# Patient Record
Sex: FEMALE | Race: WHITE | Employment: OTHER | URBAN - METROPOLITAN AREA
[De-identification: names, ages, dates, MRNs, and addresses within clinical notes are randomized per-mention and may not be internally consistent; named-entity substitution may affect disease eponyms.]

---

## 2017-01-03 ENCOUNTER — OFFICE VISIT (OUTPATIENT)
Dept: FAMILY MEDICINE | Facility: CLINIC | Age: 72
End: 2017-01-03
Payer: MEDICARE

## 2017-01-03 VITALS
HEIGHT: 64 IN | DIASTOLIC BLOOD PRESSURE: 72 MMHG | SYSTOLIC BLOOD PRESSURE: 134 MMHG | BODY MASS INDEX: 23.93 KG/M2 | WEIGHT: 140.19 LBS | HEART RATE: 68 BPM | OXYGEN SATURATION: 97 %
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
## 2017-01-03 DIAGNOSIS — M54.2 NECK PAIN: Primary | ICD-10-CM

## 2017-01-03 DIAGNOSIS — Z23 NEED FOR INFLUENZA VACCINATION: ICD-10-CM

## 2017-01-03 DIAGNOSIS — M54.50 ACUTE BILATERAL LOW BACK PAIN WITHOUT SCIATICA: ICD-10-CM

## 2017-01-03 PROCEDURE — 99999 PR PBB SHADOW E&M-EST. PATIENT-LVL IV: CPT | Mod: PBBFAC,,, | Performed by: FAMILY MEDICINE

## 2017-01-03 PROCEDURE — 1125F AMNT PAIN NOTED PAIN PRSNT: CPT | Mod: S$GLB,,, | Performed by: FAMILY MEDICINE

## 2017-01-03 PROCEDURE — 1157F ADVNC CARE PLAN IN RCRD: CPT | Mod: S$GLB,,, | Performed by: FAMILY MEDICINE

## 2017-01-03 PROCEDURE — 1159F MED LIST DOCD IN RCRD: CPT | Mod: S$GLB,,, | Performed by: FAMILY MEDICINE

## 2017-01-03 PROCEDURE — 99214 OFFICE O/P EST MOD 30 MIN: CPT | Mod: 25,S$GLB,, | Performed by: FAMILY MEDICINE

## 2017-01-03 PROCEDURE — 90688 IIV4 VACCINE SPLT 0.5 ML IM: CPT | Mod: S$GLB,,, | Performed by: FAMILY MEDICINE

## 2017-01-03 PROCEDURE — 1160F RVW MEDS BY RX/DR IN RCRD: CPT | Mod: S$GLB,,, | Performed by: FAMILY MEDICINE

## 2017-01-03 PROCEDURE — 3075F SYST BP GE 130 - 139MM HG: CPT | Mod: S$GLB,,, | Performed by: FAMILY MEDICINE

## 2017-01-03 PROCEDURE — G0008 ADMIN INFLUENZA VIRUS VAC: HCPCS | Mod: S$GLB,,, | Performed by: FAMILY MEDICINE

## 2017-01-03 PROCEDURE — 3078F DIAST BP <80 MM HG: CPT | Mod: S$GLB,,, | Performed by: FAMILY MEDICINE

## 2017-01-03 RX ORDER — HYDROCODONE BITARTRATE AND ACETAMINOPHEN 5; 325 MG/1; MG/1
1 TABLET ORAL EVERY 8 HOURS PRN
Qty: 30 TABLET | Refills: 0 | Status: SHIPPED | OUTPATIENT
Start: 2017-01-03 | End: 2017-06-14

## 2017-01-03 RX ORDER — CYCLOBENZAPRINE HCL 5 MG
5 TABLET ORAL 3 TIMES DAILY PRN
Qty: 30 TABLET | Refills: 0 | Status: SHIPPED | OUTPATIENT
Start: 2017-01-03 | End: 2017-01-13

## 2017-01-03 NOTE — MR AVS SNAPSHOT
[facility]   [place]  [place] [address]  Phone: [phone]  Fax: [phone]                  Grace Navarro   1/3/2017 4:00 PM   Office Visit    Description:  Female : 1945   Provider:  Erasto Maier MD   Department:  [facility]           Reason for Visit     Fall     Back Pain     Neck Pain           Diagnoses this Visit        Comments    Neck pain    -  Primary     Acute bilateral low back pain without sciatica         Need for influenza vaccination                To Do List           Future Appointments        Provider Department Dept Phone    2017 1:30 PM KENH XR1 300 LB LIMIT [facility] [phone]    2017 1:45 PM KENH XR1 300 LB LIMIT [facility] [phone]    2017 2:00 PM KENH XR1 300 LB LIMIT [facility] [phone]    2017 2:15 PM KENH XR1 300 LB LIMIT [facility] [phone]    2017 8:00 AM Funmi An, PT [facility] [phone]      Goals (5 Years of Data)     None       These Medications        Disp Refills Start End    hydrocodone-acetaminophen 5-325mg (NORCO) 5-325 mg per tablet 30 tablet 0 1/3/2017     Take 1 tablet by mouth every 8 (eight) hours as needed for Pain. - Oral    Pharmacy: [facility] [address] Ph #: [phone]       cyclobenzaprine (FLEXERIL) 5 MG tablet 30 tablet 0 1/3/2017 2017    Take 1 tablet (5 mg total) by mouth 3 (three) times daily as needed. - Oral    Pharmacy: [facility]  [place] [address] Ph #: [phone]         [facility]     [facility] Care [facility] -  Assistance  Registered nurses in the [facility] provide clinical advisement, health education, appointment booking, and other advisory services.  Call for this free service at [phone].             Medications            Message regarding Medications     Verify the changes and/or additions to your medication regime listed below are the same as discussed with your clinician today.  If any of these changes or additions are incorrect, please notify your healthcare provider.        START taking these NEW medications        Refills    hydrocodone-acetaminophen 5-325mg (NORCO) 5-325 mg per tablet 0    Sig: Take 1 tablet by mouth every 8 (eight) hours as needed for Pain.    Class: Print    Route: Oral    cyclobenzaprine (FLEXERIL) 5 MG tablet 0    Sig: Take 1 tablet (5 mg total) by mouth 3 (three) times daily as needed.    Class: Normal    Route: Oral      STOP taking these medications     acetaminophen (TYLENOL) 325 MG tablet Take 325 mg by mouth every 6 (six) hours as needed for Pain.    tramadol (ULTRAM) 50 mg tablet Take 50 mg by mouth every 6 (six) hours as needed for Pain.           Verify that the below list of medications is an accurate representation of the medications you are currently taking.  If none reported, the list may be blank. If incorrect, please contact your healthcare provider. Carry this list with you in case of emergency.           Current Medications     albuterol 90 mcg/actuation inhaler Inhale 2 puffs into the lungs every 6 (six) hours as needed for Wheezing.    ascorbic acid (VITAMIN C) 1000 MG tablet Take 1,000 mg by mouth once daily.    azelastine (ASTELIN) 137 mcg (0.1 %) nasal spray 1 spray (137 mcg total) by Nasal route 2 (two) times daily.    calcium-vitamin D3 (CALCIUM 500 + D) 500 mg(1,250mg) -200 unit per tablet Take 1 tablet by mouth 2 (two) times daily with meals.    cetirizine 10 mg chewable tablet Take 10 mg by mouth once daily.    metoprolol succinate (TOPROL-XL) 200 MG 24 hr tablet Take 1 tablet (200 mg total) by mouth once daily.    piroxicam (FELDENE) 10 MG Cap Take 1 capsule (10 mg total) by mouth once daily.    cyclobenzaprine (FLEXERIL) 5 MG tablet Take 1 tablet (5 mg total) by mouth 3  "(three) times daily as needed.    hydrocodone-acetaminophen 5-325mg (NORCO) 5-325 mg per tablet Take 1 tablet by mouth every 8 (eight) hours as needed for Pain.           Clinical Reference Information           Vital Signs - Last Recorded  Most recent update: 1/3/2017  3:59 PM by Mary Centeno MA    BP Pulse Ht Wt SpO2 BMI    134/72 (BP Location: Right arm, Patient Position: Sitting, BP Method: Manual) 68 5' 4" (1.626 m) 63.6 kg (140 lb 3.4 oz) 97% 24.07 kg/m2      Blood Pressure          Most Recent Value    BP  134/72      Allergies as of 1/3/2017     Ace Inhibitors    Morphine      Immunizations Administered on Date of Encounter - 1/3/2017     Name Date Dose VIS Date Route    influenza - Quadrivalent  Incomplete 0.5 mL 8/7/2015 Intramuscular      Orders Placed During Today's Visit      Normal Orders This Visit    Influenza - Quadrivalent (3 years & older)     Future Labs/Procedures Expected by Expires    X-Ray Cervical Spine AP And Lateral  1/3/2017 4/3/2017    X-Ray Lumbar Spine Ap And Lateral  1/3/2017 4/3/2017    X-Ray Sacrum And Coccyx  1/3/2017 1/3/2018    X-Ray Thoracic Spine AP Lateral  1/3/2017 4/3/2017      "

## 2017-01-03 NOTE — PROGRESS NOTES
Subjective:       Patient ID: Grace Navarro is a 71 y.o. female.    Chief Complaint: Fall; Back Pain; and Neck Pain    HPI Comments: 71 years old female who came to the clinic after recent fall when a piece of furniture fell over her.  Patient with neck upper and lower Back pain for the last several days.  The pain is 7 out of 10 of intensity on and off aggravated with activity and better with rest.  Patient requests her influenza vaccination.    Fall     Back Pain     Neck Pain        Review of Systems   Constitutional: Negative.    HENT: Negative.    Eyes: Negative.    Respiratory: Negative.    Cardiovascular: Negative.    Gastrointestinal: Negative.    Genitourinary: Negative.    Musculoskeletal: Positive for back pain and neck pain.   Skin: Negative.    Neurological: Negative.    Psychiatric/Behavioral: Negative.        Objective:      Physical Exam   Constitutional: She is oriented to person, place, and time. She appears well-developed and well-nourished. No distress.   HENT:   Head: Normocephalic and atraumatic.   Right Ear: External ear normal.   Left Ear: External ear normal.   Nose: Nose normal.   Mouth/Throat: Oropharynx is clear and moist. No oropharyngeal exudate.   Eyes: Conjunctivae and EOM are normal. Pupils are equal, round, and reactive to light. Right eye exhibits no discharge. Left eye exhibits no discharge. No scleral icterus.   Neck: Normal range of motion. Neck supple. No JVD present. No tracheal deviation present. No thyromegaly present.   Cardiovascular: Normal rate, regular rhythm, normal heart sounds and intact distal pulses.  Exam reveals no gallop and no friction rub.    No murmur heard.  Pulmonary/Chest: Effort normal and breath sounds normal. No stridor. No respiratory distress. She has no wheezes. She has no rales. She exhibits no tenderness.   Abdominal: Soft. Bowel sounds are normal. She exhibits no distension and no mass. There is no tenderness. There is no rebound and no  guarding.   Musculoskeletal: Normal range of motion. She exhibits no edema.        Cervical back: She exhibits tenderness.        Thoracic back: She exhibits tenderness.        Lumbar back: She exhibits tenderness, pain and spasm.   Lymphadenopathy:     She has no cervical adenopathy.   Neurological: She is alert and oriented to person, place, and time. She has normal reflexes. No cranial nerve deficit. She exhibits normal muscle tone. Coordination normal.   Skin: Skin is warm and dry. No rash noted. She is not diaphoretic. No erythema. No pallor.   Psychiatric: She has a normal mood and affect. Her behavior is normal. Judgment and thought content normal.       Assessment:       1. Neck pain    2. Acute bilateral low back pain without sciatica    3. Need for influenza vaccination    4. Fall, initial encounter        Plan:         Grace was seen today for fall, back pain and neck pain.    Diagnoses and all orders for this visit:    Neck pain  -     X-Ray Cervical Spine AP And Lateral; Future  -     hydrocodone-acetaminophen 5-325mg (NORCO) 5-325 mg per tablet; Take 1 tablet by mouth every 8 (eight) hours as needed for Pain.  -     cyclobenzaprine (FLEXERIL) 5 MG tablet; Take 1 tablet (5 mg total) by mouth 3 (three) times daily as needed.    Acute bilateral low back pain without sciatica  -     X-Ray Lumbar Spine Ap And Lateral; Future  -     X-Ray Thoracic Spine AP Lateral; Future  -     X-Ray Sacrum And Coccyx; Future  -     hydrocodone-acetaminophen 5-325mg (NORCO) 5-325 mg per tablet; Take 1 tablet by mouth every 8 (eight) hours as needed for Pain.  -     cyclobenzaprine (FLEXERIL) 5 MG tablet; Take 1 tablet (5 mg total) by mouth 3 (three) times daily as needed.    Need for influenza vaccination  -     Influenza - Quadrivalent (3 years & older)    Fall, initial encounter

## 2017-01-04 ENCOUNTER — HOSPITAL ENCOUNTER (OUTPATIENT)
Dept: RADIOLOGY | Facility: HOSPITAL | Age: 72
Discharge: HOME | End: 2017-01-04
Attending: FAMILY MEDICINE
Payer: MEDICARE

## 2017-01-04 DIAGNOSIS — M54.50 ACUTE BILATERAL LOW BACK PAIN WITHOUT SCIATICA: ICD-10-CM

## 2017-01-04 DIAGNOSIS — M54.2 NECK PAIN: ICD-10-CM

## 2017-01-04 PROCEDURE — 72040 X-RAY EXAM NECK SPINE 2-3 VW: CPT | Mod: 26,,, | Performed by: RADIOLOGY

## 2017-01-04 PROCEDURE — 72100 X-RAY EXAM L-S SPINE 2/3 VWS: CPT | Mod: 26,,, | Performed by: RADIOLOGY

## 2017-01-04 PROCEDURE — 72040 X-RAY EXAM NECK SPINE 2-3 VW: CPT | Mod: TC,PO

## 2017-01-04 PROCEDURE — 72070 X-RAY EXAM THORAC SPINE 2VWS: CPT | Mod: TC,PO

## 2017-01-04 PROCEDURE — 72100 X-RAY EXAM L-S SPINE 2/3 VWS: CPT | Mod: TC,PO

## 2017-01-04 PROCEDURE — 72070 X-RAY EXAM THORAC SPINE 2VWS: CPT | Mod: 26,,, | Performed by: RADIOLOGY

## 2017-01-04 PROCEDURE — 72220 X-RAY EXAM SACRUM TAILBONE: CPT | Mod: 26,,, | Performed by: RADIOLOGY

## 2017-01-04 PROCEDURE — 72220 X-RAY EXAM SACRUM TAILBONE: CPT | Mod: TC,PO

## 2017-01-10 ENCOUNTER — HOSPITAL ENCOUNTER (EMERGENCY)
Facility: HOSPITAL | Age: 72
Discharge: HOME | End: 2017-01-10
Attending: EMERGENCY MEDICINE
Payer: MEDICARE

## 2017-01-10 VITALS
HEART RATE: 76 BPM | TEMPERATURE: 98 F | HEIGHT: 64 IN | WEIGHT: 142 LBS | OXYGEN SATURATION: 99 % | BODY MASS INDEX: 24.24 KG/M2 | RESPIRATION RATE: 18 BRPM | DIASTOLIC BLOOD PRESSURE: 62 MMHG | SYSTOLIC BLOOD PRESSURE: 127 MMHG

## 2017-01-10 DIAGNOSIS — M25.511 ACUTE PAIN OF RIGHT SHOULDER: Primary | ICD-10-CM

## 2017-01-10 PROCEDURE — 63600175 PHARM REV CODE 636 W HCPCS: Performed by: PHYSICIAN ASSISTANT

## 2017-01-10 PROCEDURE — 99283 EMERGENCY DEPT VISIT LOW MDM: CPT | Mod: 25

## 2017-01-10 PROCEDURE — 96372 THER/PROPH/DIAG INJ SC/IM: CPT

## 2017-01-10 RX ORDER — DIAZEPAM 5 MG/1
5 TABLET ORAL EVERY 6 HOURS PRN
COMMUNITY
End: 2017-02-14

## 2017-01-10 RX ORDER — ORPHENADRINE CITRATE 30 MG/ML
30 INJECTION INTRAMUSCULAR; INTRAVENOUS
Status: COMPLETED | OUTPATIENT
Start: 2017-01-10 | End: 2017-01-10

## 2017-01-10 RX ADMIN — ORPHENADRINE CITRATE 30 MG: 30 INJECTION INTRAMUSCULAR; INTRAVENOUS at 11:01

## 2017-01-10 NOTE — DISCHARGE INSTRUCTIONS
Exercises for Shoulder Flexibility: External Rotation  This stretch can help restore shoulder flexibility and relieve pain over time. When stretching, be sure to breathe deeply. Follow any special instructions from your doctor or physical therapist:  1.  a doorway. Grasp the doorjamb with the hand on the frozen side. Your arm should be bent.  2. With the other hand, hold the elbow on the frozen side firmly against your body.  3. Standing in the same spot, rotate your body away from the doorjamb. Stop when you feel the stretch in the shoulder. At first, try to hold the stretch for 5 seconds.  4. Work up to doing 3 sets of this stretch, 3 times a day. Work up to holding the stretch for 30 to 60 seconds.  Note: Keep your arms as still as you can. Over time, rotate your body a little more to enhance the stretch. But be careful not to twist your back.  Frozen shoulder  Frozen shoulder is another name for adhesive capsulitis, which causes restricted movement in the shoulder. If you have frozen shoulder, this stretch may cause discomfort, especially when you first get started. A few months may pass before you achieve the results you want. But once your shoulder heals, it rarely becomes frozen again. So stick to your stretching program. If you have any questions, be sure to ask your doctor.   © 7697-1581 [employer]. [address] [place] [address], [place] [address]. All rights reserved. This information is not intended as a substitute for professional medical care. Always follow your healthcare professional's instructions.

## 2017-01-10 NOTE — ED PROVIDER NOTES
Encounter Date: 1/10/2017       History     Chief Complaint   Patient presents with    Shoulder Pain     shoulder pain for over one week.  Was seen by PCP and pain is worse.     Review of patient's allergies indicates:   Allergen Reactions    Ace inhibitors Other (See Comments)    Morphine Other (See Comments)     hallucination     HPI Comments: Grace Navarro, a 71 y.o. female that presents to the ED for R sided shoulder pain that started 2 days ago.  Pain is constant, burning and stabbing, is worse with movement.  Treatments tried include Norco, valium and ice with no improvement of symptoms.  Denies any recent trauma to this site or any previous surgeries.  She is currently under the treatment of a pain management doctor.   She is requesting a shot for pain d/t having to travel in 2 days for her daughter's wedding.        The history is provided by the patient.     Past Medical History   Diagnosis Date    Anxiety     Rene's disease     Chronic low back pain     Depression     Goiter, nodular     Hyperlipidemia     Hypertension     Irritable bowel     Neuromuscular disorder     PUD (peptic ulcer disease)     Varicose veins      No past medical history pertinent negatives.  Past Surgical History   Procedure Laterality Date    Appendectomy      Tonsillectomy      Spine surgery      Belpharoptosis repair Bilateral     Cataract extraction w/  intraocular lens implant Left 08/03/2016         Cataract extraction w/  intraocular lens implant Right 09/21/2016          Family History   Problem Relation Age of Onset    Hypertension Mother     Kidney failure Mother     Hypertension Father     Coronary artery disease Father     Prostate cancer Father     Eczema Son     Diabetes Brother     Thyroid cancer Neg Hx      Social History   Substance Use Topics    Smoking status: Former Smoker     Types: Cigarettes    Smokeless tobacco: None      Comment: quit 1975     Alcohol use 3.0 oz/week     5 Glasses of wine per week      Comment: socially     Review of Systems   Constitutional: Negative for fever.   Respiratory: Negative for chest tightness.    Cardiovascular: Negative for chest pain.   Gastrointestinal: Negative for nausea and vomiting.   Musculoskeletal: Positive for arthralgias (R shoulder pain ). Negative for neck pain and neck stiffness.   Skin: Negative for color change and rash.   Allergic/Immunologic: Negative for immunocompromised state.   Neurological: Negative for numbness.   Hematological: Does not bruise/bleed easily.   Psychiatric/Behavioral: Negative for agitation and confusion.   All other systems reviewed and are negative.      Physical Exam   Initial Vitals   BP Pulse Resp Temp SpO2   01/10/17 0945 01/10/17 0945 01/10/17 0945 01/10/17 0945 01/10/17 0945   119/60 81 16 98.1 °F (36.7 °C) 99 %     Physical Exam    Nursing note and vitals reviewed.  Constitutional: She appears well-developed and well-nourished. No distress.   HENT:   Head: Normocephalic and atraumatic.   Right Ear: External ear normal.   Left Ear: External ear normal.   Nose: Nose normal.   Mouth/Throat: Oropharynx is clear and moist.   Eyes: Conjunctivae and EOM are normal.   Neck: Normal range of motion. No tracheal deviation present.   Cardiovascular: Normal rate and regular rhythm.   Pulmonary/Chest: No respiratory distress.   Abdominal: Soft. Bowel sounds are normal. There is no tenderness. There is no rebound and no guarding.   Musculoskeletal: Normal range of motion.        Right shoulder: She exhibits tenderness, pain and spasm. She exhibits normal range of motion, no bony tenderness, no swelling, no effusion and no crepitus.        Arms:  Negative drop arm and empty can test, normal flexion and extension with increased pain to R shoulder.      Neurological: She is alert and oriented to person, place, and time. She has normal strength.   Skin: Skin is warm and dry. No rash noted. No  erythema.   Psychiatric: She has a normal mood and affect. Thought content normal.         ED Course   Procedures  Labs Reviewed - No data to display          Medical Decision Making:   Initial Assessment:   Grace Navarro, a 71 y.o.non-toxic/afebrile  female that presents to the ED for R sided shoulder pain that started 2 days ago.  Pain is constant, burning and stabbing, is worse with movement.  Treatments tried include Norco and valium and ice with no improvement of symptoms.  Denies any recent trauma to this site or any previous surgeries.  She is currently under the treatment of a pain management doctor.    Differential Diagnosis:   RTC, muscle spasm, radicular pain   ED Management:  No recent trauma or bony pain to warrant imaging.  Patient was given an injection of orphenadrine with improvement of pain and instructed to continue with massage therapy and the valium and norco that were previously prescribed.  She was given strict precautions for return to ED and verbalized understanding.                     ED Course     Clinical Impression:   The encounter diagnosis was Acute pain of right shoulder.          Ellie Jewell PA-C  01/10/17 3925

## 2017-01-10 NOTE — ED NOTES
"Pt c/o R trapezius and R upper back pain x 1 week.  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  Pt states pain is worse with movement. Pt denies relief from pain medication.   "

## 2017-01-10 NOTE — ED AVS SNAPSHOT
[facility] [address]  [place] [address]               Grace Navarro   1/10/2017 10:07 AM   ED    Description:  Female : 1945   Department:  [facility]           Your Care was Coordinated By:     Provider Role From To    Oral Javed Jr., MD Attending Provider 01/10/17 1034 --    Ellie Jewell PA-C Physician Assistant 01/10/17 1021 --      Reason for Visit     Shoulder Pain           Diagnoses this Visit        Comments    Acute pain of right shoulder    -  Primary       ED Disposition     None           To Do List           [facility]     [facility] Care [facility] -  Assistance  Registered nurses in the [facility] provide clinical advisement, health education, appointment booking, and other advisory services.  Call for this free service at [phone].             Medications           Message regarding Medications     Verify the changes and/or additions to your medication regime listed below are the same as discussed with your clinician today.  If any of these changes or additions are incorrect, please notify your healthcare provider.        These medications were administered today        Dose Freq    orphenadrine injection 30 mg 30 mg ED 1 Time    Sig: Inject 1 mL (30 mg total) into the muscle ED 1 Time.    Class: Normal    Route: Intramuscular    Cosign for Ordering: Required by Oral Javed Jr., MD           Verify that the below list of medications is an accurate representation of the medications you are currently taking.  If none reported, the list may be blank. If incorrect, please contact your healthcare provider. Carry this list with you in case of emergency.           Current Medications     cyclobenzaprine (FLEXERIL) 5 MG tablet Take 1 tablet (5 mg total) by mouth 3 (three) times daily as needed.    diazePAM (VALIUM) 5 MG tablet Take 5 mg by mouth every 6 (six) hours as needed  "for Anxiety.    hydrocodone-acetaminophen 5-325mg (NORCO) 5-325 mg per tablet Take 1 tablet by mouth every 8 (eight) hours as needed for Pain.    albuterol 90 mcg/actuation inhaler Inhale 2 puffs into the lungs every 6 (six) hours as needed for Wheezing.    ascorbic acid (VITAMIN C) 1000 MG tablet Take 1,000 mg by mouth once daily.    azelastine (ASTELIN) 137 mcg (0.1 %) nasal spray 1 spray (137 mcg total) by Nasal route 2 (two) times daily.    calcium-vitamin D3 (CALCIUM 500 + D) 500 mg(1,250mg) -200 unit per tablet Take 1 tablet by mouth 2 (two) times daily with meals.    cetirizine 10 mg chewable tablet Take 10 mg by mouth once daily.    metoprolol succinate (TOPROL-XL) 200 MG 24 hr tablet Take 1 tablet (200 mg total) by mouth once daily.    piroxicam (FELDENE) 10 MG Cap Take 1 capsule (10 mg total) by mouth once daily.           Clinical Reference Information           Your Vitals Were     BP Pulse Temp Resp Height Weight    119/60 (BP Location: Left arm, Patient Position: Sitting) 81 98.1 °F (36.7 °C) (Oral) 16 5' 4" (1.626 m) 64.4 kg (142 lb)    SpO2 BMI             99% 24.37 kg/m2         Allergies as of 1/10/2017        Reactions    Ace Inhibitors Other (See Comments)    Morphine Other (See Comments)    hallucination      Immunizations Administered on Date of Encounter - 1/10/2017     None      ED Micro, Lab, POCT     None      ED Imaging Orders     None        Discharge Instructions         Exercises for Shoulder Flexibility: External Rotation  This stretch can help restore shoulder flexibility and relieve pain over time. When stretching, be sure to breathe deeply. Follow any special instructions from your doctor or physical therapist:  1.  a doorway. Grasp the doorjamb with the hand on the frozen side. Your arm should be bent.  2. With the other hand, hold the elbow on the frozen side firmly against your body.  3. Standing in the same spot, rotate your body away from the doorjamb. Stop when you " feel the stretch in the shoulder. At first, try to hold the stretch for 5 seconds.  4. Work up to doing 3 sets of this stretch, 3 times a day. Work up to holding the stretch for 30 to 60 seconds.  Note: Keep your arms as still as you can. Over time, rotate your body a little more to enhance the stretch. But be careful not to twist your back.  Frozen shoulder  Frozen shoulder is another name for adhesive capsulitis, which causes restricted movement in the shoulder. If you have frozen shoulder, this stretch may cause discomfort, especially when you first get started. A few months may pass before you achieve the results you want. But once your shoulder heals, it rarely becomes frozen again. So stick to your stretching program. If you have any questions, be sure to ask your doctor.   © 4664-9091 [employer]. [address] [place] [address]. All rights reserved. This information is not intended as a substitute for professional medical care. Always follow your healthcare professional's instructions.          Your Scheduled Appointments     Jan 18, 2017  8:00 AM CST   New Physical Therapy Patient with Funmi An, PT   [facility] ([address] [id number] [phone]            Feb 14, 2017  1:20 PM CST   Physical with MD Shankar Linda kerry - Internal Medicine ([facility])    [address]  [place] [address] [phone]              Smoking Cessation     If you would like to quit smoking:   You may be eligible for free services if you are a [place] resident and started smoking cigarettes before September 1, 1988.  Call the Smoking Cessation Trust (SCT) toll free at (090) 173-3150 or (419) 849-6319.   Call 1-800-QUIT-NOW if you do not meet the above criteria.             [facility] complies with applicable Federal civil rights laws and does not discriminate on the  basis of race, color, national origin, age, disability, or sex.        Language Assistance Services     ATTENTION: Language assistance services are available, free of charge. Please call [phone].      ATENCIÓN: Si habla judi, tiene a saleem disposición servicios gratuitos de asistencia lingüística. Llame al [phone].     CHÚ Ý: N?u b?n nói Ti?ng Vi?t, có các d?ch v? h? tr? ngôn ng? mi?n phí dành cho b?n. G?i s? [phone].

## 2017-01-12 ENCOUNTER — OFFICE VISIT (OUTPATIENT)
Dept: INTERNAL MEDICINE | Facility: CLINIC | Age: 72
End: 2017-01-12
Payer: MEDICARE

## 2017-01-12 VITALS
HEART RATE: 82 BPM | DIASTOLIC BLOOD PRESSURE: 84 MMHG | HEIGHT: 64 IN | OXYGEN SATURATION: 98 % | SYSTOLIC BLOOD PRESSURE: 142 MMHG | BODY MASS INDEX: 23.53 KG/M2 | WEIGHT: 137.81 LBS

## 2017-01-12 DIAGNOSIS — M62.838 NECK MUSCLE SPASM: Primary | ICD-10-CM

## 2017-01-12 PROCEDURE — 1160F RVW MEDS BY RX/DR IN RCRD: CPT | Mod: S$GLB,,, | Performed by: INTERNAL MEDICINE

## 2017-01-12 PROCEDURE — 3077F SYST BP >= 140 MM HG: CPT | Mod: S$GLB,,, | Performed by: INTERNAL MEDICINE

## 2017-01-12 PROCEDURE — 1157F ADVNC CARE PLAN IN RCRD: CPT | Mod: S$GLB,,, | Performed by: INTERNAL MEDICINE

## 2017-01-12 PROCEDURE — 99999 PR PBB SHADOW E&M-EST. PATIENT-LVL IV: CPT | Mod: PBBFAC,,, | Performed by: INTERNAL MEDICINE

## 2017-01-12 PROCEDURE — 1159F MED LIST DOCD IN RCRD: CPT | Mod: S$GLB,,, | Performed by: INTERNAL MEDICINE

## 2017-01-12 PROCEDURE — 1125F AMNT PAIN NOTED PAIN PRSNT: CPT | Mod: S$GLB,,, | Performed by: INTERNAL MEDICINE

## 2017-01-12 PROCEDURE — 99213 OFFICE O/P EST LOW 20 MIN: CPT | Mod: S$GLB,,, | Performed by: INTERNAL MEDICINE

## 2017-01-12 PROCEDURE — 3079F DIAST BP 80-89 MM HG: CPT | Mod: S$GLB,,, | Performed by: INTERNAL MEDICINE

## 2017-01-12 RX ORDER — PREDNISONE 20 MG/1
TABLET ORAL
Qty: 10 TABLET | Refills: 0 | Status: SHIPPED | OUTPATIENT
Start: 2017-01-12 | End: 2017-02-14

## 2017-01-12 NOTE — PROGRESS NOTES
"Portions of this note are generated with voice recognition software. Typographical errors may exist.     SUBJECTIVE:    This is a/an 71 y.o. female here for primary care visit for  Chief Complaint   Patient presents with    Neck Pain    Shoulder Pain     right     Patient states that she had acute development of neck and paraspinal pain in the thoracic region and subscapular region which prompted her to seek medical services at the emergency room because she did not have availability at urgent medical visit.  She received an injection at the emergency room with moderate relief of symptoms but returns for medical evaluation because she has had difficulty managing symptoms despite regular use of narcotic pain medication and muscle relaxer.  Patient voices remission of paraspinal pain and subscapular pain.  States this resolved with the massage received earlier today.  However she still has significant problems with stiffness in the neck.  Patient has resorted to wearing a neck brace today thinking that this would improve her overall symptoms in the neck region.  Denies cervical radiculopathy.  Denies constitutional symptoms.  Denies motor sensory or coordination deficits of the extremities.  Patient admits that stress does worsen her pain.  She is scheduled to travel tomorrow for a wedding.  Patient is anxious to "quickly fix this pain before I travel."    Medications Reviewed and Updated    Past medical, family, and social histories were reviewed and updated.    Review of Systems negative unless noted otherwise in history of present illness-  General ROS: negative  Psychological ROS: negative  Allergy and Immunology ROS: negative  Hematological and Lymphatic ROS: negative  Musculoskeletal ROS: negative  Neurological ROS: negative    Allergic:    Review of patient's allergies indicates:   Allergen Reactions    Ace inhibitors Other (See Comments)    Morphine Other (See Comments)     hallucination "       OBJECTIVE:  BP: (!) 142/84 Pulse: 82    Wt Readings from Last 3 Encounters:   01/12/17 62.5 kg (137 lb 12.6 oz)   01/10/17 64.4 kg (142 lb)   01/03/17 63.6 kg (140 lb 3.4 oz)    Body mass index is 23.65 kg/(m^2).  Previous Blood Pressure Readings :   BP Readings from Last 3 Encounters:   01/12/17 (!) 142/84   01/10/17 127/62   01/03/17 134/72     GEN: No apparent distress  HEENT: sclera non-icteric, conjunctiva clear  CV: no peripheral edema  PULM: breathing non-labored  ABD: Obese, protuberant abdomen.  PSYCH: appropriate affect  MSK: able to rise from chair without assistance  · Paraspinal tenderness cervical region and occipital region.  Palpable muscle spasms limiting range of motion in the cervical region.  · Remainder of shoulder and upper back exam unremarkable.  Neurologic: Normal gait.  5 out of 5 strength all extremities.  Sensory exam is equal all 4 extremities to light touch.  SKIN: normal skin turgor    Pertinent Labs Reviewed       ASSESSMENT/PLAN:    Muscle spasms.  Multiple Locations.  Not optimally controlled.  Etiology multifactorial.  Tension headache influenced by stress.  Neck and back strain due to deconditioning and awkward movement.  Counseling on self-care measures.  Recommend patient continue to pursue [place] measures.  Follow with physical therapy.  Recommend against concomitant use of narcotic and muscle relaxer to avoid adverse effects.  Recommend against steroid injection in this elderly patient.  Patient reluctant to pursue NSAID medication.  Patient can proceed cautiously with prednisone 20 mg daily for the next 5-10 days.  Risks benefits and alternatives explained to the patient.  Patient strongly voices preference for steroids therapy.       Future Appointments  Date Time Provider Department Center   1/18/2017 8:00 AM Funmi An, PT [facility]   2/14/2017 1:20 PM Lisa Dean MD [facility]       Tashi Darden  1/12/2017  5:49 PM

## 2017-01-12 NOTE — PATIENT INSTRUCTIONS
PRECAUTIONS WITH Prednisone  · Please take your prednisone medication as prescribed  · NEVER take the prednisone at night. This is a morning medicine to be taken with food.   · You should also consider taking an antacid (Pepcid, Zantac, Prilosec) while you are on this medicine to avoid stomach upset. Take the antacid daily so long as you are on prednisone.     Please call the clinic if you develop the following side effects and stop the steroid   Significant stomach pain or upset   Inability to sleep   Pain in the hip or joints that doesn't go away  Easy and diffuse bruising all over      Body aches or fever      Contracción De Lola [Neck Spasm, No Trauma]  La contracción de los músculos del lola puede ocurrir después de un movimiento de lola torpe. También puede provocar contracción muscular dormir con saleem lola ladeado. Algunas personas responden a la tensión emocional tensando los músculos del lola, hombros, y espalda superior. Si el espasmo de lola dura mucho tiempo puede causar dolor de keron.    El tratamiento descrito abajo generalmente contribuirá que el dolor desaparezca en 5-7 días. El dolor que continua puede requerir andres evaluación mayor u otro tipo de tratamiento dana la terapia física.  Cuidado En [place]:  1. Descanse y relaje los músculos. Use andres almohada cómoda que sostenga la keron y mantenga la columna en posición neutral. La posición de la keron no debe estar inclinada hacia sergo o detrás. Andres toalla enrollada puede ayudar a adaptar la posición a saleem necesidad.  2. Algunas personas se alivian con calor (ducha caliente, baño caliente o compresa caliente) y masajes, mientras que otras prefieren compresas frías (hielo en cubos o molido en bolsa de plástico, envuelta en andres toalla). Intente los dos y use el método que le resulte mejor dipesh 20 minutos varias veces al día.  3. Puede usar acetaminofén (Tylenol) o ibuprofeno (Motrin, Advil) para controlar el dolor, a menos que le  receten otro medicamento para el dolor. [NOTA: Si tiene insuficiencia hepática o renal, o alguna vez ha tenido andres úlcera estomacal o sangrado gastrointestinal, hable con saleem médico antes de usar estos medicamentos.]  Seguimiento  con saleem médico o en esta institución si severino síntomas no muestran señales de mejora luego de andres semana. Puede necesitar terapia física o andres evaluación mayor.  [NOTA: Si le hicieron andres radiografía, la va a revisar un radiólogo. Le avisarán si encuentran algo que pueda afectar saleem atención.]  Busque Prontamente Atención Médica  si algo de lo siguiente ocurre:  · Dolor que empeora o se extiende a taylor o ambos brazos  · Debilidad o entumecimiento de taylor o ambos brazos  · Dolor de keron creciente con náuseas o vómito  · Fiebre por encima de 100.4°F (38.0°C)  © 4530-7231 The [employer]. [address] [place] [address], [place] [address]. Todos los derechos reservados. Esta información no pretende sustituir la atención médica profesional. Sólo saleem médico puede diagnosticar y tratar un problema de bentley.        Inclinación de la keron / estiramiento del trapecio superior (flexibilidad)    4. Siéntese derecho en andres silla con saleem keron y sofya en andres posición neutral, alineando las orejas con los hombros. Tómese del borde del asiento de saleem silla con saleem mano derecha. [place] levemente saleem barbilla.  5. Incline saleem keron hacia la izquierda mirando hacia adelante.  6. Coloque saleem mano izquierda sobre el lado derecho de saleem keron. Empuje saleem keron suavemente hacia la izquierda. Sostenga por 30 a 60 segundos. Aplique andres presión suave para aumentar el estiramiento. No fuerce saleem keron a la posición.  7. Regrese saleem keron y sofya a la posición neutral.  8. Repita vipul ejercicio dos veces o según las instrucciones que haya recibido.  9. Cambie de lado y repita dos veces o según las instrucciones que haya recibido.  Desafíese  [place] un extremo de andres toalla debajo de saleem brazo vane. Luego lleve el otro  extremo hasta cubrir saleem hombro derecho y también meta la toalla debajo de vipul. Jale de la toalla hacia abajo de saleem lado derecho usando severino dos hussain a medida que gira saleem keron hacia la izquierda. Repita del otro lado.   © 1716-7385 The [employer]. [address] [place] [address], [place] [address]. All rights reserved. This information is not intended as a substitute for professional medical care. Always follow your healthcare professional's instructions.

## 2017-01-12 NOTE — MR AVS SNAPSHOT
[facility]   [place] [address]  Phone: [phone]  Fax: [phone]                  Grace Navarro   2017 2:00 PM   Office Visit    Description:  Female : 1945   Provider:  Tashi Darden MD   Department:  [place] [place] [facility]           Reason for Visit     Neck Pain     Shoulder Pain           Diagnoses this Visit        Comments    Neck muscle spasm    -  Primary            To Do List           Future Appointments        Provider Department Dept Phone    2017 8:00 AM Funmi An PT [facility] [phone]    2017 1:20 PM Lisa Dean MD [facility] [phone]      Goals (5 Years of Data)     None       These Medications        Disp Refills Start End    predniSONE (DELTASONE) 20 MG tablet 10 tablet 0 2017     Take 1 tablet with breakfast for 5 to 10 days    Pharmacy: [facility] [address] [place] [address] Ph #: [phone]         [facility]     [facility] Care [facility] -  Assistance  Registered nurses in the [facility] provide clinical advisement, health education, appointment booking, and other advisory services.  Call for this free service at [phone].             Medications           Message regarding Medications     Verify the changes and/or additions to your medication regime listed below are the same as discussed with your clinician today.  If any of these changes or additions are incorrect, please notify your healthcare provider.        START taking these NEW medications        Refills    predniSONE (DELTASONE) 20 MG tablet 0    Sig: Take 1 tablet with breakfast for 5 to 10 days    Class: Normal           Verify that the below list of medications is an accurate representation of the medications you are currently taking.  If none reported, the list may be blank. If incorrect, please contact your healthcare  "provider. Carry this list with you in case of emergency.           Current Medications     albuterol 90 mcg/actuation inhaler Inhale 2 puffs into the lungs every 6 (six) hours as needed for Wheezing.    ascorbic acid (VITAMIN C) 1000 MG tablet Take 1,000 mg by mouth once daily.    azelastine (ASTELIN) 137 mcg (0.1 %) nasal spray 1 spray (137 mcg total) by Nasal route 2 (two) times daily.    calcium-vitamin D3 (CALCIUM 500 + D) 500 mg(1,250mg) -200 unit per tablet Take 1 tablet by mouth 2 (two) times daily with meals.    cetirizine 10 mg chewable tablet Take 10 mg by mouth once daily.    cyclobenzaprine (FLEXERIL) 5 MG tablet Take 1 tablet (5 mg total) by mouth 3 (three) times daily as needed.    diazePAM (VALIUM) 5 MG tablet Take 5 mg by mouth every 6 (six) hours as needed for Anxiety.    hydrocodone-acetaminophen 5-325mg (NORCO) 5-325 mg per tablet Take 1 tablet by mouth every 8 (eight) hours as needed for Pain.    metoprolol succinate (TOPROL-XL) 200 MG 24 hr tablet Take 1 tablet (200 mg total) by mouth once daily.    piroxicam (FELDENE) 10 MG Cap Take 1 capsule (10 mg total) by mouth once daily.    predniSONE (DELTASONE) 20 MG tablet Take 1 tablet with breakfast for 5 to 10 days           Clinical Reference Information           Vital Signs - Last Recorded  Most recent update: 1/12/2017  2:15 PM by Zara Rush MA    BP Pulse Ht Wt SpO2 BMI    (!) 142/84 (BP Location: Left arm, Patient Position: Sitting, BP Method: Manual) 82 5' 4" (1.626 m) 62.5 kg (137 lb 12.6 oz) 98% 23.65 kg/m2      Blood Pressure          Most Recent Value    BP  (!)  142/84      Allergies as of 1/12/2017     Ace Inhibitors    Morphine      Immunizations Administered on Date of Encounter - 1/12/2017     None      Orders Placed During Today's Visit      Normal Orders This Visit    Ambulatory Referral to Physical/Occupational Therapy       Instructions      PRECAUTIONS WITH Prednisone  · Please take your prednisone medication as " prescribed  · NEVER take the prednisone at night. This is a morning medicine to be taken with food.   · You should also consider taking an antacid (Pepcid, Zantac, Prilosec) while you are on this medicine to avoid stomach upset. Take the antacid daily so long as you are on prednisone.     Please call the clinic if you develop the following side effects and stop the steroid   Significant stomach pain or upset   Inability to sleep   Pain in the hip or joints that doesn't go away  Easy and diffuse bruising all over      Body aches or fever      Contracción De Lola [Neck Spasm, No Trauma]  La contracción de los músculos del lola puede ocurrir después de un movimiento de lola torpe. También puede provocar contracción muscular dormir con saleem lola ladeado. Algunas personas responden a la tensión emocional tensando los músculos del lola, hombros, y espalda superior. Si el espasmo de lola dura mucho tiempo puede causar dolor de keron.    El tratamiento descrito abajo generalmente contribuirá que el dolor desaparezca en 5-7 días. El dolor que continua puede requerir andres evaluación mayor u otro tipo de tratamiento dana la terapia física.  Cuidado En [place]:  1. Descanse y relaje los músculos. Use andres almohada cómoda que sostenga la keron y mantenga la columna en posición neutral. La posición de la keron no debe estar inclinada hacia sergo o detrás. Andres toalla enrollada puede ayudar a adaptar la posición a saleem necesidad.  2. Algunas personas se alivian con calor (ducha caliente, baño caliente o compresa caliente) y masajes, mientras que otras prefieren compresas frías (hielo en cubos o molido en bolsa de plástico, envuelta en andres toalla). Intente los dos y use el método que le resulte mejor dipesh 20 minutos varias veces al día.  3. Puede usar acetaminofén (Tylenol) o ibuprofeno (Motrin, Advil) para controlar el dolor, a menos que le receten otro medicamento para el dolor. [NOTA: Si tiene insuficiencia hepática o  renal, o alguna vez ha tenido andres úlcera estomacal o sangrado gastrointestinal, hable con saleem médico antes de usar estos medicamentos.]  Seguimiento  con saleem médico o en esta institución si severino síntomas no muestran señales de mejora luego de andres semana. Puede necesitar terapia física o andres evaluación mayor.  [NOTA: Si le hicieron andres radiografía, la va a revisar un radiólogo. Le avisarán si encuentran algo que pueda afectar saleem atención.]  Busque Prontamente Atención Médica  si algo de lo siguiente ocurre:  · Dolor que empeora o se extiende a taylor o ambos brazos  · Debilidad o entumecimiento de taylor o ambos brazos  · Dolor de keron creciente con náuseas o vómito  · Fiebre por encima de 100.4°F (38.0°C)  © 1253-8608 [employer]. [address] [place] [address]. Todos los derechos reservados. Esta información no pretende sustituir la atención médica profesional. Sólo saleem médico puede diagnosticar y tratar un problema de [place]ley.        Inclinación de la keron / estiramiento del trapecio superior (flexibilidad)    4. Siéntese derecho en andres silla con saleem keron y sofya en andres posición neutral, alineando las orejas con los hombros. Tómese del borde del asiento de saleem silla con saleem mano derecha. [place] levemente saleem barbilla.  5. Incline saleem keron hacia la izquierda mirando hacia adelante.  6. Coloque saleem mano izquierda sobre el lado derecho de saleem keron. Empuje saleem keron suavemente hacia la izquierda. Sostenga por 30 a 60 segundos. Aplique andres presión suave para aumentar el estiramiento. No fuerce saleem keron a la posición.  7. Regrese saleem keron y sofya a la posición neutral.  8. Repita vipul ejercicio dos veces o según las instrucciones que haya recibido.  9. Cambie de lado y repita dos veces o según las instrucciones que haya recibido.  Desafíese  [place] un extremo de andres toalla debajo de saleem brazo vane. Luego lleve el otro extremo hasta cubrir saleem hombro derecho y también meta la toalla debajo de vipul.  Jale de la toalla hacia abajo de saleem lado derecho usando severino dos hussain a medida que gira saleem keron hacia la izquierda. Repita del otro lado.   © 4338-5052 [employer]. [address] [place] [address]. All rights reserved. This information is not intended as a substitute for professional medical care. Always follow your healthcare professional's instructions.

## 2017-02-03 ENCOUNTER — CLINICAL SUPPORT (OUTPATIENT)
Dept: SPEECH THERAPY | Facility: HOSPITAL | Age: 72
End: 2017-02-03
Attending: INTERNAL MEDICINE
Payer: MEDICARE

## 2017-02-03 DIAGNOSIS — R53.1 DECREASED STRENGTH: ICD-10-CM

## 2017-02-03 DIAGNOSIS — R26.89 DECREASED FUNCTIONAL MOBILITY: ICD-10-CM

## 2017-02-03 DIAGNOSIS — M25.60 DECREASED RANGE OF MOTION: ICD-10-CM

## 2017-02-03 DIAGNOSIS — M54.2 NECK PAIN: ICD-10-CM

## 2017-02-03 PROCEDURE — 97110 THERAPEUTIC EXERCISES: CPT

## 2017-02-03 PROCEDURE — 97162 PT EVAL MOD COMPLEX 30 MIN: CPT

## 2017-02-03 PROCEDURE — G8985 CARRY GOAL STATUS: HCPCS | Mod: CK

## 2017-02-03 PROCEDURE — G8984 CARRY CURRENT STATUS: HCPCS | Mod: CK

## 2017-02-03 NOTE — PLAN OF CARE
"  TIME RECORD    Date: 02/03/2017    Start Time:  0800  Stop Time:  0840    PROCEDURES:    TIMED  Procedure Min.   TE 10                     UNTIMED  Procedure Min.   Initial evaluation 30         Total Timed Minutes:  10  Total Timed Units:  1  Total Untimed Units:  1  Charges Billed/# of units:  2 (MCE-1, TE-1)    OUTPATIENT PHYSICAL THERAPY   PATIENT EVALUATION  Onset Date: Chronic  Primary Diagnosis:   1. Decreased range of motion     2. Decreased strength     3. Neck pain     4. Decreased functional mobility       Treatment Diagnosis: neck pain, decreased cervical ROM and UE strength  Past Medical History   Diagnosis Date    Anxiety     Rene's disease     Chronic low back pain     Depression     Goiter, nodular     Hyperlipidemia     Hypertension     Irritable bowel     Neuromuscular disorder     PUD (peptic ulcer disease)     Varicose veins      Precautions: HTN  Prior Therapy: Yes - for back  Medications: Grace Navarro has a current medication list which includes the following prescription(s): albuterol, ascorbic acid (vitamin c), azelastine, calcium-vitamin d3, cetirizine, diazepam, hydrocodone-acetaminophen 5-325mg, metoprolol succinate, piroxicam, and prednisone.  Nutrition:  Normal  History of Present Illness: Chronic neck pain with R shoulder pain  Prior Level of Function: Independent  Social History:   Functional Deficits Leading to Referral/Nature of Injury: difficulty moving head and performing job duties  Patient Therapy Goals: improve pain and head motion    Subjective     Grace Navarro states "since I was [age] and I [age]." "I am a  and am having to use the mouse and I think it is aggravating my condition." Pain and difficulty driving because it is difficult for her to turn her head. Pain in neck, "right shoulder blade and arm." Went to the ER 3 weeks ago "becuase the pain wouldn't let me move my arm." Denies numbness and tingling " into arm. Sometimes worse at night. Had a fall Decemeber 31 and had xrays that all came back negative.    Pain:  Location: neck  and shoulder   Description: Dull  Activities Which Increase Pain: moving head  Activities Which Decrease Pain: heating pad and tylenol  Pain Scale: 3-4/10 at best 5-6/10 now  10/10 at worst    Objective     Posture: sitting: slumped with forward head and rounded shoulders  Palpation: +TTP B cervical paraspinals and UT R>L; pain and hypomobility with  of upper thoracic and cervical spine, and side-gliding and up-gliding  Sensation: B UE intact to light touch    Range of Motion/Strength:     Cervical AROM Pain/Dysfunction with movement   Flexion 25 Pain in midline and R side neck   Extension 22    R Side Bending 10 Pain on L**   L Side Bending 12 Pain on R   R rotation 41 Pain on R   L rotation 25 Pain on L     Shoulder  Right   Left  Pain/Dysfunction with Movement    AROM PROM MMT AROM PROM MMT    flexion WFL NT 4+/5 WFL NT 4+/5    abduction WFL* NT 4+/5 WFL* NT 4+/5 Pain in B neck   Internal rotation WFL NT 4+/5 WFL NT 4+/5    ER  WFL NT 4+/5 WFL NT 4+/5    *denotes pain    Elbow  Right   Left  Pain/Dysfunction with Movement    AROM PROM MMT AROM PROM MMT    flexion WFL NT 4+/5 WFL NT 4+/5    extension WFL NT 4+/5 WFL NT 4+/5        Treatment: Treatment to consist of manual therapy including STM/MFR to B upper thoracic and cervical paraspinals, UT, SCM, manual cervical stretching; therapeutic exercise including cervical/UE strengthening/stretching, postural education, and modalities prn. Gave pt HEP consisting of chin tucks, scap retraction, and pec sarah stretch. Pt demo understanding by performing exercises x 10'.    Assessment     History  Co-morbidities and personal factors that may impact the plan of care Examination  Body Structures and Functions, activity limitations and participation restrictions that may impact the plan of care Clinical Presentation   Decision Making/ Complexity  "Score   Co-morbidities:   -HTN, arthritis          Personal Factors:     **MOD** Body Regions: head, neck, trunk, UE    Body Systems: musculoskeletal - posture, ROM, strength; neuromuscular - sensation      Activity limitations: moving head, reaching      Participation Restrictions: driving, working    **MOD**     Evolving clinical presentation with changing clinical characteristics:     **MOD**   FOTO Neck Survey: 57% limitation - CK -   -pt reported "Extreme difficulty or unable to perform " with Looking up to see a bird  -pt reported "Moderate difficulty " with Using a vacuum ; Lowering a lightweight object (1-5 lbs.) from the top shelf of a closet  -pt reported "A little bit of difficulty" with Combing or brushing your hair; Placing a can of soup (1 lb.) on a shelf overhead  **MOD**         Initial Assessment (Pertinent finding, problem list and factors affecting outcome): Pt is a 72 yo female presenting to PT with pain, decreased cervical ROM, decreased UE strength, poor posture, and functional deficits with moving head, reaching, and performing job duties. Pt would benefit from skilled PT consisting of manual therapy including STM/MFR to B upper thoracic and cervical paraspinals, UT, SCM, manual cervical stretching; therapeutic exercise including cervical/UE strengthening/stretching, postural education, and modalities prn to address limitations and increase functional mobility.  Rehab Potiential: good    Short Term Goals (4 Weeks):   1. Pt will be independent with initial HEP.  2. Pt will increase B UE strength to grossly 5/5 to improve functional mobility.  3. Pt will display correct posture 80% of the time to improve functional mobility.    Long Term Goals (8 Weeks):   1. Pt will be independent with updated HEP.  2. Pt will perform all neck ROM with </= 2/10 pain  3. Pt will perform job duties without limitation as evidence of improved function.  4. Pt will report 44% on the FOTO Neck Survey " placing the patient in the 40%<60% impaired, limited, or restricted category indicating increased functional mobility.    Plan     Certification Period: 2/3/2017 to 4/3/2017  Recommended Treatment Plan: 2 times per week for 8 weeks: Electrical Stimulation IFC, Group Therapy, Manual Therapy, Moist Heat/ Ice, Neuromuscular Re-ed, Patient Education, Therapeutic Activites and Therapeutic Exercise  Other Recommendations: modalities prn, ASTYM prn, kinesiotape prn, Functional Dry Needling prn       Therapist: Camille Elias PT    I CERTIFY THE NEED FOR THESE SERVICES FURNISHED UNDER THIS PLAN OF TREATMENT AND WHILE UNDER MY CARE    Physician's comments: ________________________________________________________________________________________________________________________________________________      Physician's Name: ___________________________________

## 2017-02-13 ENCOUNTER — CLINICAL SUPPORT (OUTPATIENT)
Dept: REHABILITATION | Facility: HOSPITAL | Age: 72
End: 2017-02-13
Attending: INTERNAL MEDICINE
Payer: MEDICARE

## 2017-02-13 DIAGNOSIS — M25.60 DECREASED RANGE OF MOTION: ICD-10-CM

## 2017-02-13 DIAGNOSIS — R26.89 DECREASED FUNCTIONAL MOBILITY: ICD-10-CM

## 2017-02-13 DIAGNOSIS — R53.1 DECREASED STRENGTH: ICD-10-CM

## 2017-02-13 DIAGNOSIS — M54.2 NECK PAIN: ICD-10-CM

## 2017-02-13 PROCEDURE — 97140 MANUAL THERAPY 1/> REGIONS: CPT | Mod: PN

## 2017-02-13 PROCEDURE — 97110 THERAPEUTIC EXERCISES: CPT | Mod: PN

## 2017-02-13 NOTE — PROGRESS NOTES
"TIME RECORD    Date:  02/13/2017    Start Time:  0825  Stop Time:  0915    PROCEDURES:    TIMED  Procedure Min.   MT 15   TE 25                 UNTIMED  Procedure Min.   MH 10         Total Timed Minutes:  40  Total Timed Units:  3  Total Untimed Units:  0  Charges Billed/# of units:  3 (MT-1, TE-2)      Progress/Current Status    Subjective:     Patient ID: Grace Navarro is a 71 y.o. female.  Diagnosis:   1. Decreased range of motion     2. Decreased strength     3. Neck pain     4. Decreased functional mobility       Pain: Pt reports she is "stiff" this morning.    Objective:     Pt initiated treatment with MT x 15' consisting of pt in supine for STM/MFR to B upper thoracic and cervical paraspinals, B UT with TPR and B UT str f/b TE 1:1 with PT per log x 25'. MH to cervical spine in sitting to conclude.    Date 2/13/2017   Visit 2   POC exp  4/3/17    Esaf1Oavg  3/3/17    Gcode 2 of 10   Visit  Total 93.82  201.40   MHP --   MT 15'   UT Str. 3x30" L   LV Str. 3x30" B   Pec Str.    Chin Tuck 10x5"  Supine   Cervical Rot 10x5" B   DNF -   Cervical Kacey 5x5" sb/rot   Scap ret    Lats --   Rows --   Horizontal Abd --   Scaption --   Wall Slides --   Initials KV         Assessment:     Pt tolerated treatment poorly with reports of "a lot of pain" in B UT and cervical paraspinals. Not specific to pain scale. Discussed pt attending a few more PT visits, but if she continues to be unable to tolerated TE PT would recommend pt returning to [facility].    Patient Education/Response:     Cont HEP.    Plans and Goals:     Cont POC. Progress as able.     Short Term Goals (4 Weeks):   1. Pt will be independent with initial HEP.  2. Pt will increase B UE strength to grossly 5/5 to improve functional mobility.  3. Pt will display correct posture 80% of the time to improve functional mobility.     Long Term Goals (8 Weeks):   1. Pt will be independent with updated HEP.  2. Pt will perform all neck ROM with </= 2/10 pain  3. Pt " will perform job duties without limitation as evidence of improved function.  4. Pt will report 44% on the FOTO Neck Survey placing the patient in the 40%<60% impaired, limited, or restricted category indicating increased functional mobility.

## 2017-02-14 ENCOUNTER — OFFICE VISIT (OUTPATIENT)
Dept: INTERNAL MEDICINE | Facility: CLINIC | Age: 72
End: 2017-02-14
Payer: MEDICARE

## 2017-02-14 VITALS
SYSTOLIC BLOOD PRESSURE: 130 MMHG | HEIGHT: 64 IN | TEMPERATURE: 98 F | BODY MASS INDEX: 24.13 KG/M2 | RESPIRATION RATE: 17 BRPM | HEART RATE: 64 BPM | DIASTOLIC BLOOD PRESSURE: 78 MMHG | WEIGHT: 141.31 LBS

## 2017-02-14 DIAGNOSIS — G89.29 CHRONIC BILATERAL LOW BACK PAIN WITHOUT SCIATICA: ICD-10-CM

## 2017-02-14 DIAGNOSIS — J31.0 CHRONIC RHINITIS: ICD-10-CM

## 2017-02-14 DIAGNOSIS — D69.2 SENILE PURPURA: ICD-10-CM

## 2017-02-14 DIAGNOSIS — I10 BENIGN ESSENTIAL HYPERTENSION: Primary | ICD-10-CM

## 2017-02-14 DIAGNOSIS — E04.2 MULTINODULAR GOITER: ICD-10-CM

## 2017-02-14 DIAGNOSIS — Z12.31 ENCOUNTER FOR SCREENING MAMMOGRAM FOR BREAST CANCER: ICD-10-CM

## 2017-02-14 DIAGNOSIS — M54.50 CHRONIC BILATERAL LOW BACK PAIN WITHOUT SCIATICA: ICD-10-CM

## 2017-02-14 DIAGNOSIS — Z78.0 POSTMENOPAUSAL ESTROGEN DEFICIENCY: ICD-10-CM

## 2017-02-14 PROCEDURE — 1125F AMNT PAIN NOTED PAIN PRSNT: CPT | Mod: S$GLB,,, | Performed by: INTERNAL MEDICINE

## 2017-02-14 PROCEDURE — 3075F SYST BP GE 130 - 139MM HG: CPT | Mod: S$GLB,,, | Performed by: INTERNAL MEDICINE

## 2017-02-14 PROCEDURE — 1159F MED LIST DOCD IN RCRD: CPT | Mod: S$GLB,,, | Performed by: INTERNAL MEDICINE

## 2017-02-14 PROCEDURE — 1157F ADVNC CARE PLAN IN RCRD: CPT | Mod: S$GLB,,, | Performed by: INTERNAL MEDICINE

## 2017-02-14 PROCEDURE — 99499 UNLISTED E&M SERVICE: CPT | Mod: S$GLB,,, | Performed by: INTERNAL MEDICINE

## 2017-02-14 PROCEDURE — 99999 PR PBB SHADOW E&M-EST. PATIENT-LVL IV: CPT | Mod: PBBFAC,,, | Performed by: INTERNAL MEDICINE

## 2017-02-14 PROCEDURE — 1160F RVW MEDS BY RX/DR IN RCRD: CPT | Mod: S$GLB,,, | Performed by: INTERNAL MEDICINE

## 2017-02-14 PROCEDURE — 3078F DIAST BP <80 MM HG: CPT | Mod: S$GLB,,, | Performed by: INTERNAL MEDICINE

## 2017-02-14 PROCEDURE — 99215 OFFICE O/P EST HI 40 MIN: CPT | Mod: S$GLB,,, | Performed by: INTERNAL MEDICINE

## 2017-02-14 RX ORDER — METOPROLOL SUCCINATE 200 MG/1
200 TABLET, EXTENDED RELEASE ORAL DAILY
Qty: 90 TABLET | Refills: 3 | Status: SHIPPED | OUTPATIENT
Start: 2017-02-14 | End: 2018-02-21 | Stop reason: SDUPTHER

## 2017-02-14 NOTE — PROGRESS NOTES
INTERNAL MEDICINE INITIAL VISIT NOTE      CHIEF COMPLAINT     Chief Complaint   Patient presents with    [facility]     HPI     Grace Navarro is a 71 y.o. [place] female who presents to [facility] care. Previous PCP Dr. Thompson.    Reports that she had an infection around the R eye but resolved now.     HTN - toprol XL 200mg daily.     LBP s/p L4-L5 laminectomy 7/2012. Undergoing PT currently. On norco prn and valium prn for the lower back pain. Reports has a very addictive personality so she rarely takes it. For her neck, she takes tylenol 650mg bid prn.     Chronic rhinitis - sneezing. Takes astelin 1 spray each nostril BID and zyrtec daily. 2 yrs ago, she had bronchitis in which she used albuterol. Otherwise didn't have to use it.     Has senile purpura and takes vitamin C OTC that she feels help.     Reports sometimes urine is strong. No dysuria/urinary frequency/urgency.     Reports sometimes chokes when she eats and talks on the phone or when she eats very fast. No acid reflux. No dysphagia. No weight loss. Reports slow weight gain. Used to walk 4 miles per day but now w/ work at [employer], sometimes walks 2-3 miles few times a week. Last fall 1/4/17 - shelves fell on her. No unsteady gait.  Lives by self.     Past Medical History:  Past Medical History   Diagnosis Date    Anxiety     Rene's disease     Chronic low back pain     Depression     Goiter, nodular     Hyperlipidemia     Hypertension     Irritable bowel     Neuromuscular disorder     PUD (peptic ulcer disease)     Varicose veins        Past Surgical History:  Past Surgical History   Procedure Laterality Date    Appendectomy      Tonsillectomy      Spine surgery      Belpharoptosis repair Bilateral     Cataract extraction w/  intraocular lens implant Left 08/03/2016         Cataract extraction w/  intraocular lens implant Right 09/21/2016            Allergies:  Review of patient's allergies indicates:  "  Allergen Reactions    Ace inhibitors Other (See Comments)    Morphine Other (See Comments)     hallucination       meds reviewed    Family History:  Family History   Problem Relation Age of Onset    Hypertension Mother     Kidney failure Mother     Hypertension Father     Coronary artery disease Father     Prostate cancer Father     Eczema Son     Diabetes Brother     Thyroid cancer Neg Hx        Social History:  Social History   Substance Use Topics    Smoking status: Former Smoker     Types: Cigarettes    Smokeless tobacco: None      Comment: quit 1975    Alcohol use 8.4 oz/week     14 Glasses of wine per week      Comment: socially       Review of Systems:  Review of Systems Comprehensive review of systems otherwise negative. See history/subjective section for more details.    Health Maintainence:   Tdap 7/21/16  Flu - 1/3/17  Pneumovax 7/21/16  Zostavax - has had it about 12 yrs.   MMG - 4/15/15  Declines colonoscopy  Hep C screening 4/15/15    PHYSICAL EXAM     Visit Vitals    /78    Pulse 64    Temp 98.2 °F (36.8 °C) (Oral)    Resp 17    Ht 5' 4" (1.626 m)    Wt 64.1 kg (141 lb 5 oz)    BMI 24.26 kg/m2       GEN - A+OX4, NAD   HEENT - PERRL, EOMI, OP clear. MMM.   Neck - No cervical LAD. Right thyroid nodule.   CV - RRR, no m/r.   Chest - CTAB, no wheezing or rhonchi  Abd - S/NT/ND/+BS.   Ext - 2+BDP and radial pulses. No LE edema.   Neuro - 5/5 BUE and BLE strength. 1+ DTRs. Normal gait.   MSK - No pain on palpation of the spine. Tense B sup trapezius.  Skin - No rash.      LABS     Labs reviewed.    ASSESSMENT/PLAN     Grace Navarro is a 71 y.o. female with  Grace was seen today for establish care.    Diagnoses and all orders for this visit:    Benign essential hypertension - Stable and controlled. Continue current medications.    Chronic bilateral low back pain without sciatica - currently undergoing PT.     Chronic rhinitis - cont astelin, zyrtec daily.     Senile " christofer - sees Dr. Lopes.     Multinodular goiter - TSH WNL 7/2016.  -     US Soft Tissue Head Neck Thyroid; Future; Expected date: 2/14/17  -     Ambulatory Referral to Endocrinology    Encounter for screening mammogram for breast cancer  -     Mammo Digital Screening Bilateral With CAD; Future; Expected date: 2/14/17    Postmenopausal estrogen deficiency  -     DXA Bone Density Appendicular Skeleton; Future; Expected date: 2/14/17    Other orders  -     metoprolol succinate (TOPROL-XL) 200 MG 24 hr tablet; Take 1 tablet (200 mg total) by mouth once daily.    45 minutes was spent on patient with over half the time was spent in coordination of care and/or counseling.  RTC in 6 months, sooner if needed and depending on labs.    Lisa Dean MD  Department of Internal Medicine - [facility]  1:12 PM

## 2017-02-14 NOTE — MR AVS SNAPSHOT
[facility]  [address]  [facility] [address]  Phone: [phone]  Fax: [phone]                  Grace Navarro   2017 1:20 PM   Office Visit    Description:  Female : 1945   Provider:  Lisa Dean MD   Department:  [facility]           Reason for Visit     Establish Care           Diagnoses this Visit        Comments    Benign essential hypertension    -  Primary     Chronic bilateral low back pain without sciatica         Chronic rhinitis         Senile purpura         Multinodular goiter         Encounter for screening mammogram for breast cancer         Postmenopausal estrogen deficiency                To Do List           Future Appointments        Provider Department Dept Phone    2/15/2017 8:00 AM Kim Casiano PTA [facility] [phone]    2017 11:00 AM Camille Elias PT [facility] [phone]    2017 8:30 AM Camille Elias PT [facility] [phone]    3/2/2017 8:00 AM Kim Casiano PTA [facility] [phone]    3/7/2017 11:00 AM Camille Elias PT [facility] [phone]      Goals (5 Years of Data)     None      Follow-Up and Disposition     Return in about 6 months (around 2017).    Follow-up and Disposition History       These Medications        Disp Refills Start End    metoprolol succinate (TOPROL-XL) 200 MG 24 hr tablet 90 tablet 3 2017     Take 1 tablet (200 mg total) by mouth once daily. - Oral    Pharmacy: [facility] - [place] [phone] [facility] Ph #: [phone]         [facility]     [facility] -  Assistance  Registered nurses in the [facility] provide clinical advisement, health education, appointment booking, and other advisory services.  Call for this free service at [phone].             Medications          "  Message regarding Medications     Verify the changes and/or additions to your medication regime listed below are the same as discussed with your clinician today.  If any of these changes or additions are incorrect, please notify your healthcare provider.        STOP taking these medications     piroxicam (FELDENE) 10 MG Cap Take 1 capsule (10 mg total) by mouth once daily.    diazePAM (VALIUM) 5 MG tablet Take 5 mg by mouth every 6 (six) hours as needed for Anxiety.    predniSONE (DELTASONE) 20 MG tablet Take 1 tablet with breakfast for 5 to 10 days    albuterol 90 mcg/actuation inhaler Inhale 2 puffs into the lungs every 6 (six) hours as needed for Wheezing.           Verify that the below list of medications is an accurate representation of the medications you are currently taking.  If none reported, the list may be blank. If incorrect, please contact your healthcare provider. Carry this list with you in case of emergency.           Current Medications     ascorbic acid (VITAMIN C) 1000 MG tablet Take 1,000 mg by mouth once daily.    azelastine (ASTELIN) 137 mcg (0.1 %) nasal spray 1 spray (137 mcg total) by Nasal route 2 (two) times daily.    calcium-vitamin D3 (CALCIUM 500 + D) 500 mg(1,250mg) -200 unit per tablet Take 1 tablet by mouth 2 (two) times daily with meals.    cetirizine 10 mg chewable tablet Take 10 mg by mouth once daily.    hydrocodone-acetaminophen 5-325mg (NORCO) 5-325 mg per tablet Take 1 tablet by mouth every 8 (eight) hours as needed for Pain.    metoprolol succinate (TOPROL-XL) 200 MG 24 hr tablet Take 1 tablet (200 mg total) by mouth once daily.           Clinical Reference Information           Your Vitals Were     BP Pulse Temp Resp Height Weight    130/78 64 98.2 °F (36.8 °C) (Oral) 17 5' 4" (1.626 m) 64.1 kg (141 lb 5 oz)    BMI                24.26 kg/m2          Blood Pressure          Most Recent Value    BP  130/78      Allergies as of 2/14/2017     Ace Inhibitors    Morphine    "   Immunizations Administered on Date of Encounter - 2/14/2017     None      Orders Placed During Today's Visit      Normal Orders This Visit    Ambulatory Referral to Endocrinology     Future Labs/Procedures Expected by Expires    DXA Bone Density Appendicular Skeleton  2/14/2017 2/14/2018    Mammo Digital Screening Bilateral With CAD  2/14/2017 4/14/2018    US Soft Tissue Head Neck Thyroid  2/14/2017 (Approximate) 2/14/2018      Language Assistance Services     ATTENTION: Language assistance services are available, free of charge. Please call [phone].      ATENCIÓN: Si habla español, tiene a saleem disposición servicios gratuitos de asistencia lingüística. Llame al [phone].     CHÚ Ý: N?u b?n nói Ti?ng Vi?t, có các d?ch v? h? tr? ngôn ng? mi?n phí dành cho b?n. G?i s? [phone].         [facility] complies with applicable Federal civil rights laws and does not discriminate on the basis of race, color, national origin, age, disability, or sex.

## 2017-02-15 ENCOUNTER — CLINICAL SUPPORT (OUTPATIENT)
Dept: REHABILITATION | Facility: HOSPITAL | Age: 72
End: 2017-02-15
Attending: INTERNAL MEDICINE
Payer: MEDICARE

## 2017-02-15 DIAGNOSIS — R26.89 DECREASED FUNCTIONAL MOBILITY: ICD-10-CM

## 2017-02-15 DIAGNOSIS — M25.60 DECREASED RANGE OF MOTION: ICD-10-CM

## 2017-02-15 DIAGNOSIS — M54.2 NECK PAIN: ICD-10-CM

## 2017-02-15 DIAGNOSIS — R53.1 DECREASED STRENGTH: ICD-10-CM

## 2017-02-15 PROCEDURE — 97110 THERAPEUTIC EXERCISES: CPT | Mod: PN

## 2017-02-15 PROCEDURE — 97140 MANUAL THERAPY 1/> REGIONS: CPT | Mod: PN

## 2017-02-15 NOTE — PROGRESS NOTES
"TIME RECORD    Date:  02/15/2017    Start Time:  805  Stop Time:  855    PROCEDURES:    TIMED  Procedure Min.   Manual therapy 15   Therex 25                 UNTIMED  Procedure Min.   Moist heat 10         Total Timed Minutes:  40  Total Timed Units:  3  Total Untimed Units:  0  Charges Billed/# of units:  3  MTx1, TEx2      Progress/Current Status    Subjective:     Patient ID: Grace Navarro is a 71 y.o. female.  Diagnosis:   1. Decreased range of motion     2. Decreased strength     3. Neck pain     4. Decreased functional mobility       Pain: 2-3 /10  Patient reports pain/sore after last visit initially, "then better the next day."    Objective:     Patient received moist heat to cervical spine in sitting x 10 minutes.  Manual therapy x 15' consisting of pt in supine for STM/MFR to B upper thoracic and cervical paraspinals, B UT with TPR and B UT str f/b TE 1:1 with PTA per log x 25'.  Added trial scap retraction, wall slides and UT stretch to right today.    Date 2/15/17 2/13/2017   Visit 3 2   POC exp  4/3/17     [id number]  3/3/17     Gcode 3 of 10 2 of 10   Visit  Total   93.82  295.22 93.82  201.40   MHP 10' --   MT 15' 15'   UT Str. 30"x3 B 3x30" L   LV Str. 30"x3 B 3x30" B   Pec Str.     Chin Tuck Supine  5"x10 10x5"  Supine   Cervical Rot 5"x5 B 10x5" B   DNF -- -   Cervical Kacey 5"x5 sb/rot   5x5" sb/rot   Scap ret 5"x10    Lats Next? --   Rows Next? --   Horizontal Abd  --   Scaption  --   Wall Slides 1x10 --   Initials DH 1/6 KV       Assessment:     Patient able to increase activity as noted with reports of "perfect" after treatment.  Able to perform self UT to B sides today without pain or difficulty with R UE.    Patient Education/Response:     Patient educated to continue HEP.  Verbalized understanding.    Plans and Goals:     Cont POC. Progress as able.     Short Term Goals (4 Weeks):   1. Pt will be independent with initial HEP.  2. Pt will increase B UE strength to grossly 5/5 to improve " functional mobility.  3. Pt will display correct posture 80% of the time to improve functional mobility.     Long Term Goals (8 Weeks):   1. Pt will be independent with updated HEP.  2. Pt will perform all neck ROM with </= 2/10 pain  3. Pt will perform job duties without limitation as evidence of improved function.  4. Pt will report 44% on the FOTO Neck Survey placing the patient in the 40%<60% impaired, limited, or restricted category indicating increased functional mobility.

## 2017-02-21 ENCOUNTER — CLINICAL SUPPORT (OUTPATIENT)
Dept: REHABILITATION | Facility: HOSPITAL | Age: 72
End: 2017-02-21
Attending: INTERNAL MEDICINE
Payer: MEDICARE

## 2017-02-21 DIAGNOSIS — M54.2 NECK PAIN: ICD-10-CM

## 2017-02-21 DIAGNOSIS — M25.60 DECREASED RANGE OF MOTION: ICD-10-CM

## 2017-02-21 DIAGNOSIS — R26.89 DECREASED FUNCTIONAL MOBILITY: ICD-10-CM

## 2017-02-21 DIAGNOSIS — R53.1 DECREASED STRENGTH: ICD-10-CM

## 2017-02-21 PROCEDURE — 97110 THERAPEUTIC EXERCISES: CPT | Mod: PN

## 2017-02-21 PROCEDURE — 97140 MANUAL THERAPY 1/> REGIONS: CPT | Mod: PN

## 2017-02-21 NOTE — PROGRESS NOTES
"TIME RECORD    Date:  02/21/2017    Start Time:  1101  Stop Time:  1150    PROCEDURES:    TIMED  Procedure Min.   MT 15   TE 19   TE Supervised 5 NC             UNTIMED  Procedure Min.   Moist heat 10         Total Timed Minutes:  34  Total Timed Units:  2  Total Untimed Units:  0  Charges Billed/# of units:  2 (MT-1, TE-1)      Progress/Current Status    Subjective:     Patient ID: Grace Navarro is a 71 y.o. female.  Diagnosis:   1. Decreased range of motion     2. Decreased strength     3. Neck pain     4. Decreased functional mobility       Pain: 2-3 /10  Pt reports she is feeling "much better" today. "I just have a contracture here" (as pt points to medial border of scapula at T5)    Objective:     Patient received moist heat to cervical spine in sitting x 10'.  MT x 15' consisting of pt in supine for STM/MFR to B upper thoracic and cervical paraspinals, B UT f/b TE 1:1 with PT per log x 19' f/b supervised PT x 5'. **FOTO NEXT VISIT**    Date 2/21/17 2/15/17 2/13/2017   Visit 4 3 2   POC exp  4/3/17      [id number]  3/3/17      Gcode 4 of 10 3 of 10 2 of 10   Visit  Total 61.84  357.06   93.82  295.22 93.82  201.40   MHP 10' 10' --   MT 10' 15' 15'   UT Str. 30"x3 B 30"x3 B 3x30" L   LV Str. 30"x3 B 30"x3 B 3x30" B   Pec Str.      Chin Tuck Supine  12x5" Supine  5"x10 10x5"  Supine   Cervical Rot  5"x5 B 10x5" B   DNF  -- -   Cervical Kacey 5"x5 sb/rot 5"x5 sb/rot   5x5" sb/rot   Scap ret 5"x10 5"x10    Lats Held (Pain) Next? --   Rows 2x10 YTC Next? --   Horizontal Abd --  --   Scaption --  --   Wall Slides NT 1x10 --   Initials KV DH 1/6 KV       Assessment:     Pt reported pain in L UT with shoulder lats today. Pt reported "slight pain" at the end of the treatment session. Not specific to pain scale.    Patient Education/Response:     Cont HEP.    Plans and Goals:     Cont POC. Progress as able.     Short Term Goals (4 Weeks):   1. Pt will be independent with initial HEP.  2. Pt will increase B UE " strength to grossly 5/5 to improve functional mobility.  3. Pt will display correct posture 80% of the time to improve functional mobility.     Long Term Goals (8 Weeks):   1. Pt will be independent with updated HEP.  2. Pt will perform all neck ROM with </= 2/10 pain  3. Pt will perform job duties without limitation as evidence of improved function.  4. Pt will report 44% on the FOTO Neck Survey placing the patient in the 40%<60% impaired, limited, or restricted category indicating increased functional mobility.

## 2017-02-23 ENCOUNTER — CLINICAL SUPPORT (OUTPATIENT)
Dept: REHABILITATION | Facility: HOSPITAL | Age: 72
End: 2017-02-23
Attending: INTERNAL MEDICINE
Payer: MEDICARE

## 2017-02-23 DIAGNOSIS — M25.60 DECREASED RANGE OF MOTION: ICD-10-CM

## 2017-02-23 DIAGNOSIS — R26.89 DECREASED FUNCTIONAL MOBILITY: ICD-10-CM

## 2017-02-23 DIAGNOSIS — M54.2 NECK PAIN: ICD-10-CM

## 2017-02-23 DIAGNOSIS — R53.1 DECREASED STRENGTH: ICD-10-CM

## 2017-02-23 PROCEDURE — 97140 MANUAL THERAPY 1/> REGIONS: CPT | Mod: PN

## 2017-02-23 NOTE — PROGRESS NOTES
"TIME RECORD    Date:  02/23/2017    Start Time:  0830  Stop Time:  0923    PROCEDURES:    TIMED  Procedure Min.   MT 23   TE 6   TE Supervised 14 NC             UNTIMED  Procedure Min.   MH 10         Total Timed Minutes:  29  Total Timed Units:  2  Total Untimed Units:  0  Charges Billed/# of units:  MT-2      Progress/Current Status    Subjective:     Patient ID: Grace Navarro is a 71 y.o. female.  Diagnosis:   1. Decreased range of motion     2. Decreased strength     3. Neck pain     4. Decreased functional mobility       Pain: Pt reports she feels better than last treatment, but "I always have some pain."    Objective:     Patient received moist heat to cervical spine in sitting x 10' f/b supervised TE x 14' f/b TE 1:1 with PT per log x 6' f/b MT x 23' consisting of pt in supine for STM/MFR to B upper thoracic and cervical paraspinals, B UT.    Date 2/23/17 2/21/17 2/15/17 2/13/2017   Visit 5 4 3 2   POC exp  4/3/17 FOTO      [id number]  3/3/17       Gcode 5 of 10 4 of 10 3 of 10 2 of 10   Visit  Total 59.72  416.78 61.84  357.06   93.82  295.22 93.82  201.40   [facility] 10' 10' 10' --   MT 23' 10' 15' 15'   UT Str. 30"x3 B 30"x3 B 30"x3 B 3x30" L   LV Str. 30"x3 B 30"x3 B 30"x3 B 3x30" B   Pec Str.       Chin Tuck Supine  12x5" Supine  12x5" Supine  5"x10 10x5"  Supine   Cervical Rot   5"x5 B 10x5" B   DNF   -- -   Cervical Kacey 5"x5 sb/rot 5"x5 sb/rot 5"x5 sb/rot   5x5" sb/rot   Scap ret 5"x10 5"x10 5"x10    Lats held Held (Pain) Next? --   Rows 2x10 YTC 2x10 YTC Next? --   Horizontal Abd -- --  --   Scaption -- --  --   Wall Slides NT NT 1x10 --   Initials KV KV DH 1/6 KV       Assessment:     Pt reported feeling "good" but "still some pain like I always have"    Patient Education/Response:     Cont HEP.    Plans and Goals:     Cont POC. Progress as able.     Short Term Goals (4 Weeks):   1. Pt will be independent with initial HEP.  2. Pt will increase B UE strength to grossly 5/5 to improve functional " mobility.  3. Pt will display correct posture 80% of the time to improve functional mobility.     Long Term Goals (8 Weeks):   1. Pt will be independent with updated HEP.  2. Pt will perform all neck ROM with </= 2/10 pain  3. Pt will perform job duties without limitation as evidence of improved function.  4. Pt will report 44% on the FOTO Neck Survey placing the patient in the 40%<60% impaired, limited, or restricted category indicating increased functional mobility.

## 2017-03-02 ENCOUNTER — CLINICAL SUPPORT (OUTPATIENT)
Dept: REHABILITATION | Facility: HOSPITAL | Age: 72
End: 2017-03-02
Attending: INTERNAL MEDICINE
Payer: MEDICARE

## 2017-03-02 DIAGNOSIS — M54.2 NECK PAIN: ICD-10-CM

## 2017-03-02 DIAGNOSIS — M25.60 DECREASED RANGE OF MOTION: ICD-10-CM

## 2017-03-02 DIAGNOSIS — R26.89 DECREASED FUNCTIONAL MOBILITY: ICD-10-CM

## 2017-03-02 DIAGNOSIS — R53.1 DECREASED STRENGTH: ICD-10-CM

## 2017-03-02 PROCEDURE — 97140 MANUAL THERAPY 1/> REGIONS: CPT | Mod: PN

## 2017-03-02 PROCEDURE — 97110 THERAPEUTIC EXERCISES: CPT | Mod: PN

## 2017-03-02 NOTE — PROGRESS NOTES
"TIME RECORD    Date:  03/02/2017    Start Time:  800  Stop Time:  845    PROCEDURES:    TIMED  Procedure Min.   Manual therapy 15   Therex 15   Therex (supervised) 10             UNTIMED  Procedure Min.   Moist heat 10         Total Timed Minutes:  30  Total Timed Units:  2  Total Untimed Units:  0  Charges Billed/# of units:  2  MTx1, TEx2      Progress/Current Status    Subjective:     Patient ID: Grace Navarro is a 71 y.o. female.  Diagnosis:   1. Decreased range of motion     2. Decreased strength     3. Neck pain     4. Decreased functional mobility       Patient reports her neck is doing better, but continued pain/tightness upper mid back/scap border right side.  Not specific to scale.    Objective:     Patient received moist heat to thoracic spine area x 10 minutes in hooklying.  Manual therapy provided x 15 minutes including STM/MFR to B upper thoracic paraspinals, manual stretching B UT and STM/MFR B UT focus right. Patient then performed all self stretching and therex as per log with 1:1 by PTA x 15 minutes and additional 10 minutes with supervision. Added trial lower cervical - upper thoracic stretch.    Date 3/1/17 2/23/17 2/21/17 2/15/17 2/13/2017   Visit 6 5 4 3 2   POC exp  4/3/17  FOTO      [id number]  3/3/17        Gcode 6 of 10 5 of 10 4 of 10 3 of 10 2 of 10   Visit  Total   61.84  478.62 59.72  416.78 61.84  357.06   93.82  295.22 93.82  201.40   MHP 10 10' 10' 10' --   MT 15' 23' 10' 15' 15'   UT Str. 30"x3 B 30"x3 B 30"x3 B 30"x3 B 3x30" L   LV Str. 30"x3 B 30"x3 B 30"x3 B 30"x3 B 3x30" B   Pec Str.        Chin Tuck Supine  5"x12 Supine  12x5" Supine  12x5" Supine  5"x10 10x5"  Supine   Cervical Rot 5"x5   5"x5 B 10x5" B   DNF --   -- -   Cervical Kacey 5"x5 sb/rot 5"x5 sb/rot 5"x5 sb/rot 5"x5 sb/rot   5x5" sb/rot   Scap ret 5"x15 5"x10 5"x10 5"x10    Lats held held Held (Pain) Next? --   Rows 3x10 YTC 2x10 YTC 2x10 YTC Next? --   Horizontal Abd -- -- --  --   Scaption -- -- --  -- " "  Wall Slides -- NT NT 1x10 --   Initials DH 1/6 KV KV DH 1/6 KV       Assessment:     Patient with multiple trigger points noted right upper trap and along right scap border with manual therapy.  Limited range with UT and Levator scap stretching.  Able to increase reps slightly as noted without complaint of pain or difficulty.  Reports "tightness in my shoulder" after treatment.  Not specific to scale.    Patient Education/Response:     Given written handout for lower cervical/upper thoracic self stretch.  Reviewed same as performed, demonstrated understanding.    Plans and Goals:       Cont POC. Progress as able.     Short Term Goals (4 Weeks):   1. Pt will be independent with initial HEP.  2. Pt will increase B UE strength to grossly 5/5 to improve functional mobility.  3. Pt will display correct posture 80% of the time to improve functional mobility.     Long Term Goals (8 Weeks):   1. Pt will be independent with updated HEP.  2. Pt will perform all neck ROM with </= 2/10 pain  3. Pt will perform job duties without limitation as evidence of improved function.  4. Pt will report 44% on the FOTO Neck Survey placing the patient in the 40%<60% impaired, limited, or restricted category indicating increased functional mobility.      "

## 2017-03-07 ENCOUNTER — CLINICAL SUPPORT (OUTPATIENT)
Dept: REHABILITATION | Facility: HOSPITAL | Age: 72
End: 2017-03-07
Attending: INTERNAL MEDICINE
Payer: MEDICARE

## 2017-03-07 DIAGNOSIS — R26.89 DECREASED FUNCTIONAL MOBILITY: ICD-10-CM

## 2017-03-07 DIAGNOSIS — M25.60 DECREASED RANGE OF MOTION: ICD-10-CM

## 2017-03-07 DIAGNOSIS — M54.2 NECK PAIN: ICD-10-CM

## 2017-03-07 DIAGNOSIS — R53.1 DECREASED STRENGTH: ICD-10-CM

## 2017-03-07 PROCEDURE — 97110 THERAPEUTIC EXERCISES: CPT | Mod: PN

## 2017-03-07 PROCEDURE — 97140 MANUAL THERAPY 1/> REGIONS: CPT | Mod: PN

## 2017-03-07 NOTE — PROGRESS NOTES
"TIME RECORD    Date:  03/07/2017    Start Time:  1100  Stop Time:  1152    PROCEDURES:    TIMED  Procedure Min.   Manual therapy 20   Therex 20                 UNTIMED  Procedure Min.   Moist heat 10         Total Timed Minutes:  40  Total Timed Units:  3  Total Untimed Units:  0  Charges Billed/# of units:  3  MTx1, TEx2      Progress/Current Status    Subjective:     Patient ID: Grace Navarro is a 71 y.o. female.  Diagnosis:   1. Decreased range of motion     2. Decreased strength     3. Neck pain     4. Decreased functional mobility       Patient today reports her "neck is about the same, and I have a constant contraction at the base of my head and by my shoulder blade."  Not specific as to scale.    Objective:     Patient received moist heat to thoracic spine area x 10 minutes in hooklying.  Manual therapy provided x 20 minutes including STM/MFR to B upper thoracic paraspinals, manual stretching B UT and STM/MFR B UT focus right., STM with elongation to B cervical paraspinals, CFM B occiput.  To left sidelying for additional STM/MFR right UT  Patient then performed all self stretching and therex as per log with 1:1 by PTA x 15 minutes and additional 10 minutes with supervision. Reviewed technique for self stretching UT, levator scap and  lower cervical - upper thoracic stretch.    Date 3/7/17 3/1/17 2/23/17 2/21/17 2/15/17 2/13/2017   Visit 7 6 5 4 3 2   POC exp  4/3/17   FOTO      [id number]  3/3/17         Gcode 7 of 10 6 of 10 5 of 10 4 of 10 3 of 10 2 of 10   Visit  Total   93.82  572.44   61.84  478.62 59.72  416.78 61.84  357.06   93.82  295.22 93.82  201.40   MHP 10' 10 10' 10' 10' --   MT 20' 15' 23' 10' 15' 15'   UT Str. 30"x3 30"x3 B 30"x3 B 30"x3 B 30"x3 B 3x30" L   LV Str. 30"x3  30"x3 B 30"x3 B 30"x3 B 30"x3 B 3x30" B   Pec Str.         Chin Tuck Supine 5"x15 Supine  5"x12 Supine  12x5" Supine  12x5" Supine  5"x10 10x5"  Supine   Cervical Rot 5"x5 5"x5   5"x5 B 10x5" B   DNF -- --   -- - " "  Cervical Kacey 5"x5 self  Sit Sb/rot  5"x5 sb/rot 5"x5 sb/rot 5"x5 sb/rot 5"x5 sb/rot   5x5" sb/rot   Scap ret 5"x15 5"x15 5"x10 5"x10 5"x10    Lats held held held Held (Pain) Next? --   Rows 2x15 YTC 3x10 YTC 2x10 YTC 2x10 YTC Next? --   Horizontal Abd -- -- -- --  --   Scaption -- -- -- --  --   Wall Slides 2x10 -- NT NT 1x10 --   Initials DH 2/6 DH 1/6 KV KV DH 1/6 KV       Assessment:     Significant myofascial restrictions noted right UT and scap border with manual therapy.  Able to tolerate same and increased activity with therex as noted with reports of "better - worked out" after treatment, not specific to scale.    Patient Education/Response:     Reviewed self stretching for best technique.  Demonstrated understanding.    Plans and Goals:       Cont POC. Progress as able.     Short Term Goals (4 Weeks):   1. Pt will be independent with initial HEP.  2. Pt will increase B UE strength to grossly 5/5 to improve functional mobility.  3. Pt will display correct posture 80% of the time to improve functional mobility.     Long Term Goals (8 Weeks):   1. Pt will be independent with updated HEP.  2. Pt will perform all neck ROM with </= 2/10 pain  3. Pt will perform job duties without limitation as evidence of improved function.  4. Pt will report 44% on the FOTO Neck Survey placing the patient in the 40%<60% impaired, limited, or restricted category indicating increased functional mobility.      "

## 2017-03-09 ENCOUNTER — CLINICAL SUPPORT (OUTPATIENT)
Dept: REHABILITATION | Facility: HOSPITAL | Age: 72
End: 2017-03-09
Attending: INTERNAL MEDICINE
Payer: MEDICARE

## 2017-03-09 DIAGNOSIS — R26.89 DECREASED FUNCTIONAL MOBILITY: ICD-10-CM

## 2017-03-09 DIAGNOSIS — M54.2 NECK PAIN: ICD-10-CM

## 2017-03-09 DIAGNOSIS — M25.60 DECREASED RANGE OF MOTION: ICD-10-CM

## 2017-03-09 DIAGNOSIS — R53.1 DECREASED STRENGTH: ICD-10-CM

## 2017-03-09 PROCEDURE — 97110 THERAPEUTIC EXERCISES: CPT | Mod: PN

## 2017-03-09 PROCEDURE — 97140 MANUAL THERAPY 1/> REGIONS: CPT | Mod: PN

## 2017-03-14 ENCOUNTER — CLINICAL SUPPORT (OUTPATIENT)
Dept: REHABILITATION | Facility: HOSPITAL | Age: 72
End: 2017-03-14
Attending: INTERNAL MEDICINE
Payer: MEDICARE

## 2017-03-14 DIAGNOSIS — M25.60 DECREASED RANGE OF MOTION: ICD-10-CM

## 2017-03-14 DIAGNOSIS — M54.2 NECK PAIN: ICD-10-CM

## 2017-03-14 DIAGNOSIS — R53.1 DECREASED STRENGTH: ICD-10-CM

## 2017-03-14 DIAGNOSIS — R26.89 DECREASED FUNCTIONAL MOBILITY: ICD-10-CM

## 2017-03-14 PROCEDURE — 97140 MANUAL THERAPY 1/> REGIONS: CPT | Mod: PN

## 2017-03-14 PROCEDURE — 97110 THERAPEUTIC EXERCISES: CPT | Mod: PN

## 2017-03-14 NOTE — PROGRESS NOTES
"TIME RECORD    Date:  03/14/2017    Start Time:  1308  Stop Time:  1355    PROCEDURES:    TIMED  Procedure Min.   MT 8   TE 29                 UNTIMED  Procedure Min.   MH 10         Total Timed Minutes:  37  Total Timed Units: 2  Total Untimed Units:  0  Charges Billed/# of units:  2 (MT-1, TE-1)      Progress/Current Status    Subjective:     Patient ID: Grace Navarro is a 71 y.o. female.  Diagnosis:   1. Decreased range of motion     2. Decreased strength     3. Neck pain     4. Decreased functional mobility       Pain: Pt reports she had a "lot of pain" last night, but wore her neck brace to sleep and is feeling "much better" today. "I feel good." Not specific to pain scale.    Objective:     Pt initiated treatment with MT x 8' including pt in L SL for STM/MFR to R upper thoracic paraspinals, with scapular framing f/b TE 1:1 with PT per log x 29' f/b MH x 10' to thoracic spine in sitting to conclude.**FOTO NEXT VISIT**    Date 3/14/17 3/9/17 3/7/17 3/1/17 2/23/17 2/21/17 2/15/17 2/13/2017   Visit 9 8 7 6 5 4 3 2   POC exp  4/3/17     FOTO      [id number]  3/3/17           Gcode 9 of 10 8 of 10 7 of 10 6 of 10 5 of 10 4 of 10 3 of 10 2 of 10   Visit  Total 61.84  728.10   93.82  666.26   93.82  572.44   61.84  478.62 59.72  416.78 61.84  357.06   93.82  295.22 93.82  201.40   MHP 10' 10' 10' 10 10' 10' 10' --   MT 8' 15' 20' 15' 23' 10' 15' 15'   UT Str. 30"x3 B 30"x3 30"x3 30"x3 B 30"x3 B 30"x3 B 30"x3 B 3x30" L   LV Str. 30"x3 B 30"x3 30"x3  30"x3 B 30"x3 B 30"x3 B 30"x3 B 3x30" B   Pec Str.           Chin Tuck Sit   5"x10 Sit   5"x10 Supine 5"x15 Supine  5"x12 Supine  12x5" Supine  12x5" Supine  5"x10 10x5"  Supine   Cervical Rot 10x5" 5"x5 5"x5 5"x5   5"x5 B 10x5" B   DNF   -- --   -- -   Cervical Kacey 5"x7 self sb/rot/flex 5"x5 self sb/rot (sit) 5"x5 self  Sit Sb/rot  5"x5 sb/rot 5"x5 sb/rot 5"x5 sb/rot 5"x5 sb/rot   5x5" sb/rot   Scap ret 20x5" 5"x15 5"x15 5"x15 5"x10 5"x10 5"x10    Lats held held " "held held held Held (Pain) Next? --   Rows RTC 2x10 RTC 2x10 2x15 YTC 3x10 YTC 2x10 YTC 2x10 YTC Next? --   Serratus punch stand RTC   2x10 B RTC   2x10 B         Horizontal Abd -- -- -- -- -- --  --   Scaption -- -- -- -- -- --  --   Wall Slides 2x12 2x10 2x10 -- NT NT 1x10 --   Initials KV DH 3/6 DH 2/6 DH 1/6 KV KV DH 1/6 KV       Assessment:     Pt tolerated treatment well with reports of "feeling good" at the end of the treatment session. Required verbal and tactile cuing for correct performance of exercises.    Patient Education/Response:     Cont HEP.    Plans and Goals:     Cont POC. Progress as able.     Short Term Goals (4 Weeks):   1. Pt will be independent with initial HEP.  2. Pt will increase B UE strength to grossly 5/5 to improve functional mobility.  3. Pt will display correct posture 80% of the time to improve functional mobility.     Long Term Goals (8 Weeks):   1. Pt will be independent with updated HEP.  2. Pt will perform all neck ROM with </= 2/10 pain  3. Pt will perform job duties without limitation as evidence of improved function.  4. Pt will report 44% on the FOTO Neck Survey placing the patient in the 40%<60% impaired, limited, or restricted category indicating increased functional mobility.    "

## 2017-03-16 ENCOUNTER — CLINICAL SUPPORT (OUTPATIENT)
Dept: REHABILITATION | Facility: HOSPITAL | Age: 72
End: 2017-03-16
Attending: INTERNAL MEDICINE
Payer: MEDICARE

## 2017-03-16 DIAGNOSIS — M54.2 NECK PAIN: ICD-10-CM

## 2017-03-16 DIAGNOSIS — M25.60 DECREASED RANGE OF MOTION: ICD-10-CM

## 2017-03-16 DIAGNOSIS — R26.89 DECREASED FUNCTIONAL MOBILITY: ICD-10-CM

## 2017-03-16 DIAGNOSIS — R53.1 DECREASED STRENGTH: ICD-10-CM

## 2017-03-16 PROCEDURE — G8984 CARRY CURRENT STATUS: HCPCS | Mod: CK,PN

## 2017-03-16 PROCEDURE — 97110 THERAPEUTIC EXERCISES: CPT | Mod: PN

## 2017-03-16 PROCEDURE — G8985 CARRY GOAL STATUS: HCPCS | Mod: CK,PN

## 2017-03-16 PROCEDURE — 97140 MANUAL THERAPY 1/> REGIONS: CPT | Mod: PN

## 2017-03-16 NOTE — PROGRESS NOTES
"TIME RECORD    Date:  03/16/2017    Start Time:  800  Stop Time:  910    PROCEDURES:    TIMED  Procedure Min.   Manual therapy 12   Therex 43                 UNTIMED  Procedure Min.   Moist heat 10         Total Timed Minutes:  55  Total Timed Units:  4  Total Untimed Units:  0  Charges Billed/# of units:  4  MTx1, TEx3      Progress/Current Status    Subjective:     Patient ID: Grace Navarro is a 71 y.o. female.  Diagnosis:   1. Decreased range of motion     2. Decreased strength     3. Neck pain     4. Decreased functional mobility       "I am improving but the pain is always there just the same.  I cannot turn my neck when I am driving - it is very dangerous."    Objective:     Manual therapy provided x 12 minutes including STM/MFR to B upper thoracic paraspinals, manual stretching B UT and STM/MFR B UT focus right., STM with elongation to B cervical paraspinals, CFM B occiput.  To left sidelying for additional STM/MFR right UT. Patient then performed all self stretching and therex as per log with 1:1 by PTA x 43 minutes.  MH x 10' to thoracic spine in sitting to conclude. FOTO completed.    Date 3/16/17 3/14/17 3/9/17 3/7/17 3/1/17 2/23/17 2/21/17 2/15/17 2/13/2017   Visit 10 9 8 7 6 5 4 3 2   POC exp  4/3/17      FOTO      [id number]  3/3/17            Gcode 1 of 10 FOTO 9 of 10 8 of 10 7 of 10 6 of 10 5 of 10 4 of 10 3 of 10 2 of 10   Visit  Total 125.80  853.90 61.84  728.10   93.82  666.26   93.82  572.44   61.84  478.62 59.72  416.78 61.84  357.06   93.82  295.22 93.82  201.40   MHP 10' 10' 10' 10' 10 10' 10' 10' --   MT 12' 8' 15' 20' 15' 23' 10' 15' 15'   UT Str. 30"x3 B 30"x3 B 30"x3 30"x3 30"x3 B 30"x3 B 30"x3 B 30"x3 B 3x30" L   LV Str. 30"x3 B 30"x3 B 30"x3 30"x3  30"x3 B 30"x3 B 30"x3 B 30"x3 B 3x30" B   Pec Str. 30"x3           Chin Tuck Sit  5"x15 Sit   5"x10 Sit   5"x10 Supine 5"x15 Supine  5"x12 Supine  12x5" Supine  12x5" Supine  5"x10 10x5"  Supine   Cervical Rot 5"x10 10x5" 5"x5 5"x5 " "5"x5   5"x5 B 10x5" B   DNF    -- --   -- -   Cervical Kacey 5"x5 self  Sb/rot/flex 5"x7 self sb/rot/flex 5"x5 self sb/rot (sit) 5"x5 self  Sit Sb/rot  5"x5 sb/rot 5"x5 sb/rot 5"x5 sb/rot 5"x5 sb/rot   5x5" sb/rot   Scap ret 5"x20 20x5" 5"x15 5"x15 5"x15 5"x10 5"x10 5"x10    Lats held held held held held held Held (Pain) Next? --   Rows RTC 2x15 RTC 2x10 RTC 2x10 2x15 YTC 3x10 YTC 2x10 YTC 2x10 YTC Next? --   Serratus punch stand RTC   2x15 RTC   2x10 B RTC   2x10 B         Horizontal Abd -- -- -- -- -- -- --  --   Scaption -- -- -- -- -- -- --  --   Wall Slides 2x12 2x12 2x10 2x10 -- NT NT 1x10 --   Initials DH 1/6 KV DH 3/6 DH 2/6 DH 1/6 KV KV DH 1/6 KV         Assessment:     Patient required re instruction with verbal instruction, tactile cues and written handout for correct technique with self stretching    Patient Education/Response:     Complete review of technique for all therex and HEP as performed.  Given written handout for self stretching B UT, Lev Scap, lower cervical/upper thoracic, Isometric rotation and side bending    Plans and Goals:     Cont POC. Progress as able.     Short Term Goals (4 Weeks):   1. Pt will be independent with initial HEP.  2. Pt will increase B UE strength to grossly 5/5 to improve functional mobility.  3. Pt will display correct posture 80% of the time to improve functional mobility.     Long Term Goals (8 Weeks):   1. Pt will be independent with updated HEP.  2. Pt will perform all neck ROM with </= 2/10 pain  3. Pt will perform job duties without limitation as evidence of improved function.  4. Pt will report 44% on the FOTO Neck Survey placing the patient in the 40%<60% impaired, limited, or restricted category indicating increased functional mobility.    "

## 2017-03-17 NOTE — PROGRESS NOTES
Functional Limitations Reporting using FOTO Neck Survey. Current score: 57% limitation; previous score: 59% limitation. Pt showed a 2% improvement in function.

## 2017-03-21 ENCOUNTER — CLINICAL SUPPORT (OUTPATIENT)
Dept: REHABILITATION | Facility: HOSPITAL | Age: 72
End: 2017-03-21
Attending: INTERNAL MEDICINE
Payer: MEDICARE

## 2017-03-21 DIAGNOSIS — M54.2 NECK PAIN: ICD-10-CM

## 2017-03-21 DIAGNOSIS — M25.60 DECREASED RANGE OF MOTION: ICD-10-CM

## 2017-03-21 DIAGNOSIS — R26.89 DECREASED FUNCTIONAL MOBILITY: ICD-10-CM

## 2017-03-21 DIAGNOSIS — R53.1 DECREASED STRENGTH: ICD-10-CM

## 2017-03-21 PROCEDURE — 97110 THERAPEUTIC EXERCISES: CPT | Mod: PN

## 2017-03-21 PROCEDURE — 97140 MANUAL THERAPY 1/> REGIONS: CPT | Mod: PN

## 2017-03-21 NOTE — PROGRESS NOTES
"TIME RECORD    Date:  03/21/2017    Start Time:  1100  Stop Time:  1203    PROCEDURES:    TIMED  Procedure Min.   MT 10   TE 43                 UNTIMED  Procedure Min.   MH 10         Total Timed Minutes:  53  Total Timed Units:  4  Total Untimed Units:  0  Charges Billed/# of units:  4  (MT-1, TE-3)      Progress/Current Status    Subjective:     Patient ID: Grace Navarro is a 71 y.o. female.  Diagnosis:   1. Decreased range of motion     2. Decreased strength     3. Neck pain     4. Decreased functional mobility       Pt reports she feels like she is improving with PT.    Objective:     Pt initiated treatment with MT x 10' including STM/MFR to B upper thoracic paraspinals, manual stretching B UT and STM/MFR B UT focus right., STM with elongation to B cervical paraspinals, CFM B occiput then To left sidelying for additional STM/MFR right UT f/b TE 1:1 with PT per log x 43'.  MH x 10' to thoracic spine in sitting to conclude.     Date 3/21/17 3/16/17 3/14/17 3/9/17 3/7/17 3/1/17 2/23/17 2/21/17 2/15/17 2/13/2017   Visit 11 10 9 8 7 6 5 4 3 2   POC exp  4/3/17       FOTO      [id number]  3/3/17             Gcode 2 of 10 1 of 10 FOTO 9 of 10 8 of 10 7 of 10 6 of 10 5 of 10 4 of 10 3 of 10 2 of 10   Visit  Total 125.80  979.70 125.80  853.90 61.84  728.10   93.82  666.26   93.82  572.44   61.84  478.62 59.72  416.78 61.84  357.06   93.82  295.22 93.82  201.40   MHP 10' 10' 10' 10' 10' 10 10' 10' 10' --   MT 10' 12' 8' 15' 20' 15' 23' 10' 15' 15'   UT Str. 30"x3 B 30"x3 B 30"x3 B 30"x3 30"x3 30"x3 B 30"x3 B 30"x3 B 30"x3 B 3x30" L   LV Str. 30"x3 B 30"x3 B 30"x3 B 30"x3 30"x3  30"x3 B 30"x3 B 30"x3 B 30"x3 B 3x30" B   Pec Str.  30"x3           Chin Tuck Sit  5"x15 Sit  5"x15 Sit   5"x10 Sit   5"x10 Supine 5"x15 Supine  5"x12 Supine  12x5" Supine  12x5" Supine  5"x10 10x5"  Supine   Cervical Rot  5"x10 10x5" 5"x5 5"x5 5"x5   5"x5 B 10x5" B   DNF     -- --   -- -   Cervical Kacey 5"x7 self sb/rot/flex 5"x5 " "self  Sb/rot/flex 5"x7 self sb/rot/flex 5"x5 self sb/rot (sit) 5"x5 self  Sit Sb/rot  5"x5 sb/rot 5"x5 sb/rot 5"x5 sb/rot 5"x5 sb/rot   5x5" sb/rot   Scap ret 5"x20 5"x20 20x5" 5"x15 5"x15 5"x15 5"x10 5"x10 5"x10    Lats  held held held held held held Held (Pain) Next? --   Rows RTC 2x15 RTC 2x15 RTC 2x10 RTC 2x10 2x15 YTC 3x10 YTC 2x10 YTC 2x10 YTC Next? --   Serratus punch stand RTC   2x15 RTC   2x15 RTC   2x10 B RTC   2x10 B         Horizontal Abd 2x10 YTB -- -- -- -- -- -- --  --   Scaption -- -- -- -- -- -- -- --  --   Wall Slides 2x12 2x12 2x12 2x10 2x10 -- NT NT 1x10 --   Initials KV DH 1/6 KV DH 3/6 DH 2/6 DH 1/6 KV KV DH 1/6 KV         Assessment:     Trial mechanical traction that was stopped due to pt reports of pain near B mastoids when traction applied. Able to tolerated progression of exercises with no reports of increased pain or discomfort.     Patient Education/Response:     Cont HEP.    Plans and Goals:     Cont POC. Progress as able.     Short Term Goals (4 Weeks):   1. Pt will be independent with initial HEP.  2. Pt will increase B UE strength to grossly 5/5 to improve functional mobility.  3. Pt will display correct posture 80% of the time to improve functional mobility.     Long Term Goals (8 Weeks):   1. Pt will be independent with updated HEP.  2. Pt will perform all neck ROM with </= 2/10 pain  3. Pt will perform job duties without limitation as evidence of improved function.  4. Pt will report 44% on the FOTO Neck Survey placing the patient in the 40%<60% impaired, limited, or restricted category indicating increased functional mobility.    "

## 2017-03-24 ENCOUNTER — CLINICAL SUPPORT (OUTPATIENT)
Dept: REHABILITATION | Facility: HOSPITAL | Age: 72
End: 2017-03-24
Attending: INTERNAL MEDICINE
Payer: MEDICARE

## 2017-03-24 ENCOUNTER — OFFICE VISIT (OUTPATIENT)
Dept: OPHTHALMOLOGY | Facility: CLINIC | Age: 72
End: 2017-03-24
Payer: MEDICARE

## 2017-03-24 DIAGNOSIS — H40.003 GLAUCOMA SUSPECT OF BOTH EYES: ICD-10-CM

## 2017-03-24 DIAGNOSIS — Z98.49 POSTOPERATIVE CARE FOR CATARACT: Primary | ICD-10-CM

## 2017-03-24 DIAGNOSIS — H47.391 MYELINATED NERVE FIBERS OF OPTIC DISC OF RIGHT EYE: ICD-10-CM

## 2017-03-24 DIAGNOSIS — M54.2 NECK PAIN: ICD-10-CM

## 2017-03-24 DIAGNOSIS — M25.60 DECREASED RANGE OF MOTION: Primary | ICD-10-CM

## 2017-03-24 DIAGNOSIS — Z48.810 POSTOPERATIVE CARE FOR CATARACT: Primary | ICD-10-CM

## 2017-03-24 DIAGNOSIS — R26.89 DECREASED FUNCTIONAL MOBILITY: ICD-10-CM

## 2017-03-24 DIAGNOSIS — Z96.1 PSEUDOPHAKIA OF BOTH EYES: ICD-10-CM

## 2017-03-24 DIAGNOSIS — R53.1 DECREASED STRENGTH: ICD-10-CM

## 2017-03-24 DIAGNOSIS — H25.11 NUCLEAR SCLEROTIC CATARACT OF RIGHT EYE: ICD-10-CM

## 2017-03-24 PROCEDURE — 97140 MANUAL THERAPY 1/> REGIONS: CPT | Mod: PN

## 2017-03-24 PROCEDURE — 97110 THERAPEUTIC EXERCISES: CPT | Mod: PN

## 2017-03-24 PROCEDURE — 99499 UNLISTED E&M SERVICE: CPT | Mod: S$GLB,,, | Performed by: OPHTHALMOLOGY

## 2017-03-24 NOTE — PROGRESS NOTES
"TIME RECORD    Date:  03/24/2017    Start Time:  900  Stop Time:  1000    PROCEDURES:    TIMED  Procedure Min.   MT 10   TE 40                 UNTIMED  Procedure Min.   MH 10         Total Timed Minutes:  50  Total Timed Units:  3  Total Untimed Units:  0  Charges Billed/# of units:  3  (MT-1, TE-2)      Progress/Current Status    Subjective:     Patient ID: Grace Navarro is a 71 y.o. female.  Diagnosis:   1. Decreased range of motion     2. Decreased strength     3. Neck pain     4. Decreased functional mobility       Pt reports that her neck is stiff today.  No c/o pain.    Objective:     Pt initiated treatment with MT x 10' including STM/MFR to B upper thoracic paraspinals, manual stretching B UT and STM/MFR B UT focus right., STM with elongation to B cervical paraspinals, CFM B occiput then To left sidelying for additional STM/MFR right UT f/b TE 1:1 with PT per log x 40'.  MH x 10' to thoracic spine in sitting to conclude.     Date 3/24/17 3/21/17 3/16/17 3/14/17 3/9/17 3/7/17 3/1/17 2/23/17 2/21/17 2/15/17 2/13/2017   Visit 12 11 10 9 8 7 6 5 4 3 2   POC exp  4/3/17        FOTO      [id number]  3/3/17              Gcode 3 of 10 2 of 10 1 of 10 FOTO 9 of 10 8 of 10 7 of 10 6 of 10 5 of 10 4 of 10 3 of 10 2 of 10   Visit  Total 93.82  1073.52 125.80  979.70 125.80  853.90 61.84  728.10   93.82  666.26   93.82  572.44   61.84  478.62 59.72  416.78 61.84  357.06   93.82  295.22 93.82  201.40   MHP 10' 10' 10' 10' 10' 10' 10 10' 10' 10' --   MT 10' 10' 12' 8' 15' 20' 15' 23' 10' 15' 15'   UT Str. 30"x3 B 30"x3 B 30"x3 B 30"x3 B 30"x3 30"x3 30"x3 B 30"x3 B 30"x3 B 30"x3 B 3x30" L   LV Str. 30"x3 B 30"x3 B 30"x3 B 30"x3 B 30"x3 30"x3  30"x3 B 30"x3 B 30"x3 B 30"x3 B 3x30" B   Pec Str. x2' supine on 1/2 FR  30"x3           Chin Tuck Sit   5"x15 Sit  5"x15 Sit  5"x15 Sit   5"x10 Sit   5"x10 Supine 5"x15 Supine  5"x12 Supine  12x5" Supine  12x5" Supine  5"x10 10x5"  Supine   Cervical Rot 5"x10  5"x10 10x5" 5"x5 " "5"x5 5"x5   5"x5 B 10x5" B   DNF      -- --   -- -   Cervical Kacey 5"x7 self  Sb/rot/flex 5"x7 self sb/rot/flex 5"x5 self  Sb/rot/flex 5"x7 self sb/rot/flex 5"x5 self sb/rot (sit) 5"x5 self  Sit Sb/rot  5"x5 sb/rot 5"x5 sb/rot 5"x5 sb/rot 5"x5 sb/rot   5x5" sb/rot   Scap ret 5"x20 5"x20 5"x20 20x5" 5"x15 5"x15 5"x15 5"x10 5"x10 5"x10    Lats   held held held held held held Held (Pain) Next? --   Rows RTC 2x15 RTC 2x15 RTC 2x15 RTC 2x10 RTC 2x10 2x15 YTC 3x10 YTC 2x10 YTC 2x10 YTC Next? --   Serratus punch stand RTC   2x15 RTC   2x15 RTC   2x15 RTC   2x10 B RTC   2x10 B         Horizontal Abd 2x10 YTB 2x10 YTB -- -- -- -- -- -- --  --   Scaption  -- -- -- -- -- -- -- --  --   Wall Slides 2x12 2x12 2x12 2x12 2x10 2x10 -- NT NT 1x10 --   Initials RS KV DH 1/6 KV DH 3/6 DH 2/6 DH 1/6 KV KV DH 1/6 KV         Assessment:     Pt tolerated treatment fair, requiring VCs for proper technique during exercises.  Initiated pec stretch on 1/2 foam roll today.  However, pt expressed that it was uncomfortable.  Pt to be reassessed next visit.    Patient Education/Response:     Cont HEP.    Plans and Goals:     Cont POC. Progress as able.     Short Term Goals (4 Weeks):   1. Pt will be independent with initial HEP.  2. Pt will increase B UE strength to grossly 5/5 to improve functional mobility.  3. Pt will display correct posture 80% of the time to improve functional mobility.     Long Term Goals (8 Weeks):   1. Pt will be independent with updated HEP.  2. Pt will perform all neck ROM with </= 2/10 pain  3. Pt will perform job duties without limitation as evidence of improved function.  4. Pt will report 44% on the FOTO Neck Survey placing the patient in the 40%<60% impaired, limited, or restricted category indicating increased functional mobility.    "

## 2017-03-24 NOTE — PROGRESS NOTES
"HPI     DLS: 11/03/16    Pt here for glasses check;  Pt states with her new glasses she feels like she has to move her head   around to see clearly. Pt states she doesn't wear her glasses for distance   but just for reading and for the computer.    Meds: No GTTS    1. Glaucoma suspect of both eyes  2. 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 Pseudophakia, both eyes        Last edited by Bessy Meza on 3/24/2017  2:11 PM.         Assessment /Plan     For exam results, see Encounter Report.    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 (3/22/2016)   Old pt of Dr. Miguel - wants to be seen at [facility] - all her other doctors are here and she lives on [address]  Originally from [facility] - bi lingual         1. Myelinated NFL OD    2. Glaucoma suspect OU - enlarged CDR  Patient states she has been followed for years by Dr. Khan and was told that she does not have glaucoma.    3. 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 PC IOL OU    OD - 9/21/2016 - PCB00 21.5   OS - 8/3/2016 - PCB00 22.0    Plan  Get records from Dr. Khan - signed release  - was monitored with VF's photos ect. / Eventually was told she did NOT have glaucoma and no further testing was done - old VF's ect - may have a defect from the medullated NFL    Try +1.50 readers for [employer] work - her +2.50 are now too strong    Once done with psot op - will need to monitor as a glaucoma suspect - lrg CDR's    F/U 6 months with DFE / ?? OCT or photos - ?? If really needs to be followed as a glaucoma suspect or not   Old pt of [facility] - and told no futher testing needed     Can be seen at [facility] or " main campus -       ADDENDUM - 3/24/2016    Having trouble with new glasses   On re-check - found a minimal change in right eye - half a diopter af cylinder at 90   Suspect the PD and progressive are not lined up with her eyes properly.  New Rx printed and hopefully they can be remade at no charge. NOT done at [facility]     If this does not work - consider referral for dr Tang - for refraction

## 2017-03-27 ENCOUNTER — PATIENT MESSAGE (OUTPATIENT)
Dept: DERMATOLOGY | Facility: CLINIC | Age: 72
End: 2017-03-27

## 2017-03-29 RX ORDER — TRIAMCINOLONE ACETONIDE 1 MG/G
CREAM TOPICAL
Qty: 60 G | Refills: 3 | Status: SHIPPED | OUTPATIENT
Start: 2017-03-29 | End: 2017-07-17

## 2017-03-30 ENCOUNTER — CLINICAL SUPPORT (OUTPATIENT)
Dept: REHABILITATION | Facility: HOSPITAL | Age: 72
End: 2017-03-30
Attending: INTERNAL MEDICINE
Payer: MEDICARE

## 2017-03-30 DIAGNOSIS — M25.60 DECREASED RANGE OF MOTION: ICD-10-CM

## 2017-03-30 DIAGNOSIS — M54.2 NECK PAIN: ICD-10-CM

## 2017-03-30 DIAGNOSIS — R53.1 DECREASED STRENGTH: ICD-10-CM

## 2017-03-30 DIAGNOSIS — R26.89 DECREASED FUNCTIONAL MOBILITY: ICD-10-CM

## 2017-03-30 PROCEDURE — 97110 THERAPEUTIC EXERCISES: CPT | Mod: PN

## 2017-03-30 PROCEDURE — G8986 CARRY D/C STATUS: HCPCS | Mod: CK,PN

## 2017-03-30 PROCEDURE — G8985 CARRY GOAL STATUS: HCPCS | Mod: CK,PN

## 2017-03-30 NOTE — PROGRESS NOTES
"TIME RECORD    Date:  03/30/2017    Start Time:  0901  Stop Time:  0955    PROCEDURES:    TIMED  Procedure Min.   TE 40   TE Supervised 14 NC             UNTIMED  Procedure Min.             Total Timed Minutes:  40  Total Timed Units:  3  Total Untimed Units:  0  Charges Billed/# of units:  TE-3      Progress/Current Status    Subjective:     Patient ID: Grace Navarro is a 71 y.o. female.  Diagnosis:   1. Decreased range of motion     2. Decreased strength     3. Neck pain     4. Decreased functional mobility       Pt reports she is doing "good" today.    Objective:     Pt initiated treatment with objective measures taken x 20' f/b TE 1:1 with PT per log  20' f/b supervised TE x 14'    Date 3/30/17 3/24/17 3/21/17 3/16/17 3/14/17 3/9/17 3/7/17 3/1/17 2/23/17 2/21/17 2/15/17 2/13/2017   Visit 13 12 11 10 9 8 7 6 5 4 3 2   POC exp  4/3/17         FOTO      Knck5Ouge  3/3/17               Gcode  3 of 10 2 of 10 1 of 10 FOTO 9 of 10 8 of 10 7 of 10 6 of 10 5 of 10 4 of 10 3 of 10 2 of 10   Visit  Total 95.94  1169.46 93.82  1073.52 125.80  979.70 125.80  853.90 61.84  728.10   93.82  666.26   93.82  572.44   61.84  478.62 59.72  416.78 61.84  357.06   93.82  295.22 93.82  201.40   MHP  10' 10' 10' 10' 10' 10' 10 10' 10' 10' --   MT  10' 10' 12' 8' 15' 20' 15' 23' 10' 15' 15'   UT Str. 30"x3 B 30"x3 B 30"x3 B 30"x3 B 30"x3 B 30"x3 30"x3 30"x3 B 30"x3 B 30"x3 B 30"x3 B 3x30" L   LV Str. 30"x3 B 30"x3 B 30"x3 B 30"x3 B 30"x3 B 30"x3 30"x3  30"x3 B 30"x3 B 30"x3 B 30"x3 B 3x30" B   Pec Str.  x2' supine on 1/2 FR  30"x3           Chin Tuck 5"x15 Sit   5"x15 Sit  5"x15 Sit  5"x15 Sit   5"x10 Sit   5"x10 Supine 5"x15 Supine  5"x12 Supine  12x5" Supine  12x5" Supine  5"x10 10x5"  Supine   Cervical Rot 5"x10 5"x10  5"x10 10x5" 5"x5 5"x5 5"x5   5"x5 B 10x5" B   DNF       -- --   -- -   Cervical Kacey 5"x7 self  Sb/rot/flex 5"x7 self  Sb/rot/flex 5"x7 self sb/rot/flex 5"x5 self  Sb/rot/flex 5"x7 self sb/rot/flex 5"x5 self " "sb/rot (sit) 5"x5 self  Sit Sb/rot  5"x5 sb/rot 5"x5 sb/rot 5"x5 sb/rot 5"x5 sb/rot   5x5" sb/rot   Scap ret  5"x20 5"x20 5"x20 20x5" 5"x15 5"x15 5"x15 5"x10 5"x10 5"x10    Lats    held held held held held held Held (Pain) Next? --   Rows RTC 2x15 RTC 2x15 RTC 2x15 RTC 2x15 RTC 2x10 RTC 2x10 2x15 YTC 3x10 YTC 2x10 YTC 2x10 YTC Next? --   Serratus punch stand RTC   2x15 RTC   2x15 RTC   2x15 RTC   2x15 RTC   2x10 B RTC   2x10 B         Horizontal Abd 2x10 YTB 2x10 YTB 2x10 YTB -- -- -- -- -- -- --  --   Scaption   -- -- -- -- -- -- -- --  --   Wall Slides  2x12 2x12 2x12 2x12 2x10 2x10 -- NT NT 1x10 --   Initials KV RS KV DH 1/6 KV DH 3/6 DH 2/6 DH 1/6 KV KV DH 1/6 KV         Assessment:     See d/c summary below.    Patient Education/Response:     See d/c summary below.    Plans and Goals:     REHAB SERVICES OUTPATIENT DISCHARGE SUMMARY  Physical Therapy      Name:  Grace Navarro  Date:  3/30/17  Date of Evaluation:    Physician:  2/3/17  Total # Of Visits:  13    Diagnosis:    1. Decreased range of motion     2. Decreased strength     3. Neck pain     4. Decreased functional mobility         Physical/Functional Status:  Since beginning PT, pt has been seen 13 times since initial evaluation on 2/3/17. Overall, she has made good, steady progress with her PT treatments and has worked hard towards all of her PT goals as evidenced by subjective and objective improvements. Pt was educated on maintaining performance of HEP to aid with reduction of chronic pain. Pt verbalized understanding. She will be discharged with an updated HEP and was instructed to contact us with any other questions or concerns. Pt agreeable to d/c.      Range of Motion/Strength:      Cervical AROM Pain/Dysfunction with movement   Flexion 40 Pain in midline and R side neck   Extension 20     R Side Bending 11    L Side Bending 11    R rotation 23    L rotation 25       Shoulder   Right     Left   Pain/Dysfunction with Movement     AROM PROM " MMT AROM PROM MMT     flexion WFL NT 5/5 WFL NT 5/5     abduction WFL NT 5/5 WFL NT 4+/5    Internal rotation WFL NT 5/5 WFL NT 5/5     ER  WFL NT 5/5 WFL NT 5/5     *denotes pain     Elbow   Right     Left   Pain/Dysfunction with Movement     AROM PROM MMT AROM PROM MMT     flexion WFL NT 5/5 WFL NT 5/5     extension WFL NT 5/5 WFL NT 5/5       FOTO Neck Survey: current: 51% limitation    The patient is to be discharged from our Therapy service for the following reason(s):  Patient has reached the maximum rehab potential for the present time    Degree of Goal Achievement:  Patient has partially met goals    Cont POC. Progress as able.     Short Term Goals (4 Weeks):   1. Pt will be independent with initial HEP. - MET  2. Pt will increase B UE strength to grossly 5/5 to improve functional mobility. - Partially MET  3. Pt will display correct posture 80% of the time to improve functional mobility. - Partially MET     Long Term Goals (8 Weeks):   1. Pt will be independent with updated HEP. - Partially MET  2. Pt will perform all neck ROM with </= 2/10 pain  - MET  3. Pt will perform job duties without limitation as evidence of improved function. - Partially MET  4. Pt will report 44% on the FOTO Neck Survey placing the patient in the 40%<60% impaired, limited, or restricted category indicating increased functional mobility. - Partially MET (51% limitation)    Patient Education:  Gave pt HEP consisting of UT, levator scap, and pec stretching, chin tucks, cervical rotation and isometrics, shoulder rows and horizontal abduction, scap retraction. Pt demo understanding by performing exercises.    Discharge Plan:  Home Program:  See above and Recommendations made for follow up therapy care:  Per MD orders or if pain worsens.

## 2017-04-19 ENCOUNTER — PATIENT MESSAGE (OUTPATIENT)
Dept: INTERNAL MEDICINE | Facility: CLINIC | Age: 72
End: 2017-04-19

## 2017-05-22 ENCOUNTER — HOSPITAL ENCOUNTER (OUTPATIENT)
Dept: RADIOLOGY | Facility: HOSPITAL | Age: 72
Discharge: HOME | End: 2017-05-22
Attending: INTERNAL MEDICINE
Payer: MEDICARE

## 2017-05-22 DIAGNOSIS — Z12.31 ENCOUNTER FOR SCREENING MAMMOGRAM FOR BREAST CANCER: ICD-10-CM

## 2017-05-22 PROCEDURE — 77067 SCR MAMMO BI INCL CAD: CPT | Mod: 26,,, | Performed by: RADIOLOGY

## 2017-05-22 PROCEDURE — 77067 SCR MAMMO BI INCL CAD: CPT | Mod: TC

## 2017-05-22 PROCEDURE — 77063 BREAST TOMOSYNTHESIS BI: CPT | Mod: 26,,, | Performed by: RADIOLOGY

## 2017-05-23 RX ORDER — AZELASTINE 1 MG/ML
1 SPRAY, METERED NASAL 2 TIMES DAILY
Qty: 30 ML | Refills: 3 | Status: SHIPPED | OUTPATIENT
Start: 2017-05-23 | End: 2017-08-21

## 2017-05-24 ENCOUNTER — HOSPITAL ENCOUNTER (OUTPATIENT)
Dept: RADIOLOGY | Facility: CLINIC | Age: 72
Discharge: HOME | End: 2017-05-24
Attending: INTERNAL MEDICINE
Payer: MEDICARE

## 2017-05-24 DIAGNOSIS — Z78.0 POSTMENOPAUSAL ESTROGEN DEFICIENCY: ICD-10-CM

## 2017-05-24 PROCEDURE — 77080 DXA BONE DENSITY AXIAL: CPT | Mod: TC

## 2017-05-24 PROCEDURE — 77080 DXA BONE DENSITY AXIAL: CPT | Mod: 26,,, | Performed by: INTERNAL MEDICINE

## 2017-05-29 PROBLEM — M85.89 OSTEOPENIA OF MULTIPLE SITES: Status: ACTIVE | Noted: 2017-05-29

## 2017-06-14 ENCOUNTER — OFFICE VISIT (OUTPATIENT)
Dept: OBSTETRICS AND GYNECOLOGY | Facility: CLINIC | Age: 72
End: 2017-06-14
Payer: MEDICARE

## 2017-06-14 VITALS
WEIGHT: 145.06 LBS | HEIGHT: 64 IN | BODY MASS INDEX: 24.77 KG/M2 | SYSTOLIC BLOOD PRESSURE: 126 MMHG | DIASTOLIC BLOOD PRESSURE: 88 MMHG

## 2017-06-14 DIAGNOSIS — Z01.419 ENCOUNTER FOR ROUTINE GYNECOLOGIC EXAMINATION IN MEDICARE PATIENT: ICD-10-CM

## 2017-06-14 DIAGNOSIS — N95.2 POSTMENOPAUSAL ATROPHIC VAGINITIS: ICD-10-CM

## 2017-06-14 DIAGNOSIS — Z12.31 SCREENING MAMMOGRAM FOR HIGH-RISK PATIENT: ICD-10-CM

## 2017-06-14 DIAGNOSIS — Z01.419 ENCOUNTER FOR GYNECOLOGICAL EXAMINATION WITHOUT ABNORMAL FINDING: Primary | ICD-10-CM

## 2017-06-14 PROCEDURE — 99999 PR PBB SHADOW E&M-EST. PATIENT-LVL III: CPT | Mod: PBBFAC,,, | Performed by: OBSTETRICS & GYNECOLOGY

## 2017-06-14 PROCEDURE — G0101 CA SCREEN;PELVIC/BREAST EXAM: HCPCS | Mod: S$GLB,,, | Performed by: OBSTETRICS & GYNECOLOGY

## 2017-06-14 RX ORDER — ESTRADIOL 0.1 MG/G
0.5 CREAM VAGINAL
Qty: 42 G | Refills: 4 | Status: SHIPPED | OUTPATIENT
Start: 2017-06-15 | End: 2017-07-10

## 2017-06-14 NOTE — PROGRESS NOTES
Subjective:       Patient ID: Grace Navarro is a 71 y.o. female.    Chief Complaint:  Well Woman      History of Present Illness  HPI  Grace Navarro is a 71 y.o. female  NEW TO ME here for her  GYN exam.  She does report vaginal dryness, states she used creams in the past but it was stopped many years ago due to spotting.  She describes her periods as stopped age 50 with menopause.  Denies break through bleeding.   Denies vaginal itching or irritation.  Denies vaginal discharge.  She is not currently sexually active.   History of abnormal pap: No  Last Pap: was normal  Last MMG: normal--routine follow-up in 12 months  Last Colonoscopy:  colonoscopy several years ago without abnormalities.  Denies domestic violence. She does feel safe at home.     Past Medical History:   Diagnosis Date    Anxiety     Rene's disease     Chronic low back pain     Depression     Goiter, nodular     Hyperlipidemia     Hypertension     Irritable bowel     Neuromuscular disorder     Osteopenia of multiple sites 2017    PUD (peptic ulcer disease)     Varicose veins      Past Surgical History:   Procedure Laterality Date    APPENDECTOMY      BELPHAROPTOSIS REPAIR Bilateral     CATARACT EXTRACTION W/  INTRAOCULAR LENS IMPLANT Left 2016        CATARACT EXTRACTION W/  INTRAOCULAR LENS IMPLANT Right 2016        SPINE SURGERY  2012    Fusion @ L4L5    TONSILLECTOMY       Social History     Social History    Marital status:      Spouse name: N/A    Number of children: N/A    Years of education: N/A     Occupational History     Retired     Social History Main Topics    Smoking status: Former Smoker     Packs/day: 1.00     Years: 10.00     Types: Cigarettes    Smokeless tobacco: Never Used      Comment: quit     Alcohol use 8.4 oz/week     14 Glasses of wine per week      Comment: socially    Drug use: No    Sexual activity: Not Currently  "    Partners: Male      Comment:      Other Topics Concern    Not on file     Social History Narrative    . 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     Family History   Problem Relation Age of Onset    Hypertension Mother     Kidney failure Mother     Hypertension Father     Coronary artery disease Father     Prostate cancer Father     Eczema Son     Diabetes Brother     Thyroid cancer Neg Hx     Breast cancer Neg Hx     Colon cancer Neg Hx     Ovarian cancer Neg Hx      OB History      Para Term  AB Living    2 2 2   2    SAB TAB Ectopic Multiple Live Births        2          /88   Ht 5' 4" (1.626 m)   Wt 65.8 kg (145 lb 1 oz)   LMP 1996   BMI 24.90 kg/m²         GYN & OB History  Patient's last menstrual period was 1996.   Date of Last Pap: No result found    OB History    Para Term  AB Living   2 2 2   2   SAB TAB Ectopic Multiple Live Births       2      # Outcome Date GA Lbr Kiran/2nd Weight Sex Delivery Anes PTL Lv   2 Term     M Vag-Spont   BREEZY   1 Term     F Vag-Spont   BREEZY          Review of Systems  Review of Systems   Constitutional: Negative for activity change, appetite change, fatigue and unexpected weight change.   HENT: Negative.    Eyes: Negative for visual disturbance.   Respiratory: Negative for shortness of breath and wheezing.    Cardiovascular: Negative for chest pain, palpitations and leg swelling.   Gastrointestinal: Negative for abdominal pain, bloating and blood in stool.   Endocrine: Negative for diabetes and hair loss.   Genitourinary: Negative for decreased libido, dyspareunia and postmenopausal bleeding.   Musculoskeletal: Negative for back pain and joint swelling.   Skin:  Negative for no acne and hair changes.   Neurological: Negative for headaches.   Hematological: Does not bruise/bleed easily.   Psychiatric/Behavioral: Negative for depression and sleep disturbance. The " patient is not nervous/anxious.    Breast: Negative for breast pain and nipple discharge          Objective:    Physical Exam:   Constitutional: She is oriented to person, place, and time. She appears well-developed and well-nourished.    HENT:   Head: Normocephalic and atraumatic.    Eyes: EOM are normal. Pupils are equal, round, and reactive to light.    Neck: Normal range of motion. Neck supple.    Cardiovascular: Normal rate and regular rhythm.     Pulmonary/Chest: Effort normal and breath sounds normal.   BREASTS: Symmetrical, no skin changes or visible lesions.  No palpable masses, nipple discharge bilaterally.          Abdominal: Soft. Bowel sounds are normal.     Genitourinary: Pelvic exam was performed with patient supine.   Genitourinary Comments: PELVIC: Normal external genitalia without lesions.  Normal hair distribution.  Adequate perineal body, normal urethral meatus.  Vagina  Dry and poorly rugated, Severely atrophic, without lesions or discharge.  Cervix pink, without lesions, discharge or tenderness, almost flat with vault, extremely stenotic upper vagina. No significant cystocele or rectocele.  Bimanual exam shows uterus to be normal size, regular, mobile and nontender.  Adnexa without masses or tenderness.    RECTAL:Deferred             Musculoskeletal: Normal range of motion and moves all extremeties.       Neurological: She is alert and oriented to person, place, and time.    Skin: Skin is warm and dry.    Psychiatric: She has a normal mood and affect.          Assessment:        1. Encounter for gynecological examination without abnormal finding    2. Encounter for routine gynecologic examination in Medicare patient    3. Screening mammogram for high-risk patient    4. Postmenopausal atrophic vaginitis                Plan:           1. Encounter for gynecological examination without abnormal finding  COUNSELING:  The patient was counseled today on osteoporosis prevention, calcium  supplementation, and regular weight bearing exercise. The patient was also counseled today on ACS PAP guidelines, with recommendations for yearly pelvic exams unless their uterus, cervix, and ovaries were removed for benign reasons; in that case, examinations every 3-5 years are recommended. The patient was also counseled regarding monthly breast self-examination, routine STD screening for at-risk populations, prophylactic immunizations for transmitted infections such as HPV, Pertussis, or Influenza as appropriate, and yearly mammograms when indicated by ACS guidelines. She was advised to see her primary care physician for all other health maintenance.   FOLLOW-UP with me for next routine visit.         2. Encounter for routine gynecologic examination in Medicare patient      3. Screening mammogram for high-risk patient    - Mammo Digital Screening Bilat with Tomosynthesis CAD; Future    4. Postmenopausal atrophic vaginitis  - estradiol (ESTRACE) 0.01 % (0.1 mg/gram) vaginal cream; Place 0.5 g vaginally twice a week. Insert 0.5grams intravaginally twice weekly  Dispense: 42 g; Refill: 4  - estradiol (ESTRACE) 0.01 % (0.1 mg/gram) vaginal cream; Place 0.5 g vaginally twice a week. Insert 0.5grams intravaginally twice weekly  Dispense: 42 g; Refill: 4       Return in about 2 years (around 6/14/2019).

## 2017-07-02 ENCOUNTER — PATIENT MESSAGE (OUTPATIENT)
Dept: INTERNAL MEDICINE | Facility: CLINIC | Age: 72
End: 2017-07-02

## 2017-07-10 ENCOUNTER — OFFICE VISIT (OUTPATIENT)
Dept: INTERNAL MEDICINE | Facility: CLINIC | Age: 72
End: 2017-07-10
Payer: MEDICARE

## 2017-07-10 ENCOUNTER — PATIENT MESSAGE (OUTPATIENT)
Dept: ADMINISTRATIVE | Facility: OTHER | Age: 72
End: 2017-07-10

## 2017-07-10 VITALS
RESPIRATION RATE: 17 BRPM | HEART RATE: 58 BPM | BODY MASS INDEX: 24.74 KG/M2 | HEIGHT: 64 IN | WEIGHT: 144.88 LBS | DIASTOLIC BLOOD PRESSURE: 88 MMHG | SYSTOLIC BLOOD PRESSURE: 158 MMHG | TEMPERATURE: 98 F

## 2017-07-10 DIAGNOSIS — Z13.220 ENCOUNTER FOR LIPID SCREENING FOR CARDIOVASCULAR DISEASE: ICD-10-CM

## 2017-07-10 DIAGNOSIS — F41.9 ANXIETY: ICD-10-CM

## 2017-07-10 DIAGNOSIS — R53.83 FATIGUE, UNSPECIFIED TYPE: ICD-10-CM

## 2017-07-10 DIAGNOSIS — Z13.6 ENCOUNTER FOR LIPID SCREENING FOR CARDIOVASCULAR DISEASE: ICD-10-CM

## 2017-07-10 DIAGNOSIS — L29.9 PRURITUS: ICD-10-CM

## 2017-07-10 DIAGNOSIS — R22.31 AXILLARY MASS, RIGHT: ICD-10-CM

## 2017-07-10 DIAGNOSIS — F32.A DEPRESSION, UNSPECIFIED DEPRESSION TYPE: ICD-10-CM

## 2017-07-10 DIAGNOSIS — I10 BENIGN ESSENTIAL HYPERTENSION: Primary | ICD-10-CM

## 2017-07-10 PROCEDURE — 99999 PR PBB SHADOW E&M-EST. PATIENT-LVL IV: CPT | Mod: PBBFAC,,, | Performed by: INTERNAL MEDICINE

## 2017-07-10 PROCEDURE — 1125F AMNT PAIN NOTED PAIN PRSNT: CPT | Mod: S$GLB,,, | Performed by: INTERNAL MEDICINE

## 2017-07-10 PROCEDURE — 99214 OFFICE O/P EST MOD 30 MIN: CPT | Mod: S$GLB,,, | Performed by: INTERNAL MEDICINE

## 2017-07-10 PROCEDURE — 99499 UNLISTED E&M SERVICE: CPT | Mod: S$GLB,,, | Performed by: INTERNAL MEDICINE

## 2017-07-10 PROCEDURE — 1159F MED LIST DOCD IN RCRD: CPT | Mod: S$GLB,,, | Performed by: INTERNAL MEDICINE

## 2017-07-10 RX ORDER — AMLODIPINE BESYLATE 5 MG/1
5 TABLET ORAL DAILY
Qty: 90 TABLET | Refills: 0 | Status: SHIPPED | OUTPATIENT
Start: 2017-07-10 | End: 2017-07-11

## 2017-07-10 RX ORDER — HYDROXYZINE HYDROCHLORIDE 10 MG/1
25 TABLET, FILM COATED ORAL 3 TIMES DAILY PRN
Qty: 30 TABLET | Refills: 0
Start: 2017-07-10 | End: 2017-07-10 | Stop reason: SDUPTHER

## 2017-07-10 RX ORDER — HYDROXYZINE HYDROCHLORIDE 25 MG/1
25 TABLET, FILM COATED ORAL 3 TIMES DAILY PRN
Qty: 30 TABLET | Refills: 1 | Status: SHIPPED | OUTPATIENT
Start: 2017-07-10 | End: 2017-08-09

## 2017-07-10 NOTE — PROGRESS NOTES
"Subjective:       Patient ID: Grace Navarro is a 71 y.o. female.    Chief Complaint: Fatigue and Blood Pressure Check    HPI urgent    Reports that pt is feeling fatigued all the time. When she wakes up, she wants to go back to sleep. Wants to quit alcohol.    Reports always feels itchy all over her skin in the summer. On hydroxyzine 10mg prn. Wonders if this could help calm her nerves and also allows her to sleep at night.     HTN - on toprol XL 200mg daily. Wanted to know if salt affects her BP. Reports when she eats salty foods, it can elevate her DBP to over 100.   Reports her BP cuff at home is not working. Bought another machine that she reports is not working. Didn't bring her BP cuff w/ her. SBP avg 150 per pt. Worried.     Review of Systems   Constitutional: Negative for chills, fever and unexpected weight change.   HENT: Negative.    Respiratory: Negative for shortness of breath and wheezing.    Cardiovascular: Positive for palpitations (occasional). Negative for chest pain and leg swelling.   Gastrointestinal: Negative for abdominal pain, blood in stool, constipation, diarrhea, nausea and vomiting.   Genitourinary: Negative.    Musculoskeletal: Positive for back pain.   Skin: Negative for rash.   Psychiatric/Behavioral: Positive for dysphoric mood and sleep disturbance. The patient is nervous/anxious.      as above in HPI.     Objective:      Physical Exam    BP (!) 158/88   Pulse (!) 58   Temp 98.3 °F (36.8 °C) (Oral)   Resp 17   Ht 5' 4" (1.626 m)   Wt 65.7 kg (144 lb 14.4 oz)   LMP 06/14/1996   BMI 24.87 kg/m²     GEN - A+OX4, NAD   HEENT - PERRL, EOMI, OP clear. MMM.   Neck - No thyromegaly or cervical LAD. Thyroid nodules.  CV - RRR, no m/r   Chest - CTAB, no wheezing or rhonchi  Abd - S/NT/ND/+BS.   Ext - 2+BDP and radial pulses. No LE edema.   Neuro - 5/5 BUE and BLE strength. Decreased DTRs.   LN - R axillary mass - soft and mildly tender.   Skin - No rash.    Previous labs " reviewed.     Assessment/Plan     Grace was seen today for fatigue and blood pressure check.    Diagnoses and all orders for this visit:    Benign essential hypertension - add norvasc. Ordered Digital Hypertension monitoring.  -     Hypertension Digital Medicine (HDMP) Enrollment Order  -     Hypertension Digital Medicine ([facility]): Assign Onboarding Questionnaires  -     amlodipine (NORVASC) 5 MG tablet; Take 1 tablet (5 mg total) by mouth once daily.  -     TSH; Future    Pruritus  -     hydrOXYzine HCl (ATARAX) 25 MG tablet; Take 1 tablet (25 mg total) by mouth 3 (three) times daily as needed (itching or anxiety).    Anxiety  -     Ambulatory consult to Psychology  -     TSH; Future  -     hydrOXYzine HCl (ATARAX) 25 MG tablet; Take 1 tablet (25 mg total) by mouth 3 (three) times daily as needed (itching or anxiety).    Depression, unspecified depression type - wean off of alcohol. No SI/HI.  -     Ambulatory consult to Psychology  -     TSH; Future    Fatigue, unspecified type  -     CBC auto differential; Future  -     Comprehensive metabolic panel; Future  -     Lipid panel; Future    BMI 24.87, adult  -     CBC auto differential; Future    Encounter for lipid screening for cardiovascular disease  -     Lipid panel; Future    Axillary mass, right  -     US Breast Right Complete; Future    Try melatonin 5mg nightly 30 min prior to sleep.   Schedule thyroid ultrasound at check out.     Return in about 3 months (around 10/10/2017).      Lisa Dean MD  Department of Internal Medicine - [facility]  10:30 AM

## 2017-07-11 ENCOUNTER — LAB VISIT (OUTPATIENT)
Dept: LAB | Facility: HOSPITAL | Age: 72
End: 2017-07-11
Attending: INTERNAL MEDICINE
Payer: MEDICARE

## 2017-07-11 ENCOUNTER — PATIENT MESSAGE (OUTPATIENT)
Dept: INTERNAL MEDICINE | Facility: CLINIC | Age: 72
End: 2017-07-11

## 2017-07-11 DIAGNOSIS — F41.9 ANXIETY: ICD-10-CM

## 2017-07-11 DIAGNOSIS — Z13.220 ENCOUNTER FOR LIPID SCREENING FOR CARDIOVASCULAR DISEASE: ICD-10-CM

## 2017-07-11 DIAGNOSIS — R53.83 FATIGUE, UNSPECIFIED TYPE: ICD-10-CM

## 2017-07-11 DIAGNOSIS — I10 BENIGN ESSENTIAL HYPERTENSION: ICD-10-CM

## 2017-07-11 DIAGNOSIS — I10 BENIGN ESSENTIAL HYPERTENSION: Primary | ICD-10-CM

## 2017-07-11 DIAGNOSIS — Z13.6 ENCOUNTER FOR LIPID SCREENING FOR CARDIOVASCULAR DISEASE: ICD-10-CM

## 2017-07-11 DIAGNOSIS — F32.A DEPRESSION, UNSPECIFIED DEPRESSION TYPE: ICD-10-CM

## 2017-07-11 LAB
ALBUMIN SERPL BCP-MCNC: 3.9 G/DL
ALP SERPL-CCNC: 82 U/L
ALT SERPL W/O P-5'-P-CCNC: 23 U/L
ANION GAP SERPL CALC-SCNC: 9 MMOL/L
AST SERPL-CCNC: 28 U/L
BASOPHILS # BLD AUTO: 0.03 K/UL
BASOPHILS NFR BLD: 0.7 %
BILIRUB SERPL-MCNC: 0.5 MG/DL
BUN SERPL-MCNC: 14 MG/DL
CALCIUM SERPL-MCNC: 10 MG/DL
CHLORIDE SERPL-SCNC: 104 MMOL/L
CHOLEST/HDLC SERPL: 3.5 {RATIO}
CO2 SERPL-SCNC: 30 MMOL/L
CREAT SERPL-MCNC: 0.7 MG/DL
DIFFERENTIAL METHOD: NORMAL
EOSINOPHIL # BLD AUTO: 0.2 K/UL
EOSINOPHIL NFR BLD: 4.7 %
ERYTHROCYTE [DISTWIDTH] IN BLOOD BY AUTOMATED COUNT: 13.4 %
EST. GFR  (AFRICAN AMERICAN): >60 ML/MIN/1.73 M^2
EST. GFR  (NON AFRICAN AMERICAN): >60 ML/MIN/1.73 M^2
GLUCOSE SERPL-MCNC: 100 MG/DL
HCT VFR BLD AUTO: 38.8 %
HDL/CHOLESTEROL RATIO: 28.8 %
HDLC SERPL-MCNC: 219 MG/DL
HDLC SERPL-MCNC: 63 MG/DL
HGB BLD-MCNC: 12.5 G/DL
LDLC SERPL CALC-MCNC: 141.4 MG/DL
LYMPHOCYTES # BLD AUTO: 1.4 K/UL
LYMPHOCYTES NFR BLD: 34.5 %
MCH RBC QN AUTO: 30.4 PG
MCHC RBC AUTO-ENTMCNC: 32.2 %
MCV RBC AUTO: 94 FL
MONOCYTES # BLD AUTO: 0.5 K/UL
MONOCYTES NFR BLD: 11.3 %
NEUTROPHILS # BLD AUTO: 2 K/UL
NEUTROPHILS NFR BLD: 48.6 %
NONHDLC SERPL-MCNC: 156 MG/DL
PLATELET # BLD AUTO: 207 K/UL
PMV BLD AUTO: 9.8 FL
POTASSIUM SERPL-SCNC: 4.2 MMOL/L
PROT SERPL-MCNC: 7.5 G/DL
RBC # BLD AUTO: 4.11 M/UL
SODIUM SERPL-SCNC: 143 MMOL/L
T4 FREE SERPL-MCNC: 0.91 NG/DL
TRIGL SERPL-MCNC: 73 MG/DL
TSH SERPL DL<=0.005 MIU/L-ACNC: 5.12 UIU/ML
WBC # BLD AUTO: 4.06 K/UL

## 2017-07-11 PROCEDURE — 84439 ASSAY OF FREE THYROXINE: CPT

## 2017-07-11 PROCEDURE — 80061 LIPID PANEL: CPT

## 2017-07-11 PROCEDURE — 36415 COLL VENOUS BLD VENIPUNCTURE: CPT

## 2017-07-11 PROCEDURE — 80053 COMPREHEN METABOLIC PANEL: CPT

## 2017-07-11 PROCEDURE — 84443 ASSAY THYROID STIM HORMONE: CPT

## 2017-07-11 PROCEDURE — 85025 COMPLETE CBC W/AUTO DIFF WBC: CPT

## 2017-07-11 RX ORDER — HYDROCHLOROTHIAZIDE 12.5 MG/1
12.5 TABLET ORAL DAILY
Qty: 30 TABLET | Refills: 2 | Status: SHIPPED | OUTPATIENT
Start: 2017-07-11 | End: 2017-08-14

## 2017-07-13 ENCOUNTER — PATIENT MESSAGE (OUTPATIENT)
Dept: ADMINISTRATIVE | Facility: OTHER | Age: 72
End: 2017-07-13

## 2017-07-13 ENCOUNTER — HOSPITAL ENCOUNTER (OUTPATIENT)
Dept: RADIOLOGY | Facility: HOSPITAL | Age: 72
Discharge: HOME | End: 2017-07-13
Attending: INTERNAL MEDICINE
Payer: MEDICARE

## 2017-07-13 VITALS — HEIGHT: 64 IN | BODY MASS INDEX: 24.59 KG/M2 | WEIGHT: 144 LBS

## 2017-07-13 DIAGNOSIS — R22.31 AXILLARY MASS, RIGHT: ICD-10-CM

## 2017-07-13 DIAGNOSIS — N63.0 BREAST MASS: ICD-10-CM

## 2017-07-13 PROCEDURE — 76642 ULTRASOUND BREAST LIMITED: CPT | Mod: 26,RT,, | Performed by: RADIOLOGY

## 2017-07-13 PROCEDURE — 77061 BREAST TOMOSYNTHESIS UNI: CPT | Mod: 26,,, | Performed by: RADIOLOGY

## 2017-07-13 PROCEDURE — 76642 ULTRASOUND BREAST LIMITED: CPT | Mod: TC,RT

## 2017-07-13 PROCEDURE — 77061 BREAST TOMOSYNTHESIS UNI: CPT | Mod: TC

## 2017-07-13 PROCEDURE — 77065 DX MAMMO INCL CAD UNI: CPT | Mod: 26,,, | Performed by: RADIOLOGY

## 2017-07-14 ENCOUNTER — TELEPHONE (OUTPATIENT)
Dept: INTERNAL MEDICINE | Facility: CLINIC | Age: 72
End: 2017-07-14

## 2017-07-14 ENCOUNTER — PATIENT MESSAGE (OUTPATIENT)
Dept: INTERNAL MEDICINE | Facility: CLINIC | Age: 72
End: 2017-07-14

## 2017-07-14 DIAGNOSIS — E03.8 SUBCLINICAL HYPOTHYROIDISM: ICD-10-CM

## 2017-07-14 DIAGNOSIS — E78.5 HYPERLIPIDEMIA, UNSPECIFIED HYPERLIPIDEMIA TYPE: Primary | ICD-10-CM

## 2017-07-14 NOTE — TELEPHONE ENCOUNTER
Called and spoke w/ pt. Repeat TFT and lipid in 2 mo. Pt will decrease egg consumption. Decreasing her alcohol intake also.

## 2017-07-14 NOTE — TELEPHONE ENCOUNTER
----- Message from Ana Rodrigues sent at 7/14/2017  9:09 AM CDT -----  Contact: Self/ [phone]   Type: Test Results    What test was performed? Blood test     Who ordered the test? Dr. Dean     When and where were the test performed?  07/11/2017     Comments: pt wanted to go over the test results. Please call and advise     Thank you

## 2017-07-17 ENCOUNTER — PATIENT MESSAGE (OUTPATIENT)
Dept: INTERNAL MEDICINE | Facility: CLINIC | Age: 72
End: 2017-07-17

## 2017-07-17 ENCOUNTER — HOSPITAL ENCOUNTER (OUTPATIENT)
Dept: RADIOLOGY | Facility: HOSPITAL | Age: 72
Discharge: HOME | End: 2017-07-17
Attending: INTERNAL MEDICINE
Payer: MEDICARE

## 2017-07-17 ENCOUNTER — PATIENT OUTREACH (OUTPATIENT)
Dept: OTHER | Facility: OTHER | Age: 72
End: 2017-07-17

## 2017-07-17 DIAGNOSIS — E04.2 MULTINODULAR GOITER: ICD-10-CM

## 2017-07-17 PROCEDURE — 76536 US EXAM OF HEAD AND NECK: CPT | Mod: TC

## 2017-07-17 PROCEDURE — 76536 US EXAM OF HEAD AND NECK: CPT | Mod: 26,,, | Performed by: RADIOLOGY

## 2017-07-17 RX ORDER — UBIDECARENONE 75 MG
500 CAPSULE ORAL DAILY
COMMUNITY

## 2017-07-17 RX ORDER — VIT C/E/ZN/COPPR/LUTEIN/ZEAXAN 250MG-90MG
1000 CAPSULE ORAL 2 TIMES DAILY
COMMUNITY

## 2017-07-17 NOTE — LETTER
Anita Rodney, PharmD  [address] [place] [address]     Dear Grace Navarro,    Welcome to the [facility]!         My name is Anita Rodney PharmD and I am your dedicated Digital Medicine clinician.  As an expert in medication management, I will help ensure that the medications you are taking continue to provide you with the intended benefits.      I am Mami Burr and I will be your health  for the duration of the program.  My  job is to help you identify lifestyle changes to improve your blood pressure control.  We will talk about nutrition, exercise, and other ways that you may be able to adjust your current habits to better your health. Together, we will work to improve your overall health and encourage you to meet your goals for a healthier lifestyle.    What we expect from YOU:    You will need to take blood pressure readings multiple times a week and no less than one reading per week.   It is important that you take your measurements at different times during the day, when possible.     What you should expect from your Digital Medicine Care Team:   We will provide you with education about high blood pressure, including lifestyle changes that could help you to control your blood pressure.   We will review your weekly readings and provide you with monthly blood pressure progress reports after you have been in the program for more than 30 days.   We will send monthly progress reports on your blood pressure control to your physician so they can follow along with your progress as well.    You will be able to reach me by phone at [phone] or through your [facility] account by clicking my name under Care Team on the right side of the home screen.    I look forward to working with you to achieve your blood pressure goals!    Sincerely,    Anita Rodney PharmD  Your personal clinician    Please visit  [email or web address] to learn more about high blood pressure and what you can do lower your blood pressure.                                                                                           Grace Navarro  [address] [address]

## 2017-07-17 NOTE — TELEPHONE ENCOUNTER
HTN Digital Medicine Program Medication Reconciliation Outreach    Called patient to introduce him/her into the HDMP. Reviewed program details including blood pressure goals, technique for taking readings (timing, cuff placement, body positioning, iHealth suzanne), and what to do in case of emergency. Introduced patient to members of care team (health , clinician, and responsible provider). Verified patient's understanding of [facility] MyChart suzanne use for contacting clinical team and to ensure that iHealth cuff readings continue to transmit by logging in once every 2 weeks. Confirmation text sent and patient received.  Pt reports taking BP meds in the morning. Is concerned about HCTZ which she just started because she states she has been feeling very weak. States that she went to the gym and had to leave because of this. Pt reports she will be going out of town for a couple of weeks starting the beginning of September, so will ask to be put on hiatus during this time.  Pt asked about taking multiple BP readings daily - advised that this is fine as long as she waits about 15 mins between readings.    Screening Questionnaire Review:  1. Depression - not indicated  2. Sleep apnea - not indicated         Verified the following information with the patient:  1. Medication list  Current Outpatient Prescriptions on File Prior to Visit   Medication Sig Dispense Refill    ascorbic acid (VITAMIN C) 1000 MG tablet Take 1,000 mg by mouth once daily.      azelastine (ASTELIN) 137 mcg (0.1 %) nasal spray 1 spray (137 mcg total) by Nasal route 2 (two) times daily. 30 mL 3    hydrochlorothiazide (HYDRODIURIL) 12.5 MG Tab Take 1 tablet (12.5 mg total) by mouth once daily. 30 tablet 2    metoprolol succinate (TOPROL-XL) 200 MG 24 hr tablet Take 1 tablet (200 mg total) by mouth once daily. 90 tablet 3    hydrOXYzine HCl (ATARAX) 25 MG tablet Take 1 tablet (25 mg total) by mouth 3 (three) times daily as needed (itching or anxiety).  30 tablet 1    [DISCONTINUED] calcium-vitamin D3 (CALCIUM 500 + D) 500 mg(1,250mg) -200 unit per tablet Take 1 tablet by mouth 2 (two) times daily with meals.      [DISCONTINUED] triamcinolone acetonide 0.1% (KENALOG) 0.1 % cream AAA bid 60 g 3     No current facility-administered medications on file prior to visit.      Does not take D3 or B12 regularly, but has been compliant with all other medications.      Previous ADRs  CCB: Amlodipine - flushing    ACE: pt not sure which medication she was on but was told by her MD that she is allergic to this class of drug    Diuretics: HCTZ - just started on Saturday; causing weakness    2. Medication compliance: has been compliant with the medicaiton regimen     3. Medication Allergies:   Review of patient's allergies indicates:   Allergen Reactions    Ace inhibitors Other (See Comments)    Amlodipine      flushing    Morphine Other (See Comments)     hallucination       Explained that our goal is to get her BP consistently below 140/90mmHg.  Patient denies having further questions, concerns. BP is not at goal.         Last 5 Patient Entered Redings Current 30 Day Average: 146/80     Recent Readings 7/16/2017 7/15/2017 7/15/2017 7/13/2017 7/13/2017    Systolic BP (mmHg) 156 143 160 138 174    Diastolic BP (mmHg) 85 80 88 76 96    Pulse 59 57 62 57 57

## 2017-07-18 ENCOUNTER — PATIENT MESSAGE (OUTPATIENT)
Dept: INTERNAL MEDICINE | Facility: CLINIC | Age: 72
End: 2017-07-18

## 2017-07-26 ENCOUNTER — PATIENT MESSAGE (OUTPATIENT)
Dept: INTERNAL MEDICINE | Facility: CLINIC | Age: 72
End: 2017-07-26

## 2017-07-27 ENCOUNTER — PATIENT MESSAGE (OUTPATIENT)
Dept: INTERNAL MEDICINE | Facility: CLINIC | Age: 72
End: 2017-07-27

## 2017-07-31 ENCOUNTER — LAB VISIT (OUTPATIENT)
Dept: LAB | Facility: HOSPITAL | Age: 72
End: 2017-07-31
Attending: INTERNAL MEDICINE
Payer: MEDICARE

## 2017-07-31 DIAGNOSIS — I10 BENIGN ESSENTIAL HYPERTENSION: ICD-10-CM

## 2017-07-31 LAB
ANION GAP SERPL CALC-SCNC: 6 MMOL/L
BUN SERPL-MCNC: 16 MG/DL
CALCIUM SERPL-MCNC: 9.6 MG/DL
CHLORIDE SERPL-SCNC: 101 MMOL/L
CO2 SERPL-SCNC: 32 MMOL/L
CREAT SERPL-MCNC: 0.6 MG/DL
EST. GFR  (AFRICAN AMERICAN): >60 ML/MIN/1.73 M^2
EST. GFR  (NON AFRICAN AMERICAN): >60 ML/MIN/1.73 M^2
GLUCOSE SERPL-MCNC: 92 MG/DL
POTASSIUM SERPL-SCNC: 4.2 MMOL/L
SODIUM SERPL-SCNC: 139 MMOL/L

## 2017-07-31 PROCEDURE — 80048 BASIC METABOLIC PNL TOTAL CA: CPT

## 2017-07-31 PROCEDURE — 36415 COLL VENOUS BLD VENIPUNCTURE: CPT

## 2017-08-01 ENCOUNTER — PATIENT OUTREACH (OUTPATIENT)
Dept: OTHER | Facility: OTHER | Age: 72
End: 2017-08-01

## 2017-08-01 ENCOUNTER — PATIENT MESSAGE (OUTPATIENT)
Dept: INTERNAL MEDICINE | Facility: CLINIC | Age: 72
End: 2017-08-01

## 2017-08-01 NOTE — PROGRESS NOTES
"Last 5 Patient Entered Redings Current 30 Day Average: 138/79     Recent Readings 8/1/2017 7/30/2017 7/29/2017 7/28/2017 7/27/2017    Systolic BP (mmHg) 146 121 148 129 137    Diastolic BP (mmHg) 83 74 87 72 84    Pulse 57 59 62 62 61        Initial introduction completed with the pt and the role of the health  was explained.   We discussed the following information:  Exercise - Patient reports that she walk 4 miles each day either at the gym or outside.  Diet - Patient reports that she doesn't dine out and usually cooks for herself. She is working to reduce sodium intake. She has also reduced alcohol intake.    Patient reports that she is not liking her new BP medication. Ms. Navarro feels "terrible" when her BP is "good". She messaged Dr. Dean about this.    Resources on diet and exercise were sent.     Pt aware that I am not available for emergencies and to call 911 or [facility] on call if one arises.  Pt aware of the importance of medication adherence.  Pt aware of the importance of diet and exercise.  Pt aware that his sodium intake should be no more than 2000mg per day.  Pt aware that the recommended physical activity each week should be about 30 minutes per day at least 5 times per week.   Pt aware of the importance of taking BP readings at least weekly if not more and during different times each day.  Pt aware that the health  can be used as a resource for lifestyle modifications to help reduce or maintain a healthy BP        "

## 2017-08-03 ENCOUNTER — PATIENT OUTREACH (OUTPATIENT)
Dept: OTHER | Facility: OTHER | Age: 72
End: 2017-08-03

## 2017-08-03 NOTE — PROGRESS NOTES
Ms. Navarro is newly enrolled in [facility]. She has been taking HCTZ 12.5mg for about 2 weeks. She feels a bit fatigued. Denies increased urination. Drinking about 2L of H2O per day. Concerned that BP is not better after HCTZ. Was having very high readings (180 systolic) prior to starting program. Lives alone.     Patient message:   If you look at my readings I had 138/76 on July 13, 2017.   I began the water pill on July 15, 2017.   Today,  my readings are 138/75.   It seems to me that the water pill is not making a difference.   Best regards,   Grace Navarro   [phone]     Last 5 Patient Entered [facility] Current 30 Day Average: 138/78     Recent Readings 8/3/2017 8/2/2017 8/1/2017 8/1/2017 7/30/2017    Systolic BP (mmHg) 135 138 149 146 121    Diastolic BP (mmHg) 73 75 83 83 74    Pulse 65 57 52 57 59      BP at goal, trending down- average BP upon enrollment 146/80mmHg  Continue monitoring fatigue    Hypertension Medications             hydrochlorothiazide (HYDRODIURIL) 12.5 MG Tab Take 1 tablet (12.5 mg total) by mouth once daily.    metoprolol succinate (TOPROL-XL) 200 MG 24 hr tablet Take 1 tablet (200 mg total) by mouth once daily.

## 2017-08-07 ENCOUNTER — PATIENT OUTREACH (OUTPATIENT)
Dept: OTHER | Facility: OTHER | Age: 72
End: 2017-08-07

## 2017-08-07 NOTE — PROGRESS NOTES
Ms. Navarro continues to experience fatigue/weakness, which she believes is associated with HCTZ. She switched administration time to bedtime to see if that helps.     Last 5 Patient Entered [facility] Current 30 Day Average: 137/79     Recent Readings 8/6/2017 8/5/2017 8/4/2017 8/4/2017 8/3/2017    Systolic BP (mmHg) 146 134 125 143 138    Diastolic BP (mmHg) 82 77 76 84 79    Pulse 57 60 59 60 52          BP at goal  Weakness associated with HCTZ?  Continue monitoring

## 2017-08-09 ENCOUNTER — PATIENT MESSAGE (OUTPATIENT)
Dept: INTERNAL MEDICINE | Facility: CLINIC | Age: 72
End: 2017-08-09

## 2017-08-09 RX ORDER — HYDROXYZINE HYDROCHLORIDE 10 MG/1
10 TABLET, FILM COATED ORAL 3 TIMES DAILY PRN
Qty: 45 TABLET | Refills: 0 | Status: SHIPPED | OUTPATIENT
Start: 2017-08-09 | End: 2018-01-02

## 2017-08-13 ENCOUNTER — PATIENT MESSAGE (OUTPATIENT)
Dept: INTERNAL MEDICINE | Facility: CLINIC | Age: 72
End: 2017-08-13

## 2017-08-14 NOTE — PROGRESS NOTES
Patient called to report she will be out of town for a few weeks. She would like to stop HCTZ while she is on vacation and she will not monitoring. She also messaged Dr. Dean to notify her.     Last 5 Patient Entered [facility] Current 30 Day Average: 137/78     Recent Readings 8/13/2017 8/12/2017 8/10/2017 8/9/2017 8/8/2017    Systolic BP (mmHg) 143 129 136 137 138    Diastolic BP (mmHg) 76 72 75 82 80    Pulse 62 60 60 73 56        BP at goal  [facility] for 4 weeks      Hypertension Medications             hydrochlorothiazide (HYDRODIURIL) 12.5 MG Tab Take 1 tablet (12.5 mg total) by mouth once daily.    metoprolol succinate (TOPROL-XL) 200 MG 24 hr tablet Take 1 tablet (200 mg total) by mouth once daily.

## 2017-08-22 ENCOUNTER — PATIENT OUTREACH (OUTPATIENT)
Dept: OTHER | Facility: OTHER | Age: 72
End: 2017-08-22

## 2017-08-22 DIAGNOSIS — I10 ESSENTIAL HYPERTENSION: Primary | ICD-10-CM

## 2017-08-22 RX ORDER — HYDROCHLOROTHIAZIDE 12.5 MG/1
12.5 TABLET ORAL EVERY OTHER DAY
Qty: 15 TABLET | Refills: 2 | Status: SHIPPED | OUTPATIENT
Start: 2017-08-22 | End: 2017-08-23 | Stop reason: DRUGHIGH

## 2017-08-22 NOTE — PROGRESS NOTES
Ms. Navarro is concerned because she took a reading with another monitor today and it was 176/95 mmHg. He reports increased sodium intake and lots of walking. She has noticed KENNEDY. Has gained 2 lbs. Feels weak when taking HCTZ. Wants to try lower dosage. Going out of the country in 10 days.     Last 5 Patient Entered [facility] Current 30 Day Average: 137/78     Recent Readings 8/13/2017 8/12/2017 8/10/2017 8/9/2017 8/8/2017    Systolic BP (mmHg) 143 129 136 137 138    Diastolic BP (mmHg) 76 72 75 82 80    Pulse 62 60 60 73 56          BP spike 2/2 high salt intake  Start HCTZ 12.5mg QOD    Hypertension Medications             hydrochlorothiazide (HYDRODIURIL) 12.5 MG Tab Take 1 tablet (12.5 mg total) by mouth every other day.    metoprolol succinate (TOPROL-XL) 200 MG 24 hr tablet Take 1 tablet (200 mg total) by mouth once daily.

## 2017-08-23 ENCOUNTER — PATIENT OUTREACH (OUTPATIENT)
Dept: OTHER | Facility: OTHER | Age: 72
End: 2017-08-23

## 2017-08-23 RX ORDER — HYDROCHLOROTHIAZIDE 12.5 MG/1
12.5 TABLET ORAL DAILY
COMMUNITY
End: 2017-10-02 | Stop reason: SDUPTHER

## 2017-08-23 NOTE — PROGRESS NOTES
"Ms. Navarro called once again to report elevated BP readings. She reports a mild headache on the "top" of her head. She took HCTZ 12.5 yesterday evening and will  new RX today. She reports monitoring sodium, but upon questioning she ate tuna salad with malissa and red beans that were "a little bit salty". Still planning on going out of the country next week.     [facility]: 140/80     Recent Readings 8/23/2017 8/23/2017 8/22/2017 8/22/2017 8/22/2017    Systolic BP (mmHg) 175 183 176 190 175    Diastolic BP (mmHg) 102 100 90 93 91    Pulse 64 64 57 57 55          BP above goal likely 2/2 excessive salt intake  Increase HCTZ 12.5mg to QD  Continue close monitoring      Hypertension Medications             hydrochlorothiazide (HYDRODIURIL) 12.5 MG Tab Take 12.5 mg by mouth once daily.    metoprolol succinate (TOPROL-XL) 200 MG 24 hr tablet Take 1 tablet (200 mg total) by mouth once daily.            "

## 2017-08-24 ENCOUNTER — PATIENT MESSAGE (OUTPATIENT)
Dept: INTERNAL MEDICINE | Facility: CLINIC | Age: 72
End: 2017-08-24

## 2017-08-24 NOTE — PROGRESS NOTES
Ms. Navarro remains anxious about BP elevation. She continues to monitor with digital cuff and home cuff. She is very anxious because she will have to go out of the country in 8 days. She has been trying to limit sodium, but did eat etouffee yesterday.    Last 5 Patient Entered [facility] Current 30 Day Average: 141/80     Recent Readings 8/24/2017 8/24/2017 8/23/2017 8/23/2017 8/23/2017    Systolic BP (mmHg) 166 173 168 175 183    Diastolic BP (mmHg) 93 105 86 102 100    Pulse 53 65 58 64 64          BP spikes likely 2/2 anxiety  Asked patient to stop monitoring for a few days to alleviate anxiety  Continue monitoring    Hypertension Medications             hydrochlorothiazide (HYDRODIURIL) 12.5 MG Tab Take 12.5 mg by mouth once daily.    metoprolol succinate (TOPROL-XL) 200 MG 24 hr tablet Take 1 tablet (200 mg total) by mouth once daily.

## 2017-08-28 ENCOUNTER — PATIENT MESSAGE (OUTPATIENT)
Dept: ADMINISTRATIVE | Facility: OTHER | Age: 72
End: 2017-08-28

## 2017-08-28 ENCOUNTER — PATIENT OUTREACH (OUTPATIENT)
Dept: OTHER | Facility: OTHER | Age: 72
End: 2017-08-28
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
## 2017-09-13 ENCOUNTER — PATIENT OUTREACH (OUTPATIENT)
Dept: OTHER | Facility: OTHER | Age: 72
End: 2017-09-13

## 2017-09-13 NOTE — PROGRESS NOTES
Last 5 Patient Entered [facility] Current 30 Day Average: 154/86     Recent Readings 9/2/2017 8/30/2017 8/29/2017 8/28/2017 8/27/2017    Systolic BP (mmHg) 137 131 145 161 150    Diastolic BP (mmHg) 85 75 88 87 84    Pulse 61 61 63 58 64        Hypertension Digital Medicine Program (HDMP): Health  Follow Up    Lifestyle Modifications:    1. Low sodium diet: Deferred    2.Physical activity: Deferred    3.Hypotension/Hypertension symptoms: no   Frequency/Alleviating factors/Precipitating factors, etc.     4. Patient  has been been compliant with the medication regimen     Follow up with Ms. Grace Jeanie Navarro completed. Ms. Navarro just returned home from [place]. She will resume taking BP readings this week. No further questions or concerns. I will follow up in a few weeks to assess progress.

## 2017-09-26 DIAGNOSIS — J30.9 ALLERGIC RHINITIS: ICD-10-CM

## 2017-09-26 RX ORDER — AZELASTINE 1 MG/ML
SPRAY, METERED NASAL
Qty: 30 ML | Refills: 11 | Status: SHIPPED | OUTPATIENT
Start: 2017-09-26 | End: 2020-02-27

## 2017-09-28 ENCOUNTER — PATIENT OUTREACH (OUTPATIENT)
Dept: OTHER | Facility: OTHER | Age: 72
End: 2017-09-28

## 2017-09-28 NOTE — PROGRESS NOTES
"Last 5 Patient Entered [facility] Current 30 Day Average: 135/81     Recent Readings 9/23/2017 9/2/2017 8/30/2017 8/29/2017 8/28/2017    Systolic BP (mmHg) 127 137 131 145 161    Diastolic BP (mmHg) 76 85 75 88 87    Pulse 63 61 61 63 58        Hypertension Digital Medicine Program (HDMP): Health  Follow Up    Lifestyle Modifications:    1.Low sodium diet: yes Patient reports that she has made significant changes to her low sodium diet.    2.Physical activity: yes Ms. Navarro is continuing to walk about 4 miles a days for exercise.     3.Hypotension/Hypertension symptoms: no   Frequency/Alleviating factors/Precipitating factors, etc.     4.Patient has been compliant with the medication regimen.     Follow up with Ms. Grace Navarro completed. Ms. Navarro is unsatisfied with the HDMP thus far. Ms. Navarro joined the program July 17, 2017. Since then, patient states that she has been working hard to lower BP by working with [facility], to adjust medications. She has also made significant changes to her lifestyle. Ms. Navarro is most upset about the monthy report she received in the mail this month. She states that the letter is "not personal" and makes her "feel guilty" about not having BP at goal. Ms. Navarro reports that she "doesn't not need to be told to exercise, watch her salt, etc" because she is already doing those things. Patient also states that her elevated BP readings when she first joined should not count towards her BP average. Patient states that she "knows why" her BP may be elevated in certain situations (i.e. Pain, stress, dietary indiscretions, etc). I explained to Ms. Navarro that the letter is automatically sent with an update from the month (past 30 day BP average), and that the information on the bottom half of the letter is just general information for all patients. I apologized to Ms. Navarro for the letter and reassured her that we are here to assist her and to help her improve BP " "and lifestyle. Patient seemed more understanding towards the end of our conversation, but would still like the link to file a complaint "just in case". No further questions or concerns. I will follow up in a few weeks to assess progress.         "

## 2017-10-02 ENCOUNTER — PATIENT OUTREACH (OUTPATIENT)
Dept: OTHER | Facility: OTHER | Age: 72
End: 2017-10-02

## 2017-10-02 DIAGNOSIS — I10 ESSENTIAL HYPERTENSION: Primary | ICD-10-CM

## 2017-10-02 RX ORDER — HYDROCHLOROTHIAZIDE 12.5 MG/1
12.5 TABLET ORAL DAILY
Qty: 90 TABLET | Refills: 2 | Status: SHIPPED | OUTPATIENT
Start: 2017-10-02 | End: 2017-11-20 | Stop reason: DRUGHIGH

## 2017-10-02 NOTE — PROGRESS NOTES
"Called patient to review BP readings. She has been compliant with HCTZ and feels that it is "working". She requests 90 day RX be sent to [facility]. Has been monitoring sodium closely.    Last 5 Patient Entered [facility] Current 30 Day Average: 134/79     Recent Readings 9/29/2017 9/23/2017 9/2/2017 8/30/2017 8/29/2017    Systolic BP (mmHg) 138 127 137 131 145    Diastolic BP (mmHg) 75 76 85 75 88    Pulse 60 63 61 61 63          BP at goal  Continue monitoring    Hypertension Medications             hydrochlorothiazide (HYDRODIURIL) 12.5 MG Tab Take 1 tablet (12.5 mg total) by mouth once daily.    metoprolol succinate (TOPROL-XL) 200 MG 24 hr tablet Take 1 tablet (200 mg total) by mouth once daily.          "

## 2017-10-27 ENCOUNTER — PATIENT OUTREACH (OUTPATIENT)
Dept: OTHER | Facility: OTHER | Age: 72
End: 2017-10-27

## 2017-10-27 NOTE — PROGRESS NOTES
Last 5 Patient Entered [facility] Current 30 Day Average: 130/76     Recent Readings 10/25/2017 10/18/2017 10/15/2017 10/9/2017 10/6/2017    Systolic BP (mmHg) 141 122 131 121 127    Diastolic BP (mmHg) 86 76 75 70 73    Pulse 69 61 64 59 61        Hypertension Digital Medicine Program (HDMP): Health  Follow Up    Lifestyle Modifications:    1.Low sodium diet: yes Patient reports that she is continuing to monitor her salt intake.    2.Physical activity: yes Ms. Navarro is exercising at the gym during the week.    3.Hypotension/Hypertension symptoms: no   Frequency/Alleviating factors/Precipitating factors, etc.     4.Patient has been compliant with the medication regimen.     Follow up with Ms. Grace Navarro completed. Ms. Navarro is doing well. She attributes elevated BP on 10/25 to returning home from vacation. No further questions or concerns. I will follow up in a few weeks to assess progress.

## 2017-11-13 ENCOUNTER — TELEPHONE (OUTPATIENT)
Dept: OBSTETRICS AND GYNECOLOGY | Facility: CLINIC | Age: 72
End: 2017-11-13

## 2017-11-13 NOTE — TELEPHONE ENCOUNTER
----- Message from Angela Lema sent at 11/13/2017  9:44 AM CST -----  Contact: [facility]  _x  1st Request  _  2nd Request  _  3rd Request    Who: Pharmacy    Why: pharmacy calling in regards to medication.. Please advise    What Number to Call Back: [phone]    When to Expect a call back: (Before the end of the day)   -- if call after 3:00 call back will be tomorrow.

## 2017-11-20 ENCOUNTER — PATIENT OUTREACH (OUTPATIENT)
Dept: OTHER | Facility: OTHER | Age: 72
End: 2017-11-20

## 2017-11-20 RX ORDER — HYDROCHLOROTHIAZIDE 12.5 MG/1
25 TABLET ORAL DAILY
COMMUNITY
End: 2017-11-20 | Stop reason: DRUGHIGH

## 2017-11-20 RX ORDER — HYDROCHLOROTHIAZIDE 12.5 MG/1
12.5 TABLET ORAL DAILY
COMMUNITY
End: 2018-03-06 | Stop reason: DRUGHIGH

## 2017-11-20 NOTE — PROGRESS NOTES
Ms. Navarro is feeling well. She feels that she is stable and has made good progress with sodium intake. Reviewed new BP goal, <130/90 mmHg given 20.6% ASCVD risk. She reports that there's not much else she can do, other than increase her walking time (currently walking 1 hour and 8 minutes ~4 miles). She will try to do this.     Last 5 Patient Entered Readings Current 30 Day Average: 132/79     Recent Readings 11/19/2017 11/16/2017 11/12/2017 11/8/2017 11/5/2017    Systolic BP (mmHg) 124 139 136 119 124    Diastolic BP (mmHg) 72 79 84 75 74    Pulse 68 66 61 63 69        BP above goal, <130/80 mmHg  Increase HCTZ to 25mg daily- monitor closely    Hypertension Medications             hydroCHLOROthiazide (HYDRODIURIL) 12.5 MG Tab Take 25 mg by mouth once daily.    metoprolol succinate (TOPROL-XL) 200 MG 24 hr tablet Take 1 tablet (200 mg total) by mouth once daily.

## 2017-11-20 NOTE — PROGRESS NOTES
"Ms. Navarro called back to report she does not want to increase HCTZ dose. She feels that she is "risking too much" to get BP to goal. Would like to continue current treatment and lifestyle modifications.     Last 5 Patient Entered Readings Current 30 Day Average: 132/79     Recent Readings 11/19/2017 11/16/2017 11/12/2017 11/8/2017 11/5/2017    Systolic BP (mmHg) 124 139 136 119 124    Diastolic BP (mmHg) 72 79 84 75 74    Pulse 68 66 61 63 69          BP above goal, <130/80 mmHg  Continue previous treatment    Hypertension Medications             hydroCHLOROthiazide (HYDRODIURIL) 12.5 MG Tab Take 12.5 mg by mouth once daily.    metoprolol succinate (TOPROL-XL) 200 MG 24 hr tablet Take 1 tablet (200 mg total) by mouth once daily.          "

## 2017-11-28 ENCOUNTER — PATIENT OUTREACH (OUTPATIENT)
Dept: OTHER | Facility: OTHER | Age: 72
End: 2017-11-28

## 2017-11-28 NOTE — PROGRESS NOTES
Ms. Navarro called in today due to concerns about elevated readings yesterday and today. They have not transmitted, but she reports a systolic of 143 today. She wants to know if it's ok to take additional dose of HCTZ 12.5mg if BP is elevated.    Last 5 Patient Entered Readings Current 30 Day Average: 130/77     Recent Readings 11/25/2017 11/20/2017 11/19/2017 11/16/2017 11/12/2017    Systolic BP (mmHg) 127 124 124 139 136    Diastolic BP (mmHg) 75 71 72 79 84    Pulse 61 59 68 66 61          BP at goal  Ok to take additional HCTZ 12.5mg dose if BP >140/90 mmHg  Continue monitoring    Hypertension Medications             hydroCHLOROthiazide (HYDRODIURIL) 12.5 MG Tab Take 12.5 mg by mouth once daily.    metoprolol succinate (TOPROL-XL) 200 MG 24 hr tablet Take 1 tablet (200 mg total) by mouth once daily.

## 2017-12-08 ENCOUNTER — OFFICE VISIT (OUTPATIENT)
Dept: FAMILY MEDICINE | Facility: CLINIC | Age: 72
End: 2017-12-08
Payer: MEDICARE

## 2017-12-08 ENCOUNTER — HOSPITAL ENCOUNTER (OUTPATIENT)
Dept: RADIOLOGY | Facility: HOSPITAL | Age: 72
Discharge: HOME | End: 2017-12-08
Attending: FAMILY MEDICINE
Payer: MEDICARE

## 2017-12-08 VITALS
SYSTOLIC BLOOD PRESSURE: 150 MMHG | HEART RATE: 64 BPM | TEMPERATURE: 98 F | BODY MASS INDEX: 24.42 KG/M2 | OXYGEN SATURATION: 98 % | HEIGHT: 64 IN | DIASTOLIC BLOOD PRESSURE: 80 MMHG | WEIGHT: 143.06 LBS

## 2017-12-08 DIAGNOSIS — M25.511 ACUTE PAIN OF RIGHT SHOULDER: ICD-10-CM

## 2017-12-08 DIAGNOSIS — M75.91 SUPRASPINATUS TENDINITIS, RIGHT: ICD-10-CM

## 2017-12-08 DIAGNOSIS — M25.511 ACUTE PAIN OF RIGHT SHOULDER: Primary | ICD-10-CM

## 2017-12-08 DIAGNOSIS — R03.0 ELEVATED BLOOD PRESSURE READING: ICD-10-CM

## 2017-12-08 PROCEDURE — 73030 X-RAY EXAM OF SHOULDER: CPT | Mod: 26,RT,, | Performed by: RADIOLOGY

## 2017-12-08 PROCEDURE — 99214 OFFICE O/P EST MOD 30 MIN: CPT | Mod: S$GLB,,, | Performed by: FAMILY MEDICINE

## 2017-12-08 PROCEDURE — 99999 PR PBB SHADOW E&M-EST. PATIENT-LVL IV: CPT | Mod: PBBFAC,,, | Performed by: FAMILY MEDICINE

## 2017-12-08 PROCEDURE — 73030 X-RAY EXAM OF SHOULDER: CPT | Mod: TC,RT

## 2017-12-08 NOTE — PROGRESS NOTES
Subjective:       Patient ID: Grace Navarro is a 71 y.o. female.    Chief Complaint: Hypertension (Right shoulder pain)    Grace is a 71 y.o. female who presents today for an urgent care appointment with right shoulder pain. This has been going on for 7-10 days. No trauma. The pain radiates a little to her arm, but only if she moves it. She has a lot of pain with movement. She has limited range of motion.       Shoulder Pain    The pain is present in the right shoulder. This is a new problem. The current episode started 1 to 4 weeks ago. There has been no history of extremity trauma. The problem occurs constantly. The problem has been gradually worsening. The quality of the pain is described as sharp. Associated symptoms include a limited range of motion. Pertinent negatives include no fever, headaches, numbness, tingling or visual symptoms. Treatments tried: muscle creams. The treatment provided no relief.     Review of Systems   Constitutional: Negative for chills and fever.   Respiratory: Negative for cough.    Gastrointestinal: Negative for constipation, diarrhea, nausea and vomiting.   Musculoskeletal:        Joint pain   Skin: Negative for rash.   Neurological: Negative for tingling, numbness and headaches.        No tingling       Objective:     Vitals:    12/08/17 1049   BP: (!) 150/80   Pulse: 64   Temp: 98.1 °F (36.7 °C)        Physical Exam   Constitutional: She is oriented to person, place, and time. She appears well-developed and well-nourished.   HENT:   Head: Normocephalic and atraumatic.   Cardiovascular: Normal rate and regular rhythm.    Musculoskeletal:   Tight paraspinals noted  TTP at insertion of supraspinatus (r)  Positive pain with empty can test (r)  Negative spurling BL  Negative aguilar's test BL  Negative Infraspinatus/teres minor examination BL  Negative drop arm test BL  Possible positive apley scratch test   Neurological: She is alert and oriented to person, place, and time.    Skin: Skin is warm.   Psychiatric: She has a normal mood and affect. Her behavior is normal. Judgment and thought content normal.   Nursing note and vitals reviewed.      Assessment:       1. Acute pain of right shoulder    2. Supraspinatus tendinitis, right    3. Elevated blood pressure reading        Plan:         Likely supraspinatus teninosis based on exam, patient states that she can't take NSAID due to a side effect of hypertension. She states that multiple different doctors have told her this in the past. She declines oral steroids also.   Will recommend tylenol 650 TID scheduled for two weeks  R.I.C.E  PT  Follow up with PCP in two weeks to ensure resolution and to recheck Blood pressure, it is possible that it is uncontrolled vs high due to pain.     Acute pain of right shoulder  -     X-ray Shoulder 2 or More Views Right; Future; Expected date: 12/08/2017  -     Ambulatory Referral to Physical/Occupational Therapy    Supraspinatus tendinitis, right  -     Ambulatory Referral to Physical/Occupational Therapy    Elevated blood pressure reading        Warning signs discussed, patient to call with any further issues or worsening of symptoms.

## 2017-12-08 NOTE — PATIENT INSTRUCTIONS
tylenol 650 mg three times per day  Follow up with primary for BP check and shoulder pain    R.I.C.E.    R.I.C.E. stands for Rest, Ice, Compression, and Elevation. Doing these things helps limit pain and swelling after an injury. R.I.C.E. also helps injuries heal faster. Use R.I.C.E. for sprains, strains, and severe bruises or bumps. Follow the tips on this handout and begin R.I.C.E. as soon as possible after an injury.  ? Rest  Pain is your bodys way of telling you to rest an injured area. Whether you have hurt an elbow, hand, foot, or knee, limiting its use will prevent further injury and help you heal.  ? Ice  Applying ice right after an injury helps prevent swelling and reduce pain. Dont place ice directly on your skin.  · Wrap a cold pack or bag of ice in a thin cloth. Place it over the injured area.  · Ice for 10 minutes every 3 hours. Dont ice for more than 20 minutes at a time.  ? Compression  Putting pressure (compression) on an injury helps prevent swelling and provides support.  · Wrap the injured area firmly with an elastic bandage. If your hand or foot tingles, becomes discolored, or feels cold to the touch, the bandage may be too tight. Rewrap it more loosely.  · If your bandage becomes too loose, rewrap it.  · Do not wear an elastic bandage overnight.  ? Elevation  Keeping an injury elevated helps reduce swelling, pain, and throbbing. Elevation is most effective when the injury is kept elevated higher than the heart.     Call your healthcare provider if you notice any of the following:  · Fingers or toes feel numb, are cold to the touch, or change color  · Skin looks shiny or tight  · Pain, swelling, or bruising worsens and is not improved with elevation   Date Last Reviewed: 9/3/2015  © 8407-5353 [employer]. [address] [place] [address], [place] [address]. All rights reserved. This information is not intended as a substitute for professional medical care. Always follow your healthcare  professional's instructions.

## 2017-12-12 ENCOUNTER — PATIENT OUTREACH (OUTPATIENT)
Dept: OTHER | Facility: OTHER | Age: 72
End: 2017-12-12

## 2017-12-12 NOTE — PROGRESS NOTES
"Last 5 Patient Entered Readings Current 30 Day Average: 131/75     Recent Readings 12/11/2017 12/9/2017 12/8/2017 12/6/2017 12/5/2017    Systolic BP (mmHg) 112 121 143 117 143    Diastolic BP (mmHg) 69 71 82 74 83    Pulse 64 62 63 61 56        Hypertension Digital Medicine Program (HDMP): Health  Follow Up    Lifestyle Modifications:    1.Low sodium diet: yes Patient reports that she is limiting her salt intake.    2.Physical activity: yes Ms. Navarro continues to exercise regularly.     3.Hypotension/Hypertension symptoms: no Patient reports feeling "weak" when her BP is low, and that her "body can't get used to it" (used to lower BP). Patient is staying hydrated.  Frequency/Alleviating factors/Precipitating factors, etc.     4.Patient has been compliant with the medication regimen.     Follow up with Ms. Grace Navarro completed. Ms. Navarro is doing okay. Patient was upset that her elevated BP reading in the clinic on Friday (12/08/17) was documented. Patient states that she knew that her BP was elevated because of "extreme pain" in her shoulder. Patient states that she is doing "everything that I can to keep it lower" now that the new HTN guidelines were released. Encouragement provided. No further questions or concerns. I will follow up in a few weeks to assess progress.         "

## 2017-12-27 PROBLEM — R53.1 DECREASED STRENGTH: Status: RESOLVED | Noted: 2017-02-03 | Resolved: 2017-12-27

## 2017-12-27 PROBLEM — M54.2 NECK PAIN: Status: RESOLVED | Noted: 2017-02-03 | Resolved: 2017-12-27

## 2017-12-27 PROBLEM — R26.89 DECREASED FUNCTIONAL MOBILITY: Status: RESOLVED | Noted: 2017-02-03 | Resolved: 2017-12-27

## 2017-12-27 PROBLEM — M25.60 DECREASED RANGE OF MOTION: Status: RESOLVED | Noted: 2017-02-03 | Resolved: 2017-12-27

## 2018-01-02 ENCOUNTER — OFFICE VISIT (OUTPATIENT)
Dept: FAMILY MEDICINE | Facility: CLINIC | Age: 73
End: 2018-01-02
Payer: MEDICARE

## 2018-01-02 ENCOUNTER — CLINICAL SUPPORT (OUTPATIENT)
Dept: REHABILITATION | Facility: HOSPITAL | Age: 73
End: 2018-01-02
Payer: MEDICARE

## 2018-01-02 VITALS
SYSTOLIC BLOOD PRESSURE: 138 MMHG | HEIGHT: 64 IN | TEMPERATURE: 98 F | BODY MASS INDEX: 25.21 KG/M2 | DIASTOLIC BLOOD PRESSURE: 72 MMHG | WEIGHT: 147.69 LBS | OXYGEN SATURATION: 98 % | HEART RATE: 62 BPM

## 2018-01-02 DIAGNOSIS — R53.1 WEAKNESS: ICD-10-CM

## 2018-01-02 DIAGNOSIS — M25.562 CHRONIC PAIN OF BOTH KNEES: ICD-10-CM

## 2018-01-02 DIAGNOSIS — M25.511 ACUTE PAIN OF RIGHT SHOULDER: ICD-10-CM

## 2018-01-02 DIAGNOSIS — G89.29 CHRONIC PAIN OF BOTH KNEES: ICD-10-CM

## 2018-01-02 DIAGNOSIS — M79.601 PAIN OF RIGHT UPPER EXTREMITY: ICD-10-CM

## 2018-01-02 DIAGNOSIS — R03.0 ELEVATED BLOOD PRESSURE READING: ICD-10-CM

## 2018-01-02 DIAGNOSIS — J06.9 VIRAL UPPER RESPIRATORY TRACT INFECTION: ICD-10-CM

## 2018-01-02 DIAGNOSIS — R68.89 FLU-LIKE SYMPTOMS: Primary | ICD-10-CM

## 2018-01-02 DIAGNOSIS — M25.561 CHRONIC PAIN OF BOTH KNEES: ICD-10-CM

## 2018-01-02 LAB
FLUAV AG SPEC QL IA: NEGATIVE
FLUBV AG SPEC QL IA: NEGATIVE
SPECIMEN SOURCE: NORMAL

## 2018-01-02 PROCEDURE — G8988 SELF CARE GOAL STATUS: HCPCS | Mod: CJ,PN

## 2018-01-02 PROCEDURE — G8987 SELF CARE CURRENT STATUS: HCPCS | Mod: CK,PN

## 2018-01-02 PROCEDURE — 99214 OFFICE O/P EST MOD 30 MIN: CPT | Mod: S$GLB,,, | Performed by: FAMILY MEDICINE

## 2018-01-02 PROCEDURE — 97110 THERAPEUTIC EXERCISES: CPT | Mod: PN

## 2018-01-02 PROCEDURE — 97165 OT EVAL LOW COMPLEX 30 MIN: CPT | Mod: PN

## 2018-01-02 PROCEDURE — 99999 PR PBB SHADOW E&M-EST. PATIENT-LVL IV: CPT | Mod: PBBFAC,,, | Performed by: FAMILY MEDICINE

## 2018-01-02 PROCEDURE — 87400 INFLUENZA A/B EACH AG IA: CPT | Mod: PO

## 2018-01-02 RX ORDER — BENZONATATE 100 MG/1
100 CAPSULE ORAL 3 TIMES DAILY PRN
Qty: 30 CAPSULE | Refills: 0 | Status: SHIPPED | OUTPATIENT
Start: 2018-01-02 | End: 2018-02-01

## 2018-01-02 RX ORDER — FLUTICASONE PROPIONATE 50 MCG
1 SPRAY, SUSPENSION (ML) NASAL 2 TIMES DAILY
Qty: 1 BOTTLE | Refills: 11 | Status: SHIPPED | OUTPATIENT
Start: 2018-01-02 | End: 2019-01-20 | Stop reason: SDUPTHER

## 2018-01-02 NOTE — PATIENT INSTRUCTIONS
Discussed diagnosis and treatment of URI.  Discussed the importance of avoiding unnecessary antibiotic therapy.  Suggested symptomatic OTC remedies.  Suggested brief course of Afrin for 3 days total  Flonase BID  Humidifier/hot showers  Buckwheat honey (OTC at grocery store)  Tessalon Perles  Nasal saline spray for congestion.  Follow up as needed with PCP    R.I.C.E.    R.I.C.E. stands for Rest, Ice, Compression, and Elevation. Doing these things helps limit pain and swelling after an injury. R.I.C.E. also helps injuries heal faster. Use R.I.C.E. for sprains, strains, and severe bruises or bumps. Follow the tips on this handout and begin R.I.C.E. as soon as possible after an injury.  ? Rest  Pain is your bodys way of telling you to rest an injured area. Whether you have hurt an elbow, hand, foot, or knee, limiting its use will prevent further injury and help you heal.  ? Ice  Applying ice right after an injury helps prevent swelling and reduce pain. Dont place ice directly on your skin.  · Wrap a cold pack or bag of ice in a thin cloth. Place it over the injured area.  · Ice for 10 minutes every 3 hours. Dont ice for more than 20 minutes at a time.  ? Compression  Putting pressure (compression) on an injury helps prevent swelling and provides support.  · Wrap the injured area firmly with an elastic bandage. If your hand or foot tingles, becomes discolored, or feels cold to the touch, the bandage may be too tight. Rewrap it more loosely.  · If your bandage becomes too loose, rewrap it.  · Do not wear an elastic bandage overnight.  ? Elevation  Keeping an injury elevated helps reduce swelling, pain, and throbbing. Elevation is most effective when the injury is kept elevated higher than the heart.     Call your healthcare provider if you notice any of the following:  · Fingers or toes feel numb, are cold to the touch, or change color  · Skin looks shiny or tight  · Pain, swelling, or bruising worsens and is not  improved with elevation   Date Last Reviewed: 9/3/2015  © 8761-6399 [employer]. [address] [place] [address], [place] [address]. All rights reserved. This information is not intended as a substitute for professional medical care. Always follow your healthcare professional's instructions.

## 2018-01-02 NOTE — PATIENT INSTRUCTIONS
ROM: Flexion - Wand (Supine)        Lie on back holding wand. Raise arms over head.   Repeat 15 times per set. Do 2 sets per session. Do 3-5 sessions per day.     [email or web address]     Abduction (Side-Lying)        Lie on left side. Raise arm above head. Keep palm forward.  Repeat 15 times per set. Do 2 sets per session. Do 3-5 sessions per day.     https://[email or web address]     Copyright © [employer]. All rights reserved.    External Rotation: Side-Lying (Dumbbell)        Lie with neck supported, left elbow bent to 90°, forearm across stomach. Raise forearm, keeping elbow at side.  Repeat ____ times per set. Do ____ sets per session. Do ____ sessions per week. Use ____ lb weight.      Copyright © [employer]. All rights reserved.       Pull Down: Standing        Face anchor in stride stance. Grasp bar, palms down, at shoulder height. Pull bar in to chest.  Repeat 15 times per set. Do 2 sets per session. Do 3-5 sessions per week.  [place] Height: Over Head     [email or web address]/305     Copyright © [employer]. All rights reserved.  Low Row: Thumbs Up        Face anchor, medium to wide stance. Thumbs up, pull arms back, squeezing shoulder blades together.   Repeat 15 times per set. Do 2 sets per session. Do 3-5 sessions per week.  [place] Height: Waist     [email or web address]     Copyright © [employer]. All rights reserved.      Rotation: External (Single Arm)        Side toward anchor in shoulder width stance with elbow bent to 90°, arm across mid-section. Thumb up, pull arm away from body, keeping elbow bent.  Repeat 15 times per set. Repeat with other arm. Do 2 sets per session. Do 3-5 sessions per week.  [place] Height: Waist     https://[email or web address]     Copyright © [employer]. All rights reserved.   Rotation: Internal (Single Arm)        Side toward anchor in shoulder width stance with elbow bent to 90°, forearm away from body. Thumb up, pull arm across body keeping elbow bent.  Repeat 15 times per set. Repeat with other arm. Do 2 sets  per session. Do 3-5 sessions per week.  [place] Height: Waist     [email or web address].[email or web address]     Copyright © [employer]. All rights reserved.     Scapula Adduction With Pectoralis Stretch: Mid-Range - Standing        Shoulders at 90°, keeping weight on feet, lean forward and squeeze shoulder blades together. Hold ___ seconds.  Do ___ times, ___ times per day.     [email or web address]

## 2018-01-02 NOTE — PROGRESS NOTES
Subjective:       Patient ID: Grace Navarro is a 72 y.o. female.    Chief Complaint: Follow-up (chills,cough)    Grace is a 72 y.o. female who presents today with feeling sick and follow up from [facility] for right shoulder pain follow up. The shoulder pain has improved, but is still worse with cooking. She had her first appointment with PT today. Patient is also complaining of chronic BL knee pain along with some leg weakness. The pain is intermittent.     She was recently in [place] and has felt sick since her return. She has no fever, but has congestion, cough, and sore throat. Denies any nausea, vomiting.       URI    This is a new problem. The current episode started yesterday. The problem has been gradually worsening. There has been no fever. Associated symptoms include congestion, coughing and a sore throat. Pertinent negatives include no diarrhea, dysuria, ear pain, headaches, joint pain, joint swelling, nausea, rash, vomiting or wheezing. She has tried acetaminophen (humidifier) for the symptoms. The treatment provided mild relief.   Cough   This is a new problem. The current episode started yesterday. The problem has been gradually worsening. Associated symptoms include nasal congestion and a sore throat. Pertinent negatives include no chills, ear pain, fever, headaches, myalgias, rash or wheezing. She has tried nothing for the symptoms. The treatment provided no relief.   Shoulder Pain    The pain is present in the right shoulder. Chronicity: follow up. The current episode started more than 1 month ago. The problem has been gradually improving. Associated symptoms include a limited range of motion. Pertinent negatives include no fever, headaches, inability to bear weight, joint swelling, numbness or tingling. She has tried acetaminophen and cold for the symptoms. The treatment provided moderate relief.   Knee Pain    The incident occurred more than 1 week ago. There was no injury  mechanism. The pain is present in the right knee and left knee. The quality of the pain is described as aching. The pain is moderate. The pain has been fluctuating since onset. Associated symptoms include muscle weakness. Pertinent negatives include no inability to bear weight, loss of motion, loss of sensation, numbness or tingling. It is unknown if a foreign body is present. She has tried acetaminophen for the symptoms. The treatment provided mild relief.     Review of Systems   Constitutional: Negative for chills and fever.   HENT: Positive for congestion and sore throat. Negative for ear pain.    Respiratory: Positive for cough. Negative for wheezing.    Gastrointestinal: Negative for diarrhea, nausea and vomiting.   Genitourinary: Negative for dysuria.   Musculoskeletal: Positive for back pain and joint swelling. Negative for joint pain and myalgias.   Skin: Negative for rash.   Neurological: Negative for tingling, numbness and headaches.       Objective:     Vitals:    01/02/18 1328   BP: 138/72   Pulse: 62   Temp: 98 °F (36.7 °C)        Physical Exam   Constitutional: She is oriented to person, place, and time. She appears well-developed and well-nourished.   HENT:   Head: Normocephalic and atraumatic.   Moderate nasal congestion  pharyngeal erythema without exudate  No adenopathy   Eyes: Conjunctivae are normal.   Cardiovascular: Normal rate and regular rhythm.    Pulmonary/Chest: Effort normal and breath sounds normal.   Musculoskeletal:   Knees appear grossly intact BL  Hamstring and quad appears to have normal range of motion     Neurological: She is alert and oriented to person, place, and time.   Skin: Skin is warm.   Psychiatric: She has a normal mood and affect. Her behavior is normal. Judgment and thought content normal.   Nursing note and vitals reviewed.      Assessment:       1. Flu-like symptoms    2. Acute pain of right shoulder    3. Elevated blood pressure reading    4. Chronic pain of both  knees    5. Viral upper respiratory tract infection        Plan:       Flu-like symptoms/Viral upper respiratory tract infection  Flu negative  Discussed diagnosis and treatment of URI.  Discussed the importance of avoiding unnecessary antibiotic therapy.  Suggested symptomatic OTC remedies.  Suggested brief course of Afrin for 3 days total  Flonase BID  Humidifier/hot showers  Buckwheat honey (OTC at [employer])  Tessalon Perles  Nasal saline spray for congestion.  Follow up as needed with PCP  -     Influenza antigen Nasopharyngeal Swab  -     fluticasone (FLONASE) 50 mcg/actuation nasal spray; 1 spray by Each Nare route 2 (two) times daily.  Dispense: 1 Bottle; Refill: 11  -     benzonatate (TESSALON) 100 MG capsule; Take 1 capsule (100 mg total) by mouth 3 (three) times daily as needed for Cough.  Dispense: 30 capsule; Refill: 0    Acute pain of right shoulder  Continue PT exercises, first apt was today    Elevated blood pressure reading  Appears to be normal today, compared to last visit    Chronic pain of both knees  R.I.C.E  Tylenol  PT referral  -     Ambulatory Referral to Physical/Occupational Therapy    Warning signs discussed, patient to call with any further issues or worsening of symptoms.

## 2018-01-02 NOTE — PLAN OF CARE
TIME RECORD    Date: 01/02/2018    Start Time:  1010  Stop Time:  1050    PROCEDURES:    TIMED  Procedure Min.   TE 10             UNTIMED  Procedure Min.   IE 40         Total Timed Minutes:  10  Total Timed Units:  1  Total Untimed Units:  1  Charges Billed/# of units:  LCE1 TE1    Medicare  This visit:111.67  Total:111.67    Visit:1 FOTO @ 5    OCCUPATIONAL THERAPY INITIAL EVALUATION & PLAN OF TREATMENT    Patient Name: Grace Navarro  Physician Name:  Clayton  Primary Diagnosis:  R shoulder pain  Treatment Diagnosis:  Pain in limb, weakness  Onset Date:  months  Eval Date:  01/02/2018  Certification Period:  01/02/2018 to 3/2/18  Past Medical History:   Past Medical History:   Diagnosis Date    Anxiety     Rene's disease     Chronic low back pain     Depression     Goiter, nodular     Hyperlipidemia     Hypertension     Irritable bowel     Neuromuscular disorder     Osteopenia of multiple sites 5/29/2017    PUD (peptic ulcer disease)     Varicose veins    Precautions:  universal  Prior Therapy:  rec'd therapy for neck  Signs of Abuse: no  Medications: Grace Navarro has a current medication list which includes the following prescription(s): ascorbic acid (vitamin c), azelastine, calcium/magnesium, cholecalciferol (vitamin d3), cyanocobalamin, hydrochlorothiazide, hydroxyzine hcl, and metoprolol succinate.  Nutrition:  WDWNF  Social Cultural Assessment:  Lives alone, drives  Prior Level of Function: Independent  Social History:  Works at [employer]  Place of Residence (steps/adaptations):  13 steps in house  Functional Deficits Leading to Referral/Nature of Injury:  Insidious onset with progressively worsening symptoms RUE pain, weakness and stiffness all of which inhibit pt's habits, roles and routines.   Patient Therapy Goals:  To decrease pain and increase functional use  Hand dominance: Right  X-Rays/Tests: xrays reveal no abnormalities    Subjective:  Pain:  During no work:  2/10  While workin/10  Sleepin/10  Location of pain: lateral arm     Objective:  Sensation Test: Patient denies any numbness/tingling    Observation/Inspection:rounded shoulders    Range of Motion:   Shoulder  Right   Left  Pain/Dysfunction with Movement    AROM PROM MMT AROM PROM MMT    flexion 145 WNL 4/5 WNL WNL WNL    extension 50 WNL 4/5 WNL WNL WNL    abduction 140 WNL 4/5 WNL WNL WNL    adduction 45 WNL 4/5 WNL WNL WNL    Internal rotation L4 WNL 4/5 WNL WNL WNL    ER at 90° abd 85 WNL 4/5 WNL WNL WNL    ER at 0° abd 85 WNL 4/5 WNL WNL WNL      ROM Comments: Pain at end range    Painful Arc: Patient demonstrates no painful arc in shoulder flexion or abduction    Special Tests:  Positive: Int. Rot. and ADD. Impingement, Neer Impingement and Bicep/yergason's Test  Negative: Empty can test    Palpation: (for pain)     Positive: AC joint and Bicipital Groove    Limitations of Functional Status:   Self Care: requires increase time to don clothes and reaching overhead  Work: Independent  Leisure: pt with difficulty lifting, carrying, reaching    Test: Patient scored 55% on FOTO shoulder survey demonstrating Pt's functional ability with upper extremity.     Treatment included: OT evaluation, the following exercises (HEP) were instructed and Grace was able to demonstrate them prior to the end of the session. HEP are as follows: see attached in media     Assessment  This 72 y.o. female referred to Outpatient Occupational Therapy with diagnosis of R shoulder pain presents with limitations as described in problem list. Patient can benefit from Occupational Therapy services for Ultrasound, moist heat, PROM, AAROM, AROM, Theraputic exercises, joint mobs, home exercise program provied with written instructions, ice and strengthening. The following goals were discussed with the patient and she is in agreement with them as to be addressed in the treatment plan.     History Examination Decision Making Complexity  Score   Occupational Profile: Did an expanded chart review        Medical and Therapy History:     Comorbidities that may affect POC include: chronic low back pain, neck pain, elbow pain          Low   Performance Deficits   Dominant UE affected    Physical  Decreased function of Right UE, Decreased ROM, Increased pain, Decreased strength, Hypomobility, Muscular atrophy, Inability to perform work/tasks, Difficulty sleeping, Inability to perform leisure activiites and Inability to perform self care tasks    Cognitive  N/A      Psychosocial:    Pt unable to perform the following requires increase time to don clothes and reaching overhead, pt with difficulty lifting, carrying, reaching all of which were a part of pt's habits, roles, routines, interpersonal interactions prior to injury    Moderate Foto score:55%    Pt has several treatment options including ASTYM, IASTM, cupping, soft tissue mobs, joint mobs, therex, therapeutic activity, education, endurance training, modalities etc.      Discussed goals and Pt in agreement with goals.    Issued HEP at eval and pt able to perform all with minimal verbal and tactile cues provided by OT. Pt able to complete all without c/o and demonstrating good technique    Low Low     Rehab Potential: good    Goals to be met by discharge:(4 weeks)  1) Independent with HEP  2) Pt will demonstrate (R) shoulder AROM WNL grossly for [place] with ADL's  3) Pt will demonstrate (R) shoulder MMT WNL grossly for [place] with functional activities  4) Independent and pain free with ADL's and IADL's  5) Patient will be able to achieve less than or equal to 25% on FOTO shoulder survey demonstrating overall improved functional ability with upper extremity.     Plan  Recommended Treatment Plan Pt requesting to try to do HEP on her own at this time. Will trial that for 2 weeks and discharge if no c/o. However if pt has trouble performing on her own plan for 2 times per week for 8 weeks:  Therapeutic Exercise, Functional Activities, Patient Education, Home Exercise Program, Ultrasound/Phonophoresis, Electrical Stimulation/TENS/Interferential, Moist Heat/Ice, Sensory/Neuromuscular Reeducation and Manual Therapy  Other Recommendations:  KT/ASTYM PRN    Therapist's Name: Jessie BARBA   Date: 01/02/2018    I CERTIFY THE NEED FOR THESE SERVICES FURNISHED UNDER THIS PLAN OF TREATMENT AND WHILE UNDER MY CARE    Physician's comments: ________________________________________________________________________________________________________________________________________________      Physician's Name: ___________________________________

## 2018-01-19 ENCOUNTER — CLINICAL SUPPORT (OUTPATIENT)
Dept: REHABILITATION | Facility: HOSPITAL | Age: 73
End: 2018-01-19
Payer: MEDICARE

## 2018-01-19 DIAGNOSIS — M25.561 CHRONIC PAIN OF BOTH KNEES: ICD-10-CM

## 2018-01-19 DIAGNOSIS — G89.29 CHRONIC PAIN OF BOTH KNEES: ICD-10-CM

## 2018-01-19 DIAGNOSIS — R29.898 WEAKNESS OF BOTH LOWER EXTREMITIES: ICD-10-CM

## 2018-01-19 DIAGNOSIS — M25.562 CHRONIC PAIN OF BOTH KNEES: ICD-10-CM

## 2018-01-19 PROCEDURE — 97161 PT EVAL LOW COMPLEX 20 MIN: CPT | Mod: PN

## 2018-01-19 PROCEDURE — G8978 MOBILITY CURRENT STATUS: HCPCS | Mod: CK,PN

## 2018-01-19 PROCEDURE — 97110 THERAPEUTIC EXERCISES: CPT | Mod: PN

## 2018-01-19 PROCEDURE — G8979 MOBILITY GOAL STATUS: HCPCS | Mod: CJ,PN

## 2018-01-19 NOTE — PLAN OF CARE
TIME RECORD    Date: 1/19/2018    Start Time:  9:24 am   Stop Time:  9:55 am     PROCEDURES:    TIMED  Procedure Min.   TE 8'                     UNTIMED  Procedure Min.   IE  23'         Total Timed Minutes:  8'  Total Timed Units:  1  Total Untimed Units:  1  Charges Billed/# of units:  2 ( 1 IE, 1 TE)     OUTPATIENT PHYSICAL THERAPY   PATIENT EVALUATION  Onset Date: approximately 2 months ago  Primary Diagnosis: intermittent (B) knee pain, decreased LE strength/flexibility  Treatment Diagnosis:   1. Chronic pain of both knees     2. Weakness of both lower extremities       Past Medical History:   Diagnosis Date    Anxiety     Rene's disease     Chronic low back pain     Depression     Goiter, nodular     Hyperlipidemia     Hypertension     Irritable bowel     Neuromuscular disorder     Osteopenia of multiple sites 5/29/2017    PUD (peptic ulcer disease)     Varicose veins      Precautions: anxiety, depression, HTN, neuromuscular disorder, osteopenia, standard  Prior Therapy: yes  Medications: Grace Navarro has a current medication list which includes the following prescription(s): ascorbic acid (vitamin c), azelastine, benzonatate, calcium/magnesium, cholecalciferol (vitamin d3), cyanocobalamin, fluticasone, hydrochlorothiazide, and metoprolol succinate.  Nutrition:  Normal  History of Present Illness: chronic  Prior Level of Function: Independent  Social History: Pt stated that she lives alone.   Place of Residence (Steps/Adaptations): Pt stated that she has one step to enter her two story house. Pt stated that she has to ascend/descend stairs slowly.   Functional Deficits Leading to Referral/Nature of Injury: difficulty bending/squatting  down  Patient Therapy Goals: improve LE strength    Subjective     Grace Navarro states that approximately 2 months ago she bent down and had difficulty getting up because her legs felt weak. Pt stated that she had a lumbar fusion in 2012,  therefore uses her legs to bend to pick things up. Pt reported that she has been diagnosed with arthritis in her (B) knees. Pt stated that she walks 3-4 miles a day. Pt stated that she feels her (B) knees are getting more stiff.    Pain:  Location: (B) knees   Description: Aching  Activities Which Increase Pain: bending down  Activities Which Decrease Pain: pt stated that she tries to avoid activities that cause pain, creams  Pain Scale: 0/10 at best 0/10 now  8/10 at worst    Objective       Palpation: pt did not c/o tenderness to palpation along (B) knees  Range of Motion/Strength:     Hip Right  Left  Pain/Dysfunction with Movement    AROM MMT AROM MMT    Flexion WFL 4+/5 WFL 4+/5    Extension NT NT NT NT Able to perform good bridge against gravity    Abduction WFL 4+/5 WFL 4+/5      Knee  Right   Left  Pain/Dysfunction with Movement    AROM PROM MMT AROM PROM MMT    Flexion 135 NT 5/5 137  NT 5/5    Extension 2 NT 4+/5 2 NT 4+/5        Ankle Right  Left  Pain/Dysfunction with Movement    AROM MMT AROM MMT    Plantarflexion WFL 4+/5 WFL 4+/5    Dorsiflexion WFL 5/5 WFL 5/5      Flexibility: decreased (B) hamstring and gastroc flexibility   Gait: Without AD  Analysis: (B) genu valgus   Bed Mobility:Independent  Transfers: Independent    Functional Limitation Reports: G codes  Tool: FOTO knee SURVEY  Category: mobility ()  Limitation: 43% limited   Current: CK = at least 40% but < 60% impaired, limited or restricted  Goal: CJ = at least 20% but < 40% impaired, limited or restricted    TREATMENT     Time In: 9:47 am  Time Out: 9:55 am    PT Evaluation Completed? Yes  Discussed Plan of Care with patient: Yes    Grace received 8 minutes of therapeutic exercise & instruction including: (B) SLR and LAQ with RTB    Grace received 0 minutes of manual therapy including:n/a      Written Home Exercises Provided: yes see above  Grace demo good understanding of the education provided. Patient demo good return  demo of skill of exercises.      Assessment       Initial Assessment (Pertinent finding, problem list and factors affecting outcome): Ms. Navarro is a 72 year old female with c/o intermittent (B) knee pain and (B) LE weakness. Pt presents with decreased (B) LE strength/flexibility and decreased functional mobility. Pt will benefit from skilled PT with focus on (B) quad strengthening in order to return pt to her highest level of function with decreased pain and limitation.     History  Co-morbidities and personal factors that may impact the plan of care Examination  Body Structures and Functions, activity limitations and participation restrictions that may impact the plan of care    Clinical Presentation   Co-morbidities:   HTN, neuromuscular disorder, osteopenia        Personal Factors:   anxiety, depression Body Regions:   lower extremities    Body Systems:    ROM  strength            Participation Restrictions:   n/a     Activity limitations:   Learning and applying knowledge  no deficits    General Tasks and Commands  no deficits    Communication  no deficits    Mobility  no deficits    Self care  no deficits    Domestic Life  no deficits    Interactions/Relationships  no deficits    Life Areas  no deficits    Community and Social Life  no deficits         stable and uncomplicated                      low   high  moderate Decision Making/ Complexity Score:  low       Rehab Potiential: good  Barriers to Rehab: none  Short Term Goals = Long Term Goals ( 8 weeks) : 3/19/18  1. Pt will be independent with HEP  2. Pt will improve (B) LE strength to 5/5 on MMT where deficits noted in order to improve functional mobility  3. Pt will improve FOTO knee survey score to </= 25% limited in order to demo improved functional mobility  4. Pt will be able to perform at least 10 squats with proper technique without any VC/TC from therapist in order for pt to be able to safely lower herself to  low objects  5. Pt will report  <4/10 pain in (B) knees when performing ADLs in order to be able to perform ADLs with less difficulty     Plan     Certification Period: 1/19/18 to 3/19/18  Recommended Treatment Plan: 2 times per week for 8 weeks: Gait Training, Group Therapy, Locomotor Training, Manual Therapy, Moist Heat/ Ice, Neuromuscular Re-ed, Patient Education, Self Care, Therapeutic Activites and Therapeutic Exercise  Other Recommendations: modalities prn, ASTYM prn, kinesiotape prn, Functional Dry Needling prn       Therapist: Antonina Wolfe PT    I CERTIFY THE NEED FOR THESE SERVICES FURNISHED UNDER THIS PLAN OF TREATMENT AND WHILE UNDER MY CARE    Physician's comments: ________________________________________________________________________________________________________________________________________________      Physician's Name: ___________________________________

## 2018-01-23 ENCOUNTER — PATIENT OUTREACH (OUTPATIENT)
Dept: OTHER | Facility: OTHER | Age: 73
End: 2018-01-23

## 2018-01-23 ENCOUNTER — CLINICAL SUPPORT (OUTPATIENT)
Dept: REHABILITATION | Facility: HOSPITAL | Age: 73
End: 2018-01-23
Payer: MEDICARE

## 2018-01-23 DIAGNOSIS — M25.561 CHRONIC PAIN OF BOTH KNEES: ICD-10-CM

## 2018-01-23 DIAGNOSIS — G89.29 CHRONIC PAIN OF BOTH KNEES: ICD-10-CM

## 2018-01-23 DIAGNOSIS — R29.898 WEAKNESS OF BOTH LOWER EXTREMITIES: ICD-10-CM

## 2018-01-23 DIAGNOSIS — M25.562 CHRONIC PAIN OF BOTH KNEES: ICD-10-CM

## 2018-01-23 PROCEDURE — 97110 THERAPEUTIC EXERCISES: CPT | Mod: PN

## 2018-01-23 NOTE — PROGRESS NOTES
"TIME RECORD    Date: 1/23/2018      Start Time:  3:00 pm   Stop Time:  3:58 pm     PROCEDURES:    TIMED  Procedure Min.   TE supervised 5' NC   TE 53'                 UNTIMED  Procedure Min.             Total Timed Minutes:  53'  Total Timed Units:  4  Total Untimed Units:  0  Charges Billed/# of units:  4 TE      Progress/Current Status    Subjective:     Patient ID: Grace Navarro is a 72 y.o. female.  Diagnosis:   1. Chronic pain of both knees     2. Weakness of both lower extremities       Pain: 0 /10  Pt stated that she did experience pain in (B) knees a couple of days ago when trying to kneel.     Objective:     Pt initiated therapy session on stationary bike x 5' supervised by PT. Pt participated in therex per log below 1:1 with PT x 53'.   Date  1/23/18   VISIT 2   Gcode 2/10   FOTO 2/5   Cap Visit   Cap Total 127.92  235.50   MT prn   Long Sit HS 3x30" supine B   Gastroc Str.    Bike 5'     QS -   SAQ -   SLR 1.5# 2x10 B   SL Abd    Heel Slides    Kacey Hip Add    LAQs 1.5# 2x10 B   Seated Hip Flex    Cybex   Leg Press 3.5 2x10 DL    1.5 2x10 SL   TKE PTC 2x10 , 3" hold B   Hip Abd RTC 2x10 B   Hip Flex RTC 2x10 B   Hip Ext RTC 2x10 B   Wall squats  1x10    HS Curls    Knee Ext    Step Ups 2x10 blue B   Step Downs 2x10 L1 B   Gait    CP    Initials CK         Assessment:     Pt required frequent VC from therapist for proper technique when performing therex. Pt was able to tolerate all therex without any adverse reactions. Pt reported no increased pain in (B) knees at end of session.     Patient Education/Response:     Continue with HEP. Give updated HEP next visit    Plans and Goals:     Continue per POC, progress as tolerated, focus on quad strengthening therex  Short Term Goals = Long Term Goals ( 8 weeks) : 3/19/18  1. Pt will be independent with HEP  2. Pt will improve (B) LE strength to 5/5 on MMT where deficits noted in order to improve functional mobility  3. Pt will improve FOTO knee survey " score to </= 25% limited in order to demo improved functional mobility  4. Pt will be able to perform at least 10 squats with proper technique without any VC/TC from therapist in order for pt to be able to safely lower herself to  low objects  5. Pt will report <4/10 pain in (B) knees when performing ADLs in order to be able to perform ADLs with less difficulty

## 2018-01-23 NOTE — PROGRESS NOTES
"Last 5 Patient Entered Readings                                      Current 30 Day Average: 121/69     Recent Readings 1/19/2018 1/17/2018 1/17/2018 1/15/2018 1/15/2018    SBP (mmHg) 108 130 142 121 132    DBP (mmHg) 61 78 85 67 76    Pulse 64 56 57 59 60          Hypertension Digital Medicine Program (HDMP): Health  Follow Up    Lifestyle Modifications:    1.Low sodium diet: yes Patient reports that she is continuing to limit her salt intake.     2.Physical activity: yes Ms. Navarro is exercising regularly.    3.Hypotension/Hypertension symptoms: no   Frequency/Alleviating factors/Precipitating factors, etc.     4.Patient has been compliant with the medication regimen.     Follow up with Ms. Grace Navarro completed. Ms. Navarro is doing well. Patient is "upset" with the new guidelines for HTN. She believes that she is doing everything that she can in terms of lifestyle, but will still have an occasional spike in BP. She will continue to monitor. No further questions or concerns. I will follow up in a few weeks to assess progress.         "

## 2018-01-26 ENCOUNTER — CLINICAL SUPPORT (OUTPATIENT)
Dept: REHABILITATION | Facility: HOSPITAL | Age: 73
End: 2018-01-26
Payer: MEDICARE

## 2018-01-26 DIAGNOSIS — M25.562 CHRONIC PAIN OF BOTH KNEES: ICD-10-CM

## 2018-01-26 DIAGNOSIS — M25.561 CHRONIC PAIN OF BOTH KNEES: ICD-10-CM

## 2018-01-26 DIAGNOSIS — G89.29 CHRONIC PAIN OF BOTH KNEES: ICD-10-CM

## 2018-01-26 DIAGNOSIS — R29.898 WEAKNESS OF BOTH LOWER EXTREMITIES: ICD-10-CM

## 2018-01-26 PROCEDURE — 97110 THERAPEUTIC EXERCISES: CPT | Mod: PN

## 2018-01-26 NOTE — PROGRESS NOTES
"TIME RECORD    Date: 1/26/2018      Start Time:  805  Stop Time:  900    PROCEDURES:    TIMED  Procedure Min.   Therex 50     UNTIMED  Procedure Min.   Bike 5         Total Timed Minutes:  50  Total Timed Units:  3  Total Untimed Units:  0  Charges Billed/# of units:  3 TE      Progress/Current Status    Subjective:     Patient ID: Grace Navarro is a 72 y.o. female.  Diagnosis:   1. Chronic pain of both knees     2. Weakness of both lower extremities       Pain: 0 /10  Patient reports no pain upon entering clinic.    Objective:     Pt initiated therapy session on stationary bike x 5' supervised. Pt participated in therex per log below 1:1 with PTA x 50'. Increased reps and expanded LE strengthening ex as noted.    Date  1/26/18 1/23/18   VISIT 3 2   Gcode 3/10 2/10   FOTO 3/5 2/5   Cap Visit   Cap Total   95.94  331.44 127.92  235.50   MT  prn   Long Sit HS 30"x3  w/strap 3x30" supine B   Gastroc Str.     Bike 5' 5'     QS  -   SAQ  -   SLR 1.5# 3x10 B 1.5# 2x10 B   SL Abd 1.5#   2x10 B  Pillow**    Heel Slides     Kacey Hip Add 5"x10    LAQs 1.5# 2x15 1.5# 2x10 B   Seated Hip Flex     Cybex   Leg Press 3.5  2x15 DL    1.5 2x12  Unilateral 3.5 2x10 DL    1.5 2x10 SL   TKE PTC 2x10  3" hold B PTC 2x10 , 3" hold B   Hip Abd RTC 2x10 B RTC 2x10 B   Hip Flex RTC 2x10 B RTC 2x10 B   Hip Ext RTC 2x10 B RTC 2x10 B   Wall squats  10 + 5 1x10    HS Curls     Knee Ext     Step Ups Blue  2x10  B 2x10 blue B   Step Downs L1 2x10 B 2x10 L1 B   Gait --    CP --    Initials DH 1/6 CK     ** Pillow under hip in sidelying for comfort.    Assessment:     Patient able to increase activity significantly with complaint of knee pain only with initial minute on bike.  Resolved immediately and able to complete remaining time and all other therex with reports of "good" after treatment.    Patient Education/Response:     Patient educated to continue HEP.  Verbalized understanding.    Plans and Goals:     Continue per POC, progress as " tolerated, focus on quad strengthening therex.    Short Term Goals = Long Term Goals ( 8 weeks) : 3/19/18  1. Pt will be independent with HEP  2. Pt will improve (B) LE strength to 5/5 on MMT where deficits noted in order to improve functional mobility  3. Pt will improve FOTO knee survey score to </= 25% limited in order to demo improved functional mobility  4. Pt will be able to perform at least 10 squats with proper technique without any VC/TC from therapist in order for pt to be able to safely lower herself to  low objects  5. Pt will report <4/10 pain in (B) knees when performing ADLs in order to be able to perform ADLs with less difficulty

## 2018-01-30 ENCOUNTER — CLINICAL SUPPORT (OUTPATIENT)
Dept: REHABILITATION | Facility: HOSPITAL | Age: 73
End: 2018-01-30
Payer: MEDICARE

## 2018-01-30 DIAGNOSIS — M25.561 CHRONIC PAIN OF BOTH KNEES: ICD-10-CM

## 2018-01-30 DIAGNOSIS — R29.898 WEAKNESS OF BOTH LOWER EXTREMITIES: ICD-10-CM

## 2018-01-30 DIAGNOSIS — G89.29 CHRONIC PAIN OF BOTH KNEES: ICD-10-CM

## 2018-01-30 DIAGNOSIS — M25.562 CHRONIC PAIN OF BOTH KNEES: ICD-10-CM

## 2018-01-30 PROCEDURE — 97110 THERAPEUTIC EXERCISES: CPT | Mod: PN

## 2018-01-30 NOTE — PROGRESS NOTES
"TIME RECORD    Date: 1/30/2018      Start Time:  1100  Stop Time:  1155    PROCEDURES:    TIMED  Procedure Min.   Therex 50     UNTIMED  Procedure Min.   Bike 5         Total Timed Minutes:  50  Total Timed Units:  3  Total Untimed Units:  0  Charges Billed/# of units:  3 TE      Progress/Current Status    Subjective:     Patient ID: Grace Navarro is a 72 y.o. female.  Diagnosis:   1. Chronic pain of both knees     2. Weakness of both lower extremities       Pain: 0 /10  Patient reports no pain upon entering clinic.    Objective:     Pt initiated therapy session on stationary bike x 5' supervised. Pt participated in therex per log below 1:1 with PTA x 50'.     Date  1/30/18 1/26/18 1/23/18   VISIT 4 3 2   Gcode 4/10 3/10 2/10   FOTO 4/6 3/5 2/5   Cap Visit   Cap Total 95.94  427.38   95.94  331.44 127.92  235.50   MT   prn   Long Sit HS 30"x3  w/strap  3x30" supine B   Gastroc Str.      Bike 5' 5' 5'     QS   -   SAQ   -   SLR 1.5# 3x10 B 1.5# 3x10 B 1.5# 2x10 B   SL Abd 1.5#   2x12 B  Pillow** 1.5#   2x10 B  Pillow**    Heel Slides      Kacey Hip Add 5"x15 5"x10    LAQs 1.5# 2x15 1.5# 2x15 1.5# 2x10    Seated Hip Flex      Cybex   Leg Press 3.5   2x15 DL    1.5 2x12  Unilateral 3.5  2x15 DL    1.5 2x12  Unilateral 3.5 2x10 DL    1.5 2x10 SL   TKE PTC 2x10  3" hold B PTC 2x10  3" hold B PTC 2x10 , 3" hold B   Hip Abd RTC 2x12 B RTC 2x10 B RTC  2x10 B   Hip Flex RTC 2x12 B RTC 2x10 B RTC   2x10 B   Hip Ext RTC 2x12 B RTC 2x10 B RTC   2x10 B   Wall squats  10+5  10 + 5 1x10    HS Curls      Knee Ext      Step Ups Blue  2x10  B Blue  2x10  B 2x10 blue B   Step Downs L1 2x10 B L1 2x10 B 2x10 L1 B   Gait  --    CP  --    Initials CS 1/6 DH 1/6 CK     ** Pillow under hip in sidelying for comfort.    Assessment:     Patient reported some lateral quad discomfort after performing bike but dissipated.  Patient Education/Response:     Patient educated to continue HEP.  Verbalized understanding.    Plans and Goals: "     Continue per POC, progress as tolerated, focus on quad strengthening therex.    Short Term Goals = Long Term Goals ( 8 weeks) : 3/19/18  1. Pt will be independent with HEP  2. Pt will improve (B) LE strength to 5/5 on MMT where deficits noted in order to improve functional mobility  3. Pt will improve FOTO knee survey score to </= 25% limited in order to demo improved functional mobility  4. Pt will be able to perform at least 10 squats with proper technique without any VC/TC from therapist in order for pt to be able to safely lower herself to  low objects  5. Pt will report <4/10 pain in (B) knees when performing ADLs in order to be able to perform ADLs with less difficulty

## 2018-02-02 ENCOUNTER — CLINICAL SUPPORT (OUTPATIENT)
Dept: REHABILITATION | Facility: HOSPITAL | Age: 73
End: 2018-02-02
Payer: MEDICARE

## 2018-02-02 DIAGNOSIS — G89.29 CHRONIC PAIN OF BOTH KNEES: ICD-10-CM

## 2018-02-02 DIAGNOSIS — M25.561 CHRONIC PAIN OF BOTH KNEES: ICD-10-CM

## 2018-02-02 DIAGNOSIS — R29.898 WEAKNESS OF BOTH LOWER EXTREMITIES: ICD-10-CM

## 2018-02-02 DIAGNOSIS — M25.562 CHRONIC PAIN OF BOTH KNEES: ICD-10-CM

## 2018-02-02 PROCEDURE — 97110 THERAPEUTIC EXERCISES: CPT | Mod: PN

## 2018-02-02 NOTE — PROGRESS NOTES
"TIME RECORD    Date: 2/2/2018      Start Time:  8:05 am   Stop Time:  8:58 am     PROCEDURES:    TIMED  Procedure Min.   TE supervised 10' NC   TE 43'                  UNTIMED  Procedure Min.             Total Timed Minutes:  43'   Total Timed Units:  3  Total Untimed Units:  0  Charges Billed/# of units:  3 TE      Progress/Current Status    Subjective:     Patient ID: Grace Navarro is a 72 y.o. female.  Diagnosis:   1. Chronic pain of both knees     2. Weakness of both lower extremities       Pain: 7-8 /10 (R) knee, 4-5 (L) knee prior to therapy session  Pt stated that she put biofreeze on to help with pain.     Objective:     Pt participated in therex per log below 1:1 with PTA x 25', supervised therex x 10' and therex 1:1 with PT x 18'.     Date  2/2/18 1/30/18 1/26/18 1/23/18   VISIT 5 4 3 2   Gcode 5/10 4/10 3/10 2/10   FOTO 5/5 4/6 3/5 2/5   Cap Visit   Cap Total 95.94  523.32 95.94  427.38   95.94  331.44 127.92  235.50   MT    prn   Long Sit HS 30"x3 w/ strap 30"x3  w/strap  3x30" supine B   Gastroc Str. 3x30" fitter      Bike NT 5' 5' 5'     QS    -   SAQ    -   SLR 1.5# 2x15 B 1.5# 3x10 B 1.5# 3x10 B 1.5# 2x10 B   SL Abd 1.5# 2x15 B pillow 1.5#   2x12 B  Pillow** 1.5#   2x10 B  Pillow**    Heel Slides       Kcaey Hip Add 5"x20 5"x15 5"x10    LAQs 1.5# 2x15 B 1.5# 2x15 1.5# 2x15 1.5# 2x10    Seated Hip Flex       Cybex   Leg Press 3.5   2x15 DL    1.5 2x12  Unilateral 3.5   2x15 DL    1.5 2x12  Unilateral 3.5  2x15 DL    1.5 2x12  Unilateral 3.5 2x10 DL    1.5 2x10 SL   TKE PTC 2x15, 3" hold B PTC 2x10  3" hold B PTC 2x10  3" hold B PTC 2x10 , 3" hold B   Hip Abd RTC 2x12 B RTC 2x12 B RTC 2x10 B RTC  2x10 B   Hip Flex RTC 2x12 B RTC 2x12 B RTC 2x10 B RTC   2x10 B   Hip Ext RTC 2x12 B RTC 2x12 B RTC 2x10 B RTC   2x10 B   Wall squats  - 10+5  10 + 5 1x10    HS Curls       Knee Ext       Step Ups Blue 2x10 B Blue  2x10  B Blue  2x10  B 2x10 blue B   Step Downs L1 2x10 B L1 2x10 B L1 2x10 B 2x10 L1 B "   Gait   --    CP   --    Initials CK CS 1/6 DH 1/6 CK       Assessment:     Pt was able to tolerate all therex without any c/o increased pain. Pt received VC from therapist for proper technique when performing therex. Pt reported experiencing decreased pain in (B) knees after therapy session.     Patient Education/Response:     Continue with HEP    Plans and Goals:     Continue per POC, progress as tolerated, focus on quad strengthening therex.    Short Term Goals = Long Term Goals ( 8 weeks) : 3/19/18  1. Pt will be independent with HEP  2. Pt will improve (B) LE strength to 5/5 on MMT where deficits noted in order to improve functional mobility  3. Pt will improve FOTO knee survey score to </= 25% limited in order to demo improved functional mobility  4. Pt will be able to perform at least 10 squats with proper technique without any VC/TC from therapist in order for pt to be able to safely lower herself to  low objects  5. Pt will report <4/10 pain in (B) knees when performing ADLs in order to be able to perform ADLs with less difficulty

## 2018-02-06 ENCOUNTER — CLINICAL SUPPORT (OUTPATIENT)
Dept: REHABILITATION | Facility: HOSPITAL | Age: 73
End: 2018-02-06
Payer: MEDICARE

## 2018-02-06 DIAGNOSIS — M25.562 CHRONIC PAIN OF BOTH KNEES: ICD-10-CM

## 2018-02-06 DIAGNOSIS — G89.29 CHRONIC PAIN OF BOTH KNEES: ICD-10-CM

## 2018-02-06 DIAGNOSIS — R29.898 WEAKNESS OF BOTH LOWER EXTREMITIES: ICD-10-CM

## 2018-02-06 DIAGNOSIS — M25.561 CHRONIC PAIN OF BOTH KNEES: ICD-10-CM

## 2018-02-06 PROCEDURE — 97110 THERAPEUTIC EXERCISES: CPT | Mod: PN

## 2018-02-06 NOTE — PROGRESS NOTES
"TIME RECORD    Date: 2/6/2018      Start Time:  11:00 am   Stop Time:  11:50 am     PROCEDURES:    TIMED  Procedure Min.   TE 50                     UNTIMED  Procedure Min.             Total Timed Minutes:  50   Total Timed Units:  3  Total Untimed Units:  0  Charges Billed/# of units:  3TE      Progress/Current Status    Subjective:     Patient ID: Grace Navarro is a 72 y.o. female.  Diagnosis:   1. Chronic pain of both knees     2. Weakness of both lower extremities       Pain:Pt reports her last session she was in B knee pain. She states she feels her B knees are better overall and she is ready to "work out". Pt agreeable to PT session.     Objective:     Pt participated in therex per log below 1:1 with PTA x 45 min    Date  2/6/18 2/2/18 1/30/18 1/26/18 1/23/18   VISIT 6 5 4 3 2   Gcode 6/10 5/10 4/10 3/10 2/10   FOTO 6/10 5/5 4/6 3/5 2/5   Cap Visit   Cap Total 95.94  619.26 95.94  523.32 95.94  427.38   95.94  331.44 127.92  235.50   MT     prn   Long Sit HS 30"x3 w/ strap 30"x3 w/ strap 30"x3  w/strap  3x30" supine B   Gastroc Str.  3x30" fitter      Bike  NT 5' 5' 5'     QS     -   SAQ     -   SLR 1.5# 2x15 B 1.5# 2x15 B 1.5# 3x10 B 1.5# 3x10 B 1.5# 2x10 B   SL Abd NT 1.5# 2x15 B pillow 1.5#   2x12 B  Pillow** 1.5#   2x10 B  Pillow**    Heel Slides        Kacey Hip Add 5'x20 5"x20 5"x15 5"x10    LAQs 1.5# 2x15 B 1.5# 2x15 B 1.5# 2x15 1.5# 2x15 1.5# 2x10    Seated Hip Flex        Cybex   Leg Press 4.0   2x15 DL    2.0 2x15  Unilateral 3.5   2x15 DL    1.5 2x12  Unilateral 3.5   2x15 DL    1.5 2x12  Unilateral 3.5  2x15 DL    1.5 2x12  Unilateral 3.5 2x10 DL    1.5 2x10 SL   TKE Cybex 3.0  2x10 2" hold B PTC 2x15, 3" hold B PTC 2x10  3" hold B PTC 2x10  3" hold B PTC 2x10 , 3" hold B   Hip Abd cybex 1.0 2x10 B RTC 2x12 B RTC 2x12 B RTC 2x10 B RTC  2x10 B   Hip Flex cybex 1.0 2x10 B RTC 2x12 B RTC 2x12 B RTC 2x10 B RTC   2x10 B   Hip Ext cybex 1.0 2x10 B RTC 2x12 B RTC 2x12 B RTC 2x10 B RTC   2x10 B "   Wall squats   - 10+5  10 + 5 1x10    HS Curls        Knee Ext        Step Ups Blue 2x10 B Blue 2x10 B Blue  2x10  B Blue  2x10  B 2x10 blue B   Step Downs  L1 2x10 B L1 2x10 B L1 2x10 B 2x10 L1 B   Gait    --    CP    --    Initials JA 1/6 CK CS 1/6 DH 1/6 CK       Assessment:     Pt completed session with no reports of increased pain in B knees. Tolerated use of cybex machinery today with no adverse reactions.    Patient Education/Response:     Continue with HEP    Plans and Goals:     Continue per POC, progress as tolerated, focus on quad strengthening therex.    Short Term Goals = Long Term Goals ( 8 weeks) : 3/19/18  1. Pt will be independent with HEP  2. Pt will improve (B) LE strength to 5/5 on MMT where deficits noted in order to improve functional mobility  3. Pt will improve FOTO knee survey score to </= 25% limited in order to demo improved functional mobility  4. Pt will be able to perform at least 10 squats with proper technique without any VC/TC from therapist in order for pt to be able to safely lower herself to  low objects  5. Pt will report <4/10 pain in (B) knees when performing ADLs in order to be able to perform ADLs with less difficulty

## 2018-02-08 ENCOUNTER — CLINICAL SUPPORT (OUTPATIENT)
Dept: REHABILITATION | Facility: HOSPITAL | Age: 73
End: 2018-02-08
Payer: MEDICARE

## 2018-02-08 DIAGNOSIS — R29.898 WEAKNESS OF BOTH LOWER EXTREMITIES: ICD-10-CM

## 2018-02-08 DIAGNOSIS — M25.562 CHRONIC PAIN OF BOTH KNEES: ICD-10-CM

## 2018-02-08 DIAGNOSIS — M25.561 CHRONIC PAIN OF BOTH KNEES: ICD-10-CM

## 2018-02-08 DIAGNOSIS — G89.29 CHRONIC PAIN OF BOTH KNEES: ICD-10-CM

## 2018-02-08 PROCEDURE — 97110 THERAPEUTIC EXERCISES: CPT | Mod: PN

## 2018-02-08 NOTE — PROGRESS NOTES
"TIME RECORD    Date: 2/8/2018      Start Time:  9:35am   Stop Time:  10:20 am     PROCEDURES:    TIMED  Procedure Min.   TE 50                     UNTIMED  Procedure Min.             Total Timed Minutes:  50   Total Timed Units:  3  Total Untimed Units:  0  Charges Billed/# of units:  3TE      Progress/Current Status    Subjective:     Patient ID: Grace Navarro is a 72 y.o. female.  Diagnosis:   1. Chronic pain of both knees     2. Weakness of both lower extremities       Pain:Pt agreeable to PT session. She states she has no new complaints of pain. She reports her biggest difficulty is squatting.     Objective:     Pt participated in therex per log below 1:1 with PTA x 45 min    Date  2/8/18 2/6/18 2/2/18 1/30/18 1/26/18 1/23/18   VISIT 7 6 5 4 3 2   Gcode 7/10 6/10 5/10 4/10 3/10 2/10   FOTO 7/10 DONE 6/10 5/5 4/6 3/5 2/5   Cap Visit   Cap Total 95.94  715.20 95.94  619.26 95.94  523.32 95.94  427.38   95.94  331.44 127.92  235.50   MT      prn   Long Sit HS 30"x3 w/ strap 30"x3 w/ strap 30"x3 w/ strap 30"x3  w/strap  3x30" supine B   Gastroc Str.   3x30" fitter      Bike   NT 5' 5' 5'     QS      -   SAQ      -   SLR 1.5# 2x15 B 1.5# 2x15 B 1.5# 2x15 B 1.5# 3x10 B 1.5# 3x10 B 1.5# 2x10 B   SL Abd  NT 1.5# 2x15 B pillow 1.5#   2x12 B  Pillow** 1.5#   2x10 B  Pillow**    Heel Slides         Kacey Hip Add 5'x20 5'x20 5"x20 5"x15 5"x10    LAQs  1.5# 2x15 B 1.5# 2x15 B 1.5# 2x15 1.5# 2x15 1.5# 2x10    Seated Hip Flex         Cybex   Leg Press Defer due to lumbar pain 4.0   2x15 DL    2.0 2x15  Unilateral 3.5   2x15 DL    1.5 2x12  Unilateral 3.5   2x15 DL    1.5 2x12  Unilateral 3.5  2x15 DL    1.5 2x12  Unilateral 3.5 2x10 DL    1.5 2x10 SL   TKE Cybex 3.0  2x10 2" hold B Cybex 3.0  2x10 2" hold B PTC 2x15, 3" hold B PTC 2x10  3" hold B PTC 2x10  3" hold B PTC 2x10 , 3" hold B   Hip Abd cybex 1.0 2x10 B cybex 1.0 2x10 B RTC 2x12 B RTC 2x12 B RTC 2x10 B RTC  2x10 B   Hip Flex cybex 1.0 2x10 B cybex 1.0 2x10 B " RTC 2x12 B RTC 2x12 B RTC 2x10 B RTC   2x10 B   Hip Ext cybex 1.0 2x10 B cybex 1.0 2x10 B RTC 2x12 B RTC 2x12 B RTC 2x10 B RTC   2x10 B   Wall squats  w/ Green Physioball  2x10   - 10+5  10 + 5 1x10    Lunges FWD Blue step  2x10 // B        Knee Ext         Step Ups Blue 2x10 B Blue 2x10 B Blue 2x10 B Blue  2x10  B Blue  2x10  B 2x10 blue B   Step Downs L1 2x10 B  L1 2x10 B L1 2x10 B L1 2x10 B 2x10 L1 B   Gait     --    CP     --    Initials JA 2/6 JA 1/6 CK CS 1/6 DH 1/6 CK       Assessment:   Pt completed session with no reports of pain post session. She responded well to additional therex and wall squats on wall with Physioball.     Patient Education/Response:     Continue with HEP    Plans and Goals:     Continue per POC, progress as tolerated, focus on quad strengthening therex.    Short Term Goals = Long Term Goals ( 8 weeks) : 3/19/18  1. Pt will be independent with HEP  2. Pt will improve (B) LE strength to 5/5 on MMT where deficits noted in order to improve functional mobility  3. Pt will improve FOTO knee survey score to </= 25% limited in order to demo improved functional mobility  4. Pt will be able to perform at least 10 squats with proper technique without any VC/TC from therapist in order for pt to be able to safely lower herself to  low objects  5. Pt will report <4/10 pain in (B) knees when performing ADLs in order to be able to perform ADLs with less difficulty

## 2018-02-16 ENCOUNTER — CLINICAL SUPPORT (OUTPATIENT)
Dept: REHABILITATION | Facility: HOSPITAL | Age: 73
End: 2018-02-16
Payer: MEDICARE

## 2018-02-16 DIAGNOSIS — M25.562 CHRONIC PAIN OF BOTH KNEES: ICD-10-CM

## 2018-02-16 DIAGNOSIS — G89.29 CHRONIC PAIN OF BOTH KNEES: ICD-10-CM

## 2018-02-16 DIAGNOSIS — M25.561 CHRONIC PAIN OF BOTH KNEES: ICD-10-CM

## 2018-02-16 DIAGNOSIS — R29.898 WEAKNESS OF BOTH LOWER EXTREMITIES: ICD-10-CM

## 2018-02-16 PROCEDURE — 97110 THERAPEUTIC EXERCISES: CPT | Mod: PN

## 2018-02-16 NOTE — PROGRESS NOTES
"TIME RECORD    Date: 2/16/2018      Start Time:  900  Stop Time:  952  PROCEDURES:    TIMED  Procedure Min.   TE 52                     UNTIMED  Procedure Min.             Total Timed Minutes:  52   Total Timed Units:  3  Total Untimed Units:  0  Charges Billed/# of units:  3TE      Progress/Current Status    Subjective:     Patient ID: Grace Navarro is a 72 y.o. female.  Diagnosis:   1. Chronic pain of both knees     2. Weakness of both lower extremities       Pain:  Patient reports 5/10 pain today secondary to arthritis.    Objective:     Pt participated in therex per log below 1:1 with PTA x 52 min    Date  2/16/18 2/8/18 2/6/18 2/2/18 1/30/18 1/26/18 1/23/18   VISIT 8 7 6 5 4 3 2   Gcode 8/10 7/10 6/10 5/10 4/10 3/10 2/10   FOTO 8/10 7/10 DONE 6/10 5/5 4/6 3/5 2/5   Cap Visit   Cap Total 95.94  811.14 95.94  715.20 95.94  619.26 95.94  523.32 95.94  427.38   95.94  331.44 127.92  235.50   MT       prn   Long Sit HS 30"x3 w/ strap 30"x3 w/ strap 30"x3 w/ strap 30"x3 w/ strap 30"x3  w/strap  3x30" supine B   Gastroc Str.    3x30" fitter      Bike Stepper  L1  5'   NT 5' 5' 5'     QS       -   SAQ       -   SLR 1.5#   ^ next ? 2x15 B 1.5# 2x15 B 1.5# 2x15 B 1.5# 2x15 B 1.5# 3x10 B 1.5# 3x10 B 1.5# 2x10 B   SL Abd   NT 1.5# 2x15 B pillow 1.5#   2x12 B  Pillow** 1.5#   2x10 B  Pillow**    Heel Slides          Kacey Hip Add 5"x25 5'x20 5'x20 5"x20 5"x15 5"x10    LAQs 1.5# 2x15 B  1.5# 2x15 B 1.5# 2x15 B 1.5# 2x15 1.5# 2x15 1.5# 2x10    Seated Hip Flex          Cybex   Leg Press Pt declined Defer due to lumbar pain 4.0   2x15 DL    2.0 2x15  Unilateral 3.5   2x15 DL    1.5 2x12  Unilateral 3.5   2x15 DL    1.5 2x12  Unilateral 3.5  2x15 DL    1.5 2x12  Unilateral 3.5 2x10 DL    1.5 2x10 SL   TKE Cybex 3.0  2x12  2" hold B Cybex 3.0  2x10 2" hold B Cybex 3.0  2x10 2" hold B PTC 2x15, 3" hold B PTC 2x10  3" hold B PTC 2x10  3" hold B PTC 2x10 , 3" hold B   Hip Abd cybex 1.0 2x12 B cybex 1.0 2x10 B cybex 1.0 2x10 " B RTC 2x12 B RTC 2x12 B RTC 2x10 B RTC  2x10 B   Hip Flex cybex 1.0 2x15 B cybex 1.0 2x10 B cybex 1.0 2x10 B RTC 2x12 B RTC 2x12 B RTC 2x10 B RTC   2x10 B   Hip Ext cybex 1.0 2x12 B cybex 1.0 2x10 B cybex 1.0 2x10 B RTC 2x12 B RTC 2x12 B RTC 2x10 B RTC   2x10 B   Wall squats  w/ Green Physioball  2x10 w/ Green Physioball  2x10   - 10+5  10 + 5 1x10    Lunges FWD Blue step  2x10 // B Blue step  2x10 // B        Knee Ext          Step Ups Blue 2x12 B Blue 2x10 B Blue 2x10 B Blue 2x10 B Blue  2x10  B Blue  2x10  B 2x10 blue B   Step Downs L1 2x10 B L1 2x10 B  L1 2x10 B L1 2x10 B L1 2x10 B 2x10 L1 B   Gait      --    CP      --    Initials CS 3/6 JA 2/6 JA 1/6 CK CS 1/6 DH 1/6 CK       Assessment:   Pt completed with increased reps without difficulty or report of increased pain.     Patient Education/Response:     Continue with HEP    Plans and Goals:     Continue per POC, progress as tolerated, focus on quad strengthening therex.    Short Term Goals = Long Term Goals ( 8 weeks) : 3/19/18  1. Pt will be independent with HEP  2. Pt will improve (B) LE strength to 5/5 on MMT where deficits noted in order to improve functional mobility  3. Pt will improve FOTO knee survey score to </= 25% limited in order to demo improved functional mobility  4. Pt will be able to perform at least 10 squats with proper technique without any VC/TC from therapist in order for pt to be able to safely lower herself to  low objects  5. Pt will report <4/10 pain in (B) knees when performing ADLs in order to be able to perform ADLs with less difficulty

## 2018-02-20 ENCOUNTER — CLINICAL SUPPORT (OUTPATIENT)
Dept: REHABILITATION | Facility: HOSPITAL | Age: 73
End: 2018-02-20
Payer: MEDICARE

## 2018-02-20 DIAGNOSIS — M25.562 CHRONIC PAIN OF BOTH KNEES: ICD-10-CM

## 2018-02-20 DIAGNOSIS — R29.898 WEAKNESS OF BOTH LOWER EXTREMITIES: ICD-10-CM

## 2018-02-20 DIAGNOSIS — G89.29 CHRONIC PAIN OF BOTH KNEES: ICD-10-CM

## 2018-02-20 DIAGNOSIS — M25.561 CHRONIC PAIN OF BOTH KNEES: ICD-10-CM

## 2018-02-20 PROCEDURE — 97110 THERAPEUTIC EXERCISES: CPT | Mod: PN

## 2018-02-20 NOTE — PROGRESS NOTES
"TIME RECORD    Date: 2/20/2018      Start Time:  11:00 am  Stop Time:  11:59 am    PROCEDURES:    TIMED  Procedure Min.   TE supervised 5' NC   TE 54'                 UNTIMED  Procedure Min.             Total Timed Minutes:  54'   Total Timed Units:  4  Total Untimed Units:  0  Charges Billed/# of units:  4 TE      Progress/Current Status    Subjective:     Patient ID: Grace Navarro is a 72 y.o. female.  Diagnosis:   1. Chronic pain of both knees     2. Weakness of both lower extremities       Pain: 0 /10  Pt stated that her (B) knees were feeling great prior to therapy session today. Pt stated that she has noticed that it is a little easier to bend down to pick things up/get on/off floor.     Objective:     Pt initiated therapy session on stationary bike x 5' supervised. Pt participated in therex per log below 1:1 with PT x 54 min    Date  2/20/18 2/16/18 2/8/18 2/6/18 2/2/18 1/30/18 1/26/18 1/23/18   VISIT 9 8 7 6 5 4 3 2   Gcode 9/10 8/10 7/10 6/10 5/10 4/10 3/10 2/10   FOTO 9/10 8/10 7/10 DONE 6/10 5/5 4/6 3/5 2/5   Cap Visit   Cap Total 127.92  939.06 95.94  811.14 95.94  715.20 95.94  619.26 95.94  523.32 95.94  427.38   95.94  331.44 127.92  235.50   MT        prn   Long Sit HS 30"x3 w/strap 30"x3 w/ strap 30"x3 w/ strap 30"x3 w/ strap 30"x3 w/ strap 30"x3  w/strap  3x30" supine B   Gastroc Str. 3x30" fitter    3x30" fitter      Bike 5' Stepper  L1  5'   NT 5' 5' 5'     QS        -   SAQ        -   SLR 2# 2x10  1.5#   ^ next ? 2x15 B 1.5# 2x15 B 1.5# 2x15 B 1.5# 2x15 B 1.5# 3x10 B 1.5# 3x10 B 1.5# 2x10 B   SL Abd -   NT 1.5# 2x15 B pillow 1.5#   2x12 B  Pillow** 1.5#   2x10 B  Pillow**    Heel Slides           Kacey Hip Add 5"x25 5"x25 5'x20 5'x20 5"x20 5"x15 5"x10    LAQs 2# 2x10 B 1.5# 2x15 B  1.5# 2x15 B 1.5# 2x15 B 1.5# 2x15 1.5# 2x15 1.5# 2x10    Seated Hip Flex           Cybex   Leg Press - Pt declined Defer due to lumbar pain 4.0   2x15 DL    2.0 2x15  Unilateral 3.5   2x15 DL    1.5 " "2x12  Unilateral 3.5   2x15 DL    1.5 2x12  Unilateral 3.5  2x15 DL    1.5 2x12  Unilateral 3.5 2x10 DL    1.5 2x10 SL   TKE cybex 3.0 2x10 3" hold B Cybex 3.0  2x12  2" hold B Cybex 3.0  2x10 2" hold B Cybex 3.0  2x10 2" hold B PTC 2x15, 3" hold B PTC 2x10  3" hold B PTC 2x10  3" hold B PTC 2x10 , 3" hold B   Hip Abd cybex 1.5 2x10 B cybex 1.0 2x12 B cybex 1.0 2x10 B cybex 1.0 2x10 B RTC 2x12 B RTC 2x12 B RTC 2x10 B RTC  2x10 B   Hip Flex cybex 1.5 2x10 B cybex 1.0 2x15 B cybex 1.0 2x10 B cybex 1.0 2x10 B RTC 2x12 B RTC 2x12 B RTC 2x10 B RTC   2x10 B   Hip Ext cybex 1.5 2x10 B cybex 1.0 2x12 B cybex 1.0 2x10 B cybex 1.0 2x10 B RTC 2x12 B RTC 2x12 B RTC 2x10 B RTC   2x10 B   Wall squats  W/green PB 2x15 w/ Green Physioball  2x10 w/ Green Physioball  2x10   - 10+5  10 + 5 1x10    Lunges FWD  Blue step  2x10 // B Blue step  2x10 // B        Knee Ext 15# 2x10           Step Ups Blue 2x15 B Blue 2x12 B Blue 2x10 B Blue 2x10 B Blue 2x10 B Blue  2x10  B Blue  2x10  B 2x10 blue B   Step Downs L1 2x15 B L1 2x10 B L1 2x10 B  L1 2x10 B L1 2x10 B L1 2x10 B 2x10 L1 B   Gait       --    CP       --    Initials CK CS 3/6 JA 2/6 JA 1/6 CK CS 1/6 DH 1/6 CK       Range of Motion/Strength:      Hip Right   Left   Pain/Dysfunction with Movement     AROM MMT AROM MMT     Flexion WFL 5/5 WFL 5/5     Extension NT NT NT NT Able to perform good bridge against gravity    Abduction WFL 5/5 WFL 5/5        Knee   Right     Left   Pain/Dysfunction with Movement     AROM PROM MMT AROM PROM MMT     Flexion 140 NT 5/5 140 NT 5/5     Extension 1 NT 4+/5 1 NT 4+/5           Ankle Right   Left   Pain/Dysfunction with Movement     AROM MMT AROM MMT     Plantarflexion WFL 4+/5 WFL 4+/5     Dorsiflexion WFL 5/5 WFL 5/5           Assessment:     Pt was able to tolerate progression of therex noted per log above. Pt demo improved (B) knee ROM and improved (B) hip strength compared to measurements taken on initial evaluation. Pt tolerated all therex without " any c/o increased pain in (B) knees.     Patient Education/Response:     Continue with HEP    Plans and Goals:     Continue per POC, progress as tolerated, focus on quad strengthening therex.    Short Term Goals = Long Term Goals ( 8 weeks) : 3/19/18  1. Pt will be independent with HEP  2. Pt will improve (B) LE strength to 5/5 on MMT where deficits noted in order to improve functional mobility  3. Pt will improve FOTO knee survey score to </= 25% limited in order to demo improved functional mobility  4. Pt will be able to perform at least 10 squats with proper technique without any VC/TC from therapist in order for pt to be able to safely lower herself to  low objects  5. Pt will report <4/10 pain in (B) knees when performing ADLs in order to be able to perform ADLs with less difficulty

## 2018-02-21 RX ORDER — METOPROLOL SUCCINATE 200 MG/1
200 TABLET, EXTENDED RELEASE ORAL DAILY
Qty: 90 TABLET | Refills: 3 | Status: SHIPPED | OUTPATIENT
Start: 2018-02-21 | End: 2018-10-23 | Stop reason: SDUPTHER

## 2018-02-23 ENCOUNTER — CLINICAL SUPPORT (OUTPATIENT)
Dept: REHABILITATION | Facility: HOSPITAL | Age: 73
End: 2018-02-23
Payer: MEDICARE

## 2018-02-23 DIAGNOSIS — M25.562 CHRONIC PAIN OF BOTH KNEES: ICD-10-CM

## 2018-02-23 DIAGNOSIS — R29.898 WEAKNESS OF BOTH LOWER EXTREMITIES: ICD-10-CM

## 2018-02-23 DIAGNOSIS — M25.561 CHRONIC PAIN OF BOTH KNEES: ICD-10-CM

## 2018-02-23 DIAGNOSIS — G89.29 CHRONIC PAIN OF BOTH KNEES: ICD-10-CM

## 2018-02-23 PROCEDURE — G8979 MOBILITY GOAL STATUS: HCPCS | Mod: CJ,PN

## 2018-02-23 PROCEDURE — 97110 THERAPEUTIC EXERCISES: CPT | Mod: PN

## 2018-02-23 PROCEDURE — G8978 MOBILITY CURRENT STATUS: HCPCS | Mod: CK,PN

## 2018-02-23 NOTE — PROGRESS NOTES
"TIME RECORD    Date: 2/23/2018      Start Time:  9:00 am  Stop Time:  10:00  PROCEDURES:    TIMED  Procedure Min.       TE 55'                 UNTIMED  Procedure Min.   Bike 5  NC         Total Timed Minutes:  55'   Total Timed Units:  4  Total Untimed Units:  0  Charges Billed/# of units:  4 TE      Progress/Current Status    Subjective:     Patient ID: Grace Navarro is a 72 y.o. female.  Diagnosis:   1. Chronic pain of both knees     2. Weakness of both lower extremities       Pain:  Patient reports knees are a little achy this morning.      Objective:     Pt initiated therapy session on stationary bike x 5' supervised. Pt participated in therex per log below 1:1 with PTA x 55 min    Date  2/23/18 2/20/18 2/16/18 2/8/18 2/6/18 2/2/18 1/30/18 1/26/18 1/23/18   VISIT 10 9 8 7 6 5 4 3 2   Gcode done 9/10 8/10 7/10 6/10 5/10 4/10 3/10 2/10   FOTO done 9/10 8/10 7/10 DONE 6/10 5/5 4/6 3/5 2/5   Cap Visit   Cap Total 127.92  1066.98 127.92  939.06 95.94  811.14 95.94  715.20 95.94  619.26 95.94  523.32 95.94  427.38   95.94  331.44 127.92  235.50   MT         prn   Long Sit HS 30"x3 w/strap 30"x3 w/strap 30"x3 w/ strap 30"x3 w/ strap 30"x3 w/ strap 30"x3 w/ strap 30"x3  w/strap  3x30" supine B   Gastroc Str. 30"x3 fitter 3x30" fitter    3x30" fitter      Bike 5' 5' Stepper  L1  5'   NT 5' 5' 5'     QS         -   SAQ         -   SLR 2# 2x12 2# 2x10  1.5#   ^ next ? 2x15 B 1.5# 2x15 B 1.5# 2x15 B 1.5# 2x15 B 1.5# 3x10 B 1.5# 3x10 B 1.5# 2x10 B   SL Abd  -   NT 1.5# 2x15 B pillow 1.5#   2x12 B  Pillow** 1.5#   2x10 B  Pillow**    Heel Slides            Kacey Hip Add 5"x25 5"x25 5"x25 5'x20 5'x20 5"x20 5"x15 5"x10    LAQs 2# 2x12 B 2# 2x10 B 1.5# 2x15 B  1.5# 2x15 B 1.5# 2x15 B 1.5# 2x15 1.5# 2x15 1.5# 2x10    Seated Hip Flex -           Cybex   Leg Press - - Pt declined Defer due to lumbar pain 4.0   2x15 DL    2.0 2x15  Unilateral 3.5   2x15 DL    1.5 2x12  Unilateral 3.5   2x15 DL    1.5 2x12  Unilateral " "3.5  2x15 DL    1.5 2x12  Unilateral 3.5 2x10 DL    1.5 2x10 SL   TKE cybex 3.0 2x12   3" hold B cybex 3.0 2x10 3" hold B Cybex 3.0  2x12  2" hold B Cybex 3.0  2x10 2" hold B Cybex 3.0  2x10 2" hold B PTC 2x15, 3" hold B PTC 2x10  3" hold B PTC 2x10  3" hold B PTC 2x10 , 3" hold B   Hip Abd cybex 1.5 2x10 B cybex 1.5 2x10 B cybex 1.0 2x12 B cybex 1.0 2x10 B cybex 1.0 2x10 B RTC 2x12 B RTC 2x12 B RTC 2x10 B RTC  2x10 B   Hip Flex cybex 1.5 2x10 B cybex 1.5 2x10 B cybex 1.0 2x15 B cybex 1.0 2x10 B cybex 1.0 2x10 B RTC 2x12 B RTC 2x12 B RTC 2x10 B RTC   2x10 B   Hip Ext cybex 1.5 2x10 B cybex 1.5 2x10 B cybex 1.0 2x12 B cybex 1.0 2x10 B cybex 1.0 2x10 B RTC 2x12 B RTC 2x12 B RTC 2x10 B RTC   2x10 B   Wall squats  W/green PB 2x15 W/green PB 2x15 w/ Green Physioball  2x10 w/ Green Physioball  2x10   - 10+5  10 + 5 1x10    Lunges FWD   Blue step  2x10 // B Blue step  2x10 // B        Knee Ext 15# 2x10 15# 2x10           Step Ups Blue 2x15 B Blue 2x15 B Blue 2x12 B Blue 2x10 B Blue 2x10 B Blue 2x10 B Blue  2x10  B Blue  2x10  B 2x10 blue B   Step Downs L1 2x15 B L1 2x15 B L1 2x10 B L1 2x10 B  L1 2x10 B L1 2x10 B L1 2x10 B 2x10 L1 B   Gait        --    CP        --    Initials CS 1/6 CK CS 3/6 JA 2/6 JA 1/6 CK CS 1/6 DH 1/6 CK         Assessment:     Patient reported her knees felt better after bike.  fIncreased reps where noted without difficulty.    Patient Education/Response:     Continue with HEP    Plans and Goals:     Continue per POC, progress as tolerated, focus on quad strengthening therex.    Short Term Goals = Long Term Goals ( 8 weeks) : 3/19/18  1. Pt will be independent with HEP  2. Pt will improve (B) LE strength to 5/5 on MMT where deficits noted in order to improve functional mobility  3. Pt will improve FOTO knee survey score to </= 25% limited in order to demo improved functional mobility  4. Pt will be able to perform at least 10 squats with proper technique without any VC/TC from therapist in order for " pt to be able to safely lower herself to  low objects  5. Pt will report <4/10 pain in (B) knees when performing ADLs in order to be able to perform ADLs with less difficulty

## 2018-02-23 NOTE — PROGRESS NOTES
Functional Limitation reporting via FOTO knee survey    FOTO knee survey initial evaluation: 43% limited  FOTO knee survey 2/23/18: 50% limited - not consistent with reports of subjective improvement  FOTO knee survey goal: <25% limited - not met

## 2018-02-27 ENCOUNTER — CLINICAL SUPPORT (OUTPATIENT)
Dept: REHABILITATION | Facility: HOSPITAL | Age: 73
End: 2018-02-27
Payer: MEDICARE

## 2018-02-27 ENCOUNTER — PATIENT OUTREACH (OUTPATIENT)
Dept: OTHER | Facility: OTHER | Age: 73
End: 2018-02-27

## 2018-02-27 ENCOUNTER — DOCUMENTATION ONLY (OUTPATIENT)
Dept: REHABILITATION | Facility: HOSPITAL | Age: 73
End: 2018-02-27

## 2018-02-27 DIAGNOSIS — M25.561 CHRONIC PAIN OF BOTH KNEES: ICD-10-CM

## 2018-02-27 DIAGNOSIS — M25.562 CHRONIC PAIN OF BOTH KNEES: ICD-10-CM

## 2018-02-27 DIAGNOSIS — G89.29 CHRONIC PAIN OF BOTH KNEES: ICD-10-CM

## 2018-02-27 DIAGNOSIS — R29.898 WEAKNESS OF BOTH LOWER EXTREMITIES: ICD-10-CM

## 2018-02-27 PROCEDURE — 97110 THERAPEUTIC EXERCISES: CPT | Mod: PN

## 2018-02-27 NOTE — PROGRESS NOTES
Face to Face PTA Conference performed with Steph Marsh PTA, Kim Casiano PTA, Marlo Shepard PTA, Shan Todd PTA regarding patient's current status, overall progress, and plan of care.    Antonina Wolfe, PT    Face to face meeting completed with Antonina Wolfe PT regarding current status and progress of   Grace Acourt Lurati .  Kim Casiano PTA     Face to face meeting completed with Antonina Wolfe PT regarding current status and progress of   Grace Acourt Lurati .  Shan Todd, PTA    Face to face meeting completed with Antonina Wolfe PT regarding current status and progress of   Grace Acourt Lurati .  Marlo Shepard PTA

## 2018-02-27 NOTE — PROGRESS NOTES
"TIME RECORD    Date: 2/27/2018      Start Time:  1100  Stop Time:  1200  PROCEDURES:    TIMED  Procedure Min.       TE 55'                 UNTIMED  Procedure Min.   Bike 5  NC         Total Timed Minutes:  55'   Total Timed Units:  4  Total Untimed Units:  0  Charges Billed/# of units:  4 TE      Progress/Current Status    Subjective:     Patient ID: Grace Navarro is a 72 y.o. female.  Diagnosis:   1. Chronic pain of both knees     2. Weakness of both lower extremities       Pain:  Patient reports 4-5/10 pain B knees today secondary to arthritis.      Objective:     Pt initiated therapy session on stepper at L 2 x 5' supervised. Pt participated in therex per log below 1:1 with PTA x 55 min   ** Patient's knees shift medially with step ups and downs, so placed GTB around thighs just above knees with cues to apply pressure laterally against theraband to keep knees in alignment requiring verbal cues.**     Date  2/27/18 2/23/18 2/20/18 2/16/18 2/8/18 2/6/18 2/2/18 1/30/18 1/26/18 1/23/18   VISIT 11 10 9 8 7 6 5 4 3 2   Gcode 2/10 done 9/10 8/10 7/10 6/10 5/10 4/10 3/10 2/10   FOTO  done 9/10 8/10 7/10 DONE 6/10 5/5 4/6 3/5 2/5   Cap Visit   Cap Total 121.28  1188.26 127.92  1066.98 127.92  939.06 95.94  811.14 95.94  715.20 95.94  619.26 95.94  523.32 95.94  427.38   95.94  331.44 127.92  235.50   MT          prn   Long Sit HS 30"x3 w/strap 30"x3 w/strap 30"x3 w/strap 30"x3 w/ strap 30"x3 w/ strap 30"x3 w/ strap 30"x3 w/ strap 30"x3  w/strap  3x30" supine B   Gastroc Str. 30"x3 fitter 30"x3 fitter 3x30" fitter    3x30" fitter      Bike Stepper  L2  5' 5' 5' Stepper  L1  5'   NT 5' 5' 5'     QS          -   SAQ          -   SLR 2# 2x12 2# 2x12 2# 2x10  1.5#   ^ next ? 2x15 B 1.5# 2x15 B 1.5# 2x15 B 1.5# 2x15 B 1.5# 3x10 B 1.5# 3x10 B 1.5# 2x10 B   SL Abd   -   NT 1.5# 2x15 B pillow 1.5#   2x12 B  Pillow** 1.5#   2x10 B  Pillow**    Heel Slides             Kacey Hip Add 5"x25 5"x25 5"x25 5"x25 5'x20 5'x20 5"x20 " "5"x15 5"x10    LAQs 2# 2x12 B 2# 2x12 B 2# 2x10 B 1.5# 2x15 B  1.5# 2x15 B 1.5# 2x15 B 1.5# 2x15 1.5# 2x15 1.5# 2x10    Seated Hip Flex  -           Cybex   Leg Press  - - Pt declined Defer due to lumbar pain 4.0   2x15 DL    2.0 2x15  Unilateral 3.5   2x15 DL    1.5 2x12  Unilateral 3.5   2x15 DL    1.5 2x12  Unilateral 3.5  2x15 DL    1.5 2x12  Unilateral 3.5 2x10 DL    1.5 2x10 SL   TKE NT cybex 3.0 2x12   3" hold B cybex 3.0 2x10 3" hold B Cybex 3.0  2x12  2" hold B Cybex 3.0  2x10 2" hold B Cybex 3.0  2x10 2" hold B PTC 2x15, 3" hold B PTC 2x10  3" hold B PTC 2x10  3" hold B PTC 2x10 , 3" hold B   Hip Abd GTB  2x12 cybex 1.5 2x10 B cybex 1.5 2x10 B cybex 1.0 2x12 B cybex 1.0 2x10 B cybex 1.0 2x10 B RTC 2x12 B RTC 2x12 B RTC 2x10 B RTC  2x10 B   Hip Flex GTB  2x12 cybex 1.5 2x10 B cybex 1.5 2x10 B cybex 1.0 2x15 B cybex 1.0 2x10 B cybex 1.0 2x10 B RTC 2x12 B RTC 2x12 B RTC 2x10 B RTC   2x10 B   Hip Ext GTB  2x12 cybex 1.5 2x10 B cybex 1.5 2x10 B cybex 1.0 2x12 B cybex 1.0 2x10 B cybex 1.0 2x10 B RTC 2x12 B RTC 2x12 B RTC 2x10 B RTC   2x10 B   Wall squats  W/green PB 2x15 W/green PB 2x15 W/green PB 2x15 w/ Green Physioball  2x10 w/ Green Physioball  2x10   - 10+5  10 + 5 1x10    Lunges FWD 1x10  note   Blue step  2x10 // B Blue step  2x10 // B        Knee Ext 20# 2x10 15# 2x10 15# 2x10           Step Ups Blue 2x15 B  GTB** Blue 2x15 B Blue 2x15 B Blue 2x12 B Blue 2x10 B Blue 2x10 B Blue 2x10 B Blue  2x10  B Blue  2x10  B 2x10 blue B   Step Downs L1 2x15  GTB ** L1 2x15 B L1 2x15 B L1 2x10 B L1 2x10 B  L1 2x10 B L1 2x10 B L1 2x10 B 2x10 L1 B   Gait         --    CP         --    Initials CS  1/6 CS 1/6 CK CS 3/6 JA 2/6 JA 1/6 CK CS 1/6 DH 1/6 CK         Assessment:     Patient able to maintain better alignment with knees while performing step ups and downs using theraband around thighs and with verbal cues.  Patient Education/Response:     Continue with HEP    Plans and Goals:     Continue per POC, progress as " tolerated, focus on quad strengthening therex.    Short Term Goals = Long Term Goals ( 8 weeks) : 3/19/18  1. Pt will be independent with HEP  2. Pt will improve (B) LE strength to 5/5 on MMT where deficits noted in order to improve functional mobility  3. Pt will improve FOTO knee survey score to </= 25% limited in order to demo improved functional mobility  4. Pt will be able to perform at least 10 squats with proper technique without any VC/TC from therapist in order for pt to be able to safely lower herself to  low objects  5. Pt will report <4/10 pain in (B) knees when performing ADLs in order to be able to perform ADLs with less difficulty

## 2018-02-27 NOTE — PROGRESS NOTES
"Ms. Navarro continues to feel well. She went to a [employer] [place] this past [place] and noticed systolic BP was 162mmHg. She did not submit to EMR. She will no longer go [place] [employer]. She was amazed of the impact of the sodium. Once again, reviewed BP guidelines and patient states BP is "ok" for her. Refuses medication changes. Will try to increase exercise intensity. I recommended she walk on slight incline to improve aerobic capacity.     Last 5 Patient Entered Readings                                      Current 30 Day Average: 134/74     Recent Readings 2/21/2018 2/19/2018 2/14/2018 2/10/2018 2/10/2018    SBP (mmHg) 133 139 123 134 132    DBP (mmHg) 66 79 67 76 76    Pulse 62 62 65 57 60          BP above goal, <130/80 mmHg  Continue monitoring      Hypertension Medications             hydroCHLOROthiazide (HYDRODIURIL) 12.5 MG Tab Take 12.5 mg by mouth once daily.    metoprolol succinate (TOPROL-XL) 200 MG 24 hr tablet Take 1 tablet (200 mg total) by mouth once daily.          "

## 2018-03-02 ENCOUNTER — CLINICAL SUPPORT (OUTPATIENT)
Dept: REHABILITATION | Facility: HOSPITAL | Age: 73
End: 2018-03-02
Payer: MEDICARE

## 2018-03-02 DIAGNOSIS — M25.561 CHRONIC PAIN OF BOTH KNEES: ICD-10-CM

## 2018-03-02 DIAGNOSIS — R29.898 WEAKNESS OF BOTH LOWER EXTREMITIES: ICD-10-CM

## 2018-03-02 DIAGNOSIS — G89.29 CHRONIC PAIN OF BOTH KNEES: ICD-10-CM

## 2018-03-02 DIAGNOSIS — M25.562 CHRONIC PAIN OF BOTH KNEES: ICD-10-CM

## 2018-03-02 PROCEDURE — 97110 THERAPEUTIC EXERCISES: CPT | Mod: PN

## 2018-03-02 NOTE — PROGRESS NOTES
"TIME RECORD    Date: 3/2/2018      Start Time:  9:00  Stop Time:  10:00  PROCEDURES:    TIMED  Procedure Min.       TE 25                 UNTIMED  Procedure Min.   Bike 5  NC   Supervised TE 30     Total Timed Minutes:  25'  Total Timed Units:  2  Total Untimed Units:  0  Charges Billed/# of units:  2 TE      Progress/Current Status    Subjective:     Patient ID: Grace Navarro is a 72 y.o. female.  Diagnosis:   1. Chronic pain of both knees     2. Weakness of both lower extremities       Pain:0/10  Pt reports no knee pain today and that she thinks she needs to stretch the front of her leg as well.    Objective:     Pt initiated therapy session on stepper at L 2 x 5' supervised. Pt participated in therex per log below x 25 mins 1:1 with PT and then supervised x 30 mins per log below. PT added on Yves stretch for quad since pt unable to lay prone or perform SL stork stretch.   ** Patient's knees shift medially with step ups and downs, so placed GTB around thighs just above knees with cues to apply pressure laterally against theraband to keep knees in alignment requiring verbal cues.**     Date  3/2/18 2/27/18 2/23/18 2/20/18 2/16/18 2/8/18 2/6/18 2/2/18 1/30/18 1/26/18 1/23/18   VISIT 12 11 10 9 8 7 6 5 4 3 2   Gcode 3/10 2/10 done 9/10 8/10 7/10 6/10 5/10 4/10 3/10 2/10   FOTO   done 9/10 8/10 7/10 DONE 6/10 5/5 4/6 3/5 2/5   Cap Visit   Cap Total 60.64  1248.90 121.28  1188.26 127.92  1066.98 127.92  939.06 95.94  811.14 95.94  715.20 95.94  619.26 95.94  523.32 95.94  427.38   95.94  331.44 127.92  235.50   MT           prn   Long Sit HS 3x30'' B w/ strap 30"x3 w/strap 30"x3 w/strap 30"x3 w/strap 30"x3 w/ strap 30"x3 w/ strap 30"x3 w/ strap 30"x3 w/ strap 30"x3  w/strap  3x30" supine B   Gastroc Str.  30"x3 fitter 30"x3 fitter 3x30" fitter    3x30" fitter      Quad stretch via yves stretch 2x30'' B             Bike Stepper  L2 5' Stepper  L2  5' 5' 5' Stepper  L1  5'   NT 5' 5' 5'     QS           - " "  SAQ           -   SLR 2# 2x15 B 2# 2x12 2# 2x12 2# 2x10  1.5#   ^ next ? 2x15 B 1.5# 2x15 B 1.5# 2x15 B 1.5# 2x15 B 1.5# 3x10 B 1.5# 3x10 B 1.5# 2x10 B   SL Abd --   -   NT 1.5# 2x15 B pillow 1.5#   2x12 B  Pillow** 1.5#   2x10 B  Pillow**    Heel Slides              Kacey Hip Add 5''x20 5"x25 5"x25 5"x25 5"x25 5'x20 5'x20 5"x20 5"x15 5"x10    LAQs 2# 2x15 B 2# 2x12 B 2# 2x12 B 2# 2x10 B 1.5# 2x15 B  1.5# 2x15 B 1.5# 2x15 B 1.5# 2x15 1.5# 2x15 1.5# 2x10    Seated Hip Flex --  -           Cybex   Leg Press --  - - Pt declined Defer due to lumbar pain 4.0   2x15 DL    2.0 2x15  Unilateral 3.5   2x15 DL    1.5 2x12  Unilateral 3.5   2x15 DL    1.5 2x12  Unilateral 3.5  2x15 DL    1.5 2x12  Unilateral 3.5 2x10 DL    1.5 2x10 SL   TKE cybex 3.0 2x10   3" hold B NT cybex 3.0 2x12   3" hold B cybex 3.0 2x10 3" hold B Cybex 3.0  2x12  2" hold B Cybex 3.0  2x10 2" hold B Cybex 3.0  2x10 2" hold B PTC 2x15, 3" hold B PTC 2x10  3" hold B PTC 2x10  3" hold B PTC 2x10 , 3" hold B   Hip Abd GTB  2x12  ^ next GTB  2x12 cybex 1.5 2x10 B cybex 1.5 2x10 B cybex 1.0 2x12 B cybex 1.0 2x10 B cybex 1.0 2x10 B RTC 2x12 B RTC 2x12 B RTC 2x10 B RTC  2x10 B   Hip Flex GTB  2x12  ^ next GTB  2x12 cybex 1.5 2x10 B cybex 1.5 2x10 B cybex 1.0 2x15 B cybex 1.0 2x10 B cybex 1.0 2x10 B RTC 2x12 B RTC 2x12 B RTC 2x10 B RTC   2x10 B   Hip Ext BTB  2x15 GTB  2x12 cybex 1.5 2x10 B cybex 1.5 2x10 B cybex 1.0 2x12 B cybex 1.0 2x10 B cybex 1.0 2x10 B RTC 2x12 B RTC 2x12 B RTC 2x10 B RTC   2x10 B   Wall squats  W/green PB 2x15 W/green PB 2x15 W/green PB 2x15 W/green PB 2x15 w/ Green Physioball  2x10 w/ Green Physioball  2x10   - 10+5  10 + 5 1x10    Lunges FWD NT 1x10  note   Blue step  2x10 // B Blue step  2x10 // B        Knee Ext  20# 2x10 15# 2x10 15# 2x10           Step Ups Blue 2x15 B  GTB** Blue 2x15 B  GTB** Blue 2x15 B Blue 2x15 B Blue 2x12 B Blue 2x10 B Blue 2x10 B Blue 2x10 B Blue  2x10  B Blue  2x10  B 2x10 blue B   Step Downs L1 2x15  GTB ** " L1 2x15  GTB ** L1 2x15 B L1 2x15 B L1 2x10 B L1 2x10 B  L1 2x10 B L1 2x10 B L1 2x10 B 2x10 L1 B   Gait --         --    CP --         --    Initials GENIE CS  1/6 CS 1/6 CK CS 3/6 JA 2/6 JA 1/6 CK CS 1/6 DH 1/6 CK         Assessment:     Pt did not tolerate quad stretch via lori stretch well, and reported pain at anterior thighs; exercise discontinued and discharged. Pt compliant and completed all other TE as noted above. Pt reported increased L quad pain after treatment.    Patient Education/Response:     Continue with HEP    Plans and Goals:     Continue per POC, progress as tolerated, focus on quad strengthening therex.    Short Term Goals = Long Term Goals ( 8 weeks) : 3/19/18  1. Pt will be independent with HEP  2. Pt will improve (B) LE strength to 5/5 on MMT where deficits noted in order to improve functional mobility  3. Pt will improve FOTO knee survey score to </= 25% limited in order to demo improved functional mobility  4. Pt will be able to perform at least 10 squats with proper technique without any VC/TC from therapist in order for pt to be able to safely lower herself to  low objects  5. Pt will report <4/10 pain in (B) knees when performing ADLs in order to be able to perform ADLs with less difficulty

## 2018-03-06 ENCOUNTER — CLINICAL SUPPORT (OUTPATIENT)
Dept: REHABILITATION | Facility: HOSPITAL | Age: 73
End: 2018-03-06
Payer: MEDICARE

## 2018-03-06 ENCOUNTER — PATIENT OUTREACH (OUTPATIENT)
Dept: OTHER | Facility: OTHER | Age: 73
End: 2018-03-06

## 2018-03-06 DIAGNOSIS — G89.29 CHRONIC PAIN OF BOTH KNEES: ICD-10-CM

## 2018-03-06 DIAGNOSIS — R29.898 WEAKNESS OF BOTH LOWER EXTREMITIES: ICD-10-CM

## 2018-03-06 DIAGNOSIS — M25.562 CHRONIC PAIN OF BOTH KNEES: ICD-10-CM

## 2018-03-06 DIAGNOSIS — M25.561 CHRONIC PAIN OF BOTH KNEES: ICD-10-CM

## 2018-03-06 PROCEDURE — 97110 THERAPEUTIC EXERCISES: CPT | Mod: PN

## 2018-03-06 RX ORDER — HYDROCHLOROTHIAZIDE 25 MG/1
25 TABLET ORAL DAILY
COMMUNITY
End: 2018-03-20 | Stop reason: SDUPTHER

## 2018-03-06 NOTE — PROGRESS NOTES
"TIME RECORD    Date: 3/6/2018      Start Time:  1105   Stop Time:  1200  PROCEDURES:    TIMED  Procedure Min.   TE 45                     UNTIMED  Procedure Min.             Total Timed Minutes: 45  Total Timed Units: 3  Total Untimed Units:  0  Charges Billed/# of units:  3 TE      Progress/Current Status    Subjective:     Patient ID: Grace Navarro is a 72 y.o. female.  Diagnosis:   1. Chronic pain of both knees     2. Weakness of both lower extremities       Pain:  Patient reports L quad pain.  Objective:   Patient performed therex as outlined per log x 35 minutes 1:1 with PTA, with supervision x 10 minutes and completed session x 10 minute 1:1 with PTA.      ** Patient's knees shift medially with step ups and downs, so placed GTB around thighs just above knees with cues to apply pressure laterally against theraband to keep knees in alignment requiring verbal cues.**     Date  3/6/18 3/2/18 2/27/18 2/23/18 2/20/18 2/16/18 2/8/18 2/6/18 2/2/18 1/30/18 1/26/18 1/23/18   VISIT 13 12 11 10 9 8 7 6 5 4 3 2   Gcode 4/10 3/10 2/10 done 9/10 8/10 7/10 6/10 5/10 4/10 3/10 2/10   FOTO    done 9/10 8/10 7/10 DONE 6/10 5/5 4/6 3/5 2/5   Cap Visit   Cap Total 90.96  1339.86 60.64  1248.90 121.28  1188.26 127.92  1066.98 127.92  939.06 95.94  811.14 95.94  715.20 95.94  619.26 95.94  523.32 95.94  427.38   95.94  331.44 127.92  235.50   MT            prn   Long Sit HS 3x30'' B w/ strap 3x30'' B w/ strap 30"x3 w/strap 30"x3 w/strap 30"x3 w/strap 30"x3 w/ strap 30"x3 w/ strap 30"x3 w/ strap 30"x3 w/ strap 30"x3  w/strap  3x30" supine B   Gastroc Str. 30"x3 fitter  30"x3 fitter 30"x3 fitter 3x30" fitter    3x30" fitter      Quad stretch via lori stretch 2x30'' B  R w/strap 2x30'' B             Bike Stepper  L2  7' Stepper  L2 5' Stepper  L2  5' 5' 5' Stepper  L1  5'   NT 5' 5' 5'     QS            -   SAQ            -   SLR 2# 2x15 B 2# 2x15 B 2# 2x12 2# 2x12 2# 2x10  1.5#   ^ next ? 2x15 B 1.5# 2x15 B 1.5# 2x15 B 1.5# " "2x15 B 1.5# 3x10 B 1.5# 3x10 B 1.5# 2x10 B   SL Abd -- --   -   NT 1.5# 2x15 B pillow 1.5#   2x12 B  Pillow** 1.5#   2x10 B  Pillow**    Heel Slides               Kacey Hip Add 5''x20 5''x20 5"x25 5"x25 5"x25 5"x25 5'x20 5'x20 5"x20 5"x15 5"x10    LAQs 2# 2x15 B 2# 2x15 B 2# 2x12 B 2# 2x12 B 2# 2x10 B 1.5# 2x15 B  1.5# 2x15 B 1.5# 2x15 B 1.5# 2x15 1.5# 2x15 1.5# 2x10    Seated Hip Flex -- --  -           Cybex   Leg Press -- --  - - Pt declined Defer due to lumbar pain 4.0   2x15 DL    2.0 2x15  Unilateral 3.5   2x15 DL    1.5 2x12  Unilateral 3.5   2x15 DL    1.5 2x12  Unilateral 3.5  2x15 DL    1.5 2x12  Unilateral 3.5 2x10 DL    1.5 2x10 SL   TKE cybex 3.0 2x12   3" hold B cybex 3.0 2x10   3" hold B NT cybex 3.0 2x12   3" hold B cybex 3.0 2x10 3" hold B Cybex 3.0  2x12  2" hold B Cybex 3.0  2x10 2" hold B Cybex 3.0  2x10 2" hold B PTC 2x15, 3" hold B PTC 2x10  3" hold B PTC 2x10  3" hold B PTC 2x10 , 3" hold B   Hip Abd GTB  2x12 GTB  2x12  ^ next GTB  2x12 cybex 1.5 2x10 B cybex 1.5 2x10 B cybex 1.0 2x12 B cybex 1.0 2x10 B cybex 1.0 2x10 B RTC 2x12 B RTC 2x12 B RTC 2x10 B RTC  2x10 B   Hip Flex GTB  2x15 GTB  2x12  ^ next GTB  2x12 cybex 1.5 2x10 B cybex 1.5 2x10 B cybex 1.0 2x15 B cybex 1.0 2x10 B cybex 1.0 2x10 B RTC 2x12 B RTC 2x12 B RTC 2x10 B RTC   2x10 B   Hip Ext GTB  2x12 BTB  2x15 GTB  2x12 cybex 1.5 2x10 B cybex 1.5 2x10 B cybex 1.0 2x12 B cybex 1.0 2x10 B cybex 1.0 2x10 B RTC 2x12 B RTC 2x12 B RTC 2x10 B RTC   2x10 B   Wall squats  OOT W/green PB 2x15 W/green PB 2x15 W/green PB 2x15 W/green PB 2x15 w/ Green Physioball  2x10 w/ Green Physioball  2x10   - 10+5  10 + 5 1x10    Lunges FWD  NT 1x10  note   Blue step  2x10 // B Blue step  2x10 // B        Knee Ext 20# 2x10  20# 2x10 15# 2x10 15# 2x10           Step Ups Blue 2x15 B  GTB Blue 2x15 B  GTB** Blue 2x15 B  GTB** Blue 2x15 B Blue 2x15 B Blue 2x12 B Blue 2x10 B Blue 2x10 B Blue 2x10 B Blue  2x10  B Blue  2x10  B 2x10 blue B   Step Downs L1 2x15   " GTB L1 2x15  GTB ** L1 2x15  GTB ** L1 2x15 B L1 2x15 B L1 2x10 B L1 2x10 B  L1 2x10 B L1 2x10 B L1 2x10 B 2x10 L1 B   Gait  --         --    CP  --         --    Initials CS  1/6 GENIE CS  1/6 CS 1/6 CK CS 3/6 JA 2/6 JA 1/6 CK CS 1/6 DH 1/6 CK         Assessment:     Cues to keep knees in alignment with step ups and downs.    Patient Education/Response:     Continue with HEP    Plans and Goals:     Continue per POC, progress as tolerated, focus on quad strengthening therex.    Short Term Goals = Long Term Goals ( 8 weeks) : 3/19/18  1. Pt will be independent with HEP  2. Pt will improve (B) LE strength to 5/5 on MMT where deficits noted in order to improve functional mobility  3. Pt will improve FOTO knee survey score to </= 25% limited in order to demo improved functional mobility  4. Pt will be able to perform at least 10 squats with proper technique without any VC/TC from therapist in order for pt to be able to safely lower herself to  low objects  5. Pt will report <4/10 pain in (B) knees when performing ADLs in order to be able to perform ADLs with less difficulty

## 2018-03-06 NOTE — PROGRESS NOTES
Ms. Navarro called because she is concerned about elevated readings over the last few days. She has been dining out frequently, which is not normal for her. She is also concerned because she has a wedding coming up in a couple of weeks and she know she will eat and drink there. Patient has been weary of additional medication, but now wants to increase HCTZ dose.     Last 5 Patient Entered Readings                                      Current 30 Day Average: 137/76     Recent Readings 3/3/2018 3/3/2018 3/3/2018 3/3/2018 2/26/2018    SBP (mmHg) 134 153 157 168 162    DBP (mmHg) 75 80 83 85 90    Pulse 72 66 64 66 57          BP above goal, <130/80 mmHg  Increase HCTZ to 25mg daily  Continue monitoring  Schedule BMP at next encounter    Hypertension Medications             hydroCHLOROthiazide (HYDRODIURIL) 25 MG tablet Take 25 mg by mouth once daily.    metoprolol succinate (TOPROL-XL) 200 MG 24 hr tablet Take 1 tablet (200 mg total) by mouth once daily.

## 2018-03-13 ENCOUNTER — OFFICE VISIT (OUTPATIENT)
Dept: FAMILY MEDICINE | Facility: CLINIC | Age: 73
End: 2018-03-13
Payer: MEDICARE

## 2018-03-13 ENCOUNTER — HOSPITAL ENCOUNTER (OUTPATIENT)
Dept: RADIOLOGY | Facility: HOSPITAL | Age: 73
Discharge: HOME | End: 2018-03-13
Attending: FAMILY MEDICINE
Payer: MEDICARE

## 2018-03-13 VITALS
WEIGHT: 144.38 LBS | OXYGEN SATURATION: 98 % | TEMPERATURE: 98 F | HEIGHT: 64 IN | HEART RATE: 67 BPM | DIASTOLIC BLOOD PRESSURE: 80 MMHG | BODY MASS INDEX: 24.65 KG/M2 | SYSTOLIC BLOOD PRESSURE: 112 MMHG

## 2018-03-13 DIAGNOSIS — M25.561 ACUTE PAIN OF RIGHT KNEE: Primary | ICD-10-CM

## 2018-03-13 DIAGNOSIS — M25.561 ACUTE PAIN OF RIGHT KNEE: ICD-10-CM

## 2018-03-13 DIAGNOSIS — G57.01 PIRIFORMIS SYNDROME, RIGHT: ICD-10-CM

## 2018-03-13 PROCEDURE — 99999 PR PBB SHADOW E&M-EST. PATIENT-LVL IV: CPT | Mod: PBBFAC,,, | Performed by: FAMILY MEDICINE

## 2018-03-13 PROCEDURE — 99499 UNLISTED E&M SERVICE: CPT | Mod: S$GLB,,, | Performed by: FAMILY MEDICINE

## 2018-03-13 PROCEDURE — 3079F DIAST BP 80-89 MM HG: CPT | Mod: CPTII,S$GLB,, | Performed by: FAMILY MEDICINE

## 2018-03-13 PROCEDURE — 73564 X-RAY EXAM KNEE 4 OR MORE: CPT | Mod: TC,FY,RT

## 2018-03-13 PROCEDURE — 73564 X-RAY EXAM KNEE 4 OR MORE: CPT | Mod: 26,RT,, | Performed by: RADIOLOGY

## 2018-03-13 PROCEDURE — 99214 OFFICE O/P EST MOD 30 MIN: CPT | Mod: S$GLB,,, | Performed by: FAMILY MEDICINE

## 2018-03-13 PROCEDURE — 3074F SYST BP LT 130 MM HG: CPT | Mod: CPTII,S$GLB,, | Performed by: FAMILY MEDICINE

## 2018-03-13 RX ORDER — TIZANIDINE 2 MG/1
2 TABLET ORAL NIGHTLY PRN
Qty: 10 TABLET | Refills: 0 | Status: SHIPPED | OUTPATIENT
Start: 2018-03-13 | End: 2018-03-23

## 2018-03-13 NOTE — PROGRESS NOTES
Subjective:       Patient ID: Grace Navarro is a 72 y.o. female.    Chief Complaint: Knee Pain (Right )    Grace is a 72 y.o. female who presents today for an urgent care visit with acute on chronic knee pain.  She was previously seen for knee pain and sent to physical therapy.  Her current symptoms seem to start over the weekend when she was at a wedding in the [facility].  She was walking and heels on the street on Saturday night. While she was walking about one block, she loses all sensation in the right leg. This lasted for a few seconds. After that she was able to step on it, but since this she has had severe right knee pain. There is no swelling, fevers. She was still able to dance at the wedding. She is still moving normally. She tried ice packs and warm compresses and that did not help. She does have a brace but she thinks it makes the pain worse. She does take tylenol 650 mg up to 4 times a day and that doesn't help. She has had what she describes as constant pain.     The PT seems to be helping her chronic pain.     She is able to go up stairs, but with pain.       Knee Pain    The incident occurred 3 to 5 days ago. The incident occurred in the street. There was no injury mechanism. The pain is present in the right knee. The pain is at a severity of 7/10. The pain is moderate. The pain has been constant since onset. Associated symptoms include a loss of sensation and numbness. Pertinent negatives include no inability to bear weight, loss of motion, muscle weakness or tingling.     Review of Systems   Constitutional: Negative for chills and fever.   Gastrointestinal: Negative for diarrhea, nausea and vomiting.   Genitourinary: Negative for difficulty urinating and dyspareunia.   Musculoskeletal: Positive for joint swelling.   Skin: Negative for rash.   Neurological: Positive for numbness. Negative for tingling.       Objective:     Vitals:    03/13/18 1047   BP: 112/80   Pulse: 67   Temp:  97.8 °F (36.6 °C)        Physical Exam   Constitutional: She is oriented to person, place, and time. She appears well-developed and well-nourished.   HENT:   Head: Normocephalic and atraumatic.   Eyes: Conjunctivae and EOM are normal. Pupils are equal, round, and reactive to light.   Neck: Normal range of motion. Neck supple.   Cardiovascular: Normal rate and regular rhythm.    Pulmonary/Chest: Effort normal and breath sounds normal.   Musculoskeletal: She exhibits tenderness. She exhibits no edema.   Knee exam grossly normal BL  No edema noted  Negative thessaly sign BL  There is some crepitus noted with the left knee worse then the right    There is TTP at the piriformis muscle (right)  There is also mild TTP below the greater trochanter and along the IT band (right)   Neurological: She is alert and oriented to person, place, and time. No cranial nerve deficit or sensory deficit. She exhibits normal muscle tone.   Finger to nose intact  Heel to shin intact  Strength and sensation grossly intract BL UE/LE  CN 2-12 grossly intact  PERRLA  Rapid alternating movement intact  No midline spinal tenderness   Skin: Skin is warm. No rash noted.   Psychiatric: She has a normal mood and affect. Her behavior is normal. Judgment and thought content normal.   Nursing note and vitals reviewed.      Assessment:       1. Acute pain of right knee    2. Piriformis syndrome, right        Plan:       72-year-old female presents today with acute on chronic pain of her right knee.  She also states that she has some pain that seems to go down her leg.  Her knee exam was grossly normal.  There was no edema that was appreciated.  She did have a very tight and tender piriformis muscle.  She also had tenderness to palpation of the greater trochanter on the right. Patient reports being allergic to all NSAIDS; her PCP apparently told her to not take them because they cause her to have HTN.     Referral to ortho for knee pain. Continue tylenol.  Continue PT as tolerated. XR ordered  Tizanidine for possible component of piriformis syndrome.     Follow up with her PCP.     Acute pain of right knee  -     X-Ray Knee Complete 4 Or More Views Right; Future; Expected date: 03/13/2018  -     Ambulatory referral to Orthopedics    Piriformis syndrome, right  -     tiZANidine (ZANAFLEX) 2 MG tablet; Take 1 tablet (2 mg total) by mouth nightly as needed.  Dispense: 10 tablet; Refill: 0      Warning signs discussed, patient to call with any further issues or worsening of symptoms.     Parts of the above note were dictated using a voice dictation software. Please excuse any grammatical or typographical errors.

## 2018-03-13 NOTE — PATIENT INSTRUCTIONS
Knee Pain of Uncertain Cause    There are several common causes for knee pain. These can include:  · A sprain of the ligaments that support the joint  · An injury to the cartilage lining of the joint  · Arthritis from wear-and-tear or inflammation  There are other causes as well. There may also be swelling, reduced movement of the knee joint, and pain with walking. A definite diagnosis will still need to be made. If your symptoms do not improve, further follow-up and testing may be needed.  Home care  · Stay off the injured leg as much as possible until pain improves.  · Apply an ice pack over the injured area for 15 to 20 minutes every 3 to 6 hours. You should do this for the first 24 to 48 hours. You can make an ice pack by filling a plastic bag that seals at the top with ice cubes and then wrapping it with a thin towel. Continue to use ice packs for relief of pain and swelling as needed. As the ice melts, be careful to avoid getting your wrap, splint, or cast wet. After 48 hours, apply heat (warm shower or warm bath) for 15 to 20 minutes several times a day, or alternate ice and heat. If you have to wear a hook-and-loop knee brace, you can open it to apply the ice pack, or heat, directly to the knee. Never put ice directly on the skin. Always wrap the ice in a towel or other type of cloth.  · You may use over-the-counter pain medicine to control pain, unless another pain medicine was prescribed. If you have chronic liver or kidney disease or ever had a stomach ulcer or GI bleeding, talk with your healthcare provider using these medicines.  · If crutches or a walker have been recommended, do not put weight on the injured leg until you can do so without pain. Check with your healthcare provider before returning to sports or full work duties.  · If you have a hook-and-loop knee brace, you can remove it to bathe and sleep, unless told otherwise.  Follow-up care  Follow up with your healthcare provider as advised.  This is usually within 1-2 weeks.  If X-rays were taken, you will be told of any new findings that may affect your care.  Call 911  Call 911 if you have:  · Shortness of breath  · Chest pain  When to seek medical advice  Call your healthcare provider right away if any of these occur:  · Toes or foot becomes swollen, cold, blue, numb, or tingly  · Pain or swelling spreads over the knee or calf  · Warmth or redness appears over the knee or calf  · Other joints become painful  · Rash appears  · Fever of 100.4°F (38°C) or above lasting for 24 to 48 hours  Date Last Reviewed: 11/23/2015  © 4502-5188 [employer]. [address] [place] [address], [place] [address]. All rights reserved. This information is not intended as a substitute for professional medical care. Always follow your healthcare professional's instructions.

## 2018-03-16 ENCOUNTER — CLINICAL SUPPORT (OUTPATIENT)
Dept: REHABILITATION | Facility: HOSPITAL | Age: 73
End: 2018-03-16
Payer: MEDICARE

## 2018-03-16 DIAGNOSIS — G89.29 CHRONIC PAIN OF BOTH KNEES: ICD-10-CM

## 2018-03-16 DIAGNOSIS — R29.898 WEAKNESS OF BOTH LOWER EXTREMITIES: ICD-10-CM

## 2018-03-16 DIAGNOSIS — M25.562 CHRONIC PAIN OF BOTH KNEES: ICD-10-CM

## 2018-03-16 DIAGNOSIS — M25.561 CHRONIC PAIN OF BOTH KNEES: ICD-10-CM

## 2018-03-16 PROCEDURE — 97110 THERAPEUTIC EXERCISES: CPT | Mod: PN

## 2018-03-16 NOTE — PROGRESS NOTES
DAILY TREATMENT NOTE      Start Time:  9:02 am  Stop Time:  9:56 am    PROCEDURES:    TIMED  Procedure Min.   TE 54'                     UNTIMED  Procedure Min.             Total Timed Minutes:  54'  Total Timed Units:  4  Total Untimed Units:  0  Charges Billed/# of units:  4 TE      Progress/Current Status    Subjective:     Patient ID: Grace Navarro is a 72 y.o. female.  Diagnosis:   1. Chronic pain of both knees     2. Weakness of both lower extremities       Pain:   Pt stated that she was experiencing pain in (R) knee earlier in the week, so went and saw Dr. Arnold, and was told pain was possibly coming from her back. Pt stated that she underwent an xray on (R) knee on Tuesday and was told has arthritis in (R) knee and referred to orthopedic MD. Pt stated that her (R) knee is feeling better now and walked 4 miles yesterday. Pt reports improvement in her symptoms since SOC and stated that she has less difficulty squatting down/picking thinks up off the floor. Pt stated that she wore heels on Saturday and had to walk two blocks to wedding and MD told her may be what flared up sciatic nerve and knee pain.     Objective:     Objective measurements were taken and patient performed therex as outlined per log x 54 minutes 1:1 with PT     ** Patient's knees shift medially with step ups and downs, so placed GTB around thighs just above knees with cues to apply pressure laterally against theraband to keep knees in alignment requiring verbal cues.**     Date  3/16/18 3/6/18 3/2/18 2/27/18 2/23/18 2/20/18 2/16/18 2/8/18 2/6/18 2/2/18 1/30/18 1/26/18 1/23/18   VISIT 14 13 12 11 10 9 8 7 6 5 4 3 2   Gcode 5/10 4/10 3/10 2/10 done 9/10 8/10 7/10 6/10 5/10 4/10 3/10 2/10   FOTO     done 9/10 8/10 7/10 DONE 6/10 5/5 4/6 3/5 2/5   Cap Visit   Cap Total  90.96  1339.86 60.64  1248.90 121.28  1188.26 127.92  1066.98 127.92  939.06 95.94  811.14 95.94  715.20 95.94  619.26 95.94  523.32 95.94  427.38   95.94  331.44  "127.92  235.50   MT             prn   Long Sit HS 3x30" B supine w/strap 3x30'' B w/ strap 3x30'' B w/ strap 30"x3 w/strap 30"x3 w/strap 30"x3 w/strap 30"x3 w/ strap 30"x3 w/ strap 30"x3 w/ strap 30"x3 w/ strap 30"x3  w/strap  3x30" supine B   Gastroc Str.  30"x3 fitter  30"x3 fitter 30"x3 fitter 3x30" fitter    3x30" fitter      Quad stretch via lori stretch - 2x30'' B  R w/strap 2x30'' B             Bike - Stepper  L2  7' Stepper  L2 5' Stepper  L2  5' 5' 5' Stepper  L1  5'   NT 5' 5' 5'     QS             -   SAQ             -   SLR GTB 2x10 B 2# 2x15 B 2# 2x15 B 2# 2x12 2# 2x12 2# 2x10  1.5#   ^ next ? 2x15 B 1.5# 2x15 B 1.5# 2x15 B 1.5# 2x15 B 1.5# 3x10 B 1.5# 3x10 B 1.5# 2x10 B   SL Abd - -- --   -   NT 1.5# 2x15 B pillow 1.5#   2x12 B  Pillow** 1.5#   2x10 B  Pillow**    Heel Slides                Kacey Hip Add  5''x20 5''x20 5"x25 5"x25 5"x25 5"x25 5'x20 5'x20 5"x20 5"x15 5"x10    LAQs GTB 2x10 2# 2x15 B 2# 2x15 B 2# 2x12 B 2# 2x12 B 2# 2x10 B 1.5# 2x15 B  1.5# 2x15 B 1.5# 2x15 B 1.5# 2x15 1.5# 2x15 1.5# 2x10    Seated Hip Flex  -- --  -           Cybex   Leg Press  -- --  - - Pt declined Defer due to lumbar pain 4.0   2x15 DL    2.0 2x15  Unilateral 3.5   2x15 DL    1.5 2x12  Unilateral 3.5   2x15 DL    1.5 2x12  Unilateral 3.5  2x15 DL    1.5 2x12  Unilateral 3.5 2x10 DL    1.5 2x10 SL   TKE GTB 2x10 cybex 3.0 2x12   3" hold B cybex 3.0 2x10   3" hold B NT cybex 3.0 2x12   3" hold B cybex 3.0 2x10 3" hold B Cybex 3.0  2x12  2" hold B Cybex 3.0  2x10 2" hold B Cybex 3.0  2x10 2" hold B PTC 2x15, 3" hold B PTC 2x10  3" hold B PTC 2x10  3" hold B PTC 2x10 , 3" hold B   Hip Abd GTB 2x10 B GTB  2x12 GTB  2x12  ^ next GTB  2x12 cybex 1.5 2x10 B cybex 1.5 2x10 B cybex 1.0 2x12 B cybex 1.0 2x10 B cybex 1.0 2x10 B RTC 2x12 B RTC 2x12 B RTC 2x10 B RTC  2x10 B   Hip Flex GTB 2x10  GTB  2x15 GTB  2x12  ^ next GTB  2x12 cybex 1.5 2x10 B cybex 1.5 2x10 B cybex 1.0 2x15 B cybex 1.0 2x10 B cybex 1.0 2x10 B RTC 2x12 B RTC " "2x12 B RTC 2x10 B RTC   2x10 B   Hip Ext GTB 2x10 GTB  2x12 BTB  2x15 GTB  2x12 cybex 1.5 2x10 B cybex 1.5 2x10 B cybex 1.0 2x12 B cybex 1.0 2x10 B cybex 1.0 2x10 B RTC 2x12 B RTC 2x12 B RTC 2x10 B RTC   2x10 B   Wall squats  2x10 OOT W/green PB 2x15 W/green PB 2x15 W/green PB 2x15 W/green PB 2x15 w/ Green Physioball  2x10 w/ Green Physioball  2x10   - 10+5  10 + 5 1x10    Lunges FWD -  NT 1x10  note   Blue step  2x10 // B Blue step  2x10 // B        Knee Ext  20# 2x10  20# 2x10 15# 2x10 15# 2x10           Step Ups Stairs 2x10 wGTB B Blue 2x15 B  GTB Blue 2x15 B  GTB** Blue 2x15 B  GTB** Blue 2x15 B Blue 2x15 B Blue 2x12 B Blue 2x10 B Blue 2x10 B Blue 2x10 B Blue  2x10  B Blue  2x10  B 2x10 blue B   Step Downs - L1 2x15   GTB L1 2x15  GTB ** L1 2x15  GTB ** L1 2x15 B L1 2x15 B L1 2x10 B L1 2x10 B  L1 2x10 B L1 2x10 B L1 2x10 B 2x10 L1 B   Gait   --         --    CP   --         --    Initials CK CS  1/6 GENIE CS  1/6 CS 1/6 CK CS 3/6 JA 2/6 JA 1/6 CK CS 1/6 DH 1/6 CK       Assessment:     When pt was performing standing hip therex in parallel bars, pt bent down to fix theraband around her ankles and when she stood up she bumped the back of her head on parallel bar. Pt stated " I'm ok it was my fault". Pt denied experiencing a headache, nausea, blurred vision or any other adverse symptoms. Pt was able to complete session without complaints. At end of therapy session pt again stated that she was not experiencing a headache or any other adverse symptoms from bumping her head on parallel bars.     Patient Education/Response:     See DC summary below    Plans and Goals:     DC from PT       REHAB SERVICES OUTPATIENT DISCHARGE SUMMARY  Physical Therapy      Name:  Grace Navarro  Date:  3/16/18  Date of Evaluation:  1/19/18  Physician:  Silvano Arnold DO  Total # Of Visits:  14  Diagnosis:    1. Chronic pain of both knees     2. Weakness of both lower extremities         Physical/Functional Status:  At time of " discharge, pt had attended 13 PT follow sessions since initial evaluation on 1/19/18. Pt has made steady progress with her PT treatments as evident by subjective and objective improvements. Pt demo improved LE strength and improved (B) knee ROM compared to measurements taken on initial evaluation. Pt was agreeable to being discharged with HEP at this time. See objective measurements below.       Range of Motion/Strength: 3/16/18     Hip Right   Left   Pain/Dysfunction with Movement     AROM MMT AROM MMT     Flexion WFL 5/5 WFL 5/5     Extension NT NT NT NT Able to perform good bridge against gravity    Abduction WFL 5/5 WFL 5/5        Knee   Right     Left   Pain/Dysfunction with Movement     AROM PROM MMT AROM PROM MMT     Flexion 140 NT 5/5 140 NT 5/5     Extension 1 NT 5/5 1 NT 5/5           Ankle Right   Left   Pain/Dysfunction with Movement     AROM MMT AROM MMT     Plantarflexion WFL 4+/5 WFL 4+/5     Dorsiflexion WFL 5/5 WFL 5/5       FOTO: 45% limited - not consistent with subjective and objective improvements    Range of Motion/Strength: Initial Evaluation      Hip Right   Left   Pain/Dysfunction with Movement     AROM MMT AROM MMT     Flexion WFL 4+/5 WFL 4+/5     Extension NT NT NT NT Able to perform good bridge against gravity    Abduction WFL 4+/5 WFL 4+/5        Knee   Right     Left   Pain/Dysfunction with Movement     AROM PROM MMT AROM PROM MMT     Flexion 135 NT 5/5 137  NT 5/5     Extension 2 NT 4+/5 2 NT 4+/5           Ankle Right   Left   Pain/Dysfunction with Movement     AROM MMT AROM MMT     Plantarflexion WFL 4+/5 WFL 4+/5     Dorsiflexion WFL 5/5 WFL 5/5       FOTO; 43% limited    The patient is to be discharged from our Therapy service for the following reason(s):  Patient has reached the maximum rehab potential for the present time    Degree of Goal Achievement:  Patient has partially met goals    Short Term Goals = Long Term Goals ( 8 weeks) : 3/19/18  1. Pt will be independent with HEP  - MET  2. Pt will improve (B) LE strength to 5/5 on MMT where deficits noted in order to improve functional mobility - PARTIALLY MET  3. Pt will improve FOTO knee survey score to </= 25% limited in order to demo improved functional mobility - NOT MET  4. Pt will be able to perform at least 10 squats with proper technique without any VC/TC from therapist in order for pt to be able to safely lower herself to  low objects - NT  5. Pt will report <4/10 pain in (B) knees when performing ADLs in order to be able to perform ADLs with less difficulty - PARTIALLY MET      Patient Education:  Pt was educated on and given handout on updated HEP. Pt demo/verbalized understanding. (See media section)     Discharge Plan:  Home Program:  See above. Follow up with MD prn.

## 2018-03-16 NOTE — Clinical Note
Please see PT DC summary for Grace Navarro,  45. Thank you for this referral.   Anotnina Wolfe, PT 3/16/2018

## 2018-03-20 ENCOUNTER — PATIENT OUTREACH (OUTPATIENT)
Dept: OTHER | Facility: OTHER | Age: 73
End: 2018-03-20

## 2018-03-20 DIAGNOSIS — I10 ESSENTIAL HYPERTENSION: Primary | ICD-10-CM

## 2018-03-20 RX ORDER — HYDROCHLOROTHIAZIDE 25 MG/1
25 TABLET ORAL DAILY
Qty: 90 TABLET | Refills: 2 | Status: SHIPPED | OUTPATIENT
Start: 2018-03-20 | End: 2018-04-06 | Stop reason: DRUGHIGH

## 2018-03-20 NOTE — PROGRESS NOTES
Ms. Navarro is tolerating HCTZ 25mg without problems. She was under some pain, but her knee is better now.     Last 5 Patient Entered Readings                                      Current 30 Day Average: 140/77     Recent Readings 3/15/2018 3/15/2018 3/12/2018 3/12/2018 3/11/2018    SBP (mmHg) 129 139 160 173 133    DBP (mmHg) 80 81 84 86 67    Pulse 60 62 62 63 61          BP above goal, <130/80 mmHg  Continue monitoring  BMP 4/3/18    Hypertension Medications             hydroCHLOROthiazide (HYDRODIURIL) 25 MG tablet Take 1 tablet (25 mg total) by mouth once daily.    metoprolol succinate (TOPROL-XL) 200 MG 24 hr tablet Take 1 tablet (200 mg total) by mouth once daily.

## 2018-03-27 ENCOUNTER — PATIENT OUTREACH (OUTPATIENT)
Dept: OTHER | Facility: OTHER | Age: 73
End: 2018-03-27

## 2018-03-27 NOTE — PROGRESS NOTES
Patient called in today to report fatigue with lower BP reading. We had a poor connection and the patient hung up. Sent [facility] message to follow up.   BP above goal, <130/80 mmHg  Continue monitoring    Last 5 Patient Entered Readings                                      Current 30 Day Average: 137/77     Recent Readings 3/27/2018 3/27/2018 3/25/2018 3/25/2018 3/25/2018    SBP (mmHg) 121 133 131 138 148    DBP (mmHg) 70 74 73 74 76    Pulse 60 59 58 58 61

## 2018-04-03 ENCOUNTER — LAB VISIT (OUTPATIENT)
Dept: LAB | Facility: HOSPITAL | Age: 73
End: 2018-04-03
Attending: INTERNAL MEDICINE
Payer: MEDICARE

## 2018-04-03 DIAGNOSIS — I10 ESSENTIAL HYPERTENSION: ICD-10-CM

## 2018-04-03 LAB
ANION GAP SERPL CALC-SCNC: 8 MMOL/L
BUN SERPL-MCNC: 17 MG/DL
CALCIUM SERPL-MCNC: 9.4 MG/DL
CHLORIDE SERPL-SCNC: 101 MMOL/L
CO2 SERPL-SCNC: 30 MMOL/L
CREAT SERPL-MCNC: 0.6 MG/DL
EST. GFR  (AFRICAN AMERICAN): >60 ML/MIN/1.73 M^2
EST. GFR  (NON AFRICAN AMERICAN): >60 ML/MIN/1.73 M^2
GLUCOSE SERPL-MCNC: 90 MG/DL
POTASSIUM SERPL-SCNC: 4.1 MMOL/L
SODIUM SERPL-SCNC: 139 MMOL/L

## 2018-04-03 PROCEDURE — 36415 COLL VENOUS BLD VENIPUNCTURE: CPT | Mod: PO

## 2018-04-03 PROCEDURE — 80048 BASIC METABOLIC PNL TOTAL CA: CPT

## 2018-04-03 NOTE — PROGRESS NOTES
Ms. Navarro has been feeling a bit fatigued. Understands that it is likely due to BP coming down. Checked at [facility] today and it was 106/59 mmHg. Reports she is drinking plenty of fluids. Wants to continue current treatment. Has been monitoring sodium intake closely and eating at home.    Last 5 Patient Entered Readings                                      Current 30 Day Average: 132/74     Recent Readings 4/1/2018 3/31/2018 3/31/2018 3/28/2018 3/28/2018    SBP (mmHg) 121 127 141 135 140    DBP (mmHg) 70 77 80 69 78    Pulse 60 63 62 69 67          BP at goal, <130/80 mmHg  LLN readings and fatigue likely 2/2 close sodium monitoring in setting of HCTZ use  Continue monitoring  Await lab results    Hypertension Medications             hydroCHLOROthiazide (HYDRODIURIL) 25 MG tablet Take 1 tablet (25 mg total) by mouth once daily.    metoprolol succinate (TOPROL-XL) 200 MG 24 hr tablet Take 1 tablet (200 mg total) by mouth once daily.

## 2018-04-04 ENCOUNTER — PATIENT MESSAGE (OUTPATIENT)
Dept: INTERNAL MEDICINE | Facility: CLINIC | Age: 73
End: 2018-04-04

## 2018-04-06 RX ORDER — HYDROCHLOROTHIAZIDE 12.5 MG/1
12.5 CAPSULE ORAL DAILY
COMMUNITY
End: 2018-05-28 | Stop reason: SDUPTHER

## 2018-04-06 NOTE — PROGRESS NOTES
Called patient to review labs. She decreased HCTZ to 12.5mg daily. She reports some improvement in fatigue, but cannot be sure.     Last 5 Patient Entered Readings                                      Current 30 Day Average: 130/74     Recent Readings 4/5/2018 4/5/2018 4/4/2018 4/1/2018 3/31/2018    SBP (mmHg) 124 130 114 121 127    DBP (mmHg) 71 73 69 70 77    Pulse 55 57 62 60 63          BP at goal, <130/80 mmHg  Continue monitoring    Hypertension Medications             hydroCHLOROthiazide (MICROZIDE) 12.5 mg capsule Take 12.5 mg by mouth once daily.    metoprolol succinate (TOPROL-XL) 200 MG 24 hr tablet Take 1 tablet (200 mg total) by mouth once daily.

## 2018-04-11 ENCOUNTER — PATIENT OUTREACH (OUTPATIENT)
Dept: OTHER | Facility: OTHER | Age: 73
End: 2018-04-11

## 2018-04-11 NOTE — PROGRESS NOTES
"Last 5 Patient Entered Readings                                      Current 30 Day Average: 129/73     Recent Readings 4/10/2018 4/10/2018 4/6/2018 4/6/2018 4/6/2018    SBP (mmHg) 123 127 121 128 128    DBP (mmHg) 69 72 69 77 76    Pulse 60 60 62 60 61        Digital Medicine: Health  Follow Up    Lifestyle Modifications:    1.Dietary Modifications (Sodium intake <2,000mg/day, food labels, dining out): Ms. Navarro reports that she is continuing to limit her salt intake. She states she prepares most of her meals at home to help. She states that there is "nothing else that I can change".    2.Physical Activity: Ms. Navarro is exercising most days of the week. [place]     3.Medication Therapy: Patient has been compliant with the medication regimen.    4.Patient has the following medication side effects/concerns: None  (Frequency/Alleviating factors/Precipitating factors, etc.)     Follow up with Ms. Grace Navarro completed. Ms. Navarro is doing well. No further questions or concerns. I will follow up in a few weeks to assess progress.      "

## 2018-05-01 ENCOUNTER — PATIENT OUTREACH (OUTPATIENT)
Dept: OTHER | Facility: OTHER | Age: 73
End: 2018-05-01

## 2018-05-01 NOTE — PROGRESS NOTES
"Ms. Navarro is feeling well. She attributes higher readings on 4/20 to going to a party. She knows that sodium has a large effect on her blood pressure. She reports let cramps at night "sometimes". She is taking a calcium and magnesium supplement because she heard it can help.      Last 5 Patient Entered Readings                                      Current 30 Day Average: 126/72     Recent Readings 4/24/2018 4/24/2018 4/24/2018 4/21/2018 4/20/2018    SBP (mmHg) 127 131 134 120 153    DBP (mmHg) 64 67 74 69 84    Pulse 59 59 60 62 59        BMP  Lab Results   Component Value Date     04/03/2018    K 4.1 04/03/2018     04/03/2018    CO2 30 (H) 04/03/2018    BUN 17 04/03/2018    CREATININE 0.6 04/03/2018    CALCIUM 9.4 04/03/2018    ANIONGAP 8 04/03/2018    ESTGFRAFRICA >60.0 04/03/2018    EGFRNONAA >60.0 04/03/2018         BP at goal, <130/80 mmHg  Continue monitoring  K WNL- monitor cramps    Hypertension Medications             hydroCHLOROthiazide (MICROZIDE) 12.5 mg capsule Take 12.5 mg by mouth once daily.    metoprolol succinate (TOPROL-XL) 200 MG 24 hr tablet Take 1 tablet (200 mg total) by mouth once daily.            "

## 2018-05-25 ENCOUNTER — PATIENT OUTREACH (OUTPATIENT)
Dept: OTHER | Facility: OTHER | Age: 73
End: 2018-05-25

## 2018-05-25 NOTE — PROGRESS NOTES
Last 5 Patient Entered Readings                                      Current 30 Day Average: 124/70     Recent Readings 5/24/2018 5/22/2018 5/22/2018 5/18/2018 5/15/2018    SBP (mmHg) 127 124 130 115 120    DBP (mmHg) 73 67 73 71 65    Pulse 61 56 56 60 62        Digital Medicine: Health  Follow Up    Left voicemail to follow up with Mrs. Edwards Jeanie Navarro.  Current BP average 124/70 mmHg is at goal, <130/80 mmHg.  Will follow up in 2 weeks.

## 2018-05-26 ENCOUNTER — PATIENT MESSAGE (OUTPATIENT)
Dept: INTERNAL MEDICINE | Facility: CLINIC | Age: 73
End: 2018-05-26

## 2018-05-28 RX ORDER — HYDROCHLOROTHIAZIDE 12.5 MG/1
12.5 CAPSULE ORAL DAILY
Qty: 90 CAPSULE | Refills: 3 | Status: SHIPPED | OUTPATIENT
Start: 2018-05-28 | End: 2018-10-15 | Stop reason: DRUGHIGH

## 2018-06-01 ENCOUNTER — OFFICE VISIT (OUTPATIENT)
Dept: OPHTHALMOLOGY | Facility: CLINIC | Age: 73
End: 2018-06-01
Payer: MEDICARE

## 2018-06-01 DIAGNOSIS — H04.123 DRY EYES: ICD-10-CM

## 2018-06-01 DIAGNOSIS — Z96.1 PSEUDOPHAKIA OF BOTH EYES: ICD-10-CM

## 2018-06-01 DIAGNOSIS — H40.003 GLAUCOMA SUSPECT OF BOTH EYES: Primary | ICD-10-CM

## 2018-06-01 PROCEDURE — 99999 PR PBB SHADOW E&M-EST. PATIENT-LVL I: CPT | Mod: PBBFAC,,, | Performed by: OPHTHALMOLOGY

## 2018-06-01 PROCEDURE — 92012 INTRM OPH EXAM EST PATIENT: CPT | Mod: S$GLB,,, | Performed by: OPHTHALMOLOGY

## 2018-06-01 NOTE — PROGRESS NOTES
HPI     DLS: 3/24/17    Pt states her eyesight is bothering her even with her glasses on x few   months now. Pt states she feels like her glasses are dirty, so she will   take them off and clean them but it doesn't make a difference. Pt states   reading small, fine print is getting really difficult.  Pt denies any eye   pain.     Meds: Systane prn ou             Allergy drop prn ou    1. Glaucoma suspect of both eyes   2. 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 Pseudophakia, both eyes       Last edited by Bessy Meza on 6/1/2018  9:54 AM. (History)            Assessment /Plan     For exam results, see Encounter Report.    Glaucoma suspect of both eyes    Pseudophakia of both eyes        New patient here for cataract eval. (3/22/2016)   Old pt of Dr. Miguel - wants to be seen at [facility] - all her other doctors are here and she lives on [place]  Originally from [facility] - bi lingual         1. Myelinated NFL OD    2. Glaucoma suspect OU - enlarged CDR  Patient states she has been followed for years by Dr. Khan and was told that she does not have glaucoma.    3. 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 PC IOL OU    OD - 9/21/2016 - PCB00 21.5   OS - 8/3/2016 - PCB00 22.0    Plan  Get records from Dr. Khan - signed release  - was monitored with VF's photos ect. / Eventually was told she did NOT have glaucoma and no further testing was done - old VF's ect - may have a defect from the medullated NFL    Try +1.50 readers for [employer] work - her +2.50 are now too strong    Can be seen at [facility] or [facility] -   Excellent VA at 20/20 OD and OS, J1 and J2 OD and OS, minimal PCO OU. No complaints of glare. No other pathology appreciated on anterior exam other than PEEs. Pt reports some dry eye symptoms but not using ATs consistently.  Start ATs 4-6 times daily, scheduled       F/U 6 months with HVF / DFE / OCT - monitor as a glaucoma suspect     No sign change in MR on 6/1/2018 - rec increase AT's   NO PCO   If still C.O trouble with near vision can refer to optom for glasses - ? [place]

## 2018-06-05 ENCOUNTER — PATIENT MESSAGE (OUTPATIENT)
Dept: INTERNAL MEDICINE | Facility: CLINIC | Age: 73
End: 2018-06-05

## 2018-06-05 NOTE — TELEPHONE ENCOUNTER
Called [facility] to get down to the bottom of the clarification they needed since we never received any faxes or phone calls and she said she needed to know if the prescription for hydrochlorothiazide is for 12.5 mg or 25mg

## 2018-06-08 ENCOUNTER — PATIENT MESSAGE (OUTPATIENT)
Dept: INTERNAL MEDICINE | Facility: CLINIC | Age: 73
End: 2018-06-08

## 2018-06-08 NOTE — PROGRESS NOTES
Last 5 Patient Entered Readings                                      Current 30 Day Average: 124/70     Recent Readings 6/2/2018 5/30/2018 5/24/2018 5/22/2018 5/22/2018    SBP (mmHg) 120 122 127 124 130    DBP (mmHg) 75 65 73 67 73    Pulse 67 63 61 56 56        Digital Medicine: Health  Follow Up    Lifestyle Modifications:    1.Dietary Modifications (Sodium intake <2,000mg/day, food labels, dining out): Ms. Navarro continues to limit her salt intake most days. She does dine out on occasion and finds that most foods are over salted. She is more careful during the day about salt intake when she knows that she will be dining out.     2.Physical Activity: Ms. Navarro is exercising most days of the week.    3.Medication Therapy: Patient has been compliant with the medication regimen.    4.Patient has the following medication side effects/concerns: None  (Frequency/Alleviating factors/Precipitating factors, etc.)     Follow up with Ms. Grace Navarro completed. Ms. Navarro is doing well. No further questions or concerns. Will continue follow up to achieve health goals.

## 2018-06-13 ENCOUNTER — PATIENT OUTREACH (OUTPATIENT)
Dept: OTHER | Facility: OTHER | Age: 73
End: 2018-06-13

## 2018-06-13 NOTE — PROGRESS NOTES
Ms. Navarro called to clarify because she received a HCTZ capsule instead of tablet. She verbalizes understanding that this is the appropriate therapy. Pleased with readings. Has been avoiding salt as much as possible.      Last 5 Patient Entered Readings                                      Current 30 Day Average: 122/70     Recent Readings 6/11/2018 6/2/2018 5/30/2018 5/24/2018 5/22/2018    SBP (mmHg) 120 120 122 127 124    DBP (mmHg) 72 75 65 73 67    Pulse 65 67 63 61 56          BP at goal, <130/80 mmHg  Continue monitoring    Hypertension Medications             hydroCHLOROthiazide (MICROZIDE) 12.5 mg capsule Take 1 capsule (12.5 mg total) by mouth once daily.    metoprolol succinate (TOPROL-XL) 200 MG 24 hr tablet Take 1 tablet (200 mg total) by mouth once daily.

## 2018-06-27 ENCOUNTER — PES CALL (OUTPATIENT)
Dept: ADMINISTRATIVE | Facility: CLINIC | Age: 73
End: 2018-06-27

## 2018-06-28 ENCOUNTER — PATIENT MESSAGE (OUTPATIENT)
Dept: INTERNAL MEDICINE | Facility: CLINIC | Age: 73
End: 2018-06-28

## 2018-07-02 ENCOUNTER — PATIENT MESSAGE (OUTPATIENT)
Dept: INTERNAL MEDICINE | Facility: CLINIC | Age: 73
End: 2018-07-02

## 2018-07-17 ENCOUNTER — PATIENT OUTREACH (OUTPATIENT)
Dept: OTHER | Facility: OTHER | Age: 73
End: 2018-07-17

## 2018-07-17 NOTE — PROGRESS NOTES
"Last 5 Patient Entered Readings                                      Current 30 Day Average: 117/67     Recent Readings 7/11/2018 7/4/2018 7/4/2018 6/29/2018 6/23/2018    SBP (mmHg) 123 111 116 114 122    DBP (mmHg) 68 65 71 67 66    Pulse 60 57 58 64 63      Digital Medicine: Health  Follow Up    Lifestyle Modifications:    1.Dietary Modifications (Sodium intake <2,000mg/day, food labels, dining out): Patient is continuing to watch her salt intake closely.     2.Physical Activity: Ms. Navarro is exercising most days of the week. She goes to the gym, and walks outside.     3.Medication Therapy: Patient has been compliant with the medication regimen.    4.Patient has the following medication side effects/concerns: Ms. Navarro has been feeling bad with lower BP readings. She states that she often feels dizzy, and sometimes has to abandon her walks to go lie down. She is drinking 6-8 glasses of water each day. She attributes spikes in BP to pain, so she is afraid to make any changes to her medication regimen. She states that she feels that her BP is too low, but when readings start to approach the 130s "you say its high". Again, she expresses dissatisfaction with the program because of her BP goal (<130/80 mmHg). She is also afraid to leave the program because she lives alone, and likes having someone "keeping an eye" on her.  (Frequency/Alleviating factors/Precipitating factors, etc.)     Follow up with Ms. Grace Navarro completed. No further questions or concerns. Will continue follow up to achieve health goals.        "

## 2018-07-17 NOTE — PROGRESS NOTES
"Patient continues to feel that BP is "too low" sometimes. She understands that pain and her instances of high sodium intake can make BP trend up and she wants to keep HCTZ on for those occassions. She says she has figured out that it is best for her to take HCTZ after her walk. She complains that she "often" feels bad "when BP gets too low". On the same breath, she says she does not want to change her medications. She will discuss with Dr. Dean.     Last 5 Patient Entered Readings                                      Current 30 Day Average: 117/67     Recent Readings 7/11/2018 7/4/2018 7/4/2018 6/29/2018 6/23/2018    SBP (mmHg) 123 111 116 114 122    DBP (mmHg) 68 65 71 67 66    Pulse 60 57 58 64 63          BP at goal with some hypotension symptoms  Counseled patient at length on symptoms of low BP and my recommendation to STOP HCTZ if she is feeling badly. Patient prefers to keep current treatment.   Continue monitoring      Hypertension Medications             hydroCHLOROthiazide (MICROZIDE) 12.5 mg capsule Take 1 capsule (12.5 mg total) by mouth once daily.    metoprolol succinate (TOPROL-XL) 200 MG 24 hr tablet Take 1 tablet (200 mg total) by mouth once daily.          "

## 2018-08-03 ENCOUNTER — PATIENT OUTREACH (OUTPATIENT)
Dept: OTHER | Facility: OTHER | Age: 73
End: 2018-08-03

## 2018-08-03 NOTE — PROGRESS NOTES
Ms. Navarro called in because she is concerned about elevated BP readings. She reports she has not entered them because they have been high. Endorses systolic BP in 140s and 150s. Denies SOB, CP, etc. Does report HA, but attributes them to neck problems. Patient is tearful on the phone. She has been working hard cooking and cleaning for family in town. They are gone for the weekend and she is afraid to be alone. She has been eating high sodium foods prepared by her family.       Last 5 Patient Entered Readings                                      Current 30 Day Average: 122/70     Recent Readings 7/25/2018 7/22/2018 7/18/2018 7/18/2018 7/18/2018    SBP (mmHg) 121 129 128 128 138    DBP (mmHg) 69 78 70 69 73    Pulse 66 58 60 59 61      BP spike 2/2 high dietary sodium  Ok to take additional HCTZ dose if patient remains concerned- she has previously refused additional medication  Decrease sodium in take, <2000mg per day      Hypertension Medications             hydroCHLOROthiazide (MICROZIDE) 12.5 mg capsule Take 1 capsule (12.5 mg total) by mouth once daily.    metoprolol succinate (TOPROL-XL) 200 MG 24 hr tablet Take 1 tablet (200 mg total) by mouth once daily.

## 2018-08-05 ENCOUNTER — PATIENT MESSAGE (OUTPATIENT)
Dept: ADMINISTRATIVE | Facility: OTHER | Age: 73
End: 2018-08-05

## 2018-08-20 ENCOUNTER — PATIENT MESSAGE (OUTPATIENT)
Dept: INTERNAL MEDICINE | Facility: CLINIC | Age: 73
End: 2018-08-20

## 2018-08-28 ENCOUNTER — PATIENT OUTREACH (OUTPATIENT)
Dept: OTHER | Facility: OTHER | Age: 73
End: 2018-08-28

## 2018-08-28 DIAGNOSIS — I10 ESSENTIAL HYPERTENSION: Primary | ICD-10-CM

## 2018-08-28 NOTE — PROGRESS NOTES
Last 5 Patient Entered Readings                                      Current 30 Day Average: 136/75     Recent Readings 8/24/2018 8/20/2018 8/19/2018 8/19/2018 8/16/2018    SBP (mmHg) 125 127 131 138 131    DBP (mmHg) 76 78 72 81 72    Pulse 64 62 63 63 65

## 2018-09-04 ENCOUNTER — PATIENT MESSAGE (OUTPATIENT)
Dept: ADMINISTRATIVE | Facility: OTHER | Age: 73
End: 2018-09-04

## 2018-10-03 ENCOUNTER — PATIENT MESSAGE (OUTPATIENT)
Dept: INTERNAL MEDICINE | Facility: CLINIC | Age: 73
End: 2018-10-03

## 2018-10-03 ENCOUNTER — TELEPHONE (OUTPATIENT)
Dept: INTERNAL MEDICINE | Facility: CLINIC | Age: 73
End: 2018-10-03

## 2018-10-03 NOTE — TELEPHONE ENCOUNTER
----- Message from Cecy Partida sent at 10/3/2018 10:50 AM CDT -----  Contact: PT Portal Request  Appointment Request From: Grace Navarro    With Provider: Lisa Dean MD [[facility] - Internal Medicine]    Preferred Date Range: 10/25/2018 - 11/30/2018    Preferred Times: Any time    Reason for visit: Existing Patient    Comments:  Annual check up

## 2018-10-11 ENCOUNTER — PATIENT OUTREACH (OUTPATIENT)
Dept: OTHER | Facility: OTHER | Age: 73
End: 2018-10-11

## 2018-10-11 NOTE — PROGRESS NOTES
"Last 5 Patient Entered Readings                                      Current 30 Day Average: 141/79     Recent Readings 10/11/2018 10/10/2018 10/10/2018 10/10/2018 10/10/2018    SBP (mmHg) 126 127 146 159 183    DBP (mmHg) 78 80 79 84 101    Pulse 63 63 63 57 60        Patient called requesting to speak with a pharmacist regarding her recent high blood pressure and symptoms she was experiencing. Patient had chest pain yesterday that began on her whole chest then moved to the left side only. She took a shower for relief and had a "cold spell" where her body went cold for an hour after the shower then stopped. Prior to this episode she had a family conflict over the phone that caused tremendous stress triggering this episode or likely anxiety attack. She took her BP and was elevated then returned to normal with time and relaxation. She increased HCTZ from 12.5mg daily to 25mg daily 10/8. I recommended continuing to avoid triggers causing a spike in blood pressure.     Her second complaint was left leg pain. She returned home Monday from an [place] trip with a long plan ride. She denies tenderness to the touch, fever to the touch, pain, SOB or swelling of the left leg today. Yesterday she stated her left leg was in pain that improved with a pedicure. She resumed her normal four mile daily walk yesterday and she was able to complete her walk without any issues. I advised her on the signs and symptoms of a DVT and to seek medical care if these occur.     She will follow-up as planned with her pharmacist MARY Rodney.  "

## 2018-10-11 NOTE — PROGRESS NOTES
"Last 5 Patient Entered Readings                                      Current 30 Day Average: 167/86     Recent Readings 10/10/2018 10/9/2018 10/9/2018 10/8/2018 10/8/2018    SBP (mmHg) 192 146 151 164 168    DBP (mmHg) 100 76 84 82 91    Pulse 64 60 63 62 64        Called patient to follow up about elevated BP. Patient reports feeling "so scared" last night because her BP was so elevated. Ms. Navarro had been out of the country, and returned home on Monday. She "figured" her BP was "out of whack" because of the 26 hour travel time. She has noticed that her feet have been swollen, and she has been waking in the middle of the night to urinate. Ms. Navarro reports taking an additional dose of hctz because of dietary indiscretions while traveling.     Ms. Navarro will follow up with Dr. Dean 11/6. She would also like to see cardiology, but is needing a recommendation. She reports being afraid of what elevated BP is doing to her heart and her body. She also fears she will have a stroke, as this "runs in my family".     PharmD to follow up. Will continue to monitor.     "

## 2018-10-15 ENCOUNTER — PATIENT OUTREACH (OUTPATIENT)
Dept: OTHER | Facility: OTHER | Age: 73
End: 2018-10-15

## 2018-10-15 RX ORDER — HYDROCHLOROTHIAZIDE 12.5 MG/1
25 TABLET ORAL DAILY
COMMUNITY
End: 2018-10-23 | Stop reason: DRUGHIGH

## 2018-10-15 NOTE — PROGRESS NOTES
Ms. Edwards is feeling much better today after BP spikes last week. She is currently at the gym. Reports taking HCTZ 25mg daily.    Last 5 Patient Entered Readings                                      Current 30 Day Average: 139/77     Recent Readings 10/13/2018 10/11/2018 10/11/2018 10/10/2018 10/10/2018    SBP (mmHg) 129 127 126 127 146    DBP (mmHg) 70 79 78 80 79    Pulse 66 63 63 63 63          BP above goal, <130/80 mmHg- trending down  Continue monitoring    Hypertension Medications             hydroCHLOROthiazide (HYDRODIURIL) 12.5 MG Tab Take 25 mg by mouth once daily.    metoprolol succinate (TOPROL-XL) 200 MG 24 hr tablet Take 1 tablet (200 mg total) by mouth once daily.

## 2018-10-23 ENCOUNTER — PATIENT OUTREACH (OUTPATIENT)
Dept: OTHER | Facility: OTHER | Age: 73
End: 2018-10-23

## 2018-10-23 RX ORDER — HYDROCHLOROTHIAZIDE 25 MG/1
25 TABLET ORAL DAILY
Qty: 90 TABLET | Refills: 1 | Status: SHIPPED | OUTPATIENT
Start: 2018-10-23 | End: 2019-02-11 | Stop reason: DRUGHIGH

## 2018-10-23 NOTE — PROGRESS NOTES
Ms. Navarro called in to request a refill on HCTZ. She confirms she is still taking 25mg daily.       Last 5 Patient Entered Readings                                      Current 30 Day Average: 135/76     Recent Readings 10/22/2018 10/20/2018 10/17/2018 10/13/2018 10/11/2018    SBP (mmHg) 126 131 129 129 127    DBP (mmHg) 69 75 73 70 79    Pulse 65 66 69 66 63          BP trending down, goal <130/80 mmHg  RX refilled    Hypertension Medications             hydroCHLOROthiazide (HYDRODIURIL) 25 MG tablet Take 1 tablet (25 mg total) by mouth once daily.    metoprolol succinate (TOPROL-XL) 200 MG 24 hr tablet Take 1 tablet (200 mg total) by mouth once daily.

## 2018-10-24 RX ORDER — METOPROLOL SUCCINATE 200 MG/1
200 TABLET, EXTENDED RELEASE ORAL DAILY
Qty: 90 TABLET | Refills: 3 | Status: SHIPPED | OUTPATIENT
Start: 2018-10-24 | End: 2019-05-18

## 2018-11-02 ENCOUNTER — PATIENT OUTREACH (OUTPATIENT)
Dept: ADMINISTRATIVE | Facility: HOSPITAL | Age: 73
End: 2018-11-02

## 2018-11-02 DIAGNOSIS — I10 HYPERTENSION, UNSPECIFIED TYPE: Primary | ICD-10-CM

## 2018-11-02 NOTE — PROGRESS NOTES
Pre-visit audit complete, pt due for labs ordered by PCP.  Attempted to contact pt to schedule lab visit.

## 2018-11-06 ENCOUNTER — IMMUNIZATION (OUTPATIENT)
Dept: INTERNAL MEDICINE | Facility: CLINIC | Age: 73
End: 2018-11-06
Payer: MEDICARE

## 2018-11-06 ENCOUNTER — OFFICE VISIT (OUTPATIENT)
Dept: INTERNAL MEDICINE | Facility: CLINIC | Age: 73
End: 2018-11-06
Payer: MEDICARE

## 2018-11-06 ENCOUNTER — PATIENT MESSAGE (OUTPATIENT)
Dept: INTERNAL MEDICINE | Facility: CLINIC | Age: 73
End: 2018-11-06

## 2018-11-06 VITALS
DIASTOLIC BLOOD PRESSURE: 66 MMHG | HEART RATE: 62 BPM | HEIGHT: 64 IN | SYSTOLIC BLOOD PRESSURE: 124 MMHG | BODY MASS INDEX: 24.69 KG/M2 | TEMPERATURE: 98 F | WEIGHT: 144.63 LBS

## 2018-11-06 DIAGNOSIS — Z13.220 ENCOUNTER FOR LIPID SCREENING FOR CARDIOVASCULAR DISEASE: ICD-10-CM

## 2018-11-06 DIAGNOSIS — Z12.83 SKIN CANCER SCREENING: ICD-10-CM

## 2018-11-06 DIAGNOSIS — F41.9 ANXIETY: ICD-10-CM

## 2018-11-06 DIAGNOSIS — D69.2 SENILE PURPURA: ICD-10-CM

## 2018-11-06 DIAGNOSIS — E04.2 MULTINODULAR GOITER: ICD-10-CM

## 2018-11-06 DIAGNOSIS — Z13.6 ENCOUNTER FOR LIPID SCREENING FOR CARDIOVASCULAR DISEASE: ICD-10-CM

## 2018-11-06 DIAGNOSIS — Z23 NEED FOR STREPTOCOCCUS PNEUMONIAE VACCINATION: ICD-10-CM

## 2018-11-06 DIAGNOSIS — M47.816 LUMBAR FACET ARTHROPATHY: ICD-10-CM

## 2018-11-06 DIAGNOSIS — M85.80 OSTEOPENIA, UNSPECIFIED LOCATION: ICD-10-CM

## 2018-11-06 DIAGNOSIS — I10 BENIGN ESSENTIAL HYPERTENSION: Primary | ICD-10-CM

## 2018-11-06 DIAGNOSIS — Z12.11 ENCOUNTER FOR FIT (FECAL IMMUNOCHEMICAL TEST) SCREENING: ICD-10-CM

## 2018-11-06 PROCEDURE — 99499 UNLISTED E&M SERVICE: CPT | Mod: S$GLB,,, | Performed by: INTERNAL MEDICINE

## 2018-11-06 PROCEDURE — 3078F DIAST BP <80 MM HG: CPT | Mod: CPTII,HCNC,S$GLB, | Performed by: INTERNAL MEDICINE

## 2018-11-06 PROCEDURE — G0008 ADMIN INFLUENZA VIRUS VAC: HCPCS | Mod: HCNC,S$GLB,, | Performed by: INTERNAL MEDICINE

## 2018-11-06 PROCEDURE — 90662 IIV NO PRSV INCREASED AG IM: CPT | Mod: HCNC,S$GLB,, | Performed by: INTERNAL MEDICINE

## 2018-11-06 PROCEDURE — 3074F SYST BP LT 130 MM HG: CPT | Mod: CPTII,HCNC,S$GLB, | Performed by: INTERNAL MEDICINE

## 2018-11-06 PROCEDURE — 99215 OFFICE O/P EST HI 40 MIN: CPT | Mod: 25,HCNC,S$GLB, | Performed by: INTERNAL MEDICINE

## 2018-11-06 PROCEDURE — 99999 PR PBB SHADOW E&M-EST. PATIENT-LVL V: CPT | Mod: PBBFAC,HCNC,, | Performed by: INTERNAL MEDICINE

## 2018-11-06 PROCEDURE — 1101F PT FALLS ASSESS-DOCD LE1/YR: CPT | Mod: CPTII,HCNC,S$GLB, | Performed by: INTERNAL MEDICINE

## 2018-11-06 RX ORDER — HYDROXYZINE HYDROCHLORIDE 10 MG/1
10 TABLET, FILM COATED ORAL 3 TIMES DAILY PRN
Qty: 30 TABLET | Refills: 1 | Status: SHIPPED | OUTPATIENT
Start: 2018-11-06 | End: 2018-11-06 | Stop reason: SDUPTHER

## 2018-11-06 RX ORDER — HYDROXYZINE HYDROCHLORIDE 10 MG/1
10 TABLET, FILM COATED ORAL 3 TIMES DAILY PRN
Qty: 30 TABLET | Refills: 1 | Status: SHIPPED | OUTPATIENT
Start: 2018-11-06 | End: 2019-10-30

## 2018-11-06 NOTE — PROGRESS NOTES
INTERNAL MEDICINE YEARLY VISIT NOTE      CHIEF COMPLAINT     Yearly exam    HPI     Grace Navarro is a 72 y.o. C female who presents for yearly exam.    LBP s/p L4-L5 laminectomy 7/2012.  Concerned about her mobility. Difficulty tying her shoes - has to sit down to tie shoes. If she bends forward to  something off the floor, she has pain down her L leg. This is ongoing for a while.   Reports also sometimes w/ pins and needles in the L leg.   MRI L spine 11/2015 reviewed. Facet changes.   S/p PT but didn't feel like it's helped her legs get stronger.     HTN - hct z25mg daily, toprol xl 200mg daily. Part of digital HTN. Reviewed flowsheet.  Reports issues w/ son and one of the stressful encounters, BP went up to 180s. Then she went to take a bath and eventually it went back down.   Previously on hydroxyzine 10mg prn and worked well.     Senile purpura and takes vitamin C OTC that she feels help.     Multinodular goiter - US thyroid 7/2017 stable multinodular thyroid gland.    Osteopenia - Ca and Vit d.    Past Medical History:  Past Medical History:   Diagnosis Date    Anxiety     Rene's disease     Chronic low back pain     Depression     Goiter, nodular     Hyperlipidemia     Hypertension     Irritable bowel     Neuromuscular disorder     Osteopenia of multiple sites 5/29/2017    PUD (peptic ulcer disease)     Varicose veins        Past Surgical History:  Past Surgical History:   Procedure Laterality Date    APPENDECTOMY      BELPHAROPTOSIS REPAIR Bilateral     BREAST BIOPSY      RIGHT    CATARACT EXTRACTION W/  INTRAOCULAR LENS IMPLANT Left 08/03/2016        CATARACT EXTRACTION W/  INTRAOCULAR LENS IMPLANT Right 09/21/2016        INSERTION-INTRAOCULAR LENS (IOL) Right 9/21/2016    Performed [facility] at [facility] OR [facility]    INSERTION-INTRAOCULAR LENS (IOL) Left 8/3/2016    Performed [facility] at [facility]     PHACOEMULSIFICATION-ASPIRATION-CATARACT Right 9/21/2016    Performed by Mayra [facility] at [facility] OR [facility]    PHACOEMULSIFICATION-ASPIRATION-CATARACT Left 8/3/2016    Per[facility] at [facility] OR [facility]    SPINE SURGERY  07/2012    Fusion @ L4L5    TONSILLECTOMY         Allergies:  Review of patient's allergies indicates:   Allergen Reactions    Ace inhibitors Other (See Comments)     Pt not sure which medication it was - was told by doctor that she was allergic    Amlodipine      flushing    Ibuprofen     Morphine Other (See Comments)     hallucination       Home Medications:  Prior to Admission medications    Medication Sig Start Date End Date Taking? Authorizing Provider   ascorbic acid (VITAMIN C) 1000 MG tablet Take 1,000 mg by mouth once daily.   Yes Historical Provider, MD   azelastine (ASTELIN) 137 mcg (0.1 %) nasal spray USE ONE SPRAY(S) IN EACH NOSTRIL TWICE DAILY 9/26/17  Yes Erasto Maier MD   CALCIUM-MAGNESIUM ORAL Take 1 capsule by mouth Daily.   Yes Historical Provider, MD   cholecalciferol, vitamin D3, 1,000 unit capsule Take 1,000 Units by mouth once daily.   Yes Historical Provider, MD   cyanocobalamin 500 MCG tablet Take 500 mcg by mouth once daily.   Yes Historical Provider, MD   hydroCHLOROthiazide (HYDRODIURIL) 25 MG tablet Take 1 tablet (25 mg total) by mouth once daily. 10/23/18 10/23/19 Yes Lisa Dean MD   metoprolol succinate (TOPROL-XL) 200 MG 24 hr tablet TAKE 1 TABLET (200 MG TOTAL) BY MOUTH ONCE DAILY. 10/24/18  Yes Lisa Dean MD       Family History:  Family History   Problem Relation Age of Onset    Hypertension Mother     Kidney failure Mother     Hypertension Father     Coronary artery disease Father     Prostate cancer Father     Eczema Son     Diabetes Brother     Thyroid cancer Neg Hx     Breast cancer Neg Hx     Colon cancer Neg Hx     Ovarian cancer Neg Hx        Social History:  Social History     Tobacco Use    Smoking  "status: Former Smoker     Packs/day: 1.00     Years: 10.00     Pack years: 10.00     Types: Cigarettes    Smokeless tobacco: Never Used    Tobacco comment: quit 1975   Substance Use Topics    Alcohol use: Yes     Alcohol/week: 8.4 oz     Types: 14 Glasses of wine per week     Comment: socially    Drug use: No       Review of Systems:  Review of Systems Comprehensive review of systems otherwise negative. See history/subjective section for more details.    Health Maintainence:   Reviewed.    PHYSICAL EXAM     /66 (BP Location: Left arm, Patient Position: Sitting, BP Method: Large (Manual))   Pulse 62   Temp 98.2 °F (36.8 °C)   Ht 5' 4" (1.626 m)   Wt 65.6 kg (144 lb 9.6 oz)   LMP 06/14/1996   BMI 24.82 kg/m²     GEN - A+OX4, NAD   HEENT - PERRL, EOMI, OP clear. MMM.   Neck - No cervical LAD. R and midline thyroid nodules.   CV - RRR, no m/r   Chest - CTAB, no wheezing or rhonchi  Abd - S/NT/ND/+BS.   Ext - 2+BDP and radial pulses. No LE edema.   Neuro - PERRL, EOMI, no nystagmus, eyebrow raise, facial sensation, hearing, m of mastication, smile, palatal raise, shoulder shrug, tongue protrusion symmetric and intact. 5/5 BUE and BLE strength. Sensation to light touch intact throughout. 2+ DTRs. Normal gait. Neg straight leg raise test.  MSK - No spinal tenderness to palpation. Normal gait.   Skin - No rash. Multiple bruises.    LABS     Previous labs reviewed.    ASSESSMENT/PLAN     Grace Navarro is a 72 y.o. female with  Grace was seen today for annual exam.    Diagnoses and all orders for this visit:    Benign essential hypertension - Stable and controlled. Continue current medications.  -     Comprehensive metabolic panel; Future; Expected date: 11/06/2018  -     TSH; Future; Expected date: 11/06/2018  -     T4, free; Future; Expected date: 11/06/2018    Senile purpura  -     CBC auto differential; Future; Expected date: 11/06/2018    Multinodular goiter  -     TSH; Future; Expected date: " 11/06/2018  -     T4, free; Future; Expected date: 11/06/2018  -     US Soft Tissue Head Neck Thyroid; Future; Expected date: 11/06/2018    Osteopenia, unspecified location - cont Ca and Vit d.     BMI 24.82, adult  -     CBC auto differential; Future; Expected date: 11/06/2018    Encounter for lipid screening for cardiovascular disease  -     Lipid panel; Future; Expected date: 11/06/2018    Anxiety   -     hydrOXYzine HCl (ATARAX) 10 MG Tab; Take 1 tablet (10 mg total) by mouth 3 (three) times daily as needed (anxiety).    Lumbar facet arthropathy  -     [facility]    Need for Streptococcus pneumoniae vaccination  -     pneumoc 13-eleanor conj-dip cr(PF) (PREVNAR 13 (PF)) 0.5 mL  -     (In Office Administered) Pneumococcal Conjugate Vaccine (13 Valent) (IM)    Encounter for FIT (fecal immunochemical test) screening  -     Fecal Immunochemical Test (iFOBT); Future; Expected date: 11/06/2018    Skin cancer screening  -     Ambulatory Referral to Dermatology    Flu vaccine today.   45 minutes was spent on patient with over half the time was spent in coordination of care and/or counseling.    RTC in 6 months, sooner if needed and depending on labs.    Lisa Dean MD  Department of Internal Medicine - [facility]  10:00 AM

## 2018-11-07 ENCOUNTER — PATIENT MESSAGE (OUTPATIENT)
Dept: INTERNAL MEDICINE | Facility: CLINIC | Age: 73
End: 2018-11-07

## 2018-11-08 ENCOUNTER — LAB VISIT (OUTPATIENT)
Dept: LAB | Facility: HOSPITAL | Age: 73
End: 2018-11-08
Attending: INTERNAL MEDICINE
Payer: MEDICARE

## 2018-11-08 DIAGNOSIS — Z13.220 ENCOUNTER FOR LIPID SCREENING FOR CARDIOVASCULAR DISEASE: ICD-10-CM

## 2018-11-08 DIAGNOSIS — Z13.6 ENCOUNTER FOR LIPID SCREENING FOR CARDIOVASCULAR DISEASE: ICD-10-CM

## 2018-11-08 DIAGNOSIS — E04.2 MULTINODULAR GOITER: ICD-10-CM

## 2018-11-08 DIAGNOSIS — I10 BENIGN ESSENTIAL HYPERTENSION: ICD-10-CM

## 2018-11-08 DIAGNOSIS — D69.2 SENILE PURPURA: ICD-10-CM

## 2018-11-08 LAB
ALBUMIN SERPL BCP-MCNC: 3.8 G/DL
ALP SERPL-CCNC: 63 U/L
ALT SERPL W/O P-5'-P-CCNC: 15 U/L
ANION GAP SERPL CALC-SCNC: 7 MMOL/L
AST SERPL-CCNC: 18 U/L
BASOPHILS # BLD AUTO: 0.01 K/UL
BASOPHILS NFR BLD: 0.2 %
BILIRUB SERPL-MCNC: 0.3 MG/DL
BUN SERPL-MCNC: 19 MG/DL
CALCIUM SERPL-MCNC: 9.6 MG/DL
CHLORIDE SERPL-SCNC: 101 MMOL/L
CHOLEST SERPL-MCNC: 221 MG/DL
CHOLEST/HDLC SERPL: 3.8 {RATIO}
CO2 SERPL-SCNC: 31 MMOL/L
CREAT SERPL-MCNC: 0.7 MG/DL
DIFFERENTIAL METHOD: ABNORMAL
EOSINOPHIL # BLD AUTO: 0.1 K/UL
EOSINOPHIL NFR BLD: 2.6 %
ERYTHROCYTE [DISTWIDTH] IN BLOOD BY AUTOMATED COUNT: 12.9 %
EST. GFR  (AFRICAN AMERICAN): >60 ML/MIN/1.73 M^2
EST. GFR  (NON AFRICAN AMERICAN): >60 ML/MIN/1.73 M^2
GLUCOSE SERPL-MCNC: 113 MG/DL
HCT VFR BLD AUTO: 37.6 %
HDLC SERPL-MCNC: 58 MG/DL
HDLC SERPL: 26.2 %
HGB BLD-MCNC: 12.2 G/DL
LDLC SERPL CALC-MCNC: 149.6 MG/DL
LYMPHOCYTES # BLD AUTO: 1.4 K/UL
LYMPHOCYTES NFR BLD: 31.7 %
MCH RBC QN AUTO: 31 PG
MCHC RBC AUTO-ENTMCNC: 32.4 G/DL
MCV RBC AUTO: 96 FL
MONOCYTES # BLD AUTO: 0.4 K/UL
MONOCYTES NFR BLD: 10.1 %
NEUTROPHILS # BLD AUTO: 2.4 K/UL
NEUTROPHILS NFR BLD: 55.2 %
NONHDLC SERPL-MCNC: 163 MG/DL
PLATELET # BLD AUTO: 178 K/UL
PMV BLD AUTO: 9.4 FL
POTASSIUM SERPL-SCNC: 4 MMOL/L
PROT SERPL-MCNC: 7.2 G/DL
RBC # BLD AUTO: 3.93 M/UL
SODIUM SERPL-SCNC: 139 MMOL/L
T4 FREE SERPL-MCNC: 1.04 NG/DL
TRIGL SERPL-MCNC: 67 MG/DL
TSH SERPL DL<=0.005 MIU/L-ACNC: 3.29 UIU/ML
WBC # BLD AUTO: 4.26 K/UL

## 2018-11-08 PROCEDURE — 80053 COMPREHEN METABOLIC PANEL: CPT | Mod: HCNC

## 2018-11-08 PROCEDURE — 80061 LIPID PANEL: CPT | Mod: HCNC

## 2018-11-08 PROCEDURE — 84443 ASSAY THYROID STIM HORMONE: CPT | Mod: HCNC

## 2018-11-08 PROCEDURE — 84439 ASSAY OF FREE THYROXINE: CPT | Mod: HCNC

## 2018-11-08 PROCEDURE — 36415 COLL VENOUS BLD VENIPUNCTURE: CPT | Mod: HCNC

## 2018-11-08 PROCEDURE — 85025 COMPLETE CBC W/AUTO DIFF WBC: CPT | Mod: HCNC

## 2018-11-09 ENCOUNTER — TELEPHONE (OUTPATIENT)
Dept: INTERNAL MEDICINE | Facility: CLINIC | Age: 73
End: 2018-11-09

## 2018-11-09 NOTE — TELEPHONE ENCOUNTER
Please call and notify pt:    Blood counts, liver, kidney and thyroid functions are normal. Fasting sugars are mildly high but not in diabetic range. I recommend lower carb diet.     Your cholesterol is very high. All your risk factors added together indicates that you should be started on a cholesterol medication to decrease the risk of a future cardiovascular event (such as stroke, heart attack, etc). I would like to start you on a medication called pravastatin. Most common side effects include some nausea and upset stomach and muscle cramps. It can potentially worsen your glucose a little. Rare but serious side effects include liver issues and muscle breakdown.     If you are ok with it, let me know and I'll send the script to the pharmacy. And please follow up with me in 3 months to recheck the cholesterol and liver function.

## 2018-11-12 NOTE — TELEPHONE ENCOUNTER
Spoke with pt, advised of MD notes. Pt states she is not interested in starting medication. States she is against medication because of side effects. Pt says she is reading books and will diet to work on cholesterol. Scheduled appt in 3 months. KENNETH

## 2018-11-26 ENCOUNTER — PATIENT MESSAGE (OUTPATIENT)
Dept: INTERNAL MEDICINE | Facility: CLINIC | Age: 73
End: 2018-11-26

## 2018-11-26 DIAGNOSIS — Z12.31 ENCOUNTER FOR SCREENING MAMMOGRAM FOR BREAST CANCER: Primary | ICD-10-CM

## 2018-11-28 ENCOUNTER — PATIENT OUTREACH (OUTPATIENT)
Dept: OTHER | Facility: OTHER | Age: 73
End: 2018-11-28

## 2018-11-28 ENCOUNTER — PATIENT MESSAGE (OUTPATIENT)
Dept: INTERNAL MEDICINE | Facility: CLINIC | Age: 73
End: 2018-11-28

## 2018-11-28 NOTE — PROGRESS NOTES
"Last 5 Patient Entered Readings                                      Current 30 Day Average: 128/77     Recent Readings 11/20/2018 11/20/2018 11/20/2018 11/17/2018 11/11/2018    SBP (mmHg) 123 134 131 114 124    DBP (mmHg) 76 70 77 71 71    Pulse 67 65 67 66 62        Digital Medicine: Health  Follow Up    Lifestyle Modifications:    1.Dietary Modifications (Sodium intake <2,000mg/day, food labels, dining out): Encouraged sodium restriction.    2.Physical Activity: Ms. Navarro continues to exercise daily.     3.Medication Therapy: Patient has been compliant with the medication regimen.    4.Patient has the following medication side effects/concerns: None  (Frequency/Alleviating factors/Precipitating factors, etc.)     Follow up with Ms. Grace Navarro completed. Ms. Navarro is doing okay. She reports that she is continuing to take "double of the fluid pill". She had labs drawn before seeing Dr. Dean, and she was pleased to see normal kidney function. Kidney disease also runs in her family, and this has always been a concern for her. No further questions or concerns. Will continue to follow up to achieve health goals.        "

## 2018-11-29 ENCOUNTER — HOSPITAL ENCOUNTER (OUTPATIENT)
Dept: RADIOLOGY | Facility: HOSPITAL | Age: 73
Discharge: HOME | End: 2018-11-29
Attending: INTERNAL MEDICINE
Payer: MEDICARE

## 2018-11-29 DIAGNOSIS — Z12.31 ENCOUNTER FOR SCREENING MAMMOGRAM FOR BREAST CANCER: ICD-10-CM

## 2018-11-29 PROCEDURE — 77063 BREAST TOMOSYNTHESIS BI: CPT | Mod: TC,HCNC

## 2018-11-29 PROCEDURE — 77063 BREAST TOMOSYNTHESIS BI: CPT | Mod: 26,HCNC,, | Performed by: RADIOLOGY

## 2018-11-29 PROCEDURE — 77067 SCR MAMMO BI INCL CAD: CPT | Mod: TC,HCNC

## 2018-11-29 PROCEDURE — 77067 SCR MAMMO BI INCL CAD: CPT | Mod: 26,HCNC,, | Performed by: RADIOLOGY

## 2018-12-03 ENCOUNTER — TELEPHONE (OUTPATIENT)
Dept: RADIOLOGY | Facility: HOSPITAL | Age: 73
End: 2018-12-03

## 2018-12-03 ENCOUNTER — PATIENT MESSAGE (OUTPATIENT)
Dept: INTERNAL MEDICINE | Facility: CLINIC | Age: 73
End: 2018-12-03

## 2018-12-03 NOTE — TELEPHONE ENCOUNTER
Spoke with patient and explained mammogram findings.Patient expressed understanding of results. Patient is scheduled for a abnormal mammogram follow up appointment at [facility] on 12/4/18

## 2018-12-04 ENCOUNTER — HOSPITAL ENCOUNTER (OUTPATIENT)
Dept: RADIOLOGY | Facility: HOSPITAL | Age: 73
Discharge: HOME | End: 2018-12-04
Attending: INTERNAL MEDICINE
Payer: MEDICARE

## 2018-12-04 DIAGNOSIS — R92.8 ABNORMAL MAMMOGRAM: ICD-10-CM

## 2018-12-04 PROCEDURE — 77065 DX MAMMO INCL CAD UNI: CPT | Mod: TC,HCNC,PO

## 2018-12-04 PROCEDURE — 77061 BREAST TOMOSYNTHESIS UNI: CPT | Mod: TC,HCNC,PO

## 2018-12-04 PROCEDURE — 77061 BREAST TOMOSYNTHESIS UNI: CPT | Mod: 26,,, | Performed by: RADIOLOGY

## 2018-12-04 PROCEDURE — 77065 DX MAMMO INCL CAD UNI: CPT | Mod: 26,HCNC,, | Performed by: RADIOLOGY

## 2018-12-05 ENCOUNTER — PATIENT MESSAGE (OUTPATIENT)
Dept: INTERNAL MEDICINE | Facility: CLINIC | Age: 73
End: 2018-12-05

## 2018-12-11 ENCOUNTER — PATIENT OUTREACH (OUTPATIENT)
Dept: OTHER | Facility: OTHER | Age: 73
End: 2018-12-11

## 2018-12-11 NOTE — PROGRESS NOTES
"Patient called to inquire about a program she found on Facebook to treat high blood pressure without medication.   She is tolerating medications without problems. Also reports she is feelign very "peaceful". Monitoring sodium intake.  No questions or concerns.     Last 5 Patient Entered Readings                                      Current 30 Day Average: 122/72     Recent Readings 12/5/2018 11/29/2018 11/29/2018 11/20/2018 11/20/2018    SBP (mmHg) 128 121 131 123 134    DBP (mmHg) 74 66 71 76 70    Pulse 62 65 66 67 65        BP at goal, <130/80 mmHg  Continue monitoring      Hypertension Medications             hydroCHLOROthiazide (HYDRODIURIL) 25 MG tablet Take 1 tablet (25 mg total) by mouth once daily.    metoprolol succinate (TOPROL-XL) 200 MG 24 hr tablet TAKE 1 TABLET (200 MG TOTAL) BY MOUTH ONCE DAILY.          "

## 2019-01-20 DIAGNOSIS — J06.9 VIRAL UPPER RESPIRATORY TRACT INFECTION: ICD-10-CM

## 2019-01-20 DIAGNOSIS — R68.89 FLU-LIKE SYMPTOMS: ICD-10-CM

## 2019-01-21 RX ORDER — FLUTICASONE PROPIONATE 50 MCG
SPRAY, SUSPENSION (ML) NASAL
Qty: 16 G | Refills: 1 | Status: SHIPPED | OUTPATIENT
Start: 2019-01-21 | End: 2019-09-11 | Stop reason: SDUPTHER

## 2019-01-30 ENCOUNTER — OFFICE VISIT (OUTPATIENT)
Dept: OBSTETRICS AND GYNECOLOGY | Facility: CLINIC | Age: 74
End: 2019-01-30
Payer: MEDICARE

## 2019-01-30 ENCOUNTER — PATIENT OUTREACH (OUTPATIENT)
Dept: OTHER | Facility: OTHER | Age: 74
End: 2019-01-30

## 2019-01-30 VITALS
SYSTOLIC BLOOD PRESSURE: 124 MMHG | BODY MASS INDEX: 25.29 KG/M2 | WEIGHT: 148.13 LBS | DIASTOLIC BLOOD PRESSURE: 70 MMHG | HEIGHT: 64 IN

## 2019-01-30 DIAGNOSIS — N95.2 VAGINAL ATROPHY: ICD-10-CM

## 2019-01-30 DIAGNOSIS — Z01.419 WELL WOMAN EXAM WITH ROUTINE GYNECOLOGICAL EXAM: Primary | ICD-10-CM

## 2019-01-30 DIAGNOSIS — Z01.419 PAP TEST, AS PART OF ROUTINE GYNECOLOGICAL EXAMINATION: ICD-10-CM

## 2019-01-30 PROCEDURE — 88175 CYTOPATH C/V AUTO FLUID REDO: CPT | Mod: HCNC

## 2019-01-30 PROCEDURE — G0101 CA SCREEN;PELVIC/BREAST EXAM: HCPCS | Mod: HCNC,S$GLB,, | Performed by: OBSTETRICS & GYNECOLOGY

## 2019-01-30 PROCEDURE — 99999 PR PBB SHADOW E&M-EST. PATIENT-LVL III: CPT | Mod: PBBFAC,HCNC,, | Performed by: OBSTETRICS & GYNECOLOGY

## 2019-01-30 PROCEDURE — 87624 HPV HI-RISK TYP POOLED RSLT: CPT | Mod: HCNC

## 2019-01-30 PROCEDURE — 99999 PR PBB SHADOW E&M-EST. PATIENT-LVL III: ICD-10-PCS | Mod: PBBFAC,HCNC,, | Performed by: OBSTETRICS & GYNECOLOGY

## 2019-01-30 PROCEDURE — G0101 PR CA SCREEN;PELVIC/BREAST EXAM: ICD-10-PCS | Mod: HCNC,S$GLB,, | Performed by: OBSTETRICS & GYNECOLOGY

## 2019-01-30 RX ORDER — ESTRADIOL 0.1 MG/G
1 CREAM VAGINAL
Qty: 42.5 G | Refills: 1 | Status: SHIPPED | OUTPATIENT
Start: 2019-01-31 | End: 2020-07-30

## 2019-01-30 NOTE — PROGRESS NOTES
Subjective:       Patient ID: Grace Navarro is a 73 y.o. female.    Chief Complaint:  Well Woman      History of Present Illness  HPI  This 73 yr old P2 female is here for routine exam.  She is new to me.  Her MD retired and just estab care.  She has not had hyst. She has never had abnormal pap and had normal mammogram one month ago.  She has no family or personal history of breast or ovarian or uterine ca.  She is not sexually active.  She is not on HRT  Took for one yr. She exercises regularly    Walks daily.  No complaints  GYN & OB History  Patient's last menstrual period was 1996.   Date of Last Pap: No result found    OB History    Para Term  AB Living   2 2 2     2   SAB TAB Ectopic Multiple Live Births           2      # Outcome Date GA Lbr Kiran/2nd Weight Sex Delivery Anes PTL Lv   2 Term     M Vag-Spont   BREEZY   1 Term     F Vag-Spont   BREEZY          Review of Systems  Review of Systems   Constitutional: Negative for chills and fever.   Respiratory: Negative for shortness of breath.    Cardiovascular: Negative for chest pain.   Gastrointestinal: Negative for abdominal pain, nausea and vomiting.   Genitourinary: Negative for difficulty urinating, dyspareunia, genital sores, menstrual problem, pelvic pain, vaginal bleeding, vaginal discharge and vaginal pain.   Skin: Negative for wound.   Hematological: Negative for adenopathy.           Objective:   Physical Exam:   Constitutional: She is oriented to person, place, and time. She appears well-developed and well-nourished.    HENT:   Head: Normocephalic.    Eyes: EOM are normal.    Neck: Normal range of motion.    Cardiovascular: Normal rate.     Pulmonary/Chest: Effort normal. She exhibits no mass and no tenderness. Right breast exhibits no inverted nipple, no mass, no skin change and no tenderness. Left breast exhibits no inverted nipple, no mass, no skin change and no tenderness.        Abdominal: Soft. She exhibits no distension.  There is no tenderness.     Genitourinary: Vagina normal and uterus normal. There is no rash, tenderness or lesion on the right labia. There is no rash, tenderness or lesion on the left labia. Uterus is not tender. Cervix is normal. Right adnexum displays no mass, no tenderness and no fullness. Left adnexum displays no mass, no tenderness and no fullness. Cervix exhibits no discharge.   Genitourinary Comments: Some mild atrophy           Musculoskeletal: Normal range of motion.       Neurological: She is alert and oriented to person, place, and time.    Skin: Skin is warm and dry.    Psychiatric: She has a normal mood and affect.          Assessment:        1. Well woman exam with routine gynecological exam    2. Vaginal atrophy    3. Pap test, as part of routine gynecological examination               Plan:      If pap normal today with neg HPV she does not need another and her internist can order her mammogram yearly  Estrace cream for vaginal dryness

## 2019-01-30 NOTE — LETTER
January 30, 2019      Lisa Dean MD  [address]  [facility] [address]           [facility] 5th Floor  [address]  [facility] [address]  Phone: [phone]          Patient: Grace Navarro   MR Number: [id number]   YOB: 1945   Date of Visit: 1/30/2019       Dear Dr. Lisa Dean:    Thank you for referring Grace Navarro to me for evaluation. Attached you will find relevant portions of my assessment and plan of care.    If you have questions, please do not hesitate to call me. I look forward to following Grace Navarro along with you.    Sincerely,    Zara Torres MD    Enclosure  CC:  No Recipients    If you would like to receive this communication electronically, please contact [email or web address] or [phone] to request more information on [employer] access.    For providers and/or their staff who would like to refer a patient to [facility], please contact us through our one-stop-shop provider referral line, [facility], at [phone].    If you feel you have received this communication in error or would no longer like to receive these types of communications, please e-mail [email or web address]

## 2019-01-30 NOTE — PROGRESS NOTES
Last 5 Patient Entered Readings                                      Current 30 Day Average: 138/83     Recent Readings 1/21/2019 1/21/2019 1/19/2019 1/19/2019 1/18/2019    SBP (mmHg) 123 141 141 166 142    DBP (mmHg) 73 77 81 81 80    Pulse 65 66 64 66 60        Digital Medicine: Health  Follow Up    Lifestyle Modifications:    1.Dietary Modifications (Sodium intake <2,000mg/day, food labels, dining out): Deferred    2.Physical Activity: Deferred    3.Medication Therapy: Patient has been compliant with the medication regimen.    4.Patient has the following medication side effects/concerns: None  (Frequency/Alleviating factors/Precipitating factors, etc.)     Follow up with Ms. Grace Childsluchokylee Melanie completed. MsDanial Navarro says she is doing okay. She was in the clinic today, and unable to speak. Encouraged patient to continue to submit frequent BP readings, and advised her to call back at any time with questions or concerns. She thanks me for calling. Will continue to follow up to achieve health goals.

## 2019-01-31 ENCOUNTER — PATIENT MESSAGE (OUTPATIENT)
Dept: OBSTETRICS AND GYNECOLOGY | Facility: CLINIC | Age: 74
End: 2019-01-31

## 2019-02-04 ENCOUNTER — PATIENT MESSAGE (OUTPATIENT)
Dept: ADMINISTRATIVE | Facility: OTHER | Age: 74
End: 2019-02-04

## 2019-02-05 LAB
HPV HR 12 DNA CVX QL NAA+PROBE: NEGATIVE
HPV16 AG SPEC QL: NEGATIVE
HPV18 DNA SPEC QL NAA+PROBE: NEGATIVE

## 2019-02-06 ENCOUNTER — PATIENT MESSAGE (OUTPATIENT)
Dept: OBSTETRICS AND GYNECOLOGY | Facility: CLINIC | Age: 74
End: 2019-02-06

## 2019-02-08 NOTE — PROGRESS NOTES
"Last 5 Patient Entered Readings                                      Current 30 Day Average: 131/80     Recent Readings 2/6/2019 2/4/2019 2/1/2019 1/21/2019 1/21/2019    SBP (mmHg) 128 109 119 123 141    DBP (mmHg) 70 70 66 73 77    Pulse 61 61 64 65 66        Called patient to follow up about low BP recorded on 2/4. Patient states that she was feeling "poorly" with low BP on 2/4. Since then, she has cute her hctz in half. She is feeling better. Will have PharmD, RAMON Rodney, follow up next week.       "

## 2019-02-11 ENCOUNTER — PATIENT OUTREACH (OUTPATIENT)
Dept: OTHER | Facility: OTHER | Age: 74
End: 2019-02-11

## 2019-02-11 RX ORDER — HYDROCHLOROTHIAZIDE 12.5 MG/1
12.5 TABLET ORAL DAILY
COMMUNITY
End: 2019-03-07 | Stop reason: SDUPTHER

## 2019-02-11 NOTE — PROGRESS NOTES
"Ms. Navarro continues to have episodes of feelign "faint". She thought it was due to HCTZ 25mg so she decreased on her own to 12.5mg, but noticed no change. She is now convinced that it may not be there water pill. She will see Dr. Dean on 2/20.    Last 5 Patient Entered Readings                                      Current 30 Day Average: 133/81     Recent Readings 2/6/2019 2/4/2019 2/1/2019 1/21/2019 1/21/2019    SBP (mmHg) 128 109 119 123 141    DBP (mmHg) 70 70 66 73 77    Pulse 61 61 64 65 66          Recommended patient keep a detailed log of how she is feeling everyday  Decrease dose to 12.5mg daily  Continue monitoring    Hypertension Medications             hydroCHLOROthiazide (HYDRODIURIL) 12.5 MG Tab Take 12.5 mg by mouth once daily.    metoprolol succinate (TOPROL-XL) 200 MG 24 hr tablet TAKE 1 TABLET (200 MG TOTAL) BY MOUTH ONCE DAILY.          "

## 2019-02-20 ENCOUNTER — OFFICE VISIT (OUTPATIENT)
Dept: INTERNAL MEDICINE | Facility: CLINIC | Age: 74
End: 2019-02-20
Payer: MEDICARE

## 2019-02-20 VITALS
BODY MASS INDEX: 24.99 KG/M2 | DIASTOLIC BLOOD PRESSURE: 70 MMHG | TEMPERATURE: 98 F | WEIGHT: 146.38 LBS | HEART RATE: 88 BPM | HEIGHT: 64 IN | SYSTOLIC BLOOD PRESSURE: 124 MMHG

## 2019-02-20 DIAGNOSIS — Z12.11 COLON CANCER SCREENING: ICD-10-CM

## 2019-02-20 DIAGNOSIS — Z12.83 SKIN CANCER SCREENING: ICD-10-CM

## 2019-02-20 DIAGNOSIS — E78.5 HYPERLIPIDEMIA, UNSPECIFIED HYPERLIPIDEMIA TYPE: Primary | ICD-10-CM

## 2019-02-20 DIAGNOSIS — Z82.3 FAMILY HISTORY OF STROKE: ICD-10-CM

## 2019-02-20 DIAGNOSIS — I10 BENIGN ESSENTIAL HYPERTENSION: ICD-10-CM

## 2019-02-20 DIAGNOSIS — K58.9 IRRITABLE BOWEL SYNDROME, UNSPECIFIED TYPE: ICD-10-CM

## 2019-02-20 DIAGNOSIS — R73.02 IGT (IMPAIRED GLUCOSE TOLERANCE): ICD-10-CM

## 2019-02-20 DIAGNOSIS — R14.0 BLOATING: ICD-10-CM

## 2019-02-20 PROCEDURE — 1101F PR PT FALLS ASSESS DOC 0-1 FALLS W/OUT INJ PAST YR: ICD-10-PCS | Mod: HCNC,CPTII,S$GLB, | Performed by: INTERNAL MEDICINE

## 2019-02-20 PROCEDURE — 3074F SYST BP LT 130 MM HG: CPT | Mod: HCNC,CPTII,S$GLB, | Performed by: INTERNAL MEDICINE

## 2019-02-20 PROCEDURE — 3078F PR MOST RECENT DIASTOLIC BLOOD PRESSURE < 80 MM HG: ICD-10-PCS | Mod: HCNC,CPTII,S$GLB, | Performed by: INTERNAL MEDICINE

## 2019-02-20 PROCEDURE — 1101F PT FALLS ASSESS-DOCD LE1/YR: CPT | Mod: HCNC,CPTII,S$GLB, | Performed by: INTERNAL MEDICINE

## 2019-02-20 PROCEDURE — 99214 PR OFFICE/OUTPT VISIT, EST, LEVL IV, 30-39 MIN: ICD-10-PCS | Mod: HCNC,S$GLB,, | Performed by: INTERNAL MEDICINE

## 2019-02-20 PROCEDURE — 99214 OFFICE O/P EST MOD 30 MIN: CPT | Mod: HCNC,S$GLB,, | Performed by: INTERNAL MEDICINE

## 2019-02-20 PROCEDURE — 99999 PR PBB SHADOW E&M-EST. PATIENT-LVL IV: ICD-10-PCS | Mod: PBBFAC,HCNC,, | Performed by: INTERNAL MEDICINE

## 2019-02-20 PROCEDURE — 3078F DIAST BP <80 MM HG: CPT | Mod: HCNC,CPTII,S$GLB, | Performed by: INTERNAL MEDICINE

## 2019-02-20 PROCEDURE — 99999 PR PBB SHADOW E&M-EST. PATIENT-LVL IV: CPT | Mod: PBBFAC,HCNC,, | Performed by: INTERNAL MEDICINE

## 2019-02-20 PROCEDURE — 3074F PR MOST RECENT SYSTOLIC BLOOD PRESSURE < 130 MM HG: ICD-10-PCS | Mod: HCNC,CPTII,S$GLB, | Performed by: INTERNAL MEDICINE

## 2019-02-20 RX ORDER — DICYCLOMINE HYDROCHLORIDE 10 MG/1
10 CAPSULE ORAL
Qty: 120 CAPSULE | Refills: 0 | Status: SHIPPED | OUTPATIENT
Start: 2019-02-20 | End: 2019-03-22

## 2019-02-20 NOTE — PROGRESS NOTES
"Subjective:       Patient ID: Grace Navarro is a 73 y.o. female.    Chief Complaint: Follow-up    HPI     HTN - hctz 25mg daily, toprol xl 200mg daily.   Part of digital HTN.   10 days ago reports decreased her hctz to 12.5mg daily as she was feeling fatigued and dizziness in the afternoon. Uncertain if it helped w/ fatigue; reports no more dizziness after decreasing the hctz.     Chronic LBP w/ h/o laminectomy. Reports neuropathic symptoms of BLE. S/P PT but doesn't feel like it helped w/ strength.   Last had MRI L spine 11/2015.  At our last visit 11/2018, referred to back and spine.   Pt reports the L leg pain is resolved now though.     Cholesterol was high on labs. Didn't want to start statin at the time. Was going to try dietary changes and here for f/u today.  She stopped eating shrimp. Decreased cheese.  Drinks about a little over a glass of wine a night.   Dad w h/o stroke. Wonders if she needed to see cards. Last seen Dr Villarreal in 2015 for syncope work up.    C/o issues w/ IBS - flatulence all the time. Reports the flatulence is painful. Feel bloated. No diarrhea/constipation. Eats a lot fiber. No nausea/vomiting. Beans, veggies and legumes all cause flatulence.   Taking gas relief prevention pills OTC.   Previously on bentyl and it helped.   No unintentional weight loss/decreased appetite/fevers/chills.    Multinodular goiter. Ordered US at last visit and pt didn't realize need to make appt.    Review of Systems  as above in HPI.     Objective:      Physical Exam    /70 (BP Location: Left arm, Patient Position: Sitting, BP Method: Medium (Manual))   Pulse 88   Temp 98 °F (36.7 °C)   Ht 5' 4" (1.626 m)   Wt 66.4 kg (146 lb 6.2 oz)   LMP 06/14/1996   BMI 25.13 kg/m²     GEN - A+OX4, NAD   HEENT - PERRL, EOMI, OP clear. MMM  Neck no carotid bruit  CV - RRR, no m/r   Chest - CTAB, no wheezing or rhonchi  Abd - S/ND/+BS/NT. No masses palpated.  Ext - 2+BDP and radial pulses. No C/C/E.  Neuro " - 5/5 BUE and BLE strength. Decreased DTRs.  MSK - no spinal tenderness to palp  Skin - No rash.    Previous labs reviewed w pt    Assessment/Plan     Grace was seen today for follow-up.    Diagnoses and all orders for this visit:    Hyperlipidemia, unspecified hyperlipidemia type - Pt hesitant to start cholesterol medicine. Made some dietary changes. Repeat FLP when she's fasting. Would like to see cardiologist for second opinion. Will refer.   -     Lipid panel; Future  -     Ambulatory Referral to Cardiology    IGT (impaired glucose tolerance) - fasting glucose on IGT range. Will check a1c.   -     Hemoglobin A1c; Future    Colon cancer screening  -     Cancel: Case request GI: COLONOSCOPY    Irritable bowel syndrome, unspecified  Type - reports has this for many years and unchanged. Wanted to go back to trying bentyl (on this in 2013 and 2014). Received letter to repeat Cscope from [facility] - last one done 2/2012. Reports normal and doesn't want to do Cscope at this time. If continued symptoms, recommend proceeding w/ cscope and consider US.   -     dicyclomine (BENTYL) 10 MG capsule; Take 1 capsule (10 mg total) by mouth 4 (four) times daily before meals and nightly.  -     H. pylori antigen, stool; Future    Benign essential hypertension - Stable and controlled. Continue current medications. Ok w/ hctz 12.5mg daily. Part of digital HTN.     Bloating  -     Lipase; Future  -     Tissue transglutaminase, IgA; Future  -     IgA; Future    Family history of stroke  -     Ambulatory Referral to Cardiology    Skin cancer screening  -     Ambulatory Referral to Dermatology      Follow-up in about 1 year (around 2/20/2020).      Lisa Dean MD  Department of Internal Medicine - [facility]  11:09 AM

## 2019-02-20 NOTE — PATIENT INSTRUCTIONS
Fasting labs another day. Return stool test to check for H pylori.   Schedule thyroid ultrasound to recheck thyroid nodule.   Make an appointment with cardiology for screening.   Make appointment with dermatology for skin cancer screening.   If you change your mind about colonoscopy let me know and I can order it.     I sent in the refill for Bentyl (IBS medicine) to pharmacy.

## 2019-02-21 ENCOUNTER — LAB VISIT (OUTPATIENT)
Dept: LAB | Facility: HOSPITAL | Age: 74
End: 2019-02-21
Attending: INTERNAL MEDICINE
Payer: MEDICARE

## 2019-02-21 DIAGNOSIS — R73.02 IGT (IMPAIRED GLUCOSE TOLERANCE): ICD-10-CM

## 2019-02-21 DIAGNOSIS — K58.9 IRRITABLE BOWEL SYNDROME, UNSPECIFIED TYPE: ICD-10-CM

## 2019-02-21 DIAGNOSIS — R14.0 BLOATING: ICD-10-CM

## 2019-02-21 DIAGNOSIS — E78.5 HYPERLIPIDEMIA, UNSPECIFIED HYPERLIPIDEMIA TYPE: ICD-10-CM

## 2019-02-21 LAB
CHOLEST SERPL-MCNC: 249 MG/DL
CHOLEST/HDLC SERPL: 3.7 {RATIO}
ESTIMATED AVG GLUCOSE: 111 MG/DL
HBA1C MFR BLD HPLC: 5.5 %
HDLC SERPL-MCNC: 67 MG/DL
HDLC SERPL: 26.9 %
IGA SERPL-MCNC: 130 MG/DL
LDLC SERPL CALC-MCNC: 158.2 MG/DL
LIPASE SERPL-CCNC: 19 U/L
NONHDLC SERPL-MCNC: 182 MG/DL
TRIGL SERPL-MCNC: 119 MG/DL

## 2019-02-21 PROCEDURE — 83036 HEMOGLOBIN GLYCOSYLATED A1C: CPT | Mod: HCNC

## 2019-02-21 PROCEDURE — 83690 ASSAY OF LIPASE: CPT | Mod: HCNC

## 2019-02-21 PROCEDURE — 83516 IMMUNOASSAY NONANTIBODY: CPT | Mod: HCNC

## 2019-02-21 PROCEDURE — 82784 ASSAY IGA/IGD/IGG/IGM EACH: CPT | Mod: HCNC

## 2019-02-21 PROCEDURE — 87338 HPYLORI STOOL AG IA: CPT | Mod: HCNC

## 2019-02-21 PROCEDURE — 36415 COLL VENOUS BLD VENIPUNCTURE: CPT | Mod: HCNC

## 2019-02-21 PROCEDURE — 80061 LIPID PANEL: CPT | Mod: HCNC

## 2019-02-22 ENCOUNTER — PATIENT MESSAGE (OUTPATIENT)
Dept: INTERNAL MEDICINE | Facility: CLINIC | Age: 74
End: 2019-02-22

## 2019-02-22 DIAGNOSIS — E78.5 HYPERLIPIDEMIA, UNSPECIFIED HYPERLIPIDEMIA TYPE: ICD-10-CM

## 2019-02-22 DIAGNOSIS — R73.02 IGT (IMPAIRED GLUCOSE TOLERANCE): Primary | ICD-10-CM

## 2019-02-22 LAB — TTG IGA SER-ACNC: 4 UNITS

## 2019-02-22 NOTE — TELEPHONE ENCOUNTER
Spoke with pt, advised of message below. Pt is very worried about cholesterol, she says she does not eat any of the above items except her glasses of alcohol. she says she is wiling to start medication at this time. She would like It sent through [employer]. Thank you    Pt also was very upset saying she has prediabetes. I explained to pt that she is in the normal range and not a prediabetic. Pt says she is close to the range so I advised her to stay away from sweets, pastas, snack. Pt says she does not eat any of those.  Pt says waiting a year to see PCP is too long because she wont know how her health is. I advised pt she can be seen sooner all she has to do is schedule appt when available

## 2019-02-22 NOTE — TELEPHONE ENCOUNTER
Please call and notify pt:    Things that can elevate cholesterol includes: alcohol, dairy, creams, butter, shellfish, etc. If she snacks/goes out to eat, there's likely cholesterol in those foods. I recommend paying attention to the food labels.   We've discussed previously trial of diet to lower cholesterol. Now that it's a little higher, I would recommend a low dose cholesterol medicine henny if she's worried about stroke due to family history. I'll send in pravastatin 20mg daily to [facility].   Sugars are normal. Pancreatic enzyme is also normal. H pylori in the stool and celiac disease tests are pending.

## 2019-02-25 RX ORDER — PRAVASTATIN SODIUM 40 MG/1
40 TABLET ORAL DAILY
Qty: 90 TABLET | Refills: 3 | Status: SHIPPED | OUTPATIENT
Start: 2019-02-25 | End: 2019-05-13

## 2019-02-26 ENCOUNTER — PATIENT MESSAGE (OUTPATIENT)
Dept: INTERNAL MEDICINE | Facility: CLINIC | Age: 74
End: 2019-02-26

## 2019-02-26 NOTE — PROGRESS NOTES
Patient found to be stable by Dr. Dean.  Continue monitoring  BP at goal, <130/80 mmHg    Last 5 Patient Entered Readings                                      Current 30 Day Average: 120/68     Recent Readings 2/23/2019 2/11/2019 2/11/2019 2/11/2019 2/6/2019    SBP (mmHg) 121 124 134 131 128    DBP (mmHg) 69 66 67 71 70    Pulse 63 60 60 59 61

## 2019-02-27 LAB — H PYLORI AG STL QL IA: NOT DETECTED

## 2019-03-02 ENCOUNTER — PATIENT MESSAGE (OUTPATIENT)
Dept: INTERNAL MEDICINE | Facility: CLINIC | Age: 74
End: 2019-03-02

## 2019-03-07 ENCOUNTER — HOSPITAL ENCOUNTER (OUTPATIENT)
Dept: CARDIOLOGY | Facility: CLINIC | Age: 74
Discharge: HOME | End: 2019-03-07
Payer: MEDICARE

## 2019-03-07 ENCOUNTER — OFFICE VISIT (OUTPATIENT)
Dept: CARDIOLOGY | Facility: CLINIC | Age: 74
End: 2019-03-07
Payer: MEDICARE

## 2019-03-07 VITALS
HEIGHT: 64 IN | WEIGHT: 148.56 LBS | BODY MASS INDEX: 25.36 KG/M2 | DIASTOLIC BLOOD PRESSURE: 72 MMHG | HEART RATE: 61 BPM | SYSTOLIC BLOOD PRESSURE: 156 MMHG

## 2019-03-07 DIAGNOSIS — I10 ESSENTIAL HYPERTENSION: ICD-10-CM

## 2019-03-07 DIAGNOSIS — E78.00 PURE HYPERCHOLESTEROLEMIA: Primary | ICD-10-CM

## 2019-03-07 PROCEDURE — 93000 EKG 12-LEAD: ICD-10-PCS | Mod: HCNC,S$GLB,, | Performed by: INTERNAL MEDICINE

## 2019-03-07 PROCEDURE — 93000 ELECTROCARDIOGRAM COMPLETE: CPT | Mod: HCNC,S$GLB,, | Performed by: INTERNAL MEDICINE

## 2019-03-07 PROCEDURE — 3077F PR MOST RECENT SYSTOLIC BLOOD PRESSURE >= 140 MM HG: ICD-10-PCS | Mod: HCNC,CPTII,S$GLB, | Performed by: INTERNAL MEDICINE

## 2019-03-07 PROCEDURE — 3078F DIAST BP <80 MM HG: CPT | Mod: HCNC,CPTII,S$GLB, | Performed by: INTERNAL MEDICINE

## 2019-03-07 PROCEDURE — 99203 PR OFFICE/OUTPT VISIT, NEW, LEVL III, 30-44 MIN: ICD-10-PCS | Mod: HCNC,S$GLB,, | Performed by: INTERNAL MEDICINE

## 2019-03-07 PROCEDURE — 99203 OFFICE O/P NEW LOW 30 MIN: CPT | Mod: HCNC,S$GLB,, | Performed by: INTERNAL MEDICINE

## 2019-03-07 PROCEDURE — 99999 PR PBB SHADOW E&M-EST. PATIENT-LVL III: ICD-10-PCS | Mod: PBBFAC,HCNC,, | Performed by: INTERNAL MEDICINE

## 2019-03-07 PROCEDURE — 1101F PR PT FALLS ASSESS DOC 0-1 FALLS W/OUT INJ PAST YR: ICD-10-PCS | Mod: HCNC,CPTII,S$GLB, | Performed by: INTERNAL MEDICINE

## 2019-03-07 PROCEDURE — 3078F PR MOST RECENT DIASTOLIC BLOOD PRESSURE < 80 MM HG: ICD-10-PCS | Mod: HCNC,CPTII,S$GLB, | Performed by: INTERNAL MEDICINE

## 2019-03-07 PROCEDURE — 99999 PR PBB SHADOW E&M-EST. PATIENT-LVL III: CPT | Mod: PBBFAC,HCNC,, | Performed by: INTERNAL MEDICINE

## 2019-03-07 PROCEDURE — 1101F PT FALLS ASSESS-DOCD LE1/YR: CPT | Mod: HCNC,CPTII,S$GLB, | Performed by: INTERNAL MEDICINE

## 2019-03-07 PROCEDURE — 3077F SYST BP >= 140 MM HG: CPT | Mod: HCNC,CPTII,S$GLB, | Performed by: INTERNAL MEDICINE

## 2019-03-07 RX ORDER — HYDROCHLOROTHIAZIDE 12.5 MG/1
12.5 TABLET ORAL DAILY
Qty: 90 TABLET | Refills: 3 | Status: SHIPPED | OUTPATIENT
Start: 2019-03-07 | End: 2019-06-03 | Stop reason: SDUPTHER

## 2019-03-07 NOTE — LETTER
March 7, 2019      Lisa Dean MD  [address]  [facility] [address]           [facility]  [address]  [facility] [address]  Phone: [phone]          Patient: Grace Navarro   MR Number: [id number]   YOB: 1945   Date of Visit: 3/7/2019       Dear Dr. Lisa Dean:    Thank you for referring Grace Navarro to me for evaluation. Attached you will find relevant portions of my assessment and plan of care.    If you have questions, please do not hesitate to call me. I look forward to following Grace Navarro along with you.    Sincerely,    Lori Gibson MD    Enclosure  CC:  No Recipients    If you would like to receive this communication electronically, please contact [email or web address] or [phone] to request more information on [employer] Link access.    For providers and/or their staff who would like to refer a patient to [facility], please contact us through our one-stop-shop provider referral line, [facility] , at [phone].    If you feel you have received this communication in error or would no longer like to receive these types of communications, please e-mail [email or web address]          Private Residence

## 2019-03-07 NOTE — PROGRESS NOTES
Subjective:   Patient ID:  Grace Navarro is a 73 y.o. female who presents for evaluation of Hyperlipidemia; Medication Management (pravastatin); and Family history of stroke      HPI: She presents for evaluation of HTN and hyperlipidemia. She has been on metoprolol since Dominique and was subsequently started on HCTZ which she has tolerated well. She is followed in [facility]. She was recently prescribed pravastatin for an elevated 10 year ASCVD score of 17%. She has no other cardiac history. Grace Navarro denies any chest pain, shortness of breath, PND, orthopnea, palpitations, leg edema, or syncope. She eats a clean diet and walks 4 miles daily. She had an echo in 2014 which showed an LVEF of 60-65%.    ECG : Sinus bradycardia 59 bpm    Past Medical History:   Diagnosis Date    Anxiety     Rene's disease     Chronic low back pain     Depression     Goiter, nodular     Hyperlipidemia     Hypertension     Irritable bowel     Neuromuscular disorder     Osteopenia of multiple sites 5/29/2017    PUD (peptic ulcer disease)     Subarachnoid hemorrhage 2014    Varicose veins        Past Surgical History:   Procedure Laterality Date    APPENDECTOMY      BELPHAROPTOSIS REPAIR Bilateral     BREAST BIOPSY      RIGHT    CATARACT EXTRACTION W/  INTRAOCULAR LENS IMPLANT Left 08/03/2016        CATARACT EXTRACTION W/  INTRAOCULAR LENS IMPLANT Right 09/21/2016        INSERTION-INTRAOCULAR LENS (IOL) Right 9/21/2016    Performed by [facility] at [facility] OR 1ST FLR    INSERTION-INTRAOCULAR LENS (IOL) Left 8/3/2016    Performed by [facility] MD at [facility] OR 1ST FLR    PHACOEMULSIFICATION-ASPIRATION-CATARACT Right 9/21/2016    Performed by [facility] MD at [facility] OR 1ST FLR    PHACOEMULSIFICATION-ASPIRATION-CATARACT Left 8/3/2016    Performed by [facility] at [facility] OR 1ST FLR    SPINE SURGERY  07/2012    Fusion @ L4L5     TONSILLECTOMY         Social History     Socioeconomic History    Marital status:      Spouse name: None    Number of children: None    Years of education: None    Highest education level: None   Social Needs    Financial resource strain: None    Food insecurity - worry: None    Food insecurity - inability: None    Transportation needs - medical: None    Transportation needs - non-medical: None   Occupational History     Employer: retired   Tobacco Use    Smoking status: Former Smoker     Packs/day: 1.00     Years: 10.00     Pack years: 10.00     Types: Cigarettes    Smokeless tobacco: Never Used    Tobacco comment: quit 1975   Substance and Sexual Activity    Alcohol use: Yes     Alcohol/week: 8.4 oz     Types: 14 Glasses of wine per week     Comment: socially    Drug use: No    Sexual activity: Not Currently     Partners: Male     Comment:    Other Topics Concern    Are you pregnant or think you may be? Not Asked    Breast-feeding Not Asked   Social History Narrative    . 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       Family History   Problem Relation Age of Onset    Hypertension Mother     Kidney failure Mother     Hypertension Father     Coronary artery disease Father     Prostate cancer Father     Stroke Father     Eczema Son     Diabetes Brother     Thyroid cancer Neg Hx     Breast cancer Neg Hx     Colon cancer Neg Hx     Ovarian cancer Neg Hx        Patient's Medications   New Prescriptions    No medications on file   Previous Medications    ASCORBIC ACID (VITAMIN C) 1000 MG TABLET    Take 1,000 mg by mouth once daily.    AZELASTINE (ASTELIN) 137 MCG (0.1 %) NASAL SPRAY    USE ONE SPRAY(S) IN EACH NOSTRIL TWICE DAILY    CALCIUM-MAGNESIUM ORAL    Take 1 capsule by mouth Daily.    CHOLECALCIFEROL, VITAMIN D3, 1,000 UNIT CAPSULE    Take 1,000 Units by mouth once daily.    CYANOCOBALAMIN 500 MCG TABLET    Take 500 mcg by  mouth once daily.    DICYCLOMINE (BENTYL) 10 MG CAPSULE    Take 1 capsule (10 mg total) by mouth 4 (four) times daily before meals and nightly.    ESTRADIOL (ESTRACE) 0.01 % (0.1 MG/GRAM) VAGINAL CREAM    Place 1 g vaginally twice a week.    FLUTICASONE (FLONASE) 50 MCG/ACTUATION NASAL SPRAY    USE ONE SPRAY(S) IN EACH NOSTRIL TWICE DAILY    HYDROXYZINE HCL (ATARAX) 10 MG TAB    Take 1 tablet (10 mg total) by mouth 3 (three) times daily as needed (anxiety).    METOPROLOL SUCCINATE (TOPROL-XL) 200 MG 24 HR TABLET    TAKE 1 TABLET (200 MG TOTAL) BY MOUTH ONCE DAILY.    PRAVASTATIN (PRAVACHOL) 40 MG TABLET    Take 1 tablet (40 mg total) by mouth once daily.   Modified Medications    Modified Medication Previous Medication    HYDROCHLOROTHIAZIDE (HYDRODIURIL) 12.5 MG TAB hydroCHLOROthiazide (HYDRODIURIL) 12.5 MG Tab       Take 1 tablet (12.5 mg total) by mouth once daily.    Take 12.5 mg by mouth once daily.   Discontinued Medications    No medications on file       Review of Systems   Constitution: Negative for weakness, malaise/fatigue and weight gain.   HENT: Negative for hearing loss.    Eyes: Negative for visual disturbance.   Cardiovascular: Negative for chest pain, claudication, dyspnea on exertion, leg swelling, near-syncope, orthopnea, palpitations, paroxysmal nocturnal dyspnea and syncope.   Respiratory: Negative for cough, shortness of breath, sleep disturbances due to breathing, snoring and wheezing.    Endocrine: Negative for cold intolerance, heat intolerance, polydipsia, polyphagia and polyuria.   Hematologic/Lymphatic: Negative for bleeding problem. Does not bruise/bleed easily.   Skin: Negative for rash and suspicious lesions.   Musculoskeletal: Negative for arthritis, falls, joint pain, muscle weakness and myalgias.   Gastrointestinal: Negative for abdominal pain, change in bowel habit, constipation, diarrhea, heartburn, hematochezia, melena and nausea.   Genitourinary: Negative for hematuria and  "nocturia.   Neurological: Negative for excessive daytime sleepiness, dizziness, headaches, light-headedness and loss of balance.   Psychiatric/Behavioral: Negative for depression. The patient is not nervous/anxious.    Allergic/Immunologic: Negative for environmental allergies.       BP (!) 156/72 (BP Location: Left arm, Patient Position: Sitting, BP Method: Medium (Automatic))   Pulse 61   Ht 5' 4" (1.626 m)   Wt 67.4 kg (148 lb 9.4 oz)   LMP 06/14/1996   BMI 25.51 kg/m²     Objective:   Physical Exam   Constitutional: She is oriented to person, place, and time. She appears well-developed and well-nourished.        HENT:   Head: Normocephalic and atraumatic.   Mouth/Throat: Oropharynx is clear and moist.   Eyes: Conjunctivae and EOM are normal. Pupils are equal, round, and reactive to light. No scleral icterus.   Neck: Normal range of motion. Neck supple. No hepatojugular reflux and no JVD present. No tracheal deviation present. No thyromegaly present.   Cardiovascular: Normal rate, regular rhythm, normal heart sounds and intact distal pulses. PMI is not displaced.   Pulses:       Carotid pulses are 2+ on the right side, and 2+ on the left side.       Radial pulses are 2+ on the right side, and 2+ on the left side.        Dorsalis pedis pulses are 2+ on the right side, and 2+ on the left side.        Posterior tibial pulses are 2+ on the right side, and 2+ on the left side.   Pulmonary/Chest: Effort normal and breath sounds normal.   Abdominal: Soft. Bowel sounds are normal. She exhibits no distension and no mass. There is no hepatosplenomegaly. There is no tenderness.   Musculoskeletal: She exhibits no edema or tenderness.   Lymphadenopathy:     She has no cervical adenopathy.   Neurological: She is alert and oriented to person, place, and time.   Skin: Skin is warm and dry. No rash noted. No cyanosis or erythema. Nails show no clubbing.   Psychiatric: She has a normal mood and affect. Her speech is normal " and behavior is normal.       Lab Results   Component Value Date     11/08/2018    K 4.0 11/08/2018     11/08/2018    CO2 31 (H) 11/08/2018    BUN 19 11/08/2018    CREATININE 0.7 11/08/2018     (H) 11/08/2018    HGBA1C 5.5 02/21/2019    MG 2.2 12/14/2016    AST 18 11/08/2018    ALT 15 11/08/2018    ALBUMIN 3.8 11/08/2018    PROT 7.2 11/08/2018    BILITOT 0.3 11/08/2018    WBC 4.26 11/08/2018    HGB 12.2 11/08/2018    HCT 37.6 11/08/2018    MCV 96 11/08/2018     11/08/2018    INR 1.0 02/08/2014    TSH 3.288 11/08/2018    CHOL 249 (H) 02/21/2019    HDL 67 02/21/2019    LDLCALC 158.2 02/21/2019    TRIG 119 02/21/2019       Assessment:     1. Pure hypercholesterolemia : Recently started pravastatin.   2. Essential hypertension : Blood pressure is at goal. I have made no changes. Continue current regimen. She is enrolled in [facility]. Continue healthy lifestyle.       Plan:     Grace was seen today for hyperlipidemia, medication management and family history of stroke.    Diagnoses and all orders for this visit:    Pure hypercholesterolemia  -     EKG 12-lead    Essential hypertension  -     EKG 12-lead    Other orders  -     hydroCHLOROthiazide (HYDRODIURIL) 12.5 MG Tab; Take 1 tablet (12.5 mg total) by mouth once daily.        Thank you for allowing me to participate in this patient's care. Please do not hesitate to contact me with any questions or concerns.

## 2019-03-08 ENCOUNTER — OFFICE VISIT (OUTPATIENT)
Dept: FAMILY MEDICINE | Facility: CLINIC | Age: 74
End: 2019-03-08
Payer: MEDICARE

## 2019-03-08 ENCOUNTER — HOSPITAL ENCOUNTER (OUTPATIENT)
Dept: RADIOLOGY | Facility: HOSPITAL | Age: 74
Discharge: HOME | End: 2019-03-08
Attending: FAMILY MEDICINE
Payer: MEDICARE

## 2019-03-08 VITALS
WEIGHT: 147.94 LBS | BODY MASS INDEX: 25.25 KG/M2 | HEART RATE: 69 BPM | DIASTOLIC BLOOD PRESSURE: 68 MMHG | HEIGHT: 64 IN | SYSTOLIC BLOOD PRESSURE: 128 MMHG | OXYGEN SATURATION: 95 %

## 2019-03-08 DIAGNOSIS — M25.531 RIGHT WRIST PAIN: ICD-10-CM

## 2019-03-08 DIAGNOSIS — M25.531 RIGHT WRIST PAIN: Primary | ICD-10-CM

## 2019-03-08 PROCEDURE — 99214 OFFICE O/P EST MOD 30 MIN: CPT | Mod: HCNC,S$GLB,, | Performed by: FAMILY MEDICINE

## 2019-03-08 PROCEDURE — 1101F PT FALLS ASSESS-DOCD LE1/YR: CPT | Mod: HCNC,CPTII,S$GLB, | Performed by: FAMILY MEDICINE

## 2019-03-08 PROCEDURE — 3078F DIAST BP <80 MM HG: CPT | Mod: HCNC,CPTII,S$GLB, | Performed by: FAMILY MEDICINE

## 2019-03-08 PROCEDURE — 99214 PR OFFICE/OUTPT VISIT, EST, LEVL IV, 30-39 MIN: ICD-10-PCS | Mod: HCNC,S$GLB,, | Performed by: FAMILY MEDICINE

## 2019-03-08 PROCEDURE — 1101F PR PT FALLS ASSESS DOC 0-1 FALLS W/OUT INJ PAST YR: ICD-10-PCS | Mod: HCNC,CPTII,S$GLB, | Performed by: FAMILY MEDICINE

## 2019-03-08 PROCEDURE — 3074F SYST BP LT 130 MM HG: CPT | Mod: HCNC,CPTII,S$GLB, | Performed by: FAMILY MEDICINE

## 2019-03-08 PROCEDURE — 73100 X-RAY EXAM OF WRIST: CPT | Mod: 26,HCNC,RT, | Performed by: RADIOLOGY

## 2019-03-08 PROCEDURE — 99999 PR PBB SHADOW E&M-EST. PATIENT-LVL IV: ICD-10-PCS | Mod: PBBFAC,HCNC,, | Performed by: FAMILY MEDICINE

## 2019-03-08 PROCEDURE — 73100 X-RAY EXAM OF WRIST: CPT | Mod: TC,HCNC,FY,RT

## 2019-03-08 PROCEDURE — 3078F PR MOST RECENT DIASTOLIC BLOOD PRESSURE < 80 MM HG: ICD-10-PCS | Mod: HCNC,CPTII,S$GLB, | Performed by: FAMILY MEDICINE

## 2019-03-08 PROCEDURE — 99999 PR PBB SHADOW E&M-EST. PATIENT-LVL IV: CPT | Mod: PBBFAC,HCNC,, | Performed by: FAMILY MEDICINE

## 2019-03-08 PROCEDURE — 3074F PR MOST RECENT SYSTOLIC BLOOD PRESSURE < 130 MM HG: ICD-10-PCS | Mod: HCNC,CPTII,S$GLB, | Performed by: FAMILY MEDICINE

## 2019-03-08 PROCEDURE — 73100 XR WRIST 2 VIEW RIGHT: ICD-10-PCS | Mod: 26,HCNC,RT, | Performed by: RADIOLOGY

## 2019-03-08 RX ORDER — DICLOFENAC SODIUM 10 MG/G
2 GEL TOPICAL DAILY
Qty: 100 G | Refills: 0 | Status: SHIPPED | OUTPATIENT
Start: 2019-03-08 | End: 2019-07-22 | Stop reason: SDUPTHER

## 2019-03-08 NOTE — LETTER
March 8, 2019      [facility]  [address]  [place] [address]  Phone: [phone]  Fax: [phone]       Patient: Grace Navarro   YOB: 1945  Date of Visit: 03/08/2019    To Whom It May Concern:    Marge Navarro  was at [facility] on 03/08/2019. Please allow Grace to check her bag for free, she can not grab anything with her right hand. If you have any questions or concerns, or if I can be of further assistance, please do not hesitate to contact me.    Sincerely,    Silvano Arnold DO

## 2019-03-08 NOTE — PATIENT INSTRUCTIONS
XR  salonpas  voltaren gel  Tylenol 500 mg up to 4 times per day  Compression  Icing  Rest    Follow up with PCP and hand surgery if this persists.

## 2019-03-08 NOTE — PROGRESS NOTES
Subjective:       Patient ID: Grace Navarro is a 73 y.o. female.    Chief Complaint: Wrist Pain (x 1 day) and Arm Pain (x 1 day)    Grace is a 73 y.o. female who presents today for an urgent care visit for wrist pain x 1 day. She is not sure of any eliciting factors. The wrist pain is on the right side. She didn't lift anything heavy. No trauma or falls. The pain starts in the wrist and seems to go to the elbow. The pain worsens with rotation and movement. She has tried a brace and tylenol. This didn't really help. No history of gout.       Wrist Pain    The pain is present in the right wrist. This is a new problem. The current episode started yesterday. The problem occurs daily. Associated symptoms include joint swelling and a limited range of motion. Pertinent negatives include no fever, inability to bear weight, itching, joint locking, numbness, stiffness or tingling. Treatments tried: tylenol. Family history does not include gout or rheumatoid arthritis. There is no history of gout.     Review of Systems   Constitutional: Negative for chills and fever.   Musculoskeletal: Negative for gait problem, gout, joint swelling and stiffness.   Skin: Negative for itching, rash and wound.   Neurological: Negative for tingling and numbness.         Objective:     Vitals:    03/08/19 0948   BP: 128/68   Pulse: 69        Physical Exam   Constitutional: She appears well-developed and well-nourished.   HENT:   Head: Normocephalic and atraumatic.   Eyes: Conjunctivae are normal.   Cardiovascular: Normal rate and regular rhythm.   Pulmonary/Chest: Effort normal and breath sounds normal.   Musculoskeletal: She exhibits tenderness.        Hands:  BL:  - No erythema/deformity  - Normal  strength  - No sensory deficit  - Normal thumb/finger opposition  - Alma/Tinel both negative  - No contracture     R hand:  TTP of above areas  Limited rotation    Pulses normal BL UE   Psychiatric: She has a normal mood and affect.  Her behavior is normal. Judgment and thought content normal.   Nursing note and vitals reviewed.      Assessment:       1. Right wrist pain        Plan:       XR  salonpas  voltaren gel  Tylenol 500 mg up to 4 times per day  Compression  Icing  Rest    Follow up with PCP and hand surgery if this persists.     Right wrist pain  -     X-Ray Wrist 2 View Right; Future; Expected date: 03/08/2019  -     diclofenac sodium (VOLTAREN) 1 % Gel; Apply 2 g topically once daily.  Dispense: 100 g; Refill: 0        Warning signs discussed, patient to call with any further issues or worsening of symptoms.

## 2019-03-10 ENCOUNTER — PATIENT MESSAGE (OUTPATIENT)
Dept: CARDIOLOGY | Facility: CLINIC | Age: 74
End: 2019-03-10

## 2019-03-21 ENCOUNTER — PATIENT OUTREACH (OUTPATIENT)
Dept: OTHER | Facility: OTHER | Age: 74
End: 2019-03-21

## 2019-03-21 NOTE — PROGRESS NOTES
Last 5 Patient Entered Readings                                      Current 30 Day Average: 130/74     Recent Readings 3/5/2019 3/5/2019 3/3/2019 2/28/2019 2/23/2019    SBP (mmHg) 132 140 132 134 121    DBP (mmHg) 73 74 72 81 69    Pulse 65 65 61 59 63        [facility]: Health  Follow Up    Left voicemail to follow up with Ms. Grace Navarro.  Current BP average 130/74 mmHg is at goal, <130/80 mmHg.  Patient recently placed on hiatus for travel. Will follow up in 2 weeks.

## 2019-04-08 NOTE — PROGRESS NOTES
Last 5 Patient Entered Readings                                      Current 30 Day Average: 131/75     Recent Readings 4/4/2019 3/5/2019 3/5/2019 3/3/2019 2/28/2019    SBP (mmHg) 131 132 140 132 134    DBP (mmHg) 75 73 74 72 81    Pulse 64 65 65 61 59        Digital [facility]: Health  Follow Up    Left voicemail to follow up with Ms. Grace Navarro.  Current BP average 131/75 mmHg is at goal, <130/80 mmHg.  Patient recently placed on hiatus for travel. 2nd attempt. Will follow up in 2 weeks.    Patient called back.    Lifestyle Modifications:    1.Dietary Modifications (Sodium intake <2,000mg/day, food labels, dining out): Patient continues to limit her salt intake. She has noticed her BP to be a little elevated when checking immediately after eating. Encouraged patient to wait at least 30 minutes after eating/exercising before checking her BP, and resting 5-10 minutes prior to monitoring.     2.Physical Activity: Patient continues to exercise regularly.    3.Medication Therapy: Patient has been compliant with the medication regimen.    4.Patient has the following medication side effects/concerns: None  (Frequency/Alleviating factors/Precipitating factors, etc.)     Follow up with Ms. Grace Navarro completed. Ms. Navarro is doing okay. No further questions or concerns. Will continue to follow up to achieve health goals.

## 2019-04-22 ENCOUNTER — OFFICE VISIT (OUTPATIENT)
Dept: DERMATOLOGY | Facility: CLINIC | Age: 74
End: 2019-04-22
Payer: MEDICARE

## 2019-04-22 DIAGNOSIS — D48.5 NEOPLASM OF UNCERTAIN BEHAVIOR OF SKIN: Primary | ICD-10-CM

## 2019-04-22 DIAGNOSIS — Z12.83 SCREENING EXAM FOR SKIN CANCER: ICD-10-CM

## 2019-04-22 DIAGNOSIS — D22.9 MULTIPLE BENIGN NEVI: ICD-10-CM

## 2019-04-22 DIAGNOSIS — D18.00 ANGIOMA: ICD-10-CM

## 2019-04-22 DIAGNOSIS — L82.1 SK (SEBORRHEIC KERATOSIS): ICD-10-CM

## 2019-04-22 PROCEDURE — 1101F PR PT FALLS ASSESS DOC 0-1 FALLS W/OUT INJ PAST YR: ICD-10-PCS | Mod: HCNC,CPTII,S$GLB, | Performed by: DERMATOLOGY

## 2019-04-22 PROCEDURE — 99999 PR PBB SHADOW E&M-EST. PATIENT-LVL II: ICD-10-PCS | Mod: PBBFAC,HCNC,, | Performed by: DERMATOLOGY

## 2019-04-22 PROCEDURE — 99214 PR OFFICE/OUTPT VISIT, EST, LEVL IV, 30-39 MIN: ICD-10-PCS | Mod: 25,HCNC,S$GLB, | Performed by: DERMATOLOGY

## 2019-04-22 PROCEDURE — 88305 TISSUE EXAM BY PATHOLOGIST: CPT | Mod: HCNC | Performed by: PATHOLOGY

## 2019-04-22 PROCEDURE — 11102 TANGNTL BX SKIN SINGLE LES: CPT | Mod: HCNC,S$GLB,, | Performed by: DERMATOLOGY

## 2019-04-22 PROCEDURE — 99999 PR PBB SHADOW E&M-EST. PATIENT-LVL II: CPT | Mod: PBBFAC,HCNC,, | Performed by: DERMATOLOGY

## 2019-04-22 PROCEDURE — 99214 OFFICE O/P EST MOD 30 MIN: CPT | Mod: 25,HCNC,S$GLB, | Performed by: DERMATOLOGY

## 2019-04-22 PROCEDURE — 11102 PR TANGENTIAL BIOPSY, SKIN, SINGLE LESION: ICD-10-PCS | Mod: HCNC,S$GLB,, | Performed by: DERMATOLOGY

## 2019-04-22 PROCEDURE — 1101F PT FALLS ASSESS-DOCD LE1/YR: CPT | Mod: HCNC,CPTII,S$GLB, | Performed by: DERMATOLOGY

## 2019-04-22 PROCEDURE — 88305 TISSUE SPECIMEN TO PATHOLOGY, DERMATOLOGY: ICD-10-PCS | Mod: 26,HCNC,, | Performed by: PATHOLOGY

## 2019-04-22 NOTE — LETTER
April 22, 2019      Lisa Dean MD  [address]  [facility] [address]           [facility]  [address]  [facility] [address]  Phone: [phone]  Fax: [phone]          Patient: Grace Navarro   MR Number: [id number]   YOB: 1945   Date of Visit: 4/22/2019       Dear Dr. Lisa Dean:    Thank you for referring Grace Navarro to me for evaluation. Attached you will find relevant portions of my assessment and plan of care.    If you have questions, please do not hesitate to call me. I look forward to following Grace Navarro along with you.    Sincerely,    Dyana Lopes MD    Enclosure  CC:  No Recipients    If you would like to receive this communication electronically, please contact [email or web address] or [phone] to request more information on [employer] Link access.    For providers and/or their staff who would like to refer a patient to [facility], please contact us through our one-stop-shop provider referral line, [facility], at [phone].    If you feel you have received this communication in error or would no longer like to receive these types of communications, please e-mail [email or web address]

## 2019-04-22 NOTE — PROGRESS NOTES
Subjective:       Patient ID:  Grace Navarro is a 73 y.o. female who presents for   Chief Complaint   Patient presents with    Skin Check     TBSE     74 yo WF presenting for annual skin exam (last seen August 2018). Remote h/o actinic keratoses. Otherwise h/o lentigines, EIC, and SK.     No other skin complaints today.     No h/o nmsc or mm      Review of Systems   Skin: Positive for itching, activity-related sunscreen use and wears hat. Negative for daily sunscreen use and recent sunburn.   Hematologic/Lymphatic: Bruises/bleeds easily.        Objective:    Physical Exam   Constitutional: She appears well-developed and well-nourished. No distress.   Neurological: She is alert and oriented to person, place, and time. She is not disoriented.   Psychiatric: She has a normal mood and affect.   Skin:   Areas Examined (abnormalities noted in diagram):   Scalp / Hair Palpated and Inspected  Head / Face Inspection Performed  Neck Inspection Performed  Chest / Axilla Inspection Performed  Abdomen Inspection Performed  Genitals / Buttocks / Groin Inspection Performed  Back Inspection Performed  RUE Inspected  LUE Inspection Performed  RLE Inspected  LLE Inspection Performed  Nails and Digits Inspection Performed                   Diagram Legend     Erythematous scaling macule/papule c/w actinic keratosis       Vascular papule c/w angioma      Pigmented verrucoid papule/plaque c/w seborrheic keratosis      Yellow umbilicated papule c/w sebaceous hyperplasia      Irregularly shaped tan macule c/w lentigo     1-2 mm smooth white papules consistent with Milia      Movable subcutaneous cyst with punctum c/w epidermal inclusion cyst      Subcutaneous movable cyst c/w pilar cyst      Firm pink to brown papule c/w dermatofibroma      Pedunculated fleshy papule(s) c/w skin tag(s)      Evenly pigmented macule c/w junctional nevus     Mildly variegated pigmented, slightly irregular-bordered macule c/w mildly atypical nevus       Flesh colored to evenly pigmented papule c/w intradermal nevus       Pink pearly papule/plaque c/w basal cell carcinoma      Erythematous hyperkeratotic cursted plaque c/w SCC      Surgical scar with no sign of skin cancer recurrence      Open and closed comedones      Inflammatory papules and pustules      Verrucoid papule consistent consistent with wart     Erythematous eczematous patches and plaques     Dystrophic onycholytic nail with subungual debris c/w onychomycosis     Umbilicated papule    Erythematous-base heme-crusted tan verrucoid plaque consistent with inflamed seborrheic keratosis     Erythematous Silvery Scaling Plaque c/w Psoriasis     See annotation          Assessment / Plan:      Pathology Orders:     Normal Orders This Visit    Tissue Specimen To Pathology, Dermatology     Questions:    Directional Terms:  Other(comment)    Clinical Information:  r/o superficial bcc    Specific Site:  left shoulder        Neoplasm of uncertain behavior of skin  -     Tissue Specimen To Pathology, Dermatology    Shave biopsy procedure note:    Shave biopsy performed after verbal consent including risk of infection, scar, recurrence, need for additional treatment of site. Area prepped with alcohol, anesthetized with approximately 1.0cc of 1% lidocaine with epinephrine. Lesional tissue shaved with razor blade. Hemostasis achieved with application of aluminum chloride followed by hyfrecation. No complications. Dressing applied. Wound care explained.    If biopsy positive for malignancy, will treat with Aldara 5% cream 5 nights/week x 4 - 6 weeks.      SK (seborrheic keratosis)  These are benign inherited growths without a malignant potential. Reassurance given to patient. No treatment is necessary.     Angioma  This is a benign vascular lesion. Reassurance given. No treatment required.       Multiple benign nevi  total body skin examination performed today including at least 12 points as noted in physical  examination. No lesions suspicious for malignancy noted.  Reassurance provided.  Instructed patient to observe lesion(s) for changes and follow up in clinic if changes are noted. Discussed ABCDE's of moles and brochure provided.      Screening exam for skin cancer    Total body skin examination performed today including at least 12 points as noted in physical examination. Suspicious lesions noted.               Follow up in about 1 year (around 4/22/2020), or if symptoms worsen or fail to improve.

## 2019-04-22 NOTE — PATIENT INSTRUCTIONS

## 2019-04-24 ENCOUNTER — PATIENT MESSAGE (OUTPATIENT)
Dept: DERMATOLOGY | Facility: CLINIC | Age: 74
End: 2019-04-24

## 2019-04-30 ENCOUNTER — HOSPITAL ENCOUNTER (OUTPATIENT)
Dept: RADIOLOGY | Facility: HOSPITAL | Age: 74
Discharge: HOME | End: 2019-04-30
Attending: INTERNAL MEDICINE
Payer: MEDICARE

## 2019-04-30 DIAGNOSIS — E04.2 MULTINODULAR GOITER: ICD-10-CM

## 2019-04-30 PROCEDURE — 76536 US EXAM OF HEAD AND NECK: CPT | Mod: TC,HCNC

## 2019-04-30 PROCEDURE — 76536 US EXAM OF HEAD AND NECK: CPT | Mod: 26,HCNC,, | Performed by: RADIOLOGY

## 2019-04-30 PROCEDURE — 76536 US SOFT TISSUE HEAD NECK THYROID: ICD-10-PCS | Mod: 26,HCNC,, | Performed by: RADIOLOGY

## 2019-05-02 ENCOUNTER — TELEPHONE (OUTPATIENT)
Dept: INTERNAL MEDICINE | Facility: CLINIC | Age: 74
End: 2019-05-02

## 2019-05-02 NOTE — TELEPHONE ENCOUNTER
----- Message from Galina Pelletier sent at 5/2/2019  3:56 PM CDT -----  Contact: [phone]  Patient is returning a phone call.  Who left a message for the patient: Jacque  Does patient know what this is regarding:    Comments: please advise, thanks

## 2019-05-11 ENCOUNTER — PATIENT MESSAGE (OUTPATIENT)
Dept: INTERNAL MEDICINE | Facility: CLINIC | Age: 74
End: 2019-05-11

## 2019-05-11 DIAGNOSIS — H93.12 LEFT-SIDED TINNITUS: Primary | ICD-10-CM

## 2019-05-13 ENCOUNTER — PATIENT MESSAGE (OUTPATIENT)
Dept: INTERNAL MEDICINE | Facility: CLINIC | Age: 74
End: 2019-05-13

## 2019-05-13 DIAGNOSIS — E78.5 HYPERLIPIDEMIA, UNSPECIFIED HYPERLIPIDEMIA TYPE: Primary | ICD-10-CM

## 2019-05-13 RX ORDER — ROSUVASTATIN CALCIUM 10 MG/1
10 TABLET, COATED ORAL DAILY
Qty: 90 TABLET | Refills: 3 | Status: SHIPPED | OUTPATIENT
Start: 2019-05-13 | End: 2019-05-18

## 2019-05-13 NOTE — TELEPHONE ENCOUNTER
"Please call pt:  Change pravastatin 40mg to rosuvastatin 10mg nightly to see if it helps w/ symptoms (trouble sleeping/"unusual tiredness/weakness"). Recommend giving it several weeks to before making changes unless symptoms are intolerable. Thus keep appt as scheduled.  "

## 2019-05-14 NOTE — TELEPHONE ENCOUNTER
"Spoke with pt, she says she is not sure if its the cholesterol medication that has her feeling like she is. She isn't sure if she wants to change medication right now. Pt would like labs done as soon as possible because she says "something is not right". Pt want to know if she can be seen by PCP sooner then appt date. Pt says she lives alone and sometimes she feels so weak she feels like she can not move. (pt did not want message sent to covering physician, she wants Dr. Dean to receive message).  "

## 2019-05-15 ENCOUNTER — PATIENT MESSAGE (OUTPATIENT)
Dept: INTERNAL MEDICINE | Facility: CLINIC | Age: 74
End: 2019-05-15

## 2019-05-15 NOTE — TELEPHONE ENCOUNTER
Please let her know I'm not in office. Can schedule UC appt as nothing available this week. If there is a space available on Sat, can also be scheduled for then.

## 2019-05-18 ENCOUNTER — OFFICE VISIT (OUTPATIENT)
Dept: INTERNAL MEDICINE | Facility: CLINIC | Age: 74
End: 2019-05-18
Payer: MEDICARE

## 2019-05-18 VITALS
BODY MASS INDEX: 25.11 KG/M2 | DIASTOLIC BLOOD PRESSURE: 64 MMHG | SYSTOLIC BLOOD PRESSURE: 124 MMHG | HEART RATE: 57 BPM | HEIGHT: 64 IN | WEIGHT: 147.06 LBS | TEMPERATURE: 98 F

## 2019-05-18 DIAGNOSIS — E04.9 GOITER, NODULAR: ICD-10-CM

## 2019-05-18 DIAGNOSIS — G89.29 CHRONIC MIDLINE LOW BACK PAIN WITHOUT SCIATICA: ICD-10-CM

## 2019-05-18 DIAGNOSIS — E78.5 HYPERLIPIDEMIA, UNSPECIFIED HYPERLIPIDEMIA TYPE: ICD-10-CM

## 2019-05-18 DIAGNOSIS — E55.9 VITAMIN D DEFICIENCY: ICD-10-CM

## 2019-05-18 DIAGNOSIS — I10 BENIGN ESSENTIAL HYPERTENSION: ICD-10-CM

## 2019-05-18 DIAGNOSIS — M54.50 CHRONIC MIDLINE LOW BACK PAIN WITHOUT SCIATICA: ICD-10-CM

## 2019-05-18 DIAGNOSIS — E53.8 VITAMIN B12 DEFICIENCY: ICD-10-CM

## 2019-05-18 DIAGNOSIS — R53.83 FATIGUE, UNSPECIFIED TYPE: Primary | ICD-10-CM

## 2019-05-18 PROCEDURE — 3074F PR MOST RECENT SYSTOLIC BLOOD PRESSURE < 130 MM HG: ICD-10-PCS | Mod: HCNC,CPTII,S$GLB, | Performed by: INTERNAL MEDICINE

## 2019-05-18 PROCEDURE — 3078F DIAST BP <80 MM HG: CPT | Mod: HCNC,CPTII,S$GLB, | Performed by: INTERNAL MEDICINE

## 2019-05-18 PROCEDURE — 99999 PR PBB SHADOW E&M-EST. PATIENT-LVL III: ICD-10-PCS | Mod: PBBFAC,HCNC,, | Performed by: INTERNAL MEDICINE

## 2019-05-18 PROCEDURE — 99214 OFFICE O/P EST MOD 30 MIN: CPT | Mod: HCNC,S$GLB,, | Performed by: INTERNAL MEDICINE

## 2019-05-18 PROCEDURE — 99999 PR PBB SHADOW E&M-EST. PATIENT-LVL III: CPT | Mod: PBBFAC,HCNC,, | Performed by: INTERNAL MEDICINE

## 2019-05-18 PROCEDURE — 99214 PR OFFICE/OUTPT VISIT, EST, LEVL IV, 30-39 MIN: ICD-10-PCS | Mod: HCNC,S$GLB,, | Performed by: INTERNAL MEDICINE

## 2019-05-18 PROCEDURE — 3074F SYST BP LT 130 MM HG: CPT | Mod: HCNC,CPTII,S$GLB, | Performed by: INTERNAL MEDICINE

## 2019-05-18 PROCEDURE — 1101F PR PT FALLS ASSESS DOC 0-1 FALLS W/OUT INJ PAST YR: ICD-10-PCS | Mod: HCNC,CPTII,S$GLB, | Performed by: INTERNAL MEDICINE

## 2019-05-18 PROCEDURE — 3078F PR MOST RECENT DIASTOLIC BLOOD PRESSURE < 80 MM HG: ICD-10-PCS | Mod: HCNC,CPTII,S$GLB, | Performed by: INTERNAL MEDICINE

## 2019-05-18 PROCEDURE — 1101F PT FALLS ASSESS-DOCD LE1/YR: CPT | Mod: HCNC,CPTII,S$GLB, | Performed by: INTERNAL MEDICINE

## 2019-05-18 RX ORDER — METOPROLOL SUCCINATE 100 MG/1
100 TABLET, EXTENDED RELEASE ORAL DAILY
Qty: 30 TABLET | Refills: 11 | Status: SHIPPED | OUTPATIENT
Start: 2019-05-18 | End: 2020-04-01

## 2019-05-18 NOTE — PROGRESS NOTES
"Subjective:       Patient ID: Grace Navarro is a 73 y.o. female.    Chief Complaint: Fatigue    HPI   Pt previously w/ elevated cholesterol but wanted to try diet modifications. On repeat cholesterol, still high. Started on pravastatin 2/2019.    Pt sent message 5/11/19 reporting "chirping" in L ear that's bothersome and requested ENT referral, which was placed in the system.   Pt sent another message 5/13/19 regarding feeling "unusual tiredness/weakness" and trouble sleeping. She thinks it may be due to pravastatin.     Multinodular goiter - 4/30/19 US w/ multiple nodules. Repeat in 1 yr.     Reports if she does some work, she would feel very tired and has to nap.   Normally walks about 4 miles every day. But in the last 3 days, so tired that she didn't go on walks at all. In the last 3-4 mo, intermittently would not go to sleep all night. She's concerned it may be due to cholesterol medicine. Reports that even when she wakes up after a full night's sleep, she may still feel tired and has to nap again. No SOB/CP/palpitations. No weakness. Since 4/20/19, she cut the cholesterol pill in half but it hasn't helped.     Daughter told her she snores at night.     No night sweats/unexpected weight changes/lymphadenopathy/fevers/chills.    Review of Systems    Chronic B knee pains occasionally.   Comprehensive review of systems otherwise negative. See history/subjective section for more details.    Objective:      Physical Exam    /64 (BP Location: Left arm, Patient Position: Sitting, BP Method: Medium (Manual))   Pulse (!) 57   Temp 98 °F (36.7 °C)   Ht 5' 4" (1.626 m)   Wt 66.7 kg (147 lb 0.8 oz)   LMP 06/14/1996   BMI 25.24 kg/m²     Gen - A+OX4, NAD  HEENT - PERRL, OP clear. MMM. TM normal.   Neck - no LAD  CV - RRR, no m/r  Chest - CTAB, no wheezing/rhonchi  Abd - S/NT/ND/+BS  Ext - 2+ B radial and DP pulses. No LE edema.   MSK - no swelling in hands/knee joints. No spinal tenderness to palpation. " Normal gait.   LN - no axillary LAD.     PREVIOUS LABS AND IMAGING REVIEWED.    Assessment/Plan     Grace was seen today for fatigue.    Diagnoses and all orders for this visit:    Fatigue, unspecified type - Stop cholesterol medicine. Decrease metoprolol from 200mg daily to 100mg daily.   F/U in June as scheduled. If unchanged, consider sleep study.  -     metoprolol succinate (TOPROL-XL) 100 MG 24 hr tablet; Take 1 tablet (100 mg total) by mouth once daily.  -     CBC auto differential; Future  -     Comprehensive metabolic panel; Future  -     TSH; Future  -     Vitamin D; Future  -     Vitamin B12; Future  -     Protein electrophoresis, serum; Future  -     Urinalysis; Future  -     Urinalysis Microscopic; Future    Benign essential hypertension - dec toprol xl to 100mg daily. Cont hctz 12.5mg daily.  -     Comprehensive metabolic panel; Future    Vitamin B12 deficiency - taking OTC b12.  -     Vitamin B12; Future    Hyperlipidemia, unspecified hyperlipidemia type - stop cholesterol medicine for now to see if it helps.  -     Comprehensive metabolic panel; Future  -     Lipid panel; Future    Vitamin D deficiency - takes 1000 units daily.   -     Vitamin D; Future    Chronic midline low back pain without sciatica - f/u w/ back and spine.   -     Protein electrophoresis, serum; Future    Goiter, nodular - repeat US 4/2020.    Keep appt in June.      Lisa Dean MD  Department of Internal Medicine - [facility]  7:55 AM

## 2019-05-20 ENCOUNTER — LAB VISIT (OUTPATIENT)
Dept: LAB | Facility: HOSPITAL | Age: 74
End: 2019-05-20
Attending: INTERNAL MEDICINE
Payer: MEDICARE

## 2019-05-20 ENCOUNTER — PATIENT OUTREACH (OUTPATIENT)
Dept: OTHER | Facility: OTHER | Age: 74
End: 2019-05-20

## 2019-05-20 DIAGNOSIS — R53.83 FATIGUE, UNSPECIFIED TYPE: ICD-10-CM

## 2019-05-20 LAB
BILIRUB UR QL STRIP: NEGATIVE
CLARITY UR: CLEAR
COLOR UR: YELLOW
GLUCOSE UR QL STRIP: NEGATIVE
HGB UR QL STRIP: ABNORMAL
KETONES UR QL STRIP: NEGATIVE
LEUKOCYTE ESTERASE UR QL STRIP: NEGATIVE
MICROSCOPIC COMMENT: NORMAL
NITRITE UR QL STRIP: NEGATIVE
PH UR STRIP: 6 [PH] (ref 5–8)
PROT UR QL STRIP: NEGATIVE
RBC #/AREA URNS HPF: 4 /HPF (ref 0–4)
SP GR UR STRIP: 1.01 (ref 1–1.03)
URN SPEC COLLECT METH UR: ABNORMAL
UROBILINOGEN UR STRIP-ACNC: NEGATIVE EU/DL
WBC #/AREA URNS HPF: 1 /HPF (ref 0–5)

## 2019-05-20 PROCEDURE — 81000 URINALYSIS NONAUTO W/SCOPE: CPT | Mod: HCNC

## 2019-05-20 NOTE — PROGRESS NOTES
Last 5 Patient Entered Readings                                      Current 30 Day Average: 121/67     Recent Readings 5/9/2019 5/1/2019 5/1/2019 4/25/2019 4/21/2019    SBP (mmHg) 117 118 134 126 121    DBP (mmHg) 64 63 72 69 68    Pulse 63 55 56 60 61        Digital Medicine: Health  Follow Up    Lifestyle Modifications:    1.Dietary Modifications (Sodium intake <2,000mg/day, food labels, dining out): No changes to diet.    2.Physical Activity: Patient is taking exercise one day at a time since she has not had as much energy. She reports being more active on some days than others.     3.Medication Therapy: Patient has been compliant with the medication regimen. Patient reports feeling more tired and fatigued over the past few weeks. She was able to be seen in the clinic last week, and reports that her cholesterol medication was stopped and metoprolol was cut back. She has noticed lower pulse too.     4.Patient has the following medication side effects/concerns: None  (Frequency/Alleviating factors/Precipitating factors, etc.)     Follow up with Ms. Grace Ware Melanie completed. Ms. Navarro feels that she is not doing okay, but hopes that she will be better since her last medication change. No further questions or concerns. Will continue to follow up to achieve health goals.

## 2019-05-22 ENCOUNTER — PATIENT MESSAGE (OUTPATIENT)
Dept: INTERNAL MEDICINE | Facility: CLINIC | Age: 74
End: 2019-05-22

## 2019-05-22 ENCOUNTER — TELEPHONE (OUTPATIENT)
Dept: INTERNAL MEDICINE | Facility: CLINIC | Age: 74
End: 2019-05-22

## 2019-05-22 DIAGNOSIS — G47.19 EXCESSIVE DAYTIME SLEEPINESS: Primary | ICD-10-CM

## 2019-05-22 NOTE — TELEPHONE ENCOUNTER
Please call and notify pt:    Urine, b12, D, thyroid, hemoglobin to check for anemia, liver and kidney functions, protein in the blood all normal. Recommend proceeding w/ sleep study. Make sure she stopped her cholesterol medicine and did dec her metoprolol to 50mg daily also.

## 2019-05-22 NOTE — TELEPHONE ENCOUNTER
Spoke with pt, notified of results. Advised to proceed with sleep study. Pt says she has stopped cholesterol meds. According to clinical note she was to decrease metoprolol from 200mg to 100mg (which she says she did). Is it suppose to be decreased to 50mg instead? Please advise

## 2019-05-28 ENCOUNTER — PATIENT OUTREACH (OUTPATIENT)
Dept: OTHER | Facility: OTHER | Age: 74
End: 2019-05-28

## 2019-05-28 NOTE — PROGRESS NOTES
Patient LVM to report she started metoprolol per Dr. Dean. BP at goal, <130/80 mmHg  Continue monitoring    Last 5 Patient Entered Readings                                      Current 30 Day Average: 122/69     Recent Readings 5/28/2019 5/20/2019 5/20/2019 5/9/2019 5/1/2019    SBP (mmHg) 128 125 125 117 118    DBP (mmHg) 72 73 72 64 63    Pulse 67 60 61 63 55

## 2019-05-30 ENCOUNTER — TELEPHONE (OUTPATIENT)
Dept: SLEEP MEDICINE | Facility: OTHER | Age: 74
End: 2019-05-30

## 2019-06-01 ENCOUNTER — PATIENT MESSAGE (OUTPATIENT)
Dept: CARDIOLOGY | Facility: CLINIC | Age: 74
End: 2019-06-01

## 2019-06-03 RX ORDER — HYDROCHLOROTHIAZIDE 12.5 MG/1
12.5 TABLET ORAL DAILY
Qty: 90 TABLET | Refills: 3 | Status: SHIPPED | OUTPATIENT
Start: 2019-06-03 | End: 2020-04-07 | Stop reason: SDUPTHER

## 2019-06-19 ENCOUNTER — LAB VISIT (OUTPATIENT)
Dept: LAB | Facility: HOSPITAL | Age: 74
End: 2019-06-19
Attending: INTERNAL MEDICINE
Payer: MEDICARE

## 2019-06-19 DIAGNOSIS — R73.02 IGT (IMPAIRED GLUCOSE TOLERANCE): ICD-10-CM

## 2019-06-19 DIAGNOSIS — E78.5 HYPERLIPIDEMIA, UNSPECIFIED HYPERLIPIDEMIA TYPE: ICD-10-CM

## 2019-06-19 LAB
ALBUMIN SERPL BCP-MCNC: 4 G/DL (ref 3.5–5.2)
ALP SERPL-CCNC: 70 U/L (ref 55–135)
ALT SERPL W/O P-5'-P-CCNC: 21 U/L (ref 10–44)
AST SERPL-CCNC: 25 U/L (ref 10–40)
BILIRUB DIRECT SERPL-MCNC: 0.2 MG/DL (ref 0.1–0.3)
BILIRUB SERPL-MCNC: 0.4 MG/DL (ref 0.1–1)
CHOLEST SERPL-MCNC: 217 MG/DL (ref 120–199)
CHOLEST/HDLC SERPL: 3.6 {RATIO} (ref 2–5)
ESTIMATED AVG GLUCOSE: 111 MG/DL (ref 68–131)
HBA1C MFR BLD HPLC: 5.5 % (ref 4–5.6)
HDLC SERPL-MCNC: 60 MG/DL (ref 40–75)
HDLC SERPL: 27.6 % (ref 20–50)
LDLC SERPL CALC-MCNC: 140.2 MG/DL (ref 63–159)
NONHDLC SERPL-MCNC: 157 MG/DL
PROT SERPL-MCNC: 7.3 G/DL (ref 6–8.4)
TRIGL SERPL-MCNC: 84 MG/DL (ref 30–150)

## 2019-06-19 PROCEDURE — 80076 HEPATIC FUNCTION PANEL: CPT | Mod: HCNC

## 2019-06-19 PROCEDURE — 83036 HEMOGLOBIN GLYCOSYLATED A1C: CPT | Mod: HCNC

## 2019-06-19 PROCEDURE — 80061 LIPID PANEL: CPT | Mod: HCNC

## 2019-06-19 PROCEDURE — 36415 COLL VENOUS BLD VENIPUNCTURE: CPT | Mod: HCNC

## 2019-06-24 ENCOUNTER — TELEPHONE (OUTPATIENT)
Dept: SLEEP MEDICINE | Facility: OTHER | Age: 74
End: 2019-06-24

## 2019-06-25 ENCOUNTER — HOSPITAL ENCOUNTER (OUTPATIENT)
Dept: SLEEP MEDICINE | Facility: OTHER | Age: 74
Discharge: HOME | End: 2019-06-25
Attending: INTERNAL MEDICINE
Payer: MEDICARE

## 2019-06-25 DIAGNOSIS — G47.33 OSA (OBSTRUCTIVE SLEEP APNEA): ICD-10-CM

## 2019-06-25 DIAGNOSIS — G47.19 EXCESSIVE DAYTIME SLEEPINESS: ICD-10-CM

## 2019-06-25 PROCEDURE — 95810 POLYSOM 6/> YRS 4/> PARAM: CPT | Mod: 26,HCNC,, | Performed by: PSYCHIATRY & NEUROLOGY

## 2019-06-25 PROCEDURE — 95800 SLP STDY UNATTENDED: CPT | Mod: HCNC

## 2019-06-25 PROCEDURE — 95810 PR POLYSOMNOGRAPHY, 4 OR MORE: ICD-10-PCS | Mod: 26,HCNC,, | Performed by: PSYCHIATRY & NEUROLOGY

## 2019-06-26 ENCOUNTER — PATIENT MESSAGE (OUTPATIENT)
Dept: INTERNAL MEDICINE | Facility: CLINIC | Age: 74
End: 2019-06-26

## 2019-06-27 ENCOUNTER — PATIENT MESSAGE (OUTPATIENT)
Dept: INTERNAL MEDICINE | Facility: CLINIC | Age: 74
End: 2019-06-27

## 2019-06-27 ENCOUNTER — TELEPHONE (OUTPATIENT)
Dept: INTERNAL MEDICINE | Facility: CLINIC | Age: 74
End: 2019-06-27

## 2019-06-27 NOTE — TELEPHONE ENCOUNTER
----- Message from Nelly Ma sent at 6/27/2019  1:42 PM CDT -----  Contact: patient [phone]  Patient is requesting a call back to reschedule her appointment that was cancelled this morning.      Patient is requesting a call back.    Please call and advise  Thank you

## 2019-06-29 ENCOUNTER — OFFICE VISIT (OUTPATIENT)
Dept: INTERNAL MEDICINE | Facility: CLINIC | Age: 74
End: 2019-06-29
Payer: MEDICARE

## 2019-06-29 VITALS
BODY MASS INDEX: 25.56 KG/M2 | TEMPERATURE: 98 F | HEART RATE: 68 BPM | DIASTOLIC BLOOD PRESSURE: 78 MMHG | SYSTOLIC BLOOD PRESSURE: 122 MMHG | WEIGHT: 149.69 LBS | OXYGEN SATURATION: 98 % | HEIGHT: 64 IN

## 2019-06-29 DIAGNOSIS — G47.00 INSOMNIA, UNSPECIFIED TYPE: ICD-10-CM

## 2019-06-29 DIAGNOSIS — I10 BENIGN ESSENTIAL HYPERTENSION: ICD-10-CM

## 2019-06-29 DIAGNOSIS — R06.2 WHEEZING: ICD-10-CM

## 2019-06-29 DIAGNOSIS — R53.83 FATIGUE, UNSPECIFIED TYPE: Primary | ICD-10-CM

## 2019-06-29 DIAGNOSIS — E78.5 HYPERLIPIDEMIA, UNSPECIFIED HYPERLIPIDEMIA TYPE: ICD-10-CM

## 2019-06-29 PROCEDURE — 3074F PR MOST RECENT SYSTOLIC BLOOD PRESSURE < 130 MM HG: ICD-10-PCS | Mod: HCNC,CPTII,S$GLB, | Performed by: INTERNAL MEDICINE

## 2019-06-29 PROCEDURE — 99999 PR PBB SHADOW E&M-EST. PATIENT-LVL III: ICD-10-PCS | Mod: PBBFAC,HCNC,, | Performed by: INTERNAL MEDICINE

## 2019-06-29 PROCEDURE — 3078F DIAST BP <80 MM HG: CPT | Mod: HCNC,CPTII,S$GLB, | Performed by: INTERNAL MEDICINE

## 2019-06-29 PROCEDURE — 3078F PR MOST RECENT DIASTOLIC BLOOD PRESSURE < 80 MM HG: ICD-10-PCS | Mod: HCNC,CPTII,S$GLB, | Performed by: INTERNAL MEDICINE

## 2019-06-29 PROCEDURE — 1101F PT FALLS ASSESS-DOCD LE1/YR: CPT | Mod: HCNC,CPTII,S$GLB, | Performed by: INTERNAL MEDICINE

## 2019-06-29 PROCEDURE — 99999 PR PBB SHADOW E&M-EST. PATIENT-LVL III: CPT | Mod: PBBFAC,HCNC,, | Performed by: INTERNAL MEDICINE

## 2019-06-29 PROCEDURE — 99214 OFFICE O/P EST MOD 30 MIN: CPT | Mod: HCNC,S$GLB,, | Performed by: INTERNAL MEDICINE

## 2019-06-29 PROCEDURE — 3074F SYST BP LT 130 MM HG: CPT | Mod: HCNC,CPTII,S$GLB, | Performed by: INTERNAL MEDICINE

## 2019-06-29 PROCEDURE — 99214 PR OFFICE/OUTPT VISIT, EST, LEVL IV, 30-39 MIN: ICD-10-PCS | Mod: HCNC,S$GLB,, | Performed by: INTERNAL MEDICINE

## 2019-06-29 PROCEDURE — 1101F PR PT FALLS ASSESS DOC 0-1 FALLS W/OUT INJ PAST YR: ICD-10-PCS | Mod: HCNC,CPTII,S$GLB, | Performed by: INTERNAL MEDICINE

## 2019-06-29 RX ORDER — ALBUTEROL SULFATE 90 UG/1
2 AEROSOL, METERED RESPIRATORY (INHALATION) EVERY 6 HOURS PRN
Qty: 1 INHALER | Refills: 0 | Status: SHIPPED | OUTPATIENT
Start: 2019-06-29 | End: 2020-02-27

## 2019-06-29 NOTE — PROGRESS NOTES
"Subjective:       Patient ID: Grace Navarro is a 73 y.o. female.    Chief Complaint: fatigue f/u    HPI   Saw me 5/18/19 for fatigue. At that visit, stopped the cholesterol medicine and decrease metoprolol from 200mg to 100mg daily.   CBC, CMP, TSH, D, B12, SPEP, UA normal.     Pt went through home sleep testing device 6/25/19.  Per pt's email:  "Last night I spent an awful night thanks to your device Tomas 610. Since I put it on I got a terrible headache that didn't let me fall asleep and lasted until this morning. My face is full of marks on my forehead and my cheeks. I think that instead of paying  (co-payment of $ 200) for the exam we should be compensated for the suffering. If I knew about it I never could have accepted to do this procedure.     This is really a shame that you consider this as a "solution".   I don't have any more words to describe my feeling of having been subject to this torture!!!!!"    Pt reports will likely not be able to tolerate CPAP even if she does have sleep apnea.     Pt reports she feels more energy and more like herself. Thinks is due to dec in BB.    Wants to try diet again and fish oil for cholesterol lowering. Lipids similar even on prava 40     Insomnia - some nights trouble falling asleep.   Tried ambien, which caused nightmares.  Never tried melatonin.    Had some wheezing/chest tightness yesterday when she visited a house w/ 2 cats.   No asthma.    C/o toes itching sometimes. Not flaky    Review of Systems  Comprehensive review of systems otherwise negative. See history/subjective section for more details.      Objective:      Physical Exam    /78 (BP Location: Left arm, Patient Position: Sitting, BP Method: Large (Manual))   Pulse 68   Temp 97.8 °F (36.6 °C) (Oral)   Ht 5' 4" (1.626 m)   Wt 67.9 kg (149 lb 11.1 oz)   LMP 06/14/1996   SpO2 98%   BMI 25.69 kg/m²     Gen - A+OX4, NAD  HEENT - PERRL, OP clear. MMM. TM normal.   Neck - no LAD  CV - RRR, no " m/r  Chest - CTAB, no wheezing/rhonchi  Abd - S/NT/ND/+BS  Ext - 2+ B radial and DP pulses. No LE edema.   Skin - no rash including in feet.     Labs reviewed.     Assessment/Plan     Grace was seen today for follow-up.    Diagnoses and all orders for this visit:    Fatigue, unspecified type - improved w/ dec in toprol from 200 to 100. Consider further dec to 50mg daily in the future if needed.     Benign essential hypertension - Stable and controlled. Continue current medications.  Part of digital HTN. flowsheet reviewed and controlled.     Hyperlipidemia, unspecified hyperlipidemia type - lipids w and w/o prava about the same. Pt would like to try diet control again and also fish oil. Repeat lipids at the end of Nov (pt is going to [place] for a mo and back in Oct).   -     Lipid panel; Future    Wheezing - none on exam today.   -     albuterol (VENTOLIN HFA) 90 mcg/actuation inhaler; Inhale 2 puffs into the lungs every 6 (six) hours as needed for Wheezing. Rescue    Insomnia, unspecified type - rec trial of melatonin OTC.       Follow up if symptoms worsen or fail to improve.      Lisa Dean MD  Department of Internal Medicine - [facility]  10:40 AM

## 2019-07-01 ENCOUNTER — TELEPHONE (OUTPATIENT)
Dept: INTERNAL MEDICINE | Facility: CLINIC | Age: 74
End: 2019-07-01

## 2019-07-01 NOTE — TELEPHONE ENCOUNTER
Please call and notify pt:    Her sleep study does show sleep apnea. The treatment is being on CPAP machine. However I know she didn't like having the mask around the face for the home sleep study. Does she want to at least try the nasal mask?

## 2019-07-01 NOTE — PROCEDURES
"Dear Referring Provider,    The sleep study that you ordered is complete. You have ordered sleep LAB services to perform the sleep study for Grace Navarro.     Please find Sleep Study result in "Chart Review" under the "Media tab."     As the ordering provider, you are responsible for reviewing the results and implementing a treatment plan with your patient. If you need a Sleep Medicine provider to explain the sleep study findings and arrange treatment for the patient, please refer patient for consultation to our Sleep Clinic via [facility] with Ambulatory Consult Sleep.    To do that please place an order for an "Ambulatory Consult Sleep" - it will go to our clinic work queue for our Medical Assistant to contact the patient for an appointment.     For any questions, please contact our clinic staff at [phone] to talk to clinical staff.      "

## 2019-07-02 ENCOUNTER — PATIENT MESSAGE (OUTPATIENT)
Dept: INTERNAL MEDICINE | Facility: CLINIC | Age: 74
End: 2019-07-02

## 2019-07-02 NOTE — TELEPHONE ENCOUNTER
9 events per hours of decreased oxygen saturation. Let her know we can mail her the report and she can look over it.

## 2019-07-08 NOTE — PROGRESS NOTES
Patient called in to request I remove her from the Digital Medicine registry. She feels that she can monitor on her own. She will call me if she wishes to join once again.    Last 5 Patient Entered Readings                                      Current 30 Day Average: 124/74     Recent Readings 6/21/2019 6/17/2019 6/6/2019 5/28/2019 5/20/2019    SBP (mmHg) 123 124 126 128 125    DBP (mmHg) 76 73 73 72 73    Pulse 70 73 68 67 60          BP at goal, <130/80 mmHg  Remove from [facility] per patient request    Hypertension Medications             hydroCHLOROthiazide (HYDRODIURIL) 12.5 MG Tab Take 1 tablet (12.5 mg total) by mouth once daily.    metoprolol succinate (TOPROL-XL) 100 MG 24 hr tablet Take 1 tablet (100 mg total) by mouth once daily.

## 2019-07-22 ENCOUNTER — PATIENT MESSAGE (OUTPATIENT)
Dept: DERMATOLOGY | Facility: CLINIC | Age: 74
End: 2019-07-22

## 2019-07-22 ENCOUNTER — PATIENT MESSAGE (OUTPATIENT)
Dept: INTERNAL MEDICINE | Facility: CLINIC | Age: 74
End: 2019-07-22

## 2019-07-22 ENCOUNTER — PATIENT MESSAGE (OUTPATIENT)
Dept: FAMILY MEDICINE | Facility: CLINIC | Age: 74
End: 2019-07-22

## 2019-07-22 DIAGNOSIS — M25.531 RIGHT WRIST PAIN: ICD-10-CM

## 2019-07-22 RX ORDER — DICLOFENAC SODIUM 10 MG/G
GEL TOPICAL
Refills: 0 | OUTPATIENT
Start: 2019-07-22

## 2019-07-22 NOTE — TELEPHONE ENCOUNTER
----- Message from Silvano Arnold DO sent at 7/22/2019  4:42 PM CDT -----  Contact: [phone]/self  Hi,   Please see message below. She sent me a refill request that I forwarded to your office as she normally follows with you. Please let her know the status of her refill request.     Thanks.    Silvano  ----- Message -----  From: Dana Nayak MA  Sent: 7/22/2019   4:42 PM  To: Silvano Arnold DO        ----- Message -----  From: Kelly Green  Sent: 7/22/2019   3:37 PM  To: [facility] Staff    Patient is requesting a call back regarding refilling her prescription for  diclofenac sodium (VOLTAREN) 1 % Gel. Why was it denied? Thanks

## 2019-07-22 NOTE — TELEPHONE ENCOUNTER
Spoke to pt and notified her to reach out the pharmacy to have rx request dent to the correct physician

## 2019-07-22 NOTE — TELEPHONE ENCOUNTER
Pt calling for a refill Voltaren gel. Pt states that PCP didn't write the original Rx however she was advised from specialist office( Dr. Arnold) to contact PCP office for refill. Please confirm with pt if PCP will refill medication since specialist will not.

## 2019-07-22 NOTE — TELEPHONE ENCOUNTER
Please see if pt is still taking med and what for? This was originally ordered by another provider.

## 2019-07-23 RX ORDER — DICLOFENAC SODIUM 10 MG/G
2 GEL TOPICAL DAILY
Qty: 100 G | Refills: 0 | Status: SHIPPED | OUTPATIENT
Start: 2019-07-23 | End: 2020-09-02 | Stop reason: SDUPTHER

## 2019-07-24 DIAGNOSIS — L30.9 DERMATITIS: Primary | ICD-10-CM

## 2019-07-28 RX ORDER — TRIAMCINOLONE ACETONIDE 1 MG/G
CREAM TOPICAL 2 TIMES DAILY
Qty: 80 G | Refills: 1 | OUTPATIENT
Start: 2019-07-28

## 2019-08-28 ENCOUNTER — PATIENT MESSAGE (OUTPATIENT)
Dept: INTERNAL MEDICINE | Facility: CLINIC | Age: 74
End: 2019-08-28

## 2019-09-11 DIAGNOSIS — J06.9 VIRAL UPPER RESPIRATORY TRACT INFECTION: ICD-10-CM

## 2019-09-11 DIAGNOSIS — R68.89 FLU-LIKE SYMPTOMS: ICD-10-CM

## 2019-09-11 RX ORDER — FLUTICASONE PROPIONATE 50 MCG
SPRAY, SUSPENSION (ML) NASAL
Qty: 16 ML | Refills: 1 | Status: SHIPPED | OUTPATIENT
Start: 2019-09-11 | End: 2022-04-07

## 2019-10-30 ENCOUNTER — OFFICE VISIT (OUTPATIENT)
Dept: INTERNAL MEDICINE | Facility: CLINIC | Age: 74
End: 2019-10-30
Payer: MEDICARE

## 2019-10-30 ENCOUNTER — LAB VISIT (OUTPATIENT)
Dept: LAB | Facility: HOSPITAL | Age: 74
End: 2019-10-30
Attending: INTERNAL MEDICINE
Payer: MEDICARE

## 2019-10-30 VITALS
WEIGHT: 147.69 LBS | BODY MASS INDEX: 25.21 KG/M2 | HEART RATE: 75 BPM | HEIGHT: 64 IN | DIASTOLIC BLOOD PRESSURE: 80 MMHG | SYSTOLIC BLOOD PRESSURE: 160 MMHG | OXYGEN SATURATION: 98 %

## 2019-10-30 DIAGNOSIS — Z11.4 ENCOUNTER FOR SCREENING FOR HUMAN IMMUNODEFICIENCY VIRUS (HIV): ICD-10-CM

## 2019-10-30 DIAGNOSIS — E53.8 DEFICIENCY OF OTHER SPECIFIED B GROUP VITAMINS: ICD-10-CM

## 2019-10-30 DIAGNOSIS — D64.9 ANEMIA, UNSPECIFIED TYPE: Primary | ICD-10-CM

## 2019-10-30 DIAGNOSIS — R03.0 ELEVATED BP WITHOUT DIAGNOSIS OF HYPERTENSION: ICD-10-CM

## 2019-10-30 DIAGNOSIS — D51.3 OTHER DIETARY VITAMIN B12 DEFICIENCY ANEMIA: ICD-10-CM

## 2019-10-30 DIAGNOSIS — L50.9 URTICARIA: ICD-10-CM

## 2019-10-30 DIAGNOSIS — L29.9 PRURITUS, UNSPECIFIED: ICD-10-CM

## 2019-10-30 DIAGNOSIS — L50.9 URTICARIA: Primary | ICD-10-CM

## 2019-10-30 LAB
ALBUMIN SERPL BCP-MCNC: 3.9 G/DL (ref 3.5–5.2)
ALP SERPL-CCNC: 74 U/L (ref 55–135)
ALT SERPL W/O P-5'-P-CCNC: 15 U/L (ref 10–44)
ANION GAP SERPL CALC-SCNC: 9 MMOL/L (ref 8–16)
AST SERPL-CCNC: 22 U/L (ref 10–40)
BASOPHILS # BLD AUTO: 0.03 K/UL (ref 0–0.2)
BASOPHILS NFR BLD: 0.5 % (ref 0–1.9)
BILIRUB SERPL-MCNC: 0.4 MG/DL (ref 0.1–1)
BUN SERPL-MCNC: 12 MG/DL (ref 8–23)
CALCIUM SERPL-MCNC: 9.3 MG/DL (ref 8.7–10.5)
CHLORIDE SERPL-SCNC: 103 MMOL/L (ref 95–110)
CO2 SERPL-SCNC: 30 MMOL/L (ref 23–29)
CREAT SERPL-MCNC: 0.6 MG/DL (ref 0.5–1.4)
DIFFERENTIAL METHOD: ABNORMAL
EOSINOPHIL # BLD AUTO: 0.2 K/UL (ref 0–0.5)
EOSINOPHIL NFR BLD: 2.9 % (ref 0–8)
ERYTHROCYTE [DISTWIDTH] IN BLOOD BY AUTOMATED COUNT: 13.1 % (ref 11.5–14.5)
EST. GFR  (AFRICAN AMERICAN): >60 ML/MIN/1.73 M^2
EST. GFR  (NON AFRICAN AMERICAN): >60 ML/MIN/1.73 M^2
GLUCOSE SERPL-MCNC: 90 MG/DL (ref 70–110)
HCT VFR BLD AUTO: 36 % (ref 37–48.5)
HGB BLD-MCNC: 11.2 G/DL (ref 12–16)
IMM GRANULOCYTES # BLD AUTO: 0.02 K/UL (ref 0–0.04)
IMM GRANULOCYTES NFR BLD AUTO: 0.4 % (ref 0–0.5)
LYMPHOCYTES # BLD AUTO: 1.6 K/UL (ref 1–4.8)
LYMPHOCYTES NFR BLD: 29.9 % (ref 18–48)
MCH RBC QN AUTO: 30.1 PG (ref 27–31)
MCHC RBC AUTO-ENTMCNC: 31.1 G/DL (ref 32–36)
MCV RBC AUTO: 97 FL (ref 82–98)
MONOCYTES # BLD AUTO: 0.5 K/UL (ref 0.3–1)
MONOCYTES NFR BLD: 9.9 % (ref 4–15)
NEUTROPHILS # BLD AUTO: 3.1 K/UL (ref 1.8–7.7)
NEUTROPHILS NFR BLD: 56.4 % (ref 38–73)
NRBC BLD-RTO: 0 /100 WBC
PLATELET # BLD AUTO: 200 K/UL (ref 150–350)
PMV BLD AUTO: 10.4 FL (ref 9.2–12.9)
POTASSIUM SERPL-SCNC: 3.8 MMOL/L (ref 3.5–5.1)
PROT SERPL-MCNC: 7.1 G/DL (ref 6–8.4)
RBC # BLD AUTO: 3.72 M/UL (ref 4–5.4)
SODIUM SERPL-SCNC: 142 MMOL/L (ref 136–145)
TSH SERPL DL<=0.005 MIU/L-ACNC: 2.22 UIU/ML (ref 0.4–4)
WBC # BLD AUTO: 5.48 K/UL (ref 3.9–12.7)

## 2019-10-30 PROCEDURE — 99999 PR PBB SHADOW E&M-EST. PATIENT-LVL III: CPT | Mod: PBBFAC,HCNC,, | Performed by: INTERNAL MEDICINE

## 2019-10-30 PROCEDURE — 1101F PT FALLS ASSESS-DOCD LE1/YR: CPT | Mod: HCNC,CPTII,S$GLB, | Performed by: INTERNAL MEDICINE

## 2019-10-30 PROCEDURE — 99999 PR PBB SHADOW E&M-EST. PATIENT-LVL III: ICD-10-PCS | Mod: PBBFAC,HCNC,, | Performed by: INTERNAL MEDICINE

## 2019-10-30 PROCEDURE — 3077F PR MOST RECENT SYSTOLIC BLOOD PRESSURE >= 140 MM HG: ICD-10-PCS | Mod: HCNC,CPTII,S$GLB, | Performed by: INTERNAL MEDICINE

## 2019-10-30 PROCEDURE — 3079F DIAST BP 80-89 MM HG: CPT | Mod: HCNC,CPTII,S$GLB, | Performed by: INTERNAL MEDICINE

## 2019-10-30 PROCEDURE — 86703 HIV-1/HIV-2 1 RESULT ANTBDY: CPT | Mod: HCNC

## 2019-10-30 PROCEDURE — 36415 COLL VENOUS BLD VENIPUNCTURE: CPT | Mod: HCNC,PO

## 2019-10-30 PROCEDURE — 99213 PR OFFICE/OUTPT VISIT, EST, LEVL III, 20-29 MIN: ICD-10-PCS | Mod: HCNC,S$GLB,, | Performed by: INTERNAL MEDICINE

## 2019-10-30 PROCEDURE — 85025 COMPLETE CBC W/AUTO DIFF WBC: CPT | Mod: HCNC

## 2019-10-30 PROCEDURE — 99213 OFFICE O/P EST LOW 20 MIN: CPT | Mod: HCNC,S$GLB,, | Performed by: INTERNAL MEDICINE

## 2019-10-30 PROCEDURE — 84443 ASSAY THYROID STIM HORMONE: CPT | Mod: HCNC

## 2019-10-30 PROCEDURE — 3079F PR MOST RECENT DIASTOLIC BLOOD PRESSURE 80-89 MM HG: ICD-10-PCS | Mod: HCNC,CPTII,S$GLB, | Performed by: INTERNAL MEDICINE

## 2019-10-30 PROCEDURE — 80053 COMPREHEN METABOLIC PANEL: CPT | Mod: HCNC

## 2019-10-30 PROCEDURE — 1101F PR PT FALLS ASSESS DOC 0-1 FALLS W/OUT INJ PAST YR: ICD-10-PCS | Mod: HCNC,CPTII,S$GLB, | Performed by: INTERNAL MEDICINE

## 2019-10-30 PROCEDURE — 3077F SYST BP >= 140 MM HG: CPT | Mod: HCNC,CPTII,S$GLB, | Performed by: INTERNAL MEDICINE

## 2019-10-30 RX ORDER — HYDROXYZINE HYDROCHLORIDE 25 MG/1
25 TABLET, FILM COATED ORAL 3 TIMES DAILY PRN
Qty: 21 TABLET | Refills: 1 | Status: SHIPPED | OUTPATIENT
Start: 2019-10-30 | End: 2019-11-30 | Stop reason: SDUPTHER

## 2019-10-30 NOTE — PROGRESS NOTES
"Subjective:       Patient ID: Grace Navarro is a 73 y.o. female.    Chief Complaint: Rash    Rash  Provider : sees dermatology for TBSE last vsit 4/2019   Onset; started one month ago   Duration; daily   Description; getting worse. Systemic puritis   Locations; started on the feet and spread to the rest of the body  Associated; bleeding after scratching  Denies; fevers, chills, sweating. Juandice. Changes in urination  Previous episodes; none   Travel ; recently traveled to [place] stayed with host family  Sick contacts;  no one else had similar symptoms   Pertinent hx/meds; used steroid cream. Hydroxyzine , idrocortisone, triamcinolone         HPI  Review of Systems   Constitutional: Negative for activity change, chills, diaphoresis, fatigue and fever.   Eyes: Negative for visual disturbance.   Respiratory: Negative for shortness of breath.    Cardiovascular: Negative for chest pain and palpitations.   Gastrointestinal: Negative for abdominal distention.   Genitourinary: Negative for dysuria.   Musculoskeletal: Negative for arthralgias.   Skin: Positive for rash. Negative for color change and pallor.   Neurological: Negative for syncope and headaches.   All other systems reviewed and are negative.      Objective:       BP (!) 160/80   Pulse 75   Ht 5' 4" (1.626 m)   Wt 67 kg (147 lb 11.3 oz)   LMP 06/14/1996   SpO2 98%   BMI 25.35 kg/m²     Physical Exam   Constitutional: She is oriented to person, place, and time. She appears well-developed and well-nourished. No distress.   HENT:   Head: Normocephalic.   No scalp lesions   Eyes: Pupils are equal, round, and reactive to light. Conjunctivae and EOM are normal. Right eye exhibits no discharge. Left eye exhibits no discharge.   Neck: Normal range of motion.   Cardiovascular: Normal rate, regular rhythm and normal heart sounds. Exam reveals no gallop and no friction rub.   No murmur heard.  Pulmonary/Chest: Effort normal. No respiratory distress. "   Abdominal: Soft. Bowel sounds are normal.   Musculoskeletal: Normal range of motion. She exhibits no edema or deformity.   Neurological: She is alert and oriented to person, place, and time.   Skin: Skin is warm. She is not diaphoretic.   Scattered ecoriated lesions across the body on ear, face , neck, back, belly, calf and feet . Some with circular wheels some with dried blood.    Psychiatric: She has a normal mood and affect.   Nursing note and vitals reviewed.      Assessment:       1. Urticaria    2. Encounter for screening for human immunodeficiency virus (HIV)     3. Pruritus, unspecified         Plan:       Grace was seen today for rash.    Diagnoses and all orders for this visit:    Urticaria  Generalized puritis. DDx includes urticaria.  infestation exposure during recent travel. Lower suspicion for organ dysfuction, liver or kidney disease. Although given the generalized nature and duration of symptoms will send screening labs to confirm. Instructed to follow up with dermatology if symptoms persist and labs are normal     -     CBC auto differential; Future  -     Comprehensive metabolic panel; Future  -     HIV 1/2 Ag/Ab (4th Gen); Future  -     TSH; Future  -     hydrOXYzine HCl (ATARAX) 25 MG tablet; Take 1 tablet (25 mg total) by mouth 3 (three) times daily as needed for Itching.    Encounter for screening for human immunodeficiency virus (HIV)   -     HIV 1/2 Ag/Ab (4th Gen); Future    Pruritus, unspecified   -     TSH; Future    Elevated BP without the Dx of HTn  Suspect this is 2nd to itching/uncomformatble sensation  Return to PCP in one month for BP follow up

## 2019-10-30 NOTE — PATIENT INSTRUCTIONS
WE were happy to see you today    Please have your labs done at your earliest conveince    For your itching symptoms we recommend that you     1. Take and antihistamine   - take one tab , daily and increase to up to three times daily of any of the following, zrytec, xyzal, claritin  - if that does not work you can take benadryl 25 mg every 6 hrs or Hydroxyzine every 6 hours    2. Use emollient aveeno, eucrin cream to mosturies the skin  3. Use wet cloth to cool air on irritated areas  4. Keep your appointment with dermatology

## 2019-10-31 ENCOUNTER — TELEPHONE (OUTPATIENT)
Dept: INTERNAL MEDICINE | Facility: CLINIC | Age: 74
End: 2019-10-31

## 2019-10-31 ENCOUNTER — PATIENT MESSAGE (OUTPATIENT)
Dept: INTERNAL MEDICINE | Facility: CLINIC | Age: 74
End: 2019-10-31

## 2019-10-31 LAB — HIV 1+2 AB+HIV1 P24 AG SERPL QL IA: NEGATIVE

## 2019-10-31 NOTE — PROGRESS NOTES
Slight anemia   Already responded to patient through the portal    Can we schedule her additional labs and XR

## 2019-10-31 NOTE — TELEPHONE ENCOUNTER
----- Message from Antonio Shahid III, MD sent at 10/31/2019  3:50 PM CDT -----  Slight anemia   Already responded to patient through the portal    Can we schedule her additional labs and XR

## 2019-11-01 ENCOUNTER — PATIENT MESSAGE (OUTPATIENT)
Dept: INTERNAL MEDICINE | Facility: CLINIC | Age: 74
End: 2019-11-01

## 2019-11-01 ENCOUNTER — HOSPITAL ENCOUNTER (OUTPATIENT)
Dept: RADIOLOGY | Facility: HOSPITAL | Age: 74
Discharge: HOME | End: 2019-11-01
Attending: INTERNAL MEDICINE
Payer: MEDICARE

## 2019-11-01 DIAGNOSIS — L29.9 PRURITUS, UNSPECIFIED: ICD-10-CM

## 2019-11-01 DIAGNOSIS — L50.9 URTICARIA: ICD-10-CM

## 2019-11-01 PROCEDURE — 71046 X-RAY EXAM CHEST 2 VIEWS: CPT | Mod: 26,HCNC,, | Performed by: RADIOLOGY

## 2019-11-01 PROCEDURE — 71046 X-RAY EXAM CHEST 2 VIEWS: CPT | Mod: TC,HCNC,FY,PO

## 2019-11-01 PROCEDURE — 71046 XR CHEST PA AND LATERAL: ICD-10-PCS | Mod: 26,HCNC,, | Performed by: RADIOLOGY

## 2019-11-01 NOTE — PROGRESS NOTES
Greetings    The results of your Chest XRay was all normal . Please let us know if you have any questions

## 2019-11-02 ENCOUNTER — PATIENT MESSAGE (OUTPATIENT)
Dept: INTERNAL MEDICINE | Facility: CLINIC | Age: 74
End: 2019-11-02

## 2019-11-03 ENCOUNTER — PATIENT MESSAGE (OUTPATIENT)
Dept: INTERNAL MEDICINE | Facility: CLINIC | Age: 74
End: 2019-11-03

## 2019-11-04 ENCOUNTER — PATIENT MESSAGE (OUTPATIENT)
Dept: INTERNAL MEDICINE | Facility: CLINIC | Age: 74
End: 2019-11-04

## 2019-11-05 ENCOUNTER — OFFICE VISIT (OUTPATIENT)
Dept: INTERNAL MEDICINE | Facility: CLINIC | Age: 74
End: 2019-11-05
Payer: MEDICARE

## 2019-11-05 VITALS
SYSTOLIC BLOOD PRESSURE: 132 MMHG | DIASTOLIC BLOOD PRESSURE: 86 MMHG | TEMPERATURE: 98 F | HEIGHT: 64 IN | WEIGHT: 144.38 LBS | BODY MASS INDEX: 24.65 KG/M2 | OXYGEN SATURATION: 98 % | HEART RATE: 76 BPM

## 2019-11-05 DIAGNOSIS — L29.9 PRURITUS, UNSPECIFIED: Primary | ICD-10-CM

## 2019-11-05 PROCEDURE — 99213 OFFICE O/P EST LOW 20 MIN: CPT | Mod: HCNC,S$GLB,, | Performed by: INTERNAL MEDICINE

## 2019-11-05 PROCEDURE — 3079F DIAST BP 80-89 MM HG: CPT | Mod: HCNC,CPTII,S$GLB, | Performed by: INTERNAL MEDICINE

## 2019-11-05 PROCEDURE — 3079F PR MOST RECENT DIASTOLIC BLOOD PRESSURE 80-89 MM HG: ICD-10-PCS | Mod: HCNC,CPTII,S$GLB, | Performed by: INTERNAL MEDICINE

## 2019-11-05 PROCEDURE — 99213 PR OFFICE/OUTPT VISIT, EST, LEVL III, 20-29 MIN: ICD-10-PCS | Mod: HCNC,S$GLB,, | Performed by: INTERNAL MEDICINE

## 2019-11-05 PROCEDURE — 1101F PR PT FALLS ASSESS DOC 0-1 FALLS W/OUT INJ PAST YR: ICD-10-PCS | Mod: HCNC,CPTII,S$GLB, | Performed by: INTERNAL MEDICINE

## 2019-11-05 PROCEDURE — 99999 PR PBB SHADOW E&M-EST. PATIENT-LVL III: CPT | Mod: PBBFAC,HCNC,, | Performed by: INTERNAL MEDICINE

## 2019-11-05 PROCEDURE — 3075F PR MOST RECENT SYSTOLIC BLOOD PRESS GE 130-139MM HG: ICD-10-PCS | Mod: HCNC,CPTII,S$GLB, | Performed by: INTERNAL MEDICINE

## 2019-11-05 PROCEDURE — 99999 PR PBB SHADOW E&M-EST. PATIENT-LVL III: ICD-10-PCS | Mod: PBBFAC,HCNC,, | Performed by: INTERNAL MEDICINE

## 2019-11-05 PROCEDURE — 1101F PT FALLS ASSESS-DOCD LE1/YR: CPT | Mod: HCNC,CPTII,S$GLB, | Performed by: INTERNAL MEDICINE

## 2019-11-05 PROCEDURE — 3075F SYST BP GE 130 - 139MM HG: CPT | Mod: HCNC,CPTII,S$GLB, | Performed by: INTERNAL MEDICINE

## 2019-11-05 RX ORDER — PREDNISONE 20 MG/1
TABLET ORAL
Qty: 14 TABLET | Refills: 0 | Status: SHIPPED | OUTPATIENT
Start: 2019-11-05 | End: 2020-01-15 | Stop reason: ALTCHOICE

## 2019-11-05 NOTE — PROGRESS NOTES
Subjective:       Patient ID: Grace Navarro is a 73 y.o. female.    Chief Complaint: Rash    HPI  Pt with < 1 week of total body pruritus, started when she was in [place].  No known exposures, poor resp to Atarax.  BW unremarkable.  Review of Systems    Objective:      Physical Exam   Constitutional: She appears well-developed.   HENT:   Head: Normocephalic.   Eyes: EOM are normal.   Neck: Normal range of motion.   Cardiovascular: Normal rate, regular rhythm and intact distal pulses.   Pulmonary/Chest: Effort normal.   Abdominal: Bowel sounds are normal.   Musculoskeletal: Normal range of motion.   Lymphadenopathy:     She has no cervical adenopathy.   Neurological: She is alert. No cranial nerve deficit. She exhibits normal muscle tone. Coordination normal.   Skin: Skin is warm and dry. Rash (excoriations trunk and exts.  No urticaria) noted.   Psychiatric: She has a normal mood and affect. Her behavior is normal.   Vitals reviewed.      Assessment:       1. Pruritus, unspecified         Plan:       Grace was seen today for rash.    Diagnoses and all orders for this visit:    Pruritus, unspecified   -     predniSONE (DELTASONE) 20 MG tablet; 2 tabs po qd x 7 days      Follow up if symptoms worsen or fail to improve.

## 2019-11-06 ENCOUNTER — PATIENT MESSAGE (OUTPATIENT)
Dept: INTERNAL MEDICINE | Facility: CLINIC | Age: 74
End: 2019-11-06

## 2019-11-18 ENCOUNTER — OFFICE VISIT (OUTPATIENT)
Dept: OPHTHALMOLOGY | Facility: CLINIC | Age: 74
End: 2019-11-18
Payer: MEDICARE

## 2019-11-18 ENCOUNTER — CLINICAL SUPPORT (OUTPATIENT)
Dept: OPHTHALMOLOGY | Facility: CLINIC | Age: 74
End: 2019-11-18
Payer: MEDICARE

## 2019-11-18 DIAGNOSIS — H40.023 OPEN ANGLE WITH BORDERLINE FINDINGS AND HIGH GLAUCOMA RISK IN BOTH EYES: Primary | ICD-10-CM

## 2019-11-18 DIAGNOSIS — H04.123 DRY EYES: ICD-10-CM

## 2019-11-18 DIAGNOSIS — Z96.1 PSEUDOPHAKIA OF BOTH EYES: ICD-10-CM

## 2019-11-18 DIAGNOSIS — H40.003 GLAUCOMA SUSPECT OF BOTH EYES: ICD-10-CM

## 2019-11-18 PROCEDURE — 92133 CPTRZD OPH DX IMG PST SGM ON: CPT | Mod: HCNC,S$GLB,, | Performed by: OPHTHALMOLOGY

## 2019-11-18 PROCEDURE — 92012 INTRM OPH EXAM EST PATIENT: CPT | Mod: HCNC,S$GLB,, | Performed by: OPHTHALMOLOGY

## 2019-11-18 PROCEDURE — 99999 PR PBB SHADOW E&M-EST. PATIENT-LVL II: ICD-10-PCS | Mod: PBBFAC,HCNC,, | Performed by: OPHTHALMOLOGY

## 2019-11-18 PROCEDURE — 92133 POSTERIOR SEGMENT OCT OPTIC NERVE(OCULAR COHERENCE TOMOGRAPHY) - OU - BOTH EYES: ICD-10-PCS | Mod: HCNC,S$GLB,, | Performed by: OPHTHALMOLOGY

## 2019-11-18 PROCEDURE — 92083 HUMPHREY VISUAL FIELD - OU - BOTH EYES: ICD-10-PCS | Mod: HCNC,S$GLB,, | Performed by: OPHTHALMOLOGY

## 2019-11-18 PROCEDURE — 92012 PR EYE EXAM, EST PATIENT,INTERMED: ICD-10-PCS | Mod: HCNC,S$GLB,, | Performed by: OPHTHALMOLOGY

## 2019-11-18 PROCEDURE — 92083 EXTENDED VISUAL FIELD XM: CPT | Mod: HCNC,S$GLB,, | Performed by: OPHTHALMOLOGY

## 2019-11-18 PROCEDURE — 99999 PR PBB SHADOW E&M-EST. PATIENT-LVL I: ICD-10-PCS | Mod: PBBFAC,HCNC,,

## 2019-11-18 PROCEDURE — 99999 PR PBB SHADOW E&M-EST. PATIENT-LVL I: CPT | Mod: PBBFAC,HCNC,,

## 2019-11-18 PROCEDURE — 99999 PR PBB SHADOW E&M-EST. PATIENT-LVL II: CPT | Mod: PBBFAC,HCNC,, | Performed by: OPHTHALMOLOGY

## 2019-11-18 NOTE — PROGRESS NOTES
HPI     DLS: 6/01/18    Pt here for HVF review;    Meds: Systane tid ou                  Last edited by Bessy Meza on 11/18/2019  2:23 PM. (History)        Assessment /Plan     For exam results, see Encounter Report.    Open angle with borderline findings and high glaucoma risk in both eyes    Glaucoma suspect of both eyes  -     Baez Visual Field - OU - Extended - Both Eyes; Future    Dry eyes    Pseudophakia of both eyes    Other orders  -     Posterior Segment OCT Optic Nerve- Both eyes; Future          1.    Glaucoma (type and duration)    Suspect - low tension glaucoma suspect    folowed by [facility] for years as a suspect    First HVF   11/2019    First photos   3/2016    Treatment / Drops started   none           Family history    ???        Glaucoma meds   None         H/O adverse rxn to glaucoma drops    None         LASERS    None         GLAUCOMA SURGERIES    None        OTHER EYE SURGERIES    none        CDR    0.8/0.8        Tbase    12-16        Tmax    16/16         Ttarget   ???            HVF  1 test  2019 to  2019  -  SAD,  od // SAD  Os(( ? If associated with the medulated NFL od)         Gonio    ??        CCT    ???        OCT    1 test 2019 to 2019 - RNFL - Dec  TS/NI/NS od // wnl  os        HRT    ?? test 20?? to 20?? - MR - ??? od // ?? os        Disc photos    ??    - Ttoday    12/12  - Test done today    IOP/OCT/ HVF       2. . Myelinated NFL OD    3. 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 PC IOL OU    OD - 9/21/2016 - PCB00 21.5   OS - 8/3/2016 - PCB00 22.0    Plan  Get records from Dr. Khan - signed release  - was monitored with VF's photos ect. / Eventually was told she did NOT have glaucoma and no further testing was done - old VF's ect - may have a defect from the medullated NFL    Try +1.50 readers for [employer]  - her +2.50 are now too strong      F/U 6-9  months with IOP check // CCT // HRT // gonio    Monitor as a suspect - normal tension suspect    Hold off on starting treatment at this time - but with hint of SAD and hint of RNFL damage od - consider starting gtts in future - but hthe VF changes od may ne 2/2 myelinated NFL       NO PCO   Pt still C/O trouble with near vision can refer to optom for glasses    - ? [place]  - is particularly concerned with her near vision - has a progressive lens

## 2019-11-26 ENCOUNTER — OFFICE VISIT (OUTPATIENT)
Dept: DERMATOLOGY | Facility: CLINIC | Age: 74
End: 2019-11-26
Payer: MEDICARE

## 2019-11-26 ENCOUNTER — PATIENT MESSAGE (OUTPATIENT)
Dept: DERMATOLOGY | Facility: CLINIC | Age: 74
End: 2019-11-26

## 2019-11-26 DIAGNOSIS — L98.9 DISEASE OF SKIN AND SUBCUTANEOUS TISSUE: Primary | ICD-10-CM

## 2019-11-26 PROCEDURE — 1101F PT FALLS ASSESS-DOCD LE1/YR: CPT | Mod: HCNC,CPTII,S$GLB, | Performed by: DERMATOLOGY

## 2019-11-26 PROCEDURE — 99999 PR PBB SHADOW E&M-EST. PATIENT-LVL II: ICD-10-PCS | Mod: PBBFAC,HCNC,, | Performed by: DERMATOLOGY

## 2019-11-26 PROCEDURE — 1159F MED LIST DOCD IN RCRD: CPT | Mod: HCNC,S$GLB,, | Performed by: DERMATOLOGY

## 2019-11-26 PROCEDURE — 1159F PR MEDICATION LIST DOCUMENTED IN MEDICAL RECORD: ICD-10-PCS | Mod: HCNC,S$GLB,, | Performed by: DERMATOLOGY

## 2019-11-26 PROCEDURE — 99214 PR OFFICE/OUTPT VISIT, EST, LEVL IV, 30-39 MIN: ICD-10-PCS | Mod: HCNC,S$GLB,, | Performed by: DERMATOLOGY

## 2019-11-26 PROCEDURE — 1101F PR PT FALLS ASSESS DOC 0-1 FALLS W/OUT INJ PAST YR: ICD-10-PCS | Mod: HCNC,CPTII,S$GLB, | Performed by: DERMATOLOGY

## 2019-11-26 PROCEDURE — 99999 PR PBB SHADOW E&M-EST. PATIENT-LVL II: CPT | Mod: PBBFAC,HCNC,, | Performed by: DERMATOLOGY

## 2019-11-26 PROCEDURE — 99214 OFFICE O/P EST MOD 30 MIN: CPT | Mod: HCNC,S$GLB,, | Performed by: DERMATOLOGY

## 2019-11-26 PROCEDURE — 1125F PR PAIN SEVERITY QUANTIFIED, PAIN PRESENT: ICD-10-PCS | Mod: HCNC,S$GLB,, | Performed by: DERMATOLOGY

## 2019-11-26 PROCEDURE — 1125F AMNT PAIN NOTED PAIN PRSNT: CPT | Mod: HCNC,S$GLB,, | Performed by: DERMATOLOGY

## 2019-11-26 RX ORDER — FLUOCINONIDE TOPICAL SOLUTION USP, 0.05% 0.5 MG/ML
SOLUTION TOPICAL
Qty: 60 ML | Refills: 3 | Status: SHIPPED | OUTPATIENT
Start: 2019-11-26 | End: 2020-02-27

## 2019-11-26 RX ORDER — CLOBETASOL PROPIONATE 0.46 MG/ML
SOLUTION TOPICAL
Qty: 50 ML | Refills: 3 | Status: SHIPPED | OUTPATIENT
Start: 2019-11-26 | End: 2020-02-27

## 2019-11-26 NOTE — PROGRESS NOTES
Subjective:       Patient ID:  Grace Navarro is a 73 y.o. female who presents for   Chief Complaint   Patient presents with    Rash     Rash  - Initial  Affected locations: diffuse (started on feet)  Duration: 2 months  Signs / symptoms: itching  Severity: overall improved over starting 2 months ago.  Timing: constant and no history of same complaint (but has h/o skin rashes (sensitive skin))  Relieving factors/Treatments tried: Rx topical steroids (TAC cream)  Improvement on treatment: mild        Review of Systems   Skin: Positive for itching and rash.   Allergic/Immunologic: Positive for environmental allergies (mild - takes otc allergy pill qday).        Objective:    Physical Exam   Constitutional: She appears well-developed and well-nourished. No distress.   Neurological: She is alert and oriented to person, place, and time. She is not disoriented.   Psychiatric: She has a normal mood and affect.   Skin:   Areas Examined (abnormalities noted in diagram):   Scalp / Hair Palpated and Inspected  Head / Face Inspection Performed  Neck Inspection Performed  Chest / Axilla Inspection Performed  Abdomen Inspection Performed  Genitals / Buttocks / Groin Inspection Performed  Back Inspection Performed  RUE Inspected  LUE Inspection Performed  RLE Inspected  LLE Inspection Performed  Nails and Digits Inspection Performed                   Diagram Legend     Erythematous scaling macule/papule c/w actinic keratosis       Vascular papule c/w angioma      Pigmented verrucoid papule/plaque c/w seborrheic keratosis      Yellow umbilicated papule c/w sebaceous hyperplasia      Irregularly shaped tan macule c/w lentigo     1-2 mm smooth white papules consistent with Milia      Movable subcutaneous cyst with punctum c/w epidermal inclusion cyst      Subcutaneous movable cyst c/w pilar cyst      Firm pink to brown papule c/w dermatofibroma      Pedunculated fleshy papule(s) c/w skin tag(s)      Evenly pigmented macule  c/w junctional nevus     Mildly variegated pigmented, slightly irregular-bordered macule c/w mildly atypical nevus      Flesh colored to evenly pigmented papule c/w intradermal nevus       Pink pearly papule/plaque c/w basal cell carcinoma      Erythematous hyperkeratotic cursted plaque c/w SCC      Surgical scar with no sign of skin cancer recurrence      Open and closed comedones      Inflammatory papules and pustules      Verrucoid papule consistent consistent with wart     Erythematous eczematous patches and plaques     Dystrophic onycholytic nail with subungual debris c/w onychomycosis     Umbilicated papule    Erythematous-base heme-crusted tan verrucoid plaque consistent with inflamed seborrheic keratosis     Erythematous Silvery Scaling Plaque c/w Psoriasis     See annotation      Assessment / Plan:        Disease of skin and subcutaneous tissue - very nonspecific. Today only with excoriations upper back and post scalp. pics pt brought show only some vague erythematous patches dorsal foot and ? Dorsal hands  -     clobetasol (TEMOVATE) 0.05 % external solution; Pt to mix in 1 jar of cerave cream and apply to affected areas bid  Dispense: 50 mL; Refill: 3  -     fluocinonide (LIDEX) 0.05 % external solution; AAA scalp qday - bid prn pruritus  Dispense: 60 mL; Refill: 3             Follow up if symptoms worsen or fail to improve.

## 2019-11-30 ENCOUNTER — PATIENT OUTREACH (OUTPATIENT)
Dept: ADMINISTRATIVE | Facility: OTHER | Age: 74
End: 2019-11-30

## 2019-11-30 DIAGNOSIS — Z12.11 SCREENING FOR COLON CANCER: Primary | ICD-10-CM

## 2019-11-30 DIAGNOSIS — L50.9 URTICARIA: ICD-10-CM

## 2019-12-01 RX ORDER — HYDROXYZINE HYDROCHLORIDE 25 MG/1
TABLET, FILM COATED ORAL
Qty: 21 TABLET | Refills: 1 | Status: SHIPPED | OUTPATIENT
Start: 2019-12-01 | End: 2020-04-24

## 2019-12-03 ENCOUNTER — OFFICE VISIT (OUTPATIENT)
Dept: OPTOMETRY | Facility: CLINIC | Age: 74
End: 2019-12-03
Payer: COMMERCIAL

## 2019-12-03 DIAGNOSIS — H02.834 DERMATOCHALASIS OF BOTH UPPER EYELIDS: ICD-10-CM

## 2019-12-03 DIAGNOSIS — Z96.1 PSEUDOPHAKIA: ICD-10-CM

## 2019-12-03 DIAGNOSIS — H40.023 OAG (OPEN ANGLE GLAUCOMA) SUSPECT, HIGH RISK, BILATERAL: ICD-10-CM

## 2019-12-03 DIAGNOSIS — H02.831 DERMATOCHALASIS OF BOTH UPPER EYELIDS: ICD-10-CM

## 2019-12-03 DIAGNOSIS — H52.4 MYOPIA WITH ASTIGMATISM AND PRESBYOPIA, BILATERAL: Primary | ICD-10-CM

## 2019-12-03 DIAGNOSIS — H52.13 MYOPIA WITH ASTIGMATISM AND PRESBYOPIA, BILATERAL: Primary | ICD-10-CM

## 2019-12-03 DIAGNOSIS — H52.203 MYOPIA WITH ASTIGMATISM AND PRESBYOPIA, BILATERAL: Primary | ICD-10-CM

## 2019-12-03 PROCEDURE — 99999 PR PBB SHADOW E&M-EST. PATIENT-LVL III: CPT | Mod: PBBFAC,,, | Performed by: OPTOMETRIST

## 2019-12-03 PROCEDURE — 92015 DETERMINE REFRACTIVE STATE: CPT | Mod: S$GLB,,, | Performed by: OPTOMETRIST

## 2019-12-03 PROCEDURE — 92014 COMPRE OPH EXAM EST PT 1/>: CPT | Mod: S$GLB,,, | Performed by: OPTOMETRIST

## 2019-12-03 PROCEDURE — 92014 PR EYE EXAM, EST PATIENT,COMPREHESV: ICD-10-PCS | Mod: S$GLB,,, | Performed by: OPTOMETRIST

## 2019-12-03 PROCEDURE — 92015 PR REFRACTION: ICD-10-PCS | Mod: S$GLB,,, | Performed by: OPTOMETRIST

## 2019-12-03 PROCEDURE — 99999 PR PBB SHADOW E&M-EST. PATIENT-LVL III: ICD-10-PCS | Mod: PBBFAC,,, | Performed by: OPTOMETRIST

## 2019-12-19 ENCOUNTER — PATIENT OUTREACH (OUTPATIENT)
Dept: ADMINISTRATIVE | Facility: HOSPITAL | Age: 74
End: 2019-12-19

## 2019-12-19 ENCOUNTER — TELEPHONE (OUTPATIENT)
Dept: ADMINISTRATIVE | Facility: HOSPITAL | Age: 74
End: 2019-12-19

## 2019-12-19 DIAGNOSIS — Z12.11 COLON CANCER SCREENING: ICD-10-CM

## 2019-12-19 DIAGNOSIS — Z12.31 ENCOUNTER FOR SCREENING MAMMOGRAM FOR BREAST CANCER: Primary | ICD-10-CM

## 2019-12-19 DIAGNOSIS — E04.2 MULTINODULAR GOITER: Primary | ICD-10-CM

## 2019-12-19 NOTE — TELEPHONE ENCOUNTER
Good morning , please sign pended Endocrine referral. The initial order has . I will connect with the patient for scheduling . Patient is due for colorectal cancer screening and aware of being due for a colonoscopy.      Thank You,  Sharri

## 2020-01-15 ENCOUNTER — TELEPHONE (OUTPATIENT)
Dept: FAMILY MEDICINE | Facility: CLINIC | Age: 75
End: 2020-01-15

## 2020-01-15 ENCOUNTER — OFFICE VISIT (OUTPATIENT)
Dept: INTERNAL MEDICINE | Facility: CLINIC | Age: 75
End: 2020-01-15
Payer: MEDICARE

## 2020-01-15 VITALS
DIASTOLIC BLOOD PRESSURE: 72 MMHG | BODY MASS INDEX: 24.88 KG/M2 | HEIGHT: 64 IN | HEART RATE: 72 BPM | TEMPERATURE: 98 F | WEIGHT: 145.75 LBS | OXYGEN SATURATION: 98 % | SYSTOLIC BLOOD PRESSURE: 126 MMHG

## 2020-01-15 DIAGNOSIS — I10 ESSENTIAL HYPERTENSION: ICD-10-CM

## 2020-01-15 DIAGNOSIS — J01.00 ACUTE MAXILLARY SINUSITIS, RECURRENCE NOT SPECIFIED: Primary | ICD-10-CM

## 2020-01-15 DIAGNOSIS — J20.9 ACUTE BRONCHITIS, UNSPECIFIED ORGANISM: ICD-10-CM

## 2020-01-15 PROCEDURE — 3078F PR MOST RECENT DIASTOLIC BLOOD PRESSURE < 80 MM HG: ICD-10-PCS | Mod: HCNC,CPTII,S$GLB, | Performed by: INTERNAL MEDICINE

## 2020-01-15 PROCEDURE — 3074F SYST BP LT 130 MM HG: CPT | Mod: HCNC,CPTII,S$GLB, | Performed by: INTERNAL MEDICINE

## 2020-01-15 PROCEDURE — 99213 OFFICE O/P EST LOW 20 MIN: CPT | Mod: 25,HCNC,S$GLB, | Performed by: INTERNAL MEDICINE

## 2020-01-15 PROCEDURE — 1125F AMNT PAIN NOTED PAIN PRSNT: CPT | Mod: HCNC,S$GLB,, | Performed by: INTERNAL MEDICINE

## 2020-01-15 PROCEDURE — 1101F PR PT FALLS ASSESS DOC 0-1 FALLS W/OUT INJ PAST YR: ICD-10-PCS | Mod: HCNC,CPTII,S$GLB, | Performed by: INTERNAL MEDICINE

## 2020-01-15 PROCEDURE — 99999 PR PBB SHADOW E&M-EST. PATIENT-LVL III: CPT | Mod: PBBFAC,HCNC,, | Performed by: INTERNAL MEDICINE

## 2020-01-15 PROCEDURE — 3078F DIAST BP <80 MM HG: CPT | Mod: HCNC,CPTII,S$GLB, | Performed by: INTERNAL MEDICINE

## 2020-01-15 PROCEDURE — 96372 THER/PROPH/DIAG INJ SC/IM: CPT | Mod: HCNC,S$GLB,, | Performed by: INTERNAL MEDICINE

## 2020-01-15 PROCEDURE — 99999 PR PBB SHADOW E&M-EST. PATIENT-LVL III: ICD-10-PCS | Mod: PBBFAC,HCNC,, | Performed by: INTERNAL MEDICINE

## 2020-01-15 PROCEDURE — 1159F PR MEDICATION LIST DOCUMENTED IN MEDICAL RECORD: ICD-10-PCS | Mod: HCNC,S$GLB,, | Performed by: INTERNAL MEDICINE

## 2020-01-15 PROCEDURE — 3074F PR MOST RECENT SYSTOLIC BLOOD PRESSURE < 130 MM HG: ICD-10-PCS | Mod: HCNC,CPTII,S$GLB, | Performed by: INTERNAL MEDICINE

## 2020-01-15 PROCEDURE — 1101F PT FALLS ASSESS-DOCD LE1/YR: CPT | Mod: HCNC,CPTII,S$GLB, | Performed by: INTERNAL MEDICINE

## 2020-01-15 PROCEDURE — 1159F MED LIST DOCD IN RCRD: CPT | Mod: HCNC,S$GLB,, | Performed by: INTERNAL MEDICINE

## 2020-01-15 PROCEDURE — 99213 PR OFFICE/OUTPT VISIT, EST, LEVL III, 20-29 MIN: ICD-10-PCS | Mod: 25,HCNC,S$GLB, | Performed by: INTERNAL MEDICINE

## 2020-01-15 PROCEDURE — 1125F PR PAIN SEVERITY QUANTIFIED, PAIN PRESENT: ICD-10-PCS | Mod: HCNC,S$GLB,, | Performed by: INTERNAL MEDICINE

## 2020-01-15 PROCEDURE — 96372 PR INJECTION,THERAP/PROPH/DIAG2ST, IM OR SUBCUT: ICD-10-PCS | Mod: HCNC,S$GLB,, | Performed by: INTERNAL MEDICINE

## 2020-01-15 RX ORDER — TRIAMCINOLONE ACETONIDE 40 MG/ML
40 INJECTION, SUSPENSION INTRA-ARTICULAR; INTRAMUSCULAR
Status: COMPLETED | OUTPATIENT
Start: 2020-01-15 | End: 2020-01-15

## 2020-01-15 RX ORDER — PROMETHAZINE HYDROCHLORIDE AND DEXTROMETHORPHAN HYDROBROMIDE 6.25; 15 MG/5ML; MG/5ML
5 SYRUP ORAL EVERY 6 HOURS PRN
Qty: 240 ML | Refills: 0 | Status: SHIPPED | OUTPATIENT
Start: 2020-01-15 | End: 2020-01-25

## 2020-01-15 RX ORDER — CIPROFLOXACIN 500 MG/1
500 TABLET ORAL 2 TIMES DAILY
Qty: 20 TABLET | Refills: 0 | Status: SHIPPED | OUTPATIENT
Start: 2020-01-15 | End: 2020-01-25

## 2020-01-15 RX ADMIN — TRIAMCINOLONE ACETONIDE 40 MG: 40 INJECTION, SUSPENSION INTRA-ARTICULAR; INTRAMUSCULAR at 05:01

## 2020-01-15 NOTE — PATIENT INSTRUCTIONS
We have reviewed your prescription medications.   We will treat your bronchitis and sinus infection with a steroid shot and cipro.   I am also sending you a cough syrup.   Your Bp should be ok because it looks good at this time.

## 2020-01-16 ENCOUNTER — PATIENT MESSAGE (OUTPATIENT)
Dept: FAMILY MEDICINE | Facility: CLINIC | Age: 75
End: 2020-01-16

## 2020-01-16 RX ORDER — BENZONATATE 200 MG/1
200 CAPSULE ORAL 3 TIMES DAILY PRN
Qty: 30 CAPSULE | Refills: 0 | Status: SHIPPED | OUTPATIENT
Start: 2020-01-16 | End: 2020-01-26

## 2020-01-17 NOTE — PROGRESS NOTES
Subjective:       Patient ID: Grace Navarro is a 74 y.o. female.    Chief Complaint: Cough (coughing up green-kyler mucus. ); Sore Throat; and Ear Fullness     I have reviewed the PMH,  and  for this patient    She  has a past medical history of Anxiety, Rene's disease, Chronic low back pain, Depression, Goiter, nodular, Hyperlipidemia, Hypertension, Irritable bowel, Neuromuscular disorder, Osteopenia of multiple sites (5/29/2017), PUD (peptic ulcer disease), Subarachnoid hemorrhage (2014), and Varicose veins.    She is a patient of Lisa Dean who has been seen Dr Gann lately. She is here [facility] for an acute care visit for cough. She has been sick for a few days. She was recently visiting her daughter in [place] and she stayed longer because she was sick. She developed a cough a few days ago. She has a non-productive cough. She has a history of Asthma. She has been having sinus pressure and headaches. Her cough sounds reactive. She has a prescription for an albuterol inhaler. She has not measured her temperature, but she has had chills. She did take a flu shot this year. Her BP looks good today. She states that she took lortab and it helped her cough.     Active Ambulatory Problems     Diagnosis Date Noted    Chronic low back pain     Hypertension     Neuromuscular disorder     Anxiety     Depression     Hyperlipidemia     Irritable bowel     PUD (peptic ulcer disease)     Varicose veins     Goiter, nodular     Spondylolisthesis, grade 1 09/11/2012    History of subarachnoid hemorrhage 02/27/2014    Left atrial enlargement 02/27/2014    Syncope 02/28/2014    Microscopic hematuria 04/10/2015    Lumbar radiculopathy 11/17/2015    Postlaminectomy syndrome of lumbar region 11/17/2015    Lumbar facet arthropathy 11/17/2015    Senile nuclear sclerosis 08/03/2016    Nuclear sclerotic cataract of right eye 09/21/2016    Osteopenia of multiple sites 05/29/2017    Pain of right upper  extremity 01/02/2018    Weakness 01/02/2018    Excessive daytime sleepiness     GENOVEVA (obstructive sleep apnea)      Resolved Ambulatory Problems     Diagnosis Date Noted    Lumbago 07/12/2013    Easy bruising 11/13/2013    Traumatic orbital hematoma, left  02/08/2014    Decreased range of motion 02/03/2017    Decreased strength 02/03/2017    Neck pain 02/03/2017    Decreased functional mobility 02/03/2017    Chronic pain of both knees 01/19/2018    Weakness of both lower extremities 01/19/2018     Past Medical History:   Diagnosis Date    Rene's disease     Subarachnoid hemorrhage 2014         MEDICATIONS:  Current Outpatient Medications:     albuterol (VENTOLIN HFA) 90 mcg/actuation inhaler, Inhale 2 puffs into the lungs every 6 (six) hours as needed for Wheezing. Rescue, Disp: 1 Inhaler, Rfl: 0    ascorbic acid (VITAMIN C) 1000 MG tablet, Take 1,000 mg by mouth once daily., Disp: , Rfl:     azelastine (ASTELIN) 137 mcg (0.1 %) nasal spray, USE ONE SPRAY(S) IN EACH NOSTRIL TWICE DAILY, Disp: 30 mL, Rfl: 11    CALCIUM-MAGNESIUM ORAL, Take 1 capsule by mouth Daily., Disp: , Rfl:     cholecalciferol, vitamin D3, 1,000 unit capsule, Take 1,000 Units by mouth once daily., Disp: , Rfl:     clobetasol (TEMOVATE) 0.05 % external solution, Pt to mix in 1 jar of cerave cream and apply to affected areas bid, Disp: 50 mL, Rfl: 3    cyanocobalamin 500 MCG tablet, Take 500 mcg by mouth once daily., Disp: , Rfl:     diclofenac sodium (VOLTAREN) 1 % Gel, Apply 2 g topically once daily., Disp: 100 g, Rfl: 0    estradiol (ESTRACE) 0.01 % (0.1 mg/gram) vaginal cream, Place 1 g vaginally twice a week., Disp: 42.5 g, Rfl: 1    fluocinonide (LIDEX) 0.05 % external solution, AAA scalp qday - bid prn pruritus, Disp: 60 mL, Rfl: 3    fluticasone propionate (FLONASE) 50 mcg/actuation nasal spray, INHALE 1 SPRAY(S) IN EACH NOSTRIL TWICE DAILY, Disp: 16 mL, Rfl: 1    FLUZONE HIGH-DOSE 2019-20, PF, 180 mcg/0.5 mL  Syrg, , Disp: , Rfl:     hydroCHLOROthiazide (HYDRODIURIL) 12.5 MG Tab, Take 1 tablet (12.5 mg total) by mouth once daily., Disp: 90 tablet, Rfl: 3    hydrOXYzine HCl (ATARAX) 25 MG tablet, TAKE 1 TABLET BY MOUTH THREE TIMES DAILY AS NEEDED FOR ITCHING, Disp: 21 tablet, Rfl: 1    metoprolol succinate (TOPROL-XL) 100 MG 24 hr tablet, Take 1 tablet (100 mg total) by mouth once daily., Disp: 30 tablet, Rfl: 11    benzonatate (TESSALON) 200 MG capsule, Take 1 capsule (200 mg total) by mouth 3 (three) times daily as needed for Cough., Disp: 30 capsule, Rfl: 0    ciprofloxacin HCl (CIPRO) 500 MG tablet, Take 1 tablet (500 mg total) by mouth 2 (two) times daily. for 10 days, Disp: 20 tablet, Rfl: 0    promethazine-dextromethorphan (PROMETHAZINE-DM) 6.25-15 mg/5 mL Syrp, Take 5 mLs by mouth every 6 (six) hours as needed., Disp: 240 mL, Rfl: 0      HEALTH MAINTENANCE:   Health Maintenance   Topic Date Due    Fecal Occult Blood Test (FOBT)/FitKit  11/29/2019    Mammogram  12/04/2019    DEXA SCAN  05/24/2020    Lipid Panel  06/19/2024    TETANUS VACCINE  07/21/2026    Hepatitis C Screening  Completed    Pneumococcal Vaccine (65+ Low/Medium Risk)  Completed       Review of Systems   Constitutional: Negative for activity change, appetite change, chills, diaphoresis, fatigue and fever.   HENT: Positive for congestion and sinus pressure. Negative for drooling, ear discharge, ear pain, nosebleeds, postnasal drip, rhinorrhea, sinus pain, sneezing, sore throat, trouble swallowing and voice change.    Eyes: Negative for pain, discharge, redness and itching.   Respiratory: Positive for cough. Negative for chest tightness, shortness of breath and wheezing.    Cardiovascular: Negative for chest pain and leg swelling.   Gastrointestinal: Negative for abdominal pain, constipation, diarrhea and nausea.   Musculoskeletal: Negative for arthralgias, joint swelling and myalgias.   Skin: Negative for pallor and rash.   Neurological:  Positive for headaches. Negative for dizziness, weakness and light-headedness.   Hematological: Negative for adenopathy. Does not bruise/bleed easily.   Psychiatric/Behavioral: Negative for agitation and sleep disturbance.       Objective:          A1C:  Recent Labs   Lab 02/21/19  0714 06/19/19  0831   Hemoglobin A1C 5.5 5.5     CBC:  Recent Labs   Lab 07/11/17  0707 11/08/18  0800 05/20/19  0704 10/30/19  1642 11/01/19  0805   WBC 4.06 4.26 3.62 L 5.48 4.44   RBC 4.11 3.93 L 3.91 L 3.72 L 3.92 L   Hemoglobin 12.5 12.2 12.3 11.2 L 11.9 L   Hematocrit 38.8 37.6 38.3 36.0 L 39.3   Platelets 207 178 194 200 210   Mean Corpuscular Volume 94 96 98 97 100 H   Mean Corpuscular Hemoglobin 30.4 31.0 31.5 H 30.1 30.4   Mean Corpuscular Hemoglobin Conc 32.2 32.4 32.1 31.1 L 30.3 L     CMP:  Recent Labs   Lab 07/11/17  0707  11/08/18  0800 05/20/19  0704 06/19/19  0831 10/30/19  1642   Glucose 100   < > 113 H 90  --  90   Calcium 10.0   < > 9.6 10.1  --  9.3   Albumin 3.9  --  3.8 4.0 4.0 3.9   Total Protein 7.5  --  7.2 7.5 7.3 7.1   Sodium 143   < > 139 142  --  142   Potassium 4.2   < > 4.0 4.5  --  3.8   CO2 30 H   < > 31 H 29  --  30 H   Chloride 104   < > 101 105  --  103   BUN, Bld 14   < > 19 15  --  12   Creatinine 0.7   < > 0.7 0.7  --  0.6   Alkaline Phosphatase 82  --  63 63 70 74   ALT 23  --  15 35 21 15   AST 28  --  18 36 25 22   Total Bilirubin 0.5  --  0.3 0.4 0.4 0.4    < > = values in this interval not displayed.     LIPIDS:  Recent Labs   Lab 07/11/17  0707 11/08/18  0800 02/21/19  0714 05/20/19  0704 06/19/19  0831 10/30/19  1642   TSH 5.117 H 3.288  --  3.740  --  2.222   HDL 63 58 67 59 60  --    Cholesterol 219 H 221 H 249 H 210 H 217 H  --    Triglycerides 73 67 119 121 84  --    LDL Cholesterol 141.4 149.6 158.2 126.8 140.2  --    Hdl/Cholesterol Ratio 28.8 26.2 26.9 28.1 27.6  --    Non-HDL Cholesterol 156 163 182 151 157  --    Total Cholesterol/HDL Ratio 3.5 3.8 3.7 3.6 3.6  --      TSH:  Recent  Labs   Lab 07/11/17  0707 11/08/18  0800 05/20/19  0704 10/30/19  1642   TSH 5.117 H 3.288 3.740 2.222           Physical Exam   Constitutional: She is oriented to person, place, and time.   HENT:   Head: Normocephalic and atraumatic.   Right Ear: External ear normal. Tympanic membrane is not injected, not erythematous and not retracted. No middle ear effusion.   Left Ear: External ear normal. Tympanic membrane is not injected, not erythematous and not retracted.  No middle ear effusion.   Nose: No rhinorrhea. Right sinus exhibits maxillary sinus tenderness. Right sinus exhibits no frontal sinus tenderness. Left sinus exhibits maxillary sinus tenderness. Left sinus exhibits no frontal sinus tenderness.   Mouth/Throat: Posterior oropharyngeal erythema present. No oropharyngeal exudate or posterior oropharyngeal edema.   Eyes: Pupils are equal, round, and reactive to light. Conjunctivae and EOM are normal. Right eye exhibits no discharge. Left eye exhibits no discharge.   Neck: Normal range of motion. Neck supple. No thyromegaly present.   Cardiovascular: Normal rate, regular rhythm, normal heart sounds and intact distal pulses.   No murmur heard.  Pulmonary/Chest: Effort normal and breath sounds normal. No accessory muscle usage. No tachypnea. No respiratory distress. She has no wheezes. She has no rhonchi. She has no rales.   Abdominal: Soft. She exhibits no distension.   Musculoskeletal: She exhibits no edema or tenderness.   Lymphadenopathy:     She has no cervical adenopathy.   Neurological: She is alert and oriented to person, place, and time.   Skin: Skin is warm and dry. No rash noted. No erythema.   Psychiatric: She has a normal mood and affect. Her behavior is normal.             Assessment and Plan:     Problem List Items Addressed This Visit        Cardiac/Vascular    Hypertension - BP looks good.       Other Visit Diagnoses     Acute maxillary sinusitis, recurrence not specified    -  Primary - we will  treat with cipro and a steroid shot.       Acute bronchitis, unspecified organism     - treat with a steroid shot. Use albuterol.     RAD - treat with a steroid shot. Use albuterol.           Orders Placed This Encounter    triamcinolone acetonide injection 40 mg    promethazine-dextromethorphan (PROMETHAZINE-DM) 6.25-15 mg/5 mL Syrp    ciprofloxacin HCl (CIPRO) 500 MG tablet         Follow-up with pcp in as needed.       Quique Mota MD,  FACP  Internal Medicine

## 2020-01-20 ENCOUNTER — PATIENT MESSAGE (OUTPATIENT)
Dept: FAMILY MEDICINE | Facility: CLINIC | Age: 75
End: 2020-01-20

## 2020-02-05 ENCOUNTER — OFFICE VISIT (OUTPATIENT)
Dept: INTERNAL MEDICINE | Facility: CLINIC | Age: 75
End: 2020-02-05
Payer: MEDICARE

## 2020-02-05 VITALS
OXYGEN SATURATION: 97 % | WEIGHT: 132.25 LBS | BODY MASS INDEX: 22.58 KG/M2 | HEIGHT: 64 IN | DIASTOLIC BLOOD PRESSURE: 68 MMHG | HEART RATE: 68 BPM | SYSTOLIC BLOOD PRESSURE: 120 MMHG

## 2020-02-05 DIAGNOSIS — M25.512 ACUTE PAIN OF LEFT SHOULDER: Primary | ICD-10-CM

## 2020-02-05 PROBLEM — D69.2 SENILE PURPURA: Status: ACTIVE | Noted: 2020-02-05

## 2020-02-05 PROCEDURE — 1101F PR PT FALLS ASSESS DOC 0-1 FALLS W/OUT INJ PAST YR: ICD-10-PCS | Mod: HCNC,CPTII,S$GLB, | Performed by: INTERNAL MEDICINE

## 2020-02-05 PROCEDURE — 1159F MED LIST DOCD IN RCRD: CPT | Mod: HCNC,S$GLB,, | Performed by: INTERNAL MEDICINE

## 2020-02-05 PROCEDURE — 3074F PR MOST RECENT SYSTOLIC BLOOD PRESSURE < 130 MM HG: ICD-10-PCS | Mod: HCNC,CPTII,S$GLB, | Performed by: INTERNAL MEDICINE

## 2020-02-05 PROCEDURE — 1125F PR PAIN SEVERITY QUANTIFIED, PAIN PRESENT: ICD-10-PCS | Mod: HCNC,S$GLB,, | Performed by: INTERNAL MEDICINE

## 2020-02-05 PROCEDURE — 96372 THER/PROPH/DIAG INJ SC/IM: CPT | Mod: HCNC,S$GLB,, | Performed by: INTERNAL MEDICINE

## 2020-02-05 PROCEDURE — 3074F SYST BP LT 130 MM HG: CPT | Mod: HCNC,CPTII,S$GLB, | Performed by: INTERNAL MEDICINE

## 2020-02-05 PROCEDURE — 1159F PR MEDICATION LIST DOCUMENTED IN MEDICAL RECORD: ICD-10-PCS | Mod: HCNC,S$GLB,, | Performed by: INTERNAL MEDICINE

## 2020-02-05 PROCEDURE — 99213 OFFICE O/P EST LOW 20 MIN: CPT | Mod: HCNC,25,S$GLB, | Performed by: INTERNAL MEDICINE

## 2020-02-05 PROCEDURE — 99999 PR PBB SHADOW E&M-EST. PATIENT-LVL IV: CPT | Mod: PBBFAC,HCNC,, | Performed by: INTERNAL MEDICINE

## 2020-02-05 PROCEDURE — 3078F PR MOST RECENT DIASTOLIC BLOOD PRESSURE < 80 MM HG: ICD-10-PCS | Mod: HCNC,CPTII,S$GLB, | Performed by: INTERNAL MEDICINE

## 2020-02-05 PROCEDURE — 96372 PR INJECTION,THERAP/PROPH/DIAG2ST, IM OR SUBCUT: ICD-10-PCS | Mod: HCNC,S$GLB,, | Performed by: INTERNAL MEDICINE

## 2020-02-05 PROCEDURE — 99999 PR PBB SHADOW E&M-EST. PATIENT-LVL IV: ICD-10-PCS | Mod: PBBFAC,HCNC,, | Performed by: INTERNAL MEDICINE

## 2020-02-05 PROCEDURE — 99213 PR OFFICE/OUTPT VISIT, EST, LEVL III, 20-29 MIN: ICD-10-PCS | Mod: HCNC,25,S$GLB, | Performed by: INTERNAL MEDICINE

## 2020-02-05 PROCEDURE — 3078F DIAST BP <80 MM HG: CPT | Mod: HCNC,CPTII,S$GLB, | Performed by: INTERNAL MEDICINE

## 2020-02-05 PROCEDURE — 1101F PT FALLS ASSESS-DOCD LE1/YR: CPT | Mod: HCNC,CPTII,S$GLB, | Performed by: INTERNAL MEDICINE

## 2020-02-05 PROCEDURE — 1125F AMNT PAIN NOTED PAIN PRSNT: CPT | Mod: HCNC,S$GLB,, | Performed by: INTERNAL MEDICINE

## 2020-02-05 RX ORDER — KETOROLAC TROMETHAMINE 30 MG/ML
60 INJECTION, SOLUTION INTRAMUSCULAR; INTRAVENOUS
Status: COMPLETED | OUTPATIENT
Start: 2020-02-05 | End: 2020-02-05

## 2020-02-05 RX ADMIN — KETOROLAC TROMETHAMINE 60 MG: 30 INJECTION, SOLUTION INTRAMUSCULAR; INTRAVENOUS at 02:02

## 2020-02-05 NOTE — PATIENT INSTRUCTIONS
Shoulder Impingement Syndrome  The rotator cuff is a group of muscles and tendons that surround the shoulder joint. These muscles and tendons hold the arm in its joint. They help the shoulder move. The rotator cuff muscles and tendons can become irritated from repeated rubbing against the shoulder bone. This is called shoulder impingement syndrome or rotator cuff tendonitis.     If your case is mild, you may only need to rest the shoulder and then do certain exercises to strengthen the muscles. You can also take anti-inflammatory medicines. Steroid injections into the shoulder can ease inflammation. But you can have only a limited number of these. If the condition gets worse, your shoulder muscles may become thin and weak. This can lead to a rotator cuff tear.  Symptoms of shoulder impingement syndrome may include:  · Shoulder pain that gets worse when you raise your arm overhead  · Weakness of the shoulder muscles when you use your arm overhead  · Popping and clicking when you move your shoulder  · Shoulder pain that wakes you up at night, especially when you sleep on the affected shoulder  · Sudden pain in your shoulder when you lift or reach  Home care  Follow these tips to take care of yourself at home:  · Avoid activities that make your pain worse. These include raising your arms overhead, repeating the same motion over and over, or lifting heavy objects.  · Dont hold your arm in one position for a long time. Keep it moving.  · Put an ice pack on the sore area for 20 minutes every 1 to 2 hours for the first day. You can make an ice pack by putting ice cubes in a plastic bag. Wrap the bag in a towel before putting it on your shoulder. A frozen bag of peas or something similar can also be used as an ice pack. Use the ice packs 3 to 4 times a day for the next 2 days. Continue using the ice to relieve of pain and swelling as needed.  · You may take acetaminophen or ibuprofen to control pain, unless another  medicine was prescribed. If prednisone was prescribed, dont take anti-inflammatory medicines. If you have chronic liver or kidney disease or ever had a stomach ulcer or gastrointestinal bleeding, talk with your doctor before using these medicines.  · After your symptoms ease, you may get physical therapy or start a home exercise program. This can strengthen your shoulder muscles and help your range of motion. Talk with your doctor about what is best for your condition.  Follow-up care  Follow up with your healthcare provider, or as advised.  When to seek medical advice  Call your healthcare provider right away if any of these occur:  · Shoulder pain that gets worse and wakes you up at night  · Your shoulder or arm swells  · Numbness, tingling, or pain that travels down the arm to the hand  · Loss of shoulder strength  · Fever or chills  Date Last Reviewed: 8/1/2016  © 4109-3096 [employer], [employer]. [address] [place] [address], [place] [address]. All rights reserved. This information is not intended as a substitute for professional medical care. Always follow your healthcare professional's instructions.

## 2020-02-05 NOTE — PROGRESS NOTES
"Subjective:       Patient ID: Grace Navarro is a 74 y.o. female.    Chief Complaint: Shoulder Pain      Shoulder  Onset; 6 days   Duration; day constant worsening   Description; pain like a bruising   Worsening ; palpitation , not with movement   Alleviation ; using tylenol, ibuprofen , lidocaine patch , diflofenac gel   Associated;  Denies ;  -numbnesss, weakness,   - joint swelling, warmth, redness,   -Rashes   -No CP,SOB, ESQUEDA, LE   -Fall, accidencts   Handed; right   Previous episodes;none  Ddx - cervical, labral, ( deep, catching,instable), RC, impingment/tendinopathy/tear, bicep, adhesive ( >50 nighttime) , OA, other (fx, cancer, pe, mi)     HPI  Review of Systems   Constitutional:        See hpi   All other systems reviewed and are negative.      Objective:       /68 (BP Location: Right arm, Patient Position: Sitting, BP Method: Medium (Manual))   Pulse 68   Ht 5' 4" (1.626 m)   Wt 60 kg (132 lb 4.4 oz)   LMP 06/14/1996   SpO2 97%   BMI 22.71 kg/m²     Physical Exam   Constitutional: She is oriented to person, place, and time. She appears well-developed and well-nourished. No distress.   HENT:   Head: Normocephalic.   Nose: Nose normal.   Mouth/Throat: Oropharynx is clear and moist.   Eyes: Pupils are equal, round, and reactive to light. Conjunctivae and EOM are normal. Right eye exhibits no discharge. Left eye exhibits no discharge.   Neck: Normal range of motion.   Cardiovascular: Normal rate, regular rhythm and normal heart sounds. Exam reveals no gallop and no friction rub.   No murmur heard.  Pulmonary/Chest: Effort normal. No respiratory distress.   Abdominal: Soft. Bowel sounds are normal. She exhibits no distension.   Musculoskeletal: Normal range of motion. She exhibits no edema or deformity.   Inspection  tenderness to AC, GH  joint. No tenderness  SCjoints   ROM - full to  passive, active  Normal Strenght/sensation/reflex  Special test  Apley scratch - negative   Drop-arm -- " negative   Beer - positive  Neer - postivie   Whitaker - negative   Yergason - negative      Neurological: She is alert and oriented to person, place, and time. No cranial nerve deficit.   Skin: Skin is warm. She is not diaphoretic.   Psychiatric: She has a normal mood and affect.   Nursing note and vitals reviewed.      Assessment:       1. Acute pain of left shoulder        Plan:       Grace was seen today for shoulder pain.    Diagnoses and all orders for this visit:    Acute pain of left shoulder  Unclear etiology but suspect impingment vs OA vs bursitis of the shoulder  Nothing on history or pyhsical exam acute fracture or gouty, infectious etiology   I provided instruction on supportive care measures    reviewed signs and symptoms that should prompt return to provider or evaluation in the ED  -     ketorolac injection 60 mg

## 2020-02-06 ENCOUNTER — PATIENT MESSAGE (OUTPATIENT)
Dept: INTERNAL MEDICINE | Facility: CLINIC | Age: 75
End: 2020-02-06

## 2020-02-07 ENCOUNTER — PATIENT MESSAGE (OUTPATIENT)
Dept: INTERNAL MEDICINE | Facility: CLINIC | Age: 75
End: 2020-02-07

## 2020-02-07 RX ORDER — KETOROLAC TROMETHAMINE 10 MG/1
10 TABLET, FILM COATED ORAL 2 TIMES DAILY PRN
Qty: 30 TABLET | Refills: 0 | Status: SHIPPED | OUTPATIENT
Start: 2020-02-07 | End: 2020-02-27

## 2020-02-07 RX ORDER — KETOROLAC TROMETHAMINE 10 MG/1
10 TABLET, FILM COATED ORAL 2 TIMES DAILY PRN
Qty: 30 TABLET | Refills: 0 | Status: SHIPPED | OUTPATIENT
Start: 2020-02-07 | End: 2020-02-07 | Stop reason: SDUPTHER

## 2020-02-07 NOTE — TELEPHONE ENCOUNTER
Called and spoke w/ pt. Reports toradol injection worked well w/o issue. Rx toradol PO bid prn. Please change her appt from next Fri to 10:30am on Tuesday (this slot is held).

## 2020-02-10 ENCOUNTER — TELEPHONE (OUTPATIENT)
Dept: INTERNAL MEDICINE | Facility: CLINIC | Age: 75
End: 2020-02-10

## 2020-02-10 NOTE — TELEPHONE ENCOUNTER
----- Message from Yana Mckee sent at 2/10/2020  9:17 AM CST -----  Contact: [facility] [phone] (Phone)  Patient is calling for an RX refill or new RX.  Is this a refill or new RX:    RX name and strength: ketorolac (TORADOL) 10 mg tablet  Directions (copy/paste from chart):    Is this a 30 day or 90 day RX:    Local pharmacy or mail order pharmacy:  local  Pharmacy name and phone # (copy/paste from chart):   [facility] [address] [place] [address] [phone] (Phone)  [phone] (Fax)    Comments:  clafication on medication

## 2020-02-10 NOTE — TELEPHONE ENCOUNTER
Called and spoke to pharmacy and they stated that for this medication the Toradol you are normally not supposed to exceed 5 days of medication.

## 2020-02-11 ENCOUNTER — OFFICE VISIT (OUTPATIENT)
Dept: FAMILY MEDICINE | Facility: CLINIC | Age: 75
End: 2020-02-11
Payer: MEDICARE

## 2020-02-11 ENCOUNTER — TELEPHONE (OUTPATIENT)
Dept: INTERNAL MEDICINE | Facility: CLINIC | Age: 75
End: 2020-02-11

## 2020-02-11 ENCOUNTER — PATIENT MESSAGE (OUTPATIENT)
Dept: INTERNAL MEDICINE | Facility: CLINIC | Age: 75
End: 2020-02-11

## 2020-02-11 VITALS
SYSTOLIC BLOOD PRESSURE: 128 MMHG | BODY MASS INDEX: 24.69 KG/M2 | WEIGHT: 144.63 LBS | DIASTOLIC BLOOD PRESSURE: 72 MMHG | TEMPERATURE: 98 F | HEART RATE: 62 BPM | HEIGHT: 64 IN | OXYGEN SATURATION: 98 %

## 2020-02-11 DIAGNOSIS — M75.102 ROTATOR CUFF SYNDROME, LEFT: Primary | ICD-10-CM

## 2020-02-11 PROCEDURE — 20610 PR DRAIN/INJECT LARGE JOINT/BURSA: ICD-10-PCS | Mod: HCNC,LT,S$GLB, | Performed by: FAMILY MEDICINE

## 2020-02-11 PROCEDURE — 1125F AMNT PAIN NOTED PAIN PRSNT: CPT | Mod: HCNC,S$GLB,, | Performed by: FAMILY MEDICINE

## 2020-02-11 PROCEDURE — 99999 PR PBB SHADOW E&M-EST. PATIENT-LVL IV: ICD-10-PCS | Mod: PBBFAC,HCNC,, | Performed by: FAMILY MEDICINE

## 2020-02-11 PROCEDURE — 1159F PR MEDICATION LIST DOCUMENTED IN MEDICAL RECORD: ICD-10-PCS | Mod: HCNC,S$GLB,, | Performed by: FAMILY MEDICINE

## 2020-02-11 PROCEDURE — 3074F PR MOST RECENT SYSTOLIC BLOOD PRESSURE < 130 MM HG: ICD-10-PCS | Mod: HCNC,CPTII,S$GLB, | Performed by: FAMILY MEDICINE

## 2020-02-11 PROCEDURE — 99214 OFFICE O/P EST MOD 30 MIN: CPT | Mod: 25,HCNC,S$GLB, | Performed by: FAMILY MEDICINE

## 2020-02-11 PROCEDURE — 1101F PT FALLS ASSESS-DOCD LE1/YR: CPT | Mod: HCNC,CPTII,S$GLB, | Performed by: FAMILY MEDICINE

## 2020-02-11 PROCEDURE — 1159F MED LIST DOCD IN RCRD: CPT | Mod: HCNC,S$GLB,, | Performed by: FAMILY MEDICINE

## 2020-02-11 PROCEDURE — 20610 DRAIN/INJ JOINT/BURSA W/O US: CPT | Mod: HCNC,LT,S$GLB, | Performed by: FAMILY MEDICINE

## 2020-02-11 PROCEDURE — 1125F PR PAIN SEVERITY QUANTIFIED, PAIN PRESENT: ICD-10-PCS | Mod: HCNC,S$GLB,, | Performed by: FAMILY MEDICINE

## 2020-02-11 PROCEDURE — 3078F DIAST BP <80 MM HG: CPT | Mod: HCNC,CPTII,S$GLB, | Performed by: FAMILY MEDICINE

## 2020-02-11 PROCEDURE — 3078F PR MOST RECENT DIASTOLIC BLOOD PRESSURE < 80 MM HG: ICD-10-PCS | Mod: HCNC,CPTII,S$GLB, | Performed by: FAMILY MEDICINE

## 2020-02-11 PROCEDURE — 99214 PR OFFICE/OUTPT VISIT, EST, LEVL IV, 30-39 MIN: ICD-10-PCS | Mod: 25,HCNC,S$GLB, | Performed by: FAMILY MEDICINE

## 2020-02-11 PROCEDURE — 1101F PR PT FALLS ASSESS DOC 0-1 FALLS W/OUT INJ PAST YR: ICD-10-PCS | Mod: HCNC,CPTII,S$GLB, | Performed by: FAMILY MEDICINE

## 2020-02-11 PROCEDURE — 3074F SYST BP LT 130 MM HG: CPT | Mod: HCNC,CPTII,S$GLB, | Performed by: FAMILY MEDICINE

## 2020-02-11 PROCEDURE — 99999 PR PBB SHADOW E&M-EST. PATIENT-LVL IV: CPT | Mod: PBBFAC,HCNC,, | Performed by: FAMILY MEDICINE

## 2020-02-11 RX ORDER — TRIAMCINOLONE ACETONIDE 40 MG/ML
40 INJECTION, SUSPENSION INTRA-ARTICULAR; INTRAMUSCULAR
Status: COMPLETED | OUTPATIENT
Start: 2020-02-11 | End: 2020-02-11

## 2020-02-11 RX ADMIN — TRIAMCINOLONE ACETONIDE 40 MG: 40 INJECTION, SUSPENSION INTRA-ARTICULAR; INTRAMUSCULAR at 04:02

## 2020-02-11 NOTE — PROGRESS NOTES
(Portions of this note were dictated using voice recognition software and may contain dictation related errors in spelling/grammar/syntax not found on text review)    CC:   Chief Complaint   Patient presents with    Shoulder Pain     Left Shoulder       HPI: 74 y.o. female new to me, established with Lisa Dean MD who is currently out sick today.  Urgent care visit for pain of left shoulder.  Chart reviewed.  Saw Dr. Shahid at [place] on 02/05/2020 for shoulder pain for about a week (note reviewed).  Did have some positive impingement testing noted.  Was given Toradol injection in the office.  Apparently helped well but then pain restarted.  She was sent out Toradol p.o. on 02/07/2020.    She denies any specific inciting event for her pain. States that the Toradol helps but symptoms come back when she finishes.  Was told not to take this medication for very long.  Not sure what movements hurt but it is hard to sleep on that shoulder because of pain.  She will get pain radiating to the upper portion of the arm but not further down.  No paresthesias.  Area feels warm.  No systemic symptoms like fevers or chills.        Past Medical History:   Diagnosis Date    Anxiety     Rene's disease     Chronic low back pain     Depression     Goiter, nodular     Hyperlipidemia     Hypertension     Irritable bowel     Neuromuscular disorder     Osteopenia of multiple sites 5/29/2017    PUD (peptic ulcer disease)     Subarachnoid hemorrhage 2014    Varicose veins        Past Surgical History:   Procedure Laterality Date    APPENDECTOMY      BELPHAROPTOSIS REPAIR Bilateral     BREAST BIOPSY      RIGHT    CATARACT EXTRACTION W/  INTRAOCULAR LENS IMPLANT Left 08/03/2016        CATARACT EXTRACTION W/  INTRAOCULAR LENS IMPLANT Right 09/21/2016        SPINE SURGERY  07/2012    Fusion @ L4L5    TONSILLECTOMY         Family History   Problem Relation Age of Onset    Hypertension Mother      Kidney failure Mother     Hypertension Father     Coronary artery disease Father     Prostate cancer Father     Stroke Father     Eczema Son     Diabetes Brother     Thyroid cancer Neg Hx     Breast cancer Neg Hx     Colon cancer Neg Hx     Ovarian cancer Neg Hx        Social History     Tobacco Use    Smoking status: Former Smoker     Packs/day: 1.00     Years: 10.00     Pack years: 10.00     Types: Cigarettes    Smokeless tobacco: Never Used    Tobacco comment: quit 1975   Substance Use Topics    Alcohol use: Yes     Alcohol/week: 14.0 standard drinks     Types: 14 Glasses of wine per week     Comment: socially    Drug use: No       Lab Results   Component Value Date    WBC 4.44 11/01/2019    HGB 11.9 (L) 11/01/2019    HCT 39.3 11/01/2019     (H) 11/01/2019     11/01/2019    CHOL 217 (H) 06/19/2019    TRIG 84 06/19/2019    HDL 60 06/19/2019    ALT 15 10/30/2019    AST 22 10/30/2019    BILITOT 0.4 10/30/2019    ALKPHOS 74 10/30/2019     10/30/2019    K 3.8 10/30/2019     10/30/2019    CREATININE 0.6 10/30/2019    ESTGFRAFRICA >60.0 10/30/2019    EGFRNONAA >60.0 10/30/2019    CALCIUM 9.3 10/30/2019    ALBUMIN 3.9 10/30/2019    BUN 12 10/30/2019    CO2 30 (H) 10/30/2019    TSH 2.222 10/30/2019    INR 1.0 02/08/2014    HGBA1C 5.5 06/19/2019    LDLCALC 140.2 06/19/2019    GLU 90 10/30/2019                 ROS:  GENERAL: No fever, chills, fatigability or weight loss.  SKIN: No rashes, no itching.  HEAD: No headaches.  EYES: No visual changes  EARS: No ear pain or changes in hearing.  NOSE: No congestion or rhinorrhea.  MOUTH & THROAT: No hoarseness, change in voice, or sore throat.  NODES: Denies swollen glands.  CHEST: Denies ESQUEDA, cyanosis, wheezing, cough and sputum production.  CARDIOVASCULAR: Denies chest pain, PND, orthopnea.  ABDOMEN: No nausea, vomiting, or changes in bowel function.  URINARY: No flank pain, dysuria or hematuria.  PERIPHERAL VASCULAR: No claudication or  cyanosis.  MUSCULOSKELETAL:  Above.  NEUROLOGIC: No weakness or numbness.    Vital signs reviewed  PE:   APPEARANCE: Well nourished, well developed, in no acute distress.    HEAD: Normocephalic, atraumatic.  EYES:   Conjunctivae noninjected.  MSK:  No overlying skin changes on left shoulder.  Does have significant pain at left acromioclavicular joint without significant deformity noted visually.  Does have tenderness at the long head biceps tendon but similar on the contralateral side.  Negative painful arc.  Positive empty can test for pain and weakness on the left.  Positive resisted external rotation test for pain and weakness on the left.  Negative subscapularis lift-off test.  Positive Neer's and Whitaker impingement signs. Negative cross-body adduction test    IMPRESSION  1. Rotator cuff syndrome, left            PLAN  Discussed likely rotator cuff syndrome on left, possible contribution by AC joint arthritis.  Discussed rest from overhead activity for the next week and then slow resumption of rotator cuff exercise which have been provided.  Offered subacromial corticosteroid injection trial and patient agrees    PROCEDURE NOTE:After adequate cleansing of the overlying skin with alcohol, 5 cc total solution comprised of 1 cc 40 mg Kenalog mixed with 4 cc 2% plain lidocaine injected in the left subacromial space without difficulty. Discussed possible complications including temporary worsening of pain after initial relief.    Given persistence of symptoms we discussed role of x-ray and assessing for amount of degenerative change.  She would like to get this done especially if she has recurrent issues and coexisting advanced degeneration that need to be followed over time

## 2020-02-11 NOTE — TELEPHONE ENCOUNTER
Called pt and left a vm letting pt know that Dr. Dean is out sick for the day but I rescheduled her to see YAHIR Petty at 11:30am and to call back with any questions.

## 2020-02-12 ENCOUNTER — TELEPHONE (OUTPATIENT)
Dept: INTERNAL MEDICINE | Facility: CLINIC | Age: 75
End: 2020-02-12

## 2020-02-12 ENCOUNTER — PATIENT OUTREACH (OUTPATIENT)
Dept: ADMINISTRATIVE | Facility: HOSPITAL | Age: 75
End: 2020-02-12

## 2020-02-12 ENCOUNTER — HOSPITAL ENCOUNTER (OUTPATIENT)
Dept: RADIOLOGY | Facility: HOSPITAL | Age: 75
Discharge: HOME | End: 2020-02-12
Attending: FAMILY MEDICINE
Payer: MEDICARE

## 2020-02-12 DIAGNOSIS — M75.102 ROTATOR CUFF SYNDROME, LEFT: ICD-10-CM

## 2020-02-12 DIAGNOSIS — D53.9 MACROCYTIC ANEMIA: Primary | ICD-10-CM

## 2020-02-12 DIAGNOSIS — E53.8 B12 DEFICIENCY: ICD-10-CM

## 2020-02-12 DIAGNOSIS — Z13.220 ENCOUNTER FOR LIPID SCREENING FOR CARDIOVASCULAR DISEASE: ICD-10-CM

## 2020-02-12 DIAGNOSIS — I10 ESSENTIAL HYPERTENSION: ICD-10-CM

## 2020-02-12 DIAGNOSIS — Z13.6 ENCOUNTER FOR LIPID SCREENING FOR CARDIOVASCULAR DISEASE: ICD-10-CM

## 2020-02-12 PROCEDURE — 73030 X-RAY EXAM OF SHOULDER: CPT | Mod: 26,HCNC,LT, | Performed by: RADIOLOGY

## 2020-02-12 PROCEDURE — 73030 XR SHOULDER TRAUMA 3 VIEW LEFT: ICD-10-PCS | Mod: 26,HCNC,LT, | Performed by: RADIOLOGY

## 2020-02-12 PROCEDURE — 73030 X-RAY EXAM OF SHOULDER: CPT | Mod: TC,HCNC,FY,PO,LT

## 2020-02-12 NOTE — TELEPHONE ENCOUNTER
Called and spoke to pt and offered her an appointment today at 10:30am, pt was seen yesterday at the [place] location for her shoulder pain.  Pt would like labs prior to annual.  Labs pended.

## 2020-02-13 ENCOUNTER — PATIENT MESSAGE (OUTPATIENT)
Dept: FAMILY MEDICINE | Facility: CLINIC | Age: 75
End: 2020-02-13

## 2020-02-13 NOTE — TELEPHONE ENCOUNTER
Dear Grace Navarro    Your recent shoulder x-ray looks normal with the exception of arthritis in the shoulder.  Hopefully the steroid shot has helped a bit with your pain.  Let us know if your pain is worsening or function is worsening in case we need to do a repeat evaluation of this.  Physical therapy may also be an option to help with your shoulder pain.      Philippe Pang MD

## 2020-02-17 ENCOUNTER — HOSPITAL ENCOUNTER (OUTPATIENT)
Dept: RADIOLOGY | Facility: HOSPITAL | Age: 75
Discharge: HOME | End: 2020-02-17
Attending: INTERNAL MEDICINE
Payer: MEDICARE

## 2020-02-17 DIAGNOSIS — Z12.31 ENCOUNTER FOR SCREENING MAMMOGRAM FOR BREAST CANCER: ICD-10-CM

## 2020-02-17 PROCEDURE — 77063 BREAST TOMOSYNTHESIS BI: CPT | Mod: 26,HCNC,, | Performed by: RADIOLOGY

## 2020-02-17 PROCEDURE — 77067 MAMMO DIGITAL SCREENING BILAT WITH TOMOSYNTHESIS_CAD: ICD-10-PCS | Mod: 26,HCNC,, | Performed by: RADIOLOGY

## 2020-02-17 PROCEDURE — 77067 SCR MAMMO BI INCL CAD: CPT | Mod: TC,HCNC

## 2020-02-17 PROCEDURE — 77063 MAMMO DIGITAL SCREENING BILAT WITH TOMOSYNTHESIS_CAD: ICD-10-PCS | Mod: 26,HCNC,, | Performed by: RADIOLOGY

## 2020-02-17 PROCEDURE — 77067 SCR MAMMO BI INCL CAD: CPT | Mod: 26,HCNC,, | Performed by: RADIOLOGY

## 2020-02-19 ENCOUNTER — LAB VISIT (OUTPATIENT)
Dept: LAB | Facility: HOSPITAL | Age: 75
End: 2020-02-19
Attending: INTERNAL MEDICINE
Payer: MEDICARE

## 2020-02-19 DIAGNOSIS — D53.9 MACROCYTIC ANEMIA: ICD-10-CM

## 2020-02-19 DIAGNOSIS — Z13.6 ENCOUNTER FOR LIPID SCREENING FOR CARDIOVASCULAR DISEASE: ICD-10-CM

## 2020-02-19 DIAGNOSIS — Z13.220 ENCOUNTER FOR LIPID SCREENING FOR CARDIOVASCULAR DISEASE: ICD-10-CM

## 2020-02-19 DIAGNOSIS — I10 ESSENTIAL HYPERTENSION: ICD-10-CM

## 2020-02-19 LAB
ALBUMIN SERPL BCP-MCNC: 4 G/DL (ref 3.5–5.2)
ALP SERPL-CCNC: 66 U/L (ref 55–135)
ALT SERPL W/O P-5'-P-CCNC: 17 U/L (ref 10–44)
ANION GAP SERPL CALC-SCNC: 9 MMOL/L (ref 8–16)
AST SERPL-CCNC: 20 U/L (ref 10–40)
BASOPHILS # BLD AUTO: 0.04 K/UL (ref 0–0.2)
BASOPHILS NFR BLD: 0.8 % (ref 0–1.9)
BILIRUB SERPL-MCNC: 0.4 MG/DL (ref 0.1–1)
BUN SERPL-MCNC: 19 MG/DL (ref 8–23)
CALCIUM SERPL-MCNC: 9.6 MG/DL (ref 8.7–10.5)
CHLORIDE SERPL-SCNC: 101 MMOL/L (ref 95–110)
CHOLEST SERPL-MCNC: 232 MG/DL (ref 120–199)
CHOLEST/HDLC SERPL: 3.2 {RATIO} (ref 2–5)
CO2 SERPL-SCNC: 32 MMOL/L (ref 23–29)
CREAT SERPL-MCNC: 0.7 MG/DL (ref 0.5–1.4)
DIFFERENTIAL METHOD: ABNORMAL
EOSINOPHIL # BLD AUTO: 0.1 K/UL (ref 0–0.5)
EOSINOPHIL NFR BLD: 0.9 % (ref 0–8)
ERYTHROCYTE [DISTWIDTH] IN BLOOD BY AUTOMATED COUNT: 14.2 % (ref 11.5–14.5)
EST. GFR  (AFRICAN AMERICAN): >60 ML/MIN/1.73 M^2
EST. GFR  (NON AFRICAN AMERICAN): >60 ML/MIN/1.73 M^2
GLUCOSE SERPL-MCNC: 105 MG/DL (ref 70–110)
HCT VFR BLD AUTO: 40.9 % (ref 37–48.5)
HDLC SERPL-MCNC: 72 MG/DL (ref 40–75)
HDLC SERPL: 31 % (ref 20–50)
HGB BLD-MCNC: 12.4 G/DL (ref 12–16)
IMM GRANULOCYTES # BLD AUTO: 0.01 K/UL (ref 0–0.04)
IMM GRANULOCYTES NFR BLD AUTO: 0.2 % (ref 0–0.5)
LDLC SERPL CALC-MCNC: 148 MG/DL (ref 63–159)
LYMPHOCYTES # BLD AUTO: 1.2 K/UL (ref 1–4.8)
LYMPHOCYTES NFR BLD: 22.6 % (ref 18–48)
MCH RBC QN AUTO: 29.7 PG (ref 27–31)
MCHC RBC AUTO-ENTMCNC: 30.3 G/DL (ref 32–36)
MCV RBC AUTO: 98 FL (ref 82–98)
MONOCYTES # BLD AUTO: 0.5 K/UL (ref 0.3–1)
MONOCYTES NFR BLD: 9.4 % (ref 4–15)
NEUTROPHILS # BLD AUTO: 3.5 K/UL (ref 1.8–7.7)
NEUTROPHILS NFR BLD: 66.1 % (ref 38–73)
NONHDLC SERPL-MCNC: 160 MG/DL
NRBC BLD-RTO: 0 /100 WBC
PLATELET # BLD AUTO: 234 K/UL (ref 150–350)
PMV BLD AUTO: 10 FL (ref 9.2–12.9)
POTASSIUM SERPL-SCNC: 3.9 MMOL/L (ref 3.5–5.1)
PROT SERPL-MCNC: 7.5 G/DL (ref 6–8.4)
RBC # BLD AUTO: 4.18 M/UL (ref 4–5.4)
SODIUM SERPL-SCNC: 142 MMOL/L (ref 136–145)
TRIGL SERPL-MCNC: 60 MG/DL (ref 30–150)
WBC # BLD AUTO: 5.3 K/UL (ref 3.9–12.7)

## 2020-02-19 PROCEDURE — 80053 COMPREHEN METABOLIC PANEL: CPT | Mod: HCNC

## 2020-02-19 PROCEDURE — 80061 LIPID PANEL: CPT | Mod: HCNC

## 2020-02-19 PROCEDURE — 36415 COLL VENOUS BLD VENIPUNCTURE: CPT | Mod: HCNC,PO

## 2020-02-19 PROCEDURE — 85025 COMPLETE CBC W/AUTO DIFF WBC: CPT | Mod: HCNC

## 2020-02-27 ENCOUNTER — OFFICE VISIT (OUTPATIENT)
Dept: INTERNAL MEDICINE | Facility: CLINIC | Age: 75
End: 2020-02-27
Payer: MEDICARE

## 2020-02-27 VITALS
WEIGHT: 143.06 LBS | SYSTOLIC BLOOD PRESSURE: 124 MMHG | HEART RATE: 63 BPM | HEIGHT: 64 IN | DIASTOLIC BLOOD PRESSURE: 76 MMHG | BODY MASS INDEX: 24.42 KG/M2 | TEMPERATURE: 98 F

## 2020-02-27 DIAGNOSIS — M25.512 CHRONIC LEFT SHOULDER PAIN: ICD-10-CM

## 2020-02-27 DIAGNOSIS — K21.9 GASTROESOPHAGEAL REFLUX DISEASE, ESOPHAGITIS PRESENCE NOT SPECIFIED: ICD-10-CM

## 2020-02-27 DIAGNOSIS — G47.00 INSOMNIA, UNSPECIFIED TYPE: ICD-10-CM

## 2020-02-27 DIAGNOSIS — E78.49 OTHER HYPERLIPIDEMIA: ICD-10-CM

## 2020-02-27 DIAGNOSIS — E04.2 MULTINODULAR THYROID: ICD-10-CM

## 2020-02-27 DIAGNOSIS — G89.29 CHRONIC LEFT SHOULDER PAIN: ICD-10-CM

## 2020-02-27 DIAGNOSIS — I10 BENIGN ESSENTIAL HYPERTENSION: Primary | ICD-10-CM

## 2020-02-27 DIAGNOSIS — Z12.11 COLON CANCER SCREENING: ICD-10-CM

## 2020-02-27 PROCEDURE — 3078F PR MOST RECENT DIASTOLIC BLOOD PRESSURE < 80 MM HG: ICD-10-PCS | Mod: HCNC,CPTII,S$GLB, | Performed by: INTERNAL MEDICINE

## 2020-02-27 PROCEDURE — 1101F PR PT FALLS ASSESS DOC 0-1 FALLS W/OUT INJ PAST YR: ICD-10-PCS | Mod: HCNC,CPTII,S$GLB, | Performed by: INTERNAL MEDICINE

## 2020-02-27 PROCEDURE — 1159F PR MEDICATION LIST DOCUMENTED IN MEDICAL RECORD: ICD-10-PCS | Mod: HCNC,S$GLB,, | Performed by: INTERNAL MEDICINE

## 2020-02-27 PROCEDURE — 1126F AMNT PAIN NOTED NONE PRSNT: CPT | Mod: HCNC,S$GLB,, | Performed by: INTERNAL MEDICINE

## 2020-02-27 PROCEDURE — 3074F SYST BP LT 130 MM HG: CPT | Mod: HCNC,CPTII,S$GLB, | Performed by: INTERNAL MEDICINE

## 2020-02-27 PROCEDURE — 1101F PT FALLS ASSESS-DOCD LE1/YR: CPT | Mod: HCNC,CPTII,S$GLB, | Performed by: INTERNAL MEDICINE

## 2020-02-27 PROCEDURE — 99999 PR PBB SHADOW E&M-EST. PATIENT-LVL IV: ICD-10-PCS | Mod: PBBFAC,HCNC,, | Performed by: INTERNAL MEDICINE

## 2020-02-27 PROCEDURE — 3074F PR MOST RECENT SYSTOLIC BLOOD PRESSURE < 130 MM HG: ICD-10-PCS | Mod: HCNC,CPTII,S$GLB, | Performed by: INTERNAL MEDICINE

## 2020-02-27 PROCEDURE — 1126F PR PAIN SEVERITY QUANTIFIED, NO PAIN PRESENT: ICD-10-PCS | Mod: HCNC,S$GLB,, | Performed by: INTERNAL MEDICINE

## 2020-02-27 PROCEDURE — 1159F MED LIST DOCD IN RCRD: CPT | Mod: HCNC,S$GLB,, | Performed by: INTERNAL MEDICINE

## 2020-02-27 PROCEDURE — 3078F DIAST BP <80 MM HG: CPT | Mod: HCNC,CPTII,S$GLB, | Performed by: INTERNAL MEDICINE

## 2020-02-27 PROCEDURE — 99214 OFFICE O/P EST MOD 30 MIN: CPT | Mod: HCNC,S$GLB,, | Performed by: INTERNAL MEDICINE

## 2020-02-27 PROCEDURE — 99999 PR PBB SHADOW E&M-EST. PATIENT-LVL IV: CPT | Mod: PBBFAC,HCNC,, | Performed by: INTERNAL MEDICINE

## 2020-02-27 PROCEDURE — 99214 PR OFFICE/OUTPT VISIT, EST, LEVL IV, 30-39 MIN: ICD-10-PCS | Mod: HCNC,S$GLB,, | Performed by: INTERNAL MEDICINE

## 2020-02-27 NOTE — PROGRESS NOTES
"Subjective:       Patient ID: Grace Navarro is a 74 y.o. female.    Chief Complaint: Annual Exam    HPI   HTN - toprol xl 100mg daily, hctz 12.5mg daily. Watches her diet. Not eating a lot of salt.     Insomnia - some nights trouble falling asleep.   Tried ambien, which caused nightmares.  Melatonin 5mg nightly, which is helping w/ insomnia.    L shoulder pain s/p UC eval. S/p steroid injection.   Shoulder xr 2/12/20 - OA findings of L shoulder.  Reports the L shoulder pain is resolved now.     HLD -   Declines statin due to potential liver damage. Will think about it and let me know though.  The 10-year ASCVD risk score (Clyde BELLA Jr., et al., 2013) is: 18.2%    Values used to calculate the score:      Age: 74 years      Sex: Female      Is Non- : No      Diabetic: No      Tobacco smoker: No      Systolic Blood Pressure: 124 mmHg      Is BP treated: Yes      HDL Cholesterol: 72 mg/dL      Total Cholesterol: 232 mg/dL    Thyroid nodules. Last US 4/30/19. Needs f/u at 1, 2, 3, and 5 yrs.    Few days ago, epigastric area "felt funny". This is resolved now. Better w/ bread/crackers. Drinks a lot of strong black tea and espresso once a day. Reports cooks very spicy.  No unintentional weight loss.     Chronic R ear tinnitus. No hearing loss.     Walks daily in order to prevent having back pain.    Review of Systems   Constitutional: Negative for activity change and unexpected weight change.   HENT: Positive for rhinorrhea. Negative for hearing loss and trouble swallowing.    Eyes: Negative for discharge and visual disturbance.   Respiratory: Negative for chest tightness and wheezing.    Cardiovascular: Negative for chest pain and palpitations.   Gastrointestinal: Negative for blood in stool, constipation, diarrhea and vomiting.   Endocrine: Negative for polydipsia and polyuria.   Genitourinary: Negative for difficulty urinating, dysuria, hematuria and menstrual problem.   Musculoskeletal: " "Positive for arthralgias, joint swelling and neck pain.   Neurological: Negative for weakness and headaches.   Psychiatric/Behavioral: Negative for confusion and dysphoric mood.         Objective:      Physical Exam    /76 (BP Location: Left arm, Patient Position: Sitting, BP Method: Medium (Manual))   Pulse 63   Temp 98.1 °F (36.7 °C)   Ht 5' 4" (1.626 m)   Wt 64.9 kg (143 lb 1.3 oz)   LMP 06/14/1996   BMI 24.56 kg/m²     GEN - A+OX4, NAD   HEENT - PERRL, EOMI, OP clear. MMM.   Neck - No thyromegaly or cervical LAD. No thyroid masses felt.  CV - RRR, no m/r. No carotid bruit.   Chest - CTAB, no wheezing or rhonchi  Abd - S/NT/ND/+BS.   Ext - 2+BDP and radial pulses. No LE edema.     Labs reviewed w/ pt.    Assessment/Plan     Grace was seen today for annual exam.    Diagnoses and all orders for this visit:    Benign essential hypertension - Stable and controlled. Continue current medications.    Pure hypercholesterolemia - discussed ASCVD risk score.  Atorvastatin, pravastatin, rosuvastatin have all been proven to lower your cardiovascular risk. Think about the cholesterol medicine and let me know.     Insomnia, unspecified type - cont melatonin 5mg nightly.     Chronic left shoulder pain - improved. Cont exercise.     Multinodular thyroid - US in April.  -     US Soft Tissue Head Neck Thyroid; Future    Colon cancer screening  -     Case request GI: COLONOSCOPY    Gastroesophageal reflux disease, esophagitis presence not specified  Do not lay down for at least 2 hours after eating. Eat smaller, more frequent meals. Avoid trigger foods such as red sauce, caffeine, fatty foods, alcohol, spicy foods, etc.   Take Tums or Pepcid over the counter as needed for the stomach issue.     Ask pharmacy about Shingrix - new shingles vaccine.    Follow up in about 1 year (around 2/27/2021).      Lisa Dean MD  Department of Internal Medicine - [facility]  9:04 AM  "

## 2020-02-27 NOTE — PATIENT INSTRUCTIONS
Atorvastatin, pravastatin, rosuvastatin have all been proven to lower your cardiovascular risk. Think about the cholesterol medicine and let me know.     Ask pharmacy about Shingrix - new shingles vaccine.    Do not lay down for at least 2 hours after eating. Eat smaller, more frequent meals. Avoid trigger foods such as red sauce, caffeine, fatty foods, alcohol, spicy foods, etc.   Take Tums or Pepcid over the counter as needed for the stomach issue.     Call endoscopy to schedule colonoscopy.

## 2020-03-11 ENCOUNTER — HOSPITAL ENCOUNTER (OUTPATIENT)
Dept: RADIOLOGY | Facility: HOSPITAL | Age: 75
Discharge: HOME | End: 2020-03-11
Attending: INTERNAL MEDICINE
Payer: MEDICARE

## 2020-03-11 DIAGNOSIS — E04.2 MULTINODULAR THYROID: ICD-10-CM

## 2020-03-11 PROCEDURE — 76536 US EXAM OF HEAD AND NECK: CPT | Mod: 26,HCNC,, | Performed by: RADIOLOGY

## 2020-03-11 PROCEDURE — 76536 US EXAM OF HEAD AND NECK: CPT | Mod: TC,HCNC

## 2020-03-11 PROCEDURE — 76536 US SOFT TISSUE HEAD NECK THYROID: ICD-10-PCS | Mod: 26,HCNC,, | Performed by: RADIOLOGY

## 2020-03-19 ENCOUNTER — PATIENT MESSAGE (OUTPATIENT)
Dept: INTERNAL MEDICINE | Facility: CLINIC | Age: 75
End: 2020-03-19

## 2020-03-20 ENCOUNTER — PATIENT MESSAGE (OUTPATIENT)
Dept: INTERNAL MEDICINE | Facility: CLINIC | Age: 75
End: 2020-03-20

## 2020-03-24 ENCOUNTER — PATIENT MESSAGE (OUTPATIENT)
Dept: INTERNAL MEDICINE | Facility: CLINIC | Age: 75
End: 2020-03-24

## 2020-04-01 DIAGNOSIS — R53.83 FATIGUE, UNSPECIFIED TYPE: ICD-10-CM

## 2020-04-01 RX ORDER — METOPROLOL SUCCINATE 100 MG/1
TABLET, EXTENDED RELEASE ORAL
Qty: 90 TABLET | Refills: 3 | Status: SHIPPED | OUTPATIENT
Start: 2020-04-01 | End: 2021-04-12

## 2020-04-01 RX ORDER — HYDROCHLOROTHIAZIDE 12.5 MG/1
12.5 TABLET ORAL DAILY
Qty: 90 TABLET | Refills: 3 | OUTPATIENT
Start: 2020-04-01

## 2020-04-07 ENCOUNTER — PATIENT MESSAGE (OUTPATIENT)
Dept: INTERNAL MEDICINE | Facility: CLINIC | Age: 75
End: 2020-04-07

## 2020-04-07 RX ORDER — HYDROCHLOROTHIAZIDE 12.5 MG/1
12.5 TABLET ORAL DAILY
Qty: 90 TABLET | Refills: 3 | Status: SHIPPED | OUTPATIENT
Start: 2020-04-07 | End: 2021-02-01

## 2020-04-24 DIAGNOSIS — L50.9 URTICARIA: ICD-10-CM

## 2020-04-24 RX ORDER — HYDROXYZINE HYDROCHLORIDE 25 MG/1
TABLET, FILM COATED ORAL
Qty: 90 TABLET | Refills: 0 | Status: SHIPPED | OUTPATIENT
Start: 2020-04-24 | End: 2021-02-19 | Stop reason: SDUPTHER

## 2020-05-15 ENCOUNTER — PATIENT MESSAGE (OUTPATIENT)
Dept: ADMINISTRATIVE | Facility: OTHER | Age: 75
End: 2020-05-15

## 2020-06-01 ENCOUNTER — PATIENT MESSAGE (OUTPATIENT)
Dept: INTERNAL MEDICINE | Facility: CLINIC | Age: 75
End: 2020-06-01

## 2020-06-01 DIAGNOSIS — H00.013 HORDEOLUM EXTERNUM OF RIGHT EYE, UNSPECIFIED EYELID: Primary | ICD-10-CM

## 2020-06-03 ENCOUNTER — PATIENT OUTREACH (OUTPATIENT)
Dept: ADMINISTRATIVE | Facility: OTHER | Age: 75
End: 2020-06-03

## 2020-06-03 ENCOUNTER — OFFICE VISIT (OUTPATIENT)
Dept: OPTOMETRY | Facility: CLINIC | Age: 75
End: 2020-06-03
Payer: MEDICARE

## 2020-06-03 DIAGNOSIS — H00.013 HORDEOLUM EXTERNUM OF RIGHT EYE, UNSPECIFIED EYELID: ICD-10-CM

## 2020-06-03 PROCEDURE — 92012 INTRM OPH EXAM EST PATIENT: CPT | Mod: HCNC,S$GLB,, | Performed by: OPTOMETRIST

## 2020-06-03 PROCEDURE — 99999 PR PBB SHADOW E&M-EST. PATIENT-LVL III: ICD-10-PCS | Mod: PBBFAC,HCNC,, | Performed by: OPTOMETRIST

## 2020-06-03 PROCEDURE — 92012 PR EYE EXAM, EST PATIENT,INTERMED: ICD-10-PCS | Mod: HCNC,S$GLB,, | Performed by: OPTOMETRIST

## 2020-06-03 PROCEDURE — 99999 PR PBB SHADOW E&M-EST. PATIENT-LVL III: CPT | Mod: PBBFAC,HCNC,, | Performed by: OPTOMETRIST

## 2020-06-03 RX ORDER — AMOXICILLIN AND CLAVULANATE POTASSIUM 875; 125 MG/1; MG/1
1 TABLET, FILM COATED ORAL 2 TIMES DAILY
Qty: 20 TABLET | Refills: 0 | Status: SHIPPED | OUTPATIENT
Start: 2020-06-03 | End: 2020-06-13

## 2020-06-03 NOTE — LETTER
Camila 3, 2020      Lisa Dean MD  [address]  [facility] [address]           [place] - Optometry  [address]  [place] [address]  Phone: [phone]  Fax: [phone]          Patient: Grace Navarro   MR Number: [id number]   YOB: 1945   Date of Visit: 6/3/2020       Dear Dr. Lisa Dean:    Thank you for referring Grace Navarro to me for evaluation. Attached you will find relevant portions of my assessment and plan of care.    If you have questions, please do not hesitate to call me. I look forward to following Grace Navarro along with you.    Sincerely,    Michael Richards, OD    Enclosure  CC:  No Recipients    If you would like to receive this communication electronically, please contact [email or web address] or [phone] to request more information on [employer] Link access.    For providers and/or their staff who would like to refer a patient to [facility], please contact us through our one-stop-shop provider referral line, [facility] , at [phone].    If you feel you have received this communication in error or would no longer like to receive these types of communications, please e-mail [email or web address]

## 2020-06-03 NOTE — PROGRESS NOTES
HPI     DLS; // munir   Pt states she has been having a stye on her lower lid of her right eye   states it has been there for over 3 weeks, states that it pop about 10   days ago and had some blood and pus come out But it is still there. Using   hot cx  No f/f  systane gtts     Last edited by Mihcael Richards, OD on 6/3/2020  8:53 AM. (History)        ROS     Positive for: Eyes (cat surgery/LTG suspect)    Negative for: Constitutional, Gastrointestinal, Neurological, Skin,   Genitourinary, Musculoskeletal, HENT, Endocrine, Cardiovascular,   Respiratory, Psychiatric, Allergic/Imm, Heme/Lymph    Last edited by Michael Richards, OD on 6/3/2020  8:53 AM. (History)        Assessment /Plan     For exam results, see Encounter Report.    Hordeolum externum of right eye, unspecified eyelid  -     Ambulatory referral/consult to Optometry  -     amoxicillin-clavulanate 875-125mg (AUGMENTIN) 875-125 mg per tablet; Take 1 tablet by mouth 2 (two) times daily. for 10 days  Dispense: 20 tablet; Refill: 0      1. Sp pciol OU  2. LTG suspect--followed by Dr Liriano  3. Pt presents today w ext angelica RLL.  She reports has been around for a few weeks, but is resolving with hot cx    PLAN:    1) Hot compresses, 5 minutes at a time, QID, and AUGMENTIN BID PO X 10 days  2) Discussed chalazion formation  3) Pt will call if not resolved in one week, and will schedule excision.  Otherwise rtc as sched w Dr Liriano

## 2020-06-03 NOTE — PROGRESS NOTES
Care Everywhere: updated  Immunization: n/a  Health Maintenance: n/a  Media Review: n/a  Legacy Review: n/a  Order placed: n/a  Upcoming appts:n/a  Colonoscopy case request 2.27.2020

## 2020-06-16 ENCOUNTER — PATIENT OUTREACH (OUTPATIENT)
Dept: ADMINISTRATIVE | Facility: OTHER | Age: 75
End: 2020-06-16

## 2020-06-16 NOTE — PROGRESS NOTES
LINKS immunization registry triggered  Care Everywhere updated  Health Maintenance updated  Chart reviewed for overdue Proactive [facility] Encounters health maintenance testing  Patient has open case request for colonoscopy.  [employer] order not entered

## 2020-06-17 ENCOUNTER — OFFICE VISIT (OUTPATIENT)
Dept: OTOLARYNGOLOGY | Facility: CLINIC | Age: 75
End: 2020-06-17
Payer: MEDICARE

## 2020-06-17 ENCOUNTER — CLINICAL SUPPORT (OUTPATIENT)
Dept: AUDIOLOGY | Facility: CLINIC | Age: 75
End: 2020-06-17
Payer: MEDICARE

## 2020-06-17 ENCOUNTER — TELEPHONE (OUTPATIENT)
Dept: OTOLARYNGOLOGY | Facility: CLINIC | Age: 75
End: 2020-06-17

## 2020-06-17 VITALS
WEIGHT: 151 LBS | HEART RATE: 63 BPM | SYSTOLIC BLOOD PRESSURE: 171 MMHG | BODY MASS INDEX: 25.92 KG/M2 | DIASTOLIC BLOOD PRESSURE: 83 MMHG

## 2020-06-17 DIAGNOSIS — H91.93 HEARING DECREASED, BILATERAL: Primary | ICD-10-CM

## 2020-06-17 DIAGNOSIS — H90.3 SENSORINEURAL HEARING LOSS, BILATERAL: Primary | ICD-10-CM

## 2020-06-17 DIAGNOSIS — H90.3 SENSORINEURAL HEARING LOSS, BILATERAL: ICD-10-CM

## 2020-06-17 DIAGNOSIS — H93.13 TINNITUS AURIUM, BILATERAL: Primary | ICD-10-CM

## 2020-06-17 PROCEDURE — 1101F PR PT FALLS ASSESS DOC 0-1 FALLS W/OUT INJ PAST YR: ICD-10-PCS | Mod: HCNC,CPTII,S$GLB, | Performed by: OTOLARYNGOLOGY

## 2020-06-17 PROCEDURE — 99999 PR PBB SHADOW E&M-EST. PATIENT-LVL III: CPT | Mod: PBBFAC,HCNC,, | Performed by: OTOLARYNGOLOGY

## 2020-06-17 PROCEDURE — 3077F PR MOST RECENT SYSTOLIC BLOOD PRESSURE >= 140 MM HG: ICD-10-PCS | Mod: HCNC,CPTII,S$GLB, | Performed by: OTOLARYNGOLOGY

## 2020-06-17 PROCEDURE — 3077F SYST BP >= 140 MM HG: CPT | Mod: HCNC,CPTII,S$GLB, | Performed by: OTOLARYNGOLOGY

## 2020-06-17 PROCEDURE — 99999 PR PBB SHADOW E&M-EST. PATIENT-LVL III: ICD-10-PCS | Mod: PBBFAC,HCNC,, | Performed by: OTOLARYNGOLOGY

## 2020-06-17 PROCEDURE — 3079F DIAST BP 80-89 MM HG: CPT | Mod: HCNC,CPTII,S$GLB, | Performed by: OTOLARYNGOLOGY

## 2020-06-17 PROCEDURE — 92557 COMPREHENSIVE HEARING TEST: CPT | Mod: HCNC,S$GLB,, | Performed by: AUDIOLOGIST

## 2020-06-17 PROCEDURE — 92567 TYMPANOMETRY: CPT | Mod: HCNC,S$GLB,, | Performed by: AUDIOLOGIST

## 2020-06-17 PROCEDURE — 99202 PR OFFICE/OUTPT VISIT, NEW, LEVL II, 15-29 MIN: ICD-10-PCS | Mod: HCNC,S$GLB,, | Performed by: OTOLARYNGOLOGY

## 2020-06-17 PROCEDURE — 92557 PR COMPREHENSIVE HEARING TEST: ICD-10-PCS | Mod: HCNC,S$GLB,, | Performed by: AUDIOLOGIST

## 2020-06-17 PROCEDURE — 99202 OFFICE O/P NEW SF 15 MIN: CPT | Mod: HCNC,S$GLB,, | Performed by: OTOLARYNGOLOGY

## 2020-06-17 PROCEDURE — 92567 PR TYMPA2METRY: ICD-10-PCS | Mod: HCNC,S$GLB,, | Performed by: AUDIOLOGIST

## 2020-06-17 PROCEDURE — 1101F PT FALLS ASSESS-DOCD LE1/YR: CPT | Mod: HCNC,CPTII,S$GLB, | Performed by: OTOLARYNGOLOGY

## 2020-06-17 PROCEDURE — 1159F PR MEDICATION LIST DOCUMENTED IN MEDICAL RECORD: ICD-10-PCS | Mod: HCNC,S$GLB,, | Performed by: OTOLARYNGOLOGY

## 2020-06-17 PROCEDURE — 3079F PR MOST RECENT DIASTOLIC BLOOD PRESSURE 80-89 MM HG: ICD-10-PCS | Mod: HCNC,CPTII,S$GLB, | Performed by: OTOLARYNGOLOGY

## 2020-06-17 PROCEDURE — 1159F MED LIST DOCD IN RCRD: CPT | Mod: HCNC,S$GLB,, | Performed by: OTOLARYNGOLOGY

## 2020-06-17 NOTE — PROGRESS NOTES
Grace Navarro was seen today in the clinic for an audiologic evaluation.  Patients main complaint was tinnitus.  Mrs. Navarro reported that she experiences tinnitus that is primarily in her left ear.  Tinnitus varies in sound quality.  Pt has difficulty hearing high frequencies.  Pt denied pain and dizziness.    Tympanometry revealed Type A in the right ear and Type A in the left ear.  Audiogram results revealed normal sloping to profound sensorineural hearing loss (SNHL) in the right ear and normal sloping to profound SNHL in the left ear.  Speech reception thresholds were noted at 30 dB in the right ear and 25 dB in the left ear.  Speech discrimination scores were 84% in the right ear and 92% in the left ear.    Recommendations:  1. Otologic evaluation  2. Annual audiogram  3. Noise protection when in noise  4. Hearing aid consultation

## 2020-06-17 NOTE — PROGRESS NOTES
Answers for HPI/ROS submitted by the patient on 6/12/2020   tinnitus: Yes  eye itching: Yes  Snoring?: Yes  Joint pain? : Yes  Muscle aches / pain?: Yes  back pain: Yes  neck pain: Yes  rash: Yes  Bruises or bleeds easily: Yes

## 2020-06-17 NOTE — PROGRESS NOTES
Subjective:       Patient ID: Grace Navarro is a 74 y.o. female.    Chief Complaint: Tinnitus and Hearing Loss    Ringing in Ears:   Chronicity:  Chronic  Onset:  More than 1 month ago  Frequency:  Constantly  Severity:  Mild   Associated symptoms: tinnitus and buzzing.  No ear congestion, no fever and no facial weakness.  Aggravated by:  Noise  Treatments tried:  NothingNo noise exposure and no ear tubes.  Hearing Loss:    Associated symptoms: tinnitus and buzzing.  No ear congestion, no fever and no facial weakness.No noise exposure and no ear tubes.       Grace Navarro is a 74 y.o. female    Review of Systems   Constitutional: Negative for chills and fever.   HENT: Positive for hearing loss and tinnitus. Negative for sore throat and trouble swallowing.    Eyes: Positive for itching.   Respiratory: Negative for apnea and chest tightness.    Cardiovascular: Negative for chest pain.   Musculoskeletal: Positive for arthralgias, back pain and neck pain.   Integumentary:  Positive for rash.   Hematological: Bruises/bleeds easily.         Objective:      Physical Exam  Constitutional:       Appearance: She is well-developed.   HENT:      Head: Normocephalic and atraumatic.      Right Ear: Tympanic membrane, ear canal and external ear normal.      Left Ear: Tympanic membrane, ear canal and external ear normal.      Nose: Nose normal.      Mouth/Throat:      Pharynx: Uvula midline.   Neck:      Musculoskeletal: Normal range of motion.      Thyroid: No thyroid mass.       Data:      Audiogram tracings independently reviewed and discussed with patient shows high frequency snhl    Assessment:       1. Tinnitus aurium, bilateral    2. Sensorineural hearing loss, bilateral        Plan:         Discussed with patient multiple tinnitus management strategies including the use of maskers, instruments, background sound enrichment and other means. All questions answered and appropriate references given.    Answers  for HPI/ROS submitted by the patient on 6/12/2020   Snoring?: Yes  Muscle aches / pain?: Yes

## 2020-07-14 ENCOUNTER — PATIENT MESSAGE (OUTPATIENT)
Dept: INTERNAL MEDICINE | Facility: CLINIC | Age: 75
End: 2020-07-14

## 2020-07-14 DIAGNOSIS — L30.9 DERMATITIS: Primary | ICD-10-CM

## 2020-07-14 RX ORDER — TRIAMCINOLONE ACETONIDE 1 MG/G
CREAM TOPICAL 2 TIMES DAILY
Qty: 30 G | Refills: 1 | Status: SHIPPED | OUTPATIENT
Start: 2020-07-14 | End: 2021-06-28

## 2020-07-27 ENCOUNTER — PROCEDURE VISIT (OUTPATIENT)
Dept: OPHTHALMOLOGY | Facility: CLINIC | Age: 75
End: 2020-07-27
Payer: MEDICARE

## 2020-07-27 DIAGNOSIS — Z96.1 PSEUDOPHAKIA OF BOTH EYES: ICD-10-CM

## 2020-07-27 DIAGNOSIS — H00.012 HORDEOLUM EXTERNUM OF RIGHT LOWER EYELID: Primary | ICD-10-CM

## 2020-07-27 PROCEDURE — 92012 INTRM OPH EXAM EST PATIENT: CPT | Mod: HCNC,S$GLB,, | Performed by: OPHTHALMOLOGY

## 2020-07-27 PROCEDURE — 92012 PR EYE EXAM, EST PATIENT,INTERMED: ICD-10-PCS | Mod: HCNC,S$GLB,, | Performed by: OPHTHALMOLOGY

## 2020-07-27 NOTE — PROGRESS NOTES
Subjective:       Patient ID: Grace Navarro is a 74 y.o. female.    Chief Complaint: Chalazion    HPI     74 y.o. female is here for Residual Chalazion RLL x 2 months. Denies eye   pain and discomfort on today. Notice discharge back in June. Chalazion has   decrease in size.     Eye Med's: Systane TID OU     Last edited by SAMM Aguilera on 7/27/2020  3:28 PM. (History)             Assessment:       1. Hordeolum externum of right lower eyelid    2. Pseudophakia of both eyes        Plan:       Small residual hordeolum externum of RLL-Needs WC's & abx adi.      Start WC's & Tobradex adi to RLL bid-tid x 7-10 days.  RTC me prn.

## 2020-07-28 ENCOUNTER — PATIENT MESSAGE (OUTPATIENT)
Dept: INTERNAL MEDICINE | Facility: CLINIC | Age: 75
End: 2020-07-28

## 2020-07-30 ENCOUNTER — OFFICE VISIT (OUTPATIENT)
Dept: INTERNAL MEDICINE | Facility: CLINIC | Age: 75
End: 2020-07-30
Payer: MEDICARE

## 2020-07-30 ENCOUNTER — HOSPITAL ENCOUNTER (OUTPATIENT)
Dept: RADIOLOGY | Facility: HOSPITAL | Age: 75
Discharge: HOME | End: 2020-07-30
Attending: INTERNAL MEDICINE
Payer: MEDICARE

## 2020-07-30 ENCOUNTER — TELEPHONE (OUTPATIENT)
Dept: INTERNAL MEDICINE | Facility: CLINIC | Age: 75
End: 2020-07-30

## 2020-07-30 VITALS
BODY MASS INDEX: 26.46 KG/M2 | TEMPERATURE: 98 F | WEIGHT: 155 LBS | DIASTOLIC BLOOD PRESSURE: 82 MMHG | SYSTOLIC BLOOD PRESSURE: 152 MMHG | HEIGHT: 64 IN | OXYGEN SATURATION: 98 % | HEART RATE: 63 BPM

## 2020-07-30 DIAGNOSIS — I10 BENIGN ESSENTIAL HYPERTENSION: ICD-10-CM

## 2020-07-30 DIAGNOSIS — M25.561 ACUTE PAIN OF RIGHT KNEE: ICD-10-CM

## 2020-07-30 DIAGNOSIS — M25.561 ACUTE PAIN OF RIGHT KNEE: Primary | ICD-10-CM

## 2020-07-30 DIAGNOSIS — R63.5 WEIGHT GAIN: ICD-10-CM

## 2020-07-30 PROCEDURE — 99999 PR PBB SHADOW E&M-EST. PATIENT-LVL V: ICD-10-PCS | Mod: PBBFAC,HCNC,, | Performed by: INTERNAL MEDICINE

## 2020-07-30 PROCEDURE — 73562 XR KNEE ORTHO BILAT: ICD-10-PCS | Mod: 26,50,HCNC, | Performed by: RADIOLOGY

## 2020-07-30 PROCEDURE — 99214 OFFICE O/P EST MOD 30 MIN: CPT | Mod: HCNC,S$GLB,, | Performed by: INTERNAL MEDICINE

## 2020-07-30 PROCEDURE — 99999 PR PBB SHADOW E&M-EST. PATIENT-LVL V: CPT | Mod: PBBFAC,HCNC,, | Performed by: INTERNAL MEDICINE

## 2020-07-30 PROCEDURE — 73562 X-RAY EXAM OF KNEE 3: CPT | Mod: TC,50,HCNC

## 2020-07-30 PROCEDURE — 1159F PR MEDICATION LIST DOCUMENTED IN MEDICAL RECORD: ICD-10-PCS | Mod: HCNC,S$GLB,, | Performed by: INTERNAL MEDICINE

## 2020-07-30 PROCEDURE — 3079F DIAST BP 80-89 MM HG: CPT | Mod: HCNC,CPTII,S$GLB, | Performed by: INTERNAL MEDICINE

## 2020-07-30 PROCEDURE — 3077F SYST BP >= 140 MM HG: CPT | Mod: HCNC,CPTII,S$GLB, | Performed by: INTERNAL MEDICINE

## 2020-07-30 PROCEDURE — 3077F PR MOST RECENT SYSTOLIC BLOOD PRESSURE >= 140 MM HG: ICD-10-PCS | Mod: HCNC,CPTII,S$GLB, | Performed by: INTERNAL MEDICINE

## 2020-07-30 PROCEDURE — 1101F PT FALLS ASSESS-DOCD LE1/YR: CPT | Mod: HCNC,CPTII,S$GLB, | Performed by: INTERNAL MEDICINE

## 2020-07-30 PROCEDURE — 1101F PR PT FALLS ASSESS DOC 0-1 FALLS W/OUT INJ PAST YR: ICD-10-PCS | Mod: HCNC,CPTII,S$GLB, | Performed by: INTERNAL MEDICINE

## 2020-07-30 PROCEDURE — 99214 PR OFFICE/OUTPT VISIT, EST, LEVL IV, 30-39 MIN: ICD-10-PCS | Mod: HCNC,S$GLB,, | Performed by: INTERNAL MEDICINE

## 2020-07-30 PROCEDURE — 1159F MED LIST DOCD IN RCRD: CPT | Mod: HCNC,S$GLB,, | Performed by: INTERNAL MEDICINE

## 2020-07-30 PROCEDURE — 3008F PR BODY MASS INDEX (BMI) DOCUMENTED: ICD-10-PCS | Mod: HCNC,CPTII,S$GLB, | Performed by: INTERNAL MEDICINE

## 2020-07-30 PROCEDURE — 1125F AMNT PAIN NOTED PAIN PRSNT: CPT | Mod: HCNC,S$GLB,, | Performed by: INTERNAL MEDICINE

## 2020-07-30 PROCEDURE — 73562 X-RAY EXAM OF KNEE 3: CPT | Mod: 26,50,HCNC, | Performed by: RADIOLOGY

## 2020-07-30 PROCEDURE — 3008F BODY MASS INDEX DOCD: CPT | Mod: HCNC,CPTII,S$GLB, | Performed by: INTERNAL MEDICINE

## 2020-07-30 PROCEDURE — 3079F PR MOST RECENT DIASTOLIC BLOOD PRESSURE 80-89 MM HG: ICD-10-PCS | Mod: HCNC,CPTII,S$GLB, | Performed by: INTERNAL MEDICINE

## 2020-07-30 PROCEDURE — 1125F PR PAIN SEVERITY QUANTIFIED, PAIN PRESENT: ICD-10-PCS | Mod: HCNC,S$GLB,, | Performed by: INTERNAL MEDICINE

## 2020-07-30 RX ORDER — NAPROXEN 500 MG/1
500 TABLET ORAL 2 TIMES DAILY WITH MEALS
Qty: 14 TABLET | Refills: 0 | Status: SHIPPED | OUTPATIENT
Start: 2020-07-30 | End: 2020-08-06

## 2020-07-30 NOTE — PROGRESS NOTES
"Subjective:       Patient ID: Grace Navarro is a 74 y.o. female.    Chief Complaint: Knee Pain (right)    HPI   Pt w/ chronic knee pains. 6 days ago, woke up with R knee pain. Pain is by the kneecap and does not radiate. Pain is 9 of 10. Didn't take anything for the pain on a normal basis. She had 1 ketorolac leftover and only took one. That helped. Iced the knee. Used lidocaine 4% cream at night to help. Tried voltaren gel but that did not help. Cannot find a comfortable position. Cannot walk on it due to pain. No trauma.   Pain when flexing knee and also crossing leg.   No fevers/chills/rash.     HTN - prior to this ep, her BP was always in the 120s-130s.     Reports since pandemic has been cooking and eating all day. Does do daily walks to help w/ her back but hasn't been able to do it due to the knee pain for the last 6 days. Feels like she's gaining all the weight in the stomach and wonders if she's having liver issues. No abdominal pain/nausea/vomiting/diarrhea/constipation. No yellowing of skin.     Review of Systems  Comprehensive review of systems otherwise negative. See history/subjective section for more details.    Objective:      Physical Exam    BP (!) 152/82 (BP Location: Right arm, Patient Position: Sitting, BP Method: Medium (Manual))   Pulse 63   Temp 98 °F (36.7 °C)   Ht 5' 4" (1.626 m)   Wt 70.3 kg (154 lb 15.7 oz)   LMP 06/14/1996   SpO2 98%   BMI 26.60 kg/m²     GEN - A+OX4, NAD   HEENT - PERRL, EOMI, OP clear. MMM. TM normal.   Neck - No thyroid masses felt.  CV - RRR, no m/r   Chest - CTAB, no wheezing or rhonchi  Abd - S/NT/ND/+BS. No hepatomegaly on exam or shifting dullness.   Ext - 2+BDP and radial pulses. No LE edema.   MSK - L knee normal. R knee w/ some prepatellar bursa effusion. Pain on palpation of B joint lines. Full extension of R knee. Able to flex the R knee to 90 degrees but some pain. Pain on extreme ext rotation of knee. Neg Ant and Post drawer tests.  Neuro - " 5/5 BUE and BLE strength.  Skin - No rash.    Previous labs reviewed.     Assessment/Plan     Grace was seen today for knee pain.    Diagnoses and all orders for this visit:    Acute pain of right knee - consider ortho referral.   -     naproxen (NAPROSYN) 500 MG tablet; Take 1 tablet (500 mg total) by mouth 2 (two) times daily with meals. for 7 days  -     Ambulatory referral/consult to Physical/Occupational Therapy; Future  -     X-ray Knee Ortho Bilateral; Future    Weight gain  -     Comprehensive metabolic panel; Future  -     TSH; Future    Benign essential hypertension - likely due to pain. Previous BPs were all controlled while on meds.   -     Comprehensive metabolic panel; Future  -     TSH; Future    4 week f/u.       Lisa Dean MD  Department of Internal Medicine - [facility]  7:06 AM

## 2020-07-30 NOTE — TELEPHONE ENCOUNTER
----- Message from Lisa eDan MD sent at 7/30/2020  7:22 AM CDT -----  Anna Marie, can you schedule pt to see me at 7am on 8/31/20 for a follow up? Thanks!

## 2020-07-31 ENCOUNTER — CLINICAL SUPPORT (OUTPATIENT)
Dept: REHABILITATION | Facility: HOSPITAL | Age: 75
End: 2020-07-31
Payer: MEDICARE

## 2020-07-31 DIAGNOSIS — M25.561 ACUTE PAIN OF RIGHT KNEE: ICD-10-CM

## 2020-07-31 DIAGNOSIS — M25.661 DECREASED ROM OF RIGHT KNEE: ICD-10-CM

## 2020-07-31 DIAGNOSIS — R29.898 DECREASED STRENGTH OF LOWER EXTREMITY: ICD-10-CM

## 2020-07-31 DIAGNOSIS — R26.89 ANTALGIC GAIT: ICD-10-CM

## 2020-07-31 PROCEDURE — 97161 PT EVAL LOW COMPLEX 20 MIN: CPT | Mod: HCNC,PN

## 2020-07-31 PROCEDURE — 97140 MANUAL THERAPY 1/> REGIONS: CPT | Mod: HCNC,PN,59

## 2020-07-31 NOTE — PLAN OF CARE
[facility]  Physical Therapy Initial Evaluation    Name: Grace Navarro  Clinic Number: [id number]    Therapy Diagnosis:   Encounter Diagnoses   Name Primary?    Acute pain of right knee     Decreased ROM of right knee     Antalgic gait     Decreased strength of lower extremity      Physician: Lisa Dean MD    Physician Orders: PT Eval and Treat   Medical Diagnosis: M25.561 (ICD-10-CM) - Acute pain of right knee  Evaluation Date: 7/31/2020     Authorization Period Expiration: 12/31/2020  Plan of Care Certification Period: 7/31/2020 to 09/18/2020  Visit # / Visits authorized: 1/ TBD  ($co-pay)  FOTO: 1/5    Time In: 1430  Time Out: 1510  Total Billable Time: 40 minutes (1 LCE, 1 MT)  Precautions: Standard    Subjective   Grace reports acute R knee pain and chronic L knee pain.  Her normal routine is to walk 3-4 miles at the local [facility] daily.  She did this on Friday and woke up Saturday with 'extreme' right knee pain which is unusual because her left knee usually is the source of her pain.  She describes the pain as bursitis.  The location of her pain is the front and inner part of her knee.  Worse with standing and walking.  Alleviated by pain cream, rest, and ice.  Has yet to return to the gym.  History of L4/5 lumbar surgery.  Denies mechanical symptoms R knee.       Past Medical History:   Diagnosis Date    Anxiety     Rene's disease     Cataract     Chronic low back pain     Depression     Goiter, nodular     Hyperlipidemia     Hypertension     Irritable bowel     Neuromuscular disorder     Osteopenia of multiple sites 5/29/2017    PUD (peptic ulcer disease)     Subarachnoid hemorrhage 2014    Varicose veins      Grace Navarro  has a past surgical history that includes Appendectomy; Tonsillectomy; Spine surgery (07/2012); Blepharoptosis repair (Bilateral); Cataract extraction w/  intraocular lens implant (Left, 08/03/2016); Cataract extraction w/   "intraocular lens implant (Right, 2016); and Breast biopsy (Right).    Grace has a current medication list which includes the following prescription(s): ascorbic acid (vitamin c), calcium/magnesium, cholecalciferol (vitamin d3), cyanocobalamin, diclofenac sodium, fluticasone propionate, hydrochlorothiazide, hydroxyzine hcl, metoprolol succinate, naproxen, and triamcinolone acetonide 0.1%.    Review of patient's allergies indicates:   Allergen Reactions    Ace inhibitors Other (See Comments)     Pt not sure which medication it was - was told by doctor that she was allergic    Amlodipine      flushing    Ibuprofen      Elevate blood pressure    Morphine Other (See Comments)     hallucination        Imagin2020: B knee Xrays:  Findings: "Right: No fracture dislocation bone destruction or OCD seen.  There are spurs on the patella.  There is mild DJD.  Left: No fracture dislocation bone destruction seen.  There is mild DJD and spurs on the patella.  There is a mild varus deformity."  Prior Therapy: 2018 for B knee pain/weakness.   Social History: Lives alone. Single-story home.   Occupation: not working.   Prior Level of Function: independent with all activities.   Current Level of Function: has not returned to her normal activities due to fear of worsening her right knee pain.     Pain:  Current 3/10, worst 9/10, best 3/10   Location: right knee  Description: sharp to touch    Pts goals: 1.  To return to her daily walking program.     Objective     Palpation: TTP along medial femoral condyle (medially) and medial joint line.  No effusion noted    Posture: B genu valgum with forefoot adduction and pes planus foot structure.     Gross Movement Analysis:  - Gait:  Antalgic gait with quickened R stance phase to off-load joint.  Pain during Stance phase on the R.   - Squats: anterior knee pain R.     Range of Motion:   LE Right Left   Hip flexion WNL WNL   Hip abduction 30 30   Hip ER WNL WNL   Hip IR  " 30 30   Hip extension 5 5   Knee (-) 3 - 115  (+) 2 - 0  - 120   Ankle DF 5 with knee ext 5 with knee ext     Lower Extremity Strength                 LE           Right           Left   Hip flexion: 4/5 4/5   Hip abduction 3+/5 3+/5   Hip extension 3+/5 3+/5   Hip ER 3+/5 3+/5   Knee flexion 4/5 4/5   Knee extension 3+/5 4/5   Ankle dorsiflexion: 5/5 5/5   Ankle plantarflexion: 3/5 WB 3/5 WB     Special Tests:   Right Left   Valgus Stress Test - -   Varus Stress test + 1 + 1   Lachman's test - -   Posterior drawer - -   Anterior drawer - -   Chen's Test + -   Knee extension OP - -   Knee flexion OP - -     Joint Mobility: mild patellar hypomobility B, lateral patellar tilting  Sensation: intact light touch sensation to BLE throughout L2-S2 dermatomal pattern  Flexibility: tight hip adductors, tight hip flexors, tight gastrocnemius/soleus complex.       CMS Impairment/Limitation/Restriction for FOTO Knee Survey    Therapist reviewed FOTO scores for Grace Navarro on 7/31/2020.   FOTO documents entered into [employer] - see Media section.    Limitation Score: 85%  Predicted Limitation Score: 45%         TREATMENT   Treatment Time In: 1455  Treatment Time Out: 1510  Total Treatment time separate from Evaluation time:15'    Grace received therapeutic exercises to develop strength, endurance, ROM, flexibility, posture and core stabilization for 5 minutes including:  - bike 5'    Grace received the following manual therapy techniques:total time 10  - grade II/III R knee passive physiological knee flexion and extension  - grade II/III tibial mobs including internal rotation mobs    Home Exercises and Patient Education Provided  Education provided re:   - PT diagnosis and recommended treatment course.    Written Home Exercises Provided: not today; next visit.       Assessment   Grace is a 74 y.o. female referred to outpatient Physical Therapy with a medical diagnosis of Acute pain of right knee. Pt presents  with PT today with R anteromedial knee pain x 1 week.  Clinical examination reveals decreased R knee strength, R knee ROM, impaired gait, impaired balance, localized edema, and fear avoidance with normal activities.  Normal ligamentous testing today.  2/5 meniscal cluster testing.  Cannot r/o meniscal dysfunction.  Imaging shows medial tibiofemoral compartment joint space thinning per report.      Pt prognosis is Excellent.   Pt will benefit from skilled outpatient Physical Therapy to address the deficits stated above and in the chart below, provide pt/family education, and to maximize pt's level of independence.     Plan of care discussed with patient: Yes  Pt's spiritual, cultural and educational needs considered and patient is agreeable to the plan of care and goals as stated below:     Anticipated Barriers for therapy: none    Medical Necessity is demonstrated by the following  History  Co-morbidities and personal factors that may impact the plan of care Co-morbidities:   Arthritis, Back pain, High Blood Pressure    Personal Factors:   age     moderate   Examination  Body Structures and Functions, activity limitations and participation restrictions that may impact the plan of care Body Regions:   lower extremities    Body Systems:    gross symmetry  ROM  strength  balance  gait  transfers  motor control  motor learning  edema    Participation Restrictions:   none    Activity limitations:   Learning and applying knowledge  no deficits    General Tasks and Commands  no deficits    Communication  communicating with/receiving spoken language    Mobility  lifting and carrying objects  walking  driving (bike, car, motorcycle)    Self care  washing oneself (bathing, drying, washing hands)  toileting  dressing    Domestic Life  shopping  cooking  doing house work (cleaning house, washing dishes, laundry)    Interactions/Relationships  no deficits    Life Areas  no deficits    Community and Social Life  community  life  recreation and leisure         moderate   Clinical Presentation stable and uncomplicated low   Decision Making/ Complexity Score: low     Goals:  Short Term = Long Term Goals: 6 weeks   1.  Patient will demonstrate full R knee ROM with OP without pain demonstrating normal knee joint ROM for daily activities.   2.  Patient will demonstrate SLR x 20 reps without quad lag demonstrating improved functional RLE strength for gait activities.   3.  Patient will report <2/10 R knee pain at worst throughout her week.   4.  Patient will report ability to complete 3 miles of walking on TM without onset/increase R knee pain.   5.  Patient will report <45% limitation on Knee FOTO survey.   6.  Patient will demonstrate to PT comprehensive understanding of each HEP component without verbal cueing.     Plan   Certification Period/Plan of care expiration: 7/31/2020 to 09/18/2020.    Outpatient Physical Therapy 2 times weekly for 6 weeks to include the following interventions: Cervical/Lumbar Traction, Electrical Stimulation IFC/NMES, Gait Training, Manual Therapy, Moist Heat/ Ice, Neuromuscular Re-ed, Orthotic Management and Training, Patient Education, Self Care, Therapeutic Activites and Therapeutic Exercise, IASTM, Dry Needling.     Chip hSahid, PT

## 2020-08-03 ENCOUNTER — CLINICAL SUPPORT (OUTPATIENT)
Dept: REHABILITATION | Facility: HOSPITAL | Age: 75
End: 2020-08-03
Payer: MEDICARE

## 2020-08-03 DIAGNOSIS — M25.661 DECREASED ROM OF RIGHT KNEE: ICD-10-CM

## 2020-08-03 DIAGNOSIS — R29.898 DECREASED STRENGTH OF LOWER EXTREMITY: ICD-10-CM

## 2020-08-03 DIAGNOSIS — R26.89 ANTALGIC GAIT: ICD-10-CM

## 2020-08-03 PROCEDURE — 97140 MANUAL THERAPY 1/> REGIONS: CPT | Mod: HCNC,PN

## 2020-08-03 PROCEDURE — 97110 THERAPEUTIC EXERCISES: CPT | Mod: HCNC,PN

## 2020-08-03 NOTE — PROGRESS NOTES
"  Physical Therapy Treatment Note     Name: Grace Ware zabrina  Clinic Number: [id number]    Therapy Diagnosis:   Encounter Diagnoses   Name Primary?    Decreased ROM of right knee     Antalgic gait     Decreased strength of lower extremity      Physician: Lisa Dean MD    Visit Date: 8/3/2020    Physician Orders: PT Eval and Treat   Medical Diagnosis: M25.561 (ICD-10-CM) - Acute pain of right knee  Evaluation Date: 7/31/2020  Authorization Period Expiration: 12/31/2020  Plan of Care Certification Period: 7/31/2020 to 09/18/2020  Visit #/Visits authorized: 2/TBD  FOTO: 2/5    Time In: 2:15PM  Time Out: 3:12PM  Total Billable Time: 57 minutes (3TE, 1 manual)    Precautions: Standard    Subjective     Pt reports: She walked about half a mile the other day with no major pain increase, but she is feeling severe knee pain at beginning of appointment today. She reports positive response to manual therapy (decreased pain)  She will be compliant with home exercise program. (received HEP today)  Response to previous treatment: Last session was initial evaluation  Functional change: Gradually increasing distance walked outside of therapy    Pain: 9/10 (onset of appointment); 4/10 (end of appointment)  Location: right knee      Objective     Grace received therapeutic exercises to develop strength, endurance, ROM and flexibility for 42 minutes including:  -- NuStep; Level 1.0; 8 min  -- Quad sets x20 with 3" holds (R only)  -- SLR 2x10 (R only)  -- Heel slides with strap x20 (R only)  -- SL clamshells 2x15 (bilateral)  -- Gastroc stretch (Fitter) 3x30" (bilateral)  -- Stair negotiation 3 bouts x4 steps (cues for step to pattern because of pain)  -- LAQs trialed (3 reps) but held due to increase in pain      Grace received the following manual therapy techniques: Joint mobilizations were applied to the: R knee for 15 minutes, including:  -- Multi-direction patellar glides (Grade II-III)  -- R knee passive physiological " "knee flexion and extension  -- Tibiofemoral mobs (including rotational components) to promote knee flexion and extension (Grade II-III)    Home Exercises Provided and Patient Education Provided     Education provided:   - HEP instruction  - Cryotherapy parameters   - Importance of performing exercises in pain-free range  - Stair negotiation ("up with the left; down with the right")     Written Home Exercises Provided: yes.  Exercises were reviewed and Grace was able to demonstrate them prior to the end of the session.  Grace demonstrated good  understanding of the education provided.     See EMR under Patient Instructions for exercises provided 8/3/2020.    Assessment     Grace tolerated first full session very well. Per self report, pt experienced pain decrease from beginning to end of session and responded well to manual therapy. Long arc quads held today due to pain increase. Otherwise, pt completed all exercises with appropriate responses; moderate verbal cues required to perform exercises with correct mechanics. Pt responsive to stair negotiation modification (ascent with LLE; descent with RLE) that she can utilize to minimize pain at this time.    Grace is progressing well towards her goals.   Pt prognosis is Excellent.     Pt will continue to benefit from skilled outpatient physical therapy to address the deficits listed in the problem list box on initial evaluation, provide pt/family education and to maximize pt's level of independence in the home and community environment.     Pt's spiritual, cultural and educational needs considered and pt agreeable to plan of care and goals.     Anticipated barriers to physical therapy: none    Goals:  Short Term = Long Term Goals: 6 weeks   1.  Patient will demonstrate full R knee ROM with OP without pain demonstrating normal knee joint ROM for daily activities. (progressing, not met)  2.  Patient will demonstrate SLR x 20 reps without quad lag demonstrating " improved functional RLE strength for gait activities.  (progressing, not met)  3.  Patient will report <2/10 R knee pain at worst throughout her week.  (progressing, not met)  4.  Patient will report ability to complete 3 miles of walking on TM without onset/increase R knee pain.  (progressing, not met)  5.  Patient will report <45% limitation on Knee FOTO survey.  (progressing, not met)  6.  Patient will demonstrate to PT comprehensive understanding of each HEP component without verbal cueing.  (progressing, not met)    Plan     Follow up regarding HEP and response to today's session; continue to progress as per plan of care.    JARVIS DE LA GARZA, PT

## 2020-08-07 ENCOUNTER — CLINICAL SUPPORT (OUTPATIENT)
Dept: REHABILITATION | Facility: HOSPITAL | Age: 75
End: 2020-08-07
Payer: MEDICARE

## 2020-08-07 DIAGNOSIS — R29.898 DECREASED STRENGTH OF LOWER EXTREMITY: ICD-10-CM

## 2020-08-07 DIAGNOSIS — M25.661 DECREASED ROM OF RIGHT KNEE: ICD-10-CM

## 2020-08-07 DIAGNOSIS — R26.89 ANTALGIC GAIT: ICD-10-CM

## 2020-08-07 PROCEDURE — 97110 THERAPEUTIC EXERCISES: CPT | Mod: HCNC,PN,CQ

## 2020-08-07 PROCEDURE — 97140 MANUAL THERAPY 1/> REGIONS: CPT | Mod: HCNC,PN,CQ

## 2020-08-07 NOTE — PROGRESS NOTES
"  Physical Therapy Treatment Note     Name: Grace Ontiveros  Clinic Number: [id number]    Therapy Diagnosis:   Encounter Diagnoses   Name Primary?    Decreased ROM of right knee     Antalgic gait     Decreased strength of lower extremity      Physician: Lisa Dean MD    Visit Date: 8/7/2020    Physician Orders: PT Eval and Treat   Medical Diagnosis: M25.561 (ICD-10-CM) - Acute pain of right knee  Evaluation Date: 7/31/2020  Authorization Period Expiration: 12/31/2020  Plan of Care Certification Period: 7/31/2020 to 09/18/2020  Visit #/Visits authorized: 3/24  FOTO: 3/5    Time In: 1:40 PM  Time Out: 2:25 PM  Total Billable Time: 30 minutes (1TE, 1 manual)    Precautions: Standard    Subjective     Pt reports: "I feel pain on the inside of my patella because I have a spur" pt agreeable to session  She will be compliant with home exercise program. (received HEP today)  Response to previous treatment: Last session was initial evaluation  Functional change: practicng going up and down stairs    Pain: 5/10  Location: medial aspect of R patella    Objective     Grace received therapeutic exercises to develop strength, endurance, ROM and flexibility for  minutes including:  -- NuStep; Level 1.0; 8 min  -- Quad sets x20 with 3" holds (R only)  -- SLR 2x10 #1 (R only)  -- Heel slides with strap x20 (R only)  -- SL clamshells 2x15 (bilateral)  -- Hip ADD squeezes with ball 20x 5' holds  -- Gastroc stretch (Fitter) 3x30" (bilateral)  -- Stair negotiation 3 bouts x4 steps (cues for step to pattern because of pain) (not performed)  -- LAQs trialed (3 reps) (on hold, not performed)  --sit to stands from high/low mat x10 high, x10 low    Grace received the following manual therapy techniques: Joint mobilizations were applied to the: R knee for 15 minutes, including:  -- Multi-direction patellar glides (Grade II-III)  --STM along knee joint line  -- R knee passive physiological knee flexion and extension (not " performed)  -- Tibiofemoral mobs (including rotational components) to promote knee flexion and extension (Grade II-III) (not performed)    Home Exercises Provided and Patient Education Provided     Education provided:   -Continue HEP  -Practice Sit to stands from low surfaces  -Hip ADD squeezes with pillow at home    Written Home Exercises Provided: yes.  Exercises were reviewed and Grace was able to demonstrate them prior to the end of the session.  Grace demonstrated good  understanding of the education provided.     See EMR under Patient Instructions for exercises provided 8/3/2020.    Assessment     Pt tolerated treatment within her tolerable pain limits. Grace seemed guarded to therapy and questioned reason for performing several therex. Pt was reassured her technique's in her performance of exercises were correct. Reassured pt that the soreness in her leg is expected due to performance of therex.     Grace is progressing well towards her goals.   Pt prognosis is Excellent.     Pt will continue to benefit from skilled outpatient physical therapy to address the deficits listed in the problem list box on initial evaluation, provide pt/family education and to maximize pt's level of independence in the home and community environment.     Pt's spiritual, cultural and educational needs considered and pt agreeable to plan of care and goals.     Anticipated barriers to physical therapy: none    Goals:  Short Term = Long Term Goals: 6 weeks   1.  Patient will demonstrate full R knee ROM with OP without pain demonstrating normal knee joint ROM for daily activities. (progressing, not met)  2.  Patient will demonstrate SLR x 20 reps without quad lag demonstrating improved functional RLE strength for gait activities.  (progressing, not met)  3.  Patient will report <2/10 R knee pain at worst throughout her week.  (progressing, not met)  4.  Patient will report ability to complete 3 miles of walking on TM without  onset/increase R knee pain.  (progressing, not met)  5.  Patient will report <45% limitation on Knee FOTO survey.  (progressing, not met)  6.  Patient will demonstrate to PT comprehensive understanding of each HEP component without verbal cueing.  (progressing, not met)    Plan     Advance PT as per POC  Marlo Shepard, RISA

## 2020-08-10 ENCOUNTER — CLINICAL SUPPORT (OUTPATIENT)
Dept: REHABILITATION | Facility: HOSPITAL | Age: 75
End: 2020-08-10
Payer: MEDICARE

## 2020-08-10 DIAGNOSIS — R29.898 DECREASED STRENGTH OF LOWER EXTREMITY: ICD-10-CM

## 2020-08-10 DIAGNOSIS — R26.89 ANTALGIC GAIT: ICD-10-CM

## 2020-08-10 DIAGNOSIS — M25.661 DECREASED ROM OF RIGHT KNEE: ICD-10-CM

## 2020-08-10 PROCEDURE — 97110 THERAPEUTIC EXERCISES: CPT | Mod: HCNC,PN,CQ

## 2020-08-10 PROCEDURE — 97140 MANUAL THERAPY 1/> REGIONS: CPT | Mod: HCNC,PN,CQ

## 2020-08-10 NOTE — PROGRESS NOTES
"  Physical Therapy Treatment Note     Name: Grace Ontiveros  Clinic Number: [id number]    Therapy Diagnosis:   Encounter Diagnoses   Name Primary?    Decreased ROM of right knee     Antalgic gait     Decreased strength of lower extremity      Physician: Lisa Dean MD    Visit Date: 8/10/2020    Physician Orders: PT Eval and Treat   Medical Diagnosis: M25.561 (ICD-10-CM) - Acute pain of right knee  Evaluation Date: 7/31/2020  Authorization Period Expiration: 12/31/2020  Plan of Care Certification Period: 7/31/2020 to 09/18/2020  Visit #/Visits authorized: 4/24  FOTO: 4/5    Time In: 3:45 PM  Time Out: 4:25 PM  Total Billable Time: 45 minutes (2TE, 1 manual)    Precautions: Standard    Subjective     Pt reports: "Im still sore and stiff in this R and left knee" pt agreeable to session  She will be compliant with home exercise program. (received HEP today)  Response to previous treatment: Last session was initial evaluation  Functional change: practicng going up and down stairs    Pain: 5/10  Location: medial aspect of R patella    Objective     Grace received therapeutic exercises to develop strength, endurance, ROM and flexibility for  minutes including:  -- NuStep; Level 1.0; 8 min  -- Quad sets x20 with 3" holds (R only)  -- SLR 2x10 #0 (R only)  -- Heel slides with strap x20 (R only) NOT TODAY  -- SL clamshells 2x15 (bilateral)  -- Hip ADD squeezes with ball 20x 5' holds  -- Gastroc stretch (Fitter) 3x30" (bilateral)  -- Stair negotiation 3 bouts x4 steps (cues for step to pattern because of pain) (not performed)  -- knee flexion stretch on stair case 20"x3 R  --sit to stands from high/low mat x10 high, x10 low    Grace received the following manual therapy techniques: Joint mobilizations were applied to the: R knee for 15 minutes, including:  -- hand held percussive device(  IT band, Hamstring mm bellies)  -- Multi-direction patellar glides (Grade II-III)  --STM along knee joint line  -- R knee passive " "physiological knee flexion and extension   -- Tibiofemoral mobs (including rotational components) to promote knee flexion and extension (Grade II-III) (not performed)    Home Exercises Provided and Patient Education Provided     Education provided:   -Continue HEP  -Practice Sit to stands from low surfaces    Written Home Exercises Provided: yes.  Exercises were reviewed and Grace was able to demonstrate them prior to the end of the session.  Grace demonstrated good  understanding of the education provided.     See EMR under Patient Instructions for exercises provided 8/3/2020.    Assessment     Pt tolerated session with no reports of increased R knee pain today. She was able to follow verbal instructions on standing / supine technique within pain limits. She responded to manual therapy with reduced "stiffness". Pt tends to ask many questions about therex performed and benefits of treatment.   Grace is progressing well towards her goals.   Pt prognosis is Excellent.     Pt will continue to benefit from skilled outpatient physical therapy to address the deficits listed in the problem list box on initial evaluation, provide pt/family education and to maximize pt's level of independence in the home and community environment.     Pt's spiritual, cultural and educational needs considered and pt agreeable to plan of care and goals.     Anticipated barriers to physical therapy: none    Goals:  Short Term = Long Term Goals: 6 weeks   1.  Patient will demonstrate full R knee ROM with OP without pain demonstrating normal knee joint ROM for daily activities. (progressing, not met)  2.  Patient will demonstrate SLR x 20 reps without quad lag demonstrating improved functional RLE strength for gait activities.  (progressing, not met)  3.  Patient will report <2/10 R knee pain at worst throughout her week.  (progressing, not met)  4.  Patient will report ability to complete 3 miles of walking on TM without onset/increase " R knee pain.  (progressing, not met)  5.  Patient will report <45% limitation on Knee FOTO survey.  (progressing, not met)  6.  Patient will demonstrate to PT comprehensive understanding of each HEP component without verbal cueing.  (progressing, not met)    Plan     Advance PT as per POC  Marlo Shepard, RISA

## 2020-08-14 ENCOUNTER — CLINICAL SUPPORT (OUTPATIENT)
Dept: REHABILITATION | Facility: HOSPITAL | Age: 75
End: 2020-08-14
Payer: MEDICARE

## 2020-08-14 DIAGNOSIS — M25.661 DECREASED ROM OF RIGHT KNEE: ICD-10-CM

## 2020-08-14 DIAGNOSIS — R29.898 DECREASED STRENGTH OF LOWER EXTREMITY: ICD-10-CM

## 2020-08-14 DIAGNOSIS — R26.89 ANTALGIC GAIT: ICD-10-CM

## 2020-08-14 PROCEDURE — 97110 THERAPEUTIC EXERCISES: CPT | Mod: HCNC,PN

## 2020-08-14 PROCEDURE — 97140 MANUAL THERAPY 1/> REGIONS: CPT | Mod: HCNC,PN

## 2020-08-17 NOTE — PROGRESS NOTES
"  Physical Therapy Treatment Note     Name: Grace Ontiveros  Clinic Number: [id number]    Therapy Diagnosis:   Encounter Diagnoses   Name Primary?    Decreased ROM of right knee     Antalgic gait     Decreased strength of lower extremity      Physician: Lisa Dean MD    Visit Date: 8/14/2020    Physician Orders: PT Eval and Treat   Medical Diagnosis: M25.561 (ICD-10-CM) - Acute pain of right knee  Evaluation Date: 7/31/2020  Authorization Period Expiration: 12/31/2020    Plan of Care Certification Period: 7/31/2020 to 09/18/2020  Visit #/Visits authorized: 5/24  FOTO: 5/5 NEXT    Time In: 1545  Time Out: 1615  Total Billable Time: 45 minutes (2TE, 1 manual)  Precautions: Standard    Subjective     Pt reports: voices soreness in her right knee.  She has returned to walking 1 mile at the gym.   She will be compliant with home exercise program. (received HEP today)  Response to previous treatment:  Increased R knee soreness.   Functional change: practicng going up and down stairs    Pain: 5/10  Location: medial aspect of R patella    Objective     Grace received therapeutic exercises to develop strength, endurance, ROM and flexibility for 30 minutes including:  -- NuStep     Level 2.0; 5 min  -- Quad sets     x20 with 3" holds (R only)  -- SLR      2x10  -- Heel slides with strap   x20 (R only) NOT TODAY  -- SL clamshells    2x15 (bilateral)  -- Hip ADD squeezes with ball  20x 5' holds  -- Gastroc stretch (Fitter)   3x30" (bilateral)  -- leg press    3x10 at 4 plates  - mini-squats    2x10     Not today:  -- Stair negotiation 3 bouts x4 steps (cues for step to pattern because of pain) (not performed)  -- knee flexion stretch on stair case 20"x3 R  --sit to stands from high/low mat x10 high, x10 low    Grace received the following manual therapy techniques: Joint mobilizations were applied to the: R knee for 15 minutes, including:  -- Multi-direction patellar glides (Grade II-III)  --STM along knee joint " line  -- R knee passive physiological knee flexion and extension   -- Tibiofemoral mobs (including rotational components) to promote knee flexion and extension (Grade II-III)     Home Exercises Provided and Patient Education Provided     Education provided:   -Continue HEP  -Practice Sit to stands from low surfaces    Written Home Exercises Provided: yes.  Exercises were reviewed and Grace was able to demonstrate them prior to the end of the session.  Grace demonstrated good  understanding of the education provided.     See EMR under Patient Instructions for exercises provided 8/3/2020.    Assessment   B anterior knee pain.  Improving with PT.  Graded return to walking program at her local gym.      Grace is progressing well towards her goals.   Pt prognosis is Excellent.     Pt will continue to benefit from skilled outpatient physical therapy to address the deficits listed in the problem list box on initial evaluation, provide pt/family education and to maximize pt's level of independence in the home and community environment.     Pt's spiritual, cultural and educational needs considered and pt agreeable to plan of care and goals.     Anticipated barriers to physical therapy: none    Goals:  Short Term = Long Term Goals: 6 weeks   1.  Patient will demonstrate full R knee ROM with OP without pain demonstrating normal knee joint ROM for daily activities. (progressing, not met)  2.  Patient will demonstrate SLR x 20 reps without quad lag demonstrating improved functional RLE strength for gait activities.  (progressing, not met)  3.  Patient will report <2/10 R knee pain at worst throughout her week.  (progressing, not met)  4.  Patient will report ability to complete 3 miles of walking on TM without onset/increase R knee pain.  (progressing, not met)  5.  Patient will report <45% limitation on Knee FOTO survey.  (progressing, not met)  6.  Patient will demonstrate to PT comprehensive understanding of each HEP  component without verbal cueing.  (progressing, not met)    Plan     Advance PT as per POC    Chip Shahid, PT

## 2020-08-19 ENCOUNTER — CLINICAL SUPPORT (OUTPATIENT)
Dept: REHABILITATION | Facility: HOSPITAL | Age: 75
End: 2020-08-19
Payer: MEDICARE

## 2020-08-19 DIAGNOSIS — R29.898 DECREASED STRENGTH OF LOWER EXTREMITY: ICD-10-CM

## 2020-08-19 DIAGNOSIS — M25.661 DECREASED ROM OF RIGHT KNEE: ICD-10-CM

## 2020-08-19 DIAGNOSIS — R26.89 ANTALGIC GAIT: ICD-10-CM

## 2020-08-19 PROCEDURE — 97140 MANUAL THERAPY 1/> REGIONS: CPT | Mod: HCNC,PN

## 2020-08-19 PROCEDURE — 97110 THERAPEUTIC EXERCISES: CPT | Mod: HCNC,PN

## 2020-08-19 NOTE — PROGRESS NOTES
"  Physical Therapy Treatment Note     Name: Grace Ontiveros  Clinic Number: [id number]    Therapy Diagnosis:   Encounter Diagnoses   Name Primary?    Decreased ROM of right knee     Antalgic gait     Decreased strength of lower extremity      Physician: Lisa Dean MD    Visit Date: 8/19/2020    Physician Orders: PT Eval and Treat   Medical Diagnosis: M25.561 (ICD-10-CM) - Acute pain of right knee  Evaluation Date: 7/31/2020  Authorization Period Expiration: 12/31/2020    Plan of Care Certification Period: 7/31/2020 to 09/18/2020  Visit #/Visits authorized: 6/24  FOTO: done today 08/19/2020    Time In: 1415  Time Out: 1500  Total Billable Time: 45 minutes (2TE, 1 manual)  Precautions: Standard    Subjective     Pt reports: that she can tell her right knee pain is getting much better.  Still mild pain with going from sitting to standing.   She will be compliant with home exercise program. (received HEP today)  Response to previous treatment:  Increased R knee soreness.   Functional change: practicng going up and down stairs    Pain: 4/10  Location: medial aspect of R patella    Objective     Grace received therapeutic exercises to develop strength, endurance, ROM and flexibility for 30 minutes including:  -- NuStep     Level 2.0; 5 min  -- Quad sets     x10 with 10" holds (R only)  -- SLR      3x10  --bridges    2x10    -- Heel slides with strap   x20 (R only) NOT TODAY  -- SL hip abduction   2x15 (bilateral)  -- Hip ADD squeezes with ball  20x 5' holds  -- Gastroc stretch (Fitter)   3x30" (bilateral)  -- leg press    3x10 at 4 plates  - mini-squats    2x10     Not today:  -- Stair negotiation 3 bouts x4 steps (cues for step to pattern because of pain) (not performed)  -- knee flexion stretch on stair case 20"x3 R  --sit to stands from high/low mat x10 high, x10 low    Grace received the following manual therapy techniques: Joint mobilizations were applied to the: R knee for 15 minutes, including:  -- " Multi-direction patellar glides (Grade II-III)  -- R knee passive physiological knee flexion and extension   -- Tibiofemoral mobs (including rotational components) to promote knee flexion and extension (Grade II-III)   - hip flexor stretching R  - hip adductor stretching R    Home Exercises Provided and Patient Education Provided   Education provided:   -Continue HEP  -Practice Sit to stands from low surfaces    Written Home Exercises Provided: yes.  Exercises were reviewed and Grace was able to demonstrate them prior to the end of the session.  Grace demonstrated good  understanding of the education provided.     See EMR under Patient Instructions for exercises provided 8/3/2020.    Assessment   B anterior knee pain.  Improving with PT.  Graded return to walking program at her local gym.  67% limitation on Knee FOTO survey (15% improvement).    Grace is progressing well towards her goals.   Pt prognosis is Excellent.     Pt will continue to benefit from skilled outpatient physical therapy to address the deficits listed in the problem list box on initial evaluation, provide pt/family education and to maximize pt's level of independence in the home and community environment.     Pt's spiritual, cultural and educational needs considered and pt agreeable to plan of care and goals.     Anticipated barriers to physical therapy: none    Goals:  Short Term = Long Term Goals: 6 weeks   1.  Patient will demonstrate full R knee ROM with OP without pain demonstrating normal knee joint ROM for daily activities. (progressing, not met)  2.  Patient will demonstrate SLR x 20 reps without quad lag demonstrating improved functional RLE strength for gait activities.  (progressing, not met)  3.  Patient will report <2/10 R knee pain at worst throughout her week.  (progressing, not met)  4.  Patient will report ability to complete 3 miles of walking on TM without onset/increase R knee pain.  (progressing, not met)  5.  Patient  will report <45% limitation on Knee FOTO survey.  (progressing, not met)  6.  Patient will demonstrate to PT comprehensive understanding of each HEP component without verbal cueing.  (progressing, not met)    Plan     Advance PT as per POC    Chip Shahid, PT

## 2020-08-21 ENCOUNTER — CLINICAL SUPPORT (OUTPATIENT)
Dept: REHABILITATION | Facility: HOSPITAL | Age: 75
End: 2020-08-21
Payer: MEDICARE

## 2020-08-21 DIAGNOSIS — R29.898 DECREASED STRENGTH OF LOWER EXTREMITY: ICD-10-CM

## 2020-08-21 DIAGNOSIS — R26.89 ANTALGIC GAIT: ICD-10-CM

## 2020-08-21 DIAGNOSIS — M25.661 DECREASED ROM OF RIGHT KNEE: ICD-10-CM

## 2020-08-21 PROCEDURE — 97110 THERAPEUTIC EXERCISES: CPT | Mod: HCNC,PN

## 2020-08-21 PROCEDURE — 97140 MANUAL THERAPY 1/> REGIONS: CPT | Mod: HCNC,PN

## 2020-08-24 NOTE — PROGRESS NOTES
"  Physical Therapy Treatment Note     Name: Grace Ontiveros  Clinic Number: [id number]    Therapy Diagnosis:   Encounter Diagnoses   Name Primary?    Decreased ROM of right knee     Antalgic gait     Decreased strength of lower extremity      Physician: Lisa Dean MD    Visit Date: 8/21/2020    Physician Orders: PT Eval and Treat   Medical Diagnosis: M25.561 (ICD-10-CM) - Acute pain of right knee  Evaluation Date: 7/31/2020  Authorization Period Expiration: 12/31/2020    Plan of Care Certification Period: 7/31/2020 to 09/18/2020  Visit #/Visits authorized: 6/24  FOTO: done today 08/19/2020    Time In: 1415  Time Out: 1500  Total Billable Time: 45 minutes (2TE, 1 manual)  Precautions: Standard    Subjective     Pt reports: that she can tell her right knee pain is getting much better.  Still mild pain with going from sitting to standing.   She will be compliant with home exercise program. (received HEP today)  Response to previous treatment:  Increased R knee soreness.   Functional change: practicng going up and down stairs    Pain: 4/10  Location: medial aspect of R patella    Objective     Grace received therapeutic exercises to develop strength, endurance, ROM and flexibility for 30 minutes including:  -- NuStep     Level 2.0; 5 min  -- Quad sets     x10 with 10" holds (R only)  -- SLR      3x10  -- bridges    2x10  -- SL hip abduction   2x15 (bilateral)  -- Gastroc stretch (Fitter)   3x30" (bilateral)  -- leg press    3x10 at 4 plates  -- mini-squats    2x10       Grace received the following manual therapy techniques: Joint mobilizations were applied to the: R knee for 15 minutes, including:  -- Multi-direction patellar glides (Grade II-III)  -- R knee passive physiological knee flexion and extension   -- Tibiofemoral mobs (including rotational components) to promote knee flexion and extension (Grade II-III)   -- hip flexor stretching R  -- hip adductor stretching R    Home Exercises Provided and " Patient Education Provided   Education provided:   -Continue HEP  -Practice Sit to stands from low surfaces    Written Home Exercises Provided: yes.  Exercises were reviewed and Grace was able to demonstrate them prior to the end of the session.  Grace demonstrated good  understanding of the education provided.     See EMR under Patient Instructions for exercises provided 8/3/2020.    Assessment   A:  Patient able to tolerate all interventions today.  Tight hip flexors; history of lumbar surgery.  Noted B knee OA with full ROM.  Needs to continue to strengthen knee extensors and posterolateral hip musculature.     Grace is progressing well towards her goals.   Pt prognosis is Excellent.     Pt will continue to benefit from skilled outpatient physical therapy to address the deficits listed in the problem list box on initial evaluation, provide pt/family education and to maximize pt's level of independence in the home and community environment.     Pt's spiritual, cultural and educational needs considered and pt agreeable to plan of care and goals.     Anticipated barriers to physical therapy: none    Goals:  Short Term = Long Term Goals: 6 weeks   1.  Patient will demonstrate full R knee ROM with OP without pain demonstrating normal knee joint ROM for daily activities. (progressing, not met)  2.  Patient will demonstrate SLR x 20 reps without quad lag demonstrating improved functional RLE strength for gait activities.  (progressing, not met)  3.  Patient will report <2/10 R knee pain at worst throughout her week.  (progressing, not met)  4.  Patient will report ability to complete 3 miles of walking on TM without onset/increase R knee pain.  (progressing, not met)  5.  Patient will report <45% limitation on Knee FOTO survey.  (progressing, not met)  6.  Patient will demonstrate to PT comprehensive understanding of each HEP component without verbal cueing.  (progressing, not met)    Plan     Advance PT as per  POC    Chip Shahid, PT

## 2020-08-25 ENCOUNTER — CLINICAL SUPPORT (OUTPATIENT)
Dept: REHABILITATION | Facility: HOSPITAL | Age: 75
End: 2020-08-25
Payer: MEDICARE

## 2020-08-25 DIAGNOSIS — R26.89 ANTALGIC GAIT: ICD-10-CM

## 2020-08-25 DIAGNOSIS — R29.898 DECREASED STRENGTH OF LOWER EXTREMITY: ICD-10-CM

## 2020-08-25 DIAGNOSIS — M25.661 DECREASED ROM OF RIGHT KNEE: ICD-10-CM

## 2020-08-25 PROCEDURE — 97110 THERAPEUTIC EXERCISES: CPT | Mod: HCNC,PN

## 2020-08-25 PROCEDURE — 97140 MANUAL THERAPY 1/> REGIONS: CPT | Mod: HCNC,PN

## 2020-08-25 NOTE — PROGRESS NOTES
"  Physical Therapy Treatment Note     Name: Grace Ontiveros  Clinic Number: [id number]    Therapy Diagnosis:   Encounter Diagnoses   Name Primary?    Decreased ROM of right knee     Antalgic gait     Decreased strength of lower extremity      Physician: Lisa Dean MD    Visit Date: 8/25/2020    Physician Orders: PT Eval and Treat   Medical Diagnosis: M25.561 (ICD-10-CM) - Acute pain of right knee  Evaluation Date: 7/31/2020  Authorization Period Expiration: 12/31/2020    Plan of Care Certification Period: 7/31/2020 to 09/18/2020  Visit #/Visits authorized: 7/24  FOTO: at dc    Time In: 1200  Time Out: 1245  Total Billable Time: 45 minutes (2TE, 1 manual)  Precautions: Standard    Subjective     Pt reports: that she can tell her right knee pain is getting much better; more L knee pain as of late.  Worse with performing sit-to-stand movements and with walking.   She will be compliant with home exercise program.  Response to previous treatment:  No adverse effects.   Functional change: practicng going up and down stairs    Pain: 4/10  Location: medial aspect of R patella    Objective     Grace received therapeutic exercises to develop strength, endurance, ROM and flexibility for 30 minutes including:  --bike     Level 2.0; 5 min  -- SLR      3x10 1.5# ankle weight  -- bridges    3x10  -- SL hip abduction   2x10 1.5# ankle weight  -- Gastroc stretch (Fitter)   3x30" (bilateral)  -- leg press    3x10 at 4.5 plates  -- mini-squats    2x10   -- steamboats    2x10 B RTB  -- Fitter     5x30 seconds DL        Grace received the following manual therapy techniques: Joint mobilizations were applied to the: R knee for 15 minutes, including:  -- Multi-direction patellar glides (Grade II-III)  -- R knee passive physiological knee flexion and extension   -- Tibiofemoral mobs (including rotational components) to promote knee flexion and extension (Grade II-III)   -- hip flexor stretching R  -- hip adductor stretching " R    Home Exercises Provided and Patient Education Provided   Education provided:   -Continue HEP  -Practice Sit to stands from low surfaces    Written Home Exercises Provided: yes.  Exercises were reviewed and Grace was able to demonstrate them prior to the end of the session.  Grace demonstrated good  understanding of the education provided.     See EMR under Patient Instructions for exercises provided 8/3/2020.    Assessment   A:  Patient able to tolerate all interventions today.  Tight hip flexors; history of lumbar surgery.  Noted B knee OA with full ROM.  Needs to continue to strengthen knee extensors and posterolateral hip musculature.  Improved L knee pain following manual therapy today.     Grace is progressing well towards her goals.   Pt prognosis is Excellent.     Pt will continue to benefit from skilled outpatient physical therapy to address the deficits listed in the problem list box on initial evaluation, provide pt/family education and to maximize pt's level of independence in the home and community environment.     Pt's spiritual, cultural and educational needs considered and pt agreeable to plan of care and goals.     Anticipated barriers to physical therapy: none    Goals:  Short Term = Long Term Goals: 6 weeks   1.  Patient will demonstrate full R knee ROM with OP without pain demonstrating normal knee joint ROM for daily activities. (progressing, not met)  2.  Patient will demonstrate SLR x 20 reps without quad lag demonstrating improved functional RLE strength for gait activities.  (progressing, not met)  3.  Patient will report <2/10 R knee pain at worst throughout her week.  (progressing, not met)  4.  Patient will report ability to complete 3 miles of walking on TM without onset/increase R knee pain.  (progressing, not met)  5.  Patient will report <45% limitation on Knee FOTO survey.  (progressing, not met)  6.  Patient will demonstrate to PT comprehensive understanding of each HEP  component without verbal cueing.  (progressing, not met)    Plan     Advance PT as per POC    Chip Shahid, PT

## 2020-08-27 ENCOUNTER — CLINICAL SUPPORT (OUTPATIENT)
Dept: REHABILITATION | Facility: HOSPITAL | Age: 75
End: 2020-08-27
Payer: MEDICARE

## 2020-08-27 DIAGNOSIS — R29.898 DECREASED STRENGTH OF LOWER EXTREMITY: ICD-10-CM

## 2020-08-27 DIAGNOSIS — M25.661 DECREASED ROM OF RIGHT KNEE: ICD-10-CM

## 2020-08-27 DIAGNOSIS — R26.89 ANTALGIC GAIT: ICD-10-CM

## 2020-08-27 PROCEDURE — 97110 THERAPEUTIC EXERCISES: CPT | Mod: HCNC,PN

## 2020-08-27 PROCEDURE — 97140 MANUAL THERAPY 1/> REGIONS: CPT | Mod: HCNC,PN

## 2020-08-27 NOTE — PROGRESS NOTES
"  Physical Therapy Treatment Note     Name: Grace Ontiveros  Clinic Number: [id number]    Therapy Diagnosis:   Encounter Diagnoses   Name Primary?    Decreased ROM of right knee     Antalgic gait     Decreased strength of lower extremity      Physician: Lisa Dean MD    Visit Date: 8/27/2020    Physician Orders: PT Eval and Treat   Medical Diagnosis: M25.561 (ICD-10-CM) - Acute pain of right knee  Evaluation Date: 7/31/2020  Authorization Period Expiration: 12/31/2020    Plan of Care Certification Period: 7/31/2020 to 09/18/2020  Visit #/Visits authorized: 8/24  FOTO: at dc    Time In: 1200  Time Out: 1245  Total Billable Time: 45 minutes (1 TE, 2 MT)  Precautions: Standard    Subjective     Pt reports: new onset L medial ankle pain today.  Woke up two days ago with pain in her ankle; no YANN.   She will be compliant with home exercise program.  Response to previous treatment:  No adverse effects.   Functional change: practicng going up and down stairs    Pain: 7/10  Location: medial L ankle/foot; worse with weight bearing.     Objective   O:  TTP over navicular tuberosity.  Pain with tibialis posterior MMT.  Antalgic gait pattern         Normal knee clearing test B.     Grace received therapeutic exercises to develop strength, endurance, ROM and flexibility for 20 minutes including:  --bike     Level 2.0; 5 min  -- SLR      3x10 1.5# ankle weight  -- bridges    3x10  -- SL hip abduction   2x10 1.5# ankle weight      Held today.   -- Gastroc stretch (Fitter)   3x30" (bilateral)  -- leg press    3x10 at 4.5 plates  -- mini-squats    2x10   -- steamboats    2x10 B RTB  -- Fitter     5x30 seconds DL        Grace received the following manual therapy techniques: Joint mobilizations were applied to the: R knee for 25 minutes, including:  -- Multi-direction patellar glides (Grade II-III)  -- R knee passive physiological knee flexion and extension   -- Tibiofemoral mobs (including rotational components) to " promote knee flexion and extension (Grade II-III)   -- hip flexor stretching R  -- hip adductor stretching R  -- IASTM medial ankle soft tissue  - talonavicular joint mobs in LP position  - trial of Barrett anti-pronation taping.     Home Exercises Provided and Patient Education Provided   Education provided:   -Continue HEP  -Practice Sit to stands from low surfaces    Written Home Exercises Provided: yes.  Exercises were reviewed and Grace was able to demonstrate them prior to the end of the session.  Grace demonstrated good  understanding of the education provided.     See EMR under Patient Instructions for exercises provided 8/3/2020.    Assessment   A:  Was planning to DC today for B knee rehab; much improved with only mild pain with initial sit-to-stand.  Medial ankle pain; new onset.  Mild irritability.  Hold DC today. Additional visit.     Grace is progressing well towards her goals.   Pt prognosis is Excellent.     Pt will continue to benefit from skilled outpatient physical therapy to address the deficits listed in the problem list box on initial evaluation, provide pt/family education and to maximize pt's level of independence in the home and community environment.   Pt's spiritual, cultural and educational needs considered and pt agreeable to plan of care and goals.     Anticipated barriers to physical therapy: none    Goals:  Short Term = Long Term Goals: 6 weeks   1.  Patient will demonstrate full R knee ROM with OP without pain demonstrating normal knee joint ROM for daily activities. (progressing, not met)  2.  Patient will demonstrate SLR x 20 reps without quad lag demonstrating improved functional RLE strength for gait activities.  (progressing, not met)  3.  Patient will report <2/10 R knee pain at worst throughout her week.  (progressing, not met)  4.  Patient will report ability to complete 3 miles of walking on TM without onset/increase R knee pain.  (progressing, not met)  5.   Patient will report <45% limitation on Knee FOTO survey.  (progressing, not met)  6.  Patient will demonstrate to PT comprehensive understanding of each HEP component without verbal cueing.  (progressing, not met)    Plan     Advance PT as per POC    Chip Shahid, PT

## 2020-09-02 ENCOUNTER — PATIENT OUTREACH (OUTPATIENT)
Dept: ADMINISTRATIVE | Facility: HOSPITAL | Age: 75
End: 2020-09-02

## 2020-09-02 ENCOUNTER — DOCUMENTATION ONLY (OUTPATIENT)
Dept: ADMINISTRATIVE | Facility: HOSPITAL | Age: 75
End: 2020-09-02

## 2020-09-02 ENCOUNTER — IMMUNIZATION (OUTPATIENT)
Dept: PHARMACY | Facility: CLINIC | Age: 75
End: 2020-09-02
Payer: MEDICARE

## 2020-09-02 ENCOUNTER — OFFICE VISIT (OUTPATIENT)
Dept: INTERNAL MEDICINE | Facility: CLINIC | Age: 75
End: 2020-09-02
Payer: MEDICARE

## 2020-09-02 VITALS
TEMPERATURE: 98 F | HEART RATE: 73 BPM | WEIGHT: 151.69 LBS | DIASTOLIC BLOOD PRESSURE: 70 MMHG | BODY MASS INDEX: 25.9 KG/M2 | HEIGHT: 64 IN | RESPIRATION RATE: 16 BRPM | OXYGEN SATURATION: 98 % | SYSTOLIC BLOOD PRESSURE: 138 MMHG

## 2020-09-02 DIAGNOSIS — M25.50 POLYARTHRALGIA: Primary | ICD-10-CM

## 2020-09-02 DIAGNOSIS — Z12.11 ENCOUNTER FOR FIT (FECAL IMMUNOCHEMICAL TEST) SCREENING: ICD-10-CM

## 2020-09-02 DIAGNOSIS — M25.562 CHRONIC PAIN OF BOTH KNEES: ICD-10-CM

## 2020-09-02 DIAGNOSIS — G89.29 CHRONIC PAIN OF BOTH KNEES: ICD-10-CM

## 2020-09-02 DIAGNOSIS — M19.012 PRIMARY OSTEOARTHRITIS OF LEFT SHOULDER: ICD-10-CM

## 2020-09-02 DIAGNOSIS — E78.5 HYPERLIPIDEMIA, UNSPECIFIED HYPERLIPIDEMIA TYPE: ICD-10-CM

## 2020-09-02 DIAGNOSIS — M25.561 CHRONIC PAIN OF BOTH KNEES: ICD-10-CM

## 2020-09-02 DIAGNOSIS — I10 BENIGN ESSENTIAL HYPERTENSION: ICD-10-CM

## 2020-09-02 DIAGNOSIS — R09.89 LEFT CAROTID BRUIT: ICD-10-CM

## 2020-09-02 PROCEDURE — 1101F PT FALLS ASSESS-DOCD LE1/YR: CPT | Mod: HCNC,CPTII,S$GLB, | Performed by: INTERNAL MEDICINE

## 2020-09-02 PROCEDURE — 99999 PR PBB SHADOW E&M-EST. PATIENT-LVL V: ICD-10-PCS | Mod: PBBFAC,HCNC,, | Performed by: INTERNAL MEDICINE

## 2020-09-02 PROCEDURE — 3008F PR BODY MASS INDEX (BMI) DOCUMENTED: ICD-10-PCS | Mod: HCNC,CPTII,S$GLB, | Performed by: INTERNAL MEDICINE

## 2020-09-02 PROCEDURE — 3078F PR MOST RECENT DIASTOLIC BLOOD PRESSURE < 80 MM HG: ICD-10-PCS | Mod: HCNC,CPTII,S$GLB, | Performed by: INTERNAL MEDICINE

## 2020-09-02 PROCEDURE — 3075F SYST BP GE 130 - 139MM HG: CPT | Mod: HCNC,CPTII,S$GLB, | Performed by: INTERNAL MEDICINE

## 2020-09-02 PROCEDURE — 99214 PR OFFICE/OUTPT VISIT, EST, LEVL IV, 30-39 MIN: ICD-10-PCS | Mod: HCNC,S$GLB,, | Performed by: INTERNAL MEDICINE

## 2020-09-02 PROCEDURE — 3075F PR MOST RECENT SYSTOLIC BLOOD PRESS GE 130-139MM HG: ICD-10-PCS | Mod: HCNC,CPTII,S$GLB, | Performed by: INTERNAL MEDICINE

## 2020-09-02 PROCEDURE — 3008F BODY MASS INDEX DOCD: CPT | Mod: HCNC,CPTII,S$GLB, | Performed by: INTERNAL MEDICINE

## 2020-09-02 PROCEDURE — 1159F PR MEDICATION LIST DOCUMENTED IN MEDICAL RECORD: ICD-10-PCS | Mod: HCNC,S$GLB,, | Performed by: INTERNAL MEDICINE

## 2020-09-02 PROCEDURE — 1101F PR PT FALLS ASSESS DOC 0-1 FALLS W/OUT INJ PAST YR: ICD-10-PCS | Mod: HCNC,CPTII,S$GLB, | Performed by: INTERNAL MEDICINE

## 2020-09-02 PROCEDURE — 3078F DIAST BP <80 MM HG: CPT | Mod: HCNC,CPTII,S$GLB, | Performed by: INTERNAL MEDICINE

## 2020-09-02 PROCEDURE — 99999 PR PBB SHADOW E&M-EST. PATIENT-LVL V: CPT | Mod: PBBFAC,HCNC,, | Performed by: INTERNAL MEDICINE

## 2020-09-02 PROCEDURE — 1159F MED LIST DOCD IN RCRD: CPT | Mod: HCNC,S$GLB,, | Performed by: INTERNAL MEDICINE

## 2020-09-02 PROCEDURE — 99214 OFFICE O/P EST MOD 30 MIN: CPT | Mod: HCNC,S$GLB,, | Performed by: INTERNAL MEDICINE

## 2020-09-02 RX ORDER — ROSUVASTATIN CALCIUM 20 MG/1
20 TABLET, COATED ORAL NIGHTLY
Qty: 90 TABLET | Refills: 3 | Status: SHIPPED | OUTPATIENT
Start: 2020-09-02 | End: 2020-09-02 | Stop reason: SDUPTHER

## 2020-09-02 RX ORDER — DICLOFENAC SODIUM 10 MG/G
2 GEL TOPICAL DAILY
Qty: 100 G | Refills: 0 | Status: SHIPPED | OUTPATIENT
Start: 2020-09-02 | End: 2020-09-02 | Stop reason: SDUPTHER

## 2020-09-02 RX ORDER — DICLOFENAC SODIUM 10 MG/G
2 GEL TOPICAL DAILY
Qty: 100 G | Refills: 0 | Status: SHIPPED | OUTPATIENT
Start: 2020-09-02 | End: 2021-01-22 | Stop reason: SDUPTHER

## 2020-09-02 RX ORDER — ROSUVASTATIN CALCIUM 20 MG/1
20 TABLET, COATED ORAL NIGHTLY
Qty: 90 TABLET | Refills: 3 | Status: SHIPPED | OUTPATIENT
Start: 2020-09-02 | End: 2021-09-30

## 2020-09-02 NOTE — PROGRESS NOTES
"Subjective:       Patient ID: Grace Navarro is a 74 y.o. female.    Chief Complaint: knee pain follow up    HPI   Was seen 7/30 for R knee pain. S/p PT.   XR knee B 7/30/20 - bone spurs on the patella B. Mild DJD. L knee mild varus deformity.   Here for follow up.   Reports the R knee pain is improved but the L knee is starting to hurt her.   Still doing exercises at home.     Also c/o B shoulder pain, especially the L. Xr done 2/12/20 - OA at L shoulder. Feels sometimes like there is some swelling in the L shoulder.  Also w/ LBP - intermittently  Sometimes w/ pain in feet also. No swelling.   Wonders if she has more scary arthritis other than OA. No joint stiffness. Not worse any times of the day.   All these pains are chronic.   Takes tylenol arthritis 2 pills twice daily. Helps w/ pain.     HLD - ASCVD score of 18.2%. previously on pravastatin but pt stopped on own b/c she was afraid of potential liver damage. Dad had h/o stroke and so pt is concerned. Wonders if she needs calcium CT score and carotid US.     HTN - toprol xl 100mg daily and hctz 12.5mg daily.   Checks BP at home. Using [employer] BP cuff. Usually 130s.   Did not like digital HTN program.     Wants shingrix.   Due for FITKIT.     Review of Systems  Comprehensive review of systems otherwise negative. See history/subjective section for more details.    Objective:      Physical Exam    /70 (BP Location: Left arm, Patient Position: Sitting)   Pulse 73   Temp 97.7 °F (36.5 °C) (Oral)   Resp 16   Ht 5' 4" (1.626 m)   Wt 68.8 kg (151 lb 10.8 oz)   LMP 06/14/1996   SpO2 98%   BMI 26.04 kg/m²     Gen - A+OX4, NAD  HEENT - PERRL, OP clear.   Neck - L carotid base bruit.   CV - RRR, no m/r  Chest - CTAB, no wheezing/rhonchi/crackles.   Abd - S/NT/ND/+BS  Ext -2 + B DP and radial pulses. No LE edema.   MSK - pain on palpation of the L med and lat joint lines. Pain on int and ext rotation of the L knee. Neg ant and post drawer signs. Pain " on abduction of L shoulder about 150 degrees but able to raise up to 180 degrees.   Skin - no rash.     Previous labs reviewed.     Assessment/Plan     Diagnoses and all orders for this visit:    Polyarthralgia  -     Sedimentation rate; Future  -     C-reactive protein; Future  -     LUDWIN Screen w/Reflex; Future  -     Cyclic Citrullinated Peptide Antibody, IgG; Future  -     Rheumatoid factor; Future  -     Discontinue: diclofenac sodium (VOLTAREN) 1 % Gel; Apply 2 g topically once daily.  -     diclofenac sodium (VOLTAREN) 1 % Gel; Apply 2 g topically once daily.    Chronic pain of both knees  -     Ambulatory referral to Orthopedics; Future  -     Discontinue: diclofenac sodium (VOLTAREN) 1 % Gel; Apply 2 g topically once daily.  -     diclofenac sodium (VOLTAREN) 1 % Gel; Apply 2 g topically once daily.    Hyperlipidemia, unspecified hyperlipidemia type - discussed about ASCVD score and that based on the score, she should be on cholesterol medicine regardless of carotid US or CT cardiac scoring. Pt to check w/ her ins whether or not CT cardiac scoring is covered by insurance. Previously on pravastatin but pt reports think she had an adverse reaction to it. Discussed that per my note in Feb pt stopped it as she was afraid it may cause liver problems. Will Rx crestor.   -     Lipid Panel; Future  -     CT Cardiac Scoring; Future  -     rosuvastatin (CRESTOR) 20 MG tablet; Take 1 tablet (20 mg total) by mouth every evening.    Primary osteoarthritis of left shoulder  -     Ambulatory referral to Orthopedics; Future    Encounter for FIT (fecal immunochemical test) screening  -     Fecal Immunochemical Test (iFOBT); Future    Benign essential hypertension - Stable and controlled. Continue current medications.    Left carotid bruit  -     US Carotid Bilateral; Future  -     rosuvastatin (CRESTOR) 20 MG tablet; Take 1 tablet (20 mg total) by mouth every evening.    Pt will ask about shingrix at local pharmacy.      Follow up in about 4 months (around 1/2/2021).      Lisa Dean MD  Department of Internal Medicine - [facility]  7:08 AM

## 2020-09-03 ENCOUNTER — TELEPHONE (OUTPATIENT)
Dept: INTERNAL MEDICINE | Facility: CLINIC | Age: 75
End: 2020-09-03

## 2020-09-03 ENCOUNTER — HOSPITAL ENCOUNTER (OUTPATIENT)
Dept: RADIOLOGY | Facility: HOSPITAL | Age: 75
Discharge: HOME | End: 2020-09-03
Attending: INTERNAL MEDICINE
Payer: MEDICARE

## 2020-09-03 DIAGNOSIS — R76.8 RHEUMATOID FACTOR POSITIVE: Primary | ICD-10-CM

## 2020-09-03 DIAGNOSIS — M25.50 POLYARTHRALGIA: ICD-10-CM

## 2020-09-03 DIAGNOSIS — R09.89 LEFT CAROTID BRUIT: ICD-10-CM

## 2020-09-03 PROCEDURE — 93880 EXTRACRANIAL BILAT STUDY: CPT | Mod: TC,HCNC

## 2020-09-03 PROCEDURE — 93880 US CAROTID BILATERAL: ICD-10-PCS | Mod: 26,HCNC,, | Performed by: RADIOLOGY

## 2020-09-03 PROCEDURE — 93880 EXTRACRANIAL BILAT STUDY: CPT | Mod: 26,HCNC,, | Performed by: RADIOLOGY

## 2020-09-03 NOTE — TELEPHONE ENCOUNTER
Please call and notify pt:    Cholesterol is still high. Start crestor as discussed.   Arthritis screening test came back positive for rheumatoid factor, which can be a sign of rheumatoid arthritis. Will refer her to rheumatology due to her joint pains and lab.

## 2020-09-04 NOTE — TELEPHONE ENCOUNTER
Pt informed of all results, understanding. Attempted to schedule rheumatology appt. Pt transferred to rheumatology dept to schedule because pt stated she wanted a female doctor.

## 2020-09-09 ENCOUNTER — TELEPHONE (OUTPATIENT)
Dept: INTERNAL MEDICINE | Facility: CLINIC | Age: 75
End: 2020-09-09

## 2020-09-09 NOTE — TELEPHONE ENCOUNTER
----- Message from Gwendolyn Lew sent at 9/9/2020  8:15 AM CDT -----  Contact: Scot with [employer]  ext [phone]  Scot states there is an order for an CT Cardiac Scoring. Scot states she needs the CPT code to see if an authorization is required for the procedure. Please call and advise.

## 2020-09-15 ENCOUNTER — TELEPHONE (OUTPATIENT)
Dept: INTERNAL MEDICINE | Facility: CLINIC | Age: 75
End: 2020-09-15

## 2020-09-15 ENCOUNTER — HOSPITAL ENCOUNTER (OUTPATIENT)
Dept: RADIOLOGY | Facility: HOSPITAL | Age: 75
Discharge: HOME | End: 2020-09-15
Attending: INTERNAL MEDICINE
Payer: MEDICARE

## 2020-09-15 DIAGNOSIS — E78.5 HYPERLIPIDEMIA, UNSPECIFIED HYPERLIPIDEMIA TYPE: ICD-10-CM

## 2020-09-15 PROCEDURE — 75571 CT CALCIUM SCORING CARDIAC: ICD-10-PCS | Mod: 26,HCNC,, | Performed by: RADIOLOGY

## 2020-09-15 PROCEDURE — 75571 CT HRT W/O DYE W/CA TEST: CPT | Mod: 26,HCNC,, | Performed by: RADIOLOGY

## 2020-09-15 PROCEDURE — 75571 CT HRT W/O DYE W/CA TEST: CPT | Mod: TC,HCNC

## 2020-09-18 ENCOUNTER — PATIENT MESSAGE (OUTPATIENT)
Dept: INTERNAL MEDICINE | Facility: CLINIC | Age: 75
End: 2020-09-18

## 2020-09-18 ENCOUNTER — NURSE TRIAGE (OUTPATIENT)
Dept: ADMINISTRATIVE | Facility: CLINIC | Age: 75
End: 2020-09-18

## 2020-09-18 NOTE — TELEPHONE ENCOUNTER
"Pt c/o L shoulder pain, pt states she was diagnosis with osteoarthritis, pt c/o 10/10, pt states that she currently has no relief with OTC. Pt states ice helps.  Pt advised per protocol and refused ED, pt requested appt for tomorrow morning.  Pt advised per protocol and verbalized understanding.    Reason for Disposition   [1] SEVERE pain AND [2] not improved 2 hours after pain medicine    Additional Information   Negative: Passed out (i.e., lost consciousness, collapsed and was not responding)   Negative: Shock suspected (e.g., cold/pale/clammy skin, too weak to stand, low BP, rapid pulse)   Negative: [1] Similar pain previously AND [2] it was from "heart attack"   Negative: [1] Similar pain previously AND [2] it was from "angina" AND [3] not relieved by nitroglycerin   Negative: Sounds like a life-threatening emergency to the triager   Negative: Difficulty breathing or unusual sweating (e.g., sweating without exertion)   Negative: [1] Pain lasting > 5 minutes AND [2] pain also present in chest  (Exception: pain is clearly made worse by movement)   Negative: [1] Age > 40 AND [2] no obvious cause AND [3] pain even when not moving the arm    (Exception: pain is clearly made worse by moving arm or bending neck)    Protocols used: SHOULDER PAIN-A-AH      "

## 2020-09-21 ENCOUNTER — TELEPHONE (OUTPATIENT)
Dept: RHEUMATOLOGY | Facility: CLINIC | Age: 75
End: 2020-09-21

## 2020-09-21 ENCOUNTER — PATIENT OUTREACH (OUTPATIENT)
Dept: ADMINISTRATIVE | Facility: OTHER | Age: 75
End: 2020-09-21

## 2020-09-21 NOTE — TELEPHONE ENCOUNTER
Spoke with the patient about being seen sooner for a earlier appt. She will be placed on a wait list as well

## 2020-09-21 NOTE — TELEPHONE ENCOUNTER
As per previous note, schedule pt for ortho eval. If unable to get appt either today or tomorrow, schedule UC visit.

## 2020-09-21 NOTE — TELEPHONE ENCOUNTER
She was supposed to f/u w/ orthopedics as discussed in clinic visit. Please schedule UC visit for eval.

## 2020-09-22 ENCOUNTER — PATIENT MESSAGE (OUTPATIENT)
Dept: ORTHOPEDICS | Facility: CLINIC | Age: 75
End: 2020-09-22

## 2020-09-22 ENCOUNTER — OFFICE VISIT (OUTPATIENT)
Dept: ORTHOPEDICS | Facility: CLINIC | Age: 75
End: 2020-09-22
Payer: MEDICARE

## 2020-09-22 VITALS
WEIGHT: 154.13 LBS | BODY MASS INDEX: 26.31 KG/M2 | DIASTOLIC BLOOD PRESSURE: 77 MMHG | HEART RATE: 65 BPM | HEIGHT: 64 IN | SYSTOLIC BLOOD PRESSURE: 163 MMHG

## 2020-09-22 DIAGNOSIS — M25.50 POLYARTHRALGIA: Primary | ICD-10-CM

## 2020-09-22 DIAGNOSIS — M19.012 PRIMARY OSTEOARTHRITIS OF LEFT SHOULDER: ICD-10-CM

## 2020-09-22 PROCEDURE — 1101F PT FALLS ASSESS-DOCD LE1/YR: CPT | Mod: HCNC,CPTII,S$GLB, | Performed by: PHYSICIAN ASSISTANT

## 2020-09-22 PROCEDURE — 99999 PR PBB SHADOW E&M-EST. PATIENT-LVL IV: CPT | Mod: PBBFAC,HCNC,, | Performed by: PHYSICIAN ASSISTANT

## 2020-09-22 PROCEDURE — 1159F MED LIST DOCD IN RCRD: CPT | Mod: HCNC,S$GLB,, | Performed by: PHYSICIAN ASSISTANT

## 2020-09-22 PROCEDURE — 1125F AMNT PAIN NOTED PAIN PRSNT: CPT | Mod: HCNC,S$GLB,, | Performed by: PHYSICIAN ASSISTANT

## 2020-09-22 PROCEDURE — 3008F BODY MASS INDEX DOCD: CPT | Mod: HCNC,CPTII,S$GLB, | Performed by: PHYSICIAN ASSISTANT

## 2020-09-22 PROCEDURE — 1101F PR PT FALLS ASSESS DOC 0-1 FALLS W/OUT INJ PAST YR: ICD-10-PCS | Mod: HCNC,CPTII,S$GLB, | Performed by: PHYSICIAN ASSISTANT

## 2020-09-22 PROCEDURE — 1125F PR PAIN SEVERITY QUANTIFIED, PAIN PRESENT: ICD-10-PCS | Mod: HCNC,S$GLB,, | Performed by: PHYSICIAN ASSISTANT

## 2020-09-22 PROCEDURE — 3078F PR MOST RECENT DIASTOLIC BLOOD PRESSURE < 80 MM HG: ICD-10-PCS | Mod: HCNC,CPTII,S$GLB, | Performed by: PHYSICIAN ASSISTANT

## 2020-09-22 PROCEDURE — 99999 PR PBB SHADOW E&M-EST. PATIENT-LVL IV: ICD-10-PCS | Mod: PBBFAC,HCNC,, | Performed by: PHYSICIAN ASSISTANT

## 2020-09-22 PROCEDURE — 99203 PR OFFICE/OUTPT VISIT, NEW, LEVL III, 30-44 MIN: ICD-10-PCS | Mod: HCNC,S$GLB,, | Performed by: PHYSICIAN ASSISTANT

## 2020-09-22 PROCEDURE — 3008F PR BODY MASS INDEX (BMI) DOCUMENTED: ICD-10-PCS | Mod: HCNC,CPTII,S$GLB, | Performed by: PHYSICIAN ASSISTANT

## 2020-09-22 PROCEDURE — 3078F DIAST BP <80 MM HG: CPT | Mod: HCNC,CPTII,S$GLB, | Performed by: PHYSICIAN ASSISTANT

## 2020-09-22 PROCEDURE — 99203 OFFICE O/P NEW LOW 30 MIN: CPT | Mod: HCNC,S$GLB,, | Performed by: PHYSICIAN ASSISTANT

## 2020-09-22 PROCEDURE — 1159F PR MEDICATION LIST DOCUMENTED IN MEDICAL RECORD: ICD-10-PCS | Mod: HCNC,S$GLB,, | Performed by: PHYSICIAN ASSISTANT

## 2020-09-22 PROCEDURE — 3077F PR MOST RECENT SYSTOLIC BLOOD PRESSURE >= 140 MM HG: ICD-10-PCS | Mod: HCNC,CPTII,S$GLB, | Performed by: PHYSICIAN ASSISTANT

## 2020-09-22 PROCEDURE — 3077F SYST BP >= 140 MM HG: CPT | Mod: HCNC,CPTII,S$GLB, | Performed by: PHYSICIAN ASSISTANT

## 2020-09-22 RX ORDER — MELOXICAM 15 MG/1
15 TABLET ORAL DAILY
Qty: 30 TABLET | Refills: 1 | Status: SHIPPED | OUTPATIENT
Start: 2020-09-22 | End: 2023-04-18

## 2020-09-22 NOTE — PROGRESS NOTES
SUBJECTIVE:     Chief Complaint & History of Present Illness:  Grace Navarro is a  New  patient 74 y.o. female who is seen here today with a complaint of    Chief Complaint   Patient presents with    Left Shoulder - Pain    Right Shoulder - Pain    Right Knee - Pain    Left Knee - Pain    .  Here today for continuing care for her left shoulder pain and intermittent episodes right shoulder bilateral knee and to a lesser degree hip pain.  Was seen by her primary care initially 09/02/2020 for polyarthralgias initial blood work demonstrate positive RA factor she is currently scheduled to follow-up with Rheumatology in December.  Since this time she has had few episodes increasing pain in the left shoulder as well as the knees.  Was treated urgent care with [facility] with good short-term results.  At the recommendation of Rheumatology she is here today for evaluation and management of her symptoms until such time she can be fully evaluated for rheumatoid arthritis  On a scale of 1-10, with 10 being worst pain imaginable, he rates this pain as 2 on good days and 10 on bad days.  she describes the pain as achy.    Review of patient's allergies indicates:   Allergen Reactions    Ace inhibitors Other (See Comments)     Pt not sure which medication it was - was told by doctor that she was allergic    Amlodipine      flushing    Ibuprofen      Elevate blood pressure    Morphine Other (See Comments)     hallucination         Current Outpatient Medications   Medication Sig Dispense Refill    ascorbic acid (VITAMIN C) 1000 MG tablet Take 1,000 mg by mouth once daily.      CALCIUM-MAGNESIUM ORAL Take 1 capsule by mouth Daily.      cholecalciferol, vitamin D3, 1,000 unit capsule Take 1,000 Units by mouth once daily.      cyanocobalamin 500 MCG tablet Take 500 mcg by mouth once daily.      diclofenac sodium (VOLTAREN) 1 % Gel Apply 2 g topically once daily. 100 g 0    fluticasone propionate (FLONASE) 50  mcg/actuation nasal spray INHALE 1 SPRAY(S) IN EACH NOSTRIL TWICE DAILY 16 mL 1    hydroCHLOROthiazide (HYDRODIURIL) 12.5 MG Tab Take 1 tablet (12.5 mg total) by mouth once daily. 90 tablet 3    hydroxyzine HCL (ATARAX) 25 MG tablet TAKE 1 TABLET BY MOUTH THREE TIMES DAILY AS NEEDED FOR ITCHING 90 tablet 0    metoprolol succinate (TOPROL-XL) 100 MG 24 hr tablet TAKE 1 TABLET EVERY DAY 90 tablet 3    rosuvastatin (CRESTOR) 20 MG tablet Take 1 tablet (20 mg total) by mouth every evening. 90 tablet 3    triamcinolone acetonide 0.1% (KENALOG) 0.1 % cream Apply topically 2 (two) times daily. To affected area for 7 days as needed. 30 g 1    meloxicam (MOBIC) 15 MG tablet Take 1 tablet (15 mg total) by mouth once daily. 30 tablet 1     No current facility-administered medications for this visit.        Past Medical History:   Diagnosis Date    Anxiety     Rene's disease     Cataract     Chronic low back pain     Depression     Goiter, nodular     Hyperlipidemia     Hypertension     Irritable bowel     Neuromuscular disorder     Osteopenia of multiple sites 5/29/2017    PUD (peptic ulcer disease)     Subarachnoid hemorrhage 2014    Varicose veins        Past Surgical History:   Procedure Laterality Date    APPENDECTOMY      BELPHAROPTOSIS REPAIR Bilateral     BREAST BIOPSY Right     RIGHT-axilla    CATARACT EXTRACTION W/  INTRAOCULAR LENS IMPLANT Left 08/03/2016        CATARACT EXTRACTION W/  INTRAOCULAR LENS IMPLANT Right 09/21/2016        SPINE SURGERY  07/2012    Fusion @ L4L5    TONSILLECTOMY         Vital Signs (Most Recent)  Vitals:    09/22/20 1259   BP: (!) 163/77   Pulse: 65       Review of Systems:  ROS:  Constitutional: no fever or chills  Eyes: no visual changes  ENT: no nasal congestion or sore throat  Respiratory: no cough or shortness of breath  Cardiovascular: no chest pain or palpitations, Left atrial hypertrophy varicose veins  Gastrointestinal: no  "nausea or vomiting, tolerating diet, Positive IBS, PVD  Genitourinary: no hematuria or dysuria  Integument/Breast: no rash or pruritis  Hematologic/Lymphatic: no easy bruising or lymphadenopathy  Musculoskeletal: positive for arthralgias, back pain, myalgias and stiff joints, negative for muscle weakness  Neurological: no seizures or tremors, Positive history subarachnoid hemorrhage, lumbar radiculopathy,  Behavioral/Psych: no auditory or visual hallucinations, Positive for anxiety and depression  Endocrine: no heat or cold intolerance, Positive for greater      OBJECTIVE:     PHYSICAL EXAM:  Height: 5' 4" (162.6 cm) Weight: 69.9 kg (154 lb 1.6 oz), General Appearance: Well nourished, well developed, in no acute distress.  Neurological: Mood & affect are normal.  Shoulder exam: left  Tenderness: globally  ROM: forward flexion 180/180, extension 45/45, full abduction 180/180, abduction-glenohumeral 90/90, external rotation 50/50, pain at the extremes of mobility  Shoulder Strength: biceps 5/5, triceps 5/5, abduction 5/5, adduction 5/5, external rotation 5/5 with shoulder at side, flexion 5/5, and extension 5/5  positive for tenderness about the glenohumeral joint, positive for tenderness over the acromioclavicular joint and negative for impingement sign  Stability tests: anterior apprehension test negative and posterior apprehension test negative  Special Tests:Cross-chest abduction: negative                     RADIOGRAPHS:  X-rays of the shoulder and knees from previous visit reviewed by me today demonstrate mild arthritic changes throughout both joint regions without evidence of fracture dislocation or advanced degenerative joint disease    ASSESSMENT/PLAN:       ICD-10-CM ICD-9-CM   1. Polyarthralgia  M25.50 719.49   2. Primary osteoarthritis of left shoulder  M19.012 715.11       Plan: We discussed with the patient at length all the different treatment options available for her left shoulder and knees including " anti-inflammatories, acetaminophen, rest, ice, Physical therapy to include strengthening exercise, occasional cortisone injections for temporary relief, arthroscopic surgical repair, and finally shoulder arthroplasty.   Meloxicam 15 mg q.d. with food times 10 days followed by p.r.n.  Patient will contact me if this fails to help with her symptoms or she has blood pressure issues

## 2020-09-22 NOTE — LETTER
September 22, 2020      Lisa Dean MD  [address]  [facility] [address]           [facility] 5th Fl  [address]  [facility] [address]  Phone: [phone]          Patient: Grace Navarro   MR Number: [id number]   YOB: 1945   Date of Visit: 9/22/2020       Dear Dr. Lisa Dean:    Thank you for referring Grace Navarro to me for evaluation. Attached you will find relevant portions of my assessment and plan of care.    If you have questions, please do not hesitate to call me. I look forward to following Grace Navarro along with you.    Sincerely,    Kwasi Duong PA-C    Enclosure  CC:  No Recipients    If you would like to receive this communication electronically, please contact [email or web address] or [phone] to request more information on [employer] Link access.    For providers and/or their staff who would like to refer a patient to [facility], please contact us through our one-stop-shop provider referral line, [facility], at [phone].    If you feel you have received this communication in error or would no longer like to receive these types of communications, please e-mail [email or web address]

## 2020-09-23 ENCOUNTER — PATIENT MESSAGE (OUTPATIENT)
Dept: ORTHOPEDICS | Facility: CLINIC | Age: 75
End: 2020-09-23

## 2020-10-05 ENCOUNTER — PATIENT MESSAGE (OUTPATIENT)
Dept: ADMINISTRATIVE | Facility: HOSPITAL | Age: 75
End: 2020-10-05

## 2020-10-21 ENCOUNTER — PATIENT MESSAGE (OUTPATIENT)
Dept: ORTHOPEDICS | Facility: CLINIC | Age: 75
End: 2020-10-21

## 2020-10-21 ENCOUNTER — PATIENT MESSAGE (OUTPATIENT)
Dept: INTERNAL MEDICINE | Facility: CLINIC | Age: 75
End: 2020-10-21

## 2020-10-27 ENCOUNTER — PES CALL (OUTPATIENT)
Dept: ADMINISTRATIVE | Facility: CLINIC | Age: 75
End: 2020-10-27

## 2020-11-10 ENCOUNTER — PATIENT MESSAGE (OUTPATIENT)
Dept: INTERNAL MEDICINE | Facility: CLINIC | Age: 75
End: 2020-11-10

## 2020-11-11 ENCOUNTER — PATIENT MESSAGE (OUTPATIENT)
Dept: INTERNAL MEDICINE | Facility: CLINIC | Age: 75
End: 2020-11-11

## 2020-11-11 NOTE — TELEPHONE ENCOUNTER
Please call pt to see if she's ok w/ increasing hctz from 12.5mg daily to 25mg daily due to BP being high. Continue metoprolol.  Pt to check BP daily and write down on a piece of paper. BP f/u next Friday as a double book at 11:30am?

## 2020-11-12 ENCOUNTER — PATIENT MESSAGE (OUTPATIENT)
Dept: INTERNAL MEDICINE | Facility: CLINIC | Age: 75
End: 2020-11-12

## 2020-11-18 ENCOUNTER — IMMUNIZATION (OUTPATIENT)
Dept: PHARMACY | Facility: CLINIC | Age: 75
End: 2020-11-18

## 2020-11-18 ENCOUNTER — LAB VISIT (OUTPATIENT)
Dept: LAB | Facility: HOSPITAL | Age: 75
End: 2020-11-18
Attending: INTERNAL MEDICINE
Payer: MEDICARE

## 2020-11-18 ENCOUNTER — IMMUNIZATION (OUTPATIENT)
Dept: PHARMACY | Facility: CLINIC | Age: 75
End: 2020-11-18
Payer: MEDICARE

## 2020-11-18 DIAGNOSIS — Z12.11 ENCOUNTER FOR FIT (FECAL IMMUNOCHEMICAL TEST) SCREENING: ICD-10-CM

## 2020-11-18 LAB — HEMOCCULT STL QL IA: NEGATIVE

## 2020-11-18 PROCEDURE — 82274 ASSAY TEST FOR BLOOD FECAL: CPT | Mod: HCNC

## 2020-11-19 ENCOUNTER — TELEPHONE (OUTPATIENT)
Dept: INTERNAL MEDICINE | Facility: CLINIC | Age: 75
End: 2020-11-19

## 2020-11-20 PROBLEM — M25.661 DECREASED ROM OF RIGHT KNEE: Status: RESOLVED | Noted: 2020-07-31 | Resolved: 2020-11-20

## 2020-11-20 PROBLEM — R26.89 ANTALGIC GAIT: Status: RESOLVED | Noted: 2020-07-31 | Resolved: 2020-11-20

## 2020-11-20 PROBLEM — R29.898 DECREASED STRENGTH OF LOWER EXTREMITY: Status: RESOLVED | Noted: 2020-07-31 | Resolved: 2020-11-20

## 2020-11-28 ENCOUNTER — PATIENT MESSAGE (OUTPATIENT)
Dept: INTERNAL MEDICINE | Facility: CLINIC | Age: 75
End: 2020-11-28

## 2020-11-30 ENCOUNTER — OFFICE VISIT (OUTPATIENT)
Dept: INTERNAL MEDICINE | Facility: CLINIC | Age: 75
End: 2020-11-30
Payer: MEDICARE

## 2020-11-30 DIAGNOSIS — I10 BENIGN ESSENTIAL HYPERTENSION: ICD-10-CM

## 2020-11-30 DIAGNOSIS — N39.0 URINARY TRACT INFECTION WITHOUT HEMATURIA, SITE UNSPECIFIED: ICD-10-CM

## 2020-11-30 DIAGNOSIS — F41.9 ANXIETY: Primary | ICD-10-CM

## 2020-11-30 PROCEDURE — 99214 OFFICE O/P EST MOD 30 MIN: CPT | Mod: HCNC,95,, | Performed by: INTERNAL MEDICINE

## 2020-11-30 PROCEDURE — 99214 PR OFFICE/OUTPT VISIT, EST, LEVL IV, 30-39 MIN: ICD-10-PCS | Mod: HCNC,95,, | Performed by: INTERNAL MEDICINE

## 2020-11-30 PROCEDURE — 1159F MED LIST DOCD IN RCRD: CPT | Mod: HCNC,95,, | Performed by: INTERNAL MEDICINE

## 2020-11-30 PROCEDURE — 1159F PR MEDICATION LIST DOCUMENTED IN MEDICAL RECORD: ICD-10-PCS | Mod: HCNC,95,, | Performed by: INTERNAL MEDICINE

## 2020-11-30 RX ORDER — ALPRAZOLAM 0.25 MG/1
0.25 TABLET ORAL DAILY PRN
Qty: 7 TABLET | Refills: 0 | Status: SHIPPED | OUTPATIENT
Start: 2020-11-30 | End: 2021-04-12

## 2020-11-30 RX ORDER — ESCITALOPRAM OXALATE 10 MG/1
TABLET ORAL
Qty: 30 TABLET | Refills: 2 | Status: SHIPPED | OUTPATIENT
Start: 2020-11-30 | End: 2021-02-01

## 2020-11-30 NOTE — Clinical Note
Please schedule lab and urine tomorrow at [facility] - 11am.   Schedule 2 month virtual follow up with me.

## 2020-11-30 NOTE — PROGRESS NOTES
The patient location is: [place], [place]  The chief complaint leading to consultation is: BP and stress    Visit type: audiovisual    Face to Face time with patient: 15 min  15 minutes of total time spent on the encounter, which includes face to face time and non-face to face time preparing to see the patient (eg, review of tests), Obtaining and/or reviewing separately obtained history, Documenting clinical information in the electronic or other health record, Independently interpreting results (not separately reported) and communicating results to the patient/family/caregiver, or Care coordination (not separately reported).         Each patient to whom he or she provides medical services by telemedicine is:  (1) informed of the relationship between the physician and patient and the respective role of any other health care provider with respect to management of the patient; and (2) notified that he or she may decline to receive medical services by telemedicine and may withdraw from such care at any time.    Notes:     Subjective:       Patient ID: Grace Navarro is a 74 y.o. female.    Hypertension  This is a chronic problem. The current episode started more than 1 year ago. The problem has been waxing and waning since onset. The problem is resistant. Associated symptoms include anxiety. Agents associated with hypertension include NSAIDs. Risk factors for coronary artery disease include family history. The current treatment provides significant improvement. Compliance problems include psychosocial issues.       HTN - hctz 12.5mg daily and toprol xl 100mg daily.   Daughter and JUANITA moved in w/ her a month ago and will be there for another 3 month. They're both working from home and converted her 2 bedrooms upstairs into their offices. They're not clean to pt's standard. Pt has been trying to stay quiet in her own house.   Reports daily stress/anxiety - chest tightness and palpitations. Tried hydroxyzine - not  "sure if it's helping her anxiety.     Her BP has also been elevated. Increased HCTZ to 2 pills daily.  Started w/ dizziness (feels like room is moving) about 1 week ago. When getting out of shower, feels her balance is not great.   BP is 140s/60s.     Reports some slight burning at the end of the urination x 2 weeks - not worse. No hematuria/fevers/chills. Always feels like the "kidneys on the left side" is painful.    Review of Systems  Comprehensive review of systems otherwise negative. See history/subjective section for more details.    Objective:      Physical Exam      Gen - A+OX4, NAD  CHEST - completing full sentences. Normal work of breathing.     Assessment/Plan     Diagnoses and all orders for this visit:    Anxiety  -     escitalopram oxalate (LEXAPRO) 10 MG tablet; Take 1/2 pill nightly x 8 days and then 1 pill nightly onwards.  -     ALPRAZolam (XANAX) 0.25 MG tablet; Take 1 tablet (0.25 mg total) by mouth daily as needed for Anxiety.    Benign essential hypertension - BP elevated at home. Will try to treat anxiety. Eating more salt as daughter is cooking. Decrease sodium intake.   -     Comprehensive Metabolic Panel; Future    Urinary tract infection without hematuria, site unspecified  -     Urinalysis; Future  -     Urinalysis Microscopic; Future  -     Urine culture; Future      Follow up in about 2 months (around 1/30/2021).      Lisa Dean MD  Department of Internal Medicine - [facility]  1:40 PM    "

## 2020-12-01 ENCOUNTER — LAB VISIT (OUTPATIENT)
Dept: LAB | Facility: HOSPITAL | Age: 75
End: 2020-12-01
Attending: INTERNAL MEDICINE
Payer: MEDICARE

## 2020-12-01 DIAGNOSIS — N39.0 URINARY TRACT INFECTION WITHOUT HEMATURIA, SITE UNSPECIFIED: ICD-10-CM

## 2020-12-01 LAB
BACTERIA #/AREA URNS AUTO: ABNORMAL /HPF
BILIRUB UR QL STRIP: NEGATIVE
CLARITY UR REFRACT.AUTO: ABNORMAL
COLOR UR AUTO: YELLOW
GLUCOSE UR QL STRIP: NEGATIVE
HGB UR QL STRIP: NEGATIVE
KETONES UR QL STRIP: NEGATIVE
LEUKOCYTE ESTERASE UR QL STRIP: ABNORMAL
MICROSCOPIC COMMENT: ABNORMAL
NITRITE UR QL STRIP: NEGATIVE
PH UR STRIP: 5 [PH] (ref 5–8)
PROT UR QL STRIP: NEGATIVE
RBC #/AREA URNS AUTO: 1 /HPF (ref 0–4)
SP GR UR STRIP: 1.01 (ref 1–1.03)
SQUAMOUS #/AREA URNS AUTO: 1 /HPF
URN SPEC COLLECT METH UR: ABNORMAL
WBC #/AREA URNS AUTO: 3 /HPF (ref 0–5)

## 2020-12-01 PROCEDURE — 87186 SC STD MICRODIL/AGAR DIL: CPT | Mod: HCNC

## 2020-12-01 PROCEDURE — 87088 URINE BACTERIA CULTURE: CPT | Mod: HCNC

## 2020-12-01 PROCEDURE — 87077 CULTURE AEROBIC IDENTIFY: CPT | Mod: HCNC

## 2020-12-01 PROCEDURE — 87086 URINE CULTURE/COLONY COUNT: CPT | Mod: HCNC

## 2020-12-01 PROCEDURE — 81001 URINALYSIS AUTO W/SCOPE: CPT | Mod: HCNC

## 2020-12-04 ENCOUNTER — TELEPHONE (OUTPATIENT)
Dept: INTERNAL MEDICINE | Facility: CLINIC | Age: 75
End: 2020-12-04

## 2020-12-04 LAB — BACTERIA UR CULT: ABNORMAL

## 2020-12-04 RX ORDER — NITROFURANTOIN 25; 75 MG/1; MG/1
100 CAPSULE ORAL 2 TIMES DAILY
Qty: 10 CAPSULE | Refills: 0 | Status: SHIPPED | OUTPATIENT
Start: 2020-12-04 | End: 2020-12-09

## 2020-12-04 NOTE — TELEPHONE ENCOUNTER
Pt informed her urine did grow out some bacteria and Dr. Dean sent in macrobid for her to take BID for 5 days. Pt verbally understood.

## 2020-12-04 NOTE — TELEPHONE ENCOUNTER
Please call and notify pt:    Since she's having symptoms of UTI and her urine culture did grow out some bacteria, will send in macrobid twice daily x 5 days.

## 2020-12-08 ENCOUNTER — OFFICE VISIT (OUTPATIENT)
Dept: RHEUMATOLOGY | Facility: CLINIC | Age: 75
End: 2020-12-08
Payer: MEDICARE

## 2020-12-08 VITALS
SYSTOLIC BLOOD PRESSURE: 158 MMHG | HEIGHT: 64 IN | BODY MASS INDEX: 26.39 KG/M2 | WEIGHT: 154.56 LBS | HEART RATE: 74 BPM | DIASTOLIC BLOOD PRESSURE: 72 MMHG

## 2020-12-08 DIAGNOSIS — M25.50 POLYARTHRALGIA: Primary | ICD-10-CM

## 2020-12-08 DIAGNOSIS — M25.50 POLYARTHRALGIA: ICD-10-CM

## 2020-12-08 DIAGNOSIS — R76.8 RHEUMATOID FACTOR POSITIVE: ICD-10-CM

## 2020-12-08 PROCEDURE — 99999 PR PBB SHADOW E&M-EST. PATIENT-LVL IV: CPT | Mod: PBBFAC,HCNC,, | Performed by: INTERNAL MEDICINE

## 2020-12-08 PROCEDURE — 1125F PR PAIN SEVERITY QUANTIFIED, PAIN PRESENT: ICD-10-PCS | Mod: HCNC,S$GLB,, | Performed by: INTERNAL MEDICINE

## 2020-12-08 PROCEDURE — 3078F PR MOST RECENT DIASTOLIC BLOOD PRESSURE < 80 MM HG: ICD-10-PCS | Mod: HCNC,CPTII,S$GLB, | Performed by: INTERNAL MEDICINE

## 2020-12-08 PROCEDURE — 1159F MED LIST DOCD IN RCRD: CPT | Mod: HCNC,S$GLB,, | Performed by: INTERNAL MEDICINE

## 2020-12-08 PROCEDURE — 3008F PR BODY MASS INDEX (BMI) DOCUMENTED: ICD-10-PCS | Mod: HCNC,CPTII,S$GLB, | Performed by: INTERNAL MEDICINE

## 2020-12-08 PROCEDURE — 3077F PR MOST RECENT SYSTOLIC BLOOD PRESSURE >= 140 MM HG: ICD-10-PCS | Mod: HCNC,CPTII,S$GLB, | Performed by: INTERNAL MEDICINE

## 2020-12-08 PROCEDURE — 99999 PR PBB SHADOW E&M-EST. PATIENT-LVL IV: ICD-10-PCS | Mod: PBBFAC,HCNC,, | Performed by: INTERNAL MEDICINE

## 2020-12-08 PROCEDURE — 1125F AMNT PAIN NOTED PAIN PRSNT: CPT | Mod: HCNC,S$GLB,, | Performed by: INTERNAL MEDICINE

## 2020-12-08 PROCEDURE — 99205 OFFICE O/P NEW HI 60 MIN: CPT | Mod: HCNC,S$GLB,, | Performed by: INTERNAL MEDICINE

## 2020-12-08 PROCEDURE — 3077F SYST BP >= 140 MM HG: CPT | Mod: HCNC,CPTII,S$GLB, | Performed by: INTERNAL MEDICINE

## 2020-12-08 PROCEDURE — 99205 PR OFFICE/OUTPT VISIT, NEW, LEVL V, 60-74 MIN: ICD-10-PCS | Mod: HCNC,S$GLB,, | Performed by: INTERNAL MEDICINE

## 2020-12-08 PROCEDURE — 3008F BODY MASS INDEX DOCD: CPT | Mod: HCNC,CPTII,S$GLB, | Performed by: INTERNAL MEDICINE

## 2020-12-08 PROCEDURE — 3078F DIAST BP <80 MM HG: CPT | Mod: HCNC,CPTII,S$GLB, | Performed by: INTERNAL MEDICINE

## 2020-12-08 PROCEDURE — 1159F PR MEDICATION LIST DOCUMENTED IN MEDICAL RECORD: ICD-10-PCS | Mod: HCNC,S$GLB,, | Performed by: INTERNAL MEDICINE

## 2020-12-08 RX ORDER — MELOXICAM 15 MG/1
15 TABLET ORAL DAILY
Qty: 30 TABLET | Refills: 3 | Status: SHIPPED | OUTPATIENT
Start: 2020-12-08 | End: 2022-04-07

## 2020-12-08 NOTE — PROGRESS NOTES
Rapid3 Question Responses and Scores 12/7/2020   MDHAQ Score 0.6   Psychologic Score 2.2   Pain Score 10   When you awakened in the morning OVER THE LAST WEEK, did you feel stiff? Yes   If Yes, please indicate the number of hours until you are as limber as you will be for the day 2   Fatigue Score 2   Global Health Score 4   RAPID3 Score 5.33     Answers for HPI/ROS submitted by the patient on 12/7/2020   fever: No  eye redness: No  mouth sores: No  headaches: No  shortness of breath: No  chest pain: No  trouble swallowing: No  diarrhea: No  constipation: No  unexpected weight change: No  genital sore: No  dysuria: Yes  During the last 3 days, have you had a skin rash?: Yes  Bruises or bleeds easily: Yes  cough: No

## 2020-12-08 NOTE — LETTER
December 8, 2020      Lisa Dean MD  [address]  [facility] [address]           [id number]  [address]  [facility] [address]  Phone: [phone]  Fax: [phone]          Patient: Grace Navarro   MR Number: [id number]   YOB: 1945   Date of Visit: 12/8/2020       Dear Dr. Lisa Dean:    Thank you for referring Grace Navarro to me for evaluation. Attached you will find relevant portions of my assessment and plan of care.    If you have questions, please do not hesitate to call me. I look forward to following Grace Navarro along with you.    Sincerely,    Katey Solano MD    Enclosure  CC:  No Recipients    If you would like to receive this communication electronically, please contact [email or web address] or [phone] to request more information on [facility] Link access.    For providers and/or their staff who would like to refer a patient to [facility], please contact us through our one-stop-shop provider referral line, [facility], at [phone].    If you feel you have received this communication in error or would no longer like to receive these types of communications, please e-mail [email or web address]

## 2020-12-08 NOTE — PROGRESS NOTES
Subjective:       Patient ID: Grace Navarro is a 74 y.o. female.    Chief Complaint: Disease Management    HPI    74 YEAR OLD F with PMH of HTN, goiter,depression, HTN, lumbar fusion, HL here for evaluation. She has pain in right shoulder, right elbow, hips,both knees.  Reports pain has been going on this year. Pain level is 6/10 aching and non radiating in right knee. Pain level can be as high as 10/10, aching and non -radiating.  Denies any swelling.  Denies any rashes, oral ulcers, fevers, raynauds, photosensitivity.   She reports meloxicam helped her with her pain.  Denies history of PUD or SAH even though I see it in the chart.    Family hx:  Negative for psoriasis        Review of Systems   Constitutional: Negative for activity change, appetite change, chills, diaphoresis, fatigue, fever and unexpected weight change.   HENT: Negative for congestion, ear discharge, ear pain, facial swelling, mouth sores, sinus pressure, sneezing, sore throat, tinnitus and trouble swallowing.    Eyes: Negative for photophobia, pain, discharge, redness, itching and visual disturbance.   Respiratory: Negative for apnea, cough, chest tightness, shortness of breath, wheezing and stridor.    Cardiovascular: Negative for chest pain and leg swelling.   Gastrointestinal: Negative for abdominal distention, abdominal pain, anal bleeding, blood in stool, constipation, diarrhea and nausea.   Endocrine: Negative for cold intolerance and heat intolerance.   Genitourinary: Negative for difficulty urinating, dysuria and genital sores.   Musculoskeletal: Positive for arthralgias, back pain, gait problem and joint swelling. Negative for neck pain and neck stiffness.   Skin: Negative for color change, pallor, rash and wound.   Neurological: Negative for dizziness, seizures, light-headedness, numbness and headaches.   Hematological: Negative for adenopathy. Does not bruise/bleed easily.   Psychiatric/Behavioral: Negative for sleep  "disturbance. The patient is not nervous/anxious.              Objective:   BP (!) 158/72 (BP Location: Left arm, Patient Position: Sitting, BP Method: Medium (Automatic))   Pulse 74   Ht 5' 4" (1.626 m)   Wt 70.1 kg (154 lb 8.7 oz)   LMP 06/14/1996   BMI 26.53 kg/m²      Physical Exam   Constitutional: She is oriented to person, place, and time.   HENT:   Head: Normocephalic and atraumatic.   Right Ear: External ear normal.   Left Ear: External ear normal.   Nose: Nose normal.   Mouth/Throat: Oropharynx is clear and moist. No oropharyngeal exudate.   Eyes: Conjunctivae and EOM are normal. Pupils are equal, round, and reactive to light. Right eye exhibits no discharge. Left eye exhibits no discharge. No scleral icterus.   Neck: Neck supple. No JVD present. No thyromegaly present.   Cardiovascular: Normal rate, regular rhythm, normal heart sounds and intact distal pulses.  Exam reveals no gallop and no friction rub.    No murmur heard.  Pulmonary/Chest: Effort normal and breath sounds normal. No respiratory distress. She has no wheezes. She has no rales. She exhibits no tenderness.   Abdominal: Soft. Bowel sounds are normal. She exhibits no distension and no mass. There is no abdominal tenderness. There is no rebound and no guarding.   Lymphadenopathy:     She has no cervical adenopathy.   Neurological: She is alert and oriented to person, place, and time. No cranial nerve deficit. Gait normal. Coordination normal.   Skin: Skin is dry. No rash noted. No erythema. No pallor.     Psychiatric: Affect and judgment normal.   Musculoskeletal: Tenderness present. No deformity or edema.        Labs: reviewed    No data to display     Assessment:     74 YEAR OLD F with PMH of HTN, goiter,depression, HTN, lumbar fusion, HL here for evaluation of joint pain.  She has +RF but not very high. 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  I will put her on daily mobic and if no " improvement can consider obtaining MRI of where she has the most pain.      1. Rheumatoid factor positive    2. Polyarthralgia            Plan:       Problem List Items Addressed This Visit     None      Visit Diagnoses     Rheumatoid factor positive        Polyarthralgia            Labs  xrays  Start meloxicam 15 mg po qday  ( side effects of nsaids discussed)  *

## 2020-12-09 ENCOUNTER — HOSPITAL ENCOUNTER (OUTPATIENT)
Dept: RADIOLOGY | Facility: HOSPITAL | Age: 75
Discharge: HOME | End: 2020-12-09
Attending: INTERNAL MEDICINE
Payer: MEDICARE

## 2020-12-09 DIAGNOSIS — M25.50 POLYARTHRALGIA: ICD-10-CM

## 2020-12-09 PROCEDURE — 77077 XR ARTHRITIS SURVEY: ICD-10-PCS | Mod: 26,HCNC,, | Performed by: RADIOLOGY

## 2020-12-09 PROCEDURE — 77077 JOINT SURVEY SINGLE VIEW: CPT | Mod: 26,HCNC,, | Performed by: RADIOLOGY

## 2020-12-09 PROCEDURE — 77077 JOINT SURVEY SINGLE VIEW: CPT | Mod: TC,HCNC,PO

## 2020-12-12 ENCOUNTER — PATIENT MESSAGE (OUTPATIENT)
Dept: RHEUMATOLOGY | Facility: CLINIC | Age: 75
End: 2020-12-12

## 2020-12-21 ENCOUNTER — PATIENT MESSAGE (OUTPATIENT)
Dept: RHEUMATOLOGY | Facility: CLINIC | Age: 75
End: 2020-12-21

## 2020-12-22 DIAGNOSIS — M54.12 CERVICAL RADICULOPATHY: Primary | ICD-10-CM

## 2020-12-23 ENCOUNTER — TELEPHONE (OUTPATIENT)
Dept: RHEUMATOLOGY | Facility: CLINIC | Age: 75
End: 2020-12-23
Payer: MEDICARE

## 2020-12-29 ENCOUNTER — CLINICAL SUPPORT (OUTPATIENT)
Dept: URGENT CARE | Facility: CLINIC | Age: 75
End: 2020-12-29
Payer: MEDICARE

## 2020-12-29 DIAGNOSIS — Z20.822 ENCOUNTER FOR LABORATORY TESTING FOR COVID-19 VIRUS: Primary | ICD-10-CM

## 2020-12-29 LAB
CTP QC/QA: YES
SARS-COV-2 RDRP RESP QL NAA+PROBE: NEGATIVE

## 2020-12-29 PROCEDURE — U0002 COVID-19 LAB TEST NON-CDC: HCPCS | Mod: QW,S$GLB,, | Performed by: FAMILY MEDICINE

## 2020-12-29 PROCEDURE — U0002: ICD-10-PCS | Mod: QW,S$GLB,, | Performed by: FAMILY MEDICINE

## 2021-01-06 ENCOUNTER — PATIENT MESSAGE (OUTPATIENT)
Dept: INTERNAL MEDICINE | Facility: CLINIC | Age: 76
End: 2021-01-06

## 2021-01-07 ENCOUNTER — PATIENT MESSAGE (OUTPATIENT)
Dept: INTERNAL MEDICINE | Facility: CLINIC | Age: 76
End: 2021-01-07

## 2021-01-08 ENCOUNTER — PATIENT MESSAGE (OUTPATIENT)
Dept: INTERNAL MEDICINE | Facility: CLINIC | Age: 76
End: 2021-01-08

## 2021-01-09 ENCOUNTER — IMMUNIZATION (OUTPATIENT)
Dept: INTERNAL MEDICINE | Facility: CLINIC | Age: 76
End: 2021-01-09
Payer: MEDICARE

## 2021-01-09 DIAGNOSIS — Z23 NEED FOR VACCINATION: ICD-10-CM

## 2021-01-09 PROCEDURE — 91300 COVID-19, MRNA, LNP-S, PF, 30 MCG/0.3 ML DOSE VACCINE: CPT | Mod: PBBFAC | Performed by: INTERNAL MEDICINE

## 2021-01-21 ENCOUNTER — PATIENT MESSAGE (OUTPATIENT)
Dept: INTERNAL MEDICINE | Facility: CLINIC | Age: 76
End: 2021-01-21

## 2021-01-21 DIAGNOSIS — M25.50 POLYARTHRALGIA: ICD-10-CM

## 2021-01-21 DIAGNOSIS — M25.562 CHRONIC PAIN OF BOTH KNEES: ICD-10-CM

## 2021-01-21 DIAGNOSIS — M25.561 CHRONIC PAIN OF BOTH KNEES: ICD-10-CM

## 2021-01-21 DIAGNOSIS — G89.29 CHRONIC PAIN OF BOTH KNEES: ICD-10-CM

## 2021-01-22 ENCOUNTER — PATIENT MESSAGE (OUTPATIENT)
Dept: INTERNAL MEDICINE | Facility: CLINIC | Age: 76
End: 2021-01-22

## 2021-01-22 RX ORDER — DICLOFENAC SODIUM 10 MG/G
2 GEL TOPICAL DAILY
Qty: 100 G | Refills: 3 | Status: SHIPPED | OUTPATIENT
Start: 2021-01-22 | End: 2021-08-17 | Stop reason: SDUPTHER

## 2021-01-23 ENCOUNTER — PATIENT MESSAGE (OUTPATIENT)
Dept: INTERNAL MEDICINE | Facility: CLINIC | Age: 76
End: 2021-01-23

## 2021-01-26 ENCOUNTER — PATIENT MESSAGE (OUTPATIENT)
Dept: INTERNAL MEDICINE | Facility: CLINIC | Age: 76
End: 2021-01-26

## 2021-01-26 DIAGNOSIS — H02.9 ABNORMALITY OF EYELID: Primary | ICD-10-CM

## 2021-01-30 ENCOUNTER — IMMUNIZATION (OUTPATIENT)
Dept: INTERNAL MEDICINE | Facility: CLINIC | Age: 76
End: 2021-01-30
Payer: MEDICARE

## 2021-01-30 DIAGNOSIS — Z23 NEED FOR VACCINATION: Primary | ICD-10-CM

## 2021-01-30 PROCEDURE — 0002A PR IMMUNIZ ADMIN, SARS-COV-2 COVID-19 VACC, 30MCG/0.3ML, 2ND DOSE: CPT | Mod: PBBFAC | Performed by: INTERNAL MEDICINE

## 2021-01-30 PROCEDURE — 91300 PR SARS-COV- 2 COVID-19 VACCINE, NO PRSV, 30MCG/0.3ML, IM: CPT | Mod: PBBFAC | Performed by: INTERNAL MEDICINE

## 2021-01-30 RX ADMIN — RNA INGREDIENT BNT-162B2 0.3 ML: 0.23 INJECTION, SUSPENSION INTRAMUSCULAR at 10:01

## 2021-02-01 ENCOUNTER — OFFICE VISIT (OUTPATIENT)
Dept: INTERNAL MEDICINE | Facility: CLINIC | Age: 76
End: 2021-02-01
Payer: MEDICARE

## 2021-02-01 ENCOUNTER — PATIENT MESSAGE (OUTPATIENT)
Dept: INTERNAL MEDICINE | Facility: CLINIC | Age: 76
End: 2021-02-01

## 2021-02-01 DIAGNOSIS — E78.5 HYPERLIPIDEMIA, UNSPECIFIED HYPERLIPIDEMIA TYPE: Primary | ICD-10-CM

## 2021-02-01 DIAGNOSIS — R42 DIZZINESS: ICD-10-CM

## 2021-02-01 DIAGNOSIS — F41.9 ANXIETY: ICD-10-CM

## 2021-02-01 DIAGNOSIS — I10 BENIGN ESSENTIAL HYPERTENSION: ICD-10-CM

## 2021-02-01 PROCEDURE — 1159F PR MEDICATION LIST DOCUMENTED IN MEDICAL RECORD: ICD-10-PCS | Mod: 95,,, | Performed by: INTERNAL MEDICINE

## 2021-02-01 PROCEDURE — 1159F MED LIST DOCD IN RCRD: CPT | Mod: 95,,, | Performed by: INTERNAL MEDICINE

## 2021-02-01 PROCEDURE — 99214 PR OFFICE/OUTPT VISIT, EST, LEVL IV, 30-39 MIN: ICD-10-PCS | Mod: 95,,, | Performed by: INTERNAL MEDICINE

## 2021-02-01 PROCEDURE — 99214 OFFICE O/P EST MOD 30 MIN: CPT | Mod: 95,,, | Performed by: INTERNAL MEDICINE

## 2021-02-01 RX ORDER — METOPROLOL SUCCINATE 50 MG/1
TABLET, EXTENDED RELEASE ORAL
Qty: 90 TABLET | Refills: 3 | Status: SHIPPED | OUTPATIENT
Start: 2021-02-01 | End: 2021-02-01 | Stop reason: SDUPTHER

## 2021-02-01 RX ORDER — METOPROLOL SUCCINATE 50 MG/1
TABLET, EXTENDED RELEASE ORAL
Qty: 90 TABLET | Refills: 3 | Status: SHIPPED | OUTPATIENT
Start: 2021-02-01 | End: 2021-04-12

## 2021-02-01 RX ORDER — HYDROCHLOROTHIAZIDE 12.5 MG/1
12.5 TABLET ORAL DAILY
Qty: 90 TABLET | Refills: 3 | Status: SHIPPED | OUTPATIENT
Start: 2021-02-01 | End: 2021-12-07 | Stop reason: SDUPTHER

## 2021-02-01 RX ORDER — HYDROCHLOROTHIAZIDE 12.5 MG/1
12.5 TABLET ORAL DAILY
Qty: 90 TABLET | Refills: 3 | Status: SHIPPED | OUTPATIENT
Start: 2021-02-01 | End: 2021-02-01 | Stop reason: SDUPTHER

## 2021-02-02 ENCOUNTER — PATIENT MESSAGE (OUTPATIENT)
Dept: INTERNAL MEDICINE | Facility: CLINIC | Age: 76
End: 2021-02-02

## 2021-02-05 ENCOUNTER — LAB VISIT (OUTPATIENT)
Dept: LAB | Facility: HOSPITAL | Age: 76
End: 2021-02-05
Attending: INTERNAL MEDICINE
Payer: MEDICARE

## 2021-02-05 DIAGNOSIS — E78.5 HYPERLIPIDEMIA, UNSPECIFIED HYPERLIPIDEMIA TYPE: ICD-10-CM

## 2021-02-05 PROCEDURE — 36415 COLL VENOUS BLD VENIPUNCTURE: CPT | Mod: PO

## 2021-02-05 PROCEDURE — 80053 COMPREHEN METABOLIC PANEL: CPT

## 2021-02-05 PROCEDURE — 80061 LIPID PANEL: CPT

## 2021-02-06 LAB
ALBUMIN SERPL BCP-MCNC: 4.1 G/DL (ref 3.5–5.2)
ALP SERPL-CCNC: 70 U/L (ref 55–135)
ALT SERPL W/O P-5'-P-CCNC: 24 U/L (ref 10–44)
ANION GAP SERPL CALC-SCNC: 10 MMOL/L (ref 8–16)
AST SERPL-CCNC: 29 U/L (ref 10–40)
BILIRUB SERPL-MCNC: 0.5 MG/DL (ref 0.1–1)
BUN SERPL-MCNC: 18 MG/DL (ref 8–23)
CALCIUM SERPL-MCNC: 9.2 MG/DL (ref 8.7–10.5)
CHLORIDE SERPL-SCNC: 100 MMOL/L (ref 95–110)
CHOLEST SERPL-MCNC: 144 MG/DL (ref 120–199)
CHOLEST/HDLC SERPL: 2.3 {RATIO} (ref 2–5)
CO2 SERPL-SCNC: 30 MMOL/L (ref 23–29)
CREAT SERPL-MCNC: 0.6 MG/DL (ref 0.5–1.4)
EST. GFR  (AFRICAN AMERICAN): >60 ML/MIN/1.73 M^2
EST. GFR  (NON AFRICAN AMERICAN): >60 ML/MIN/1.73 M^2
GLUCOSE SERPL-MCNC: 108 MG/DL (ref 70–110)
HDLC SERPL-MCNC: 62 MG/DL (ref 40–75)
HDLC SERPL: 43.1 % (ref 20–50)
LDLC SERPL CALC-MCNC: 67.8 MG/DL (ref 63–159)
NONHDLC SERPL-MCNC: 82 MG/DL
POTASSIUM SERPL-SCNC: 3.9 MMOL/L (ref 3.5–5.1)
PROT SERPL-MCNC: 7.3 G/DL (ref 6–8.4)
SODIUM SERPL-SCNC: 140 MMOL/L (ref 136–145)
TRIGL SERPL-MCNC: 71 MG/DL (ref 30–150)

## 2021-02-08 ENCOUNTER — PATIENT MESSAGE (OUTPATIENT)
Dept: INTERNAL MEDICINE | Facility: CLINIC | Age: 76
End: 2021-02-08

## 2021-02-09 ENCOUNTER — PATIENT MESSAGE (OUTPATIENT)
Dept: INTERNAL MEDICINE | Facility: CLINIC | Age: 76
End: 2021-02-09

## 2021-02-19 ENCOUNTER — PATIENT MESSAGE (OUTPATIENT)
Dept: INTERNAL MEDICINE | Facility: CLINIC | Age: 76
End: 2021-02-19

## 2021-02-19 DIAGNOSIS — L50.9 URTICARIA: ICD-10-CM

## 2021-02-20 ENCOUNTER — PATIENT OUTREACH (OUTPATIENT)
Dept: ADMINISTRATIVE | Facility: OTHER | Age: 76
End: 2021-02-20

## 2021-02-20 DIAGNOSIS — Z12.31 ENCOUNTER FOR SCREENING MAMMOGRAM FOR MALIGNANT NEOPLASM OF BREAST: Primary | ICD-10-CM

## 2021-02-22 ENCOUNTER — OFFICE VISIT (OUTPATIENT)
Dept: OPHTHALMOLOGY | Facility: CLINIC | Age: 76
End: 2021-02-22
Payer: MEDICARE

## 2021-02-22 DIAGNOSIS — H47.239 LARGE PHYSIOLOGIC CUPPING OF OPTIC DISC: ICD-10-CM

## 2021-02-22 DIAGNOSIS — H02.203 LAGOPHTHALMOS, RIGHT: ICD-10-CM

## 2021-02-22 DIAGNOSIS — H40.023 OPEN ANGLE WITH BORDERLINE FINDINGS AND HIGH GLAUCOMA RISK IN BOTH EYES: Primary | ICD-10-CM

## 2021-02-22 DIAGNOSIS — Z96.1 PSEUDOPHAKIA OF BOTH EYES: ICD-10-CM

## 2021-02-22 PROCEDURE — 1126F AMNT PAIN NOTED NONE PRSNT: CPT | Mod: S$GLB,,, | Performed by: OPHTHALMOLOGY

## 2021-02-22 PROCEDURE — 92133 CPTRZD OPH DX IMG PST SGM ON: CPT | Mod: S$GLB,,, | Performed by: OPHTHALMOLOGY

## 2021-02-22 PROCEDURE — 92012 PR EYE EXAM, EST PATIENT,INTERMED: ICD-10-PCS | Mod: S$GLB,,, | Performed by: OPHTHALMOLOGY

## 2021-02-22 PROCEDURE — 99999 PR PBB SHADOW E&M-EST. PATIENT-LVL III: CPT | Mod: PBBFAC,,, | Performed by: OPHTHALMOLOGY

## 2021-02-22 PROCEDURE — 92133 HEIDELBERG RETINA TOMOGRAPHY (HRT) - OU - BOTH EYES: ICD-10-PCS | Mod: S$GLB,,, | Performed by: OPHTHALMOLOGY

## 2021-02-22 PROCEDURE — 1126F PR PAIN SEVERITY QUANTIFIED, NO PAIN PRESENT: ICD-10-PCS | Mod: S$GLB,,, | Performed by: OPHTHALMOLOGY

## 2021-02-22 PROCEDURE — 92012 INTRM OPH EXAM EST PATIENT: CPT | Mod: S$GLB,,, | Performed by: OPHTHALMOLOGY

## 2021-02-22 PROCEDURE — 1101F PT FALLS ASSESS-DOCD LE1/YR: CPT | Mod: CPTII,S$GLB,, | Performed by: OPHTHALMOLOGY

## 2021-02-22 PROCEDURE — 99999 PR PBB SHADOW E&M-EST. PATIENT-LVL III: ICD-10-PCS | Mod: PBBFAC,,, | Performed by: OPHTHALMOLOGY

## 2021-02-22 PROCEDURE — 3288F FALL RISK ASSESSMENT DOCD: CPT | Mod: CPTII,S$GLB,, | Performed by: OPHTHALMOLOGY

## 2021-02-22 PROCEDURE — 3288F PR FALLS RISK ASSESSMENT DOCUMENTED: ICD-10-PCS | Mod: CPTII,S$GLB,, | Performed by: OPHTHALMOLOGY

## 2021-02-22 PROCEDURE — 1101F PR PT FALLS ASSESS DOC 0-1 FALLS W/OUT INJ PAST YR: ICD-10-PCS | Mod: CPTII,S$GLB,, | Performed by: OPHTHALMOLOGY

## 2021-02-22 PROCEDURE — 92020 PR SPECIAL EYE EVAL,GONIOSCOPY: ICD-10-PCS | Mod: S$GLB,,, | Performed by: OPHTHALMOLOGY

## 2021-02-22 PROCEDURE — 92020 GONIOSCOPY: CPT | Mod: S$GLB,,, | Performed by: OPHTHALMOLOGY

## 2021-02-22 RX ORDER — HYDROXYZINE HYDROCHLORIDE 25 MG/1
25 TABLET, FILM COATED ORAL 3 TIMES DAILY PRN
Qty: 90 TABLET | Refills: 4 | Status: SHIPPED | OUTPATIENT
Start: 2021-02-22 | End: 2021-03-01 | Stop reason: SDUPTHER

## 2021-02-23 ENCOUNTER — PATIENT MESSAGE (OUTPATIENT)
Dept: RHEUMATOLOGY | Facility: CLINIC | Age: 76
End: 2021-02-23

## 2021-02-23 ENCOUNTER — PATIENT MESSAGE (OUTPATIENT)
Dept: OPHTHALMOLOGY | Facility: CLINIC | Age: 76
End: 2021-02-23

## 2021-03-01 ENCOUNTER — PATIENT MESSAGE (OUTPATIENT)
Dept: INTERNAL MEDICINE | Facility: CLINIC | Age: 76
End: 2021-03-01

## 2021-03-01 DIAGNOSIS — L50.9 URTICARIA: ICD-10-CM

## 2021-03-01 RX ORDER — HYDROXYZINE HYDROCHLORIDE 25 MG/1
25 TABLET, FILM COATED ORAL 3 TIMES DAILY PRN
Qty: 270 TABLET | Refills: 4 | Status: SHIPPED | OUTPATIENT
Start: 2021-03-01 | End: 2021-03-09 | Stop reason: SDUPTHER

## 2021-03-05 ENCOUNTER — PATIENT MESSAGE (OUTPATIENT)
Dept: ADMINISTRATIVE | Facility: HOSPITAL | Age: 76
End: 2021-03-05

## 2021-03-09 ENCOUNTER — PATIENT MESSAGE (OUTPATIENT)
Dept: INTERNAL MEDICINE | Facility: CLINIC | Age: 76
End: 2021-03-09

## 2021-03-09 DIAGNOSIS — L50.9 URTICARIA: ICD-10-CM

## 2021-03-09 RX ORDER — HYDROXYZINE HYDROCHLORIDE 25 MG/1
25 TABLET, FILM COATED ORAL 3 TIMES DAILY PRN
Qty: 270 TABLET | Refills: 4 | Status: SHIPPED | OUTPATIENT
Start: 2021-03-09 | End: 2021-06-01 | Stop reason: SDUPTHER

## 2021-03-24 ENCOUNTER — HOSPITAL ENCOUNTER (OUTPATIENT)
Dept: RADIOLOGY | Facility: HOSPITAL | Age: 76
Discharge: HOME | End: 2021-03-24
Attending: INTERNAL MEDICINE
Payer: MEDICARE

## 2021-03-24 DIAGNOSIS — Z12.31 ENCOUNTER FOR SCREENING MAMMOGRAM FOR MALIGNANT NEOPLASM OF BREAST: ICD-10-CM

## 2021-03-24 PROCEDURE — 77067 MAMMO DIGITAL SCREENING BILAT WITH TOMO: ICD-10-PCS | Mod: 26,,, | Performed by: RADIOLOGY

## 2021-03-24 PROCEDURE — 77067 SCR MAMMO BI INCL CAD: CPT | Mod: 26,,, | Performed by: RADIOLOGY

## 2021-03-24 PROCEDURE — 77063 BREAST TOMOSYNTHESIS BI: CPT | Mod: 26,,, | Performed by: RADIOLOGY

## 2021-03-24 PROCEDURE — 77067 SCR MAMMO BI INCL CAD: CPT | Mod: TC

## 2021-03-24 PROCEDURE — 77063 MAMMO DIGITAL SCREENING BILAT WITH TOMO: ICD-10-PCS | Mod: 26,,, | Performed by: RADIOLOGY

## 2021-04-07 ENCOUNTER — PATIENT MESSAGE (OUTPATIENT)
Dept: INTERNAL MEDICINE | Facility: CLINIC | Age: 76
End: 2021-04-07

## 2021-04-12 ENCOUNTER — OFFICE VISIT (OUTPATIENT)
Dept: INTERNAL MEDICINE | Facility: CLINIC | Age: 76
End: 2021-04-12
Payer: MEDICARE

## 2021-04-12 VITALS
HEIGHT: 64 IN | OXYGEN SATURATION: 99 % | WEIGHT: 151 LBS | DIASTOLIC BLOOD PRESSURE: 80 MMHG | HEART RATE: 68 BPM | BODY MASS INDEX: 25.78 KG/M2 | SYSTOLIC BLOOD PRESSURE: 134 MMHG

## 2021-04-12 DIAGNOSIS — M25.561 PAIN IN BOTH KNEES, UNSPECIFIED CHRONICITY: Primary | ICD-10-CM

## 2021-04-12 DIAGNOSIS — D69.2 SENILE PURPURA: ICD-10-CM

## 2021-04-12 DIAGNOSIS — E78.5 HYPERLIPIDEMIA, UNSPECIFIED HYPERLIPIDEMIA TYPE: ICD-10-CM

## 2021-04-12 DIAGNOSIS — R42 DIZZINESS: ICD-10-CM

## 2021-04-12 DIAGNOSIS — M17.0 PRIMARY OSTEOARTHRITIS OF BOTH KNEES: ICD-10-CM

## 2021-04-12 DIAGNOSIS — I10 BENIGN ESSENTIAL HYPERTENSION: Primary | ICD-10-CM

## 2021-04-12 DIAGNOSIS — M25.562 PAIN IN BOTH KNEES, UNSPECIFIED CHRONICITY: Primary | ICD-10-CM

## 2021-04-12 PROCEDURE — 3075F PR MOST RECENT SYSTOLIC BLOOD PRESS GE 130-139MM HG: ICD-10-PCS | Mod: CPTII,S$GLB,, | Performed by: INTERNAL MEDICINE

## 2021-04-12 PROCEDURE — 3075F SYST BP GE 130 - 139MM HG: CPT | Mod: CPTII,S$GLB,, | Performed by: INTERNAL MEDICINE

## 2021-04-12 PROCEDURE — 99999 PR PBB SHADOW E&M-EST. PATIENT-LVL III: CPT | Mod: PBBFAC,,, | Performed by: INTERNAL MEDICINE

## 2021-04-12 PROCEDURE — 99999 PR PBB SHADOW E&M-EST. PATIENT-LVL III: ICD-10-PCS | Mod: PBBFAC,,, | Performed by: INTERNAL MEDICINE

## 2021-04-12 PROCEDURE — 1159F MED LIST DOCD IN RCRD: CPT | Mod: S$GLB,,, | Performed by: INTERNAL MEDICINE

## 2021-04-12 PROCEDURE — 99214 OFFICE O/P EST MOD 30 MIN: CPT | Mod: S$GLB,,, | Performed by: INTERNAL MEDICINE

## 2021-04-12 PROCEDURE — 1159F PR MEDICATION LIST DOCUMENTED IN MEDICAL RECORD: ICD-10-PCS | Mod: S$GLB,,, | Performed by: INTERNAL MEDICINE

## 2021-04-12 PROCEDURE — 3079F DIAST BP 80-89 MM HG: CPT | Mod: CPTII,S$GLB,, | Performed by: INTERNAL MEDICINE

## 2021-04-12 PROCEDURE — 3079F PR MOST RECENT DIASTOLIC BLOOD PRESSURE 80-89 MM HG: ICD-10-PCS | Mod: CPTII,S$GLB,, | Performed by: INTERNAL MEDICINE

## 2021-04-12 PROCEDURE — 99214 PR OFFICE/OUTPT VISIT, EST, LEVL IV, 30-39 MIN: ICD-10-PCS | Mod: S$GLB,,, | Performed by: INTERNAL MEDICINE

## 2021-04-12 RX ORDER — CARVEDILOL 12.5 MG/1
12.5 TABLET ORAL 2 TIMES DAILY WITH MEALS
Qty: 180 TABLET | Refills: 3 | Status: SHIPPED | OUTPATIENT
Start: 2021-04-12 | End: 2021-06-09

## 2021-04-20 ENCOUNTER — PATIENT MESSAGE (OUTPATIENT)
Dept: ORTHOPEDICS | Facility: CLINIC | Age: 76
End: 2021-04-20

## 2021-04-20 DIAGNOSIS — M25.561 PAIN IN BOTH KNEES, UNSPECIFIED CHRONICITY: Primary | ICD-10-CM

## 2021-04-20 DIAGNOSIS — M25.562 PAIN IN BOTH KNEES, UNSPECIFIED CHRONICITY: Primary | ICD-10-CM

## 2021-04-22 ENCOUNTER — OFFICE VISIT (OUTPATIENT)
Dept: ORTHOPEDICS | Facility: CLINIC | Age: 76
End: 2021-04-22
Payer: MEDICARE

## 2021-04-22 ENCOUNTER — HOSPITAL ENCOUNTER (OUTPATIENT)
Dept: RADIOLOGY | Facility: HOSPITAL | Age: 76
Discharge: HOME | End: 2021-04-22
Attending: ORTHOPAEDIC SURGERY
Payer: MEDICARE

## 2021-04-22 VITALS — WEIGHT: 150.25 LBS | HEIGHT: 64 IN | BODY MASS INDEX: 25.65 KG/M2

## 2021-04-22 DIAGNOSIS — M25.562 PAIN IN BOTH KNEES, UNSPECIFIED CHRONICITY: ICD-10-CM

## 2021-04-22 DIAGNOSIS — M25.561 PAIN IN BOTH KNEES, UNSPECIFIED CHRONICITY: ICD-10-CM

## 2021-04-22 DIAGNOSIS — M17.0 PRIMARY OSTEOARTHRITIS OF BOTH KNEES: ICD-10-CM

## 2021-04-22 PROCEDURE — 99499 UNLISTED E&M SERVICE: CPT | Mod: S$GLB,,, | Performed by: ORTHOPAEDIC SURGERY

## 2021-04-22 PROCEDURE — 99214 OFFICE O/P EST MOD 30 MIN: CPT | Mod: 25,S$GLB,, | Performed by: ORTHOPAEDIC SURGERY

## 2021-04-22 PROCEDURE — 99214 PR OFFICE/OUTPT VISIT, EST, LEVL IV, 30-39 MIN: ICD-10-PCS | Mod: 25,S$GLB,, | Performed by: ORTHOPAEDIC SURGERY

## 2021-04-22 PROCEDURE — 1125F AMNT PAIN NOTED PAIN PRSNT: CPT | Mod: S$GLB,,, | Performed by: ORTHOPAEDIC SURGERY

## 2021-04-22 PROCEDURE — 1101F PT FALLS ASSESS-DOCD LE1/YR: CPT | Mod: CPTII,S$GLB,, | Performed by: ORTHOPAEDIC SURGERY

## 2021-04-22 PROCEDURE — 99499 RISK ADDL DX/OHS AUDIT: ICD-10-PCS | Mod: S$GLB,,, | Performed by: ORTHOPAEDIC SURGERY

## 2021-04-22 PROCEDURE — 73564 XR KNEE ORTHO BILAT WITH FLEXION: ICD-10-PCS | Mod: 26,50,, | Performed by: RADIOLOGY

## 2021-04-22 PROCEDURE — 20610 LARGE JOINT ASPIRATION/INJECTION: BILATERAL KNEE: ICD-10-PCS | Mod: 50,S$GLB,, | Performed by: ORTHOPAEDIC SURGERY

## 2021-04-22 PROCEDURE — 20610 DRAIN/INJ JOINT/BURSA W/O US: CPT | Mod: 50,S$GLB,, | Performed by: ORTHOPAEDIC SURGERY

## 2021-04-22 PROCEDURE — 73564 X-RAY EXAM KNEE 4 OR MORE: CPT | Mod: TC,50

## 2021-04-22 PROCEDURE — 99999 PR PBB SHADOW E&M-EST. PATIENT-LVL III: ICD-10-PCS | Mod: PBBFAC,,, | Performed by: ORTHOPAEDIC SURGERY

## 2021-04-22 PROCEDURE — 3288F FALL RISK ASSESSMENT DOCD: CPT | Mod: CPTII,S$GLB,, | Performed by: ORTHOPAEDIC SURGERY

## 2021-04-22 PROCEDURE — 99999 PR PBB SHADOW E&M-EST. PATIENT-LVL III: CPT | Mod: PBBFAC,,, | Performed by: ORTHOPAEDIC SURGERY

## 2021-04-22 PROCEDURE — 1101F PR PT FALLS ASSESS DOC 0-1 FALLS W/OUT INJ PAST YR: ICD-10-PCS | Mod: CPTII,S$GLB,, | Performed by: ORTHOPAEDIC SURGERY

## 2021-04-22 PROCEDURE — 1159F PR MEDICATION LIST DOCUMENTED IN MEDICAL RECORD: ICD-10-PCS | Mod: S$GLB,,, | Performed by: ORTHOPAEDIC SURGERY

## 2021-04-22 PROCEDURE — 1125F PR PAIN SEVERITY QUANTIFIED, PAIN PRESENT: ICD-10-PCS | Mod: S$GLB,,, | Performed by: ORTHOPAEDIC SURGERY

## 2021-04-22 PROCEDURE — 1159F MED LIST DOCD IN RCRD: CPT | Mod: S$GLB,,, | Performed by: ORTHOPAEDIC SURGERY

## 2021-04-22 PROCEDURE — 73564 X-RAY EXAM KNEE 4 OR MORE: CPT | Mod: 26,50,, | Performed by: RADIOLOGY

## 2021-04-22 PROCEDURE — 3288F PR FALLS RISK ASSESSMENT DOCUMENTED: ICD-10-PCS | Mod: CPTII,S$GLB,, | Performed by: ORTHOPAEDIC SURGERY

## 2021-04-22 RX ADMIN — TRIAMCINOLONE ACETONIDE 40 MG: 40 INJECTION, SUSPENSION INTRA-ARTICULAR; INTRAMUSCULAR at 10:04

## 2021-04-25 RX ORDER — TRIAMCINOLONE ACETONIDE 40 MG/ML
40 INJECTION, SUSPENSION INTRA-ARTICULAR; INTRAMUSCULAR
Status: DISCONTINUED | OUTPATIENT
Start: 2021-04-22 | End: 2021-04-25 | Stop reason: HOSPADM

## 2021-05-10 ENCOUNTER — TELEPHONE (OUTPATIENT)
Dept: INTERNAL MEDICINE | Facility: CLINIC | Age: 76
End: 2021-05-10

## 2021-05-10 ENCOUNTER — PATIENT MESSAGE (OUTPATIENT)
Dept: INTERNAL MEDICINE | Facility: CLINIC | Age: 76
End: 2021-05-10

## 2021-05-10 ENCOUNTER — CLINICAL SUPPORT (OUTPATIENT)
Dept: INTERNAL MEDICINE | Facility: CLINIC | Age: 76
End: 2021-05-10
Payer: MEDICARE

## 2021-05-10 VITALS — HEART RATE: 64 BPM | OXYGEN SATURATION: 98 % | DIASTOLIC BLOOD PRESSURE: 70 MMHG | SYSTOLIC BLOOD PRESSURE: 140 MMHG

## 2021-05-10 DIAGNOSIS — H00.011 HORDEOLUM EXTERNUM OF RIGHT UPPER EYELID: Primary | ICD-10-CM

## 2021-05-10 PROCEDURE — 99999 PR PBB SHADOW E&M-EST. PATIENT-LVL II: ICD-10-PCS | Mod: PBBFAC,,,

## 2021-05-10 PROCEDURE — 99999 PR PBB SHADOW E&M-EST. PATIENT-LVL II: CPT | Mod: PBBFAC,,,

## 2021-05-11 ENCOUNTER — OFFICE VISIT (OUTPATIENT)
Dept: OPTOMETRY | Facility: CLINIC | Age: 76
End: 2021-05-11
Payer: MEDICARE

## 2021-05-11 DIAGNOSIS — H52.13 MYOPIA WITH ASTIGMATISM AND PRESBYOPIA, BILATERAL: Primary | ICD-10-CM

## 2021-05-11 DIAGNOSIS — Z96.1 PSEUDOPHAKIA: ICD-10-CM

## 2021-05-11 DIAGNOSIS — H02.886 MEIBOMIAN GLAND DYSFUNCTION (MGD) OF BOTH EYES: ICD-10-CM

## 2021-05-11 DIAGNOSIS — H52.4 MYOPIA WITH ASTIGMATISM AND PRESBYOPIA, BILATERAL: Primary | ICD-10-CM

## 2021-05-11 DIAGNOSIS — H04.123 DRY EYE SYNDROME OF BOTH EYES: ICD-10-CM

## 2021-05-11 DIAGNOSIS — H52.203 MYOPIA WITH ASTIGMATISM AND PRESBYOPIA, BILATERAL: Primary | ICD-10-CM

## 2021-05-11 DIAGNOSIS — H40.013 OAG (OPEN ANGLE GLAUCOMA) SUSPECT, LOW RISK, BILATERAL: ICD-10-CM

## 2021-05-11 DIAGNOSIS — H00.013 HORDEOLUM EXTERNUM OF RIGHT EYE, UNSPECIFIED EYELID: ICD-10-CM

## 2021-05-11 DIAGNOSIS — H02.883 MEIBOMIAN GLAND DYSFUNCTION (MGD) OF BOTH EYES: ICD-10-CM

## 2021-05-11 PROCEDURE — 99999 PR PBB SHADOW E&M-EST. PATIENT-LVL III: ICD-10-PCS | Mod: PBBFAC,,, | Performed by: OPTOMETRIST

## 2021-05-11 PROCEDURE — 92014 COMPRE OPH EXAM EST PT 1/>: CPT | Mod: S$GLB,,, | Performed by: OPTOMETRIST

## 2021-05-11 PROCEDURE — 92014 PR EYE EXAM, EST PATIENT,COMPREHESV: ICD-10-PCS | Mod: S$GLB,,, | Performed by: OPTOMETRIST

## 2021-05-11 PROCEDURE — 1126F PR PAIN SEVERITY QUANTIFIED, NO PAIN PRESENT: ICD-10-PCS | Mod: S$GLB,,, | Performed by: OPTOMETRIST

## 2021-05-11 PROCEDURE — 92015 DETERMINE REFRACTIVE STATE: CPT | Mod: S$GLB,,, | Performed by: OPTOMETRIST

## 2021-05-11 PROCEDURE — 99999 PR PBB SHADOW E&M-EST. PATIENT-LVL III: CPT | Mod: PBBFAC,,, | Performed by: OPTOMETRIST

## 2021-05-11 PROCEDURE — 92015 PR REFRACTION: ICD-10-PCS | Mod: S$GLB,,, | Performed by: OPTOMETRIST

## 2021-05-11 PROCEDURE — 1126F AMNT PAIN NOTED NONE PRSNT: CPT | Mod: S$GLB,,, | Performed by: OPTOMETRIST

## 2021-05-11 RX ORDER — DOXYCYCLINE HYCLATE 100 MG
100 TABLET ORAL 2 TIMES DAILY
Qty: 20 TABLET | Refills: 0 | Status: SHIPPED | OUTPATIENT
Start: 2021-05-11 | End: 2021-05-21

## 2021-05-11 RX ORDER — NEOMYCIN SULFATE, POLYMYXIN B SULFATE AND DEXAMETHASONE 3.5; 10000; 1 MG/ML; [USP'U]/ML; MG/ML
1 SUSPENSION/ DROPS OPHTHALMIC 4 TIMES DAILY
Qty: 5 ML | Refills: 0 | Status: SHIPPED | OUTPATIENT
Start: 2021-05-11 | End: 2021-05-21

## 2021-05-24 ENCOUNTER — CLINICAL SUPPORT (OUTPATIENT)
Dept: INTERNAL MEDICINE | Facility: CLINIC | Age: 76
End: 2021-05-24
Payer: MEDICARE

## 2021-05-24 ENCOUNTER — PATIENT MESSAGE (OUTPATIENT)
Dept: INTERNAL MEDICINE | Facility: CLINIC | Age: 76
End: 2021-05-24

## 2021-05-24 VITALS — SYSTOLIC BLOOD PRESSURE: 160 MMHG | HEART RATE: 78 BPM | DIASTOLIC BLOOD PRESSURE: 80 MMHG

## 2021-05-24 PROCEDURE — 99999 PR PBB SHADOW E&M-EST. PATIENT-LVL II: ICD-10-PCS | Mod: PBBFAC,,,

## 2021-05-24 PROCEDURE — 99999 PR PBB SHADOW E&M-EST. PATIENT-LVL II: CPT | Mod: PBBFAC,,,

## 2021-05-25 ENCOUNTER — PATIENT MESSAGE (OUTPATIENT)
Dept: INTERNAL MEDICINE | Facility: CLINIC | Age: 76
End: 2021-05-25

## 2021-05-28 ENCOUNTER — PATIENT MESSAGE (OUTPATIENT)
Dept: INTERNAL MEDICINE | Facility: CLINIC | Age: 76
End: 2021-05-28

## 2021-05-31 ENCOUNTER — PATIENT MESSAGE (OUTPATIENT)
Dept: INTERNAL MEDICINE | Facility: CLINIC | Age: 76
End: 2021-05-31

## 2021-05-31 DIAGNOSIS — L50.9 URTICARIA: ICD-10-CM

## 2021-06-01 RX ORDER — HYDROXYZINE HYDROCHLORIDE 25 MG/1
25 TABLET, FILM COATED ORAL 3 TIMES DAILY PRN
Qty: 270 TABLET | Refills: 4 | Status: SHIPPED | OUTPATIENT
Start: 2021-06-01 | End: 2022-08-25

## 2021-06-07 ENCOUNTER — PATIENT MESSAGE (OUTPATIENT)
Dept: INTERNAL MEDICINE | Facility: CLINIC | Age: 76
End: 2021-06-07

## 2021-06-07 ENCOUNTER — CLINICAL SUPPORT (OUTPATIENT)
Dept: INTERNAL MEDICINE | Facility: CLINIC | Age: 76
End: 2021-06-07
Payer: MEDICARE

## 2021-06-07 VITALS — SYSTOLIC BLOOD PRESSURE: 142 MMHG | DIASTOLIC BLOOD PRESSURE: 74 MMHG | HEART RATE: 65 BPM

## 2021-06-07 PROCEDURE — 99999 PR PBB SHADOW E&M-EST. PATIENT-LVL I: ICD-10-PCS | Mod: PBBFAC,,,

## 2021-06-07 PROCEDURE — 99999 PR PBB SHADOW E&M-EST. PATIENT-LVL I: CPT | Mod: PBBFAC,,,

## 2021-06-09 ENCOUNTER — PATIENT MESSAGE (OUTPATIENT)
Dept: INTERNAL MEDICINE | Facility: CLINIC | Age: 76
End: 2021-06-09

## 2021-06-09 RX ORDER — CARVEDILOL 25 MG/1
25 TABLET ORAL 2 TIMES DAILY WITH MEALS
Qty: 180 TABLET | Refills: 3 | Status: SHIPPED | OUTPATIENT
Start: 2021-06-09 | End: 2021-11-04

## 2021-06-27 ENCOUNTER — PATIENT MESSAGE (OUTPATIENT)
Dept: INTERNAL MEDICINE | Facility: CLINIC | Age: 76
End: 2021-06-27

## 2021-07-21 ENCOUNTER — PATIENT MESSAGE (OUTPATIENT)
Dept: OPHTHALMOLOGY | Facility: CLINIC | Age: 76
End: 2021-07-21

## 2021-07-27 ENCOUNTER — CLINICAL SUPPORT (OUTPATIENT)
Dept: OPHTHALMOLOGY | Facility: CLINIC | Age: 76
End: 2021-07-27
Payer: MEDICARE

## 2021-07-27 ENCOUNTER — OFFICE VISIT (OUTPATIENT)
Dept: OPHTHALMOLOGY | Facility: CLINIC | Age: 76
End: 2021-07-27
Payer: MEDICARE

## 2021-07-27 DIAGNOSIS — H00.012 HORDEOLUM EXTERNUM RIGHT LOWER EYELID: ICD-10-CM

## 2021-07-27 DIAGNOSIS — H02.831 DERMATOCHALASIS OF BOTH UPPER EYELIDS: ICD-10-CM

## 2021-07-27 DIAGNOSIS — H02.834 DERMATOCHALASIS OF BOTH UPPER EYELIDS: ICD-10-CM

## 2021-07-27 DIAGNOSIS — H04.123 DRY EYES: ICD-10-CM

## 2021-07-27 DIAGNOSIS — H47.239 LARGE PHYSIOLOGIC CUPPING OF OPTIC DISC: ICD-10-CM

## 2021-07-27 DIAGNOSIS — H02.132 SENILE ECTROPION OF RIGHT LOWER EYELID: Primary | ICD-10-CM

## 2021-07-27 DIAGNOSIS — H04.551 NLDO, ACQUIRED (NASOLACRIMAL DUCT OBSTRUCTION), RIGHT: ICD-10-CM

## 2021-07-27 DIAGNOSIS — H04.542 STENOSIS OF LEFT LACRIMAL CANALICULI: ICD-10-CM

## 2021-07-27 DIAGNOSIS — H40.003 GLAUCOMA SUSPECT OF BOTH EYES: ICD-10-CM

## 2021-07-27 DIAGNOSIS — Z96.1 PSEUDOPHAKIA OF BOTH EYES: ICD-10-CM

## 2021-07-27 PROCEDURE — 92083 GOLDMANN PERIMETRY - OU - EXTENDED - BOTH EYES: ICD-10-PCS | Mod: S$GLB,,, | Performed by: OPHTHALMOLOGY

## 2021-07-27 PROCEDURE — 99999 PR PBB SHADOW E&M-EST. PATIENT-LVL III: ICD-10-PCS | Mod: PBBFAC,,, | Performed by: OPHTHALMOLOGY

## 2021-07-27 PROCEDURE — 68840 EXPLORE/IRRIGATE TEAR DUCTS: CPT | Mod: 50,S$GLB,, | Performed by: OPHTHALMOLOGY

## 2021-07-27 PROCEDURE — 1101F PR PT FALLS ASSESS DOC 0-1 FALLS W/OUT INJ PAST YR: ICD-10-PCS | Mod: CPTII,S$GLB,, | Performed by: OPHTHALMOLOGY

## 2021-07-27 PROCEDURE — 1160F RVW MEDS BY RX/DR IN RCRD: CPT | Mod: CPTII,S$GLB,, | Performed by: OPHTHALMOLOGY

## 2021-07-27 PROCEDURE — 99999 PR PBB SHADOW E&M-EST. PATIENT-LVL III: CPT | Mod: PBBFAC,,, | Performed by: OPHTHALMOLOGY

## 2021-07-27 PROCEDURE — 3288F FALL RISK ASSESSMENT DOCD: CPT | Mod: CPTII,S$GLB,, | Performed by: OPHTHALMOLOGY

## 2021-07-27 PROCEDURE — 92083 EXTENDED VISUAL FIELD XM: CPT | Mod: S$GLB,,, | Performed by: OPHTHALMOLOGY

## 2021-07-27 PROCEDURE — 1126F PR PAIN SEVERITY QUANTIFIED, NO PAIN PRESENT: ICD-10-PCS | Mod: CPTII,S$GLB,, | Performed by: OPHTHALMOLOGY

## 2021-07-27 PROCEDURE — 99214 OFFICE O/P EST MOD 30 MIN: CPT | Mod: 25,S$GLB,, | Performed by: OPHTHALMOLOGY

## 2021-07-27 PROCEDURE — 1126F AMNT PAIN NOTED NONE PRSNT: CPT | Mod: CPTII,S$GLB,, | Performed by: OPHTHALMOLOGY

## 2021-07-27 PROCEDURE — 1101F PT FALLS ASSESS-DOCD LE1/YR: CPT | Mod: CPTII,S$GLB,, | Performed by: OPHTHALMOLOGY

## 2021-07-27 PROCEDURE — 1159F PR MEDICATION LIST DOCUMENTED IN MEDICAL RECORD: ICD-10-PCS | Mod: CPTII,S$GLB,, | Performed by: OPHTHALMOLOGY

## 2021-07-27 PROCEDURE — 1159F MED LIST DOCD IN RCRD: CPT | Mod: CPTII,S$GLB,, | Performed by: OPHTHALMOLOGY

## 2021-07-27 PROCEDURE — 3288F PR FALLS RISK ASSESSMENT DOCUMENTED: ICD-10-PCS | Mod: CPTII,S$GLB,, | Performed by: OPHTHALMOLOGY

## 2021-07-27 PROCEDURE — 68840 PR EXPLORE LACRIMAL CANALICULI: ICD-10-PCS | Mod: 50,S$GLB,, | Performed by: OPHTHALMOLOGY

## 2021-07-27 PROCEDURE — 92285 EXTERNAL PHOTOGRAPHY - OU - BOTH EYES: ICD-10-PCS | Mod: S$GLB,,, | Performed by: OPHTHALMOLOGY

## 2021-07-27 PROCEDURE — 92285 EXTERNAL OCULAR PHOTOGRAPHY: CPT | Mod: S$GLB,,, | Performed by: OPHTHALMOLOGY

## 2021-07-27 PROCEDURE — 1160F PR REVIEW ALL MEDS BY PRESCRIBER/CLIN PHARMACIST DOCUMENTED: ICD-10-PCS | Mod: CPTII,S$GLB,, | Performed by: OPHTHALMOLOGY

## 2021-07-27 PROCEDURE — 99214 PR OFFICE/OUTPT VISIT, EST, LEVL IV, 30-39 MIN: ICD-10-PCS | Mod: 25,S$GLB,, | Performed by: OPHTHALMOLOGY

## 2021-07-29 ENCOUNTER — TELEPHONE (OUTPATIENT)
Dept: OPHTHALMOLOGY | Facility: CLINIC | Age: 76
End: 2021-07-29

## 2021-07-29 DIAGNOSIS — Z96.1 PSEUDOPHAKIA OF BOTH EYES: ICD-10-CM

## 2021-07-29 DIAGNOSIS — H02.132 SENILE ECTROPION OF RIGHT LOWER EYELID: Primary | ICD-10-CM

## 2021-07-29 DIAGNOSIS — H02.831 DERMATOCHALASIS OF BOTH UPPER EYELIDS: ICD-10-CM

## 2021-07-29 DIAGNOSIS — H04.542 STENOSIS OF LEFT LACRIMAL CANALICULI: ICD-10-CM

## 2021-07-29 DIAGNOSIS — H02.834 DERMATOCHALASIS OF BOTH UPPER EYELIDS: ICD-10-CM

## 2021-07-29 DIAGNOSIS — H00.012 HORDEOLUM EXTERNUM RIGHT LOWER EYELID: ICD-10-CM

## 2021-07-29 DIAGNOSIS — H40.003 GLAUCOMA SUSPECT OF BOTH EYES: ICD-10-CM

## 2021-07-29 DIAGNOSIS — H04.123 DRY EYES: ICD-10-CM

## 2021-07-29 DIAGNOSIS — H47.239 LARGE PHYSIOLOGIC CUPPING OF OPTIC DISC: ICD-10-CM

## 2021-07-29 DIAGNOSIS — H04.551 NLDO, ACQUIRED (NASOLACRIMAL DUCT OBSTRUCTION), RIGHT: ICD-10-CM

## 2021-08-05 ENCOUNTER — PATIENT MESSAGE (OUTPATIENT)
Dept: INTERNAL MEDICINE | Facility: CLINIC | Age: 76
End: 2021-08-05

## 2021-08-05 ENCOUNTER — TELEPHONE (OUTPATIENT)
Dept: OPHTHALMOLOGY | Facility: CLINIC | Age: 76
End: 2021-08-05

## 2021-08-06 ENCOUNTER — CLINICAL SUPPORT (OUTPATIENT)
Dept: URGENT CARE | Facility: CLINIC | Age: 76
End: 2021-08-06
Payer: MEDICARE

## 2021-08-06 DIAGNOSIS — Z11.52 ENCOUNTER FOR SCREENING FOR COVID-19: Primary | ICD-10-CM

## 2021-08-06 LAB
CTP QC/QA: YES
SARS-COV-2 RDRP RESP QL NAA+PROBE: NEGATIVE

## 2021-08-06 PROCEDURE — U0002 COVID-19 LAB TEST NON-CDC: HCPCS | Mod: QW,S$GLB,, | Performed by: PHYSICIAN ASSISTANT

## 2021-08-06 PROCEDURE — U0002: ICD-10-PCS | Mod: QW,S$GLB,, | Performed by: PHYSICIAN ASSISTANT

## 2021-08-17 ENCOUNTER — PATIENT MESSAGE (OUTPATIENT)
Dept: INTERNAL MEDICINE | Facility: CLINIC | Age: 76
End: 2021-08-17

## 2021-08-17 DIAGNOSIS — M25.561 CHRONIC PAIN OF BOTH KNEES: ICD-10-CM

## 2021-08-17 DIAGNOSIS — M25.562 CHRONIC PAIN OF BOTH KNEES: ICD-10-CM

## 2021-08-17 DIAGNOSIS — G89.29 CHRONIC PAIN OF BOTH KNEES: ICD-10-CM

## 2021-08-17 DIAGNOSIS — M25.50 POLYARTHRALGIA: ICD-10-CM

## 2021-08-17 RX ORDER — DICLOFENAC SODIUM 10 MG/G
2 GEL TOPICAL DAILY
Qty: 100 G | Refills: 3 | Status: SHIPPED | OUTPATIENT
Start: 2021-08-17 | End: 2022-04-07

## 2021-08-24 ENCOUNTER — PATIENT MESSAGE (OUTPATIENT)
Dept: INTERNAL MEDICINE | Facility: CLINIC | Age: 76
End: 2021-08-24

## 2021-08-24 ENCOUNTER — TELEPHONE (OUTPATIENT)
Dept: INTERNAL MEDICINE | Facility: CLINIC | Age: 76
End: 2021-08-24

## 2021-08-24 DIAGNOSIS — Z01.810 PREOP CARDIOVASCULAR EXAM: Primary | ICD-10-CM

## 2021-08-25 ENCOUNTER — PATIENT MESSAGE (OUTPATIENT)
Dept: INTERNAL MEDICINE | Facility: CLINIC | Age: 76
End: 2021-08-25

## 2021-08-26 ENCOUNTER — PATIENT MESSAGE (OUTPATIENT)
Dept: INTERNAL MEDICINE | Facility: CLINIC | Age: 76
End: 2021-08-26

## 2021-09-24 ENCOUNTER — TELEPHONE (OUTPATIENT)
Dept: OPHTHALMOLOGY | Facility: CLINIC | Age: 76
End: 2021-09-24

## 2021-09-27 ENCOUNTER — OFFICE VISIT (OUTPATIENT)
Dept: INTERNAL MEDICINE | Facility: CLINIC | Age: 76
End: 2021-09-27
Payer: MEDICARE

## 2021-09-27 VITALS
OXYGEN SATURATION: 99 % | TEMPERATURE: 98 F | HEIGHT: 64 IN | HEART RATE: 66 BPM | SYSTOLIC BLOOD PRESSURE: 136 MMHG | DIASTOLIC BLOOD PRESSURE: 86 MMHG | BODY MASS INDEX: 24.65 KG/M2 | WEIGHT: 144.38 LBS

## 2021-09-27 DIAGNOSIS — I10 BENIGN ESSENTIAL HYPERTENSION: Primary | ICD-10-CM

## 2021-09-27 DIAGNOSIS — E78.5 HYPERLIPIDEMIA, UNSPECIFIED HYPERLIPIDEMIA TYPE: ICD-10-CM

## 2021-09-27 DIAGNOSIS — Z78.0 POSTMENOPAUSAL ESTROGEN DEFICIENCY: ICD-10-CM

## 2021-09-27 PROCEDURE — 1159F MED LIST DOCD IN RCRD: CPT | Mod: HCNC,CPTII,S$GLB, | Performed by: INTERNAL MEDICINE

## 2021-09-27 PROCEDURE — 1126F AMNT PAIN NOTED NONE PRSNT: CPT | Mod: HCNC,CPTII,S$GLB, | Performed by: INTERNAL MEDICINE

## 2021-09-27 PROCEDURE — 1160F PR REVIEW ALL MEDS BY PRESCRIBER/CLIN PHARMACIST DOCUMENTED: ICD-10-PCS | Mod: HCNC,CPTII,S$GLB, | Performed by: INTERNAL MEDICINE

## 2021-09-27 PROCEDURE — 99214 PR OFFICE/OUTPT VISIT, EST, LEVL IV, 30-39 MIN: ICD-10-PCS | Mod: HCNC,S$GLB,, | Performed by: INTERNAL MEDICINE

## 2021-09-27 PROCEDURE — 99999 PR PBB SHADOW E&M-EST. PATIENT-LVL V: CPT | Mod: PBBFAC,,, | Performed by: INTERNAL MEDICINE

## 2021-09-27 PROCEDURE — 3075F SYST BP GE 130 - 139MM HG: CPT | Mod: HCNC,CPTII,S$GLB, | Performed by: INTERNAL MEDICINE

## 2021-09-27 PROCEDURE — 1159F PR MEDICATION LIST DOCUMENTED IN MEDICAL RECORD: ICD-10-PCS | Mod: HCNC,CPTII,S$GLB, | Performed by: INTERNAL MEDICINE

## 2021-09-27 PROCEDURE — 1101F PT FALLS ASSESS-DOCD LE1/YR: CPT | Mod: HCNC,CPTII,S$GLB, | Performed by: INTERNAL MEDICINE

## 2021-09-27 PROCEDURE — 99214 OFFICE O/P EST MOD 30 MIN: CPT | Mod: HCNC,S$GLB,, | Performed by: INTERNAL MEDICINE

## 2021-09-27 PROCEDURE — 99499 RISK ADDL DX/OHS AUDIT: ICD-10-PCS | Mod: HCNC,S$GLB,, | Performed by: INTERNAL MEDICINE

## 2021-09-27 PROCEDURE — 99999 PR PBB SHADOW E&M-EST. PATIENT-LVL V: ICD-10-PCS | Mod: PBBFAC,,, | Performed by: INTERNAL MEDICINE

## 2021-09-27 PROCEDURE — 3079F PR MOST RECENT DIASTOLIC BLOOD PRESSURE 80-89 MM HG: ICD-10-PCS | Mod: HCNC,CPTII,S$GLB, | Performed by: INTERNAL MEDICINE

## 2021-09-27 PROCEDURE — 3288F PR FALLS RISK ASSESSMENT DOCUMENTED: ICD-10-PCS | Mod: HCNC,CPTII,S$GLB, | Performed by: INTERNAL MEDICINE

## 2021-09-27 PROCEDURE — 1101F PR PT FALLS ASSESS DOC 0-1 FALLS W/OUT INJ PAST YR: ICD-10-PCS | Mod: HCNC,CPTII,S$GLB, | Performed by: INTERNAL MEDICINE

## 2021-09-27 PROCEDURE — 3075F PR MOST RECENT SYSTOLIC BLOOD PRESS GE 130-139MM HG: ICD-10-PCS | Mod: HCNC,CPTII,S$GLB, | Performed by: INTERNAL MEDICINE

## 2021-09-27 PROCEDURE — 3288F FALL RISK ASSESSMENT DOCD: CPT | Mod: HCNC,CPTII,S$GLB, | Performed by: INTERNAL MEDICINE

## 2021-09-27 PROCEDURE — 99499 UNLISTED E&M SERVICE: CPT | Mod: HCNC,S$GLB,, | Performed by: INTERNAL MEDICINE

## 2021-09-27 PROCEDURE — 1126F PR PAIN SEVERITY QUANTIFIED, NO PAIN PRESENT: ICD-10-PCS | Mod: HCNC,CPTII,S$GLB, | Performed by: INTERNAL MEDICINE

## 2021-09-27 PROCEDURE — 1160F RVW MEDS BY RX/DR IN RCRD: CPT | Mod: HCNC,CPTII,S$GLB, | Performed by: INTERNAL MEDICINE

## 2021-09-27 PROCEDURE — 3079F DIAST BP 80-89 MM HG: CPT | Mod: HCNC,CPTII,S$GLB, | Performed by: INTERNAL MEDICINE

## 2021-09-28 ENCOUNTER — LAB VISIT (OUTPATIENT)
Dept: LAB | Facility: HOSPITAL | Age: 76
End: 2021-09-28
Attending: INTERNAL MEDICINE
Payer: MEDICARE

## 2021-09-28 DIAGNOSIS — I10 BENIGN ESSENTIAL HYPERTENSION: ICD-10-CM

## 2021-09-28 DIAGNOSIS — E78.5 HYPERLIPIDEMIA, UNSPECIFIED HYPERLIPIDEMIA TYPE: ICD-10-CM

## 2021-09-28 LAB
ALBUMIN SERPL BCP-MCNC: 4.1 G/DL (ref 3.5–5.2)
ALP SERPL-CCNC: 59 U/L (ref 55–135)
ALT SERPL W/O P-5'-P-CCNC: 17 U/L (ref 10–44)
ANION GAP SERPL CALC-SCNC: 12 MMOL/L (ref 8–16)
AST SERPL-CCNC: 21 U/L (ref 10–40)
BASOPHILS # BLD AUTO: 0.04 K/UL (ref 0–0.2)
BASOPHILS NFR BLD: 1 % (ref 0–1.9)
BILIRUB SERPL-MCNC: 0.5 MG/DL (ref 0.1–1)
BUN SERPL-MCNC: 12 MG/DL (ref 8–23)
CALCIUM SERPL-MCNC: 10.1 MG/DL (ref 8.7–10.5)
CHLORIDE SERPL-SCNC: 104 MMOL/L (ref 95–110)
CHOLEST SERPL-MCNC: 225 MG/DL (ref 120–199)
CHOLEST/HDLC SERPL: 3.1 {RATIO} (ref 2–5)
CO2 SERPL-SCNC: 28 MMOL/L (ref 23–29)
CREAT SERPL-MCNC: 0.6 MG/DL (ref 0.5–1.4)
DIFFERENTIAL METHOD: ABNORMAL
EOSINOPHIL # BLD AUTO: 0.1 K/UL (ref 0–0.5)
EOSINOPHIL NFR BLD: 3 % (ref 0–8)
ERYTHROCYTE [DISTWIDTH] IN BLOOD BY AUTOMATED COUNT: 13.1 % (ref 11.5–14.5)
EST. GFR  (AFRICAN AMERICAN): >60 ML/MIN/1.73 M^2
EST. GFR  (NON AFRICAN AMERICAN): >60 ML/MIN/1.73 M^2
GLUCOSE SERPL-MCNC: 97 MG/DL (ref 70–110)
HCT VFR BLD AUTO: 37.5 % (ref 37–48.5)
HDLC SERPL-MCNC: 72 MG/DL (ref 40–75)
HDLC SERPL: 32 % (ref 20–50)
HGB BLD-MCNC: 12.2 G/DL (ref 12–16)
IMM GRANULOCYTES # BLD AUTO: 0.01 K/UL (ref 0–0.04)
IMM GRANULOCYTES NFR BLD AUTO: 0.2 % (ref 0–0.5)
LDLC SERPL CALC-MCNC: 139.8 MG/DL (ref 63–159)
LYMPHOCYTES # BLD AUTO: 1.3 K/UL (ref 1–4.8)
LYMPHOCYTES NFR BLD: 31.9 % (ref 18–48)
MCH RBC QN AUTO: 31.3 PG (ref 27–31)
MCHC RBC AUTO-ENTMCNC: 32.5 G/DL (ref 32–36)
MCV RBC AUTO: 96 FL (ref 82–98)
MONOCYTES # BLD AUTO: 0.5 K/UL (ref 0.3–1)
MONOCYTES NFR BLD: 12.6 % (ref 4–15)
NEUTROPHILS # BLD AUTO: 2.1 K/UL (ref 1.8–7.7)
NEUTROPHILS NFR BLD: 51.3 % (ref 38–73)
NONHDLC SERPL-MCNC: 153 MG/DL
NRBC BLD-RTO: 0 /100 WBC
PLATELET # BLD AUTO: 170 K/UL (ref 150–450)
PMV BLD AUTO: 10 FL (ref 9.2–12.9)
POTASSIUM SERPL-SCNC: 4.2 MMOL/L (ref 3.5–5.1)
PROT SERPL-MCNC: 7 G/DL (ref 6–8.4)
RBC # BLD AUTO: 3.9 M/UL (ref 4–5.4)
SODIUM SERPL-SCNC: 144 MMOL/L (ref 136–145)
TRIGL SERPL-MCNC: 66 MG/DL (ref 30–150)
WBC # BLD AUTO: 4.04 K/UL (ref 3.9–12.7)

## 2021-09-28 PROCEDURE — 85025 COMPLETE CBC W/AUTO DIFF WBC: CPT | Mod: HCNC | Performed by: INTERNAL MEDICINE

## 2021-09-28 PROCEDURE — 36415 COLL VENOUS BLD VENIPUNCTURE: CPT | Mod: HCNC,PO | Performed by: INTERNAL MEDICINE

## 2021-09-28 PROCEDURE — 80053 COMPREHEN METABOLIC PANEL: CPT | Mod: HCNC | Performed by: INTERNAL MEDICINE

## 2021-09-28 PROCEDURE — 80061 LIPID PANEL: CPT | Mod: HCNC | Performed by: INTERNAL MEDICINE

## 2021-09-30 ENCOUNTER — PATIENT MESSAGE (OUTPATIENT)
Dept: INTERNAL MEDICINE | Facility: CLINIC | Age: 76
End: 2021-09-30

## 2021-09-30 ENCOUNTER — TELEPHONE (OUTPATIENT)
Dept: INTERNAL MEDICINE | Facility: CLINIC | Age: 76
End: 2021-09-30

## 2021-09-30 DIAGNOSIS — E78.5 HYPERLIPIDEMIA, UNSPECIFIED HYPERLIPIDEMIA TYPE: Primary | ICD-10-CM

## 2021-09-30 RX ORDER — ROSUVASTATIN CALCIUM 5 MG/1
5 TABLET, COATED ORAL NIGHTLY
Qty: 90 TABLET | Refills: 3 | Status: SHIPPED | OUTPATIENT
Start: 2021-09-30 | End: 2022-01-06

## 2021-09-30 RX ORDER — ROSUVASTATIN CALCIUM 5 MG/1
5 TABLET, COATED ORAL NIGHTLY
Qty: 90 TABLET | Refills: 3 | Status: SHIPPED | OUTPATIENT
Start: 2021-09-30 | End: 2021-09-30 | Stop reason: SDUPTHER

## 2021-10-01 ENCOUNTER — PATIENT MESSAGE (OUTPATIENT)
Dept: INTERNAL MEDICINE | Facility: CLINIC | Age: 76
End: 2021-10-01

## 2021-10-05 ENCOUNTER — PATIENT MESSAGE (OUTPATIENT)
Dept: INTERNAL MEDICINE | Facility: CLINIC | Age: 76
End: 2021-10-05

## 2021-10-10 ENCOUNTER — PATIENT MESSAGE (OUTPATIENT)
Dept: INTERNAL MEDICINE | Facility: CLINIC | Age: 76
End: 2021-10-10

## 2021-10-14 ENCOUNTER — IMMUNIZATION (OUTPATIENT)
Dept: INTERNAL MEDICINE | Facility: CLINIC | Age: 76
End: 2021-10-14
Payer: MEDICARE

## 2021-10-14 DIAGNOSIS — Z23 NEED FOR VACCINATION: Primary | ICD-10-CM

## 2021-10-14 PROCEDURE — 91300 COVID-19, MRNA, LNP-S, PF, 30 MCG/0.3 ML DOSE VACCINE: CPT | Mod: HCNC,PBBFAC | Performed by: INTERNAL MEDICINE

## 2021-10-14 PROCEDURE — 0003A COVID-19, MRNA, LNP-S, PF, 30 MCG/0.3 ML DOSE VACCINE: CPT | Mod: HCNC,PBBFAC | Performed by: INTERNAL MEDICINE

## 2021-11-04 ENCOUNTER — PATIENT MESSAGE (OUTPATIENT)
Dept: INTERNAL MEDICINE | Facility: CLINIC | Age: 76
End: 2021-11-04
Payer: MEDICARE

## 2021-11-04 RX ORDER — CARVEDILOL 12.5 MG/1
TABLET ORAL
Qty: 270 TABLET | Refills: 3 | Status: SHIPPED | OUTPATIENT
Start: 2021-11-04 | End: 2022-01-06

## 2021-11-11 ENCOUNTER — OFFICE VISIT (OUTPATIENT)
Dept: OPHTHALMOLOGY | Facility: CLINIC | Age: 76
End: 2021-11-11
Payer: MEDICARE

## 2021-11-11 ENCOUNTER — CLINICAL SUPPORT (OUTPATIENT)
Dept: OPHTHALMOLOGY | Facility: CLINIC | Age: 76
End: 2021-11-11
Payer: MEDICARE

## 2021-11-11 DIAGNOSIS — H47.239 LARGE PHYSIOLOGIC CUPPING OF OPTIC DISC: ICD-10-CM

## 2021-11-11 DIAGNOSIS — Z96.1 PSEUDOPHAKIA OF BOTH EYES: ICD-10-CM

## 2021-11-11 DIAGNOSIS — H04.542 STENOSIS OF LEFT LACRIMAL CANALICULI: ICD-10-CM

## 2021-11-11 DIAGNOSIS — H40.023 OPEN ANGLE WITH BORDERLINE FINDINGS AND HIGH GLAUCOMA RISK IN BOTH EYES: Primary | ICD-10-CM

## 2021-11-11 DIAGNOSIS — H02.831 DERMATOCHALASIS OF BOTH UPPER EYELIDS: ICD-10-CM

## 2021-11-11 DIAGNOSIS — H02.834 DERMATOCHALASIS OF BOTH UPPER EYELIDS: ICD-10-CM

## 2021-11-11 DIAGNOSIS — H04.123 DRY EYES: ICD-10-CM

## 2021-11-11 DIAGNOSIS — H47.391 MYELINATED NERVE FIBERS OF OPTIC DISC OF RIGHT EYE: ICD-10-CM

## 2021-11-11 DIAGNOSIS — H02.132 SENILE ECTROPION OF RIGHT LOWER EYELID: ICD-10-CM

## 2021-11-11 DIAGNOSIS — H04.551 NLDO, ACQUIRED (NASOLACRIMAL DUCT OBSTRUCTION), RIGHT: ICD-10-CM

## 2021-11-11 PROCEDURE — 92014 COMPRE OPH EXAM EST PT 1/>: CPT | Mod: HCNC,S$GLB,, | Performed by: OPHTHALMOLOGY

## 2021-11-11 PROCEDURE — 99999 PR PBB SHADOW E&M-EST. PATIENT-LVL III: CPT | Mod: PBBFAC,HCNC,, | Performed by: OPHTHALMOLOGY

## 2021-11-11 PROCEDURE — 2023F PR DILATED RETINAL EXAM W/O EVID OF RETINOPATHY: ICD-10-PCS | Mod: HCNC,CPTII,S$GLB, | Performed by: OPHTHALMOLOGY

## 2021-11-11 PROCEDURE — 1101F PR PT FALLS ASSESS DOC 0-1 FALLS W/OUT INJ PAST YR: ICD-10-PCS | Mod: HCNC,CPTII,S$GLB, | Performed by: OPHTHALMOLOGY

## 2021-11-11 PROCEDURE — 3288F PR FALLS RISK ASSESSMENT DOCUMENTED: ICD-10-PCS | Mod: HCNC,CPTII,S$GLB, | Performed by: OPHTHALMOLOGY

## 2021-11-11 PROCEDURE — 92133 POSTERIOR SEGMENT OCT OPTIC NERVE(OCULAR COHERENCE TOMOGRAPHY) - OU - BOTH EYES: ICD-10-PCS | Mod: HCNC,S$GLB,, | Performed by: OPHTHALMOLOGY

## 2021-11-11 PROCEDURE — 1160F PR REVIEW ALL MEDS BY PRESCRIBER/CLIN PHARMACIST DOCUMENTED: ICD-10-PCS | Mod: HCNC,CPTII,S$GLB, | Performed by: OPHTHALMOLOGY

## 2021-11-11 PROCEDURE — 1159F MED LIST DOCD IN RCRD: CPT | Mod: HCNC,CPTII,S$GLB, | Performed by: OPHTHALMOLOGY

## 2021-11-11 PROCEDURE — 1160F RVW MEDS BY RX/DR IN RCRD: CPT | Mod: HCNC,CPTII,S$GLB, | Performed by: OPHTHALMOLOGY

## 2021-11-11 PROCEDURE — 92083 HUMPHREY VISUAL FIELD - OU - BOTH EYES: ICD-10-PCS | Mod: HCNC,S$GLB,, | Performed by: OPHTHALMOLOGY

## 2021-11-11 PROCEDURE — 2023F DILAT RTA XM W/O RTNOPTHY: CPT | Mod: HCNC,CPTII,S$GLB, | Performed by: OPHTHALMOLOGY

## 2021-11-11 PROCEDURE — 1126F AMNT PAIN NOTED NONE PRSNT: CPT | Mod: HCNC,CPTII,S$GLB, | Performed by: OPHTHALMOLOGY

## 2021-11-11 PROCEDURE — 1159F PR MEDICATION LIST DOCUMENTED IN MEDICAL RECORD: ICD-10-PCS | Mod: HCNC,CPTII,S$GLB, | Performed by: OPHTHALMOLOGY

## 2021-11-11 PROCEDURE — 92014 PR EYE EXAM, EST PATIENT,COMPREHESV: ICD-10-PCS | Mod: HCNC,S$GLB,, | Performed by: OPHTHALMOLOGY

## 2021-11-11 PROCEDURE — 99999 PR PBB SHADOW E&M-EST. PATIENT-LVL III: ICD-10-PCS | Mod: PBBFAC,HCNC,, | Performed by: OPHTHALMOLOGY

## 2021-11-11 PROCEDURE — 3288F FALL RISK ASSESSMENT DOCD: CPT | Mod: HCNC,CPTII,S$GLB, | Performed by: OPHTHALMOLOGY

## 2021-11-11 PROCEDURE — 92083 EXTENDED VISUAL FIELD XM: CPT | Mod: HCNC,S$GLB,, | Performed by: OPHTHALMOLOGY

## 2021-11-11 PROCEDURE — 92133 CPTRZD OPH DX IMG PST SGM ON: CPT | Mod: HCNC,S$GLB,, | Performed by: OPHTHALMOLOGY

## 2021-11-11 PROCEDURE — 1101F PT FALLS ASSESS-DOCD LE1/YR: CPT | Mod: HCNC,CPTII,S$GLB, | Performed by: OPHTHALMOLOGY

## 2021-11-11 PROCEDURE — 1126F PR PAIN SEVERITY QUANTIFIED, NO PAIN PRESENT: ICD-10-PCS | Mod: HCNC,CPTII,S$GLB, | Performed by: OPHTHALMOLOGY

## 2021-12-07 ENCOUNTER — PATIENT MESSAGE (OUTPATIENT)
Dept: INTERNAL MEDICINE | Facility: CLINIC | Age: 76
End: 2021-12-07
Payer: MEDICARE

## 2021-12-07 DIAGNOSIS — R42 DIZZINESS: ICD-10-CM

## 2021-12-07 DIAGNOSIS — I10 BENIGN ESSENTIAL HYPERTENSION: ICD-10-CM

## 2021-12-07 DIAGNOSIS — L50.9 URTICARIA: ICD-10-CM

## 2021-12-07 RX ORDER — HYDROCHLOROTHIAZIDE 12.5 MG/1
12.5 TABLET ORAL DAILY
Qty: 90 TABLET | Refills: 3 | Status: SHIPPED | OUTPATIENT
Start: 2021-12-07 | End: 2022-01-06

## 2021-12-08 ENCOUNTER — PATIENT MESSAGE (OUTPATIENT)
Dept: INTERNAL MEDICINE | Facility: CLINIC | Age: 76
End: 2021-12-08
Payer: MEDICARE

## 2021-12-08 ENCOUNTER — TELEPHONE (OUTPATIENT)
Dept: INTERNAL MEDICINE | Facility: CLINIC | Age: 76
End: 2021-12-08
Payer: MEDICARE

## 2021-12-10 ENCOUNTER — HOSPITAL ENCOUNTER (OUTPATIENT)
Dept: RADIOLOGY | Facility: HOSPITAL | Age: 76
Discharge: HOME | End: 2021-12-10
Attending: INTERNAL MEDICINE
Payer: MEDICARE

## 2021-12-10 ENCOUNTER — PATIENT MESSAGE (OUTPATIENT)
Dept: INTERNAL MEDICINE | Facility: CLINIC | Age: 76
End: 2021-12-10
Payer: MEDICARE

## 2021-12-10 DIAGNOSIS — Z78.0 POSTMENOPAUSAL ESTROGEN DEFICIENCY: ICD-10-CM

## 2021-12-10 PROCEDURE — 77080 DXA BONE DENSITY AXIAL: CPT | Mod: TC,HCNC

## 2021-12-10 PROCEDURE — 77080 DXA BONE DENSITY AXIAL: CPT | Mod: 26,HCNC,, | Performed by: RADIOLOGY

## 2021-12-10 PROCEDURE — 77080 DEXA BONE DENSITY SPINE HIP: ICD-10-PCS | Mod: 26,HCNC,, | Performed by: RADIOLOGY

## 2021-12-14 ENCOUNTER — PATIENT MESSAGE (OUTPATIENT)
Dept: INTERNAL MEDICINE | Facility: CLINIC | Age: 76
End: 2021-12-14
Payer: MEDICARE

## 2021-12-14 DIAGNOSIS — M17.0 PRIMARY OSTEOARTHRITIS OF BOTH KNEES: Primary | ICD-10-CM

## 2021-12-15 ENCOUNTER — PATIENT MESSAGE (OUTPATIENT)
Dept: INTERNAL MEDICINE | Facility: CLINIC | Age: 76
End: 2021-12-15
Payer: MEDICARE

## 2021-12-15 RX ORDER — TRAMADOL HYDROCHLORIDE 50 MG/1
50 TABLET ORAL EVERY 8 HOURS PRN
Qty: 21 TABLET | Refills: 0 | Status: SHIPPED | OUTPATIENT
Start: 2021-12-15 | End: 2021-12-22

## 2021-12-31 ENCOUNTER — PATIENT MESSAGE (OUTPATIENT)
Dept: INTERNAL MEDICINE | Facility: CLINIC | Age: 76
End: 2021-12-31
Payer: MEDICARE

## 2021-12-31 ENCOUNTER — HOSPITAL ENCOUNTER (EMERGENCY)
Facility: HOSPITAL | Age: 76
Discharge: HOME | End: 2021-12-31
Attending: EMERGENCY MEDICINE
Payer: MEDICARE

## 2021-12-31 VITALS
TEMPERATURE: 98 F | RESPIRATION RATE: 16 BRPM | OXYGEN SATURATION: 98 % | HEART RATE: 66 BPM | SYSTOLIC BLOOD PRESSURE: 144 MMHG | DIASTOLIC BLOOD PRESSURE: 71 MMHG

## 2021-12-31 DIAGNOSIS — R55 SYNCOPE: ICD-10-CM
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
## 2021-12-31 DIAGNOSIS — S01.01XA SCALP LACERATION, INITIAL ENCOUNTER: Primary | ICD-10-CM

## 2021-12-31 PROCEDURE — 12001 RPR S/N/AX/GEN/TRNK 2.5CM/<: CPT

## 2021-12-31 PROCEDURE — 99284 EMERGENCY DEPT VISIT MOD MDM: CPT | Mod: HCNC,25,, | Performed by: EMERGENCY MEDICINE

## 2021-12-31 PROCEDURE — 93010 ELECTROCARDIOGRAM REPORT: CPT | Mod: HCNC,,, | Performed by: INTERNAL MEDICINE

## 2021-12-31 PROCEDURE — 25000003 PHARM REV CODE 250: Mod: HCNC | Performed by: STUDENT IN AN ORGANIZED HEALTH CARE EDUCATION/TRAINING PROGRAM

## 2021-12-31 PROCEDURE — 99284 PR EMERGENCY DEPT VISIT,LEVEL IV: ICD-10-PCS | Mod: HCNC,25,, | Performed by: EMERGENCY MEDICINE

## 2021-12-31 PROCEDURE — 12001 RPR S/N/AX/GEN/TRNK 2.5CM/<: CPT | Mod: ,,, | Performed by: EMERGENCY MEDICINE

## 2021-12-31 PROCEDURE — 99285 EMERGENCY DEPT VISIT HI MDM: CPT | Mod: 25

## 2021-12-31 PROCEDURE — 12001 PR RESUPERF WND BODY <2.5CM: ICD-10-PCS | Mod: ,,, | Performed by: EMERGENCY MEDICINE

## 2021-12-31 PROCEDURE — 93005 ELECTROCARDIOGRAM TRACING: CPT | Mod: HCNC

## 2021-12-31 PROCEDURE — 93010 EKG 12-LEAD: ICD-10-PCS | Mod: HCNC,,, | Performed by: INTERNAL MEDICINE

## 2021-12-31 RX ORDER — LIDOCAINE HYDROCHLORIDE 10 MG/ML
20 INJECTION, SOLUTION EPIDURAL; INFILTRATION; INTRACAUDAL; PERINEURAL
Status: COMPLETED | OUTPATIENT
Start: 2021-12-31 | End: 2021-12-31

## 2021-12-31 RX ORDER — ACETAMINOPHEN 325 MG/1
650 TABLET ORAL
Status: COMPLETED | OUTPATIENT
Start: 2021-12-31 | End: 2021-12-31

## 2021-12-31 RX ADMIN — LIDOCAINE HYDROCHLORIDE 200 MG: 10 INJECTION, SOLUTION EPIDURAL; INFILTRATION; INTRACAUDAL at 01:12

## 2021-12-31 RX ADMIN — ACETAMINOPHEN 650 MG: 325 TABLET ORAL at 01:12

## 2021-12-31 NOTE — ED PROVIDER NOTES
Encounter Date: 12/31/2021       History     Chief Complaint   Patient presents with    Fall     Pt arrives via EMS c/o back pain and lac to back of head after falling after taking sleeping medicine. Denies blood thinners. Skin tear noted to left arm.     76-year-old female with PMH of hyperlipidemia and hypertension presents status post syncopal episode with laceration to scalp.  Patient states she was preparing her nighttime medicines and the next thing she remembers, she was on the ground.  She then called 911 who brought her to the ED.  She does not take any blood tenderness.  She notes bleeding of her posterior head and left forearm.  She denies any nausea, vomiting, or other symptoms upon awakening.  She denies any prodromal symptoms such as chest pain, shortness of breath, lightheadedness, dizziness, or changes in vision.  Denies prior episodes of syncope.  She currently is asymptomatic other than some pain around her laceration site.  She denies neck pain or decreased range of motion.    The history is provided by the patient.     Review of patient's allergies indicates:   Allergen Reactions    Ace inhibitors Other (See Comments)     Pt not sure which medication it was - was told by doctor that she was allergic    Amlodipine      flushing    Ibuprofen      Elevate blood pressure    Morphine Other (See Comments)     hallucination    Statins-hmg-coa reductase inhibitors      Muscle cramps     Past Medical History:   Diagnosis Date    Anxiety     Rene's disease     Cataract     Chronic low back pain     Depression     Goiter, nodular     Hyperlipidemia     Hypertension     Irritable bowel     Neuromuscular disorder     Osteopenia of multiple sites 5/29/2017    PUD (peptic ulcer disease)     Subarachnoid hemorrhage 2014    Varicose veins      Past Surgical History:   Procedure Laterality Date    APPENDECTOMY      BELPHAROPTOSIS REPAIR Bilateral     BREAST BIOPSY Right     RIGHT-axilla     CATARACT EXTRACTION W/  INTRAOCULAR LENS IMPLANT Left 08/03/2016        CATARACT EXTRACTION W/  INTRAOCULAR LENS IMPLANT Right 09/21/2016        SPINE SURGERY  07/2012    Fusion @ L4L5    TONSILLECTOMY       Family History   Problem Relation Age of Onset    Hypertension Mother     Kidney failure Mother     Hypertension Father     Coronary artery disease Father     Prostate cancer Father     Stroke Father     Eczema Son     Diabetes Brother     Thyroid cancer Neg Hx     Breast cancer Neg Hx     Colon cancer Neg Hx     Ovarian cancer Neg Hx     Amblyopia Neg Hx     Blindness Neg Hx     Glaucoma Neg Hx     Macular degeneration Neg Hx     Retinal detachment Neg Hx     Strabismus Neg Hx      Social History     Tobacco Use    Smoking status: Former Smoker     Packs/day: 1.00     Years: 10.00     Pack years: 10.00     Types: Cigarettes    Smokeless tobacco: Never Used    Tobacco comment: quit 1975   Substance Use Topics    Alcohol use: Yes     Alcohol/week: 14.0 standard drinks     Types: 14 Glasses of wine per week     Comment: socially    Drug use: No     Review of Systems   Constitutional: Negative for chills and fever.   HENT: Negative for congestion and sinus pain.    Eyes: Negative for pain and visual disturbance.   Respiratory: Negative for cough and shortness of breath.    Cardiovascular: Negative for chest pain and leg swelling.   Gastrointestinal: Negative for abdominal pain, constipation, diarrhea, nausea and vomiting.   Endocrine: Negative for polydipsia and polyuria.   Genitourinary: Negative for dysuria and hematuria.   Musculoskeletal: Negative for arthralgias and back pain.   Skin: Positive for wound. Negative for color change and rash.   Neurological: Positive for syncope and headaches. Negative for dizziness, weakness and light-headedness.       Physical Exam     Initial Vitals [12/31/21 0031]   BP Pulse Resp Temp SpO2   (!) 156/76 86 16 98.1 °F (36.7  °C) 98 %      MAP       --         Physical Exam    Nursing note and vitals reviewed.  Constitutional: She appears well-developed and well-nourished. She is not diaphoretic. No distress.   Patient resting comfortably in bed in no acute distress   HENT:   Head: Normocephalic.   2.5 cm vertical laceration to posterior scalp without active bleeding   Eyes: Conjunctivae and EOM are normal. Pupils are equal, round, and reactive to light.   Neck: Neck supple.   No midline tenderness to palpation   Normal range of motion.  Cardiovascular: Normal rate, regular rhythm, normal heart sounds and intact distal pulses.   No murmur heard.  Pulmonary/Chest: Breath sounds normal. No respiratory distress. She has no wheezes. She has no rhonchi. She has no rales.   Abdominal: Abdomen is soft. She exhibits no distension. There is no abdominal tenderness. There is no rebound and no guarding.   Musculoskeletal:         General: No tenderness or edema.      Cervical back: Normal range of motion and neck supple.      Comments: 2-3 cm skin tear to the left forearm. No palpation of sternum, b/l upper or lower extremities.     Back: no midline TTP of back, no stepoffs    All other joints (shoulder/elbow/wrist/hip/knee/ankle) were examined and had full ROM and were non-tender to palpation.       Neurological: She is alert and oriented to person, place, and time. She has normal strength. No sensory deficit. GCS score is 15. GCS eye subscore is 4. GCS verbal subscore is 5. GCS motor subscore is 6.   Skin: Skin is warm and dry. Capillary refill takes less than 2 seconds.         ED Course   Lac Repair    Date/Time: 12/31/2021 4:04 AM  Performed by: Kun Malik MD  Authorized by: Raul Owens MD     Consent:     Consent obtained:  Verbal    Consent given by:  Patient    Risks, benefits, and alternatives were discussed: yes      Risks discussed:  Infection and need for additional repair  Anesthesia:     Anesthesia method:  Local  infiltration    Local anesthetic:  Lidocaine 1% w/o epi  Laceration details:     Location:  Scalp    Scalp location:  Occipital    Length (cm):  2.5    Depth (mm):  5  Pre-procedure details:     Preparation:  Patient was prepped and draped in usual sterile fashion  Exploration:     Hemostasis achieved with:  Direct pressure    Wound exploration: wound explored through full range of motion      Contaminated: no    Treatment:     Area cleansed with:  Saline    Amount of cleaning:  Standard    Irrigation solution:  Sterile saline    Irrigation volume:  750    Irrigation method:  Pressure wash    Visualized foreign bodies/material removed: no      Debridement:  None  Skin repair:     Repair method:  Staples    Number of staples:  6  Approximation:     Approximation:  Close  Repair type:     Repair type:  Simple  Post-procedure details:     Dressing:  Open (no dressing)    Procedure completion:  Tolerated well, no immediate complications      Labs Reviewed - No data to display       Imaging Results          CT Cervical Spine Without Contrast (Final result)  Result time 12/31/21 04:24:00    Final result by Lei Braxton MD (12/31/21 04:24:00)                 Impression:      1. No acute cervical fracture.  2. Cervical spondylosis.  Grade 1 anterolisthesis C4 on C5.  3. Partial osseous fusion of the C6-C7 vertebral bodies.  Bilateral facet joint fusion CT through C4.  4. Enlarged right lobe thyroid gland with 1.1 cm nodule.  Follow-up nonemergent thyroid ultrasound can be obtained to further evaluate.  5. Additional findings as above.    Electronically signed by resident: Denae Bhatt  Date:    12/31/2021  Time:    03:28    Electronically signed by: Lei Braxton MD  Date:    12/31/2021  Time:    04:24             Narrative:    EXAMINATION:  CT CERVICAL SPINE WITHOUT CONTRAST    CLINICAL HISTORY:  Neck trauma (Age >= 65y);    TECHNIQUE:  Low dose axial images, sagittal and coronal reformations were performed  through the cervical spine.  Contrast was not administered.    COMPARISON:  Cervical spine radiograph 01/04/2017    CT head 12/31/2021    Thyroid ultrasound 04/30/2019    FINDINGS:  The visualized portions of the brain demonstrate no significant abnormality.    Skull base and craniocervical junction (partially imaged): No significant abnormality.    Straightening of the cervical lordosis.  Grade 1 C4 on C5 anterolisthesis.    Vertebrae: Anterior and posterior arches of C1 are normal. Ondontoid process is intact. Vertebral body heights are well maintained.  No evidence of fracture or dislocation.    Partial osseous fusion of the C6-C7 vertebral bodies.  Bilateral facet joint fusion CT through C4.  Disc space height is maintained    Diffusely decreased bone density.  Multilevel degenerative changes of the spine.  No significant spinal canal stenosis or neural foraminal narrowing.    No acute fractures or soft tissue injury. No significant central canal stenosis or neural foraminal narrowing.  No acute fracture identified.    Miscellaneous:    Prevertebral soft tissues are normal.  Biapical fibronodular changes.  The airways patent.  Lung apices are clear.    Enlarged right thyroid gland with 1.1 cm hypodense lesion.                               CT Head Without Contrast (Final result)  Result time 12/31/21 02:52:36    Final result by Lei Braxton MD (12/31/21 02:52:36)                 Impression:      Small age indeterminate lacunar infarcts in the internal capsules bilaterally, not present on the remote prior 2014 exam.  Further evaluation with MRI, as clinically indicated.      Electronically signed by: Lei Braxton MD  Date:    12/31/2021  Time:    02:52             Narrative:    EXAMINATION:  CT HEAD WITHOUT CONTRAST    CLINICAL HISTORY:  Head trauma, minor (Age >= 65y);    TECHNIQUE:  Low dose axial images were obtained through the head.  Coronal and sagittal reformations were also performed. Contrast  was not administered.    COMPARISON:  02/09/2014.    FINDINGS:  Small age indeterminate lacunar infarcts in the internal capsules bilaterally.  There is no evidence of acute hemorrhage or mass.  The ventricular system is normal in size.  No mass-effect or midline shift.  There are no abnormal extra-axial fluid collections.  The paranasal sinuses and mastoid air cells are clear.  The calvarium appears intact.  .                                 Medications   LIDOcaine (PF) 10 mg/ml (1%) injection 200 mg (200 mg Infiltration Given by Other 12/31/21 0130)   acetaminophen tablet 650 mg (650 mg Oral Given 12/31/21 0137)     Medical Decision Making:   Initial Assessment:   76-year-old female with PMH of hyperlipidemia and hypertension presents status post syncopal episode with laceration to the posterior scalp with associated headache and no other complaints, she is hemodynamically stable, exam remarkable for a 2.5 cm vertical laceration to the posterior scalp and a skin tear to the left forearm, otherwise patient's GCS 15 and neurologically intact.  Differential Diagnosis:   Epidural hematoma, subdural hematoma, intracranial hemorrhage, subarachnoid, scalp laceration, syncope, dysrhythmia  Clinical Tests:   Medical Tests: Ordered and Reviewed  ED Management:  Patient's CT head negative for acute process such as hemorrhage or large vessel infarct.  CT cervical spine also negative for acute fracture dislocation.  EKG unremarkable shows normal sinus rhythm.  Patient's scalp laceration repaired with staples.  She remains hemodynamically stable and asymptomatic, so she may be discharged at this time.  She has been given strict return precautions as well as follow-up instructions, including for staple removal.  Patient agrees with and is comfortable with plan.                      Clinical Impression:   Final diagnoses:  [R55] Syncope  [S01.01XA] Scalp laceration, initial encounter (Primary)  [W19.XXXA] Fall, initial  encounter          ED Disposition Condition    Discharge Stable        ED Prescriptions     None        Follow-up Information     Follow up With Specialties Details Why Contact Info    Lisa Dean MD Internal Medicine Schedule an appointment as soon as possible for a visit  To discuss your recent ER visit and any additional concerns that you may have [address]  [facility] [address]  [phone]      [facility] Emergency [facility] Emergency Medicine Go to  As needed, If symptoms worsen [address]  [facility] [id number] [phone]           Kun Malik MD  Resident  12/31/21 0427

## 2021-12-31 NOTE — ED NOTES
"Pt received to intake 03. Pt complaint of syncopal episode and pain to left hip. Pt reports abrasion to left arm and laceration to posterior occipital area.. Pt states " I was ready to go to sleep then I went to the kitchen. Suddenly I lost consciousness. I didn't have my phone or anything. So when I recovered I couldn't even walk. So I crawled until I got to my phone and called an ambulance. " Awaiting MD orders/disposition. Pt verbalizes understanding of plan of care.   "

## 2021-12-31 NOTE — DISCHARGE INSTRUCTIONS
Diagnosis: Laceration    Keep the wound clean and dry. You may cleanse it daily by gently rinsing it with soap and water. Change the bandage twice daily, or when it becomes soiled or wet.    Do not soak the wound in water. This includes swimming pools, hot tubs, or while washing dishes. It is okay to shower.    If stitches or staples were placed, return to your primary care doctor, urgent care, (or if no other option the emergency department) for removal as instructed.        Return to the emergency department if you develop fevers, spreading redness or streaking redness, severe pain, swelling, or leakage of pus from the wound.

## 2022-01-01 ENCOUNTER — PATIENT MESSAGE (OUTPATIENT)
Dept: CARDIOLOGY | Facility: CLINIC | Age: 77
End: 2022-01-01
Payer: MEDICARE

## 2022-01-04 NOTE — TELEPHONE ENCOUNTER
Dee ADAME Dianne Gonzalez Staff  Caller: Self/[phone] (Yesterday,  8:05 AM)  Caller is requesting an earlier appointment then we can schedule.  Caller is requesting a message be sent to the provider.   If this is for urgent care symptoms, did you offer other providers at this location, providers at other locations, or [facility]? (yes, no, n/a):  n/a   If this is for the patients physical, did you offer to schedule next available and put on wait list, or to see NP or PA for their physical?  (yes, no, n/a):  n/a   When is the next available appointment with their provider:  2/3   Reason for the appointment:  ER follow up from passing out/injury to head   Patient preference of timeframe to be scheduled:  asap   Would the patient like a call back, or a response through their [facility] portal?:   call back   Comments:

## 2022-01-06 ENCOUNTER — OFFICE VISIT (OUTPATIENT)
Dept: INTERNAL MEDICINE | Facility: CLINIC | Age: 77
End: 2022-01-06
Payer: MEDICARE

## 2022-01-06 ENCOUNTER — PATIENT MESSAGE (OUTPATIENT)
Dept: CARDIOLOGY | Facility: CLINIC | Age: 77
End: 2022-01-06

## 2022-01-06 ENCOUNTER — OFFICE VISIT (OUTPATIENT)
Dept: CARDIOLOGY | Facility: CLINIC | Age: 77
End: 2022-01-06
Payer: MEDICARE

## 2022-01-06 ENCOUNTER — HOSPITAL ENCOUNTER (OUTPATIENT)
Dept: CARDIOLOGY | Facility: HOSPITAL | Age: 77
Discharge: HOME | End: 2022-01-06
Attending: INTERNAL MEDICINE
Payer: MEDICARE

## 2022-01-06 VITALS
DIASTOLIC BLOOD PRESSURE: 80 MMHG | SYSTOLIC BLOOD PRESSURE: 170 MMHG | WEIGHT: 147 LBS | HEART RATE: 63 BPM | BODY MASS INDEX: 25.1 KG/M2 | HEIGHT: 64 IN

## 2022-01-06 VITALS
HEART RATE: 74 BPM | SYSTOLIC BLOOD PRESSURE: 120 MMHG | BODY MASS INDEX: 25.18 KG/M2 | WEIGHT: 147.5 LBS | DIASTOLIC BLOOD PRESSURE: 80 MMHG | HEIGHT: 64 IN

## 2022-01-06 VITALS — SYSTOLIC BLOOD PRESSURE: 173 MMHG | HEART RATE: 73 BPM | DIASTOLIC BLOOD PRESSURE: 82 MMHG

## 2022-01-06 DIAGNOSIS — R55 SYNCOPE AND COLLAPSE: Primary | ICD-10-CM

## 2022-01-06 DIAGNOSIS — R55 SYNCOPE AND COLLAPSE: ICD-10-CM

## 2022-01-06 DIAGNOSIS — E04.1 THYROID NODULE: ICD-10-CM

## 2022-01-06 DIAGNOSIS — R55 SYNCOPE, UNSPECIFIED SYNCOPE TYPE: Primary | ICD-10-CM

## 2022-01-06 DIAGNOSIS — I10 PRIMARY HYPERTENSION: ICD-10-CM

## 2022-01-06 DIAGNOSIS — Z48.02 ENCOUNTER FOR STAPLE REMOVAL: ICD-10-CM

## 2022-01-06 DIAGNOSIS — S51.802D: ICD-10-CM

## 2022-01-06 DIAGNOSIS — E78.49 OTHER HYPERLIPIDEMIA: ICD-10-CM

## 2022-01-06 LAB
ASCENDING AORTA: 3.12 CM
AV INDEX (PROSTH): 0.72
AV MEAN GRADIENT: 4 MMHG
AV PEAK GRADIENT: 7 MMHG
AV VALVE AREA: 2.26 CM2
AV VELOCITY RATIO: 0.72
BSA FOR ECHO PROCEDURE: 1.74 M2
CV ECHO LV RWT: 0.29 CM
DOP CALC AO PEAK VEL: 1.28 M/S
DOP CALC AO VTI: 31.83 CM
DOP CALC LVOT AREA: 3.1 CM2
DOP CALC LVOT DIAMETER: 2 CM
DOP CALC LVOT PEAK VEL: 0.92 M/S
DOP CALC LVOT STROKE VOLUME: 71.81 CM3
DOP CALCLVOT PEAK VEL VTI: 22.87 CM
E WAVE DECELERATION TIME: 236.6 MSEC
E/A RATIO: 0.8
E/E' RATIO: 8.83 M/S
ECHO LV POSTERIOR WALL: 0.78 CM (ref 0.6–1.1)
EJECTION FRACTION: 65 %
FRACTIONAL SHORTENING: 44 % (ref 28–44)
INTERVENTRICULAR SEPTUM: 0.85 CM (ref 0.6–1.1)
IVRT: 111.32 MSEC
LA MAJOR: 5.73 CM
LA MINOR: 5.67 CM
LA WIDTH: 4.82 CM
LEFT ATRIUM SIZE: 3.56 CM
LEFT ATRIUM VOLUME INDEX MOD: 44.9 ML/M2
LEFT ATRIUM VOLUME INDEX: 48.3 ML/M2
LEFT ATRIUM VOLUME MOD: 77.24 CM3
LEFT ATRIUM VOLUME: 83.13 CM3
LEFT INTERNAL DIMENSION IN SYSTOLE: 2.97 CM (ref 2.1–4)
LEFT VENTRICLE DIASTOLIC VOLUME INDEX: 79.16 ML/M2
LEFT VENTRICLE DIASTOLIC VOLUME: 136.16 ML
LEFT VENTRICLE MASS INDEX: 90 G/M2
LEFT VENTRICLE SYSTOLIC VOLUME INDEX: 19.9 ML/M2
LEFT VENTRICLE SYSTOLIC VOLUME: 34.21 ML
LEFT VENTRICULAR INTERNAL DIMENSION IN DIASTOLE: 5.31 CM (ref 3.5–6)
LEFT VENTRICULAR MASS: 154.13 G
LV LATERAL E/E' RATIO: 8.83 M/S
LV SEPTAL E/E' RATIO: 8.83 M/S
MV PEAK A VEL: 0.66 M/S
MV PEAK E VEL: 0.53 M/S
MV STENOSIS PRESSURE HALF TIME: 68.61 MS
MV VALVE AREA P 1/2 METHOD: 3.21 CM2
PISA TR MAX VEL: 2.48 M/S
PULM VEIN S/D RATIO: 1.55
PV PEAK D VEL: 0.44 M/S
PV PEAK S VEL: 0.68 M/S
RA MAJOR: 5.66 CM
RA PRESSURE: 3 MMHG
RA WIDTH: 3.19 CM
RIGHT VENTRICULAR END-DIASTOLIC DIMENSION: 3.04 CM
SINUS: 2.75 CM
STJ: 2.37 CM
TDI LATERAL: 0.06 M/S
TDI SEPTAL: 0.06 M/S
TDI: 0.06 M/S
TR MAX PG: 25 MMHG
TRICUSPID ANNULAR PLANE SYSTOLIC EXCURSION: 2.89 CM
TV REST PULMONARY ARTERY PRESSURE: 28 MMHG

## 2022-01-06 PROCEDURE — 3074F SYST BP LT 130 MM HG: CPT | Mod: CPTII,S$GLB,, | Performed by: INTERNAL MEDICINE

## 2022-01-06 PROCEDURE — 99205 PR OFFICE/OUTPT VISIT, NEW, LEVL V, 60-74 MIN: ICD-10-PCS | Mod: 25,S$GLB,, | Performed by: INTERNAL MEDICINE

## 2022-01-06 PROCEDURE — 3288F PR FALLS RISK ASSESSMENT DOCUMENTED: ICD-10-PCS | Mod: CPTII,S$GLB,, | Performed by: INTERNAL MEDICINE

## 2022-01-06 PROCEDURE — 1160F RVW MEDS BY RX/DR IN RCRD: CPT | Mod: CPTII,S$GLB,, | Performed by: INTERNAL MEDICINE

## 2022-01-06 PROCEDURE — 99214 OFFICE O/P EST MOD 30 MIN: CPT | Mod: S$GLB,,, | Performed by: INTERNAL MEDICINE

## 2022-01-06 PROCEDURE — 99999 PR PBB SHADOW E&M-EST. PATIENT-LVL II: CPT | Mod: PBBFAC,HCNC,, | Performed by: INTERNAL MEDICINE

## 2022-01-06 PROCEDURE — 3079F DIAST BP 80-89 MM HG: CPT | Mod: CPTII,S$GLB,, | Performed by: INTERNAL MEDICINE

## 2022-01-06 PROCEDURE — 1159F MED LIST DOCD IN RCRD: CPT | Mod: CPTII,S$GLB,, | Performed by: INTERNAL MEDICINE

## 2022-01-06 PROCEDURE — 99205 OFFICE O/P NEW HI 60 MIN: CPT | Mod: 25,S$GLB,, | Performed by: INTERNAL MEDICINE

## 2022-01-06 PROCEDURE — 99499 UNLISTED E&M SERVICE: CPT | Mod: S$GLB,,, | Performed by: INTERNAL MEDICINE

## 2022-01-06 PROCEDURE — 93306 TTE W/DOPPLER COMPLETE: CPT | Mod: 26,,, | Performed by: INTERNAL MEDICINE

## 2022-01-06 PROCEDURE — 1160F PR REVIEW ALL MEDS BY PRESCRIBER/CLIN PHARMACIST DOCUMENTED: ICD-10-PCS | Mod: CPTII,S$GLB,, | Performed by: INTERNAL MEDICINE

## 2022-01-06 PROCEDURE — 99999 PR PBB SHADOW E&M-EST. PATIENT-LVL III: CPT | Mod: PBBFAC,HCNC,, | Performed by: INTERNAL MEDICINE

## 2022-01-06 PROCEDURE — 1159F PR MEDICATION LIST DOCUMENTED IN MEDICAL RECORD: ICD-10-PCS | Mod: CPTII,S$GLB,, | Performed by: INTERNAL MEDICINE

## 2022-01-06 PROCEDURE — 3077F PR MOST RECENT SYSTOLIC BLOOD PRESSURE >= 140 MM HG: ICD-10-PCS | Mod: CPTII,S$GLB,, | Performed by: INTERNAL MEDICINE

## 2022-01-06 PROCEDURE — 93306 ECHO (CUPID ONLY): ICD-10-PCS | Mod: 26,,, | Performed by: INTERNAL MEDICINE

## 2022-01-06 PROCEDURE — 1101F PT FALLS ASSESS-DOCD LE1/YR: CPT | Mod: CPTII,S$GLB,, | Performed by: INTERNAL MEDICINE

## 2022-01-06 PROCEDURE — 99214 PR OFFICE/OUTPT VISIT, EST, LEVL IV, 30-39 MIN: ICD-10-PCS | Mod: S$GLB,,, | Performed by: INTERNAL MEDICINE

## 2022-01-06 PROCEDURE — 1126F PR PAIN SEVERITY QUANTIFIED, NO PAIN PRESENT: ICD-10-PCS | Mod: CPTII,S$GLB,, | Performed by: INTERNAL MEDICINE

## 2022-01-06 PROCEDURE — 99499 RISK ADDL DX/OHS AUDIT: ICD-10-PCS | Mod: S$GLB,,, | Performed by: INTERNAL MEDICINE

## 2022-01-06 PROCEDURE — 99999 PR PBB SHADOW E&M-EST. PATIENT-LVL II: ICD-10-PCS | Mod: PBBFAC,HCNC,, | Performed by: INTERNAL MEDICINE

## 2022-01-06 PROCEDURE — 3079F PR MOST RECENT DIASTOLIC BLOOD PRESSURE 80-89 MM HG: ICD-10-PCS | Mod: CPTII,S$GLB,, | Performed by: INTERNAL MEDICINE

## 2022-01-06 PROCEDURE — 99999 PR PBB SHADOW E&M-EST. PATIENT-LVL III: ICD-10-PCS | Mod: PBBFAC,HCNC,, | Performed by: INTERNAL MEDICINE

## 2022-01-06 PROCEDURE — 3077F SYST BP >= 140 MM HG: CPT | Mod: CPTII,S$GLB,, | Performed by: INTERNAL MEDICINE

## 2022-01-06 PROCEDURE — 1101F PR PT FALLS ASSESS DOC 0-1 FALLS W/OUT INJ PAST YR: ICD-10-PCS | Mod: CPTII,S$GLB,, | Performed by: INTERNAL MEDICINE

## 2022-01-06 PROCEDURE — 3074F PR MOST RECENT SYSTOLIC BLOOD PRESSURE < 130 MM HG: ICD-10-PCS | Mod: CPTII,S$GLB,, | Performed by: INTERNAL MEDICINE

## 2022-01-06 PROCEDURE — 1126F AMNT PAIN NOTED NONE PRSNT: CPT | Mod: CPTII,S$GLB,, | Performed by: INTERNAL MEDICINE

## 2022-01-06 PROCEDURE — 3288F FALL RISK ASSESSMENT DOCD: CPT | Mod: CPTII,S$GLB,, | Performed by: INTERNAL MEDICINE

## 2022-01-06 PROCEDURE — 93306 TTE W/DOPPLER COMPLETE: CPT

## 2022-01-06 RX ORDER — CARVEDILOL 12.5 MG/1
TABLET ORAL
Qty: 180 TABLET | Refills: 3
Start: 2022-01-06 | End: 2022-02-03 | Stop reason: SDUPTHER

## 2022-01-06 RX ORDER — ATORVASTATIN CALCIUM 10 MG/1
10 TABLET, FILM COATED ORAL DAILY
Qty: 30 TABLET | Refills: 6 | Status: SHIPPED | OUTPATIENT
Start: 2022-01-06 | End: 2022-02-03

## 2022-01-06 NOTE — ASSESSMENT & PLAN NOTE
The patient has a long history of hypertension.  Blood pressures are slightly above goal on home checks.  We will re-enroll her in the digital hypertension program and have her blood pressure cuff recalibrated or reassessed to ensure they are accurate given her elevated pressure today.    The patient has evidence of chronic hypertension given her LVH on 2014 echo and lacunar infarcts seen on her CT head.  This demands aggressive blood pressure control, however, she also has evidence of intermittent orthostatic symptoms.    For now we will decrease her carvedilol to 12.5 mg twice a day and hold her HCTZ understanding that we will likely have to reintroduce other blood pressure medicines.  Permissive hypertension for short period of time is acceptable given her recent episode of syncope.

## 2022-01-06 NOTE — ASSESSMENT & PLAN NOTE
I suspect her syncope was related to a combination of vagal tone and increased p.m. dosing of her blood pressure medicines.  This is seemingly happened before in 2014.    Regardless, for complete list we will check a Holter monitor and repeat her echocardiogram.

## 2022-01-06 NOTE — ASSESSMENT & PLAN NOTE
The patient has LDL above goal given her history of lacunar infarcts.  She has tried multiple doses of rosuvastatin to no avail.  We will do a trial of atorvastatin therapy 10 mg daily.  If she does not tolerate this, we can try Zetia and possibly consider PCSK9 inhibitors.

## 2022-01-06 NOTE — PROGRESS NOTES
"EMERGENCY ROOM FOLLOWUP NOTE    Discharge note was reviewed in detail and discussed with the patient.     DISCHARGE DIAGNOSES:  1. Syncope  2. Lac to posterior scalp  3. Skin tear to left forearm    PMFSH: all information reviewed and updated as necessary during this encounter.  Medication list reviewed and reconciled.    Chart reviewed in entirety with patient.   She hasn't had any other episodes of syncope and this past one was the very first one that was unprovoked. She was getting a glass of water and the next thing she remembers is getting up off of the floor and the glass of water was on the counter.        PE:  APPEARANCE: no acute distress.  Appearing healthy. well nourished.  Vitals:    01/06/22 1022   BP: 120/80   BP Location: Left arm   Patient Position: Sitting   BP Method: Medium (Manual)   Pulse: 74   Weight: 66.9 kg (147 lb 7.8 oz)   Height: 5' 4" (1.626 m)      She is alert and very conversant.   5 staples removed from post lac on scalp.   Her arm skin avulsion looks OK.  No induration or erythema indicating infection. She has some yellowish cellular debris and was educated on soap and water wash and keeping this clean and moist.     Assessment/plan:   1. Syncope and collapse  Needs additional evaluation. Unknown cause. CT of head was normal and EKG was normal.   - Ambulatory referral/consult to Cardiology; Future    2. Encounter for staple removal  All removed. See before and after photos.     3. Avulsion of skin of left forearm, subsequent encounter  See discussion above.     4. Thyroid nodule   Pt. Was already aware of this that was found on her neck CT.  She has this followed by her PCP with routine follow up imaging.     I spent a total of 30 minutes on the day of the visit.This includes face to face time and non-face to face time preparing to see the patient (eg, review of tests), obtaining and/or reviewing separately obtained history, documenting clinical information in the electronic or other " health record, independently interpreting results and communicating results to the patient/family/caregiver, and coordinating care.

## 2022-01-06 NOTE — PROGRESS NOTES
No chief complaint on file.      HPI:  This is a pleasant 76-year-old female with a history of hypertension and hyperlipidemia presenting for initial evaluation by me after a syncopal event.    Her event happened 1 week ago in the evening.  She was doing her usual evening tasks around her house when she went to get a glass of water and suddenly had loss of consciousness.  There were no preceding symptoms of lightheadedness or dizziness.  Her syncope was sudden.  She awoke on the ground and was not certain how long she was out for.  She did have some head trauma on the back of her head requiring stitches.  She visited the emergency room and was evaluated with an unremarkable workup.    Since the event she has been able to complete ADLs and participate in her usual exercise regimen without limitation.  She denies any issues with chest pain, shortness of breath, or dyspnea on exertion.  She remains concerned about the event as she lives by herself.  At baseline she has no issues with low blood pressures, lightheadedness, or dizziness.  She has a previous history of syncope in 2014 that was thought to be vagal in nature.  No other episodes of syncope that we are aware of.    The patient denies any history of coronary artery disease, myocardial infarction, congestive heart failure, cardiomyopathy, stroke, or peripheral arterial disease.  Activity levels are excellent for her age.  She lives by herself and takes care of her home without any issue.  She additionally goes for 90 minute walks/4 miles multiple times per week without any limitation.  Overall her health is superior to her peers.    She currently takes carvedilol 12.5 in the am and 25 mg in the pm and hydrochlorothiazide 12.5 mg daily for history of hypertension.  She checks her blood pressures at home and notes systolic blood pressures ranging from the 140s to 150s over 70s to 80s at home.  The patient intermittently has orthostatic symptoms and her primary  care changed her blood pressure regimen a few months ago.  She also had to change her carvedilol dosing from 25 in the am and 12.5 in the pm to 12.5 in the am and 25 in the pm.    She has tried different doses of rosuvastatin for management of her hyperlipidemia, but cannot tolerate due to cramping.       PHYSICAL EXAM:  Vitals:    01/06/22 1354   BP: (!) 173/82   Pulse: 73       Physical Exam  Constitutional:       Appearance: Normal appearance.   Neck:      Vascular: No carotid bruit or JVD.   Cardiovascular:      Rate and Rhythm: Normal rate and regular rhythm.      Pulses: Normal pulses.           Carotid pulses are 2+ on the right side and 2+ on the left side.       Radial pulses are 2+ on the right side and 2+ on the left side.        Dorsalis pedis pulses are 2+ on the right side and 2+ on the left side.        Posterior tibial pulses are 2+ on the right side and 2+ on the left side.      Heart sounds: S1 normal and S2 normal. No murmur heard.  No S3 sounds.    Pulmonary:      Effort: Pulmonary effort is normal.      Breath sounds: Normal breath sounds. No rales.   Feet:      Right foot:      Skin integrity: Skin integrity normal.      Left foot:      Skin integrity: Skin integrity normal.   Skin:     General: Skin is warm and dry.      Findings: No lesion.   Neurological:      Mental Status: She is alert and oriented to person, place, and time.      Motor: Motor function is intact.      Gait: Gait is intact.       LABS/CARDIAC TESTS:  September 2021 CBC shows hemoglobin of 12.2, CMP demonstrates a creatinine 0.6 with BUN of 12. Albumin 4.1.   HDL 72.   ECG December 2021 demonstrates sinus rhythm with no Q-waves or ST changes.  TTE 2014 demonstrates normal LV size and systolic function.  LVH.  No significant valve disease.  CT cardiac scoring 2020 Agaston score of 64.  CT head December 2021 no acute abnormality.  Small age, indeterminate lacunar infarcts in the internal capsules bilaterally not present  in 2014.   Carotid ultrasound 2020 shows no evidence of significant ICA stenosis.  Atherosclerosis is present.    ASSESSMENT & PLAN:    Syncope  I suspect her syncope was related to a combination of vagal tone and increased p.m. dosing of her blood pressure medicines.  This is seemingly happened before in 2014.    Regardless, for complete list we will check a Holter monitor and repeat her echocardiogram.     Hypertension  The patient has a long history of hypertension.  Blood pressures are slightly above goal on home checks.  We will re-enroll her in the digital hypertension program and have her blood pressure cuff recalibrated or reassessed to ensure they are accurate given her elevated pressure today.    The patient has evidence of chronic hypertension given her LVH on 2014 echo and lacunar infarcts seen on her CT head.  This demands aggressive blood pressure control, however, she also has evidence of intermittent orthostatic symptoms.    For now we will decrease her carvedilol to 12.5 mg twice a day and hold her HCTZ understanding that we will likely have to reintroduce other blood pressure medicines.  Permissive hypertension for short period of time is acceptable given her recent episode of syncope.    Other hyperlipidemia  The patient has LDL above goal given her history of lacunar infarcts.  She has tried multiple doses of rosuvastatin to no avail.  We will do a trial of atorvastatin therapy 10 mg daily.  If she does not tolerate this, we can try Zetia and possibly consider PCSK9 inhibitors.      Syncope, unspecified syncope type    Syncope and collapse  -     Ambulatory referral/consult to Cardiology  -     Echo; Future  -     Holter monitor - 24 hour; Future    Primary hypertension    Other hyperlipidemia  -     atorvastatin (LIPITOR) 10 MG tablet; Take 1 tablet (10 mg total) by mouth once daily.  Dispense: 30 tablet; Refill: 6    Other orders  -     carvediloL (COREG) 12.5 MG tablet; Take 1 tab in the  morning and 1 tab at night.  Dispense: 180 tablet; Refill: 3        Jennifer Toledo MD

## 2022-01-07 ENCOUNTER — PATIENT MESSAGE (OUTPATIENT)
Dept: ADMINISTRATIVE | Facility: OTHER | Age: 77
End: 2022-01-07
Payer: MEDICARE

## 2022-01-07 NOTE — TELEPHONE ENCOUNTER
Spoke to patient and explained to her that this is a stable finding compared to her previous study. We will monitor and reassess her BP control.

## 2022-01-12 ENCOUNTER — HOSPITAL ENCOUNTER (OUTPATIENT)
Dept: CARDIOLOGY | Facility: HOSPITAL | Age: 77
Discharge: HOME | End: 2022-01-12
Attending: INTERNAL MEDICINE
Payer: MEDICARE

## 2022-01-12 DIAGNOSIS — R55 SYNCOPE AND COLLAPSE: ICD-10-CM

## 2022-01-12 PROCEDURE — 93227 HOLTER MONITOR - 24 HOUR (CUPID ONLY): ICD-10-PCS | Mod: ,,, | Performed by: INTERNAL MEDICINE

## 2022-01-12 PROCEDURE — 93226 XTRNL ECG REC<48 HR SCAN A/R: CPT

## 2022-01-12 PROCEDURE — 93227 XTRNL ECG REC<48 HR R&I: CPT | Mod: ,,, | Performed by: INTERNAL MEDICINE

## 2022-01-13 ENCOUNTER — PATIENT MESSAGE (OUTPATIENT)
Dept: CARDIOLOGY | Facility: CLINIC | Age: 77
End: 2022-01-13
Payer: MEDICARE

## 2022-01-13 LAB
OHS CV EVENT MONITOR DAY: 0
OHS CV HOLTER LENGTH DECIMAL HOURS: 24
OHS CV HOLTER LENGTH HOURS: 24
OHS CV HOLTER LENGTH MINUTES: 0
OHS CV HOLTER SINUS AVERAGE HR: 74
OHS CV HOLTER SINUS MAX HR: 105
OHS CV HOLTER SINUS MIN HR: 59

## 2022-01-14 PROCEDURE — 99453 REM MNTR PHYSIOL PARAM SETUP: CPT | Mod: S$GLB,,, | Performed by: INTERNAL MEDICINE

## 2022-01-14 PROCEDURE — 99453 PR REMOTE MONITR, PHYSIOL PARAM, INITIAL: ICD-10-PCS | Mod: S$GLB,,, | Performed by: INTERNAL MEDICINE

## 2022-01-17 ENCOUNTER — PATIENT MESSAGE (OUTPATIENT)
Dept: OTHER | Facility: OTHER | Age: 77
End: 2022-01-17
Payer: MEDICARE

## 2022-01-18 ENCOUNTER — TELEPHONE (OUTPATIENT)
Dept: INTERNAL MEDICINE | Facility: CLINIC | Age: 77
End: 2022-01-18
Payer: MEDICARE

## 2022-01-19 ENCOUNTER — PATIENT MESSAGE (OUTPATIENT)
Dept: CARDIOLOGY | Facility: CLINIC | Age: 77
End: 2022-01-19
Payer: MEDICARE

## 2022-01-19 ENCOUNTER — PATIENT MESSAGE (OUTPATIENT)
Dept: SURGERY | Facility: HOSPITAL | Age: 77
End: 2022-01-19
Payer: MEDICARE

## 2022-01-24 ENCOUNTER — TELEPHONE (OUTPATIENT)
Dept: OPHTHALMOLOGY | Facility: CLINIC | Age: 77
End: 2022-01-24
Payer: MEDICARE

## 2022-01-24 DIAGNOSIS — Z13.9 SCREENING FOR UNSPECIFIED CONDITION: Primary | ICD-10-CM

## 2022-01-31 PROCEDURE — 99457 PR MONITORING, PHYSIOL PARAM, REMOTE, 1ST 20 MINS, PER MONTH: ICD-10-PCS | Mod: S$GLB,,, | Performed by: INTERNAL MEDICINE

## 2022-01-31 PROCEDURE — 99458 PR REMOTE PHYSIOL MONIT, EA ADDTL 20 MINS: ICD-10-PCS | Mod: S$GLB,,, | Performed by: INTERNAL MEDICINE

## 2022-01-31 PROCEDURE — 99458 RPM TX MGMT EA ADDL 20 MIN: CPT | Mod: S$GLB,,, | Performed by: INTERNAL MEDICINE

## 2022-01-31 PROCEDURE — 99457 RPM TX MGMT 1ST 20 MIN: CPT | Mod: S$GLB,,, | Performed by: INTERNAL MEDICINE

## 2022-02-03 ENCOUNTER — OFFICE VISIT (OUTPATIENT)
Dept: CARDIOLOGY | Facility: CLINIC | Age: 77
End: 2022-02-03
Payer: MEDICARE

## 2022-02-03 VITALS
HEIGHT: 64 IN | HEART RATE: 74 BPM | SYSTOLIC BLOOD PRESSURE: 142 MMHG | BODY MASS INDEX: 25.41 KG/M2 | DIASTOLIC BLOOD PRESSURE: 75 MMHG | WEIGHT: 148.81 LBS

## 2022-02-03 DIAGNOSIS — E78.49 OTHER HYPERLIPIDEMIA: ICD-10-CM

## 2022-02-03 DIAGNOSIS — I10 PRIMARY HYPERTENSION: ICD-10-CM

## 2022-02-03 DIAGNOSIS — R55 SYNCOPE, UNSPECIFIED SYNCOPE TYPE: Primary | ICD-10-CM

## 2022-02-03 DIAGNOSIS — Z01.810 PREOPERATIVE CARDIOVASCULAR EXAMINATION: ICD-10-CM

## 2022-02-03 PROCEDURE — 1126F PR PAIN SEVERITY QUANTIFIED, NO PAIN PRESENT: ICD-10-PCS | Mod: CPTII,S$GLB,, | Performed by: INTERNAL MEDICINE

## 2022-02-03 PROCEDURE — 1100F PR PT FALLS ASSESS DOC 2+ FALLS/FALL W/INJURY/YR: ICD-10-PCS | Mod: CPTII,S$GLB,, | Performed by: INTERNAL MEDICINE

## 2022-02-03 PROCEDURE — 3078F DIAST BP <80 MM HG: CPT | Mod: CPTII,S$GLB,, | Performed by: INTERNAL MEDICINE

## 2022-02-03 PROCEDURE — 3077F SYST BP >= 140 MM HG: CPT | Mod: CPTII,S$GLB,, | Performed by: INTERNAL MEDICINE

## 2022-02-03 PROCEDURE — 1100F PTFALLS ASSESS-DOCD GE2>/YR: CPT | Mod: CPTII,S$GLB,, | Performed by: INTERNAL MEDICINE

## 2022-02-03 PROCEDURE — 1159F MED LIST DOCD IN RCRD: CPT | Mod: CPTII,S$GLB,, | Performed by: INTERNAL MEDICINE

## 2022-02-03 PROCEDURE — 1159F PR MEDICATION LIST DOCUMENTED IN MEDICAL RECORD: ICD-10-PCS | Mod: CPTII,S$GLB,, | Performed by: INTERNAL MEDICINE

## 2022-02-03 PROCEDURE — 99499 UNLISTED E&M SERVICE: CPT | Mod: S$GLB,,, | Performed by: INTERNAL MEDICINE

## 2022-02-03 PROCEDURE — 1160F RVW MEDS BY RX/DR IN RCRD: CPT | Mod: CPTII,S$GLB,, | Performed by: INTERNAL MEDICINE

## 2022-02-03 PROCEDURE — 1160F PR REVIEW ALL MEDS BY PRESCRIBER/CLIN PHARMACIST DOCUMENTED: ICD-10-PCS | Mod: CPTII,S$GLB,, | Performed by: INTERNAL MEDICINE

## 2022-02-03 PROCEDURE — 99999 PR PBB SHADOW E&M-EST. PATIENT-LVL IV: CPT | Mod: PBBFAC,,, | Performed by: INTERNAL MEDICINE

## 2022-02-03 PROCEDURE — 99999 PR PBB SHADOW E&M-EST. PATIENT-LVL IV: ICD-10-PCS | Mod: PBBFAC,,, | Performed by: INTERNAL MEDICINE

## 2022-02-03 PROCEDURE — 3078F PR MOST RECENT DIASTOLIC BLOOD PRESSURE < 80 MM HG: ICD-10-PCS | Mod: CPTII,S$GLB,, | Performed by: INTERNAL MEDICINE

## 2022-02-03 PROCEDURE — 99214 OFFICE O/P EST MOD 30 MIN: CPT | Mod: S$GLB,,, | Performed by: INTERNAL MEDICINE

## 2022-02-03 PROCEDURE — 3288F PR FALLS RISK ASSESSMENT DOCUMENTED: ICD-10-PCS | Mod: CPTII,S$GLB,, | Performed by: INTERNAL MEDICINE

## 2022-02-03 PROCEDURE — 3077F PR MOST RECENT SYSTOLIC BLOOD PRESSURE >= 140 MM HG: ICD-10-PCS | Mod: CPTII,S$GLB,, | Performed by: INTERNAL MEDICINE

## 2022-02-03 PROCEDURE — 1126F AMNT PAIN NOTED NONE PRSNT: CPT | Mod: CPTII,S$GLB,, | Performed by: INTERNAL MEDICINE

## 2022-02-03 PROCEDURE — 99499 RISK ADDL DX/OHS AUDIT: ICD-10-PCS | Mod: S$GLB,,, | Performed by: INTERNAL MEDICINE

## 2022-02-03 PROCEDURE — 99214 PR OFFICE/OUTPT VISIT, EST, LEVL IV, 30-39 MIN: ICD-10-PCS | Mod: S$GLB,,, | Performed by: INTERNAL MEDICINE

## 2022-02-03 PROCEDURE — 3288F FALL RISK ASSESSMENT DOCD: CPT | Mod: CPTII,S$GLB,, | Performed by: INTERNAL MEDICINE

## 2022-02-03 RX ORDER — EZETIMIBE 10 MG/1
10 TABLET ORAL DAILY
Qty: 90 TABLET | Refills: 3 | Status: SHIPPED | OUTPATIENT
Start: 2022-02-03 | End: 2022-02-07

## 2022-02-03 RX ORDER — CARVEDILOL 25 MG/1
TABLET ORAL
Qty: 180 TABLET | Refills: 3
Start: 2022-02-03 | End: 2022-02-17 | Stop reason: SDUPTHER

## 2022-02-03 NOTE — ASSESSMENT & PLAN NOTE
The patient has a h/o lacunar infarcts without a clinical event. This gives her 1 RF by RCRI criteria placing her at low risk. She is otherwise stable from a CV standpoint and no further testing is required prior to anticipated eye surgery.

## 2022-02-03 NOTE — PROGRESS NOTES
Chief Complaint   Patient presents with    Hypertension       HPI:  This is a pleasant 76-year-old female with a history of hypertension and hyperlipidemia presenting for follow-up evaluation by me after a syncopal event.    Her event happened in early January.  She was doing her usual evening tasks around her house when she went to get a glass of water and suddenly had loss of consciousness.  There were no preceding symptoms of lightheadedness or dizziness.  Her syncope was sudden.  She awoke on the ground and was not certain how long she was out for.  She did have some head trauma on the back of her head requiring stitches.  She visited the emergency room and was evaluated with an unremarkable workup.    Since the event she has been able to complete ADLs and participate in her usual exercise regimen without limitation. No further episodes.  She denies any issues with chest pain, shortness of breath, or dyspnea on exertion.  No palpitations, light headedness, or dizziness.     At baseline she has no issues with low blood pressures, lightheadedness, or dizziness.  She has a previous history of syncope in 2014 that was thought to be vagal in nature.  No other episodes of syncope that we are aware of.    The patient denies any history of coronary artery disease, myocardial infarction, congestive heart failure, cardiomyopathy, stroke, or peripheral arterial disease.  Activity levels are excellent for her age.  She lives by herself and takes care of her home without any issue.  She additionally goes for 90 minute walks/4 miles multiple times per week without any limitation.  Overall her health is superior to her peers.    She currently takes carvedilol 12.5x2 and SBPs are elevated in the range of 130s-150s. She is enrolled in the digital hypertension program now.     We trialed atorvastatin 10 po daily and the patient had diffuse body aches that resolved off med.     PHYSICAL EXAM:  Vitals:    02/03/22 1425   BP: (!)  142/75   Pulse: 74       Physical Exam  Constitutional:       Appearance: Normal appearance.   Neck:      Vascular: No carotid bruit or JVD.   Cardiovascular:      Rate and Rhythm: Normal rate and regular rhythm.      Pulses: Normal pulses.           Carotid pulses are 2+ on the right side and 2+ on the left side.       Radial pulses are 2+ on the right side and 2+ on the left side.        Dorsalis pedis pulses are 2+ on the right side and 2+ on the left side.        Posterior tibial pulses are 2+ on the right side and 2+ on the left side.      Heart sounds: S1 normal and S2 normal. No murmur heard.  No S3 sounds.    Pulmonary:      Effort: Pulmonary effort is normal.      Breath sounds: Normal breath sounds. No rales.   Feet:      Right foot:      Skin integrity: Skin integrity normal.      Left foot:      Skin integrity: Skin integrity normal.   Skin:     General: Skin is warm and dry.      Findings: No lesion.   Neurological:      Mental Status: She is alert and oriented to person, place, and time.      Motor: Motor function is intact.      Gait: Gait is intact.       LABS/CARDIAC TESTS:  September 2021 CBC shows hemoglobin of 12.2, CMP demonstrates a creatinine 0.6 with BUN of 12. Albumin 4.1.   HDL 72.   ECG December 2021 demonstrates sinus rhythm with no Q-waves or ST changes.  Holter January 2022 demonstrates an average heart rate of 74 beats per minute with the sinus rhythm.  Rare PVCs and PACs noted and a short run of SVT (8 beats).  For symptomatic episodes noted in patient diary all corresponded with sinus rhythm without ectopy.  TTE January 2022 demonstrates normal LV size and systolic function with EF 65%.  Normal RV size and function.  LA enlargement.  No significant valve disease.  2014 demonstrates normal LV size and systolic function.  LVH.  No significant valve disease.  CT cardiac scoring 2020 Agaston score of 64.  CT head December 2021 no acute abnormality.  Small age, indeterminate  lacunar infarcts in the internal capsules bilaterally not present in 2014.   Carotid ultrasound 2020 shows no evidence of significant ICA stenosis.  Atherosclerosis is present.    ASSESSMENT & PLAN:    Syncope  I suspect her syncope was related to a combination of vagal tone.  This is seemingly happened before in 2014.    Holter and TTE unremarkable. Expectant management at this time.     Hypertension  The patient has a long history of hypertension.  Blood pressures are slightly above goal on our digital hypertension logs. Will increase carvedilol to 25x2. Reassess over the next few months and can add a second agent if necessary. Would avoid diuretic therapy, but want to be aggressive given h/o lacunar infarcts seen on CT head.          Other hyperlipidemia  The patient has LDL above goal given her history of lacunar infarcts.  She has tried multiple statins to no avial. Will try ezetemibe 10x1.     Preoperative cardiovascular examination  The patient has a h/o lacunar infarcts without a clinical event. This gives her 1 RF by RCRI criteria placing her at low risk. She is otherwise stable from a CV standpoint and no further testing is required prior to anticipated eye surgery.       Syncope, unspecified syncope type    Primary hypertension    Other hyperlipidemia    Preoperative cardiovascular examination    Other orders  -     carvediloL (COREG) 25 MG tablet; Take 1 tab in the morning and 1 tab at night.  Dispense: 180 tablet; Refill: 3  -     ezetimibe (ZETIA) 10 mg tablet; Take 1 tablet (10 mg total) by mouth once daily.  Dispense: 90 tablet; Refill: 3        Jennifer Toledo MD

## 2022-02-03 NOTE — ASSESSMENT & PLAN NOTE
I suspect her syncope was related to a combination of vagal tone.  This is seemingly happened before in 2014.    Holter and TTE unremarkable. Expectant management at this time.

## 2022-02-03 NOTE — ASSESSMENT & PLAN NOTE
The patient has LDL above goal given her history of lacunar infarcts.  She has tried multiple statins to no avial. Will try ezetemibe 10x1.

## 2022-02-03 NOTE — ASSESSMENT & PLAN NOTE
The patient has a long history of hypertension.  Blood pressures are slightly above goal on our digital hypertension logs. Will increase carvedilol to 25x2. Reassess over the next few months and can add a second agent if necessary. Would avoid diuretic therapy, but want to be aggressive given h/o lacunar infarcts seen on CT head.

## 2022-02-07 ENCOUNTER — OFFICE VISIT (OUTPATIENT)
Dept: PRIMARY CARE CLINIC | Facility: CLINIC | Age: 77
End: 2022-02-07
Payer: MEDICARE

## 2022-02-07 VITALS
BODY MASS INDEX: 25.21 KG/M2 | HEIGHT: 64 IN | DIASTOLIC BLOOD PRESSURE: 80 MMHG | WEIGHT: 147.69 LBS | SYSTOLIC BLOOD PRESSURE: 156 MMHG | OXYGEN SATURATION: 98 % | TEMPERATURE: 98 F | HEART RATE: 68 BPM

## 2022-02-07 DIAGNOSIS — I10 BENIGN ESSENTIAL HYPERTENSION: Primary | ICD-10-CM

## 2022-02-07 DIAGNOSIS — Z12.11 COLON CANCER SCREENING: ICD-10-CM

## 2022-02-07 DIAGNOSIS — Z12.31 ENCOUNTER FOR SCREENING MAMMOGRAM FOR MALIGNANT NEOPLASM OF BREAST: ICD-10-CM

## 2022-02-07 DIAGNOSIS — E78.5 HYPERLIPIDEMIA, UNSPECIFIED HYPERLIPIDEMIA TYPE: ICD-10-CM

## 2022-02-07 PROBLEM — Z01.810 PREOPERATIVE CARDIOVASCULAR EXAMINATION: Status: RESOLVED | Noted: 2022-02-03 | Resolved: 2022-02-07

## 2022-02-07 PROBLEM — M25.561 ACUTE PAIN OF RIGHT KNEE: Status: RESOLVED | Noted: 2020-07-31 | Resolved: 2022-02-07

## 2022-02-07 PROCEDURE — 99214 OFFICE O/P EST MOD 30 MIN: CPT | Mod: HCNC,S$GLB,, | Performed by: INTERNAL MEDICINE

## 2022-02-07 PROCEDURE — 3077F SYST BP >= 140 MM HG: CPT | Mod: HCNC,CPTII,S$GLB, | Performed by: INTERNAL MEDICINE

## 2022-02-07 PROCEDURE — 1126F AMNT PAIN NOTED NONE PRSNT: CPT | Mod: HCNC,CPTII,S$GLB, | Performed by: INTERNAL MEDICINE

## 2022-02-07 PROCEDURE — 3079F DIAST BP 80-89 MM HG: CPT | Mod: HCNC,CPTII,S$GLB, | Performed by: INTERNAL MEDICINE

## 2022-02-07 PROCEDURE — 1101F PT FALLS ASSESS-DOCD LE1/YR: CPT | Mod: HCNC,CPTII,S$GLB, | Performed by: INTERNAL MEDICINE

## 2022-02-07 PROCEDURE — 1159F MED LIST DOCD IN RCRD: CPT | Mod: HCNC,CPTII,S$GLB, | Performed by: INTERNAL MEDICINE

## 2022-02-07 PROCEDURE — 3288F PR FALLS RISK ASSESSMENT DOCUMENTED: ICD-10-PCS | Mod: HCNC,CPTII,S$GLB, | Performed by: INTERNAL MEDICINE

## 2022-02-07 PROCEDURE — 99999 PR PBB SHADOW E&M-EST. PATIENT-LVL IV: ICD-10-PCS | Mod: PBBFAC,HCNC,, | Performed by: INTERNAL MEDICINE

## 2022-02-07 PROCEDURE — 99999 PR PBB SHADOW E&M-EST. PATIENT-LVL IV: CPT | Mod: PBBFAC,HCNC,, | Performed by: INTERNAL MEDICINE

## 2022-02-07 PROCEDURE — 99499 RISK ADDL DX/OHS AUDIT: ICD-10-PCS | Mod: S$GLB,,, | Performed by: INTERNAL MEDICINE

## 2022-02-07 PROCEDURE — 3077F PR MOST RECENT SYSTOLIC BLOOD PRESSURE >= 140 MM HG: ICD-10-PCS | Mod: HCNC,CPTII,S$GLB, | Performed by: INTERNAL MEDICINE

## 2022-02-07 PROCEDURE — 1160F PR REVIEW ALL MEDS BY PRESCRIBER/CLIN PHARMACIST DOCUMENTED: ICD-10-PCS | Mod: HCNC,CPTII,S$GLB, | Performed by: INTERNAL MEDICINE

## 2022-02-07 PROCEDURE — 99499 UNLISTED E&M SERVICE: CPT | Mod: S$GLB,,, | Performed by: INTERNAL MEDICINE

## 2022-02-07 PROCEDURE — 3288F FALL RISK ASSESSMENT DOCD: CPT | Mod: HCNC,CPTII,S$GLB, | Performed by: INTERNAL MEDICINE

## 2022-02-07 PROCEDURE — 1126F PR PAIN SEVERITY QUANTIFIED, NO PAIN PRESENT: ICD-10-PCS | Mod: HCNC,CPTII,S$GLB, | Performed by: INTERNAL MEDICINE

## 2022-02-07 PROCEDURE — 1160F RVW MEDS BY RX/DR IN RCRD: CPT | Mod: HCNC,CPTII,S$GLB, | Performed by: INTERNAL MEDICINE

## 2022-02-07 PROCEDURE — 1101F PR PT FALLS ASSESS DOC 0-1 FALLS W/OUT INJ PAST YR: ICD-10-PCS | Mod: HCNC,CPTII,S$GLB, | Performed by: INTERNAL MEDICINE

## 2022-02-07 PROCEDURE — 3079F PR MOST RECENT DIASTOLIC BLOOD PRESSURE 80-89 MM HG: ICD-10-PCS | Mod: HCNC,CPTII,S$GLB, | Performed by: INTERNAL MEDICINE

## 2022-02-07 PROCEDURE — 99214 PR OFFICE/OUTPT VISIT, EST, LEVL IV, 30-39 MIN: ICD-10-PCS | Mod: HCNC,S$GLB,, | Performed by: INTERNAL MEDICINE

## 2022-02-07 PROCEDURE — 1159F PR MEDICATION LIST DOCUMENTED IN MEDICAL RECORD: ICD-10-PCS | Mod: HCNC,CPTII,S$GLB, | Performed by: INTERNAL MEDICINE

## 2022-02-07 RX ORDER — EZETIMIBE 10 MG/1
10 TABLET ORAL DAILY
Qty: 90 TABLET | Refills: 3
Start: 2022-02-07 | End: 2022-08-24 | Stop reason: SDUPTHER

## 2022-02-07 NOTE — PATIENT INSTRUCTIONS
Go ahead and start losartan 25mg daily and continue carvedilol 25mg twice daily.  Blood pressure check with nurse, Perla, in 2 weeks.     Start your cholesterol medicine (ezetimibe - Zetia) 10mg daily.

## 2022-02-07 NOTE — PROGRESS NOTES
"Subjective:       Patient ID: Grace Navarro is a 76 y.o. female.    Chief Complaint: HTN Follow-up    HPI   Plan for B eyelid surgery w/ Dr. Umana on 3/22 - 2.5 hrs w/ general anesthesia.   EKG 12/31/21 - NSR.     Had syncopal episode in her kitchen floor. Crawled to the phone and called EMS. Went to ED. CT head reviewed. No CVA.  Seen Dr. Barragan 1/6/22.   Seen cardiology 1/6/22 and thinks due to vasovagal syncope. Took off of hctz and decreased coreg to 12.5mg BID. Recommend permissive HTN at this time.   H/o lacunar infarcts. Started on atorvastatin 10mg daily.   Holter - 1/12/22 - SR. Rare PVCs and PACs w/ short run of SVT.  Echo - 1/6/22 - severe LAE. Grade I LV diastolic dysfection. Mild MR. EF 65%.     On 1/15/22, pt was bending forward and slipped on the rug. Fell forward on her face and had R black eye. No syncope. This has improved.     Signed up for digital HTN program.   Reviewed flowsheet.  Followed up w/ cardiology last week. Increased coreg back to 25mg BID.   Issues w/ crestor and atorvastatin. Currently on zetia.     Review of Systems  Comprehensive review of systems otherwise negative. See history/subjective section for more details.    Objective:      Physical Exam    BP (!) 156/88 (BP Location: Left arm, Patient Position: Sitting, BP Method: Large (Manual))   Pulse 68   Temp 98.2 °F (36.8 °C) (Oral)   Ht 5' 4" (1.626 m)   Wt 67 kg (147 lb 11.3 oz)   LMP 06/14/1996   SpO2 98%   BMI 25.35 kg/m²     GEN - A+OX4, NAD   HEENT - PERRL, EOMI, OP clear. R eye bruising.  Neck - No thyromegaly or cervical LAD. No thyroid masses felt.  CV - RRR, no m/r   Chest - CTAB, no wheezing or rhonchi  Abd - S/NT/ND/+BS.   Ext - 2+BDP and radial pulses. No C/C/E.  Neuro - PERRL, EOMI, no nystagmus, eyebrow raise, facial sensation, hearing, m of mastication, smile, palatal raise, shoulder shrug, tongue protrusion symmetric and intact. 5/5 BUE and BLE strength. Sensation to light touch intact throughout. " 2+B dp and radial pulses. No LE edema.   MSK - no spinal tenderness to palpation normal gait.  Skin - No rash.    Previous labs reviewed.     Assessment/Plan     Grace was seen today for follow-up.    Diagnoses and all orders for this visit:    Benign essential hypertension - currently on coreg 25mg BID. Hasn't started on losartan 25mg daily yet. Part of digital HTN program. Recheck BP in 2 weeks.     Hyperlipidemia, unspecified hyperlipidemia type  -     ezetimibe (ZETIA) 10 mg tablet; Take 1 tablet (10 mg total) by mouth once daily.    Encounter for screening mammogram for malignant neoplasm of breast  -     Mammo Digital Screening Bilat w/ [facility]; Future    Colon cancer screening  -     Cologuard Screening (Multitarget Stool DNA); Future  -     Cologuard Screening (Multitarget Stool DNA)        Follow up in about 2 months (around 4/7/2022).      Lisa Dean MD  Department of Internal Medicine - [facility]  2:29 PM

## 2022-02-11 DIAGNOSIS — L30.9 DERMATITIS: ICD-10-CM

## 2022-02-11 RX ORDER — TRIAMCINOLONE ACETONIDE 1 MG/G
CREAM TOPICAL
Qty: 30 G | Refills: 0 | Status: SHIPPED | OUTPATIENT
Start: 2022-02-11 | End: 2022-03-01

## 2022-02-15 ENCOUNTER — TELEPHONE (OUTPATIENT)
Dept: INTERNAL MEDICINE | Facility: CLINIC | Age: 77
End: 2022-02-15
Payer: MEDICARE

## 2022-02-18 ENCOUNTER — PATIENT MESSAGE (OUTPATIENT)
Dept: PRIMARY CARE CLINIC | Facility: CLINIC | Age: 77
End: 2022-02-18
Payer: MEDICARE

## 2022-02-19 ENCOUNTER — PATIENT MESSAGE (OUTPATIENT)
Dept: PRIMARY CARE CLINIC | Facility: CLINIC | Age: 77
End: 2022-02-19
Payer: MEDICARE

## 2022-02-21 ENCOUNTER — CLINICAL SUPPORT (OUTPATIENT)
Dept: PRIMARY CARE CLINIC | Facility: CLINIC | Age: 77
End: 2022-02-21
Payer: MEDICARE

## 2022-02-21 VITALS
OXYGEN SATURATION: 100 % | DIASTOLIC BLOOD PRESSURE: 74 MMHG | RESPIRATION RATE: 18 BRPM | SYSTOLIC BLOOD PRESSURE: 163 MMHG | BODY MASS INDEX: 25.54 KG/M2 | WEIGHT: 148.81 LBS | HEART RATE: 62 BPM

## 2022-02-21 DIAGNOSIS — I10 PRIMARY HYPERTENSION: ICD-10-CM

## 2022-02-21 PROCEDURE — 99999 PR PBB SHADOW E&M-EST. PATIENT-LVL III: CPT | Mod: PBBFAC,HCNC,,

## 2022-02-21 PROCEDURE — 99999 PR PBB SHADOW E&M-EST. PATIENT-LVL III: ICD-10-PCS | Mod: PBBFAC,HCNC,,

## 2022-02-21 RX ORDER — CARVEDILOL 25 MG/1
25 TABLET ORAL 2 TIMES DAILY WITH MEALS
Qty: 180 TABLET | Refills: 3 | Status: SHIPPED | OUTPATIENT
Start: 2022-02-21 | End: 2022-03-15 | Stop reason: SDUPTHER

## 2022-02-21 RX ORDER — LOSARTAN POTASSIUM 50 MG/1
50 TABLET ORAL DAILY
Qty: 90 TABLET | Refills: 3 | Status: SHIPPED | OUTPATIENT
Start: 2022-02-21 | End: 2022-04-05 | Stop reason: SDUPTHER

## 2022-02-21 RX ORDER — LOSARTAN POTASSIUM 50 MG/1
50 TABLET ORAL DAILY
Qty: 90 TABLET | Refills: 3 | Status: SHIPPED | OUTPATIENT
Start: 2022-02-21 | End: 2022-02-21 | Stop reason: SDUPTHER

## 2022-02-21 NOTE — PROGRESS NOTES
Patient ID'd by Name and Date of birth. Stated she is here for a blood pressure check today.Stated she has taken her blood pressure medications today  Patient seated. Took automatic B/Pright arm..reading was 186/87,   Manual B/P taken in left arm 163/74. Patient stated this is similar to what it has been running at home.  B/P readings reported to MD.  MD changed her losartan from 25mg to 50mg. Stated that is not going to start HCTZ due to past dizziness / syncope episode.    Patient aware of change, asking for meds to be sent to [facility]. Will update and send to MD.    Patient states she is feeling well, and has no further questions or concerns at this time.

## 2022-02-22 ENCOUNTER — TELEPHONE (OUTPATIENT)
Dept: INTERNAL MEDICINE | Facility: CLINIC | Age: 77
End: 2022-02-22
Payer: MEDICARE

## 2022-02-22 ENCOUNTER — TELEPHONE (OUTPATIENT)
Dept: OPHTHALMOLOGY | Facility: CLINIC | Age: 77
End: 2022-02-22
Payer: MEDICARE

## 2022-02-28 PROCEDURE — 99457 PR MONITORING, PHYSIOL PARAM, REMOTE, 1ST 20 MINS, PER MONTH: ICD-10-PCS | Mod: S$GLB,,, | Performed by: INTERNAL MEDICINE

## 2022-02-28 PROCEDURE — 99458 RPM TX MGMT EA ADDL 20 MIN: CPT | Mod: S$GLB,,, | Performed by: INTERNAL MEDICINE

## 2022-02-28 PROCEDURE — 99458 PR REMOTE PHYSIOL MONIT, EA ADDTL 20 MINS: ICD-10-PCS | Mod: S$GLB,,, | Performed by: INTERNAL MEDICINE

## 2022-02-28 PROCEDURE — 99457 RPM TX MGMT 1ST 20 MIN: CPT | Mod: S$GLB,,, | Performed by: INTERNAL MEDICINE

## 2022-03-01 ENCOUNTER — HOSPITAL ENCOUNTER (EMERGENCY)
Facility: HOSPITAL | Age: 77
Discharge: HOME | End: 2022-03-01
Attending: EMERGENCY MEDICINE
Payer: MEDICARE

## 2022-03-01 VITALS
BODY MASS INDEX: 25.27 KG/M2 | DIASTOLIC BLOOD PRESSURE: 84 MMHG | SYSTOLIC BLOOD PRESSURE: 171 MMHG | TEMPERATURE: 99 F | OXYGEN SATURATION: 96 % | RESPIRATION RATE: 20 BRPM | HEART RATE: 72 BPM | WEIGHT: 148 LBS | HEIGHT: 64 IN

## 2022-03-01 DIAGNOSIS — S00.83XA FACIAL CONTUSION, INITIAL ENCOUNTER: ICD-10-CM
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
## 2022-03-01 DIAGNOSIS — S01.81XA FACIAL LACERATION, INITIAL ENCOUNTER: Primary | ICD-10-CM

## 2022-03-01 PROCEDURE — 90471 IMMUNIZATION ADMIN: CPT | Mod: HCNC | Performed by: EMERGENCY MEDICINE

## 2022-03-01 PROCEDURE — 25000003 PHARM REV CODE 250: Mod: HCNC | Performed by: EMERGENCY MEDICINE

## 2022-03-01 PROCEDURE — 12052 INTMD RPR FACE/MM 2.6-5.0 CM: CPT

## 2022-03-01 PROCEDURE — 63600175 PHARM REV CODE 636 W HCPCS: Mod: HCNC | Performed by: EMERGENCY MEDICINE

## 2022-03-01 PROCEDURE — 90714 TD VACC NO PRESV 7 YRS+ IM: CPT | Mod: HCNC | Performed by: EMERGENCY MEDICINE

## 2022-03-01 PROCEDURE — 99285 EMERGENCY DEPT VISIT HI MDM: CPT | Mod: 25

## 2022-03-01 RX ORDER — ONDANSETRON 4 MG/1
4 TABLET, ORALLY DISINTEGRATING ORAL EVERY 6 HOURS PRN
Qty: 20 TABLET | Refills: 0 | Status: SHIPPED | OUTPATIENT
Start: 2022-03-01 | End: 2022-04-07

## 2022-03-01 RX ORDER — ONDANSETRON 8 MG/1
8 TABLET, ORALLY DISINTEGRATING ORAL
Status: COMPLETED | OUTPATIENT
Start: 2022-03-01 | End: 2022-03-01

## 2022-03-01 RX ORDER — HYDROCODONE BITARTRATE AND ACETAMINOPHEN 5; 325 MG/1; MG/1
1 TABLET ORAL
Status: COMPLETED | OUTPATIENT
Start: 2022-03-01 | End: 2022-03-01

## 2022-03-01 RX ORDER — ROSUVASTATIN CALCIUM 5 MG/1
5 TABLET, COATED ORAL NIGHTLY
COMMUNITY
End: 2022-08-09

## 2022-03-01 RX ORDER — HYDROCHLOROTHIAZIDE 12.5 MG/1
12.5 TABLET ORAL DAILY
COMMUNITY
End: 2022-03-14 | Stop reason: ALTCHOICE

## 2022-03-01 RX ORDER — CARVEDILOL 12.5 MG/1
TABLET ORAL
COMMUNITY
Start: 2022-02-09 | End: 2022-03-01 | Stop reason: DRUGHIGH

## 2022-03-01 RX ADMIN — ONDANSETRON 8 MG: 8 TABLET, ORALLY DISINTEGRATING ORAL at 07:03

## 2022-03-01 RX ADMIN — TETANUS AND DIPHTHERIA TOXOIDS ADSORBED 0.5 ML: 2; 2 INJECTION INTRAMUSCULAR at 04:03

## 2022-03-01 RX ADMIN — HYDROCODONE BITARTRATE AND ACETAMINOPHEN 1 TABLET: 5; 325 TABLET ORAL at 04:03
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
## 2022-03-01 NOTE — PHARMACY MED REC
"Admission Medication History     The home medication history was taken by Becca Richter CPhT.    Medication history obtained from,[facility] pharmacy    You may go to "Admission" then "Reconcile Home Medications" tabs to review and/or act upon these items.      The home medication list has been updated by the Pharmacy department.    Please read ALL comments highlighted in yellow.    Please address this information as you see fit.     Feel free to contact us if you have any questions or require assistance.      The medications listed below were removed from the home medication list.  Please reorder if appropriate:  Patient reports no longer taking the following medication(s):   Kenalog cream 0.1%                Becca Richter CPhT.  Ext [phone]                  .          "

## 2022-03-02 ENCOUNTER — TELEPHONE (OUTPATIENT)
Dept: PRIMARY CARE CLINIC | Facility: CLINIC | Age: 77
End: 2022-03-02
Payer: MEDICARE

## 2022-03-02 ENCOUNTER — NURSE TRIAGE (OUTPATIENT)
Dept: ADMINISTRATIVE | Facility: CLINIC | Age: 77
End: 2022-03-02
Payer: MEDICARE

## 2022-03-02 ENCOUNTER — PES CALL (OUTPATIENT)
Dept: ADMINISTRATIVE | Facility: CLINIC | Age: 77
End: 2022-03-02
Payer: MEDICARE

## 2022-03-02 ENCOUNTER — PATIENT MESSAGE (OUTPATIENT)
Dept: PRIMARY CARE CLINIC | Facility: CLINIC | Age: 77
End: 2022-03-02
Payer: MEDICARE
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
## 2022-03-02 NOTE — PROGRESS NOTES
Care patient accepted from Dr. Mohan at 5:00 p.m. shift change awaiting imaging results.  CTs show no fracture, dislocation or intracranial abnormalities.  X-rays of the hand and knees also show no fracture dislocation.  Patient will take Tylenol for pain she has been instructed to return in 1 week for suture removal.

## 2022-03-03 ENCOUNTER — TELEPHONE (OUTPATIENT)
Dept: PRIMARY CARE CLINIC | Facility: CLINIC | Age: 77
End: 2022-03-03
Payer: MEDICARE

## 2022-03-03 NOTE — TELEPHONE ENCOUNTER
Patient calling due to increasing pain, taking Tylenol 650mg x 2 q 8 hrs. What else can she take? Can she increase dosage of Tylenol.

## 2022-03-03 NOTE — TELEPHONE ENCOUNTER
Called pt. She is agreeable to coming in on 3/4/22 at 10:40. Did not want to get rx for Tramadol at this time.   [facility].

## 2022-03-03 NOTE — TELEPHONE ENCOUNTER
[place]    PCP:  Dr. Dean    S/P fall yesterday and was seen at the ED.  Pt reports multiple bruises and laceration above Lt eye with sutures.  Pt reports that she was given ice bags and instructed to apply ice to affected areas but was not instructed for how long or how many days.  Instructed pt to only use ice for 15 mins at a time and then remove.  Instructed pt that NOC would send a message to PCP for instruction on how many days that she needs to continue using the ice for.  Instructed to call for questions/concerns.  VU.    Reason for Disposition   [1] Caller requesting NON-URGENT health information AND [2] PCP's office is the best resource    Additional Information   Negative: RN needs further essential information from caller in order to complete triage   Negative: Requesting regular office appointment    Protocols used: INFORMATION ONLY CALL - NO TRIAGE-A-
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
## 2022-03-04 ENCOUNTER — OFFICE VISIT (OUTPATIENT)
Dept: PRIMARY CARE CLINIC | Facility: CLINIC | Age: 77
End: 2022-03-04
Payer: MEDICARE

## 2022-03-04 VITALS
DIASTOLIC BLOOD PRESSURE: 62 MMHG | HEART RATE: 68 BPM | BODY MASS INDEX: 25.45 KG/M2 | WEIGHT: 149.06 LBS | RESPIRATION RATE: 20 BRPM | TEMPERATURE: 99 F | HEIGHT: 64 IN | SYSTOLIC BLOOD PRESSURE: 138 MMHG | OXYGEN SATURATION: 98 %

## 2022-03-04 DIAGNOSIS — T14.8XXA HEMATOMA: Primary | ICD-10-CM

## 2022-03-04 DIAGNOSIS — E04.1 THYROID NODULE: ICD-10-CM

## 2022-03-04 DIAGNOSIS — R58 ECCHYMOSIS: ICD-10-CM

## 2022-03-04 PROCEDURE — 1125F PR PAIN SEVERITY QUANTIFIED, PAIN PRESENT: ICD-10-PCS | Mod: CPTII,S$GLB,, | Performed by: INTERNAL MEDICINE

## 2022-03-04 PROCEDURE — 99999 PR PBB SHADOW E&M-EST. PATIENT-LVL V: ICD-10-PCS | Mod: PBBFAC,,, | Performed by: INTERNAL MEDICINE

## 2022-03-04 PROCEDURE — 1160F PR REVIEW ALL MEDS BY PRESCRIBER/CLIN PHARMACIST DOCUMENTED: ICD-10-PCS | Mod: CPTII,S$GLB,, | Performed by: INTERNAL MEDICINE

## 2022-03-04 PROCEDURE — 1101F PT FALLS ASSESS-DOCD LE1/YR: CPT | Mod: CPTII,S$GLB,, | Performed by: INTERNAL MEDICINE

## 2022-03-04 PROCEDURE — 3075F SYST BP GE 130 - 139MM HG: CPT | Mod: CPTII,S$GLB,, | Performed by: INTERNAL MEDICINE

## 2022-03-04 PROCEDURE — 99213 PR OFFICE/OUTPT VISIT, EST, LEVL III, 20-29 MIN: ICD-10-PCS | Mod: S$GLB,,, | Performed by: INTERNAL MEDICINE

## 2022-03-04 PROCEDURE — 3288F FALL RISK ASSESSMENT DOCD: CPT | Mod: CPTII,S$GLB,, | Performed by: INTERNAL MEDICINE

## 2022-03-04 PROCEDURE — 3075F PR MOST RECENT SYSTOLIC BLOOD PRESS GE 130-139MM HG: ICD-10-PCS | Mod: CPTII,S$GLB,, | Performed by: INTERNAL MEDICINE

## 2022-03-04 PROCEDURE — 1101F PR PT FALLS ASSESS DOC 0-1 FALLS W/OUT INJ PAST YR: ICD-10-PCS | Mod: CPTII,S$GLB,, | Performed by: INTERNAL MEDICINE

## 2022-03-04 PROCEDURE — 3078F PR MOST RECENT DIASTOLIC BLOOD PRESSURE < 80 MM HG: ICD-10-PCS | Mod: CPTII,S$GLB,, | Performed by: INTERNAL MEDICINE

## 2022-03-04 PROCEDURE — 99999 PR PBB SHADOW E&M-EST. PATIENT-LVL V: CPT | Mod: PBBFAC,,, | Performed by: INTERNAL MEDICINE

## 2022-03-04 PROCEDURE — 1159F MED LIST DOCD IN RCRD: CPT | Mod: CPTII,S$GLB,, | Performed by: INTERNAL MEDICINE

## 2022-03-04 PROCEDURE — 1160F RVW MEDS BY RX/DR IN RCRD: CPT | Mod: CPTII,S$GLB,, | Performed by: INTERNAL MEDICINE

## 2022-03-04 PROCEDURE — 1125F AMNT PAIN NOTED PAIN PRSNT: CPT | Mod: CPTII,S$GLB,, | Performed by: INTERNAL MEDICINE

## 2022-03-04 PROCEDURE — 99213 OFFICE O/P EST LOW 20 MIN: CPT | Mod: S$GLB,,, | Performed by: INTERNAL MEDICINE

## 2022-03-04 PROCEDURE — 3288F PR FALLS RISK ASSESSMENT DOCUMENTED: ICD-10-PCS | Mod: CPTII,S$GLB,, | Performed by: INTERNAL MEDICINE

## 2022-03-04 PROCEDURE — 1159F PR MEDICATION LIST DOCUMENTED IN MEDICAL RECORD: ICD-10-PCS | Mod: CPTII,S$GLB,, | Performed by: INTERNAL MEDICINE

## 2022-03-04 PROCEDURE — 3078F DIAST BP <80 MM HG: CPT | Mod: CPTII,S$GLB,, | Performed by: INTERNAL MEDICINE
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
## 2022-03-06 ENCOUNTER — PATIENT MESSAGE (OUTPATIENT)
Dept: SURGERY | Facility: HOSPITAL | Age: 77
End: 2022-03-06
Payer: MEDICARE

## 2022-03-07 ENCOUNTER — PATIENT MESSAGE (OUTPATIENT)
Dept: PRIMARY CARE CLINIC | Facility: CLINIC | Age: 77
End: 2022-03-07
Payer: MEDICARE

## 2022-03-08 ENCOUNTER — HOSPITAL ENCOUNTER (OUTPATIENT)
Dept: RADIOLOGY | Facility: HOSPITAL | Age: 77
Discharge: HOME | End: 2022-03-08
Attending: INTERNAL MEDICINE
Payer: MEDICARE

## 2022-03-08 ENCOUNTER — OFFICE VISIT (OUTPATIENT)
Dept: PRIMARY CARE CLINIC | Facility: CLINIC | Age: 77
End: 2022-03-08
Payer: MEDICARE

## 2022-03-08 VITALS
OXYGEN SATURATION: 100 % | HEIGHT: 64 IN | HEART RATE: 67 BPM | WEIGHT: 144.19 LBS | BODY MASS INDEX: 24.62 KG/M2 | SYSTOLIC BLOOD PRESSURE: 138 MMHG | TEMPERATURE: 98 F | DIASTOLIC BLOOD PRESSURE: 80 MMHG

## 2022-03-08 DIAGNOSIS — R07.81 RIB PAIN: ICD-10-CM

## 2022-03-08 DIAGNOSIS — R53.83 FATIGUE, UNSPECIFIED TYPE: ICD-10-CM

## 2022-03-08 DIAGNOSIS — M54.50 LOW BACK PAIN, UNSPECIFIED BACK PAIN LATERALITY, UNSPECIFIED CHRONICITY, UNSPECIFIED WHETHER SCIATICA PRESENT: ICD-10-CM
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
## 2022-03-08 DIAGNOSIS — R07.81 RIB PAIN: Primary | ICD-10-CM

## 2022-03-08 DIAGNOSIS — E04.1 THYROID NODULE: ICD-10-CM

## 2022-03-08 PROCEDURE — 72100 X-RAY EXAM L-S SPINE 2/3 VWS: CPT | Mod: 26,,, | Performed by: RADIOLOGY

## 2022-03-08 PROCEDURE — 1159F MED LIST DOCD IN RCRD: CPT | Mod: CPTII,S$GLB,, | Performed by: INTERNAL MEDICINE

## 2022-03-08 PROCEDURE — 99214 PR OFFICE/OUTPT VISIT, EST, LEVL IV, 30-39 MIN: ICD-10-PCS | Mod: S$GLB,,, | Performed by: INTERNAL MEDICINE

## 2022-03-08 PROCEDURE — 3288F PR FALLS RISK ASSESSMENT DOCUMENTED: ICD-10-PCS | Mod: CPTII,S$GLB,, | Performed by: INTERNAL MEDICINE

## 2022-03-08 PROCEDURE — 1159F PR MEDICATION LIST DOCUMENTED IN MEDICAL RECORD: ICD-10-PCS | Mod: CPTII,S$GLB,, | Performed by: INTERNAL MEDICINE

## 2022-03-08 PROCEDURE — 72100 X-RAY EXAM L-S SPINE 2/3 VWS: CPT | Mod: TC,PN

## 2022-03-08 PROCEDURE — 3288F FALL RISK ASSESSMENT DOCD: CPT | Mod: CPTII,S$GLB,, | Performed by: INTERNAL MEDICINE

## 2022-03-08 PROCEDURE — 99214 OFFICE O/P EST MOD 30 MIN: CPT | Mod: S$GLB,,, | Performed by: INTERNAL MEDICINE

## 2022-03-08 PROCEDURE — 3079F DIAST BP 80-89 MM HG: CPT | Mod: CPTII,S$GLB,, | Performed by: INTERNAL MEDICINE

## 2022-03-08 PROCEDURE — 1160F PR REVIEW ALL MEDS BY PRESCRIBER/CLIN PHARMACIST DOCUMENTED: ICD-10-PCS | Mod: CPTII,S$GLB,, | Performed by: INTERNAL MEDICINE

## 2022-03-08 PROCEDURE — 99999 PR PBB SHADOW E&M-EST. PATIENT-LVL V: ICD-10-PCS | Mod: PBBFAC,,, | Performed by: INTERNAL MEDICINE

## 2022-03-08 PROCEDURE — 76536 US EXAM OF HEAD AND NECK: CPT | Mod: TC

## 2022-03-08 PROCEDURE — 1160F RVW MEDS BY RX/DR IN RCRD: CPT | Mod: CPTII,S$GLB,, | Performed by: INTERNAL MEDICINE

## 2022-03-08 PROCEDURE — 71110 X-RAY EXAM RIBS BIL 3 VIEWS: CPT | Mod: TC,PN

## 2022-03-08 PROCEDURE — 3079F PR MOST RECENT DIASTOLIC BLOOD PRESSURE 80-89 MM HG: ICD-10-PCS | Mod: CPTII,S$GLB,, | Performed by: INTERNAL MEDICINE

## 2022-03-08 PROCEDURE — 72100 XR LUMBAR SPINE AP AND LATERAL: ICD-10-PCS | Mod: 26,,, | Performed by: RADIOLOGY

## 2022-03-08 PROCEDURE — 1125F PR PAIN SEVERITY QUANTIFIED, PAIN PRESENT: ICD-10-PCS | Mod: CPTII,S$GLB,, | Performed by: INTERNAL MEDICINE

## 2022-03-08 PROCEDURE — 76536 US EXAM OF HEAD AND NECK: CPT | Mod: 26,,, | Performed by: INTERNAL MEDICINE

## 2022-03-08 PROCEDURE — 71110 XR RIBS 3 VIEWS BILATERAL: ICD-10-PCS | Mod: 26,,, | Performed by: RADIOLOGY

## 2022-03-08 PROCEDURE — 1101F PR PT FALLS ASSESS DOC 0-1 FALLS W/OUT INJ PAST YR: ICD-10-PCS | Mod: CPTII,S$GLB,, | Performed by: INTERNAL MEDICINE

## 2022-03-08 PROCEDURE — 76536 US SOFT TISSUE HEAD NECK THYROID: ICD-10-PCS | Mod: 26,,, | Performed by: INTERNAL MEDICINE

## 2022-03-08 PROCEDURE — 3075F SYST BP GE 130 - 139MM HG: CPT | Mod: CPTII,S$GLB,, | Performed by: INTERNAL MEDICINE

## 2022-03-08 PROCEDURE — 1101F PT FALLS ASSESS-DOCD LE1/YR: CPT | Mod: CPTII,S$GLB,, | Performed by: INTERNAL MEDICINE

## 2022-03-08 PROCEDURE — 71110 X-RAY EXAM RIBS BIL 3 VIEWS: CPT | Mod: 26,,, | Performed by: RADIOLOGY

## 2022-03-08 PROCEDURE — 1125F AMNT PAIN NOTED PAIN PRSNT: CPT | Mod: CPTII,S$GLB,, | Performed by: INTERNAL MEDICINE

## 2022-03-08 PROCEDURE — 99999 PR PBB SHADOW E&M-EST. PATIENT-LVL V: CPT | Mod: PBBFAC,,, | Performed by: INTERNAL MEDICINE

## 2022-03-08 PROCEDURE — 3075F PR MOST RECENT SYSTOLIC BLOOD PRESS GE 130-139MM HG: ICD-10-PCS | Mod: CPTII,S$GLB,, | Performed by: INTERNAL MEDICINE
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
## 2022-03-10 ENCOUNTER — PATIENT MESSAGE (OUTPATIENT)
Dept: PRIMARY CARE CLINIC | Facility: CLINIC | Age: 77
End: 2022-03-10
Payer: MEDICARE

## 2022-03-11 ENCOUNTER — PATIENT MESSAGE (OUTPATIENT)
Dept: PRIMARY CARE CLINIC | Facility: CLINIC | Age: 77
End: 2022-03-11
Payer: MEDICARE

## 2022-03-11 ENCOUNTER — PATIENT MESSAGE (OUTPATIENT)
Dept: SURGERY | Facility: HOSPITAL | Age: 77
End: 2022-03-11
Payer: MEDICARE

## 2022-03-14 ENCOUNTER — PATIENT MESSAGE (OUTPATIENT)
Dept: PRIMARY CARE CLINIC | Facility: CLINIC | Age: 77
End: 2022-03-14
Payer: MEDICARE

## 2022-03-14 DIAGNOSIS — I10 PRIMARY HYPERTENSION: ICD-10-CM

## 2022-03-15 RX ORDER — CARVEDILOL 25 MG/1
TABLET ORAL
Qty: 180 TABLET | Refills: 3 | Status: SHIPPED | OUTPATIENT
Start: 2022-03-15 | End: 2022-08-09 | Stop reason: SDUPTHER

## 2022-04-05 ENCOUNTER — PATIENT MESSAGE (OUTPATIENT)
Dept: PRIMARY CARE CLINIC | Facility: CLINIC | Age: 77
End: 2022-04-05
Payer: MEDICARE

## 2022-04-07 ENCOUNTER — OFFICE VISIT (OUTPATIENT)
Dept: PRIMARY CARE CLINIC | Facility: CLINIC | Age: 77
End: 2022-04-07
Payer: MEDICARE

## 2022-04-07 VITALS
WEIGHT: 147.25 LBS | DIASTOLIC BLOOD PRESSURE: 84 MMHG | HEART RATE: 70 BPM | OXYGEN SATURATION: 97 % | SYSTOLIC BLOOD PRESSURE: 162 MMHG | TEMPERATURE: 98 F | HEIGHT: 64 IN | BODY MASS INDEX: 25.14 KG/M2

## 2022-04-07 DIAGNOSIS — H91.92 HEARING LOSS OF LEFT EAR, UNSPECIFIED HEARING LOSS TYPE: ICD-10-CM

## 2022-04-07 DIAGNOSIS — I10 BENIGN ESSENTIAL HYPERTENSION: Primary | ICD-10-CM

## 2022-04-07 DIAGNOSIS — I10 BENIGN ESSENTIAL HYPERTENSION: ICD-10-CM

## 2022-04-07 DIAGNOSIS — R45.84 ANHEDONIA: ICD-10-CM

## 2022-04-07 PROCEDURE — 3079F DIAST BP 80-89 MM HG: CPT | Mod: CPTII,S$GLB,, | Performed by: INTERNAL MEDICINE

## 2022-04-07 PROCEDURE — 99999 PR PBB SHADOW E&M-EST. PATIENT-LVL IV: ICD-10-PCS | Mod: PBBFAC,,, | Performed by: INTERNAL MEDICINE

## 2022-04-07 PROCEDURE — 1160F RVW MEDS BY RX/DR IN RCRD: CPT | Mod: CPTII,S$GLB,, | Performed by: INTERNAL MEDICINE

## 2022-04-07 PROCEDURE — 1125F PR PAIN SEVERITY QUANTIFIED, PAIN PRESENT: ICD-10-PCS | Mod: CPTII,S$GLB,, | Performed by: INTERNAL MEDICINE

## 2022-04-07 PROCEDURE — 99999 PR PBB SHADOW E&M-EST. PATIENT-LVL IV: CPT | Mod: PBBFAC,,, | Performed by: INTERNAL MEDICINE

## 2022-04-07 PROCEDURE — 1159F PR MEDICATION LIST DOCUMENTED IN MEDICAL RECORD: ICD-10-PCS | Mod: CPTII,S$GLB,, | Performed by: INTERNAL MEDICINE

## 2022-04-07 PROCEDURE — 1125F AMNT PAIN NOTED PAIN PRSNT: CPT | Mod: CPTII,S$GLB,, | Performed by: INTERNAL MEDICINE

## 2022-04-07 PROCEDURE — 3077F SYST BP >= 140 MM HG: CPT | Mod: CPTII,S$GLB,, | Performed by: INTERNAL MEDICINE

## 2022-04-07 PROCEDURE — 99214 PR OFFICE/OUTPT VISIT, EST, LEVL IV, 30-39 MIN: ICD-10-PCS | Mod: S$GLB,,, | Performed by: INTERNAL MEDICINE

## 2022-04-07 PROCEDURE — 1159F MED LIST DOCD IN RCRD: CPT | Mod: CPTII,S$GLB,, | Performed by: INTERNAL MEDICINE

## 2022-04-07 PROCEDURE — 1160F PR REVIEW ALL MEDS BY PRESCRIBER/CLIN PHARMACIST DOCUMENTED: ICD-10-PCS | Mod: CPTII,S$GLB,, | Performed by: INTERNAL MEDICINE

## 2022-04-07 PROCEDURE — 99214 OFFICE O/P EST MOD 30 MIN: CPT | Mod: S$GLB,,, | Performed by: INTERNAL MEDICINE

## 2022-04-07 PROCEDURE — 3079F PR MOST RECENT DIASTOLIC BLOOD PRESSURE 80-89 MM HG: ICD-10-PCS | Mod: CPTII,S$GLB,, | Performed by: INTERNAL MEDICINE

## 2022-04-07 PROCEDURE — 3077F PR MOST RECENT SYSTOLIC BLOOD PRESSURE >= 140 MM HG: ICD-10-PCS | Mod: CPTII,S$GLB,, | Performed by: INTERNAL MEDICINE

## 2022-04-07 RX ORDER — HYDROCHLOROTHIAZIDE 12.5 MG/1
12.5 CAPSULE ORAL DAILY
Qty: 90 CAPSULE | Refills: 3 | Status: SHIPPED | OUTPATIENT
Start: 2022-04-07 | End: 2022-04-07 | Stop reason: SDUPTHER

## 2022-04-07 RX ORDER — HYDROCHLOROTHIAZIDE 12.5 MG/1
12.5 CAPSULE ORAL DAILY
Qty: 90 CAPSULE | Refills: 3 | Status: SHIPPED | OUTPATIENT
Start: 2022-04-07 | End: 2022-08-09
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
## 2022-04-08 ENCOUNTER — TELEPHONE (OUTPATIENT)
Dept: INTERNAL MEDICINE | Facility: CLINIC | Age: 77
End: 2022-04-08
Payer: MEDICARE

## 2022-04-21 ENCOUNTER — OFFICE VISIT (OUTPATIENT)
Dept: PRIMARY CARE CLINIC | Facility: CLINIC | Age: 77
End: 2022-04-21
Payer: MEDICARE

## 2022-04-21 ENCOUNTER — LAB VISIT (OUTPATIENT)
Dept: LAB | Facility: HOSPITAL | Age: 77
End: 2022-04-21
Attending: INTERNAL MEDICINE
Payer: MEDICARE

## 2022-04-21 VITALS
HEART RATE: 65 BPM | OXYGEN SATURATION: 97 % | DIASTOLIC BLOOD PRESSURE: 82 MMHG | HEIGHT: 64 IN | SYSTOLIC BLOOD PRESSURE: 139 MMHG | BODY MASS INDEX: 25.18 KG/M2 | WEIGHT: 147.5 LBS

## 2022-04-21 DIAGNOSIS — I10 BENIGN ESSENTIAL HYPERTENSION: Primary | ICD-10-CM

## 2022-04-21 DIAGNOSIS — D69.2 SENILE PURPURA: ICD-10-CM

## 2022-04-21 DIAGNOSIS — I10 BENIGN ESSENTIAL HYPERTENSION: ICD-10-CM

## 2022-04-21 LAB
ANION GAP SERPL CALC-SCNC: 12 MMOL/L (ref 8–16)
BUN SERPL-MCNC: 13 MG/DL (ref 8–23)
CALCIUM SERPL-MCNC: 10 MG/DL (ref 8.7–10.5)
CHLORIDE SERPL-SCNC: 100 MMOL/L (ref 95–110)
CO2 SERPL-SCNC: 29 MMOL/L (ref 23–29)
CREAT SERPL-MCNC: 0.6 MG/DL (ref 0.5–1.4)
EST. GFR  (AFRICAN AMERICAN): >60 ML/MIN/1.73 M^2
EST. GFR  (NON AFRICAN AMERICAN): >60 ML/MIN/1.73 M^2
GLUCOSE SERPL-MCNC: 102 MG/DL (ref 70–110)
POTASSIUM SERPL-SCNC: 4.4 MMOL/L (ref 3.5–5.1)
SODIUM SERPL-SCNC: 141 MMOL/L (ref 136–145)

## 2022-04-21 PROCEDURE — 80048 BASIC METABOLIC PNL TOTAL CA: CPT | Performed by: INTERNAL MEDICINE

## 2022-04-21 PROCEDURE — 3288F FALL RISK ASSESSMENT DOCD: CPT | Mod: CPTII,S$GLB,, | Performed by: INTERNAL MEDICINE

## 2022-04-21 PROCEDURE — 1160F PR REVIEW ALL MEDS BY PRESCRIBER/CLIN PHARMACIST DOCUMENTED: ICD-10-PCS | Mod: CPTII,S$GLB,, | Performed by: INTERNAL MEDICINE

## 2022-04-21 PROCEDURE — 99214 PR OFFICE/OUTPT VISIT, EST, LEVL IV, 30-39 MIN: ICD-10-PCS | Mod: S$GLB,,, | Performed by: INTERNAL MEDICINE

## 2022-04-21 PROCEDURE — 36415 COLL VENOUS BLD VENIPUNCTURE: CPT | Mod: PN | Performed by: INTERNAL MEDICINE

## 2022-04-21 PROCEDURE — 1160F RVW MEDS BY RX/DR IN RCRD: CPT | Mod: CPTII,S$GLB,, | Performed by: INTERNAL MEDICINE

## 2022-04-21 PROCEDURE — 3075F PR MOST RECENT SYSTOLIC BLOOD PRESS GE 130-139MM HG: ICD-10-PCS | Mod: CPTII,S$GLB,, | Performed by: INTERNAL MEDICINE

## 2022-04-21 PROCEDURE — 3288F PR FALLS RISK ASSESSMENT DOCUMENTED: ICD-10-PCS | Mod: CPTII,S$GLB,, | Performed by: INTERNAL MEDICINE

## 2022-04-21 PROCEDURE — 99999 PR PBB SHADOW E&M-EST. PATIENT-LVL IV: ICD-10-PCS | Mod: PBBFAC,,, | Performed by: INTERNAL MEDICINE

## 2022-04-21 PROCEDURE — 1101F PR PT FALLS ASSESS DOC 0-1 FALLS W/OUT INJ PAST YR: ICD-10-PCS | Mod: CPTII,S$GLB,, | Performed by: INTERNAL MEDICINE

## 2022-04-21 PROCEDURE — 1159F MED LIST DOCD IN RCRD: CPT | Mod: CPTII,S$GLB,, | Performed by: INTERNAL MEDICINE

## 2022-04-21 PROCEDURE — 99214 OFFICE O/P EST MOD 30 MIN: CPT | Mod: S$GLB,,, | Performed by: INTERNAL MEDICINE

## 2022-04-21 PROCEDURE — 99999 PR PBB SHADOW E&M-EST. PATIENT-LVL IV: CPT | Mod: PBBFAC,,, | Performed by: INTERNAL MEDICINE

## 2022-04-21 PROCEDURE — 1101F PT FALLS ASSESS-DOCD LE1/YR: CPT | Mod: CPTII,S$GLB,, | Performed by: INTERNAL MEDICINE

## 2022-04-21 PROCEDURE — 1159F PR MEDICATION LIST DOCUMENTED IN MEDICAL RECORD: ICD-10-PCS | Mod: CPTII,S$GLB,, | Performed by: INTERNAL MEDICINE

## 2022-04-21 PROCEDURE — 1125F AMNT PAIN NOTED PAIN PRSNT: CPT | Mod: CPTII,S$GLB,, | Performed by: INTERNAL MEDICINE

## 2022-04-21 PROCEDURE — 3075F SYST BP GE 130 - 139MM HG: CPT | Mod: CPTII,S$GLB,, | Performed by: INTERNAL MEDICINE

## 2022-04-21 PROCEDURE — 3079F DIAST BP 80-89 MM HG: CPT | Mod: CPTII,S$GLB,, | Performed by: INTERNAL MEDICINE

## 2022-04-21 PROCEDURE — 3079F PR MOST RECENT DIASTOLIC BLOOD PRESSURE 80-89 MM HG: ICD-10-PCS | Mod: CPTII,S$GLB,, | Performed by: INTERNAL MEDICINE

## 2022-04-21 PROCEDURE — 1125F PR PAIN SEVERITY QUANTIFIED, PAIN PRESENT: ICD-10-PCS | Mod: CPTII,S$GLB,, | Performed by: INTERNAL MEDICINE
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
## 2022-04-22 ENCOUNTER — PATIENT MESSAGE (OUTPATIENT)
Dept: PRIMARY CARE CLINIC | Facility: CLINIC | Age: 77
End: 2022-04-22
Payer: MEDICARE

## 2022-04-27 ENCOUNTER — PATIENT MESSAGE (OUTPATIENT)
Dept: OPHTHALMOLOGY | Facility: CLINIC | Age: 77
End: 2022-04-27
Payer: MEDICARE

## 2022-05-02 ENCOUNTER — PATIENT MESSAGE (OUTPATIENT)
Dept: PRIMARY CARE CLINIC | Facility: CLINIC | Age: 77
End: 2022-05-02
Payer: MEDICARE

## 2022-05-03 ENCOUNTER — PATIENT MESSAGE (OUTPATIENT)
Dept: PRIMARY CARE CLINIC | Facility: CLINIC | Age: 77
End: 2022-05-03
Payer: MEDICARE

## 2022-05-03 RX ORDER — MELOXICAM 15 MG/1
15 TABLET ORAL DAILY
Qty: 7 TABLET | Refills: 0 | Status: SHIPPED | OUTPATIENT
Start: 2022-05-03 | End: 2022-08-09

## 2022-05-11 ENCOUNTER — PATIENT MESSAGE (OUTPATIENT)
Dept: PRIMARY CARE CLINIC | Facility: CLINIC | Age: 77
End: 2022-05-11
Payer: MEDICARE

## 2022-05-12 ENCOUNTER — SOCIAL WORK (OUTPATIENT)
Dept: PRIMARY CARE CLINIC | Facility: CLINIC | Age: 77
End: 2022-05-12
Payer: MEDICARE

## 2022-05-12 NOTE — PROGRESS NOTES
"[facility]  INTAKE    DATE:  5/12/2022  REFERRAL SOURCE:  Lisa Dean MD  TYPE OF VISIT:  In person  LENGTH OF SESSION: 30  .  HISTORY OF PRESENTING ILLNESS:  Grace Navarro, a 76 y.o. female with history of Adjustment disorders; with depressed mood [F43.21].    CHIEF COMPLAINT/REASON FOR ENCOUNTER: Pt presented for initial evaluation for the [facility] Program. Met with patient. Pt's chief complaint includes the following: depression.    Patient does not currently have a psychiatrist.    Patient does not currently have a therapist.          Current symptoms:  · Depression: dysphoric mood.  · Anxiety: denies.  · Insomnia: N/A.  · Theresa:  denies.  · Psychosis: denies .    No flowsheet data found.  No flowsheet data found.     Current social stressors:   Lives alone; has difficulty "fitting in" in social circles in [place]; strained relationship with son    Risk assessment:  Patient reports no suicidal ideation  Patient reports no homicidal ideation  Patient reports no self-injurious behavior  Patient reports no violent behavior    PSYCHIATRIC HISTORY:  History of Theresa or diagnosis of Bipolar Disorder in the past:  No  History of Psychosis or diagnosis of Schizophrenia in the past:  No  Previous Psychiatric Hospitalizations:  No  Previous SI/HI:   No  Previous Suicide Attempts:  No  Previous Medication Trials: No   Previous Psychiatric Outpatient Treatment:  Yes, Saw therapist "on and off" for 20 years ending in 2000's  History of Trauma:  No  History of Violence:  No  Access to a Gun:  No    SUBSTANCE ABUSE HISTORY:  Tobacco:  No   Alcohol: occasional  Illicit Substances: No  Misuse of Prescription Medications:  No    MEDICAL HISTORY:  Past Medical History:   Diagnosis Date    Anxiety     Rene's disease     Cataract     Chronic low back pain     Depression     Goiter, nodular     Hyperlipidemia     Hypertension     Irritable bowel     Neuromuscular disorder     " Osteopenia of multiple sites 5/29/2017    PUD (peptic ulcer disease)     Subarachnoid hemorrhage 2014    Varicose veins        NEUROLOGIC HISTORY:  Seizures:  No  Head trauma:  No  Memory loss:  No    SOCIAL HISTORY (MARRIAGE, EMPLOYMENT, etc.):  Living Situation: lives alone  Family Life Cycle:   Family: ;10 years  Nuclear/Marriage: two children  Extended Family:   Supports:daughter; friends out of state  Education/Vocation:   [employer]/Spirituality: no  Hobbies and Interests: dancing    PSYCHIATRIC FAMILY HISTORY: not known      MENTAL HEALTH STATUS EXAM  General Appearance:  unremarkable, age appropriate   Speech: normal tone, normal rate, normal pitch, normal volume      Level of Cooperation: cooperative      Thought Processes: normal and logical   Mood: steady      Thought Content: normal, no suicidality, no homicidality, delusions, or paranoia   Affect: congruent and appropriate   Orientation: Oriented x3   Memory: recent >  intact, remote >  intact   Attention Span & Concentration: intact   Fund of General Knowledge: intact and appropriate to age and level of education   Abstract Reasoning: interpretation of similarities was abstract   Judgment & Insight: good     Language  intact       IMPRESSION:   My diagnostic impression is Adjustment disorders; with depressed mood [F43.21], as evidenced by PHQ-9 and presenting symptoms.     PROVISIONAL DIAGNOSES:  No diagnosis found.     STRENGTHS AND LIABILITIES: Strength: Patient accepts guidance/feedback, Strength: Patient is expressive/articulate., Strength: Patient is physically healthy., Liability: Patient is impulsive., Liability: Patient has no suport network.    TREATMENT GOALS: Depression: increasing social contacts (three/week)  N/A    PLAN: In this session a psych evaluation was conducted to get history and process pt's life. Problem-solving Therapy will be utilized in future individual  therapy sessions to increase insight and support.     RETURN  TO CLINIC: 2 weeks
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
## 2022-05-19 ENCOUNTER — PATIENT MESSAGE (OUTPATIENT)
Dept: OPHTHALMOLOGY | Facility: CLINIC | Age: 77
End: 2022-05-19

## 2022-05-19 ENCOUNTER — OFFICE VISIT (OUTPATIENT)
Dept: OPHTHALMOLOGY | Facility: CLINIC | Age: 77
End: 2022-05-19
Payer: MEDICARE

## 2022-05-19 DIAGNOSIS — H47.239 LARGE PHYSIOLOGIC CUPPING OF OPTIC DISC: ICD-10-CM

## 2022-05-19 DIAGNOSIS — H40.023 OPEN ANGLE WITH BORDERLINE FINDINGS AND HIGH GLAUCOMA RISK IN BOTH EYES: Primary | ICD-10-CM

## 2022-05-19 DIAGNOSIS — Z96.1 PSEUDOPHAKIA OF BOTH EYES: ICD-10-CM

## 2022-05-19 DIAGNOSIS — H04.542 STENOSIS OF LEFT LACRIMAL CANALICULI: ICD-10-CM

## 2022-05-19 DIAGNOSIS — H02.834 DERMATOCHALASIS OF BOTH UPPER EYELIDS: ICD-10-CM

## 2022-05-19 DIAGNOSIS — H04.123 DRY EYES: ICD-10-CM

## 2022-05-19 DIAGNOSIS — H47.391 MYELINATED NERVE FIBERS OF OPTIC DISC OF RIGHT EYE: ICD-10-CM

## 2022-05-19 DIAGNOSIS — H02.132 SENILE ECTROPION OF RIGHT LOWER EYELID: ICD-10-CM

## 2022-05-19 DIAGNOSIS — H02.831 DERMATOCHALASIS OF BOTH UPPER EYELIDS: ICD-10-CM

## 2022-05-19 PROCEDURE — 3288F PR FALLS RISK ASSESSMENT DOCUMENTED: ICD-10-PCS | Mod: CPTII,S$GLB,, | Performed by: OPHTHALMOLOGY

## 2022-05-19 PROCEDURE — 99999 PR PBB SHADOW E&M-EST. PATIENT-LVL III: CPT | Mod: PBBFAC,,, | Performed by: OPHTHALMOLOGY

## 2022-05-19 PROCEDURE — 92250 FUNDUS PHOTOGRAPHY W/I&R: CPT | Mod: S$GLB,,, | Performed by: OPHTHALMOLOGY

## 2022-05-19 PROCEDURE — 92014 COMPRE OPH EXAM EST PT 1/>: CPT | Mod: S$GLB,,, | Performed by: OPHTHALMOLOGY

## 2022-05-19 PROCEDURE — 1160F RVW MEDS BY RX/DR IN RCRD: CPT | Mod: CPTII,S$GLB,, | Performed by: OPHTHALMOLOGY

## 2022-05-19 PROCEDURE — 3288F FALL RISK ASSESSMENT DOCD: CPT | Mod: CPTII,S$GLB,, | Performed by: OPHTHALMOLOGY

## 2022-05-19 PROCEDURE — 1160F PR REVIEW ALL MEDS BY PRESCRIBER/CLIN PHARMACIST DOCUMENTED: ICD-10-PCS | Mod: CPTII,S$GLB,, | Performed by: OPHTHALMOLOGY

## 2022-05-19 PROCEDURE — 1159F PR MEDICATION LIST DOCUMENTED IN MEDICAL RECORD: ICD-10-PCS | Mod: CPTII,S$GLB,, | Performed by: OPHTHALMOLOGY

## 2022-05-19 PROCEDURE — 2023F DILAT RTA XM W/O RTNOPTHY: CPT | Mod: CPTII,S$GLB,, | Performed by: OPHTHALMOLOGY

## 2022-05-19 PROCEDURE — 92014 PR EYE EXAM, EST PATIENT,COMPREHESV: ICD-10-PCS | Mod: S$GLB,,, | Performed by: OPHTHALMOLOGY

## 2022-05-19 PROCEDURE — 1126F AMNT PAIN NOTED NONE PRSNT: CPT | Mod: CPTII,S$GLB,, | Performed by: OPHTHALMOLOGY

## 2022-05-19 PROCEDURE — 1126F PR PAIN SEVERITY QUANTIFIED, NO PAIN PRESENT: ICD-10-PCS | Mod: CPTII,S$GLB,, | Performed by: OPHTHALMOLOGY

## 2022-05-19 PROCEDURE — 99999 PR PBB SHADOW E&M-EST. PATIENT-LVL III: ICD-10-PCS | Mod: PBBFAC,,, | Performed by: OPHTHALMOLOGY

## 2022-05-19 PROCEDURE — 1101F PT FALLS ASSESS-DOCD LE1/YR: CPT | Mod: CPTII,S$GLB,, | Performed by: OPHTHALMOLOGY

## 2022-05-19 PROCEDURE — 2023F PR DILATED RETINAL EXAM W/O EVID OF RETINOPATHY: ICD-10-PCS | Mod: CPTII,S$GLB,, | Performed by: OPHTHALMOLOGY

## 2022-05-19 PROCEDURE — 92250 COLOR FUNDUS PHOTOGRAPHY - OU - BOTH EYES: ICD-10-PCS | Mod: S$GLB,,, | Performed by: OPHTHALMOLOGY

## 2022-05-19 PROCEDURE — 1101F PR PT FALLS ASSESS DOC 0-1 FALLS W/OUT INJ PAST YR: ICD-10-PCS | Mod: CPTII,S$GLB,, | Performed by: OPHTHALMOLOGY

## 2022-05-19 PROCEDURE — 1159F MED LIST DOCD IN RCRD: CPT | Mod: CPTII,S$GLB,, | Performed by: OPHTHALMOLOGY

## 2022-05-21 LAB — NONINV COLON CA DNA+OCC BLD SCRN STL QL: NEGATIVE

## 2022-05-25 ENCOUNTER — PATIENT MESSAGE (OUTPATIENT)
Dept: PRIMARY CARE CLINIC | Facility: CLINIC | Age: 77
End: 2022-05-25
Payer: MEDICARE

## 2022-05-26 ENCOUNTER — DOCUMENTATION ONLY (OUTPATIENT)
Dept: PRIMARY CARE CLINIC | Facility: CLINIC | Age: 77
End: 2022-05-26
Payer: MEDICARE

## 2022-05-26 NOTE — PLAN OF CARE
05/26/22 1512   Depression Patient Health Questionnaire (PHQ-9)   Over the last two weeks how often have you been bothered by little interest or pleasure in doing things 0   Over the last two weeks how often have you been bothered by feeling down, depressed or hopeless 1   Over the last two weeks how often have you been bothered by trouble falling or staying asleep, or sleeping too much 0   Over the last two weeks how often have you been bothered by feeling tired or having little energy 0   Over the last two weeks how often have you been bothered by a poor appetite or overeating 0   Over the last two weeks how often have you been bothered by feeling bad about yourself - or that you are a failure or have let yourself or your family down 2   Over the last two weeks how often have you been bothered by trouble concentrating on things, such as reading the newspaper or watching television 0   Over the last two weeks how often have you been bothered by moving or speaking so slowly that other people could have noticed. Or the opposite - being so fidgety or restless that you have been moving around a lot more than usual. 0   Over the last two weeks how often have you been bothered by thoughts that you would be better off dead, or of hurting yourself 1   If you checked off any problems, how difficult have these problems made it for you to do your work, take care of things at home or get along with other people? Somewhat difficult   Total Score 4   Interpretation Minimal or None
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
## 2022-06-08 ENCOUNTER — PATIENT MESSAGE (OUTPATIENT)
Dept: PRIMARY CARE CLINIC | Facility: CLINIC | Age: 77
End: 2022-06-08
Payer: MEDICARE

## 2022-06-09 ENCOUNTER — DOCUMENTATION ONLY (OUTPATIENT)
Dept: PRIMARY CARE CLINIC | Facility: CLINIC | Age: 77
End: 2022-06-09
Payer: MEDICARE

## 2022-06-09 ENCOUNTER — IMMUNIZATION (OUTPATIENT)
Dept: PRIMARY CARE CLINIC | Facility: CLINIC | Age: 77
End: 2022-06-09
Payer: MEDICARE

## 2022-06-09 DIAGNOSIS — Z23 NEED FOR VACCINATION: Primary | ICD-10-CM

## 2022-06-09 PROCEDURE — 91305 COVID-19, MRNA, LNP-S, PF, 30 MCG/0.3 ML DOSE VACCINE (PFIZER): CPT | Mod: PBBFAC | Performed by: INTERNAL MEDICINE

## 2022-06-09 NOTE — PROGRESS NOTES
"Individual Psychotherapy (LCSW/PhD)  Grace Navarro,  6/9/2022    Site:  [place]         Therapeutic Intervention: Met with patient for individual psychotherapy.    Chief complaint/reason for encounter: anxiety     Interval history and content of current session: Pt reports increase in rumination about past relationship with ex-. Pt states that they believe they have been ruminating more because they are working on selling their house. SW and top discussed the impact of life transitions and SW provided support to normalizing pt's feelings of sadness and anxiety.    Pt recently increased exercise and adopted a low salt diet due to concerns for BP. Pt states they have been drinking a bit more I.e. 1-2 glasses of wine 2-3 days per week. Pt has consulted with their cardiologist and PCP about drinking and per pt they is currently "not a problem". Pt plans on moving near by daughter if able to sell their home to be closer to her and family. Pt and SW discussed importance of pt acknowledging emotions of transition and monitoring stress levels throughout process.      Treatment plan:  · Target symptoms: anxiety   · Why chosen therapy is appropriate versus another modality: relevant to diagnosis, evidence based practice  · Outcome monitoring methods: self-report, checklist/rating scale  · Therapeutic intervention type: supportive psychotherapy    Risk parameters:  Patient reports no suicidal ideation  Patient reports no homicidal ideation  Patient reports no self-injurious behavior  Patient reports no violent behavior    Verbal deficits: None    Patient's response to intervention:  The patient's response to intervention is accepting.    Progress toward goals and other mental status changes:  The patient's progress toward goals is good.    Diagnosis:   No diagnosis found.    Plan: Pt plans to continue individual psychotherapy    Return to clinic: 2 weeks    Length of Service (minutes): 30        "

## 2022-06-16 ENCOUNTER — HOSPITAL ENCOUNTER (OUTPATIENT)
Dept: RADIOLOGY | Facility: HOSPITAL | Age: 77
Discharge: HOME | End: 2022-06-16
Attending: INTERNAL MEDICINE
Payer: MEDICARE

## 2022-06-16 DIAGNOSIS — Z12.31 ENCOUNTER FOR SCREENING MAMMOGRAM FOR MALIGNANT NEOPLASM OF BREAST: ICD-10-CM

## 2022-06-16 PROCEDURE — 77063 MAMMO DIGITAL SCREENING BILAT WITH TOMO: ICD-10-PCS | Mod: 26,,, | Performed by: RADIOLOGY

## 2022-06-16 PROCEDURE — 77067 SCR MAMMO BI INCL CAD: CPT | Mod: 26,,, | Performed by: RADIOLOGY

## 2022-06-16 PROCEDURE — 77067 SCR MAMMO BI INCL CAD: CPT | Mod: TC

## 2022-06-16 PROCEDURE — 77063 BREAST TOMOSYNTHESIS BI: CPT | Mod: 26,,, | Performed by: RADIOLOGY

## 2022-06-16 PROCEDURE — 77067 MAMMO DIGITAL SCREENING BILAT WITH TOMO: ICD-10-PCS | Mod: 26,,, | Performed by: RADIOLOGY

## 2022-06-21 ENCOUNTER — PATIENT MESSAGE (OUTPATIENT)
Dept: PRIMARY CARE CLINIC | Facility: CLINIC | Age: 77
End: 2022-06-21
Payer: MEDICARE

## 2022-06-21 DIAGNOSIS — L30.9 DERMATITIS: ICD-10-CM

## 2022-06-21 RX ORDER — TRIAMCINOLONE ACETONIDE 1 MG/G
CREAM TOPICAL
Qty: 30 G | Refills: 0 | Status: SHIPPED | OUTPATIENT
Start: 2022-06-21 | End: 2023-01-18

## 2022-06-21 NOTE — TELEPHONE ENCOUNTER
Refill Routing Note   Medication(s) are not appropriate for processing by [facility] for the following reason(s):      - Non-participating provider    [facility] action(s):  Route          Medication reconciliation completed: No     Appointments  past 12m or future 3m with PCP    Date Provider   Last Visit   4/21/2022 Lisa Dean MD   Next Visit   Visit date not found Lisa Dean MD   ED visits in past 90 days: 0        Note composed:4:25 PM 06/21/2022

## 2022-06-22 ENCOUNTER — PATIENT MESSAGE (OUTPATIENT)
Dept: PRIMARY CARE CLINIC | Facility: CLINIC | Age: 77
End: 2022-06-22
Payer: MEDICARE

## 2022-06-22 RX ORDER — TRIAMCINOLONE ACETONIDE 1 MG/G
CREAM TOPICAL
Qty: 30 G | Refills: 0 | OUTPATIENT
Start: 2022-06-22

## 2022-06-23 ENCOUNTER — DOCUMENTATION ONLY (OUTPATIENT)
Dept: PRIMARY CARE CLINIC | Facility: CLINIC | Age: 77
End: 2022-06-23
Payer: MEDICARE

## 2022-06-23 NOTE — PROGRESS NOTES
Individual Psychotherapy (LCSW/PhD)  Grace Navarro,  6/23/2022    Site:  [place]         Therapeutic Intervention: Met with patient for individual psychotherapy.    Chief complaint/reason for encounter: anxiety     Interval history and content of current session: Pt reports stable mood but ongoing stress with life transitions. Pt in process of selling home. SW and pt discussed importance of pt practicing self-care and monitoring stress levels during this process. Pt and SW discussed ways for pt to be more engaged socially. Pt enjoys cooking for other peoples and learning cooking techniques.    SW and pt reviewed options for pt including searching for cooking classes and social groups via Internet. SW provided emotional support for pt and aided pt in processing feelings of anxiety and stress related to selling home and making decisions as a single person.        Treatment plan:  · Target symptoms: anxiety   · Why chosen therapy is appropriate versus another modality: relevant to diagnosis, evidence based practice  · Outcome monitoring methods: self-report, checklist/rating scale  · Therapeutic intervention type: supportive psychotherapy    Risk parameters:  Patient reports no suicidal ideation  Patient reports no homicidal ideation  Patient reports no self-injurious behavior  Patient reports no violent behavior    Verbal deficits: None    Patient's response to intervention:  The patient's response to intervention is accepting.    Progress toward goals and other mental status changes:  The patient's progress toward goals is good.    Diagnosis:   No diagnosis found.    Plan: Pt plans to continue individual psychotherapy    Return to clinic: 2 weeks    Length of Service (minutes): 30

## 2022-06-30 ENCOUNTER — OFFICE VISIT (OUTPATIENT)
Dept: OPHTHALMOLOGY | Facility: CLINIC | Age: 77
End: 2022-06-30
Payer: MEDICARE

## 2022-06-30 ENCOUNTER — TELEPHONE (OUTPATIENT)
Dept: OPHTHALMOLOGY | Facility: CLINIC | Age: 77
End: 2022-06-30

## 2022-06-30 DIAGNOSIS — H02.132 SENILE ECTROPION OF RIGHT LOWER EYELID: Primary | ICD-10-CM

## 2022-06-30 DIAGNOSIS — H40.023 OPEN ANGLE WITH BORDERLINE FINDINGS AND HIGH GLAUCOMA RISK IN BOTH EYES: ICD-10-CM

## 2022-06-30 DIAGNOSIS — H02.834 DERMATOCHALASIS OF BOTH UPPER EYELIDS: Primary | ICD-10-CM

## 2022-06-30 DIAGNOSIS — H04.123 DRY EYES: ICD-10-CM

## 2022-06-30 DIAGNOSIS — H02.831 DERMATOCHALASIS OF BOTH UPPER EYELIDS: ICD-10-CM

## 2022-06-30 DIAGNOSIS — H02.834 DERMATOCHALASIS OF BOTH UPPER EYELIDS: ICD-10-CM

## 2022-06-30 DIAGNOSIS — H57.812 BROW PTOSIS, LEFT: ICD-10-CM

## 2022-06-30 DIAGNOSIS — H02.132 SENILE ECTROPION OF RIGHT LOWER EYELID: ICD-10-CM

## 2022-06-30 DIAGNOSIS — H02.831 DERMATOCHALASIS OF BOTH UPPER EYELIDS: Primary | ICD-10-CM

## 2022-06-30 DIAGNOSIS — H47.239 LARGE PHYSIOLOGIC CUPPING OF OPTIC DISC: ICD-10-CM

## 2022-06-30 DIAGNOSIS — Z96.1 PSEUDOPHAKIA OF BOTH EYES: ICD-10-CM

## 2022-06-30 DIAGNOSIS — H04.551 NLDO, ACQUIRED (NASOLACRIMAL DUCT OBSTRUCTION), RIGHT: ICD-10-CM

## 2022-06-30 PROCEDURE — 99999 PR PBB SHADOW E&M-EST. PATIENT-LVL III: ICD-10-PCS | Mod: PBBFAC,,, | Performed by: OPHTHALMOLOGY

## 2022-06-30 PROCEDURE — 3288F FALL RISK ASSESSMENT DOCD: CPT | Mod: CPTII,S$GLB,, | Performed by: OPHTHALMOLOGY

## 2022-06-30 PROCEDURE — 99999 PR PBB SHADOW E&M-EST. PATIENT-LVL III: CPT | Mod: PBBFAC,,, | Performed by: OPHTHALMOLOGY

## 2022-06-30 PROCEDURE — 1160F PR REVIEW ALL MEDS BY PRESCRIBER/CLIN PHARMACIST DOCUMENTED: ICD-10-PCS | Mod: CPTII,S$GLB,, | Performed by: OPHTHALMOLOGY

## 2022-06-30 PROCEDURE — 1126F PR PAIN SEVERITY QUANTIFIED, NO PAIN PRESENT: ICD-10-PCS | Mod: CPTII,S$GLB,, | Performed by: OPHTHALMOLOGY

## 2022-06-30 PROCEDURE — 1101F PR PT FALLS ASSESS DOC 0-1 FALLS W/OUT INJ PAST YR: ICD-10-PCS | Mod: CPTII,S$GLB,, | Performed by: OPHTHALMOLOGY

## 2022-06-30 PROCEDURE — 92285 EXTERNAL OCULAR PHOTOGRAPHY: CPT | Mod: S$GLB,,, | Performed by: OPHTHALMOLOGY

## 2022-06-30 PROCEDURE — 99214 OFFICE O/P EST MOD 30 MIN: CPT | Mod: S$GLB,,, | Performed by: OPHTHALMOLOGY

## 2022-06-30 PROCEDURE — 92285 EXTERNAL PHOTOGRAPHY - OU - BOTH EYES: ICD-10-PCS | Mod: S$GLB,,, | Performed by: OPHTHALMOLOGY

## 2022-06-30 PROCEDURE — 3288F PR FALLS RISK ASSESSMENT DOCUMENTED: ICD-10-PCS | Mod: CPTII,S$GLB,, | Performed by: OPHTHALMOLOGY

## 2022-06-30 PROCEDURE — 1160F RVW MEDS BY RX/DR IN RCRD: CPT | Mod: CPTII,S$GLB,, | Performed by: OPHTHALMOLOGY

## 2022-06-30 PROCEDURE — 1126F AMNT PAIN NOTED NONE PRSNT: CPT | Mod: CPTII,S$GLB,, | Performed by: OPHTHALMOLOGY

## 2022-06-30 PROCEDURE — 1159F MED LIST DOCD IN RCRD: CPT | Mod: CPTII,S$GLB,, | Performed by: OPHTHALMOLOGY

## 2022-06-30 PROCEDURE — 1159F PR MEDICATION LIST DOCUMENTED IN MEDICAL RECORD: ICD-10-PCS | Mod: CPTII,S$GLB,, | Performed by: OPHTHALMOLOGY

## 2022-06-30 PROCEDURE — 1101F PT FALLS ASSESS-DOCD LE1/YR: CPT | Mod: CPTII,S$GLB,, | Performed by: OPHTHALMOLOGY

## 2022-06-30 PROCEDURE — 99214 PR OFFICE/OUTPT VISIT, EST, LEVL IV, 30-39 MIN: ICD-10-PCS | Mod: S$GLB,,, | Performed by: OPHTHALMOLOGY
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
## 2022-07-06 ENCOUNTER — PATIENT MESSAGE (OUTPATIENT)
Dept: PRIMARY CARE CLINIC | Facility: CLINIC | Age: 77
End: 2022-07-06
Payer: MEDICARE

## 2022-07-19 ENCOUNTER — PATIENT MESSAGE (OUTPATIENT)
Dept: RESEARCH | Facility: CLINIC | Age: 77
End: 2022-07-19
Payer: MEDICARE

## 2022-07-20 ENCOUNTER — PATIENT MESSAGE (OUTPATIENT)
Dept: PRIMARY CARE CLINIC | Facility: CLINIC | Age: 77
End: 2022-07-20
Payer: MEDICARE

## 2022-07-21 ENCOUNTER — LAB VISIT (OUTPATIENT)
Dept: LAB | Facility: HOSPITAL | Age: 77
End: 2022-07-21
Attending: INTERNAL MEDICINE
Payer: MEDICARE

## 2022-07-21 ENCOUNTER — DOCUMENTATION ONLY (OUTPATIENT)
Dept: PRIMARY CARE CLINIC | Facility: CLINIC | Age: 77
End: 2022-07-21
Payer: MEDICARE

## 2022-07-21 ENCOUNTER — PATIENT MESSAGE (OUTPATIENT)
Dept: PRIMARY CARE CLINIC | Facility: CLINIC | Age: 77
End: 2022-07-21
Payer: MEDICARE

## 2022-07-21 ENCOUNTER — OFFICE VISIT (OUTPATIENT)
Dept: PRIMARY CARE CLINIC | Facility: CLINIC | Age: 77
End: 2022-07-21
Payer: MEDICARE

## 2022-07-21 VITALS
BODY MASS INDEX: 25.45 KG/M2 | DIASTOLIC BLOOD PRESSURE: 82 MMHG | SYSTOLIC BLOOD PRESSURE: 134 MMHG | OXYGEN SATURATION: 98 % | WEIGHT: 149.06 LBS | TEMPERATURE: 99 F | HEIGHT: 64 IN | HEART RATE: 64 BPM

## 2022-07-21 DIAGNOSIS — R10.11 RIGHT UPPER QUADRANT PAIN: Primary | ICD-10-CM

## 2022-07-21 DIAGNOSIS — I10 BENIGN ESSENTIAL HYPERTENSION: ICD-10-CM

## 2022-07-21 DIAGNOSIS — R10.11 RIGHT UPPER QUADRANT PAIN: ICD-10-CM

## 2022-07-21 LAB
ALBUMIN SERPL BCP-MCNC: 4.2 G/DL (ref 3.5–5.2)
ALP SERPL-CCNC: 70 U/L (ref 55–135)
ALT SERPL W/O P-5'-P-CCNC: 18 U/L (ref 10–44)
ANION GAP SERPL CALC-SCNC: 11 MMOL/L (ref 8–16)
AST SERPL-CCNC: 22 U/L (ref 10–40)
BASOPHILS # BLD AUTO: 0.05 K/UL (ref 0–0.2)
BASOPHILS NFR BLD: 1.2 % (ref 0–1.9)
BILIRUB SERPL-MCNC: 0.5 MG/DL (ref 0.1–1)
BUN SERPL-MCNC: 10 MG/DL (ref 8–23)
CALCIUM SERPL-MCNC: 10.1 MG/DL (ref 8.7–10.5)
CHLORIDE SERPL-SCNC: 102 MMOL/L (ref 95–110)
CO2 SERPL-SCNC: 28 MMOL/L (ref 23–29)
CREAT SERPL-MCNC: 0.6 MG/DL (ref 0.5–1.4)
DIFFERENTIAL METHOD: ABNORMAL
EOSINOPHIL # BLD AUTO: 0.1 K/UL (ref 0–0.5)
EOSINOPHIL NFR BLD: 2.5 % (ref 0–8)
ERYTHROCYTE [DISTWIDTH] IN BLOOD BY AUTOMATED COUNT: 13.2 % (ref 11.5–14.5)
EST. GFR  (AFRICAN AMERICAN): >60 ML/MIN/1.73 M^2
EST. GFR  (NON AFRICAN AMERICAN): >60 ML/MIN/1.73 M^2
GLUCOSE SERPL-MCNC: 107 MG/DL (ref 70–110)
HCT VFR BLD AUTO: 38.7 % (ref 37–48.5)
HGB BLD-MCNC: 12.1 G/DL (ref 12–16)
IMM GRANULOCYTES # BLD AUTO: 0 K/UL (ref 0–0.04)
IMM GRANULOCYTES NFR BLD AUTO: 0 % (ref 0–0.5)
LIPASE SERPL-CCNC: 19 U/L (ref 4–60)
LYMPHOCYTES # BLD AUTO: 1 K/UL (ref 1–4.8)
LYMPHOCYTES NFR BLD: 24.6 % (ref 18–48)
MCH RBC QN AUTO: 30.6 PG (ref 27–31)
MCHC RBC AUTO-ENTMCNC: 31.3 G/DL (ref 32–36)
MCV RBC AUTO: 98 FL (ref 82–98)
MONOCYTES # BLD AUTO: 0.4 K/UL (ref 0.3–1)
MONOCYTES NFR BLD: 9 % (ref 4–15)
NEUTROPHILS # BLD AUTO: 2.5 K/UL (ref 1.8–7.7)
NEUTROPHILS NFR BLD: 62.7 % (ref 38–73)
NRBC BLD-RTO: 0 /100 WBC
PLATELET # BLD AUTO: 208 K/UL (ref 150–450)
PMV BLD AUTO: 10.3 FL (ref 9.2–12.9)
POTASSIUM SERPL-SCNC: 4.1 MMOL/L (ref 3.5–5.1)
PROT SERPL-MCNC: 7.6 G/DL (ref 6–8.4)
RBC # BLD AUTO: 3.95 M/UL (ref 4–5.4)
SODIUM SERPL-SCNC: 141 MMOL/L (ref 136–145)
WBC # BLD AUTO: 4.02 K/UL (ref 3.9–12.7)

## 2022-07-21 PROCEDURE — 1100F PTFALLS ASSESS-DOCD GE2>/YR: CPT | Mod: CPTII,S$GLB,, | Performed by: INTERNAL MEDICINE

## 2022-07-21 PROCEDURE — 80053 COMPREHEN METABOLIC PANEL: CPT | Performed by: INTERNAL MEDICINE

## 2022-07-21 PROCEDURE — 3079F DIAST BP 80-89 MM HG: CPT | Mod: CPTII,S$GLB,, | Performed by: INTERNAL MEDICINE

## 2022-07-21 PROCEDURE — 1159F MED LIST DOCD IN RCRD: CPT | Mod: CPTII,S$GLB,, | Performed by: INTERNAL MEDICINE

## 2022-07-21 PROCEDURE — 99999 PR PBB SHADOW E&M-EST. PATIENT-LVL V: ICD-10-PCS | Mod: PBBFAC,,, | Performed by: INTERNAL MEDICINE

## 2022-07-21 PROCEDURE — 1159F PR MEDICATION LIST DOCUMENTED IN MEDICAL RECORD: ICD-10-PCS | Mod: CPTII,S$GLB,, | Performed by: INTERNAL MEDICINE

## 2022-07-21 PROCEDURE — 3075F PR MOST RECENT SYSTOLIC BLOOD PRESS GE 130-139MM HG: ICD-10-PCS | Mod: CPTII,S$GLB,, | Performed by: INTERNAL MEDICINE

## 2022-07-21 PROCEDURE — 1125F PR PAIN SEVERITY QUANTIFIED, PAIN PRESENT: ICD-10-PCS | Mod: CPTII,S$GLB,, | Performed by: INTERNAL MEDICINE

## 2022-07-21 PROCEDURE — 3288F FALL RISK ASSESSMENT DOCD: CPT | Mod: CPTII,S$GLB,, | Performed by: INTERNAL MEDICINE

## 2022-07-21 PROCEDURE — 99999 PR PBB SHADOW E&M-EST. PATIENT-LVL V: CPT | Mod: PBBFAC,,, | Performed by: INTERNAL MEDICINE

## 2022-07-21 PROCEDURE — 1160F RVW MEDS BY RX/DR IN RCRD: CPT | Mod: CPTII,S$GLB,, | Performed by: INTERNAL MEDICINE

## 2022-07-21 PROCEDURE — 99214 OFFICE O/P EST MOD 30 MIN: CPT | Mod: S$GLB,,, | Performed by: INTERNAL MEDICINE

## 2022-07-21 PROCEDURE — 3075F SYST BP GE 130 - 139MM HG: CPT | Mod: CPTII,S$GLB,, | Performed by: INTERNAL MEDICINE

## 2022-07-21 PROCEDURE — 1100F PR PT FALLS ASSESS DOC 2+ FALLS/FALL W/INJURY/YR: ICD-10-PCS | Mod: CPTII,S$GLB,, | Performed by: INTERNAL MEDICINE

## 2022-07-21 PROCEDURE — 85025 COMPLETE CBC W/AUTO DIFF WBC: CPT | Performed by: INTERNAL MEDICINE

## 2022-07-21 PROCEDURE — 1160F PR REVIEW ALL MEDS BY PRESCRIBER/CLIN PHARMACIST DOCUMENTED: ICD-10-PCS | Mod: CPTII,S$GLB,, | Performed by: INTERNAL MEDICINE

## 2022-07-21 PROCEDURE — 99214 PR OFFICE/OUTPT VISIT, EST, LEVL IV, 30-39 MIN: ICD-10-PCS | Mod: S$GLB,,, | Performed by: INTERNAL MEDICINE

## 2022-07-21 PROCEDURE — 1125F AMNT PAIN NOTED PAIN PRSNT: CPT | Mod: CPTII,S$GLB,, | Performed by: INTERNAL MEDICINE

## 2022-07-21 PROCEDURE — 3079F PR MOST RECENT DIASTOLIC BLOOD PRESSURE 80-89 MM HG: ICD-10-PCS | Mod: CPTII,S$GLB,, | Performed by: INTERNAL MEDICINE

## 2022-07-21 PROCEDURE — 83690 ASSAY OF LIPASE: CPT | Performed by: INTERNAL MEDICINE

## 2022-07-21 PROCEDURE — 3288F PR FALLS RISK ASSESSMENT DOCUMENTED: ICD-10-PCS | Mod: CPTII,S$GLB,, | Performed by: INTERNAL MEDICINE

## 2022-07-21 PROCEDURE — 36415 COLL VENOUS BLD VENIPUNCTURE: CPT | Mod: PN | Performed by: INTERNAL MEDICINE

## 2022-07-21 NOTE — PROGRESS NOTES
Individual Psychotherapy (LCSW/PhD)  Grace Navarro,  7/21/2022    Site:  [place]         Therapeutic Intervention: Met with patient for individual psychotherapy.    Chief complaint/reason for encounter: depression     Interval history and content of current session: Pt reports recent health scare with GI. Pt and SW discussed difficulty of finding purpose in their life. Pt does not feel suicidal but states there mood has been low. Pt states they recently had some tests done by PCP and feel encouraged. Pt will be working with health  and is excited by improving self-care. SW and pt discussed suing guided meditation to improve coping with stress. SW sent pt link to guided meditation web site to begin practicing.    Treatment plan:  · Target symptoms: depression  · Why chosen therapy is appropriate versus another modality: relevant to diagnosis, evidence based practice  · Outcome monitoring methods: self-report, checklist/rating scale  · Therapeutic intervention type: supportive psychotherapy    Risk parameters:  Patient reports no suicidal ideation  Patient reports no homicidal ideation  Patient reports no self-injurious behavior  Patient reports no violent behavior    Verbal deficits: None    Patient's response to intervention:  The patient's response to intervention is accepting.    Progress toward goals and other mental status changes:  The patient's progress toward goals is good.    Diagnosis:   No diagnosis found.    Plan: Pt plans to continue individual psychotherapy    Return to clinic: 2 weeks    Length of Service (minutes): 30  `

## 2022-07-21 NOTE — PROGRESS NOTES
"Subjective:       Patient ID: Grace Navarro is a 76 y.o. female.    Chief Complaint: Abdominal Pain    HPI urgent visit.   Reports eats too much and drinks too much and having some abdominal pain. Fried wontons caused pain a while ago. Ate some sausages 2 days ago (never usually eat sausage) and had diarrhea and RUQ abdominal (reports was steady - improved now) x 2 days. Has been mostly sitting in bed and reading. Diarrhea resolved. Had normal BM now. Some nausea this morning. Able to hold down some spinach pie she made last night and broth and tea. No fevers/chills. Bloated. Was having a lot of flatulence but  Not anymore. Pain does not radiate to the back.   Using heating pad. Not taking anything.   Has not been drinking alcohol since Tuesday.     HTN - hasnt' been taking BP meds due to feeling bad.    Review of Systems  as above in HPI.     Objective:      Physical Exam    /82 (BP Location: Left arm, Patient Position: Sitting, BP Method: Large (Manual))   Pulse 64   Temp 98.6 °F (37 °C) (Oral)   Ht 5' 4" (1.626 m)   Wt 67.6 kg (149 lb 0.5 oz)   LMP 06/14/1996   SpO2 98%   BMI 25.58 kg/m²     Gen - A+OX4, NAD  HEENT - PERRL, OP clear. MMM.   CV - rrr, no m/r  Chest - CTAB, no wheezing/rhonchi  Abd - RUQ tenderness to palpation. Neg Reyes's sign. Lower abdominal slight tenderness to palpation. No rebound or guarding. S/ND/+bs.   Ext - 2+ B radial pulses. No LE edema.   Skin - no skin tenting.     Previous labs reviewed.     Assessment/Plan     Grace was seen today for abdominal pain.    Diagnoses and all orders for this visit:    Right upper quadrant pain  -     CBC Auto Differential; Future  -     Comprehensive Metabolic Panel; Future  -     Lipase; Future  -     US Abdomen Complete; Future    Benign essential hypertension - ok to hold BP meds but cont to monitor. Restart when feeling better. BP ok today.    avoid alcohol and fatty foods. Blum BRAT diet and advance slowly.  US today. "     Follow up if symptoms worsen or fail to improve.      Lisa Dean MD  Department of Internal Medicine - [facility]  11:38 AM

## 2022-07-22 ENCOUNTER — PATIENT MESSAGE (OUTPATIENT)
Dept: PRIMARY CARE CLINIC | Facility: CLINIC | Age: 77
End: 2022-07-22
Payer: MEDICARE

## 2022-07-25 ENCOUNTER — PATIENT MESSAGE (OUTPATIENT)
Dept: PRIMARY CARE CLINIC | Facility: CLINIC | Age: 77
End: 2022-07-25
Payer: MEDICARE

## 2022-08-03 ENCOUNTER — PATIENT MESSAGE (OUTPATIENT)
Dept: PRIMARY CARE CLINIC | Facility: CLINIC | Age: 77
End: 2022-08-03
Payer: MEDICARE

## 2022-08-04 ENCOUNTER — DOCUMENTATION ONLY (OUTPATIENT)
Dept: PRIMARY CARE CLINIC | Facility: CLINIC | Age: 77
End: 2022-08-04
Payer: MEDICARE

## 2022-08-04 NOTE — PROGRESS NOTES
"Individual Psychotherapy (LCSW/PhD)  Grace Navarro,  8/4/2022    Site:  [place]         Therapeutic Intervention: Met with patient for individual psychotherapy.    Chief complaint/reason for encounter: depression; interpersonal     Interval history and content of current session: Pt recently removed house from  market and states they are feeling relieved. Pt states they have been "feeling good". Pt downloaded mindfulness suzanne onto I-pad and began reviewing different sessions. Pt states they do feel overwhelmed at times but feel they are managing well. Pt also recently joined an exercise group at [facility] and is losing forward to beginning. Pt and SW discussed current level of drinking and ways to manage drinking behavior.     SW and pt discussed importance of socializing and engaging with others to find support. SW and pt discussed pt's estranged son and ways of grieving loss of the relationship.         Treatment plan:  · Target symptoms: depression  · Why chosen therapy is appropriate versus another modality: relevant to diagnosis, evidence based practice  · Outcome monitoring methods: self-report, checklist/rating scale  · Therapeutic intervention type: supportive psychotherapy    Risk parameters:  Patient reports no suicidal ideation  Patient reports no homicidal ideation  Patient reports no self-injurious behavior  Patient reports no violent behavior    Verbal deficits: None    Patient's response to intervention:  The patient's response to intervention is motivated.    Progress toward goals and other mental status changes:  The patient's progress toward goals is excellent, fair .    Diagnosis:   No diagnosis found.    Plan: Pt plans to continue individual psychotherapy    Return to clinic: 2 weeks    Length of Service (minutes): 30        "

## 2022-08-09 ENCOUNTER — OFFICE VISIT (OUTPATIENT)
Dept: PRIMARY CARE CLINIC | Facility: CLINIC | Age: 77
End: 2022-08-09
Payer: MEDICARE

## 2022-08-09 ENCOUNTER — PATIENT MESSAGE (OUTPATIENT)
Dept: SURGERY | Facility: HOSPITAL | Age: 77
End: 2022-08-09
Payer: MEDICARE

## 2022-08-09 ENCOUNTER — LAB VISIT (OUTPATIENT)
Dept: LAB | Facility: HOSPITAL | Age: 77
End: 2022-08-09
Attending: INTERNAL MEDICINE
Payer: MEDICARE

## 2022-08-09 VITALS
SYSTOLIC BLOOD PRESSURE: 128 MMHG | HEART RATE: 74 BPM | HEIGHT: 64 IN | BODY MASS INDEX: 25.29 KG/M2 | WEIGHT: 148.13 LBS | DIASTOLIC BLOOD PRESSURE: 71 MMHG | OXYGEN SATURATION: 97 %

## 2022-08-09 DIAGNOSIS — R10.9 ABDOMINAL CRAMPING: ICD-10-CM

## 2022-08-09 DIAGNOSIS — R42 DIZZINESS: ICD-10-CM

## 2022-08-09 DIAGNOSIS — E55.9 VITAMIN D DEFICIENCY: ICD-10-CM

## 2022-08-09 DIAGNOSIS — R53.83 FATIGUE, UNSPECIFIED TYPE: ICD-10-CM

## 2022-08-09 DIAGNOSIS — R42 DIZZINESS: Primary | ICD-10-CM

## 2022-08-09 DIAGNOSIS — R20.0 ANESTHESIA OF SKIN: ICD-10-CM

## 2022-08-09 DIAGNOSIS — Z01.810 PREOP CARDIOVASCULAR EXAM: ICD-10-CM

## 2022-08-09 DIAGNOSIS — I10 BENIGN ESSENTIAL HYPERTENSION: ICD-10-CM

## 2022-08-09 LAB
25(OH)D3+25(OH)D2 SERPL-MCNC: 66 NG/ML (ref 30–96)
BASOPHILS # BLD AUTO: 0.02 K/UL (ref 0–0.2)
BASOPHILS NFR BLD: 0.4 % (ref 0–1.9)
DIFFERENTIAL METHOD: ABNORMAL
EOSINOPHIL # BLD AUTO: 0.1 K/UL (ref 0–0.5)
EOSINOPHIL NFR BLD: 1.7 % (ref 0–8)
ERYTHROCYTE [DISTWIDTH] IN BLOOD BY AUTOMATED COUNT: 13.2 % (ref 11.5–14.5)
HCT VFR BLD AUTO: 36.8 % (ref 37–48.5)
HGB BLD-MCNC: 12.2 G/DL (ref 12–16)
IMM GRANULOCYTES # BLD AUTO: 0.01 K/UL (ref 0–0.04)
IMM GRANULOCYTES NFR BLD AUTO: 0.2 % (ref 0–0.5)
LYMPHOCYTES # BLD AUTO: 1.2 K/UL (ref 1–4.8)
LYMPHOCYTES NFR BLD: 26.6 % (ref 18–48)
MCH RBC QN AUTO: 31.7 PG (ref 27–31)
MCHC RBC AUTO-ENTMCNC: 33.2 G/DL (ref 32–36)
MCV RBC AUTO: 96 FL (ref 82–98)
MONOCYTES # BLD AUTO: 0.5 K/UL (ref 0.3–1)
MONOCYTES NFR BLD: 11.2 % (ref 4–15)
NEUTROPHILS # BLD AUTO: 2.8 K/UL (ref 1.8–7.7)
NEUTROPHILS NFR BLD: 59.9 % (ref 38–73)
NRBC BLD-RTO: 0 /100 WBC
PLATELET # BLD AUTO: 194 K/UL (ref 150–450)
PMV BLD AUTO: 10.7 FL (ref 9.2–12.9)
RBC # BLD AUTO: 3.85 M/UL (ref 4–5.4)
TSH SERPL DL<=0.005 MIU/L-ACNC: 0.94 UIU/ML (ref 0.4–4)
VIT B12 SERPL-MCNC: 1584 PG/ML (ref 210–950)
WBC # BLD AUTO: 4.63 K/UL (ref 3.9–12.7)

## 2022-08-09 PROCEDURE — 3074F PR MOST RECENT SYSTOLIC BLOOD PRESSURE < 130 MM HG: ICD-10-PCS | Mod: CPTII,S$GLB,, | Performed by: INTERNAL MEDICINE

## 2022-08-09 PROCEDURE — 82306 VITAMIN D 25 HYDROXY: CPT | Performed by: INTERNAL MEDICINE

## 2022-08-09 PROCEDURE — 1160F PR REVIEW ALL MEDS BY PRESCRIBER/CLIN PHARMACIST DOCUMENTED: ICD-10-PCS | Mod: CPTII,S$GLB,, | Performed by: INTERNAL MEDICINE

## 2022-08-09 PROCEDURE — 1159F PR MEDICATION LIST DOCUMENTED IN MEDICAL RECORD: ICD-10-PCS | Mod: CPTII,S$GLB,, | Performed by: INTERNAL MEDICINE

## 2022-08-09 PROCEDURE — 1101F PT FALLS ASSESS-DOCD LE1/YR: CPT | Mod: CPTII,S$GLB,, | Performed by: INTERNAL MEDICINE

## 2022-08-09 PROCEDURE — 85025 COMPLETE CBC W/AUTO DIFF WBC: CPT | Performed by: INTERNAL MEDICINE

## 2022-08-09 PROCEDURE — 99499 RISK ADDL DX/OHS AUDIT: ICD-10-PCS | Mod: S$GLB,,, | Performed by: INTERNAL MEDICINE

## 2022-08-09 PROCEDURE — 82607 VITAMIN B-12: CPT | Performed by: INTERNAL MEDICINE

## 2022-08-09 PROCEDURE — 3078F PR MOST RECENT DIASTOLIC BLOOD PRESSURE < 80 MM HG: ICD-10-PCS | Mod: CPTII,S$GLB,, | Performed by: INTERNAL MEDICINE

## 2022-08-09 PROCEDURE — 3288F PR FALLS RISK ASSESSMENT DOCUMENTED: ICD-10-PCS | Mod: CPTII,S$GLB,, | Performed by: INTERNAL MEDICINE

## 2022-08-09 PROCEDURE — 99499 UNLISTED E&M SERVICE: CPT | Mod: S$GLB,,, | Performed by: INTERNAL MEDICINE

## 2022-08-09 PROCEDURE — 99214 PR OFFICE/OUTPT VISIT, EST, LEVL IV, 30-39 MIN: ICD-10-PCS | Mod: S$GLB,,, | Performed by: INTERNAL MEDICINE

## 2022-08-09 PROCEDURE — 1101F PR PT FALLS ASSESS DOC 0-1 FALLS W/OUT INJ PAST YR: ICD-10-PCS | Mod: CPTII,S$GLB,, | Performed by: INTERNAL MEDICINE

## 2022-08-09 PROCEDURE — 99214 OFFICE O/P EST MOD 30 MIN: CPT | Mod: S$GLB,,, | Performed by: INTERNAL MEDICINE

## 2022-08-09 PROCEDURE — 3288F FALL RISK ASSESSMENT DOCD: CPT | Mod: CPTII,S$GLB,, | Performed by: INTERNAL MEDICINE

## 2022-08-09 PROCEDURE — 84443 ASSAY THYROID STIM HORMONE: CPT | Performed by: INTERNAL MEDICINE

## 2022-08-09 PROCEDURE — 3078F DIAST BP <80 MM HG: CPT | Mod: CPTII,S$GLB,, | Performed by: INTERNAL MEDICINE

## 2022-08-09 PROCEDURE — 99999 PR PBB SHADOW E&M-EST. PATIENT-LVL IV: CPT | Mod: PBBFAC,,, | Performed by: INTERNAL MEDICINE

## 2022-08-09 PROCEDURE — 1159F MED LIST DOCD IN RCRD: CPT | Mod: CPTII,S$GLB,, | Performed by: INTERNAL MEDICINE

## 2022-08-09 PROCEDURE — 1126F PR PAIN SEVERITY QUANTIFIED, NO PAIN PRESENT: ICD-10-PCS | Mod: CPTII,S$GLB,, | Performed by: INTERNAL MEDICINE

## 2022-08-09 PROCEDURE — 99999 PR PBB SHADOW E&M-EST. PATIENT-LVL IV: ICD-10-PCS | Mod: PBBFAC,,, | Performed by: INTERNAL MEDICINE

## 2022-08-09 PROCEDURE — 3074F SYST BP LT 130 MM HG: CPT | Mod: CPTII,S$GLB,, | Performed by: INTERNAL MEDICINE

## 2022-08-09 PROCEDURE — 36415 COLL VENOUS BLD VENIPUNCTURE: CPT | Mod: PN | Performed by: INTERNAL MEDICINE

## 2022-08-09 PROCEDURE — 1126F AMNT PAIN NOTED NONE PRSNT: CPT | Mod: CPTII,S$GLB,, | Performed by: INTERNAL MEDICINE

## 2022-08-09 PROCEDURE — 1160F RVW MEDS BY RX/DR IN RCRD: CPT | Mod: CPTII,S$GLB,, | Performed by: INTERNAL MEDICINE

## 2022-08-09 RX ORDER — CARVEDILOL 25 MG/1
25 TABLET ORAL 2 TIMES DAILY WITH MEALS
Qty: 180 TABLET | Refills: 3 | Status: SHIPPED | OUTPATIENT
Start: 2022-08-09 | End: 2022-08-24 | Stop reason: SDUPTHER

## 2022-08-09 RX ORDER — DICYCLOMINE HYDROCHLORIDE 10 MG/1
10 CAPSULE ORAL
Qty: 120 CAPSULE | Refills: 0 | Status: SHIPPED | OUTPATIENT
Start: 2022-08-09 | End: 2023-07-03 | Stop reason: SDUPTHER

## 2022-08-09 RX ORDER — CARVEDILOL 25 MG/1
25 TABLET ORAL 2 TIMES DAILY WITH MEALS
Qty: 180 TABLET | Refills: 3 | Status: SHIPPED | OUTPATIENT
Start: 2022-08-09 | End: 2022-08-09 | Stop reason: SDUPTHER

## 2022-08-09 NOTE — PROGRESS NOTES
"Subjective:       Patient ID: Grace Navarro is a 76 y.o. female.    Chief Complaint: Follow-up    HPI   At our last visit 7/21/22, pt was having some RUQ pains after certain foods.  US abdomen 7/22/22 was normal.   No longer w/ RUQ pain after 11 days and no more diarrhea.   Reports previously w/ h/o IBS and was on bentyl. Wants refill.     HTN - hctz 12.5mg daily, coreg 25mg 1/2 tab qam and 1 tab qhs.  Part of digital HTN program. BP ok.   CT head 12/2021 - small age indeterminate lacunar infarcts in the internal capsules B.  Began dizziness again when she sits up from lying down in bed - every night when getting up from lying down at night to go use the restroom. So bad that she has to use the walker just to walk to the bathroom. She was standing up for a short period of time while cooking, she had some dizziness.   Another time had some dizziness while she was in the shower (wasn't particularly hot).   Dizziness resolves after a few seconds. No palpitations/SOB/CP/ESQUEDA.   Forcing herself to drink more water.   Overall just feels lack of energy but attributes to the hot weather. Doesn't stop her from doing anything.     TTE 1/6/22 - EF 65%. Severe LAE and mild ELIO. Grade I LV DD. Mild MR. PAP 28.  24 hr holter - rare pvc and pacs including short run of SVT. Palpitations corresponded to SR.    Has tarsal stripping scheduled w/ Dr. Umana 11/2/22.  Need preop clearance.     Calorie counting. Cutting down on alcohol.   On zetia 10mg daily. Previously couldn't tolerate statins.     Review of Systems  Comprehensive review of systems otherwise negative. See history/subjective section for more details.    Objective:      Physical Exam    /68 (BP Location: Left arm, Patient Position: Sitting, BP Method: Large (Manual))   Pulse 71   Ht 5' 4" (1.626 m)   Wt 67.2 kg (148 lb 2.4 oz)   LMP 06/14/1996   SpO2 97%   BMI 25.43 kg/m²   Lying 140/71 P 66  Standing 128/71 74    GEN - A+OX4, NAD   HEENT - PERRL, EOMI, " OP clear. MMM. L eyebrow scarring. Bump at the L upper eyelid.  Neck - No thyromegaly or cervical LAD. No thyroid masses felt.  CV - RRR, no m/r. No carotid bruit.   Chest - CTAB, no wheezing or rhonchi  Abd - S/NT/ND/+BS.   Ext - 2+BDP and radial pulses. No LE edema.   MSK - No spinal tenderness to palpation. Normal gait.   Skin - No rash.    Previous labs reviewed.     Assessment/Plan     Grace was seen today for follow-up.    Diagnoses and all orders for this visit:    Dizziness - stop hctz as below. Sounds positional. Has compression stocking. Recommend to use regularly.   -     Vitamin B12; Future  -     Vitamin D; Future    Benign essential hypertension  Stop hydrochlorothiazide.   Increase carvedilol to 25mg 1 tab twice daily.   -     carvediloL (COREG) 25 MG tablet; Take 1 tablet (25 mg total) by mouth 2 (two) times daily with meals.     Abdominal cramping  -     dicyclomine (BENTYL) 10 MG capsule; Take 1 capsule (10 mg total) by mouth 4 (four) times daily before meals and nightly.    Preop cardiovascular exam  -     SCHEDULED EKG 12-LEAD (to Muse); Future    Fatigue, unspecified type  -     CBC Auto Differential; Future  -     TSH; Future  -     Vitamin B12; Future  -     Vitamin D; Future    Anesthesia of skin - of the L eyebrow after injury.  -     Vitamin B12; Future    Vitamin D deficiency  -     Vitamin D; Future      Follow up in about 2 weeks (around 8/23/2022) for Virtual Visit.      Lisa Dean MD  Department of Internal Medicine - [facility]  2:49 PM

## 2022-08-17 ENCOUNTER — PATIENT MESSAGE (OUTPATIENT)
Dept: PRIMARY CARE CLINIC | Facility: CLINIC | Age: 77
End: 2022-08-17
Payer: MEDICARE

## 2022-08-17 ENCOUNTER — TELEPHONE (OUTPATIENT)
Dept: PRIMARY CARE CLINIC | Facility: CLINIC | Age: 77
End: 2022-08-17
Payer: MEDICARE

## 2022-08-17 NOTE — TELEPHONE ENCOUNTER
Sig - Route: Take 1 tablet (25 mg total) by mouth 2 (two) times daily with meals. - Oral    Please confirm

## 2022-08-17 NOTE — TELEPHONE ENCOUNTER
[facility]:     erx for Carvedilol 25mg tab with conflicting directions, should this be:     1 tablet by mouth 2x a day with meals   Or   1/2 a tab in the morning and 1 tab at night

## 2022-08-18 ENCOUNTER — DOCUMENTATION ONLY (OUTPATIENT)
Dept: PRIMARY CARE CLINIC | Facility: CLINIC | Age: 77
End: 2022-08-18
Payer: MEDICARE

## 2022-08-18 NOTE — PROGRESS NOTES
"Individual Psychotherapy (LCSW/PhD)  Grace Navarro,  8/18/2022    Site:  [place]         Therapeutic Intervention: Met with patient for individual psychotherapy.    Chief complaint/reason for encounter: depression     Interval history and content of current session: Pt reports decrease in drinking alcohol (down to few glasses of wine per week). Pt states that their mood has been "good" but that they still feel "down" sometimes. Pt has been coping with a foot injury and pain that has been causing discomfort. Pt has not begun using mindfulness exercises. SW and pt discussed pt's effort to increase activity. Pt states they are planning on joining the gym soon.Pt has plans to travel this fall and spend time with family. SW and pt discussed pt volunteering for [employer]/community services this fall.     Treatment plan:  · Target symptoms: depression, adjustment  · Why chosen therapy is appropriate versus another modality: relevant to diagnosis, evidence based practice  · Outcome monitoring methods: self-report, checklist/rating scale  · Therapeutic intervention type: insight oriented psychotherapy, supportive psychotherapy    Risk parameters:  Patient reports no suicidal ideation  Patient reports no homicidal ideation  Patient reports no self-injurious behavior  Patient reports no violent behavior    Verbal deficits: None    Patient's response to intervention:  The patient's response to intervention is accepting.    Progress toward goals and other mental status changes:  The patient's progress toward goals is good.    Diagnosis:   No diagnosis found.    Plan: Pt plans to continue individual psychotherapy    Return to clinic: as scheduled    Length of Service (minutes): 30        "

## 2022-08-24 ENCOUNTER — PATIENT MESSAGE (OUTPATIENT)
Dept: PRIMARY CARE CLINIC | Facility: CLINIC | Age: 77
End: 2022-08-24

## 2022-08-24 ENCOUNTER — PATIENT MESSAGE (OUTPATIENT)
Dept: SURGERY | Facility: HOSPITAL | Age: 77
End: 2022-08-24
Payer: MEDICARE

## 2022-08-24 ENCOUNTER — OFFICE VISIT (OUTPATIENT)
Dept: PRIMARY CARE CLINIC | Facility: CLINIC | Age: 77
End: 2022-08-24
Payer: MEDICARE

## 2022-08-24 DIAGNOSIS — E78.5 HYPERLIPIDEMIA, UNSPECIFIED HYPERLIPIDEMIA TYPE: ICD-10-CM

## 2022-08-24 DIAGNOSIS — I10 BENIGN ESSENTIAL HYPERTENSION: ICD-10-CM

## 2022-08-24 DIAGNOSIS — R42 DIZZINESS: Primary | ICD-10-CM

## 2022-08-24 PROCEDURE — 99442 PR PHYSICIAN TELEPHONE EVALUATION 11-20 MIN: ICD-10-PCS | Mod: 95,,, | Performed by: INTERNAL MEDICINE

## 2022-08-24 PROCEDURE — 1159F PR MEDICATION LIST DOCUMENTED IN MEDICAL RECORD: ICD-10-PCS | Mod: CPTII,95,, | Performed by: INTERNAL MEDICINE

## 2022-08-24 PROCEDURE — 1160F RVW MEDS BY RX/DR IN RCRD: CPT | Mod: CPTII,95,, | Performed by: INTERNAL MEDICINE

## 2022-08-24 PROCEDURE — 1160F PR REVIEW ALL MEDS BY PRESCRIBER/CLIN PHARMACIST DOCUMENTED: ICD-10-PCS | Mod: CPTII,95,, | Performed by: INTERNAL MEDICINE

## 2022-08-24 PROCEDURE — 1159F MED LIST DOCD IN RCRD: CPT | Mod: CPTII,95,, | Performed by: INTERNAL MEDICINE

## 2022-08-24 PROCEDURE — 99442 PR PHYSICIAN TELEPHONE EVALUATION 11-20 MIN: CPT | Mod: 95,,, | Performed by: INTERNAL MEDICINE

## 2022-08-24 RX ORDER — HYDROCHLOROTHIAZIDE 12.5 MG/1
12.5 TABLET ORAL DAILY
Qty: 90 TABLET | Refills: 3
Start: 2022-08-24 | End: 2022-08-29 | Stop reason: SDUPTHER

## 2022-08-24 RX ORDER — CARVEDILOL 25 MG/1
TABLET ORAL
Qty: 180 TABLET | Refills: 3
Start: 2022-08-24 | End: 2022-08-24 | Stop reason: SDUPTHER

## 2022-08-24 RX ORDER — EZETIMIBE 10 MG/1
10 TABLET ORAL DAILY
Qty: 90 TABLET | Refills: 3
Start: 2022-08-24 | End: 2022-08-29 | Stop reason: SDUPTHER

## 2022-08-24 RX ORDER — HYDROCHLOROTHIAZIDE 12.5 MG/1
12.5 TABLET ORAL DAILY
Qty: 90 TABLET | Refills: 3
Start: 2022-08-24 | End: 2022-08-24 | Stop reason: SDUPTHER

## 2022-08-24 RX ORDER — CARVEDILOL 25 MG/1
TABLET ORAL
Qty: 180 TABLET | Refills: 3
Start: 2022-08-24 | End: 2022-08-29 | Stop reason: SDUPTHER

## 2022-08-24 NOTE — PROGRESS NOTES
Established Patient - Audio Only Telehealth Visit     The patient location is: [place], [place]  The chief complaint leading to consultation is: HTN  Visit type: Virtual visit with audio only (telephone)  Total time spent with patient: 14 min       The reason for the audio only service rather than synchronous audio and video virtual visit was related to technical difficulties or patient preference/necessity.     Each patient to whom I provide medical services by telemedicine is:  (1) informed of the relationship between the physician and patient and the respective role of any other health care provider with respect to management of the patient; and (2) notified that they may decline to receive medical services by telemedicine and may withdraw from such care at any time. Patient verbally consented to receive this service via voice-only telephone call.       HPI: at our last visit 2 weeks ago, stopped on hctz due to dizziness and inc coreg to 25mg BID. Part of digital HTN program and BP has been elevated.   Allergic to ACEI. Amlodipine caused flushing.  No difference in her transient dizziness when changing positions when stopping hctz. Hasn't been using compression stockings as it's so hot. Previously hctz 12.5mg daily and coreg 1/2 pill qam and 1 pill qpm controlled her BP well. No leg swelling.      Assessment and plan:    Diagnoses and all orders for this visit:    Dizziness - discussed compression stockings first thing in the am and take off when lying down to sleep at night. When standing from lying down, make sure to sit at side of bed and move legs to mobilize fluid before standing up.     Benign essential hypertension - ok to restart previous BP meds due no difference in dizziness when standing from lying down/sitting down when stopped on hctz. BP nto controlled on coreg 25mg BID.   -     hydroCHLOROthiazide (HYDRODIURIL) 12.5 MG Tab; Take 1 tablet (12.5 mg total) by mouth once daily.  -     carvediloL (COREG)  25 MG tablet; Take 1/2 tab every morning and 1 tab nightly.    Lisa Dean MD  Department of Internal Medicine - [facility]  9:30 AM     This service was not originating from a related E/M service provided within the previous 7 days nor will  to an E/M service or procedure within the next 24 hours or my soonest available appointment.  Prevailing standard of care was able to be met in this audio-only visit.

## 2022-08-26 ENCOUNTER — PATIENT MESSAGE (OUTPATIENT)
Dept: OPHTHALMOLOGY | Facility: CLINIC | Age: 77
End: 2022-08-26
Payer: MEDICARE

## 2022-08-29 ENCOUNTER — TELEPHONE (OUTPATIENT)
Dept: PRIMARY CARE CLINIC | Facility: CLINIC | Age: 77
End: 2022-08-29
Payer: MEDICARE

## 2022-08-29 ENCOUNTER — PATIENT MESSAGE (OUTPATIENT)
Dept: PRIMARY CARE CLINIC | Facility: CLINIC | Age: 77
End: 2022-08-29
Payer: MEDICARE

## 2022-08-29 DIAGNOSIS — I10 BENIGN ESSENTIAL HYPERTENSION: ICD-10-CM

## 2022-08-29 DIAGNOSIS — E78.5 HYPERLIPIDEMIA, UNSPECIFIED HYPERLIPIDEMIA TYPE: ICD-10-CM

## 2022-08-29 DIAGNOSIS — L50.9 URTICARIA: ICD-10-CM

## 2022-08-29 RX ORDER — CARVEDILOL 25 MG/1
TABLET ORAL
Qty: 180 TABLET | Refills: 3
Start: 2022-08-29 | End: 2023-03-01

## 2022-08-29 RX ORDER — CARVEDILOL 25 MG/1
TABLET ORAL
Qty: 180 TABLET | Refills: 3 | Status: SHIPPED | OUTPATIENT
Start: 2022-08-29 | End: 2022-08-29 | Stop reason: SDUPTHER

## 2022-08-29 RX ORDER — EZETIMIBE 10 MG/1
10 TABLET ORAL DAILY
Qty: 90 TABLET | Refills: 3 | Status: SHIPPED | OUTPATIENT
Start: 2022-08-29 | End: 2023-01-17

## 2022-08-29 RX ORDER — CARVEDILOL 12.5 MG/1
TABLET ORAL
Qty: 90 TABLET | Refills: 3 | Status: SHIPPED | OUTPATIENT
Start: 2022-08-29 | End: 2023-04-18 | Stop reason: SDUPTHER

## 2022-08-29 RX ORDER — HYDROXYZINE HYDROCHLORIDE 25 MG/1
25 TABLET, FILM COATED ORAL 3 TIMES DAILY PRN
Qty: 270 TABLET | Refills: 3 | Status: SHIPPED | OUTPATIENT
Start: 2022-08-29

## 2022-08-29 RX ORDER — HYDROCHLOROTHIAZIDE 12.5 MG/1
12.5 TABLET ORAL DAILY
Qty: 90 TABLET | Refills: 3 | Status: SHIPPED | OUTPATIENT
Start: 2022-08-29 | End: 2022-11-28 | Stop reason: SDUPTHER

## 2022-08-29 RX ORDER — HYDROXYZINE HYDROCHLORIDE 25 MG/1
25 TABLET, FILM COATED ORAL 3 TIMES DAILY PRN
Qty: 270 TABLET | Refills: 3 | Status: SHIPPED | OUTPATIENT
Start: 2022-08-29 | End: 2022-08-29 | Stop reason: SDUPTHER

## 2022-08-29 NOTE — TELEPHONE ENCOUNTER
----- Message from Nan Dee sent at 8/29/2022  3:17 PM CDT -----  Contact: Pt [phone]  Pt called and stated that the carvediloL (COREG) 25 MG tablet is to hard to cut in half and wanted to know if you can call in the 12.5mg tablet.     Please call

## 2022-08-29 NOTE — TELEPHONE ENCOUNTER
----- Message from Ting Moyer sent at 8/29/2022  8:45 AM CDT -----  Contact: [phone]  Dr. Dean: would you be so kind to refill the following prescriptions and therefore to have enough until I come back in town in the middle of October? Please send the order to my [facility] .   Hydrochlorothiazide  12.5 mg  Ezetimibe 10 mg  Carvedilol 12.5 mg     (It is not easy to cut in half the  25 mg pill)  I will appreciate if you send the order as soon as possible because I am leaving  town soon.  Thanks  Grace Navarro    Can you please resend Rx and escribe them today  and send to [facility] as patient is going out of town and add hydrOXYzine HCL (ATARAX) 25 MG tablet    [facility] ([facility]) - [place], [place] - [address]   Phone:  [phone]  Fax:  [phone]                              and call patient to confirm.

## 2022-09-01 ENCOUNTER — OFFICE VISIT (OUTPATIENT)
Dept: OPTOMETRY | Facility: CLINIC | Age: 77
End: 2022-09-01
Payer: COMMERCIAL

## 2022-09-01 DIAGNOSIS — H52.4 HYPEROPIA OF BOTH EYES WITH REGULAR ASTIGMATISM AND PRESBYOPIA: ICD-10-CM

## 2022-09-01 DIAGNOSIS — H52.03 HYPEROPIA OF BOTH EYES WITH REGULAR ASTIGMATISM AND PRESBYOPIA: ICD-10-CM

## 2022-09-01 DIAGNOSIS — H53.8 BLURRED VISION, BILATERAL: Primary | ICD-10-CM

## 2022-09-01 DIAGNOSIS — H52.223 HYPEROPIA OF BOTH EYES WITH REGULAR ASTIGMATISM AND PRESBYOPIA: ICD-10-CM

## 2022-09-01 PROCEDURE — 92012 PR EYE EXAM, EST PATIENT,INTERMED: ICD-10-PCS | Mod: S$GLB,,, | Performed by: OPTOMETRIST

## 2022-09-01 PROCEDURE — 92012 INTRM OPH EXAM EST PATIENT: CPT | Mod: S$GLB,,, | Performed by: OPTOMETRIST

## 2022-09-01 PROCEDURE — 99999 PR PBB SHADOW E&M-EST. PATIENT-LVL II: ICD-10-PCS | Mod: PBBFAC,,, | Performed by: OPTOMETRIST

## 2022-09-01 PROCEDURE — 99999 PR PBB SHADOW E&M-EST. PATIENT-LVL II: CPT | Mod: PBBFAC,,, | Performed by: OPTOMETRIST
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
## 2022-09-09 ENCOUNTER — PATIENT MESSAGE (OUTPATIENT)
Dept: PRIMARY CARE CLINIC | Facility: CLINIC | Age: 77
End: 2022-09-09
Payer: MEDICARE

## 2022-09-09 ENCOUNTER — TELEPHONE (OUTPATIENT)
Dept: PRIMARY CARE CLINIC | Facility: CLINIC | Age: 77
End: 2022-09-09
Payer: MEDICARE

## 2022-09-09 NOTE — TELEPHONE ENCOUNTER
----- Message from Melvina Moses sent at 9/9/2022 12:39 PM CDT -----  Contact: pt [phone]  Asking about the new vaccine. Advised that we are not administering it yet but requested to speak with you.    Please call and advise.    Thank You

## 2022-09-12 ENCOUNTER — PATIENT MESSAGE (OUTPATIENT)
Dept: PRIMARY CARE CLINIC | Facility: CLINIC | Age: 77
End: 2022-09-12
Payer: MEDICARE

## 2022-09-14 ENCOUNTER — PATIENT MESSAGE (OUTPATIENT)
Dept: PRIMARY CARE CLINIC | Facility: CLINIC | Age: 77
End: 2022-09-14
Payer: MEDICARE

## 2022-09-15 ENCOUNTER — DOCUMENTATION ONLY (OUTPATIENT)
Dept: PRIMARY CARE CLINIC | Facility: CLINIC | Age: 77
End: 2022-09-15
Payer: MEDICARE

## 2022-09-15 NOTE — PROGRESS NOTES
"Individual Psychotherapy (LCSW/PhD)  Grace Navarro,  9/15/2022    Site:  [place]         Therapeutic Intervention: Met with patient for individual psychotherapy.    Chief complaint/reason for encounter: depression     Interval history and content of current session: Pt states they have decided to not travel to [place] due to anxiety, unexpected trip changes. Pt states they are "fine" with their decision but are disappointed. Pt states they feel disappointed with "themselves" for not being courageous enough to travel alone. SW supported pt in processing feelings of depression and anxiety. Pt has plans to travel to [place] next year which they are looking forward too. Pt reports some increased anxiety related to BP control. Pt states they recently submitted a volunteer form to [facility] to increase social engagement. SW encouraged pt to increase deep breathing practices. Pt went to gym yesterday and states that it made them feel better. SW and pt discussed pt joining yoga class to decrease anxiety and feelings of "restlessness".    Treatment plan:  Target symptoms: depression  Why chosen therapy is appropriate versus another modality: relevant to diagnosis, evidence based practice  Outcome monitoring methods: self-report, checklist/rating scale  Therapeutic intervention type: supportive psychotherapy    Risk parameters:  Patient reports no suicidal ideation  Patient reports no homicidal ideation  Patient reports no self-injurious behavior  Patient reports no violent behavior    Verbal deficits: None    Patient's response to intervention:  The patient's response to intervention is accepting.    Progress toward goals and other mental status changes:  The patient's progress toward goals is good.    Diagnosis:   No diagnosis found.    Plan: Pt plans to continue individual psychotherapy    Return to clinic:   "

## 2022-09-26 ENCOUNTER — OFFICE VISIT (OUTPATIENT)
Dept: PRIMARY CARE CLINIC | Facility: CLINIC | Age: 77
End: 2022-09-26
Payer: MEDICARE

## 2022-09-26 VITALS
HEART RATE: 71 BPM | SYSTOLIC BLOOD PRESSURE: 143 MMHG | RESPIRATION RATE: 18 BRPM | OXYGEN SATURATION: 97 % | WEIGHT: 144.63 LBS | HEIGHT: 64 IN | DIASTOLIC BLOOD PRESSURE: 62 MMHG | BODY MASS INDEX: 24.69 KG/M2

## 2022-09-26 DIAGNOSIS — F33.1 MODERATE EPISODE OF RECURRENT MAJOR DEPRESSIVE DISORDER: Primary | ICD-10-CM

## 2022-09-26 DIAGNOSIS — I10 BENIGN ESSENTIAL HYPERTENSION: ICD-10-CM

## 2022-09-26 DIAGNOSIS — F41.1 GAD (GENERALIZED ANXIETY DISORDER): ICD-10-CM

## 2022-09-26 DIAGNOSIS — R42 DIZZINESS: ICD-10-CM

## 2022-09-26 PROCEDURE — 3078F PR MOST RECENT DIASTOLIC BLOOD PRESSURE < 80 MM HG: ICD-10-PCS | Mod: CPTII,S$GLB,, | Performed by: INTERNAL MEDICINE

## 2022-09-26 PROCEDURE — 1160F RVW MEDS BY RX/DR IN RCRD: CPT | Mod: CPTII,S$GLB,, | Performed by: INTERNAL MEDICINE

## 2022-09-26 PROCEDURE — 3077F PR MOST RECENT SYSTOLIC BLOOD PRESSURE >= 140 MM HG: ICD-10-PCS | Mod: CPTII,S$GLB,, | Performed by: INTERNAL MEDICINE

## 2022-09-26 PROCEDURE — 1126F AMNT PAIN NOTED NONE PRSNT: CPT | Mod: CPTII,S$GLB,, | Performed by: INTERNAL MEDICINE

## 2022-09-26 PROCEDURE — 1101F PR PT FALLS ASSESS DOC 0-1 FALLS W/OUT INJ PAST YR: ICD-10-PCS | Mod: CPTII,S$GLB,, | Performed by: INTERNAL MEDICINE

## 2022-09-26 PROCEDURE — 1160F PR REVIEW ALL MEDS BY PRESCRIBER/CLIN PHARMACIST DOCUMENTED: ICD-10-PCS | Mod: CPTII,S$GLB,, | Performed by: INTERNAL MEDICINE

## 2022-09-26 PROCEDURE — 99214 OFFICE O/P EST MOD 30 MIN: CPT | Mod: S$GLB,,, | Performed by: INTERNAL MEDICINE

## 2022-09-26 PROCEDURE — 1101F PT FALLS ASSESS-DOCD LE1/YR: CPT | Mod: CPTII,S$GLB,, | Performed by: INTERNAL MEDICINE

## 2022-09-26 PROCEDURE — 1159F MED LIST DOCD IN RCRD: CPT | Mod: CPTII,S$GLB,, | Performed by: INTERNAL MEDICINE

## 2022-09-26 PROCEDURE — 3077F SYST BP >= 140 MM HG: CPT | Mod: CPTII,S$GLB,, | Performed by: INTERNAL MEDICINE

## 2022-09-26 PROCEDURE — 1159F PR MEDICATION LIST DOCUMENTED IN MEDICAL RECORD: ICD-10-PCS | Mod: CPTII,S$GLB,, | Performed by: INTERNAL MEDICINE

## 2022-09-26 PROCEDURE — 3078F DIAST BP <80 MM HG: CPT | Mod: CPTII,S$GLB,, | Performed by: INTERNAL MEDICINE

## 2022-09-26 PROCEDURE — 3288F PR FALLS RISK ASSESSMENT DOCUMENTED: ICD-10-PCS | Mod: CPTII,S$GLB,, | Performed by: INTERNAL MEDICINE

## 2022-09-26 PROCEDURE — 99499 RISK ADDL DX/OHS AUDIT: ICD-10-PCS | Mod: S$GLB,,, | Performed by: INTERNAL MEDICINE

## 2022-09-26 PROCEDURE — 99499 UNLISTED E&M SERVICE: CPT | Mod: S$GLB,,, | Performed by: INTERNAL MEDICINE

## 2022-09-26 PROCEDURE — 99214 PR OFFICE/OUTPT VISIT, EST, LEVL IV, 30-39 MIN: ICD-10-PCS | Mod: S$GLB,,, | Performed by: INTERNAL MEDICINE

## 2022-09-26 PROCEDURE — 99999 PR PBB SHADOW E&M-EST. PATIENT-LVL IV: CPT | Mod: PBBFAC,,, | Performed by: INTERNAL MEDICINE

## 2022-09-26 PROCEDURE — 3288F FALL RISK ASSESSMENT DOCD: CPT | Mod: CPTII,S$GLB,, | Performed by: INTERNAL MEDICINE

## 2022-09-26 PROCEDURE — 1126F PR PAIN SEVERITY QUANTIFIED, NO PAIN PRESENT: ICD-10-PCS | Mod: CPTII,S$GLB,, | Performed by: INTERNAL MEDICINE

## 2022-09-26 PROCEDURE — 99999 PR PBB SHADOW E&M-EST. PATIENT-LVL IV: ICD-10-PCS | Mod: PBBFAC,,, | Performed by: INTERNAL MEDICINE

## 2022-09-26 RX ORDER — ESCITALOPRAM OXALATE 5 MG/1
5 TABLET ORAL DAILY
Qty: 30 TABLET | Refills: 3 | Status: SHIPPED | OUTPATIENT
Start: 2022-09-26 | End: 2023-03-20

## 2022-09-26 NOTE — PROGRESS NOTES
Subjective:       Patient ID: Grace Navarro is a 76 y.o. female.    Chief Complaint: dizziness    HPI same day visit     Pt has eye surgery planned w/ Dr. Umana 11/2/22.  Plan for EKG as preop.   Echo 1/2022 - EF 65%, severe LAE, mild ELIO, grade I LV DD. Mild MR.   HTN - hctz 12.5mg daily, coreg 12.5mg qam and 25mg qpm. Part of digital HTN program. Flow sheet reviewed.    Reports dizziness in the AM continues (in the past had c/o dizziness w/ position but pt reports today that she's been also having dizziness that's constant throughout the day but it's slight - doesn't stop her from doing what she's doing; no vertigo). Dizziness at night when she wakes up to use the bathroom is still present - she'll use her walker to prevent any falls.   BP is ok. HR is ok.     Anxiety - seeing LMSW every 2 weeks. Thinks this is helpful.   Sometimes takes hydroxyzine as it helps w/ itching and anxiety.   Starting to take up yoga. Still pushes herself to walk 3 mi.    Had bad accident in March.   Was planning on going to [place]. Had to cancel her trip due to friend; she was panicking that she would go by herself. She was very nervous about planning her trip the entire time.   Feels disappointed in herself.   Hasn't talked to her son in 4 yrs come Nov 1.  GAD7 9/26/2022   1. Feeling nervous, anxious, or on edge? 3   2. Not being able to stop or control worrying? 3   3. Worrying too much about different things? 3   4. Trouble relaxing? 3   5. Being so restless that it is hard to sit still? 0   6. Becoming easily annoyed or irritable? 1   7. Feeling afraid as if something awful might happen? 0   8. If you checked off any problems, how difficult have these problems made it for you to do your work, take care of things at home, or get along with other people? 1   YOCASTA-7 Score 13     Depression Patient Health Questionnaire 9/26/2022 8/4/2022 5/26/2022 2/3/2022 1/30/2019   Over the last two weeks how often have you been bothered by  little interest or pleasure in doing things Nearly every day Not at all Not at all Not at all Not at all   Over the last two weeks how often have you been bothered by feeling down, depressed or hopeless Nearly every day Several days Several days Not at all Not at all   PHQ-2 Total Score 6 1 1 0 0   Over the last two weeks how often have you been bothered by trouble falling or staying asleep, or sleeping too much Several days Several days Not at all - -   Over the last two weeks how often have you been bothered by feeling tired or having little energy Nearly every day Several days Not at all - -   Over the last two weeks how often have you been bothered by a poor appetite or overeating More than half the days Several days Not at all - -   Over the last two weeks how often have you been bothered by feeling bad about yourself - or that you are a failure or have let yourself or your family down Nearly every day Several days More than half the days - -   Over the last two weeks how often have you been bothered by trouble concentrating on things, such as reading the newspaper or watching television Nearly every day Not at all Not at all - -   Over the last two weeks how often have you been bothered by moving or speaking so slowly that other people could have noticed. Or the opposite - being so fidgety or restless that you have been moving around a lot more than usual. Several days Not at all Not at all - -   Over the last two weeks how often have you been bothered by thoughts that you would be better off dead, or of hurting yourself Not at all Not at all Several days - -   If you checked off any problems, how difficult have these problems made it for you to do your work, take care of things at home or get along with other people? - Somewhat difficult Somewhat difficult - -   Total Score 19 5 4 - -   Interpretation Moderately Severe Mild Minimal or None - -     Review of Systems  Comprehensive review of systems otherwise  "negative. See history/subjective section for more details.    Objective:      Physical Exam    BP (!) 143/62 (BP Location: Left arm, Patient Position: Sitting, BP Method: Medium (Manual))   Pulse 71   Resp 18   Ht 5' 4" (1.626 m)   Wt 65.6 kg (144 lb 10 oz)   LMP 06/14/1996   SpO2 97%   BMI 24.82 kg/m²     GEN - A+OX4, teary eyed.  HEENT - PERRL, EOMI, OP clear. MMM.   Neck - No thyromegaly or cervical LAD. No thyroid masses felt.  CV - RRR, no m/r   Chest - CTAB, no wheezing or rhonchi  Abd - S/NT/ND/+BS.   Ext - 2+BDP and radial pulses. No C/C/E.  MSK - no spinal tenderness to palpation.   Skin - No rash.    Previous labs reviewed.     Assessment/Plan     Diagnoses and all orders for this visit:    Moderate episode of recurrent major depressive disorder - start lexapro 5 qam.   -     EScitalopram oxalate (LEXAPRO) 5 MG Tab; Take 1 tablet (5 mg total) by mouth once daily.    YOCASTA (generalized anxiety disorder)  -     EScitalopram oxalate (LEXAPRO) 5 MG Tab; Take 1 tablet (5 mg total) by mouth once daily.    Dizziness - trial of treating above conditions to see if dizziness improves.     Benign essential hypertension - keep above meds.     3 week phone visit.   Keep 11/1 appt.      Lisa Dean MD  Department of Internal Medicine - [facility]  11:01 AM      "

## 2022-09-28 ENCOUNTER — PATIENT MESSAGE (OUTPATIENT)
Dept: PRIMARY CARE CLINIC | Facility: CLINIC | Age: 77
End: 2022-09-28
Payer: MEDICARE

## 2022-10-05 ENCOUNTER — PATIENT MESSAGE (OUTPATIENT)
Dept: ADMINISTRATIVE | Facility: OTHER | Age: 77
End: 2022-10-05
Payer: MEDICARE

## 2022-10-05 ENCOUNTER — PATIENT MESSAGE (OUTPATIENT)
Dept: SURGERY | Facility: HOSPITAL | Age: 77
End: 2022-10-05
Payer: MEDICARE

## 2022-10-06 ENCOUNTER — DOCUMENTATION ONLY (OUTPATIENT)
Dept: PRIMARY CARE CLINIC | Facility: CLINIC | Age: 77
End: 2022-10-06
Payer: MEDICARE

## 2022-10-06 NOTE — PROGRESS NOTES
Individual Psychotherapy (LCSW/PhD)  Grace Navarro,  10/6/2022    Site:  [facility]         Therapeutic Intervention: Met with patient for individual psychotherapy.    Chief complaint/reason for encounter: depression     Interval history and content of current session: Pt recently began course of Lexapro (5mg) to help with depressive symptoms. Pt also recently began attending Yoga classes and gymnasium. Pt recently was invited to a social gathering with friends. Pt reports increase in some depressive symptoms I.e. feelings of hopelessness. SW and pt discussed volunteer opportunities and SW provided website for volunteer opportunities.    Pt joined a silver sneakers class that they feel has helped their energy. Pt plans on attending yoga classes next week.     Treatment plan:  Target symptoms: depression  Why chosen therapy is appropriate versus another modality: relevant to diagnosis, evidence based practice  Outcome monitoring methods: self-report, checklist/rating scale  Therapeutic intervention type: supportive psychotherapy    Risk parameters:  Patient reports no suicidal ideation  Patient reports no homicidal ideation  Patient reports no self-injurious behavior  Patient reports no violent behavior    Verbal deficits: None    Patient's response to intervention:  The patient's response to intervention is accepting.    Progress toward goals and other mental status changes:  The patient's progress toward goals is fair .    Diagnosis:   No diagnosis found.    Plan: Pt plans to continue individual psychotherapy    Return to clinic: 2 weeks    Length of Service (minutes): 30

## 2022-10-17 ENCOUNTER — OFFICE VISIT (OUTPATIENT)
Dept: PRIMARY CARE CLINIC | Facility: CLINIC | Age: 77
End: 2022-10-17
Payer: MEDICARE

## 2022-10-17 DIAGNOSIS — F41.1 GAD (GENERALIZED ANXIETY DISORDER): ICD-10-CM

## 2022-10-17 DIAGNOSIS — F33.9 EPISODE OF RECURRENT MAJOR DEPRESSIVE DISORDER, UNSPECIFIED DEPRESSION EPISODE SEVERITY: Primary | ICD-10-CM

## 2022-10-17 DIAGNOSIS — I10 BENIGN ESSENTIAL HYPERTENSION: ICD-10-CM

## 2022-10-17 PROCEDURE — 1160F PR REVIEW ALL MEDS BY PRESCRIBER/CLIN PHARMACIST DOCUMENTED: ICD-10-PCS | Mod: CPTII,95,, | Performed by: INTERNAL MEDICINE

## 2022-10-17 PROCEDURE — 99442 PR PHYSICIAN TELEPHONE EVALUATION 11-20 MIN: ICD-10-PCS | Mod: 95,,, | Performed by: INTERNAL MEDICINE

## 2022-10-17 PROCEDURE — 1160F RVW MEDS BY RX/DR IN RCRD: CPT | Mod: CPTII,95,, | Performed by: INTERNAL MEDICINE

## 2022-10-17 PROCEDURE — 1159F MED LIST DOCD IN RCRD: CPT | Mod: CPTII,95,, | Performed by: INTERNAL MEDICINE

## 2022-10-17 PROCEDURE — 99442 PR PHYSICIAN TELEPHONE EVALUATION 11-20 MIN: CPT | Mod: 95,,, | Performed by: INTERNAL MEDICINE

## 2022-10-17 PROCEDURE — 1159F PR MEDICATION LIST DOCUMENTED IN MEDICAL RECORD: ICD-10-PCS | Mod: CPTII,95,, | Performed by: INTERNAL MEDICINE

## 2022-10-17 RX ORDER — HYDRALAZINE HYDROCHLORIDE 50 MG/1
50 TABLET, FILM COATED ORAL EVERY 12 HOURS
Qty: 60 TABLET | Refills: 11 | Status: SHIPPED | OUTPATIENT
Start: 2022-10-17 | End: 2022-12-14 | Stop reason: SINTOL

## 2022-10-17 NOTE — PROGRESS NOTES
Established Patient - Audio Only Telehealth Visit     The patient location is: [place], [place]  The chief complaint leading to consultation is: MDD/YOCASTA f/u  Visit type: Virtual visit with audio only (telephone)  Total time spent with patient: 15 min       The reason for the audio only service rather than synchronous audio and video virtual visit was related to technical difficulties or patient preference/necessity.     Each patient to whom I provide medical services by telemedicine is:  (1) informed of the relationship between the physician and patient and the respective role of any other health care provider with respect to management of the patient; and (2) notified that they may decline to receive medical services by telemedicine and may withdraw from such care at any time. Patient verbally consented to receive this service via voice-only telephone call.       HPI:   YOCASTA/mdd - at our last visit 9/26/22, started on lexapro 5mg daily.  Follows w/ PRIYA Martínez q 2 weeks. Started yoga and walking.   Had some diarrhea for 2-3 days but then resolved after. Reports her mood is doing better. However still w/ dizziness symptoms.   Wonders if coreg is playing a role. Reports takes her BP meds in the AM and in the pm and by the time 3pm comes around, she feels terrible. Very tired and no energy.   Previously tried stopping hctz to see if dizziness (when she wakes up to use the bathroom) resolves but it did not so restarted. Part of digital HTN program and reviewed flow sheets.   Reports needs to be functional as she lives alone. Afraid of falls.      Assessment and plan:    Diagnoses and all orders for this visit:    Episode of recurrent major depressive disorder, unspecified depression episode severity/YOCASTA (generalized anxiety disorder) - cont lexapro 5. Cont sessions w/ LMSW.     Benign essential hypertension - STOP coreg and start hydralazine BID. Cont digital HTN monitoring.   -     hydrALAZINE (APRESOLINE) 50  MG tablet; Take 1 tablet (50 mg total) by mouth every 12 (twelve) hours.    F/u next Fri at 11:40am.     Lisa Dean MD  Department of Internal Medicine - [facility]  9:23 AM         This service was not originating from a related E/M service provided within the previous 7 days nor will  to an E/M service or procedure within the next 24 hours or my soonest available appointment.  Prevailing standard of care was able to be met in this audio-only visit.

## 2022-10-19 ENCOUNTER — PATIENT MESSAGE (OUTPATIENT)
Dept: PRIMARY CARE CLINIC | Facility: CLINIC | Age: 77
End: 2022-10-19
Payer: MEDICARE

## 2022-10-20 ENCOUNTER — PATIENT MESSAGE (OUTPATIENT)
Dept: PRIMARY CARE CLINIC | Facility: CLINIC | Age: 77
End: 2022-10-20
Payer: MEDICARE

## 2022-10-20 ENCOUNTER — DOCUMENTATION ONLY (OUTPATIENT)
Dept: PRIMARY CARE CLINIC | Facility: CLINIC | Age: 77
End: 2022-10-20
Payer: MEDICARE

## 2022-10-20 NOTE — TELEPHONE ENCOUNTER
We did. Changed from coreg to hydralazine b/c she thought the coreg was causing her to feel too tired. Ok to stop hydralazine and go back to coreg.

## 2022-10-20 NOTE — TELEPHONE ENCOUNTER
Melvina ADAME [facility] Staff  Caller: pt [phone] (Today,  9:40 AM)  Recently started taking Rx hydrALAZINE (APRESOLINE) 50 MG tablet     Patient is experiencing some side effects and is requesting to speak with you.     Please call and advise.     Thank You

## 2022-10-20 NOTE — TELEPHONE ENCOUNTER
Spoke with Ms Edwards. She is stating that she is waking up during the night ( last 2 nights ) because she is feeling palpitations.  States she feels them currently, but not as strong.   Her b/p 160/72, pulse 97 on home machine currently.     Let her know I will call back to advise. Did we switch her meds?   Also, It looks like she is coming to see Reed at 2:30 today.

## 2022-10-20 NOTE — PROGRESS NOTES
"Individual Psychotherapy (LCSW/PhD)  Grace Navarro,  10/20/2022    Site:  [place]         Therapeutic Intervention: Met with patient for individual psychotherapy.    Chief complaint/reason for encounter: depression and behavior     Interval history and content of current session: Pt has been feeling unwell due to recent medication changes. Pt was experiencing high pulse rate and contacted PCP to notify. Pt received list of possible volunteer opportunities and will be seeking opportunities soon. Pt has been staying active and plans on fully resuming exercises.     Pt has been feeling "depressed" thinking about the lack of relationship with son. SW supported pt in processing feelings of frustration regarding son and son's . SW and pt discussed importance of pt participating in social events.      Treatment plan:  Target symptoms: depression  Why chosen therapy is appropriate versus another modality: relevant to diagnosis, evidence based practice  Outcome monitoring methods: self-report, checklist/rating scale  Therapeutic intervention type: supportive psychotherapy    Risk parameters:  Patient reports no suicidal ideation  Patient reports no homicidal ideation  Patient reports no self-injurious behavior  Patient reports no violent behavior    Verbal deficits: None    Patient's response to intervention:  The patient's response to intervention is accepting.    Progress toward goals and other mental status changes:  The patient's progress toward goals is good.    Diagnosis:   No diagnosis found.    Plan: Pt plans to continue individual psychotherapy    Return to clinic: 2 weeks    Length of Service (minutes): 30        "

## 2022-10-25 ENCOUNTER — ANESTHESIA EVENT (OUTPATIENT)
Dept: SURGERY | Facility: HOSPITAL | Age: 77
End: 2022-10-25
Payer: MEDICARE

## 2022-10-25 NOTE — ANESTHESIA PREPROCEDURE EVALUATION
10/25/2022  Grace Navarro is a 76 y.o., female.    Patient's chart and medical records reviewed.  OK to proceed to [facility].      Pre-op Assessment    I have reviewed the Patient Summary Reports.     I have reviewed the Nursing Notes.    I have reviewed the Medications.     Review of Systems  Anesthesia Hx:  No problems with previous Anesthesia  Denies Family Hx of Anesthesia complications.    Cardiovascular:   Exercise tolerance: good Hypertension    Pulmonary:   Sleep Apnea    Hepatic/GI:   PUD,    Neurological:   Neuromuscular Disease,    Psych:   Psychiatric History anxiety depression        Past Medical History:   Diagnosis Date    Anxiety     Rene's disease     Cataract     Chronic low back pain     Depression     Goiter, nodular     Hyperlipidemia     Hypertension     Irritable bowel     Neuromuscular disorder     Osteopenia of multiple sites 5/29/2017    PUD (peptic ulcer disease)     Subarachnoid hemorrhage 2014    Varicose veins      Past Surgical History:   Procedure Laterality Date    APPENDECTOMY      BELPHAROPTOSIS REPAIR Bilateral     BREAST BIOPSY Right     RIGHT-axilla    CATARACT EXTRACTION W/  INTRAOCULAR LENS IMPLANT Left 08/03/2016        CATARACT EXTRACTION W/  INTRAOCULAR LENS IMPLANT Right 09/21/2016        SPINE SURGERY  07/2012    Fusion @ L4L5    TONSILLECTOMY       Patient Active Problem List   Diagnosis    Chronic low back pain    Hypertension    Other hyperlipidemia    Irritable bowel    PUD (peptic ulcer disease)    Varicose veins    Goiter, nodular    Spondylolisthesis, grade 1    History of subarachnoid hemorrhage    Left atrial enlargement    Syncope    Microscopic hematuria    Lumbar radiculopathy    Postlaminectomy syndrome of lumbar region    Lumbar facet arthropathy    Senile nuclear sclerosis     Nuclear sclerotic cataract of right eye    Osteopenia of multiple sites    Pain of right upper extremity    Weakness    Excessive daytime sleepiness    GENOVEVA (obstructive sleep apnea)    Senile purpura    Moderate episode of recurrent major depressive disorder    YOCASTA (generalized anxiety disorder)     Facility-Administered Medications as of 11/2/2022   Medication Dose Route Frequency Provider Last Rate Last Admin    0.9%  NaCl infusion   Intravenous Continuous Bina Umana MD 10 mL/hr at 11/02/22 0613 New Bag at 11/02/22 0613    [COMPLETED] acetaminophen tablet 1,000 mg  1,000 mg Oral Once Pre-Op Dae Bobo MD   1,000 mg at 11/02/22 0557     Outpatient Medications as of 11/2/2022   Medication Sig Dispense Refill    ascorbic acid, vitamin C, (VITAMIN C) 1000 MG tablet Take 1,000 mg by mouth once daily.      CALCIUM-MAGNESIUM ORAL Take 1 capsule by mouth 2 (two) times a day. 1000mg of calcium and 500mg Magnesium      cholecalciferol, vitamin D3, 1,000 unit capsule Take 1,000 Units by mouth once daily.      cyanocobalamin 500 MCG tablet Take 500 mcg by mouth once daily.      triamcinolone acetonide 0.1% (KENALOG) 0.1 % cream APPLY  CREAM EXTERNALLY TO AFFECTED AREA TWICE DAILY AS NEEDED FOR 7 DAYS 30 g 0         Physical Exam  General: Well nourished, Cooperative, Alert and Oriented    Airway:  Mallampati: II   Mouth Opening: Normal  TM Distance: Normal  Tongue: Normal  Neck ROM: Normal ROM    Chest/Lungs:  Normal Respiratory Rate    Heart:  Rate: Normal      Wt Readings from Last 3 Encounters:   11/02/22 65.8 kg (145 lb)   10/28/22 66.4 kg (146 lb 6.2 oz)   09/26/22 65.6 kg (144 lb 10 oz)     Temp Readings from Last 3 Encounters:   11/02/22 36.6 °C (97.9 °F) (Oral)   07/21/22 37 °C (98.6 °F) (Oral)   04/07/22 36.9 °C (98.4 °F) (Oral)     BP Readings from Last 3 Encounters:   11/02/22 (!) 157/78   10/28/22 138/78   09/26/22 (!) 143/62     Pulse Readings from Last 3 Encounters:   11/02/22 61   10/28/22  62   09/26/22 71     Lab Results   Component Value Date    WBC 4.63 08/09/2022    HGB 12.2 08/09/2022    HCT 36.8 (L) 08/09/2022    MCV 96 08/09/2022     08/09/2022       Chemistry        Component Value Date/Time     07/21/2022 1208    K 4.1 07/21/2022 1208     07/21/2022 1208    CO2 28 07/21/2022 1208    BUN 10 07/21/2022 1208    CREATININE 0.6 07/21/2022 1208     07/21/2022 1208        Component Value Date/Time    CALCIUM 10.1 07/21/2022 1208    ALKPHOS 70 07/21/2022 1208    AST 22 07/21/2022 1208    ALT 18 07/21/2022 1208    BILITOT 0.5 07/21/2022 1208    ESTGFRAFRICA >60.0 07/21/2022 1208    EGFRNONAA >60.0 07/21/2022 1208        Results for orders placed or performed in visit on 10/28/22   IN OFFICE EKG 12-LEAD (to [place])    Collection Time: 10/28/22 11:53 AM    Narrative    Test Reason : Z01.810,    Vent. Rate : 060 BPM     Atrial Rate : 060 BPM     P-R Int : 146 ms          QRS Dur : 100 ms      QT Int : 422 ms       P-R-T Axes : 048 007 008 degrees     QTc Int : 422 ms    Normal sinus rhythm  Minimal voltage criteria for LVH, may be normal variant ( Harrison product )  Borderline Abnormal ECG  When compared with ECG of 31-DEC-2021 03:47,  Questionable change in The axis  T wave inversion now evident in Inferior leads  Confirmed by CAMILA KNIGHT MD ([id number]) on 10/28/2022 12:23:34 PM    Referred By:             Confirmed By:CAMILA KNIGHT MD     Echo 1/6/22  Summary     The left ventricle is normal in size with normal systolic function.   The estimated ejection fraction is 65%.   Normal right ventricular size with normal right ventricular systolic function.   Severe left atrial enlargement.   Mild right atrial enlargement.   Grade I left ventricular diastolic dysfunction.   Mild mitral regurgitation.   Normal central venous pressure (3 mmHg).   The estimated PA systolic pressure is 28 mmHg.      Anesthesia Plan  Type of Anesthesia, risks & benefits discussed:    Anesthesia  Type: Gen ETT, Gen Supraglottic Airway  Intra-op Monitoring Plan: Standard ASA Monitors  Post Op Pain Control Plan: multimodal analgesia and IV/PO Opioids PRN  Induction:  IV  Airway Plan: Direct  Informed Consent: Informed consent signed with the Patient and all parties understand the risks and agree with anesthesia plan.  All questions answered.   ASA Score: 2  Day of Surgery Review of History & Physical: H&P Update referred to the surgeon/provider.    Ready For Surgery From Anesthesia Perspective.     .

## 2022-10-26 ENCOUNTER — PATIENT MESSAGE (OUTPATIENT)
Dept: SURGERY | Facility: HOSPITAL | Age: 77
End: 2022-10-26
Payer: MEDICARE

## 2022-10-28 ENCOUNTER — OFFICE VISIT (OUTPATIENT)
Dept: PRIMARY CARE CLINIC | Facility: CLINIC | Age: 77
End: 2022-10-28
Payer: MEDICARE

## 2022-10-28 VITALS
BODY MASS INDEX: 24.99 KG/M2 | HEART RATE: 62 BPM | HEIGHT: 64 IN | OXYGEN SATURATION: 98 % | SYSTOLIC BLOOD PRESSURE: 138 MMHG | DIASTOLIC BLOOD PRESSURE: 78 MMHG | WEIGHT: 146.38 LBS

## 2022-10-28 DIAGNOSIS — Z01.810 PREOPERATIVE CARDIOVASCULAR EXAMINATION: Primary | ICD-10-CM

## 2022-10-28 DIAGNOSIS — F33.9 EPISODE OF RECURRENT MAJOR DEPRESSIVE DISORDER, UNSPECIFIED DEPRESSION EPISODE SEVERITY: ICD-10-CM

## 2022-10-28 DIAGNOSIS — H57.812 BROW PTOSIS, LEFT: ICD-10-CM

## 2022-10-28 DIAGNOSIS — I10 BENIGN ESSENTIAL HYPERTENSION: ICD-10-CM

## 2022-10-28 PROCEDURE — 93010 ELECTROCARDIOGRAM REPORT: CPT | Mod: S$GLB,,, | Performed by: INTERNAL MEDICINE

## 2022-10-28 PROCEDURE — 1160F PR REVIEW ALL MEDS BY PRESCRIBER/CLIN PHARMACIST DOCUMENTED: ICD-10-PCS | Mod: CPTII,S$GLB,, | Performed by: INTERNAL MEDICINE

## 2022-10-28 PROCEDURE — 1126F AMNT PAIN NOTED NONE PRSNT: CPT | Mod: CPTII,S$GLB,, | Performed by: INTERNAL MEDICINE

## 2022-10-28 PROCEDURE — 3075F SYST BP GE 130 - 139MM HG: CPT | Mod: CPTII,S$GLB,, | Performed by: INTERNAL MEDICINE

## 2022-10-28 PROCEDURE — 93005 EKG 12-LEAD: ICD-10-PCS | Mod: S$GLB,,, | Performed by: INTERNAL MEDICINE

## 2022-10-28 PROCEDURE — 93010 EKG 12-LEAD: ICD-10-PCS | Mod: S$GLB,,, | Performed by: INTERNAL MEDICINE

## 2022-10-28 PROCEDURE — 1101F PR PT FALLS ASSESS DOC 0-1 FALLS W/OUT INJ PAST YR: ICD-10-PCS | Mod: CPTII,S$GLB,, | Performed by: INTERNAL MEDICINE

## 2022-10-28 PROCEDURE — 93005 ELECTROCARDIOGRAM TRACING: CPT | Mod: S$GLB,,, | Performed by: INTERNAL MEDICINE

## 2022-10-28 PROCEDURE — 1160F RVW MEDS BY RX/DR IN RCRD: CPT | Mod: CPTII,S$GLB,, | Performed by: INTERNAL MEDICINE

## 2022-10-28 PROCEDURE — 3075F PR MOST RECENT SYSTOLIC BLOOD PRESS GE 130-139MM HG: ICD-10-PCS | Mod: CPTII,S$GLB,, | Performed by: INTERNAL MEDICINE

## 2022-10-28 PROCEDURE — 99999 PR PBB SHADOW E&M-EST. PATIENT-LVL IV: CPT | Mod: PBBFAC,,, | Performed by: INTERNAL MEDICINE

## 2022-10-28 PROCEDURE — 99214 PR OFFICE/OUTPT VISIT, EST, LEVL IV, 30-39 MIN: ICD-10-PCS | Mod: S$GLB,,, | Performed by: INTERNAL MEDICINE

## 2022-10-28 PROCEDURE — 1159F PR MEDICATION LIST DOCUMENTED IN MEDICAL RECORD: ICD-10-PCS | Mod: CPTII,S$GLB,, | Performed by: INTERNAL MEDICINE

## 2022-10-28 PROCEDURE — 3078F PR MOST RECENT DIASTOLIC BLOOD PRESSURE < 80 MM HG: ICD-10-PCS | Mod: CPTII,S$GLB,, | Performed by: INTERNAL MEDICINE

## 2022-10-28 PROCEDURE — 1159F MED LIST DOCD IN RCRD: CPT | Mod: CPTII,S$GLB,, | Performed by: INTERNAL MEDICINE

## 2022-10-28 PROCEDURE — 99214 OFFICE O/P EST MOD 30 MIN: CPT | Mod: S$GLB,,, | Performed by: INTERNAL MEDICINE

## 2022-10-28 PROCEDURE — 3288F FALL RISK ASSESSMENT DOCD: CPT | Mod: CPTII,S$GLB,, | Performed by: INTERNAL MEDICINE

## 2022-10-28 PROCEDURE — 99999 PR PBB SHADOW E&M-EST. PATIENT-LVL IV: ICD-10-PCS | Mod: PBBFAC,,, | Performed by: INTERNAL MEDICINE

## 2022-10-28 PROCEDURE — 3288F PR FALLS RISK ASSESSMENT DOCUMENTED: ICD-10-PCS | Mod: CPTII,S$GLB,, | Performed by: INTERNAL MEDICINE

## 2022-10-28 PROCEDURE — 1126F PR PAIN SEVERITY QUANTIFIED, NO PAIN PRESENT: ICD-10-PCS | Mod: CPTII,S$GLB,, | Performed by: INTERNAL MEDICINE

## 2022-10-28 PROCEDURE — 3078F DIAST BP <80 MM HG: CPT | Mod: CPTII,S$GLB,, | Performed by: INTERNAL MEDICINE

## 2022-10-28 PROCEDURE — 1101F PT FALLS ASSESS-DOCD LE1/YR: CPT | Mod: CPTII,S$GLB,, | Performed by: INTERNAL MEDICINE

## 2022-10-28 NOTE — PROGRESS NOTES
"Subjective:       Patient ID: Grace Navarro is a 76 y.o. female.    Chief Complaint: preop    HPI  Preop for L eyelid surgery/brow repair and R eye tarsal stripping w/ Dr. Umana on 11/2.     HTN - at last visit reports coreg may be causing dizziness so wanted to change to something else. Started on hydralazine.   hydralazine 50mg BID caused palpitations.   Went back to taking coreg 12.5mg qam and 25mg qpm and continued on hctz 12.5mg daily (stopping HCTZ did not help w/ her dizziness/sluggishness).  Digital HTN program - reviewed flow sheet. Works well.     Also started on lexapro 5mg daily for YOCASTA/MDD. Follows w/ LMSW.   Thinks that she's having some lack of energy. Takes it in the AM as she drinks a glass of wine at night.     Pt reports started having more lower back pain and has to take something for it intermittently like mobic - hasn't taken it for over a week. No numbness/tingling/weakness. Never had PT for it. H/o L4-L5 spinal fusion about 10 yrs ago. No issues w/ urination.   L spine XR 3/8/22  Lumbar spine two views: There are pedicle screws and fixation rods at L4-L5 with a disc implant and mild anterolisthesis of L4 on L5.  There is mild-moderate DJD and severe dish.  No acute fracture dislocation bone destruction seen.  No trauma seen.    Review of Systems  Comprehensive review of systems otherwise negative. See history/subjective section for more details.    Objective:      Physical Exam    /78 (BP Location: Left arm, Patient Position: Sitting, BP Method: Large (Manual))   Pulse 62   Ht 5' 4" (1.626 m)   Wt 66.4 kg (146 lb 6.2 oz)   LMP 06/14/1996   SpO2 98%   BMI 25.13 kg/m²     GEN - A+OX4, teary eyed.  HEENT - PERRL, EOMI, OP clear. MMM.   Neck - No thyromegaly or cervical LAD. No thyroid masses felt.  CV - RRR, no m/r   Chest - CTAB, no wheezing or rhonchi  Abd - S/NT/ND/+BS.   Ext - 2+BDP and radial pulses. No C/C/E.  MSK - no spinal tenderness to palpation.   Skin - No " rash.    EKG w/ NSR.     Assessment/Plan     Grace was seen today for pre-op exam.    Diagnoses and all orders for this visit:    Preoperative cardiovascular examination - medically optimized for surgery w/ Dr. Umana.   -     IN OFFICE EKG 12-LEAD (to [place])    Brow ptosis, left - as above.     Benign essential hypertension - Stable and controlled. Continue current medications.    Episode of recurrent major depressive disorder, unspecified depression episode severity - cont lexapro qam for now. F/u w/ LMSW.       F/u in 3 mo.      Lisa Dean MD  Department of Internal Medicine - [facility]  11:51 AM

## 2022-11-01 ENCOUNTER — TELEPHONE (OUTPATIENT)
Dept: OPHTHALMOLOGY | Facility: CLINIC | Age: 77
End: 2022-11-01
Payer: MEDICARE

## 2022-11-01 NOTE — H&P
Pre-operative History and Physical  Ophthalmology Service    CC: ptosis    HPI:   Patient is a 76 y.o. female presents with ptosis and ectropion requiring surgery as noted in the most recent ophthalmology progress note.    Past Medical History:   Diagnosis Date    Anxiety     Rene's disease     Cataract     Chronic low back pain     Depression     Goiter, nodular     Hyperlipidemia     Hypertension     Irritable bowel     Neuromuscular disorder     Osteopenia of multiple sites 5/29/2017    PUD (peptic ulcer disease)     Subarachnoid hemorrhage 2014    Varicose veins        Past Surgical History:   Procedure Laterality Date    APPENDECTOMY      BELPHAROPTOSIS REPAIR Bilateral     BREAST BIOPSY Right     RIGHT-axilla    CATARACT EXTRACTION W/  INTRAOCULAR LENS IMPLANT Left 08/03/2016        CATARACT EXTRACTION W/  INTRAOCULAR LENS IMPLANT Right 09/21/2016        SPINE SURGERY  07/2012    Fusion @ L4L5    TONSILLECTOMY         Family History   Problem Relation Age of Onset    Hypertension Mother     Kidney failure Mother     Hypertension Father     Coronary artery disease Father     Prostate cancer Father     Stroke Father     Eczema Son     Diabetes Brother     Thyroid cancer Neg Hx     Breast cancer Neg Hx     Colon cancer Neg Hx     Ovarian cancer Neg Hx     Amblyopia Neg Hx     Blindness Neg Hx     Glaucoma Neg Hx     Macular degeneration Neg Hx     Retinal detachment Neg Hx     Strabismus Neg Hx        Social History     Socioeconomic History    Marital status:    Occupational History     Employer: retired   Tobacco Use    Smoking status: Former     Packs/day: 1.00     Years: 10.00     Pack years: 10.00     Types: Cigarettes    Smokeless tobacco: Never    Tobacco comments:     quit 1975   Substance and Sexual Activity    Alcohol use: Yes     Alcohol/week: 14.0 standard drinks     Types: 14 Glasses of wine per week     Comment: socially    Drug use: No    Sexual activity: Not  Currently     Partners: Male     Comment:    Social History Narrative    . 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     Social Determinants of Health     Financial Resource Strain: Unknown    Difficulty of Paying Living Expenses: Patient refused   Food Insecurity: Unknown    Worried About Running Out of Food in the Last Year: Patient refused    Ran Out of Food in the Last Year: Patient refused   Transportation Needs: Unknown    Lack of Transportation (Medical): Patient refused    Lack of Transportation (Non-Medical): Patient refused   Physical Activity: Unknown    Days of Exercise per Week: Patient refused    Minutes of Exercise per Session: Patient refused   Stress: Unknown    Feeling of Stress : Patient refused   Social Connections: Unknown    Frequency of Communication with Friends and Family: Patient refused    Frequency of Social Gatherings with Friends and Family: Patient refused    Active Member of Clubs or Organizations: Patient refused    Attends Club or Organization Meetings: Patient refused    Marital Status: Patient refused   Housing Stability: Unknown    Unable to Pay for Housing in the Last Year: Patient refused    Unstable Housing in the Last Year: Patient refused       No current facility-administered medications for this encounter.     Current Outpatient Medications   Medication Sig Dispense Refill    ascorbic acid, vitamin C, (VITAMIN C) 1000 MG tablet Take 1,000 mg by mouth once daily.      CALCIUM-MAGNESIUM ORAL Take 1 capsule by mouth 2 (two) times a day. 1000mg of calcium and 500mg Magnesium      carvediloL (COREG) 12.5 MG tablet Take 12.5mg qam and 25mg tab qpm. 90 tablet 3    carvediloL (COREG) 25 MG tablet Take 12.5mg tab every morning and 25mg tab nightly. 180 tablet 3    cholecalciferol, vitamin D3, 1,000 unit capsule Take 1,000 Units by mouth once daily.      cyanocobalamin 500 MCG tablet Take 500 mcg by mouth once daily.       EScitalopram oxalate (LEXAPRO) 5 MG Tab Take 1 tablet (5 mg total) by mouth once daily. 30 tablet 3    ezetimibe (ZETIA) 10 mg tablet Take 1 tablet (10 mg total) by mouth once daily. 90 tablet 3    hydrALAZINE (APRESOLINE) 50 MG tablet Take 1 tablet (50 mg total) by mouth every 12 (twelve) hours. 60 tablet 11    hydroCHLOROthiazide (HYDRODIURIL) 12.5 MG Tab Take 1 tablet (12.5 mg total) by mouth once daily. 90 tablet 3    hydrOXYzine HCL (ATARAX) 25 MG tablet Take 1 tablet (25 mg total) by mouth 3 (three) times daily as needed for Itching. 270 tablet 3    triamcinolone acetonide 0.1% (KENALOG) 0.1 % cream APPLY  CREAM EXTERNALLY TO AFFECTED AREA TWICE DAILY AS NEEDED FOR 7 DAYS 30 g 0       Review of patient's allergies indicates:   Allergen Reactions    Ace inhibitors Other (See Comments)     Pt not sure which medication it was - was told by doctor that she was allergic    Amlodipine      flushing    Ibuprofen      Elevate blood pressure    Morphine Other (See Comments)     hallucination    Statins-hmg-coa reductase inhibitors      Muscle cramps         Review of systems:  Gen: denies fevers, chills, nausea, vomitting, weight changes  HEENT: Denies discharge or coughing/sneezing. +blurry vision  Resp: Denies SOB  CV: Denies CP or palpitations  Abd: Denies tenderness, diarrhea, constipation, nausea  Ext:  Denies pain or edema    Physical Exam  BP: Vital signs stable  General: No apparent distress  HEENT: Normocephalic, atraumatic  Lungs: Adequate respirations, no respiratory distress  Heart: Pulses intact  Abdomen: Soft, no distention  Rectal/pelvic: Deferred    Assessment  Patient is a 76 y.o. female with brow ptosis and ectropion   - Risks/benefits/alternatives of the procedure including, but not limited to scarring, bleeding, infection, loss or decreased vision, and/or need for possible repeat surgery discussed with the patient and/or family, and Informed consent obtained prior to surgery and the patient/family  voiced good understanding, witnessed, and placed in chart.  - Will proceed with ectropion repair OD, brow ptosis repair OS  - Plan for General anesthesia   - see status of aspirin and other blood thinners in progress note from last visit

## 2022-11-02 ENCOUNTER — HOSPITAL ENCOUNTER (OUTPATIENT)
Facility: HOSPITAL | Age: 77
Discharge: HOME | End: 2022-11-02
Attending: OPHTHALMOLOGY | Admitting: OPHTHALMOLOGY
Payer: MEDICARE

## 2022-11-02 ENCOUNTER — ANESTHESIA (OUTPATIENT)
Dept: SURGERY | Facility: HOSPITAL | Age: 77
End: 2022-11-02
Payer: MEDICARE

## 2022-11-02 VITALS
RESPIRATION RATE: 20 BRPM | WEIGHT: 145 LBS | HEART RATE: 62 BPM | TEMPERATURE: 98 F | OXYGEN SATURATION: 97 % | BODY MASS INDEX: 24.75 KG/M2 | HEIGHT: 64 IN | DIASTOLIC BLOOD PRESSURE: 80 MMHG | SYSTOLIC BLOOD PRESSURE: 156 MMHG

## 2022-11-02 DIAGNOSIS — H02.109 ECTROPION: ICD-10-CM

## 2022-11-02 DIAGNOSIS — H02.132 SENILE ECTROPION OF RIGHT LOWER EYELID: ICD-10-CM

## 2022-11-02 PROCEDURE — 36000707: Mod: WS

## 2022-11-02 PROCEDURE — 25000003 PHARM REV CODE 250: Performed by: STUDENT IN AN ORGANIZED HEALTH CARE EDUCATION/TRAINING PROGRAM

## 2022-11-02 PROCEDURE — 67917: ICD-10-PCS | Mod: ANES,,, | Performed by: ANESTHESIOLOGY

## 2022-11-02 PROCEDURE — 37000008 HC ANESTHESIA 1ST 15 MINUTES: Performed by: OPHTHALMOLOGY

## 2022-11-02 PROCEDURE — 67917 REPAIR EYELID DEFECT: CPT | Mod: 51,E4,, | Performed by: OPHTHALMOLOGY

## 2022-11-02 PROCEDURE — 15822 PR REVISION OF UPPER EYELID: ICD-10-PCS | Mod: CSM,LT,, | Performed by: OPHTHALMOLOGY

## 2022-11-02 PROCEDURE — 63600175 PHARM REV CODE 636 W HCPCS: Performed by: NURSE ANESTHETIST, CERTIFIED REGISTERED

## 2022-11-02 PROCEDURE — 67917 REPAIR EYELID DEFECT: CPT | Mod: CRNA,,, | Performed by: NURSE ANESTHETIST, CERTIFIED REGISTERED

## 2022-11-02 PROCEDURE — 67917: ICD-10-PCS | Mod: CRNA,,, | Performed by: NURSE ANESTHETIST, CERTIFIED REGISTERED

## 2022-11-02 PROCEDURE — 25000003 PHARM REV CODE 250: Performed by: NURSE ANESTHETIST, CERTIFIED REGISTERED

## 2022-11-02 PROCEDURE — 67900 PR REPAIR BROW PTOSIS: ICD-10-PCS | Mod: LT,,, | Performed by: OPHTHALMOLOGY

## 2022-11-02 PROCEDURE — 37000009 HC ANESTHESIA EA ADD 15 MINS: Performed by: OPHTHALMOLOGY

## 2022-11-02 PROCEDURE — 25000003 PHARM REV CODE 250: Performed by: OPHTHALMOLOGY

## 2022-11-02 PROCEDURE — 15822 BLEPHAROPLASTY UPPER EYELID: CPT | Mod: CSM,RT,, | Performed by: OPHTHALMOLOGY

## 2022-11-02 PROCEDURE — 94761 N-INVAS EAR/PLS OXIMETRY MLT: CPT

## 2022-11-02 PROCEDURE — 36000707: Performed by: OPHTHALMOLOGY

## 2022-11-02 PROCEDURE — 37000009 HC ANESTHESIA EA ADD 15 MINS: Mod: WS

## 2022-11-02 PROCEDURE — 71000033 HC RECOVERY, INTIAL HOUR: Performed by: OPHTHALMOLOGY

## 2022-11-02 PROCEDURE — 36000706: Performed by: OPHTHALMOLOGY

## 2022-11-02 PROCEDURE — 25000003 PHARM REV CODE 250

## 2022-11-02 PROCEDURE — 67917 PR FIX ECTROPION,ENTENSV LID REPAIR: ICD-10-PCS | Mod: 51,E4,, | Performed by: OPHTHALMOLOGY

## 2022-11-02 PROCEDURE — 63600175 PHARM REV CODE 636 W HCPCS: Performed by: OPHTHALMOLOGY

## 2022-11-02 PROCEDURE — 71000015 HC POSTOP RECOV 1ST HR: Performed by: OPHTHALMOLOGY

## 2022-11-02 PROCEDURE — 99900035 HC TECH TIME PER 15 MIN (STAT)

## 2022-11-02 PROCEDURE — 67900 REPAIR BROW DEFECT: CPT | Mod: LT,,, | Performed by: OPHTHALMOLOGY

## 2022-11-02 PROCEDURE — 67917 REPAIR EYELID DEFECT: CPT | Mod: ANES,,, | Performed by: ANESTHESIOLOGY

## 2022-11-02 RX ORDER — SODIUM CHLORIDE 0.9 % (FLUSH) 0.9 %
10 SYRINGE (ML) INJECTION
Status: DISCONTINUED | OUTPATIENT
Start: 2022-11-02 | End: 2022-11-02 | Stop reason: HOSPADM

## 2022-11-02 RX ORDER — KETAMINE HYDROCHLORIDE 100 MG/ML
INJECTION, SOLUTION INTRAMUSCULAR; INTRAVENOUS
Status: DISCONTINUED | OUTPATIENT
Start: 2022-11-02 | End: 2022-11-02

## 2022-11-02 RX ORDER — BUPIVACAINE HYDROCHLORIDE 2.5 MG/ML
INJECTION, SOLUTION EPIDURAL; INFILTRATION; INTRACAUDAL
Status: DISCONTINUED | OUTPATIENT
Start: 2022-11-02 | End: 2022-11-02 | Stop reason: HOSPADM

## 2022-11-02 RX ORDER — ONDANSETRON 2 MG/ML
INJECTION INTRAMUSCULAR; INTRAVENOUS
Status: DISCONTINUED | OUTPATIENT
Start: 2022-11-02 | End: 2022-11-02

## 2022-11-02 RX ORDER — LIDOCAINE HYDROCHLORIDE 20 MG/ML
INJECTION INTRAVENOUS
Status: DISCONTINUED | OUTPATIENT
Start: 2022-11-02 | End: 2022-11-02

## 2022-11-02 RX ORDER — SODIUM CHLORIDE 9 MG/ML
INJECTION, SOLUTION INTRAVENOUS CONTINUOUS
Status: DISCONTINUED | OUTPATIENT
Start: 2022-11-02 | End: 2022-11-02 | Stop reason: HOSPADM

## 2022-11-02 RX ORDER — CEFAZOLIN SODIUM 1 G/3ML
INJECTION, POWDER, FOR SOLUTION INTRAMUSCULAR; INTRAVENOUS
Status: DISCONTINUED | OUTPATIENT
Start: 2022-11-02 | End: 2022-11-02

## 2022-11-02 RX ORDER — FENTANYL CITRATE 50 UG/ML
25 INJECTION, SOLUTION INTRAMUSCULAR; INTRAVENOUS EVERY 5 MIN PRN
Status: DISCONTINUED | OUTPATIENT
Start: 2022-11-02 | End: 2022-11-02 | Stop reason: HOSPADM

## 2022-11-02 RX ORDER — ONDANSETRON 4 MG/1
4 TABLET, ORALLY DISINTEGRATING ORAL EVERY 8 HOURS PRN
Qty: 4 TABLET | Refills: 0 | Status: SHIPPED | OUTPATIENT
Start: 2022-11-02 | End: 2023-01-26

## 2022-11-02 RX ORDER — TOBRAMYCIN AND DEXAMETHASONE 3; 1 MG/ML; MG/ML
SUSPENSION/ DROPS OPHTHALMIC
Status: DISCONTINUED | OUTPATIENT
Start: 2022-11-02 | End: 2022-11-02 | Stop reason: HOSPADM

## 2022-11-02 RX ORDER — PROPOFOL 10 MG/ML
VIAL (ML) INTRAVENOUS
Status: DISCONTINUED | OUTPATIENT
Start: 2022-11-02 | End: 2022-11-02

## 2022-11-02 RX ORDER — EPINEPHRINE CONVENIENCE KIT 1 MG/ML(1)
KIT INTRAMUSCULAR; SUBCUTANEOUS
Status: DISCONTINUED | OUTPATIENT
Start: 2022-11-02 | End: 2022-11-02 | Stop reason: HOSPADM

## 2022-11-02 RX ORDER — ACETAMINOPHEN 500 MG
1000 TABLET ORAL
Status: COMPLETED | OUTPATIENT
Start: 2022-11-02 | End: 2022-11-02

## 2022-11-02 RX ORDER — TETRACAINE HYDROCHLORIDE 5 MG/ML
1 SOLUTION OPHTHALMIC ONCE
Status: DISCONTINUED | OUTPATIENT
Start: 2022-11-02 | End: 2022-11-02 | Stop reason: HOSPADM

## 2022-11-02 RX ORDER — HYDROCODONE BITARTRATE AND ACETAMINOPHEN 5; 325 MG/1; MG/1
1 TABLET ORAL EVERY 6 HOURS PRN
Qty: 16 TABLET | Refills: 0 | Status: SHIPPED | OUTPATIENT
Start: 2022-11-02 | End: 2023-03-29

## 2022-11-02 RX ORDER — ONDANSETRON 2 MG/ML
4 INJECTION INTRAMUSCULAR; INTRAVENOUS DAILY PRN
Status: DISCONTINUED | OUTPATIENT
Start: 2022-11-02 | End: 2022-11-02 | Stop reason: HOSPADM

## 2022-11-02 RX ORDER — LIDOCAINE HYDROCHLORIDE 10 MG/ML
INJECTION, SOLUTION EPIDURAL; INFILTRATION; INTRACAUDAL; PERINEURAL
Status: DISCONTINUED | OUTPATIENT
Start: 2022-11-02 | End: 2022-11-02 | Stop reason: HOSPADM

## 2022-11-02 RX ORDER — DEXAMETHASONE SODIUM PHOSPHATE 4 MG/ML
INJECTION, SOLUTION INTRA-ARTICULAR; INTRALESIONAL; INTRAMUSCULAR; INTRAVENOUS; SOFT TISSUE
Status: DISCONTINUED | OUTPATIENT
Start: 2022-11-02 | End: 2022-11-02

## 2022-11-02 RX ORDER — MIDAZOLAM HYDROCHLORIDE 1 MG/ML
INJECTION, SOLUTION INTRAMUSCULAR; INTRAVENOUS
Status: DISCONTINUED | OUTPATIENT
Start: 2022-11-02 | End: 2022-11-02

## 2022-11-02 RX ORDER — TETRACAINE HYDROCHLORIDE 5 MG/ML
SOLUTION OPHTHALMIC
Status: DISCONTINUED | OUTPATIENT
Start: 2022-11-02 | End: 2022-11-02 | Stop reason: HOSPADM

## 2022-11-02 RX ORDER — EPHEDRINE SULFATE 50 MG/ML
INJECTION, SOLUTION INTRAVENOUS
Status: DISCONTINUED | OUTPATIENT
Start: 2022-11-02 | End: 2022-11-02

## 2022-11-02 RX ADMIN — KETAMINE HYDROCHLORIDE 10 MG: 100 INJECTION INTRAMUSCULAR; INTRAVENOUS at 07:11

## 2022-11-02 RX ADMIN — ONDANSETRON 4 MG: 2 INJECTION INTRAMUSCULAR; INTRAVENOUS at 07:11

## 2022-11-02 RX ADMIN — CEFAZOLIN 2 G: 330 INJECTION, POWDER, FOR SOLUTION INTRAMUSCULAR; INTRAVENOUS at 07:11

## 2022-11-02 RX ADMIN — SODIUM CHLORIDE: 0.9 INJECTION, SOLUTION INTRAVENOUS at 06:11

## 2022-11-02 RX ADMIN — DEXAMETHASONE SODIUM PHOSPHATE 8 MG: 4 INJECTION, SOLUTION INTRAMUSCULAR; INTRAVENOUS at 07:11

## 2022-11-02 RX ADMIN — EPHEDRINE SULFATE 5 MG: 50 INJECTION INTRAVENOUS at 08:11

## 2022-11-02 RX ADMIN — PROPOFOL 200 MG: 10 INJECTION, EMULSION INTRAVENOUS at 07:11

## 2022-11-02 RX ADMIN — SODIUM CHLORIDE, SODIUM GLUCONATE, SODIUM ACETATE, POTASSIUM CHLORIDE, MAGNESIUM CHLORIDE, SODIUM PHOSPHATE, DIBASIC, AND POTASSIUM PHOSPHATE: .53; .5; .37; .037; .03; .012; .00082 INJECTION, SOLUTION INTRAVENOUS at 09:11

## 2022-11-02 RX ADMIN — MIDAZOLAM HYDROCHLORIDE 2 MG: 1 INJECTION, SOLUTION INTRAMUSCULAR; INTRAVENOUS at 07:11

## 2022-11-02 RX ADMIN — LIDOCAINE HYDROCHLORIDE 75 MG: 20 INJECTION INTRAVENOUS at 07:11

## 2022-11-02 RX ADMIN — ACETAMINOPHEN 1000 MG: 500 TABLET ORAL at 05:11

## 2022-11-02 NOTE — DISCHARGE SUMMARY
Discharge Summary  Ophthalmology Service    Admit Date: 11/2/2022     Discharge Date: 11/2/2022     Attending Physician: Bina Umana MD     Discharge Physician: Jonathan Agosto MD    Discharged Condition: Good    Reason for Admission: Senile ectropion of right lower eyelid [H02.132]  Brow ptosis, left [H57.812]  Dermatochalasis of both upper eyelids [H02.831, H02.834]  Ectropion [H02.109]     Treatments/Procedures: Procedure(s) (LRB):  STRIPPING, TARSAL (Right)  REPAIR,BROW PTOSIS (Left)  BLEPHAROPLASTY, UPPER EYELID (Bilateral) (see dictated report for details).    Hospital Course: Stable    Consults: None    Significant Diagnostic Studies: None    Disposition: Home    Patient Instructions:  - Resume same diet as prior to surgery  - Limit activity, no heavy lifting  - Call MD for severe uncontrolled pain, redness, or milky drainage  - Follow up with Dr. Umana at [facility], 10th floor, in 7-10 days - please call clinic to schedule  - Use cold compresses twice a day for the first 48 hours after surgery; then use warm compresses twice a day for the following 48 hours  - Use tobradex ointment three a day to the surgical wounds Apply using clean hands or a clean Q-tip  - Use tobradex eye drops four times a day until the bottle is empty    Patient Instructions:   Current Discharge Medication List        START taking these medications    Details   HYDROcodone-acetaminophen (NORCO) 5-325 mg per tablet Take 1 tablet by mouth every 6 (six) hours as needed for Pain.  Qty: 16 tablet, Refills: 0    Comments: Quantity prescribed more than 7 day supply? No           CONTINUE these medications which have NOT CHANGED    Details   ascorbic acid, vitamin C, (VITAMIN C) 1000 MG tablet Take 1,000 mg by mouth once daily.      CALCIUM-MAGNESIUM ORAL Take 1 capsule by mouth 2 (two) times a day. 1000mg of calcium and 500mg Magnesium      !! carvediloL (COREG) 12.5 MG tablet Take 12.5mg qam and 25mg tab qpm.  Qty: 90  tablet, Refills: 3    Comments: .      !! carvediloL (COREG) 25 MG tablet Take 12.5mg tab every morning and 25mg tab nightly.  Qty: 180 tablet, Refills: 3    Comments: .  Associated Diagnoses: Benign essential hypertension      cholecalciferol, vitamin D3, 1,000 unit capsule Take 1,000 Units by mouth once daily.      cyanocobalamin 500 MCG tablet Take 500 mcg by mouth once daily.      EScitalopram oxalate (LEXAPRO) 5 MG Tab Take 1 tablet (5 mg total) by mouth once daily.  Qty: 30 tablet, Refills: 3    Associated Diagnoses: Moderate episode of recurrent major depressive disorder; YOCASTA (generalized anxiety disorder)      ezetimibe (ZETIA) 10 mg tablet Take 1 tablet (10 mg total) by mouth once daily.  Qty: 90 tablet, Refills: 3    Associated Diagnoses: Hyperlipidemia, unspecified hyperlipidemia type      hydroCHLOROthiazide (HYDRODIURIL) 12.5 MG Tab Take 1 tablet (12.5 mg total) by mouth once daily.  Qty: 90 tablet, Refills: 3    Comments: .  Associated Diagnoses: Benign essential hypertension      hydrOXYzine HCL (ATARAX) 25 MG tablet Take 1 tablet (25 mg total) by mouth 3 (three) times daily as needed for Itching.  Qty: 270 tablet, Refills: 3    Associated Diagnoses: Urticaria      hydrALAZINE (APRESOLINE) 50 MG tablet Take 1 tablet (50 mg total) by mouth every 12 (twelve) hours.  Qty: 60 tablet, Refills: 11    Comments: Stop carvedilol  Associated Diagnoses: Benign essential hypertension      triamcinolone acetonide 0.1% (KENALOG) 0.1 % cream APPLY  CREAM EXTERNALLY TO AFFECTED AREA TWICE DAILY AS NEEDED FOR 7 DAYS  Qty: 30 g, Refills: 0    Associated Diagnoses: Dermatitis       !! - Potential duplicate medications found. Please discuss with provider.          Discharge Procedure Orders   Notify your health care provider if you experience any of the following:  temperature >100.4     Notify your health care provider if you experience any of the following:  persistent nausea and vomiting or diarrhea     Notify your  health care provider if you experience any of the following:  severe uncontrolled pain     Notify your health care provider if you experience any of the following:  redness, tenderness, or signs of infection (pain, swelling, redness, odor or green/yellow discharge around incision site)

## 2022-11-02 NOTE — OP NOTE
Operative Note  Ophthalmology Service          Date of Procedure: 11/2/2022        Attending Physician: Bina Umana MD      Assistant: Jonathan Agosto MD      Pre-Operative Diagnosis: Bilateral mechanical upper eyelid ptosis, right ectropion, dermatochalasis both eyes      Post-Operative Diagnosis: Same as pre-operative diagnosis      Treatments/Procedures: 1. Right Tarsal Strip 2. Left brow ptosis repair/scar revision 3. Bilateral upper eyelid blepharoplasty (csm ou)      Intraoperative Findings: as above      Anesthesia: General      Complications: None      Estimated Blood Loss: < 20 cc    Specimen: None    Implant: None    Disposition: Home     Indications for surgery: 76 y.o.-year-old female with a history of right-sided tearing bilateral upper eyelid ptosis interfering with activities of daily living. Patient understands the risk of pain, bleeding, infection, ocular injury, loss of the eye, asymmetry, need for revision in future, scarring.       Procedure in detail: Prior to induction of anesthesia, with patient sitting in an upright position, both upper eyelids were marked at the medial pupil border. The patient was brought to the operating room and placed in supine position. 2g of ancef was administered perioperatively. Once adequate anesthesia was achieved the skin incisions were marked. Calipers were used to measure and salty the temporal brow regions and the natural eyelid creases of both upper eyelids. Several cc of 2% lidocaine with epinephrine, 0.5% bupivacaine and vitrase was injected subcutaneously into both upper eyelids and temporal brows. The full face was then prepped using topical Betadine, and the patient was draped in the usual sterile fashion for ophthalmic plastic surgery.       Attention was turned to the right upper eyelid. A corneal shield was placed in the right palpebral fissure. The skin was incised along the markings using a #15 scalpel. A skin-only flap was raised with monopolar cautery  and Aragon forceps. Resection of the medial fat pad was not performed. The incision was closed with a 6-0 prolene in running fashion. The procedure was repeated for the left upper eyelid, again without resection of the medial fat pad.       A lateral canthal incision was made on the right using a #15 blade. Straight Aaron scissors were used to perform a canthotomy and to incise the inferior margarita of the lateral canthal tendon. Inferior eyelid was found to have appropriately increased laxity and fat show. Hemostasis was maintained throughout.      Aaron scissors were then used for dissection down to the periosteum of the right zygomatic orbital rim. This was followed by blunt dissection using Aaron scissors and Q tips to better visualize the periosteum. The lower eyelid was then distracted laterally into the desired position, noting any overlap with the upper eyelid. This overlap indicated the amount of redundancy that was then excised full-thickness in a transverse orientation with David scissors.      To fixate the right lower eyelid to the orbital rim, 5-0 Vicryl on P2 was placed through the tarsus anterior-inferior and exiting the tarsus posterior-superior. 5-0 Vicryl on P2 was then used to approximate the lateral eyelid segment to the periosteum near the level of the superior margarita of the lateral canthal tendon approximately two mm posterior to the lateral orbital rim, allowing good apposition of the lower eyelid to the globe, without tying the suture. The lateral canthal angle was then reformed by passing a 6-0 Vicryl on S14 through the cut edges of the upper and lower eyelid margins to align the gray line without tying the suture. After ensuring both the tarsus to periosteum and the gray line to gray line would align naturally with the current suture placement, the sutures were then tied to secure the lateral lower lid to periosteum and then tied to reform the lateral canthal angle. A 6-0 Prolene on P1  was then used to close the wound.      An incision with a 15 blade was then made under the left brow through the prior scar.  Scar tissue was freed from under the incision to allow for better movement of the brow. Hemostasis was achieved throughout with cautery. The sub-brow incision was closed in a layered fashion with deep interrupted 5-0 vicryl on P-2 sutures followed by a horizontal mattress 5-0 Prolene on P-3 and a running 6-0 prolene suture.     Tobradex ointment was applied to the eye and eyelid. The patient tolerated the procedure well without complications. The patient was awoken and extubated and taken to the recovery room in stable condition.

## 2022-11-02 NOTE — PLAN OF CARE
VSS. Pt able to tolerate oral liquids. Pt reports tolerable pain level for D/C. Pt/friend to  rx from pharmacy. No distress noted. Pt states she is ready for D/C. D/C instructions reviewed with pt and friend, verbalized understanding.

## 2022-11-02 NOTE — TRANSFER OF CARE
"Anesthesia Transfer of Care Note    Patient: Grace Navarro    Procedure(s) Performed: Procedure(s) (LRB):  STRIPPING, TARSAL (Right)  REPAIR,BROW PTOSIS (Left)  BLEPHAROPLASTY, UPPER EYELID (Bilateral)    Patient location: PACU    Anesthesia Type: general    Transport from OR: Transported from OR on 6-10 L/min O2 by face mask with adequate spontaneous ventilation    Post pain: adequate analgesia    Post assessment: no apparent anesthetic complications and tolerated procedure well    Post vital signs: stable    Level of consciousness: awake    Nausea/Vomiting: no nausea/vomiting    Complications: none    Transfer of care protocol was followed      Last vitals:   Visit Vitals  BP (!) 157/78   Pulse 69   Temp 36.6 °C (97.9 °F) (Oral)   Resp 15   Ht 5' 4" (1.626 m)   Wt 65.8 kg (145 lb)   LMP 06/14/1996   SpO2 100%   Breastfeeding No   BMI 24.89 kg/m²     "

## 2022-11-02 NOTE — BRIEF OP NOTE
Brief Operative Note  Ophthalmology Service      Date of Procedure: 11/2/2022     Attending Physician: Bina Umana MD     Assistant: Jonathan Agosto MD    Pre-Operative Diagnosis: Senile ectropion of right lower eyelid [H02.132]  Brow ptosis, left [H57.812]  Dermatochalasis of both upper eyelids [H02.831, H02.834]  Ectropion [H02.109]     Post-Operative Diagnosis: Same as pre-operative diagnosis    Treatments/Procedures: Procedure(s) (LRB):  STRIPPING, TARSAL (Right)  REPAIR,BROW PTOSIS (Left)  BLEPHAROPLASTY, UPPER EYELID (Bilateral)     Intraoperative Findings: see op note    Anesthesia: General    Complications: None    Estimated Blood Loss: < 20 cc    Specimens: None    -------------------------------------------------------------  Full dictated Operative Report to follow.

## 2022-11-02 NOTE — H&P
No changes to interval history, see H&P from yesterday    PE:    HEENT: Normocephalic, atraumatic  CV: RRR nl s1 and s2  Chest: CTA-B  Abd: s/nt/nd  Ext: warm, perfused

## 2022-11-04 NOTE — ANESTHESIA POSTPROCEDURE EVALUATION
Anesthesia Post Evaluation    Patient: Grace Navarro    Procedure(s) Performed: Procedure(s) (LRB):  STRIPPING, TARSAL (Right)  REPAIR,BROW PTOSIS (Left)  BLEPHAROPLASTY, UPPER EYELID (Bilateral)    Final Anesthesia Type: general      Patient location during evaluation: PACU  Patient participation: Yes- Able to Participate  Level of consciousness: awake and alert  Post-procedure vital signs: reviewed and stable  Pain management: adequate  Airway patency: patent    PONV status at discharge: No PONV  Anesthetic complications: no      Cardiovascular status: blood pressure returned to baseline  Respiratory status: unassisted  Hydration status: euvolemic  Follow-up not needed.          Vitals Value Taken Time   /80 11/02/22 1116   Temp 36.7 °C (98 °F) 11/02/22 1100   Pulse 64 11/02/22 1121   Resp 18 11/02/22 1120   SpO2 95 % 11/02/22 1121   Vitals shown include unvalidated device data.      Event Time   Out of Recovery 11:00:00         Pain/Layo Score: No data recorded

## 2022-11-09 NOTE — PROGRESS NOTES
Assessment /Plan     For exam results, see Encounter Report.    Post-operative state    Senile ectropion of right lower eyelid    Brow ptosis, left    Dermatochalasis of both upper eyelids    Open angle with borderline findings and high glaucoma risk in both eyes    Large physiologic cupping of optic disc    Dry eyes    Pseudophakia of both eyes      Patient doing well! Post-operative instructions reviewed. Sutures removed. All questions answered.  Return in 5 weeks prn sooner any worsening of vision/symptoms or any concerns.     Continue systane ultra tid OD.    Refill Maxitrol adi to bid ou weekly, and then taper to once weekly.

## 2022-11-10 ENCOUNTER — OFFICE VISIT (OUTPATIENT)
Dept: OPHTHALMOLOGY | Facility: CLINIC | Age: 77
End: 2022-11-10
Payer: MEDICARE

## 2022-11-10 ENCOUNTER — TELEPHONE (OUTPATIENT)
Dept: OPHTHALMOLOGY | Facility: CLINIC | Age: 77
End: 2022-11-10

## 2022-11-10 DIAGNOSIS — H02.831 DERMATOCHALASIS OF BOTH UPPER EYELIDS: ICD-10-CM

## 2022-11-10 DIAGNOSIS — Z96.1 PSEUDOPHAKIA OF BOTH EYES: ICD-10-CM

## 2022-11-10 DIAGNOSIS — H04.123 DRY EYES: ICD-10-CM

## 2022-11-10 DIAGNOSIS — H57.812 BROW PTOSIS, LEFT: ICD-10-CM

## 2022-11-10 DIAGNOSIS — Z98.890 POST-OPERATIVE STATE: Primary | ICD-10-CM

## 2022-11-10 DIAGNOSIS — H40.023 OPEN ANGLE WITH BORDERLINE FINDINGS AND HIGH GLAUCOMA RISK IN BOTH EYES: ICD-10-CM

## 2022-11-10 DIAGNOSIS — H47.239 LARGE PHYSIOLOGIC CUPPING OF OPTIC DISC: ICD-10-CM

## 2022-11-10 DIAGNOSIS — H02.132 SENILE ECTROPION OF RIGHT LOWER EYELID: ICD-10-CM

## 2022-11-10 DIAGNOSIS — H02.834 DERMATOCHALASIS OF BOTH UPPER EYELIDS: ICD-10-CM

## 2022-11-10 PROCEDURE — 1159F MED LIST DOCD IN RCRD: CPT | Mod: CPTII,S$GLB,, | Performed by: OPHTHALMOLOGY

## 2022-11-10 PROCEDURE — 1160F PR REVIEW ALL MEDS BY PRESCRIBER/CLIN PHARMACIST DOCUMENTED: ICD-10-PCS | Mod: CPTII,S$GLB,, | Performed by: OPHTHALMOLOGY

## 2022-11-10 PROCEDURE — 1159F PR MEDICATION LIST DOCUMENTED IN MEDICAL RECORD: ICD-10-PCS | Mod: CPTII,S$GLB,, | Performed by: OPHTHALMOLOGY

## 2022-11-10 PROCEDURE — 99024 PR POST-OP FOLLOW-UP VISIT: ICD-10-PCS | Mod: S$GLB,,, | Performed by: OPHTHALMOLOGY

## 2022-11-10 PROCEDURE — 99999 PR PBB SHADOW E&M-EST. PATIENT-LVL II: CPT | Mod: PBBFAC,,, | Performed by: OPHTHALMOLOGY

## 2022-11-10 PROCEDURE — 99024 POSTOP FOLLOW-UP VISIT: CPT | Mod: S$GLB,,, | Performed by: OPHTHALMOLOGY

## 2022-11-10 PROCEDURE — 1160F RVW MEDS BY RX/DR IN RCRD: CPT | Mod: CPTII,S$GLB,, | Performed by: OPHTHALMOLOGY

## 2022-11-10 PROCEDURE — 99999 PR PBB SHADOW E&M-EST. PATIENT-LVL II: ICD-10-PCS | Mod: PBBFAC,,, | Performed by: OPHTHALMOLOGY

## 2022-11-11 ENCOUNTER — PATIENT MESSAGE (OUTPATIENT)
Dept: OPHTHALMOLOGY | Facility: CLINIC | Age: 77
End: 2022-11-11
Payer: MEDICARE

## 2022-11-21 ENCOUNTER — PATIENT MESSAGE (OUTPATIENT)
Dept: OPHTHALMOLOGY | Facility: CLINIC | Age: 77
End: 2022-11-21
Payer: MEDICARE

## 2022-11-27 ENCOUNTER — PATIENT MESSAGE (OUTPATIENT)
Dept: PRIMARY CARE CLINIC | Facility: CLINIC | Age: 77
End: 2022-11-27
Payer: MEDICARE

## 2022-11-27 DIAGNOSIS — I10 BENIGN ESSENTIAL HYPERTENSION: ICD-10-CM

## 2022-11-28 RX ORDER — HYDROCHLOROTHIAZIDE 12.5 MG/1
12.5 TABLET ORAL DAILY
Qty: 90 TABLET | Refills: 3 | Status: SHIPPED | OUTPATIENT
Start: 2022-11-28 | End: 2023-09-12 | Stop reason: SDUPTHER

## 2022-12-08 ENCOUNTER — OFFICE VISIT (OUTPATIENT)
Dept: OPHTHALMOLOGY | Facility: CLINIC | Age: 77
End: 2022-12-08
Payer: MEDICARE

## 2022-12-08 ENCOUNTER — IMMUNIZATION (OUTPATIENT)
Dept: PRIMARY CARE CLINIC | Facility: CLINIC | Age: 77
End: 2022-12-08
Payer: MEDICARE

## 2022-12-08 DIAGNOSIS — H40.023 OPEN ANGLE WITH BORDERLINE FINDINGS AND HIGH GLAUCOMA RISK IN BOTH EYES: ICD-10-CM

## 2022-12-08 DIAGNOSIS — Z98.890 POST-OPERATIVE STATE: Primary | ICD-10-CM

## 2022-12-08 DIAGNOSIS — H02.834 DERMATOCHALASIS OF BOTH UPPER EYELIDS: ICD-10-CM

## 2022-12-08 DIAGNOSIS — L91.0 HYPERTROPHIC SCAR: ICD-10-CM

## 2022-12-08 DIAGNOSIS — H02.831 DERMATOCHALASIS OF BOTH UPPER EYELIDS: ICD-10-CM

## 2022-12-08 DIAGNOSIS — H04.123 DRY EYES: ICD-10-CM

## 2022-12-08 DIAGNOSIS — H02.132 SENILE ECTROPION OF RIGHT LOWER EYELID: ICD-10-CM

## 2022-12-08 DIAGNOSIS — Z23 NEED FOR VACCINATION: Primary | ICD-10-CM

## 2022-12-08 PROCEDURE — 99024 PR POST-OP FOLLOW-UP VISIT: ICD-10-PCS | Mod: HCNC,S$GLB,, | Performed by: OPHTHALMOLOGY

## 2022-12-08 PROCEDURE — 1159F MED LIST DOCD IN RCRD: CPT | Mod: HCNC,CPTII,S$GLB, | Performed by: OPHTHALMOLOGY

## 2022-12-08 PROCEDURE — 11900 PR INJECTION INTO SKIN LESIONS, UP TO 7: ICD-10-PCS | Mod: 79,HCNC,S$GLB, | Performed by: OPHTHALMOLOGY

## 2022-12-08 PROCEDURE — 99999 PR PBB SHADOW E&M-EST. PATIENT-LVL II: CPT | Mod: PBBFAC,HCNC,, | Performed by: OPHTHALMOLOGY

## 2022-12-08 PROCEDURE — 1160F RVW MEDS BY RX/DR IN RCRD: CPT | Mod: HCNC,CPTII,S$GLB, | Performed by: OPHTHALMOLOGY

## 2022-12-08 PROCEDURE — 0124A COVID-19, MRNA, LNP-S, BIVALENT BOOSTER, PF, 30 MCG/0.3 ML DOSE: CPT | Mod: CV19,PBBFAC | Performed by: INTERNAL MEDICINE

## 2022-12-08 PROCEDURE — 11900 INJECT SKIN LESIONS </W 7: CPT | Mod: 79,HCNC,S$GLB, | Performed by: OPHTHALMOLOGY

## 2022-12-08 PROCEDURE — 1159F PR MEDICATION LIST DOCUMENTED IN MEDICAL RECORD: ICD-10-PCS | Mod: HCNC,CPTII,S$GLB, | Performed by: OPHTHALMOLOGY

## 2022-12-08 PROCEDURE — 99999 PR PBB SHADOW E&M-EST. PATIENT-LVL II: ICD-10-PCS | Mod: PBBFAC,HCNC,, | Performed by: OPHTHALMOLOGY

## 2022-12-08 PROCEDURE — 1160F PR REVIEW ALL MEDS BY PRESCRIBER/CLIN PHARMACIST DOCUMENTED: ICD-10-PCS | Mod: HCNC,CPTII,S$GLB, | Performed by: OPHTHALMOLOGY

## 2022-12-08 PROCEDURE — 91312 COVID-19, MRNA, LNP-S, BIVALENT BOOSTER, PF, 30 MCG/0.3 ML DOSE: CPT | Mod: PBBFAC | Performed by: INTERNAL MEDICINE

## 2022-12-08 PROCEDURE — 99024 POSTOP FOLLOW-UP VISIT: CPT | Mod: HCNC,S$GLB,, | Performed by: OPHTHALMOLOGY

## 2022-12-08 NOTE — PROGRESS NOTES
HPI    Grace Navarro is a/an 76 y.o. female here for 6 week post op.  Date of procedure: 11/2/2022  Procedure: tarsal stripping and blepharoplasty  Oral antibiotics: none   Eye meds: OTC ats tid    Pt c/o of itching and feels like left side does not look right    Last edited by Graciela Amin on 12/8/2022  1:24 PM.            Assessment /Plan     For exam results, see Encounter Report.    Post-operative state    Senile ectropion of right lower eyelid    Dermatochalasis of both upper eyelids    Open angle with borderline findings and high glaucoma risk in both eyes    Dry eyes      Patient doing well! Post-operative instructions reviewed. All questions answered. Return in 6-8 weeks sooner any worsening of vision/symptoms or any concerns.    Recommend genteal adi qhs ou for dryness.     Patient bothered by left upper eyelid hypertrophic scar.     R/B/A d/w patient prior to obtaining informed consent.     Procedure note:  0.01 ml of kenalog 40 mg/ml injected to left upper eyelid hypertrophic scar.     There were no complications. Patient tolerated procedure well.

## 2022-12-09 ENCOUNTER — IMMUNIZATION (OUTPATIENT)
Dept: PHARMACY | Facility: CLINIC | Age: 77
End: 2022-12-09
Payer: MEDICARE

## 2023-01-05 ENCOUNTER — PATIENT MESSAGE (OUTPATIENT)
Dept: PRIMARY CARE CLINIC | Facility: CLINIC | Age: 78
End: 2023-01-05
Payer: MEDICARE

## 2023-01-12 ENCOUNTER — PATIENT MESSAGE (OUTPATIENT)
Dept: OPHTHALMOLOGY | Facility: CLINIC | Age: 78
End: 2023-01-12
Payer: MEDICARE

## 2023-01-12 ENCOUNTER — PATIENT MESSAGE (OUTPATIENT)
Dept: ADMINISTRATIVE | Facility: OTHER | Age: 78
End: 2023-01-12
Payer: MEDICARE

## 2023-01-19 ENCOUNTER — PATIENT MESSAGE (OUTPATIENT)
Dept: PRIMARY CARE CLINIC | Facility: CLINIC | Age: 78
End: 2023-01-19
Payer: MEDICARE

## 2023-01-24 ENCOUNTER — DOCUMENTATION ONLY (OUTPATIENT)
Dept: PRIMARY CARE CLINIC | Facility: CLINIC | Age: 78
End: 2023-01-24
Payer: MEDICARE

## 2023-01-24 ENCOUNTER — PATIENT MESSAGE (OUTPATIENT)
Dept: PRIMARY CARE CLINIC | Facility: CLINIC | Age: 78
End: 2023-01-24
Payer: MEDICARE

## 2023-01-24 NOTE — PROGRESS NOTES
"Individual Psychotherapy (LCSW/PhD)  Gracefelice Ontiveroskayli,  1/24/2023    Site:  [facility]         Therapeutic Intervention: Met with patient for individual psychotherapy.    Chief complaint/reason for encounter: depression     Interval history and content of current session: Pt reports "good" mood and states they feel their mood has improved overall. Pt recently spent time with heir daughter and son-in -law. Pt feels that this time has helped them "feel better". SW supported pt in processing feelings regarding ex- and his role in their marriage. Pt feels like they have to "protect" daughter from knowing "the truth" about their father's behavior. SW and pt discussed concept of "gas-lighting"     SW and pt discussed importance of pt expressing needs and feelings to daughter and supportive others.     Treatment plan:  Target symptoms: depression  Why chosen therapy is appropriate versus another modality: relevant to diagnosis, evidence based practice  Outcome monitoring methods: self-report, checklist/rating scale  Therapeutic intervention type: supportive psychotherapy    Risk parameters:  Patient reports no suicidal ideation  Patient reports no homicidal ideation  Patient reports no self-injurious behavior  Patient reports no violent behavior    Verbal deficits: None    Patient's response to intervention:  The patient's response to intervention is accepting.    Progress toward goals and other mental status changes:  The patient's progress toward goals is good.    Diagnosis:   No diagnosis found.    Plan: Pt plans to continue individual psychotherapy    Return to clinic: as needed    Length of Service (minutes): 30      "

## 2023-01-24 NOTE — PROGRESS NOTES
HPI    DLS: 5/19/2022    Pt here for HVF review/OCT;  Pt states no eye pain but does have some discomfort. Pt states vision does   seem to be a little cloudy to the OS but sometimes both eyes.     Meds:  Systane Ultra TID OU  Systane Gel QHS OU    1. Glaucoma susp ect of both eyes   2. 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 Pseudophakia, both eyes   4. Senile Ectropian RLL     Last edited by Bessy Meza on 1/26/2023  3:32 PM.            Assessment /Plan     For exam results, see Encounter Report.    Open angle with borderline findings and high glaucoma risk in both eyes    Large physiologic cupping of optic disc    Senile ectropion of right lower eyelid    Dermatochalasis of both upper eyelids    Dry eyes    Pseudophakia of both eyes        Last visit in glaucoma clinic 11/18/2019  - has seen jaguar and guillmet since       1.    Glaucoma (type and duration)    Suspect - low tension glaucoma suspect (suspicious ON.s)    folowed by [facility] for years as a suspect    First HVF   11/2019    First photos   3/2016    Treatment / Drops started   none           Family history    ???        Glaucoma meds   None         H/O adverse rxn to glaucoma drops    None         LASERS    None         GLAUCOMA SURGERIES    None        OTHER EYE SURGERIES    none        CDR    0.8 w/myelinated fibers  /0.8        Tbase    12-16        Tmax    16/16         Ttarget   ???            HVF  3 test  2019 to  2023  -  Non sp  od // non sp  Os(( ? If associated with the myelinated  NFL od)         Gonio   +3-4 ou         CCT    ???        OCT    3 test 2019 to 2023 - RNFL - Dec   od // wnl  os        Disc photos     5/19/2022     - Ttoday   12/13  - Test done today    IOP/ HVF / OCT       2. . Myelinated NFL OD    3. H/o eyelid surgery in 2005   Dr. harris for ptosis / dermatochalasis   Pt is interested in a re-do        4. PC IOL OU    OD - 9/21/2016 - PCB00 21.5 - [facility]   OS - 8/3/2016 - PCB00 22.0 - [facility]     5. S/P eyelid  surgery    [facility] 11/2/2022 - tarsal stripping and blepharoplasty     Plan  Get records from Dr. Khan - signed release  - was monitored with VF's photos ect. / Eventually was told she did NOT have glaucoma and no further testing was done - old VF's ect - may have a defect from the medullated NFL    Try +1.50 readers for [employer] work - her +2.50 are now too strong    Monitor as a suspect - normal tension suspect - very suspicious ON/s   Hold off on starting treatment at this time -     - the VF changes od may ne 2/2 myelinated NFL     F/U 9 months - [place] of [place] - going home for one of her cousins birthday. - check gonio / IOP and CCT next visit

## 2023-01-26 ENCOUNTER — CLINICAL SUPPORT (OUTPATIENT)
Dept: OPHTHALMOLOGY | Facility: CLINIC | Age: 78
End: 2023-01-26
Payer: MEDICARE

## 2023-01-26 ENCOUNTER — OFFICE VISIT (OUTPATIENT)
Dept: OPHTHALMOLOGY | Facility: CLINIC | Age: 78
End: 2023-01-26
Payer: MEDICARE

## 2023-01-26 DIAGNOSIS — H40.023 OPEN ANGLE WITH BORDERLINE FINDINGS AND HIGH GLAUCOMA RISK IN BOTH EYES: Primary | ICD-10-CM

## 2023-01-26 DIAGNOSIS — H47.239 LARGE PHYSIOLOGIC CUPPING OF OPTIC DISC: ICD-10-CM

## 2023-01-26 DIAGNOSIS — H02.834 DERMATOCHALASIS OF BOTH UPPER EYELIDS: ICD-10-CM

## 2023-01-26 DIAGNOSIS — Z96.1 PSEUDOPHAKIA OF BOTH EYES: ICD-10-CM

## 2023-01-26 DIAGNOSIS — H04.123 DRY EYES: ICD-10-CM

## 2023-01-26 DIAGNOSIS — H02.831 DERMATOCHALASIS OF BOTH UPPER EYELIDS: ICD-10-CM

## 2023-01-26 DIAGNOSIS — H40.023 OPEN ANGLE WITH BORDERLINE FINDINGS AND HIGH GLAUCOMA RISK IN BOTH EYES: ICD-10-CM

## 2023-01-26 DIAGNOSIS — H02.132 SENILE ECTROPION OF RIGHT LOWER EYELID: ICD-10-CM

## 2023-01-26 PROCEDURE — 3288F PR FALLS RISK ASSESSMENT DOCUMENTED: ICD-10-PCS | Mod: HCNC,CPTII,S$GLB, | Performed by: OPHTHALMOLOGY

## 2023-01-26 PROCEDURE — 1101F PR PT FALLS ASSESS DOC 0-1 FALLS W/OUT INJ PAST YR: ICD-10-PCS | Mod: HCNC,CPTII,S$GLB, | Performed by: OPHTHALMOLOGY

## 2023-01-26 PROCEDURE — 1101F PT FALLS ASSESS-DOCD LE1/YR: CPT | Mod: HCNC,CPTII,S$GLB, | Performed by: OPHTHALMOLOGY

## 2023-01-26 PROCEDURE — 92014 COMPRE OPH EXAM EST PT 1/>: CPT | Mod: 24,HCNC,S$GLB, | Performed by: OPHTHALMOLOGY

## 2023-01-26 PROCEDURE — 1159F PR MEDICATION LIST DOCUMENTED IN MEDICAL RECORD: ICD-10-PCS | Mod: HCNC,CPTII,S$GLB, | Performed by: OPHTHALMOLOGY

## 2023-01-26 PROCEDURE — 1160F RVW MEDS BY RX/DR IN RCRD: CPT | Mod: HCNC,CPTII,S$GLB, | Performed by: OPHTHALMOLOGY

## 2023-01-26 PROCEDURE — 3288F FALL RISK ASSESSMENT DOCD: CPT | Mod: HCNC,CPTII,S$GLB, | Performed by: OPHTHALMOLOGY

## 2023-01-26 PROCEDURE — 99999 PR PBB SHADOW E&M-EST. PATIENT-LVL III: CPT | Mod: PBBFAC,HCNC,, | Performed by: OPHTHALMOLOGY

## 2023-01-26 PROCEDURE — 1160F PR REVIEW ALL MEDS BY PRESCRIBER/CLIN PHARMACIST DOCUMENTED: ICD-10-PCS | Mod: HCNC,CPTII,S$GLB, | Performed by: OPHTHALMOLOGY

## 2023-01-26 PROCEDURE — 1159F MED LIST DOCD IN RCRD: CPT | Mod: HCNC,CPTII,S$GLB, | Performed by: OPHTHALMOLOGY

## 2023-01-26 PROCEDURE — 99999 PR PBB SHADOW E&M-EST. PATIENT-LVL III: ICD-10-PCS | Mod: PBBFAC,HCNC,, | Performed by: OPHTHALMOLOGY

## 2023-01-26 PROCEDURE — 1126F PR PAIN SEVERITY QUANTIFIED, NO PAIN PRESENT: ICD-10-PCS | Mod: HCNC,CPTII,S$GLB, | Performed by: OPHTHALMOLOGY

## 2023-01-26 PROCEDURE — 1126F AMNT PAIN NOTED NONE PRSNT: CPT | Mod: HCNC,CPTII,S$GLB, | Performed by: OPHTHALMOLOGY

## 2023-01-26 PROCEDURE — 92014 PR EYE EXAM, EST PATIENT,COMPREHESV: ICD-10-PCS | Mod: 24,HCNC,S$GLB, | Performed by: OPHTHALMOLOGY

## 2023-02-05 ENCOUNTER — PATIENT MESSAGE (OUTPATIENT)
Dept: OPHTHALMOLOGY | Facility: CLINIC | Age: 78
End: 2023-02-05
Payer: MEDICARE

## 2023-02-06 ENCOUNTER — PROCEDURE VISIT (OUTPATIENT)
Dept: OPHTHALMOLOGY | Facility: CLINIC | Age: 78
End: 2023-02-06
Payer: MEDICARE

## 2023-02-06 DIAGNOSIS — H02.132 SENILE ECTROPION OF RIGHT LOWER EYELID: ICD-10-CM

## 2023-02-06 DIAGNOSIS — Z98.890 POST-OPERATIVE STATE: Primary | ICD-10-CM

## 2023-02-06 DIAGNOSIS — H04.123 DRY EYES: ICD-10-CM

## 2023-02-06 PROCEDURE — 99024 POSTOP FOLLOW-UP VISIT: CPT | Mod: HCNC,S$GLB,, | Performed by: OPHTHALMOLOGY

## 2023-02-06 PROCEDURE — 99024 PR POST-OP FOLLOW-UP VISIT: ICD-10-PCS | Mod: HCNC,S$GLB,, | Performed by: OPHTHALMOLOGY

## 2023-02-06 RX ORDER — CYCLOSPORINE 0.5 MG/ML
1 EMULSION OPHTHALMIC 2 TIMES DAILY
Qty: 60 EACH | Refills: 11 | Status: SHIPPED | OUTPATIENT
Start: 2023-02-06 | End: 2024-02-26

## 2023-02-06 RX ORDER — LIFITEGRAST 50 MG/ML
1 SOLUTION/ DROPS OPHTHALMIC 2 TIMES DAILY
Qty: 4 ML | Refills: 2 | Status: SHIPPED | OUTPATIENT
Start: 2023-02-06 | End: 2023-05-07

## 2023-02-06 NOTE — PROGRESS NOTES
HPI    Grace Navarro is a/an 76 y.o. female here for post op.  Date of procedure: 11/2/2022  Procedure: tarsal stripping and blepharoplasty  Oral antibiotics: none   Eye meds: OTC ats tid    Pt doing well no complaints     Last edited by Graciela Amin on 2/6/2023  1:16 PM.            Assessment /Plan     For exam results, see Encounter Report.    Post-operative state    Dry eyes  -     cycloSPORINE (RESTASIS) 0.05 % ophthalmic emulsion; Place 1 drop into both eyes 2 (two) times daily.  Dispense: 60 each; Refill: 11  -     lifitegrast (XIIDRA) 5 % Dpet; Apply 1 drop to eye 2 (two) times a day.  Dispense: 4 mL; Refill: 2    Senile ectropion of right lower eyelid      The patient is a pleasant 77-year-old female here for post-operative evaluation.  She continues to have some tearing on the right side.  She continues to have paresthesias on the left side of her scalp.      On exam, the patient has bilateral blepharitis.  She has a medial left upper eyelid hypertrophic scar.  She has a well-healing left brow scar.  She has bilateral 1+ inferior exposure keratopathy.      These findings were discussed with the patient.    Discussed repeat right canthoplasty versus topical therapy with Restasis and or Xiidra drops.  The patient would like to try Restasis and or Xiidra before any repeat surgery.      Rx given.      Return as needed

## 2023-02-07 ENCOUNTER — PATIENT MESSAGE (OUTPATIENT)
Dept: PRIMARY CARE CLINIC | Facility: CLINIC | Age: 78
End: 2023-02-07
Payer: MEDICARE

## 2023-02-07 DIAGNOSIS — Z00.00 ENCOUNTER FOR MEDICARE ANNUAL WELLNESS EXAM: ICD-10-CM

## 2023-02-09 ENCOUNTER — PATIENT MESSAGE (OUTPATIENT)
Dept: OPHTHALMOLOGY | Facility: CLINIC | Age: 78
End: 2023-02-09
Payer: MEDICARE

## 2023-02-09 ENCOUNTER — DOCUMENTATION ONLY (OUTPATIENT)
Dept: PRIMARY CARE CLINIC | Facility: CLINIC | Age: 78
End: 2023-02-09
Payer: MEDICARE

## 2023-02-09 DIAGNOSIS — Z00.00 ENCOUNTER FOR MEDICARE ANNUAL WELLNESS EXAM: ICD-10-CM

## 2023-02-09 NOTE — PROGRESS NOTES
"Individual Psychotherapy (LCSW/PhD)  Grace Navarro,  2/9/2023    Site:  [facility]         Therapeutic Intervention: Met with patient for individual psychotherapy.    Chief complaint/reason for encounter: depression     Interval history and content of current session: Pt reports having nightmares recently about ex-. SW and pt discussed recent discussion of pt's past life and impact of trauma pt's coping skills and recent nightmare.    Pt expressed feeling more balanced and states they have been trying to practice gratitude and "counting their blessings". SW and pt discussed pt's self-care. Pt reports eating healthy I.e. cooking for their self, exercising daily. Pt has been looking into volunteering to increase social support.     Treatment plan:  Target symptoms: depression  Why chosen therapy is appropriate versus another modality: relevant to diagnosis, evidence based practice  Outcome monitoring methods: self-report, checklist/rating scale  Therapeutic intervention type: supportive psychotherapy    Risk parameters:  Patient reports no suicidal ideation  Patient reports no homicidal ideation  Patient reports no self-injurious behavior  Patient reports no violent behavior    Verbal deficits: None    Patient's response to intervention:  The patient's response to intervention is accepting.    Progress toward goals and other mental status changes:  The patient's progress toward goals is good.    Diagnosis:   No diagnosis found.    Plan: Pt plans to continue individual psychotherapy    Return to clinic: 2 weeks    Length of Service (minutes): 30      "

## 2023-02-22 ENCOUNTER — PATIENT MESSAGE (OUTPATIENT)
Dept: PRIMARY CARE CLINIC | Facility: CLINIC | Age: 78
End: 2023-02-22
Payer: MEDICARE

## 2023-02-26 RX ORDER — TRIAMCINOLONE ACETONIDE 40 MG/ML
40 INJECTION, SUSPENSION INTRA-ARTICULAR; INTRAMUSCULAR
Status: DISCONTINUED | OUTPATIENT
Start: 2022-12-08 | End: 2023-07-06

## 2023-02-27 ENCOUNTER — TELEPHONE (OUTPATIENT)
Dept: PRIMARY CARE CLINIC | Facility: CLINIC | Age: 78
End: 2023-02-27
Payer: MEDICARE

## 2023-02-27 NOTE — TELEPHONE ENCOUNTER
----- Message from Jasmin Pelletier PharmD sent at 2/27/2023  2:49 PM CST -----  Regarding: Patient requesting discharge from digital medicine  [facility] Dr. Dean,    Your patient Grace Navarro was enrolled in [facility] Digital Medicine. Unfortunately, she has requested discharge from Digital Medicine monitoring and we wanted to make you aware.     Thanks for your support of Digital Medicine programs! Please let me know if you any questions or concerns.    Sincerely,   Jasmin Pelletier, PharmD  Digital Medicine Clinical Pharmacist  [phone]

## 2023-03-06 ENCOUNTER — PATIENT MESSAGE (OUTPATIENT)
Dept: PRIMARY CARE CLINIC | Facility: CLINIC | Age: 78
End: 2023-03-06
Payer: MEDICARE

## 2023-03-08 ENCOUNTER — OFFICE VISIT (OUTPATIENT)
Dept: DERMATOLOGY | Facility: CLINIC | Age: 78
End: 2023-03-08
Payer: MEDICARE

## 2023-03-08 VITALS — WEIGHT: 145 LBS | BODY MASS INDEX: 24.89 KG/M2

## 2023-03-08 DIAGNOSIS — Z12.83 SKIN EXAM, SCREENING FOR CANCER: ICD-10-CM

## 2023-03-08 DIAGNOSIS — L82.1 SEBORRHEIC KERATOSES: ICD-10-CM

## 2023-03-08 DIAGNOSIS — L57.0 MULTIPLE ACTINIC KERATOSES: ICD-10-CM

## 2023-03-08 DIAGNOSIS — L81.4 LENTIGINES: ICD-10-CM

## 2023-03-08 DIAGNOSIS — D22.9 NEVUS OF MULTIPLE SITES: ICD-10-CM

## 2023-03-08 DIAGNOSIS — B07.8 COMMON WART: Primary | ICD-10-CM

## 2023-03-08 PROCEDURE — 1126F AMNT PAIN NOTED NONE PRSNT: CPT | Mod: HCNC,CPTII,S$GLB, | Performed by: DERMATOLOGY

## 2023-03-08 PROCEDURE — 99999 PR PBB SHADOW E&M-EST. PATIENT-LVL III: ICD-10-PCS | Mod: PBBFAC,HCNC,, | Performed by: DERMATOLOGY

## 2023-03-08 PROCEDURE — 99999 PR PBB SHADOW E&M-EST. PATIENT-LVL III: CPT | Mod: PBBFAC,HCNC,, | Performed by: DERMATOLOGY

## 2023-03-08 PROCEDURE — 1160F RVW MEDS BY RX/DR IN RCRD: CPT | Mod: HCNC,CPTII,S$GLB, | Performed by: DERMATOLOGY

## 2023-03-08 PROCEDURE — 1101F PR PT FALLS ASSESS DOC 0-1 FALLS W/OUT INJ PAST YR: ICD-10-PCS | Mod: HCNC,CPTII,S$GLB, | Performed by: DERMATOLOGY

## 2023-03-08 PROCEDURE — 1101F PT FALLS ASSESS-DOCD LE1/YR: CPT | Mod: HCNC,CPTII,S$GLB, | Performed by: DERMATOLOGY

## 2023-03-08 PROCEDURE — 3288F FALL RISK ASSESSMENT DOCD: CPT | Mod: HCNC,CPTII,S$GLB, | Performed by: DERMATOLOGY

## 2023-03-08 PROCEDURE — 99204 PR OFFICE/OUTPT VISIT, NEW, LEVL IV, 45-59 MIN: ICD-10-PCS | Mod: HCNC,25,S$GLB, | Performed by: DERMATOLOGY

## 2023-03-08 PROCEDURE — 1126F PR PAIN SEVERITY QUANTIFIED, NO PAIN PRESENT: ICD-10-PCS | Mod: HCNC,CPTII,S$GLB, | Performed by: DERMATOLOGY

## 2023-03-08 PROCEDURE — 99204 OFFICE O/P NEW MOD 45 MIN: CPT | Mod: HCNC,25,S$GLB, | Performed by: DERMATOLOGY

## 2023-03-08 PROCEDURE — 17110 PR DESTRUCTION BENIGN LESIONS UP TO 14: ICD-10-PCS | Mod: HCNC,S$GLB,, | Performed by: DERMATOLOGY

## 2023-03-08 PROCEDURE — 3288F PR FALLS RISK ASSESSMENT DOCUMENTED: ICD-10-PCS | Mod: HCNC,CPTII,S$GLB, | Performed by: DERMATOLOGY

## 2023-03-08 PROCEDURE — 17110 DESTRUCTION B9 LES UP TO 14: CPT | Mod: HCNC,S$GLB,, | Performed by: DERMATOLOGY

## 2023-03-08 PROCEDURE — 1160F PR REVIEW ALL MEDS BY PRESCRIBER/CLIN PHARMACIST DOCUMENTED: ICD-10-PCS | Mod: HCNC,CPTII,S$GLB, | Performed by: DERMATOLOGY

## 2023-03-08 PROCEDURE — 1159F MED LIST DOCD IN RCRD: CPT | Mod: HCNC,CPTII,S$GLB, | Performed by: DERMATOLOGY

## 2023-03-08 PROCEDURE — 1159F PR MEDICATION LIST DOCUMENTED IN MEDICAL RECORD: ICD-10-PCS | Mod: HCNC,CPTII,S$GLB, | Performed by: DERMATOLOGY

## 2023-03-08 RX ORDER — IMIQUIMOD 12.5 MG/.25G
CREAM TOPICAL
Qty: 4 PACKET | Refills: 3 | Status: SHIPPED | OUTPATIENT
Start: 2023-03-08 | End: 2023-08-25

## 2023-03-08 RX ORDER — FLUOROURACIL 50 MG/G
CREAM TOPICAL
Qty: 40 G | Refills: 3 | Status: SHIPPED | OUTPATIENT
Start: 2023-03-08

## 2023-03-08 NOTE — PROGRESS NOTES
Subjective:       Patient ID:  Grace Navarro is a 77 y.o. female who presents for   Chief Complaint   Patient presents with    Skin Check    Lesion     Right finger ,      New spot on right index finger.  Would like skin check.     Lesion - Initial  Affected locations: face, left arm, right arm, chest, torso, back, abdomen, left upper leg and right lower leg  Signs / symptoms: asymptomatic  Aggravated by: nothing    Review of Systems   Constitutional:  Negative for fever, chills, weight loss, weight gain, fatigue and malaise.   Skin:  Negative for daily sunscreen use, activity-related sunscreen use and wears hat.   Hematologic/Lymphatic: Bruises/bleeds easily.      Objective:    Physical Exam   Constitutional: She appears well-developed and well-nourished. No distress.   Neurological: She is alert and oriented to person, place, and time. She is not disoriented.   Psychiatric: She has a normal mood and affect.   Skin:   Areas Examined (abnormalities noted in diagram):   Head / Face Inspection Performed  Neck Inspection Performed  Chest / Axilla Inspection Performed  Back Inspection Performed  RUE Inspected  LUE Inspection Performed                     Diagram Legend     Erythematous scaling macule/papule c/w actinic keratosis       Vascular papule c/w angioma      Pigmented verrucoid papule/plaque c/w seborrheic keratosis      Yellow umbilicated papule c/w sebaceous hyperplasia      Irregularly shaped tan macule c/w lentigo     1-2 mm smooth white papules consistent with Milia      Movable subcutaneous cyst with punctum c/w epidermal inclusion cyst      Subcutaneous movable cyst c/w pilar cyst      Firm pink to brown papule c/w dermatofibroma      Pedunculated fleshy papule(s) c/w skin tag(s)      Evenly pigmented macule c/w junctional nevus     Mildly variegated pigmented, slightly irregular-bordered macule c/w mildly atypical nevus      Flesh colored to evenly pigmented papule c/w intradermal nevus        "Pink pearly papule/plaque c/w basal cell carcinoma      Erythematous hyperkeratotic cursted plaque c/w SCC      Surgical scar with no sign of skin cancer recurrence      Open and closed comedones      Inflammatory papules and pustules      Verrucoid papule consistent consistent with wart     Erythematous eczematous patches and plaques     Dystrophic onycholytic nail with subungual debris c/w onychomycosis     Umbilicated papule    Erythematous-base heme-crusted tan verrucoid plaque consistent with inflamed seborrheic keratosis     Erythematous Silvery Scaling Plaque c/w Psoriasis     See annotation      Assessment / Plan:        Common wart  Cryosurgery procedure note:    Verbal consent from the patient is obtained including, but not limited to, risk of hypopigmentation/hyperpigmentation, scar, recurrence of lesion. Liquid nitrogen cryosurgery is applied to 1 lesion to produce a freeze injury.is instructed to call if lesion does not completely resolve.    -     imiquimod (ALDARA) 5 % cream;  hs on wart  Dispense: 4 packet; Refill: 3    Multiple actinic keratoses  -     fluorouraciL (EFUDEX) 5 % cream; Use hs for 2 weeks for back of hands  Dispense: 40 g; Refill: 3    Seborrheic keratoses  Seborrheic keratosis scattered, told benign no treatment needed.  Brochure provided.      Nevus of multiple sites  The "ABCD" rules to observe pigmented lesions were reviewed.  Brochure provided      Lentigines  The "ABCD" rules to observe pigmented lesions were reviewed.      . No lesions suspicious for malignancy noted.    Recommend daily sun protection/avoidance and use of at least SPF 30, broad spectrum sunscreen (OTC drug).                Follow up in about 1 year (around 3/8/2024).  "

## 2023-03-09 ENCOUNTER — PATIENT MESSAGE (OUTPATIENT)
Dept: DERMATOLOGY | Facility: CLINIC | Age: 78
End: 2023-03-09
Payer: MEDICARE

## 2023-03-13 ENCOUNTER — PATIENT MESSAGE (OUTPATIENT)
Dept: PRIMARY CARE CLINIC | Facility: CLINIC | Age: 78
End: 2023-03-13
Payer: MEDICARE

## 2023-03-16 ENCOUNTER — CLINICAL SUPPORT (OUTPATIENT)
Dept: PRIMARY CARE CLINIC | Facility: CLINIC | Age: 78
End: 2023-03-16
Payer: MEDICARE

## 2023-03-16 DIAGNOSIS — F33.1 MODERATE EPISODE OF RECURRENT MAJOR DEPRESSIVE DISORDER: Primary | ICD-10-CM

## 2023-03-16 NOTE — PROGRESS NOTES
"Individual Psychotherapy (LCSW/PhD)  Grace Navarro,  3/16/2023    Site:  [place]         Therapeutic Intervention: Met with patient for individual psychotherapy.    Chief complaint/reason for encounter: depression     Interval history and content of current session: Pt  states they feel hopeful and are looking forward to the future. Pt is thinking of moving into daughters home. Pt and daughter have been discussing plans to move to [place].    LCSW supported pt in processing feelings of anxiety and sadness at move. Pt is looking forward to  moving to near daughter and feels kit will help them feel more "secure".    Pt will plan on selling home after move and feels ready to move on. LCSW and pt reviewed pt's progress including pt's reduction in self-criticism. LCSW and pt discussed pt's alcohol use and discussed pt's drinking. LCSW and pt discussed ways to reduce drinking to prevent poor health outcomes.       Treatment plan:  Target symptoms: depression  Why chosen therapy is appropriate versus another modality: relevant to diagnosis, evidence based practice  Outcome monitoring methods: self-report, checklist/rating scale  Therapeutic intervention type: supportive psychotherapy    Risk parameters:  Patient reports no suicidal ideation  Patient reports no homicidal ideation  Patient reports no self-injurious behavior  Patient reports no violent behavior    Verbal deficits: None    Patient's response to intervention:  The patient's response to intervention is accepting.    Progress toward goals and other mental status changes:  The patient's progress toward goals is good.    Diagnosis:   No diagnosis found.    Plan: Pt plans to continue individual psychotherapy    Return to clinic: 2 weeks    Length of Service (minutes): 30      "

## 2023-03-23 ENCOUNTER — PATIENT MESSAGE (OUTPATIENT)
Dept: PRIMARY CARE CLINIC | Facility: CLINIC | Age: 78
End: 2023-03-23
Payer: MEDICARE

## 2023-03-29 ENCOUNTER — OFFICE VISIT (OUTPATIENT)
Dept: PRIMARY CARE CLINIC | Facility: CLINIC | Age: 78
End: 2023-03-29
Payer: MEDICARE

## 2023-03-29 ENCOUNTER — LAB VISIT (OUTPATIENT)
Dept: LAB | Facility: HOSPITAL | Age: 78
End: 2023-03-29
Attending: INTERNAL MEDICINE
Payer: MEDICARE

## 2023-03-29 VITALS
TEMPERATURE: 98 F | HEART RATE: 68 BPM | SYSTOLIC BLOOD PRESSURE: 136 MMHG | BODY MASS INDEX: 25.36 KG/M2 | OXYGEN SATURATION: 98 % | DIASTOLIC BLOOD PRESSURE: 72 MMHG | WEIGHT: 148.56 LBS | HEIGHT: 64 IN

## 2023-03-29 DIAGNOSIS — D69.2 SENILE PURPURA: ICD-10-CM

## 2023-03-29 DIAGNOSIS — M54.9 MID BACK PAIN: ICD-10-CM

## 2023-03-29 DIAGNOSIS — F41.1 GAD (GENERALIZED ANXIETY DISORDER): ICD-10-CM

## 2023-03-29 DIAGNOSIS — F33.0 MILD EPISODE OF RECURRENT MAJOR DEPRESSIVE DISORDER: Primary | ICD-10-CM

## 2023-03-29 DIAGNOSIS — E78.5 HYPERLIPIDEMIA, UNSPECIFIED HYPERLIPIDEMIA TYPE: ICD-10-CM

## 2023-03-29 DIAGNOSIS — I10 BENIGN ESSENTIAL HYPERTENSION: ICD-10-CM

## 2023-03-29 LAB
BACTERIA #/AREA URNS AUTO: NORMAL /HPF
BILIRUB UR QL STRIP: NEGATIVE
CLARITY UR REFRACT.AUTO: CLEAR
COLOR UR AUTO: NORMAL
GLUCOSE UR QL STRIP: NEGATIVE
HGB UR QL STRIP: NEGATIVE
KETONES UR QL STRIP: NEGATIVE
LEUKOCYTE ESTERASE UR QL STRIP: NEGATIVE
MICROSCOPIC COMMENT: NORMAL
NITRITE UR QL STRIP: NEGATIVE
PH UR STRIP: 7 [PH] (ref 5–8)
PROT UR QL STRIP: NEGATIVE
SP GR UR STRIP: 1.01 (ref 1–1.03)
URN SPEC COLLECT METH UR: NORMAL

## 2023-03-29 PROCEDURE — 1101F PR PT FALLS ASSESS DOC 0-1 FALLS W/OUT INJ PAST YR: ICD-10-PCS | Mod: HCNC,CPTII,S$GLB, | Performed by: INTERNAL MEDICINE

## 2023-03-29 PROCEDURE — 99999 PR PBB SHADOW E&M-EST. PATIENT-LVL V: ICD-10-PCS | Mod: PBBFAC,HCNC,, | Performed by: INTERNAL MEDICINE

## 2023-03-29 PROCEDURE — 81001 URINALYSIS AUTO W/SCOPE: CPT | Mod: HCNC | Performed by: INTERNAL MEDICINE

## 2023-03-29 PROCEDURE — 99214 PR OFFICE/OUTPT VISIT, EST, LEVL IV, 30-39 MIN: ICD-10-PCS | Mod: HCNC,S$GLB,, | Performed by: INTERNAL MEDICINE

## 2023-03-29 PROCEDURE — 3075F SYST BP GE 130 - 139MM HG: CPT | Mod: HCNC,CPTII,S$GLB, | Performed by: INTERNAL MEDICINE

## 2023-03-29 PROCEDURE — 1126F AMNT PAIN NOTED NONE PRSNT: CPT | Mod: HCNC,CPTII,S$GLB, | Performed by: INTERNAL MEDICINE

## 2023-03-29 PROCEDURE — 1101F PT FALLS ASSESS-DOCD LE1/YR: CPT | Mod: HCNC,CPTII,S$GLB, | Performed by: INTERNAL MEDICINE

## 2023-03-29 PROCEDURE — 1160F PR REVIEW ALL MEDS BY PRESCRIBER/CLIN PHARMACIST DOCUMENTED: ICD-10-PCS | Mod: HCNC,CPTII,S$GLB, | Performed by: INTERNAL MEDICINE

## 2023-03-29 PROCEDURE — 1160F RVW MEDS BY RX/DR IN RCRD: CPT | Mod: HCNC,CPTII,S$GLB, | Performed by: INTERNAL MEDICINE

## 2023-03-29 PROCEDURE — 3078F PR MOST RECENT DIASTOLIC BLOOD PRESSURE < 80 MM HG: ICD-10-PCS | Mod: HCNC,CPTII,S$GLB, | Performed by: INTERNAL MEDICINE

## 2023-03-29 PROCEDURE — 1126F PR PAIN SEVERITY QUANTIFIED, NO PAIN PRESENT: ICD-10-PCS | Mod: HCNC,CPTII,S$GLB, | Performed by: INTERNAL MEDICINE

## 2023-03-29 PROCEDURE — 3075F PR MOST RECENT SYSTOLIC BLOOD PRESS GE 130-139MM HG: ICD-10-PCS | Mod: HCNC,CPTII,S$GLB, | Performed by: INTERNAL MEDICINE

## 2023-03-29 PROCEDURE — 99999 PR PBB SHADOW E&M-EST. PATIENT-LVL V: CPT | Mod: PBBFAC,HCNC,, | Performed by: INTERNAL MEDICINE

## 2023-03-29 PROCEDURE — 3078F DIAST BP <80 MM HG: CPT | Mod: HCNC,CPTII,S$GLB, | Performed by: INTERNAL MEDICINE

## 2023-03-29 PROCEDURE — 99214 OFFICE O/P EST MOD 30 MIN: CPT | Mod: HCNC,S$GLB,, | Performed by: INTERNAL MEDICINE

## 2023-03-29 PROCEDURE — 1159F MED LIST DOCD IN RCRD: CPT | Mod: HCNC,CPTII,S$GLB, | Performed by: INTERNAL MEDICINE

## 2023-03-29 PROCEDURE — 3288F PR FALLS RISK ASSESSMENT DOCUMENTED: ICD-10-PCS | Mod: HCNC,CPTII,S$GLB, | Performed by: INTERNAL MEDICINE

## 2023-03-29 PROCEDURE — 1159F PR MEDICATION LIST DOCUMENTED IN MEDICAL RECORD: ICD-10-PCS | Mod: HCNC,CPTII,S$GLB, | Performed by: INTERNAL MEDICINE

## 2023-03-29 PROCEDURE — 3288F FALL RISK ASSESSMENT DOCD: CPT | Mod: HCNC,CPTII,S$GLB, | Performed by: INTERNAL MEDICINE

## 2023-03-29 RX ORDER — ESCITALOPRAM OXALATE 5 MG/1
TABLET ORAL
Qty: 90 TABLET | Refills: 3
Start: 2023-03-29 | End: 2023-08-25 | Stop reason: SDUPTHER

## 2023-03-29 NOTE — PROGRESS NOTES
Subjective:       Patient ID: Grace Navarro is a 77 y.o. female.    Chief Complaint: Hypertension    HPI  Walks daily for an hour (3-4 miles). No issues.   No assistance w/ ADLs.   Reports back is doing ok. But has muscle pain in the lower back to the middle of the back henny on the L. No radiation. Using OTC patches or diclofenac gel. Using heating pads help.   XR L spine 3/2022  Lumbar spine two views: There are pedicle screws and fixation rods at L4-L5 with a disc implant and mild anterolisthesis of L4 on L5.  There is mild-moderate DJD and severe dish.  No acute fracture dislocation bone destruction seen.  No trauma seen.    HTN - coreg 12.5mg qam and 25mg qpm, hctz 12.5mg daily.  Checks her BP regularly. Does not want to be back into the digital HTN program. SBP 130s at home.   TTE 1/6/22. Grade I LV DD. Severe LAE and mild ELIO.  Normal EF.    HLD - couldn't tolerate multiple statins. On zetia 10mg daily. Previous calcium score w/ mild plaque burden.    MDD/YOCASTA - lexapro 5mg 1/2 pill nightly. 1 whole pill caused dizziness. Even the 1/2 pill sometimes can cause dizziness early in the morning.   Following w/ LMSW q 2 weeks.  GAD7 3/29/2023 9/26/2022   1. Feeling nervous, anxious, or on edge? 0 3   2. Not being able to stop or control worrying? 0 3   3. Worrying too much about different things? 2 3   4. Trouble relaxing? 0 3   5. Being so restless that it is hard to sit still? 0 0   6. Becoming easily annoyed or irritable? 1 1   7. Feeling afraid as if something awful might happen? 1 0   8. If you checked off any problems, how difficult have these problems made it for you to do your work, take care of things at home, or get along with other people? - 1   YOCASTA-7 Score 4 13     Depression Patient Health Questionnaire 3/29/2023 3/2/2023 9/26/2022 8/4/2022 5/26/2022 2/3/2022 1/30/2019   Over the last two weeks how often have you been bothered by little interest or pleasure in doing things Not at all Several days  Nearly every day Not at all Not at all Not at all Not at all   Over the last two weeks how often have you been bothered by feeling down, depressed or hopeless Several days Several days Nearly every day Several days Several days Not at all Not at all   PHQ-2 Total Score 1 2 6 1 1 0 0   Over the last two weeks how often have you been bothered by trouble falling or staying asleep, or sleeping too much Not at all Several days Several days Several days Not at all - -   Over the last two weeks how often have you been bothered by feeling tired or having little energy Several days Several days Nearly every day Several days Not at all - -   Over the last two weeks how often have you been bothered by a poor appetite or overeating Nearly every day Not at all More than half the days Several days Not at all - -   Over the last two weeks how often have you been bothered by feeling bad about yourself - or that you are a failure or have let yourself or your family down Several days Several days Nearly every day Several days More than half the days - -   Over the last two weeks how often have you been bothered by trouble concentrating on things, such as reading the newspaper or watching television Not at all Not at all Nearly every day Not at all Not at all - -   Over the last two weeks how often have you been bothered by moving or speaking so slowly that other people could have noticed. Or the opposite - being so fidgety or restless that you have been moving around a lot more than usual. Not at all Several days Several days Not at all Not at all - -   Over the last two weeks how often have you been bothered by thoughts that you would be better off dead, or of hurting yourself Not at all Not at all Not at all Not at all Several days - -   If you checked off any problems, how difficult have these problems made it for you to do your work, take care of things at home or get along with other people? - Very difficult - Somewhat difficult  "Somewhat difficult - -   Total Score 6 6 19 5 4 - -   Interpretation Mild Mild Moderately Severe Mild Minimal or None - -     Itching at the top of the L hand and also at the top of the R foot. Using triamcinolone prn and it works well.   Does have atarax 25mg TID prn but she only takes it at night b/c it makes her drowsy.   Easy bruising.     Review of Systems  Comprehensive review of systems otherwise negative. See history/subjective section for more details.    Objective:      Physical Exam    /72   Pulse 68   Temp 98 °F (36.7 °C) (Oral)   Ht 5' 4" (1.626 m)   Wt 67.4 kg (148 lb 9.4 oz)   LMP 06/14/1996   SpO2 98%   BMI 25.51 kg/m²     GEN - A+OX4, teary eyed.  HEENT - PERRL, EOMI, OP clear. MMM.   Neck - No thyromegaly or cervical LAD. No thyroid masses felt.  CV - RRR, no m/r   Chest - CTAB, no wheezing or rhonchi  Abd - S/NT/ND/+BS.   Ext - 2+BDP and radial pulses. No C/C/E.  MSK - pain on palpation of the L CVA area and the R glute. No hip tenderness to palpation. 5/5 BUE and BLE m strength. 2+ DTRs. Normal gait.   Skin - excoriation changes at the top of L hand. Occ small bruises of BUE.     Previous labs reviewed.     Assessment/Plan     Grace was seen today for hypertension.    Diagnoses and all orders for this visit:    Mild episode of recurrent major depressive disorder - cont working w/ LMSW.   -     EScitalopram oxalate (LEXAPRO) 5 MG Tab; Take 1/2 pill nightly.  -     Comprehensive Metabolic Panel; Future    YOCASTA (generalized anxiety disorder)  -     EScitalopram oxalate (LEXAPRO) 5 MG Tab; Take 1/2 pill nightly.  -     Comprehensive Metabolic Panel; Future    Benign essential hypertension - Stable and controlled. Continue current medications.  -     Comprehensive Metabolic Panel; Future    Hyperlipidemia, unspecified hyperlipidemia type - cont zetia.   -     Comprehensive Metabolic Panel; Future  -     Lipid Panel; Future    Senile purpura - reassurance.  -     CBC Auto Differential; " Future    Mid back pain  -     Urinalysis; Future  -     Urinalysis Microscopic; Future  -     Ambulatory referral/consult to Physical/Occupational Therapy; Future    Remind to bring back acp to next visit.    Follow up in about 4 months (around 7/29/2023).      Lisa Dean MD  Department of Internal Medicine - [facility]  7:33 AM

## 2023-03-30 ENCOUNTER — PATIENT MESSAGE (OUTPATIENT)
Dept: PRIMARY CARE CLINIC | Facility: CLINIC | Age: 78
End: 2023-03-30
Payer: MEDICARE

## 2023-04-05 ENCOUNTER — CLINICAL SUPPORT (OUTPATIENT)
Dept: REHABILITATION | Facility: HOSPITAL | Age: 78
End: 2023-04-05
Payer: MEDICARE

## 2023-04-05 DIAGNOSIS — M54.9 MID BACK PAIN: ICD-10-CM

## 2023-04-05 DIAGNOSIS — R26.89 DECREASED FUNCTIONAL MOBILITY: Primary | ICD-10-CM

## 2023-04-05 PROCEDURE — 97110 THERAPEUTIC EXERCISES: CPT | Mod: HCNC,PN | Performed by: PHYSICAL THERAPIST

## 2023-04-05 PROCEDURE — 97162 PT EVAL MOD COMPLEX 30 MIN: CPT | Mod: HCNC,PN | Performed by: PHYSICAL THERAPIST
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
## 2023-04-13 ENCOUNTER — CLINICAL SUPPORT (OUTPATIENT)
Dept: REHABILITATION | Facility: HOSPITAL | Age: 78
End: 2023-04-13
Payer: MEDICARE

## 2023-04-13 ENCOUNTER — PATIENT MESSAGE (OUTPATIENT)
Dept: PRIMARY CARE CLINIC | Facility: CLINIC | Age: 78
End: 2023-04-13

## 2023-04-13 ENCOUNTER — CLINICAL SUPPORT (OUTPATIENT)
Dept: PRIMARY CARE CLINIC | Facility: CLINIC | Age: 78
End: 2023-04-13
Payer: MEDICARE

## 2023-04-13 DIAGNOSIS — R26.89 DECREASED FUNCTIONAL MOBILITY: Primary | ICD-10-CM

## 2023-04-13 DIAGNOSIS — G89.29 CHRONIC MIDLINE LOW BACK PAIN WITHOUT SCIATICA: Primary | ICD-10-CM

## 2023-04-13 DIAGNOSIS — M54.50 CHRONIC MIDLINE LOW BACK PAIN WITHOUT SCIATICA: Primary | ICD-10-CM

## 2023-04-13 DIAGNOSIS — F32.A MODERATELY SEVERE DEPRESSION: Primary | ICD-10-CM

## 2023-04-13 PROCEDURE — 99499 NO LOS: ICD-10-PCS | Mod: HCNC,S$GLB,, | Performed by: SOCIAL WORKER

## 2023-04-13 PROCEDURE — 99499 UNLISTED E&M SERVICE: CPT | Mod: HCNC,S$GLB,, | Performed by: SOCIAL WORKER

## 2023-04-13 PROCEDURE — 97112 NEUROMUSCULAR REEDUCATION: CPT | Mod: HCNC,PN | Performed by: PHYSICAL THERAPIST

## 2023-04-13 PROCEDURE — 97110 THERAPEUTIC EXERCISES: CPT | Mod: HCNC,PN | Performed by: PHYSICAL THERAPIST

## 2023-04-13 NOTE — PROGRESS NOTES
"[facility]   Physical Therapy Treatment Note     Name: Grace Ware [facility]  Clinic Number: [id number]    Therapy Diagnosis:   Encounter Diagnosis   Name Primary?    Decreased functional mobility Yes     Physician: Lisa Dean MD    Visit Date: 4/13/2023    Physician Orders: PT Eval and Treat   Medical Diagnosis from Referral: M54.9 (ICD-10-CM) - Mid back pain  Evaluation Date: 4/5/2023  Authorization Period Expiration: 3/28/24  Plan of Care Expiration: 6/1/23  Progress Note Due: 6/1/23  Visit # / Visits authorized: 1/ 10 (+1)  FOTO: 2/ 5   PTA: 0/5     Precautions: Standard; hx of L4-5 fusion; limit time on stomach; bruises easily    Time In: 10:00 am  Time Out: 11:00 am  Total Billable Time: 56 minutes (Tex3, NMRx1)    SUBJECTIVE     Pt reports: she initially did her home exercise program once daily. She has increased pain with lateral shift (corrected today without pain). She can only come to 4-5 visits due to financial reasons. She's agreeable to 1x per week with possible last visit on 5/18/23 after she returns from her trip.    She was partially compliant with home exercise program.  Response to previous treatment: eval  Functional change: eval    Pain: 1/10  Location: bilateral back      OBJECTIVE     Objective Measures updated at progress report unless specified.     Treatment     Grace received the treatments listed below:      Grace received therapeutic exercises to develop strength, endurance, ROM, flexibility, posture, and core stabilization for 46 minutes including:    Lateral shift correction (wall to right): 15x5" holds (emphasis on small movement to neutral)  Isometric hip abduction with belt: 10x5" holds  Hip external rotation stretch: 3x20" each (light to moderate stretch)     Bridges: 2x10 double leg   Isometric left hamstring heel dig: 10x5" holds  Side lying hip abduction (laying on pillow under hip): 2x8 each  Clamshells (laying on pillow under hip): 15x5" " "each  Reverse clamshells (laying on pillow under hip)        Grace participated in neuromuscular re-education activities to improve: Balance, Coordination, Proprioception, and Posture for 10 minutes. The following activities were included:    Transverse abdominis bracin minutes with 5" holds  Transverse abdominis bracing with ball squeeze: 2 minutes with 5" holds  Transverse abdominis bracing with BKFO: 2 minutes  Transverse abdominis bracing with alternating marches: 2 minutes      therapeutic activities to improve functional performance for 0  minutes, including:  Not today    Grace received the following manual therapy techniques: Joint mobilizations, Myofacial release, and Soft tissue Mobilization were applied to the: back for 0 minutes:     Next:  Consider percussion gun to paraspinals        Patient Education and Home Exercises     Home Exercises Provided and Patient Education Provided     Education provided:   - correct technique of lateral shift exercise  - expectations with PT  - adjust future appointments    Written Home Exercises Provided: yes. Exercises were reviewed and Grace was able to demonstrate them prior to the end of the session.  Grace demonstrated good  understanding of the education provided. See EMR under Patient Instructions for exercises provided during therapy sessions    ASSESSMENT     Grace is a 77 y.o. female referred to outpatient Physical Therapy with a medical diagnosis of mid back pain. Pt presents with hx of L4-5 fusion. Lateral shift correction was made with decreased motion, resulting in no further pain. She's able to demonstrate good transverse abdominis activation with new exercises. TFL compensation present with side lying hip abduction.     Grace Is progressing well towards her goals.   Pt prognosis is Good.     Pt will continue to benefit from skilled outpatient physical therapy to address the deficits listed in the problem list box on initial " evaluation, provide pt/family education and to maximize pt's level of independence in the home and community environment.     Pt's spiritual, cultural and educational needs considered and pt agreeable to plan of care and goals.     Anticipated barriers to physical therapy: age and co-morbidities     Goals:   Short Term Goals: 4 weeks:  1. Patient will demonstrate Bilateral hip external rotation PROM at least 44 degree. PROGRESSING; NOT MET  2. Patient will demonstrate an erect posture in neutral position (no lateral shift of flexed stance). PROGRESSING; NOT MET  3. Patient will report worst pain less than 5/10 in low back for improved tolerance to ADL performance. PROGRESSING; NOT MET     Long Term Goals: 8 weeks:  1. Patient will report being able to cook without need of LSO. PROGRESSING; NOT MET  2. Patient will report only occasional need for cream or lidocaine patches for pain. PROGRESSING; NOT MET  3. Patient will improve FOTO to < 37% to improve ADL performance. PROGRESSING; NOT MET  4. Patient will demonstrate understanding and compliance with updated home exercise program for discharge.  PROGRESSING; NOT MET    PLAN     Cont with POC    Plan of care Certification: 4/5/2023 to 6/1/23.     Brenden Marcelino, PT

## 2023-04-13 NOTE — PROGRESS NOTES
"Individual Psychotherapy (LCSW/PhD)  Grace Navarro,  4/13/2023    Site:  [place]         Therapeutic Intervention: Met with patient for individual psychotherapy.    Chief complaint/reason for encounter: depression     Interval history and content of current session: Pt states they have been doing "good" overall and have been feeling ok. Pt states they are still coping with health concerns I.e. cholesterol levels. And have been making adjustments.    Pt and LCSW discussed lifestyle changes on impact on mood. LCSW and pt discussed pt's relationship with daughter and feelings of support. Pt is planning on visiting with daughter for mother's day which they are looking forward too the trip. Pt is feeling positive about their future and feels excited about potential move.    LCSW and pt reviewed pt's progress in coping skills and mood. LCSW  and pt agreed to follow-up as needed for further support.    Treatment plan:  Target symptoms: depression  Why chosen therapy is appropriate versus another modality: relevant to diagnosis, evidence based practice  Outcome monitoring methods: self-report, feedback from family  Therapeutic intervention type: supportive psychotherapy    Risk parameters:  Patient reports no suicidal ideation  Patient reports no homicidal ideation  Patient reports no self-injurious behavior  Patient reports no violent behavior    Verbal deficits: None    Patient's response to intervention:  The patient's response to intervention is accepting.    Progress toward goals and other mental status changes:  The patient's progress toward goals is excellent.    Diagnosis:   No diagnosis found.    Plan: Pt plans to continue individual psychotherapy    Return to clinic: as needed    Length of Service (minutes): 30      "

## 2023-04-14 ENCOUNTER — PATIENT MESSAGE (OUTPATIENT)
Dept: PRIMARY CARE CLINIC | Facility: CLINIC | Age: 78
End: 2023-04-14
Payer: MEDICARE

## 2023-04-14 RX ORDER — DICLOFENAC SODIUM 10 MG/G
2 GEL TOPICAL 2 TIMES DAILY
Qty: 350 G | Refills: 1 | Status: SHIPPED | OUTPATIENT
Start: 2023-04-14

## 2023-04-17 ENCOUNTER — PATIENT MESSAGE (OUTPATIENT)
Dept: PRIMARY CARE CLINIC | Facility: CLINIC | Age: 78
End: 2023-04-17
Payer: MEDICARE

## 2023-04-18 RX ORDER — CARVEDILOL 12.5 MG/1
TABLET ORAL
Qty: 90 TABLET | Refills: 3 | Status: SHIPPED | OUTPATIENT
Start: 2023-04-18 | End: 2024-03-08

## 2023-04-21 ENCOUNTER — CLINICAL SUPPORT (OUTPATIENT)
Dept: REHABILITATION | Facility: HOSPITAL | Age: 78
End: 2023-04-21
Payer: MEDICARE

## 2023-04-21 DIAGNOSIS — R26.89 DECREASED FUNCTIONAL MOBILITY: Primary | ICD-10-CM

## 2023-04-21 PROCEDURE — 97112 NEUROMUSCULAR REEDUCATION: CPT | Mod: HCNC,PN,CQ

## 2023-04-21 PROCEDURE — 97110 THERAPEUTIC EXERCISES: CPT | Mod: HCNC,PN,CQ

## 2023-04-21 NOTE — PROGRESS NOTES
"[facility]   Physical Therapy Treatment Note     Name: Grace Ware zabrina  Clinic Number: [id number]    Therapy Diagnosis:   Encounter Diagnosis   Name Primary?    Decreased functional mobility Yes     Physician: Lisa Dean MD    Visit Date: 4/21/2023    Physician Orders: PT Eval and Treat   Medical Diagnosis from Referral: M54.9 (ICD-10-CM) - Mid back pain  Evaluation Date: 4/5/2023  Authorization Period Expiration: 3/28/24  Plan of Care Expiration: 6/1/23  Progress Note Due: 6/1/23  Visit # / Visits authorized: 2/ 10 (+1)  FOTO: 3/ 5   PTA: 1/5     Precautions: Standard; hx of L4-5 fusion; limit time on stomach; bruises easily    Time In: 10:00 am  Time Out: 11:00 am  Total Billable Time: 53 minutes (Tex2, NMRx2)    SUBJECTIVE     Pt reports: Patient reports 7/10 pain in back this morning.     She was partially compliant with home exercise program.  Response to previous treatment: eval  Functional change: eval    Pain: 1/10  Location: bilateral back      OBJECTIVE     Objective Measures updated at progress report unless specified.     Treatment     Grace received the treatments listed below:      Grace received therapeutic exercises to develop strength, endurance, ROM, flexibility, posture, and core stabilization for 30 minutes including:         Bridges: 2x10 double leg  NT  -Modified to 90 90 hip lift for HS   Isometric left hamstring heel dig: 10x5" holds  Side lying hip abduction (laying on pillow under hip): 2x8 each  Clamshells (laying on pillow under hip): 15x5" each  Reverse clamshells (laying on pillow under hip) 15 x5"     Resume next visit:   Lateral shift correction (wall to right): 15x5" holds (emphasis on small movement to neutral)  Isometric hip abduction with belt: 10x5" holds  Hip external rotation stretch: 3x20" each (light to moderate stretch)        Grace participated in neuromuscular re-education activities to improve: Balance, Coordination, Proprioception, " "and Posture for 23 minutes. The following activities were included:    Transverse abdominis bracin minutes with 5" holds  Transverse abdominis bracing with ball squeeze: 2 minutes with 5" holds  Transverse abdominis bracing with BKFO: 2 minutes  Transverse abdominis bracing with alternating marches: 2 minutes      Seated R hip pull back with R ADD squeeze 5x5 breaths    therapeutic activities to improve functional performance for 0  minutes, including:  Not today    Grace received the following manual therapy techniques: Joint mobilizations, Myofacial release, and Soft tissue Mobilization were applied to the: back for 0 minutes:     Next:  Consider percussion gun to paraspinals        Patient Education and Home Exercises     Home Exercises Provided and Patient Education Provided     Education provided:   - correct technique of lateral shift exercise  - expectations with PT  - adjust future appointments    Written Home Exercises Provided: yes. Exercises were reviewed and Grace was able to demonstrate them prior to the end of the session.  Grace demonstrated good  understanding of the education provided. See EMR under Patient Instructions for exercises provided during therapy sessions    ASSESSMENT     Patient reports 5/10 pain after performing R hip pull back with R ADD squeeze and states she likes the exercise because she feels it reduces her pain. . Modified Bridges to 90 90 hip lift for emphasis on glute/hamstring, patient reports mild reduction of pain, when modified to have L hip shift and R hip pull back, patient reports significant reduction of pain in back. R hip pull back. Patient benefits from VC/TC for side lying exercise performance  as well as supine Hook lying TA activities with emphasis on reducing rotational compensation. Hand out issued for R hip pull back with R hip ADD squeeze. Print on hand out refers to Left pull back, PTA wrote on paper to perform R pull back with R ADD squeeze and " pt verbalized understanding she is to do the opposite side of what is typed on hand out. Plan to resume lateral shift correction next visit.     Grace is a 77 y.o. female referred to outpatient Physical Therapy with a medical diagnosis of mid back pain. Pt presents with hx of L4-5 fusion.   Grace Is progressing well towards her goals.   Pt prognosis is Good.     Pt will continue to benefit from skilled outpatient physical therapy to address the deficits listed in the problem list box on initial evaluation, provide pt/family education and to maximize pt's level of independence in the home and community environment.     Pt's spiritual, cultural and educational needs considered and pt agreeable to plan of care and goals.     Anticipated barriers to physical therapy: age and co-morbidities     Goals:   Short Term Goals: 4 weeks:  1. Patient will demonstrate Bilateral hip external rotation PROM at least 44 degree. PROGRESSING; NOT MET  2. Patient will demonstrate an erect posture in neutral position (no lateral shift of flexed stance). PROGRESSING; NOT MET  3. Patient will report worst pain less than 5/10 in low back for improved tolerance to ADL performance. PROGRESSING; NOT MET     Long Term Goals: 8 weeks:  1. Patient will report being able to cook without need of LSO. PROGRESSING; NOT MET  2. Patient will report only occasional need for cream or lidocaine patches for pain. PROGRESSING; NOT MET  3. Patient will improve FOTO to < 37% to improve ADL performance. PROGRESSING; NOT MET  4. Patient will demonstrate understanding and compliance with updated home exercise program for discharge.  PROGRESSING; NOT MET    PLAN     Cont with POC    Plan of care Certification: 4/5/2023 to 6/1/23.     Isaak Das, PTA

## 2023-04-24 ENCOUNTER — PATIENT MESSAGE (OUTPATIENT)
Dept: PRIMARY CARE CLINIC | Facility: CLINIC | Age: 78
End: 2023-04-24
Payer: MEDICARE

## 2023-04-25 ENCOUNTER — CLINICAL SUPPORT (OUTPATIENT)
Dept: REHABILITATION | Facility: HOSPITAL | Age: 78
End: 2023-04-25
Payer: MEDICARE

## 2023-04-25 DIAGNOSIS — R26.89 DECREASED FUNCTIONAL MOBILITY: Primary | ICD-10-CM

## 2023-04-25 PROCEDURE — 97112 NEUROMUSCULAR REEDUCATION: CPT | Mod: HCNC,PN,CQ

## 2023-04-25 PROCEDURE — 97110 THERAPEUTIC EXERCISES: CPT | Mod: HCNC,PN,CQ

## 2023-04-25 NOTE — PROGRESS NOTES
"[facility]   Physical Therapy Treatment Note     Name: Grace Ware zabrina  Clinic Number: [id number]    Therapy Diagnosis:   Encounter Diagnosis   Name Primary?    Decreased functional mobility Yes     Physician: Lisa Dean MD    Visit Date: 4/25/2023    Physician Orders: PT Eval and Treat   Medical Diagnosis from Referral: M54.9 (ICD-10-CM) - Mid back pain  Evaluation Date: 4/5/2023  Authorization Period Expiration: 3/28/24  Plan of Care Expiration: 6/1/23  Progress Note Due: 6/1/23  Visit # / Visits authorized: 3/ 10 (+1)  FOTO: 4/ 5   PTA: 2/5     Precautions: Standard; hx of L4-5 fusion; limit time on stomach; bruises easily    Time In: 10:00 am  Time Out: 11:00 am  Total Billable Time: 53 minutes (Tex2, NMRx2)    SUBJECTIVE     Pt reports: Patient reports being amazed at the relief she got the first day of doing the (BENJI activities) patient reports compliance with seated R hip shift at home but notes she has not experienced same pain relief performing at home as she had first day.     She was partially compliant with home exercise program.  Response to previous treatment: eval  Functional change: eval    Pain: 1/10  Location: bilateral back      OBJECTIVE     Objective Measures updated at progress report unless specified.     Treatment     Grace received the treatments listed below:      Grace received therapeutic exercises to develop strength, endurance, ROM, flexibility, posture, and core stabilization for 30 minutes including:    Hip external rotation stretch: 2x30" each (light to moderate stretch)     Bridges: 2x10 double leg  NT  -Modified to 90 90 hip lift for HS     Side lying hip abduction (laying on pillow under hip): 2x8 each  Clamshells (laying on pillow under hip): 15x5" each  Reverse clamshells (laying on pillow under hip) 15 x5"       Resume next visit:   Lateral shift correction (wall to right): 15x5" holds (emphasis on small movement to neutral)  Isometric left " "hamstring heel dig: 10x5" holds  Isometric hip abduction with belt: 10x5" holds          Grace participated in neuromuscular re-education activities to improve: Balance, Coordination, Proprioception, and Posture for 23 minutes. The following activities were included:    Transverse abdominis bracin minutes with 5" holds  Transverse abdominis bracing with ball squeeze: 2 minutes with 5" holds  Transverse abdominis bracing with BKFO: 2 minutes  Transverse abdominis bracing with alternating marches: 2 minutes      Seated R hip pull back with R ADD squeeze 5x5 breaths pt requires larger object between BLE for ADD squeeze.     therapeutic activities to improve functional performance for 0  minutes, including:  Not today    Grace received the following manual therapy techniques: Joint mobilizations, Myofacial release, and Soft tissue Mobilization were applied to the: back for 0 minutes:     Next:  Consider percussion gun to paraspinals        Patient Education and Home Exercises     Home Exercises Provided and Patient Education Provided     Education provided:   - correct technique of lateral shift exercise  - expectations with PT  - adjust future appointments    Written Home Exercises Provided: yes. Exercises were reviewed and Grace was able to demonstrate them prior to the end of the session.  Grace demonstrated good  understanding of the education provided. See EMR under Patient Instructions for exercises provided during therapy sessions    ASSESSMENT     Patient with improved pain reduction in L side low back during seated R hip pull back with ADD squeeze when given larger object to squeeze. Resumed birdges modification to 90 90 hip lift with L hip shift for pelvis orientation to Right, this activity very challenging for patient but she reports reduction in back pain with increased R HS use for activity. Attempted ot modify bridges to staggered with R foot closed to hips than L to increase RLE reliance " with activity, unclear if helping back pain or if no change. Patient does report staggered bridge feels better than normal bridge.     Grace is a 77 y.o. female referred to outpatient Physical Therapy with a medical diagnosis of mid back pain. Pt presents with hx of L4-5 fusion.   Grace Is progressing well towards her goals.   Pt prognosis is Good.     Pt will continue to benefit from skilled outpatient physical therapy to address the deficits listed in the problem list box on initial evaluation, provide pt/family education and to maximize pt's level of independence in the home and community environment.     Pt's spiritual, cultural and educational needs considered and pt agreeable to plan of care and goals.     Anticipated barriers to physical therapy: age and co-morbidities     Goals:   Short Term Goals: 4 weeks:  1. Patient will demonstrate Bilateral hip external rotation PROM at least 44 degree. PROGRESSING; NOT MET  2. Patient will demonstrate an erect posture in neutral position (no lateral shift of flexed stance). PROGRESSING; NOT MET  3. Patient will report worst pain less than 5/10 in low back for improved tolerance to ADL performance. PROGRESSING; NOT MET     Long Term Goals: 8 weeks:  1. Patient will report being able to cook without need of LSO. PROGRESSING; NOT MET  2. Patient will report only occasional need for cream or lidocaine patches for pain. PROGRESSING; NOT MET  3. Patient will improve FOTO to < 37% to improve ADL performance. PROGRESSING; NOT MET  4. Patient will demonstrate understanding and compliance with updated home exercise program for discharge.  PROGRESSING; NOT MET    PLAN     Cont with POC    Plan of care Certification: 4/5/2023 to 6/1/23.     Isaak Das, PTA

## 2023-04-27 ENCOUNTER — CLINICAL SUPPORT (OUTPATIENT)
Dept: PRIMARY CARE CLINIC | Facility: CLINIC | Age: 78
End: 2023-04-27
Payer: MEDICARE

## 2023-04-27 ENCOUNTER — CLINICAL SUPPORT (OUTPATIENT)
Dept: REHABILITATION | Facility: HOSPITAL | Age: 78
End: 2023-04-27
Payer: MEDICARE

## 2023-04-27 DIAGNOSIS — F41.1 GAD (GENERALIZED ANXIETY DISORDER): Primary | ICD-10-CM

## 2023-04-27 DIAGNOSIS — R26.89 DECREASED FUNCTIONAL MOBILITY: Primary | ICD-10-CM

## 2023-04-27 PROCEDURE — 97110 THERAPEUTIC EXERCISES: CPT | Mod: HCNC,PN | Performed by: PHYSICAL THERAPIST

## 2023-04-27 PROCEDURE — 97140 MANUAL THERAPY 1/> REGIONS: CPT | Mod: HCNC,PN | Performed by: PHYSICAL THERAPIST

## 2023-04-27 PROCEDURE — 97112 NEUROMUSCULAR REEDUCATION: CPT | Mod: HCNC,PN | Performed by: PHYSICAL THERAPIST

## 2023-04-27 NOTE — PROGRESS NOTES
Individual Psychotherapy (LCSW/PhD)  Gracefelice Grantkylee Ramakayli,  4/27/2023    Site:  [place]         Therapeutic Intervention: Met with patient for individual psychotherapy.    Chief complaint/reason for encounter: adjustment, anxiety     Interval history and content of current session: Pt expressed anxiety at potentially moving in with daughter in the future. Pt is struggling to express their self to daughter and is worried about potential conflict with daughter. LCSW  supported pt in processing feeling of anxiety. LCSW and pt discussed importance of setting good boundaries with daughter and expressing feelings.     Treatment plan:  Target symptoms: anxiety   Why chosen therapy is appropriate versus another modality: relevant to diagnosis, evidence based practice  Outcome monitoring methods: self-report, checklist/rating scale  Therapeutic intervention type: supportive psychotherapy    Risk parameters:  Patient reports no suicidal ideation  Patient reports no homicidal ideation  Patient reports no self-injurious behavior  Patient reports no violent behavior    Verbal deficits: None    Patient's response to intervention:  The patient's response to intervention is accepting.    Progress toward goals and other mental status changes:  The patient's progress toward goals is good.    Diagnosis:   No diagnosis found.    Plan: Pt plans to continue individual psychotherapy    Return to clinic: as needed    Length of Service (minutes): 60

## 2023-04-27 NOTE — PROGRESS NOTES
"[facility]   Physical Therapy Treatment Note     Name: Grace Ware zabrina  Clinic Number: [id number]    Therapy Diagnosis:   Encounter Diagnosis   Name Primary?    Decreased functional mobility Yes     Physician: Lisa Dean MD    Visit Date: 4/27/2023    Physician Orders: PT Eval and Treat   Medical Diagnosis from Referral: M54.9 (ICD-10-CM) - Mid back pain  Evaluation Date: 4/5/2023  Authorization Period Expiration: 3/28/24  Plan of Care Expiration: 6/1/23  Progress Note Due: 6/1/23  Visit # / Visits authorized: 4/ 10 (+1)  FOTO: 5/ 5   PTA: 0/5     Precautions: Standard; hx of L4-5 fusion; limit time on stomach; bruises easily    Time In: 10:00 am  Time Out: 10:49 am  Total Billable Time: 45 1:1 minutes (Tex1, NMRx1, MTx1)    SUBJECTIVE     Pt reports: She hasn't had the same relief since last Thursday. She's feeling like she needs to come to more PT sessions now because she doesn't want to live this way.    She was partially compliant with home exercise program.  Response to previous treatment: eval  Functional change: eval    Pain: 7/10  Location: bilateral back      OBJECTIVE     Objective Measures updated at progress report unless specified.     Treatment     Grace received the treatments listed below:      Grace received therapeutic exercises to develop strength, endurance, ROM, flexibility, posture, and core stabilization for 23 minutes including:    Side lying hip abduction (laying on pillow under hip): 2x8 each  Clamshells (laying on pillow under hip): 15x5" each  Reverse clamshells (laying on pillow under hip) 15 x5"   Isometric left hamstring heel dig: 10x5" holds    Resume next visit:   Isometric hip abduction with belt: 10x5" holds  Bridges: 2x10 double leg  NT  -Modified to 90 90 hip lift for HS    Hip external rotation stretch: 2x30" each (light to moderate stretch)     Grace participated in neuromuscular re-education activities to improve: Balance, Coordination, " "Proprioception, and Posture for 12 minutes. The following activities were included:     Seated R hip pull back with R ADD squeeze 5x5 breaths pt requires larger object between BLE for ADD squeeze.   Lateral shift correction (wall to right): 25x5" holds (emphasis on small movement to neutral); another 15x5" holds later in session    Not today:  Transverse abdominis bracin minutes with 5" holds  Transverse abdominis bracing with ball squeeze: 2 minutes with 5" holds  Transverse abdominis bracing with BKFO: 2 minutes  Transverse abdominis bracing with alternating marches: 2 minutes     therapeutic activities to improve functional performance for 0  minutes, including:  Not today    Grace received the following manual therapy techniques: Joint mobilizations, Myofacial release, and Soft tissue Mobilization were applied to the: back for 10 minutes:     MET for left anterior innominate followed by alternating isometric hip abd/add    Next:  Consider percussion gun to paraspinals        Patient Education and Home Exercises     Home Exercises Provided and Patient Education Provided     Education provided:   - correct technique of lateral shift exercise  - expectations with PT  - adjust future appointments    Written Home Exercises Provided: yes. Exercises were reviewed and Grace was able to demonstrate them prior to the end of the session.  Grace demonstrated good  understanding of the education provided. See EMR under Patient Instructions for exercises provided during therapy sessions    ASSESSMENT     Grace is a 77 y.o. female referred to outpatient Physical Therapy with a medical diagnosis of mid back pain. Pt presents with hx of L4-5 fusion. Grace arrived today with increased left LBP and right lateral shift. She presented with left anterior innominate that was corrected with MET and symptoms resolved. As session progressed, lateral shift significantly improved, but left sided LBP symptoms returned. " Cues needed to avoid compensation strategies with lateral hip strengthening exercises. After performed, pain significantly decreased.       Grace Is progressing well towards her goals.   Pt prognosis is Good.     Pt will continue to benefit from skilled outpatient physical therapy to address the deficits listed in the problem list box on initial evaluation, provide pt/family education and to maximize pt's level of independence in the home and community environment.     Pt's spiritual, cultural and educational needs considered and pt agreeable to plan of care and goals.     Anticipated barriers to physical therapy: age and co-morbidities     Goals:   Short Term Goals: 4 weeks:  1. Patient will demonstrate Bilateral hip external rotation PROM at least 44 degree. PROGRESSING; NOT MET  2. Patient will demonstrate an erect posture in neutral position (no lateral shift of flexed stance). PROGRESSING; NOT MET  3. Patient will report worst pain less than 5/10 in low back for improved tolerance to ADL performance. PROGRESSING; NOT MET     Long Term Goals: 8 weeks:  1. Patient will report being able to cook without need of LSO. PROGRESSING; NOT MET  2. Patient will report only occasional need for cream or lidocaine patches for pain. PROGRESSING; NOT MET  3. Patient will improve FOTO to < 37% to improve ADL performance. PROGRESSING; NOT MET  4. Patient will demonstrate understanding and compliance with updated home exercise program for discharge.  PROGRESSING; NOT MET    PLAN     Cont with POC. Re-assess upon return from her trip to determine POC.    Plan of care Certification: 4/5/2023 to 6/1/23.     Brenden Marcelino, PT

## 2023-05-11 ENCOUNTER — PATIENT MESSAGE (OUTPATIENT)
Dept: PRIMARY CARE CLINIC | Facility: CLINIC | Age: 78
End: 2023-05-11
Payer: MEDICARE

## 2023-05-11 ENCOUNTER — TELEPHONE (OUTPATIENT)
Dept: PRIMARY CARE CLINIC | Facility: CLINIC | Age: 78
End: 2023-05-11
Payer: MEDICARE

## 2023-05-18 ENCOUNTER — CLINICAL SUPPORT (OUTPATIENT)
Dept: PRIMARY CARE CLINIC | Facility: CLINIC | Age: 78
End: 2023-05-18
Payer: MEDICARE

## 2023-05-18 DIAGNOSIS — F41.1 GAD (GENERALIZED ANXIETY DISORDER): Primary | ICD-10-CM

## 2023-05-18 PROCEDURE — 99499 UNLISTED E&M SERVICE: CPT | Mod: ,,, | Performed by: SOCIAL WORKER

## 2023-05-18 PROCEDURE — 99499 NO LOS: ICD-10-PCS | Mod: ,,, | Performed by: SOCIAL WORKER

## 2023-05-18 NOTE — PROGRESS NOTES
Individual Psychotherapy (LCSW/PhD)  Gracefelice Grantkylee Ramakayli,  5/18/2023    Site:  [place]         Therapeutic Intervention: Met with patient for individual psychotherapy.    Chief complaint/reason for encounter: anxiety     Interval history and content of current session: Pt recently returned from vacation (visit to daughter's home). Pt and LCSW discussed trip and their interactions with daughter and son-in-law. Pt reports feeling encouraged by interactions between they and son-in-law during trip. Pt states that their interactions with son-in -law were more engaging than expected. Pt states they feel like a potential move to daughter's home in future.     Pt and daughter were able to discuss potential concerns about pt moving into the apartment. Pt expressed feelings of optimism at future move to daughter's home.       Treatment plan:  Target symptoms: anxiety   Why chosen therapy is appropriate versus another modality: relevant to diagnosis, evidence based practice  Outcome monitoring methods: self-report, checklist/rating scale  Therapeutic intervention type: supportive psychotherapy    Risk parameters:  Patient reports no suicidal ideation  Patient reports no homicidal ideation  Patient reports no self-injurious behavior  Patient reports no violent behavior    Verbal deficits: None    Patient's response to intervention:  The patient's response to intervention is accepting.    Progress toward goals and other mental status changes:  The patient's progress toward goals is good.    Diagnosis:   No diagnosis found.    Plan: Pt plans to continue individual psychotherapy    Return to clinic: 1 month, as needed    Length of Service (minutes): 60

## 2023-05-19 ENCOUNTER — CLINICAL SUPPORT (OUTPATIENT)
Dept: REHABILITATION | Facility: HOSPITAL | Age: 78
End: 2023-05-19
Payer: MEDICARE

## 2023-05-19 DIAGNOSIS — R26.89 DECREASED FUNCTIONAL MOBILITY: Primary | ICD-10-CM

## 2023-05-19 PROCEDURE — 97110 THERAPEUTIC EXERCISES: CPT | Mod: PN | Performed by: PHYSICAL THERAPIST

## 2023-05-19 NOTE — PROGRESS NOTES
"[facility]   Physical Therapy Treatment and DISCHARGE Note     Name: Grace Navarro  Clinic Number: [id number]    Therapy Diagnosis:   Encounter Diagnosis   Name Primary?    Decreased functional mobility Yes     Physician: Lisa Dean MD    Visit Date: 5/19/2023    Physician Orders: PT Eval and Treat   Medical Diagnosis from Referral: M54.9 (ICD-10-CM) - Mid back pain  Evaluation Date: 4/5/2023  Authorization Period Expiration: 3/28/24  Plan of Care Expiration: 6/1/23  Progress Note Due: 6/1/23  Visit # / Visits authorized: 5/ 10 (+1)  FOTO: Performed today   PTA: 0/5     Precautions: Standard; hx of L4-5 fusion; limit time on stomach; bruises easily    Time In: 1:00 pm  Time Out: 1:30 am  Total Billable Time: 30 1:1 minutes (Tex2)    SUBJECTIVE     Pt reports: she was in the garden a lot on her trip. Some days she had worse pain than other days. She did exercises today due to right "sciatica" and feels better now.    She was partially compliant with home exercise program.  Response to previous treatment: ongoing  Functional change: ongoing    Pain: 3/10  Location: bilateral back      OBJECTIVE     Palpation: tender to palpation     Posture:  right lateral shift, left pelvic upslip in standing, 10 degree hip flexion  No pelvic innominate     AROM:    Degrees Pain/Dysfunction   Flexion 9 deg NP   Extension 7 deg NP   Right Rotation WNL NP   Left Rotation WNL NP   Right Side Bending 58 cm mild right pain   Left Side Bending 62 cm NP      Hip external rotation PROM: right: 45 deg    left: 45 degree   Hip internal rotation PROM:  right: 25 deg   left: 20 degree      Strength:  RLE   LLE     Hip flexion: 5/5 Hip flexion: 5/5   Hip Abduction: 5/5 Hip abduction: 5/5   Hip extension 5/5 Hip extension 5/5   Hip ER 5/5 Hip ER 5/5   Hip IR 5/5 Hip IR 5/5   Knee flexion: 5/5 Knee flexion: 5/5   Knee extension: 5/5 Knee extension: 5/5   Ankle Dorsiflexion: 5/5 Ankle Dorsiflexion: 5/5   Ankle " "Plantarflexion: 5/5 Ankle Plantarflexion: 5/5      Special Tests:    Degrees Pain/Dysfunction   Hamstring 90/90 - NP   Ely Test + Bilateral quad tightness   Obers Test NT NP   SLR Test  Sciatic n  Post tib (dorsiflexion + eversion)  Sural (dorsiflexion + inversion)  Common peroneal (hip internal rotation, plantarflexion & inversion)    - NP   Slump Test - NP   Flexion Preference - NP   Extension Preference - NP   Piriformis test NT NP   FABERs - NP   FADIR + Hip pain Bilateral    Flick test NT NP   SIJ Compression NT NP   Gaenslens NT NP   Sacral Thrust - NP   Thigh Thrust + Pain   Sciatic nerve tension test - NP   Femoral nerve tension test - NP           Treatment     Grace received the treatments listed below:      Grace received therapeutic exercises to develop strength, endurance, ROM, flexibility, posture, and core stabilization for 30 minutes including:    True hip flexor stretch: 3x20" holds each    Updated home exercise program (true hip flexor stretch)  Education on progression with home exercise program and expected symptoms overall.      Patient Education and Home Exercises     Home Exercises Provided and Patient Education Provided     Education provided:   - correct technique of lateral shift exercise  - expectations with PT  - adjust future appointments    Written Home Exercises Provided: yes. Exercises were reviewed and Grace was able to demonstrate them prior to the end of the session.  Grace demonstrated good  understanding of the education provided. See EMR under Patient Instructions for exercises provided during therapy sessions    ASSESSMENT     Grace is a 77 y.o. female referred to outpatient Physical Therapy with a medical diagnosis of mid back pain. Pt presents with hx of L4-5 fusion. Grace returned today after her trip that involved a lot of high level activities such as gardening. While her pain can increase, she gets relief with her home exercise program. Her lateral shift " has improved, but still stands with a flexed posture with increased lordotic curve. She has requested DC at this time due to financial concerns.       Grace Is progressing well towards her goals.   Pt prognosis is Good.     Pt will continue to benefit from skilled outpatient physical therapy to address the deficits listed in the problem list box on initial evaluation, provide pt/family education and to maximize pt's level of independence in the home and community environment.     Pt's spiritual, cultural and educational needs considered and pt agreeable to plan of care and goals.     Anticipated barriers to physical therapy: age and co-morbidities     Goals:   Short Term Goals: 4 weeks:  1. Patient will demonstrate Bilateral hip external rotation PROM at least 44 degree. MET  2. Patient will demonstrate an erect posture in neutral position (no lateral shift of flexed stance). PROGRESSING; NOT MET  3. Patient will report worst pain less than 5/10 in low back for improved tolerance to ADL performance. PROGRESSING; NOT MET     Long Term Goals: 8 weeks:  1. Patient will report being able to cook without need of LSO. MET  2. Patient will report only occasional need for cream or lidocaine patches for pain. MET  3. Patient will improve FOTO to < 37% to improve ADL performance. PROGRESSING; NOT MET  4. Patient will demonstrate understanding and compliance with updated home exercise program for discharge.  MET    PLAN     DISCHARGE    Brenden Marcelino, PT

## 2023-05-25 ENCOUNTER — PATIENT MESSAGE (OUTPATIENT)
Dept: PRIMARY CARE CLINIC | Facility: CLINIC | Age: 78
End: 2023-05-25
Payer: MEDICARE

## 2023-05-25 NOTE — TELEPHONE ENCOUNTER
Talked to pt. Having a lot of dizziness in am. Believes it is meds. Did not have blood pressures ready for me.   Phone appt booked ( held) for 6/5   Pt wants to discuss meds. Says she will keep track of blood pressures after taking meds.   Says she is going out of the country in 2 months. She does not have in person appt in near future.

## 2023-05-30 ENCOUNTER — PATIENT MESSAGE (OUTPATIENT)
Dept: PRIMARY CARE CLINIC | Facility: CLINIC | Age: 78
End: 2023-05-30
Payer: MEDICARE

## 2023-05-30 NOTE — TELEPHONE ENCOUNTER
6/9 appt fine. Needs to be in person. Please make sure to do orthostatics at appt. Will need BP readings (need to include HR also). Please check on pt. Thanks!

## 2023-05-31 ENCOUNTER — OFFICE VISIT (OUTPATIENT)
Dept: PRIMARY CARE CLINIC | Facility: CLINIC | Age: 78
End: 2023-05-31
Payer: MEDICARE

## 2023-05-31 VITALS
TEMPERATURE: 98 F | RESPIRATION RATE: 18 BRPM | OXYGEN SATURATION: 97 % | HEART RATE: 71 BPM | BODY MASS INDEX: 25.11 KG/M2 | DIASTOLIC BLOOD PRESSURE: 71 MMHG | HEIGHT: 64 IN | SYSTOLIC BLOOD PRESSURE: 154 MMHG | WEIGHT: 147.06 LBS

## 2023-05-31 DIAGNOSIS — F33.0 MILD EPISODE OF RECURRENT MAJOR DEPRESSIVE DISORDER: ICD-10-CM

## 2023-05-31 DIAGNOSIS — I10 BENIGN ESSENTIAL HYPERTENSION: ICD-10-CM

## 2023-05-31 DIAGNOSIS — Z12.31 ENCOUNTER FOR SCREENING MAMMOGRAM FOR MALIGNANT NEOPLASM OF BREAST: ICD-10-CM

## 2023-05-31 DIAGNOSIS — R42 DIZZINESS: Primary | ICD-10-CM

## 2023-05-31 PROCEDURE — 1101F PR PT FALLS ASSESS DOC 0-1 FALLS W/OUT INJ PAST YR: ICD-10-PCS | Mod: CPTII,S$GLB,, | Performed by: INTERNAL MEDICINE

## 2023-05-31 PROCEDURE — 1158F PR ADVANCE CARE PLANNING DISCUSS DOCUMENTED IN MEDICAL RECORD: ICD-10-PCS | Mod: CPTII,S$GLB,, | Performed by: INTERNAL MEDICINE

## 2023-05-31 PROCEDURE — 1126F AMNT PAIN NOTED NONE PRSNT: CPT | Mod: CPTII,S$GLB,, | Performed by: INTERNAL MEDICINE

## 2023-05-31 PROCEDURE — 99999 PR PBB SHADOW E&M-EST. PATIENT-LVL V: ICD-10-PCS | Mod: PBBFAC,,, | Performed by: INTERNAL MEDICINE

## 2023-05-31 PROCEDURE — 1101F PT FALLS ASSESS-DOCD LE1/YR: CPT | Mod: CPTII,S$GLB,, | Performed by: INTERNAL MEDICINE

## 2023-05-31 PROCEDURE — 3288F FALL RISK ASSESSMENT DOCD: CPT | Mod: CPTII,S$GLB,, | Performed by: INTERNAL MEDICINE

## 2023-05-31 PROCEDURE — 1160F RVW MEDS BY RX/DR IN RCRD: CPT | Mod: CPTII,S$GLB,, | Performed by: INTERNAL MEDICINE

## 2023-05-31 PROCEDURE — 99214 PR OFFICE/OUTPT VISIT, EST, LEVL IV, 30-39 MIN: ICD-10-PCS | Mod: S$GLB,,, | Performed by: INTERNAL MEDICINE

## 2023-05-31 PROCEDURE — 1126F PR PAIN SEVERITY QUANTIFIED, NO PAIN PRESENT: ICD-10-PCS | Mod: CPTII,S$GLB,, | Performed by: INTERNAL MEDICINE

## 2023-05-31 PROCEDURE — 99999 PR PBB SHADOW E&M-EST. PATIENT-LVL V: CPT | Mod: PBBFAC,,, | Performed by: INTERNAL MEDICINE

## 2023-05-31 PROCEDURE — 1160F PR REVIEW ALL MEDS BY PRESCRIBER/CLIN PHARMACIST DOCUMENTED: ICD-10-PCS | Mod: CPTII,S$GLB,, | Performed by: INTERNAL MEDICINE

## 2023-05-31 PROCEDURE — 1159F PR MEDICATION LIST DOCUMENTED IN MEDICAL RECORD: ICD-10-PCS | Mod: CPTII,S$GLB,, | Performed by: INTERNAL MEDICINE

## 2023-05-31 PROCEDURE — 3077F PR MOST RECENT SYSTOLIC BLOOD PRESSURE >= 140 MM HG: ICD-10-PCS | Mod: CPTII,S$GLB,, | Performed by: INTERNAL MEDICINE

## 2023-05-31 PROCEDURE — 3078F PR MOST RECENT DIASTOLIC BLOOD PRESSURE < 80 MM HG: ICD-10-PCS | Mod: CPTII,S$GLB,, | Performed by: INTERNAL MEDICINE

## 2023-05-31 PROCEDURE — 3078F DIAST BP <80 MM HG: CPT | Mod: CPTII,S$GLB,, | Performed by: INTERNAL MEDICINE

## 2023-05-31 PROCEDURE — 99214 OFFICE O/P EST MOD 30 MIN: CPT | Mod: S$GLB,,, | Performed by: INTERNAL MEDICINE

## 2023-05-31 PROCEDURE — 1159F MED LIST DOCD IN RCRD: CPT | Mod: CPTII,S$GLB,, | Performed by: INTERNAL MEDICINE

## 2023-05-31 PROCEDURE — 3288F PR FALLS RISK ASSESSMENT DOCUMENTED: ICD-10-PCS | Mod: CPTII,S$GLB,, | Performed by: INTERNAL MEDICINE

## 2023-05-31 PROCEDURE — 1158F ADVNC CARE PLAN TLK DOCD: CPT | Mod: CPTII,S$GLB,, | Performed by: INTERNAL MEDICINE

## 2023-05-31 PROCEDURE — 3077F SYST BP >= 140 MM HG: CPT | Mod: CPTII,S$GLB,, | Performed by: INTERNAL MEDICINE

## 2023-05-31 NOTE — PATIENT INSTRUCTIONS
Decrease medicines:  carvedilol 1/2 of the 12.5mg in the morning and 1/2 of the 25mg at night.   Keep fluid pill - HCTZ.   Stop excitalopram 1/2 pill daily.     Follow up in 2-3 weeks.   Continue to keep track of your blood pressures daily.     Please bring back your healthcare power of  and living will to your next visit.

## 2023-05-31 NOTE — PROGRESS NOTES
"Subjective:       Patient ID: Grace Navarro is a 77 y.o. female.    Chief Complaint: dizziness    HPI   Of note pt is on coreg 12.5mg qam, 25mg qpm, hctz 12.5mg qd for HTN.   Pt didn't take the med this morning b/c she knew she was coming to clinic and was worried about dizziness. Reports when she wakes up, she feels fine. But after breakfast and after taking her medicines, she feels dizzy. Needs to have a walker next to bed b/c may feel dizzy when waking up at night and walking to bathroom. Dizziness will linger for 2-3 hours. Not positional.     MDD/YOCASTA - lexapro 5mg 1/2 pill nightly. Felt too dizzy on the full pill and better after cutting the lexapro in half.    Still going to the gym for an hour and 15 min (1/2 hour of walking and then squats and then another 1/2 hour of walking) daily. No issues while doing this.     No CP/palpitations/SOB. She can work around the dizziness when she's at home. No issues w/ sinuses currently.     Going to [facility] at the end of July for almost a mo.     Review of Systems  Comprehensive review of systems otherwise negative. See history/subjective section for more details.    Objective:      Physical Exam    BP (!) 154/71 (BP Location: Left arm, Patient Position: Standing, BP Method: Medium (Automatic))   Pulse 71   Temp 98.4 °F (36.9 °C) (Oral)   Resp 18   Ht 5' 4" (1.626 m)   Wt 66.7 kg (147 lb 0.8 oz)   LMP 06/14/1996   SpO2 97%   BMI 25.24 kg/m²     GEN - A+OX4, teary eyed.  HEENT - PERRL, EOMI, OP clear. MMM.   Neck - No thyromegaly or cervical LAD. No thyroid masses felt.  CV - RRR, no m/r   Chest - CTAB, no wheezing or rhonchi  Abd - S/NT/ND/+BS.   Ext - 2+BDP and radial pulses. No C/C/E.  MSK - no spinal tenderness to palpation. Normal gait.     Previous labs reviewed.     Assessment/Plan     Diagnoses and all orders for this visit:    Dizziness - not orthostatic today. Sounds related to meds.   Decrease medicines:  carvedilol 1/2 of the 12.5mg in the " morning and 1/2 of the 25mg at night.   Keep fluid pill - HCTZ.   Stop excitalopram 1/2 pill daily.     Follow up in 2-3 weeks.   Continue to keep track of your blood pressures daily.     Please bring back your healthcare power of  and living will to your next visit.     Benign essential hypertension - as above.     Mild episode of recurrent major depressive disorder - doing well. Trial of stopping escitaloprom.    Encounter for screening mammogram for malignant neoplasm of breast  -     Mammo Digital Screening Bilat w/ [facility]; Future    Advance Care Planning     Date: 05/31/2023    Power of   I initiated the process of advance care planning today and explained the importance of this process to the patient.  I introduced the concept of advance directives to the patient, as well. Then the patient received detailed information about the importance of designating a Health Care Power of  (HCPOA). She was also instructed to communicate with this person about their wishes for future healthcare, should she become sick and lose decision-making capacity. The patient has previously appointed a HCPOA. After our discussion, the patient has decided to complete a HCPOA and has appointed her daughter, health care agent:  Mami Navarro  & health care agent number:  [phone]  I spent a total time of 3 minutes discussing this issue with the patient.    Pt will bring her HCPOA and living will once she gets a copy from .       Follow up in about 2 weeks (around 6/14/2023).      Lisa Dean MD  Department of Internal Medicine - [facility]  7:32 AM

## 2023-06-09 ENCOUNTER — PATIENT MESSAGE (OUTPATIENT)
Dept: PRIMARY CARE CLINIC | Facility: CLINIC | Age: 78
End: 2023-06-09
Payer: MEDICARE

## 2023-06-14 ENCOUNTER — OFFICE VISIT (OUTPATIENT)
Dept: PRIMARY CARE CLINIC | Facility: CLINIC | Age: 78
End: 2023-06-14
Payer: MEDICARE

## 2023-06-14 VITALS
WEIGHT: 146.81 LBS | HEART RATE: 78 BPM | TEMPERATURE: 98 F | SYSTOLIC BLOOD PRESSURE: 138 MMHG | BODY MASS INDEX: 25.06 KG/M2 | HEIGHT: 64 IN | DIASTOLIC BLOOD PRESSURE: 84 MMHG | OXYGEN SATURATION: 97 %

## 2023-06-14 DIAGNOSIS — I10 BENIGN ESSENTIAL HYPERTENSION: Primary | ICD-10-CM

## 2023-06-14 DIAGNOSIS — R42 DIZZINESS: ICD-10-CM

## 2023-06-14 PROCEDURE — 1126F AMNT PAIN NOTED NONE PRSNT: CPT | Mod: CPTII,S$GLB,, | Performed by: INTERNAL MEDICINE

## 2023-06-14 PROCEDURE — 1160F PR REVIEW ALL MEDS BY PRESCRIBER/CLIN PHARMACIST DOCUMENTED: ICD-10-PCS | Mod: CPTII,S$GLB,, | Performed by: INTERNAL MEDICINE

## 2023-06-14 PROCEDURE — 99999 PR PBB SHADOW E&M-EST. PATIENT-LVL IV: CPT | Mod: PBBFAC,,, | Performed by: INTERNAL MEDICINE

## 2023-06-14 PROCEDURE — 1159F PR MEDICATION LIST DOCUMENTED IN MEDICAL RECORD: ICD-10-PCS | Mod: CPTII,S$GLB,, | Performed by: INTERNAL MEDICINE

## 2023-06-14 PROCEDURE — 1126F PR PAIN SEVERITY QUANTIFIED, NO PAIN PRESENT: ICD-10-PCS | Mod: CPTII,S$GLB,, | Performed by: INTERNAL MEDICINE

## 2023-06-14 PROCEDURE — 3288F FALL RISK ASSESSMENT DOCD: CPT | Mod: CPTII,S$GLB,, | Performed by: INTERNAL MEDICINE

## 2023-06-14 PROCEDURE — 3288F PR FALLS RISK ASSESSMENT DOCUMENTED: ICD-10-PCS | Mod: CPTII,S$GLB,, | Performed by: INTERNAL MEDICINE

## 2023-06-14 PROCEDURE — 1101F PR PT FALLS ASSESS DOC 0-1 FALLS W/OUT INJ PAST YR: ICD-10-PCS | Mod: CPTII,S$GLB,, | Performed by: INTERNAL MEDICINE

## 2023-06-14 PROCEDURE — 99211 OFF/OP EST MAY X REQ PHY/QHP: CPT | Mod: S$GLB,,, | Performed by: INTERNAL MEDICINE

## 2023-06-14 PROCEDURE — 3079F DIAST BP 80-89 MM HG: CPT | Mod: CPTII,S$GLB,, | Performed by: INTERNAL MEDICINE

## 2023-06-14 PROCEDURE — 99999 PR PBB SHADOW E&M-EST. PATIENT-LVL IV: ICD-10-PCS | Mod: PBBFAC,,, | Performed by: INTERNAL MEDICINE

## 2023-06-14 PROCEDURE — 3075F SYST BP GE 130 - 139MM HG: CPT | Mod: CPTII,S$GLB,, | Performed by: INTERNAL MEDICINE

## 2023-06-14 PROCEDURE — 1101F PT FALLS ASSESS-DOCD LE1/YR: CPT | Mod: CPTII,S$GLB,, | Performed by: INTERNAL MEDICINE

## 2023-06-14 PROCEDURE — 1160F RVW MEDS BY RX/DR IN RCRD: CPT | Mod: CPTII,S$GLB,, | Performed by: INTERNAL MEDICINE

## 2023-06-14 PROCEDURE — 1159F MED LIST DOCD IN RCRD: CPT | Mod: CPTII,S$GLB,, | Performed by: INTERNAL MEDICINE

## 2023-06-14 PROCEDURE — 3075F PR MOST RECENT SYSTOLIC BLOOD PRESS GE 130-139MM HG: ICD-10-PCS | Mod: CPTII,S$GLB,, | Performed by: INTERNAL MEDICINE

## 2023-06-14 PROCEDURE — 99211 PR OFFICE/OUTPT VISIT, EST, LEVL I: ICD-10-PCS | Mod: S$GLB,,, | Performed by: INTERNAL MEDICINE

## 2023-06-14 PROCEDURE — 3079F PR MOST RECENT DIASTOLIC BLOOD PRESSURE 80-89 MM HG: ICD-10-PCS | Mod: CPTII,S$GLB,, | Performed by: INTERNAL MEDICINE

## 2023-06-14 NOTE — PROGRESS NOTES
"Subjective:       Patient ID: Grace Navarro is a 77 y.o. female.    Chief Complaint: Hypertension    HPI  Pt saw me 5/31/23 for dizziness. We decreased meds to coreg 1/2 of the 12.5mg qam and 1/2 of the 25mg nightly and to keep hctz. We've also stopped her lexapro 1/2 pill daily.   Reports initially felt "uneasy" and thinks this was due to withdrawal. But that's resolved.   Reports dizziness is so much better. BP at goal at home.   Otherwise feels good. Got back to driving.     Will be going to [facility] and will be there for 26 days.     Review of Systems  as above in HPI.     Objective:      Physical Exam    /84 (BP Location: Left arm, Patient Position: Sitting, BP Method: Large (Manual))   Pulse 78   Temp 98 °F (36.7 °C) (Oral)   Ht 5' 4" (1.626 m)   Wt 66.6 kg (146 lb 13.2 oz)   LMP 06/14/1996   SpO2 97%   BMI 25.20 kg/m²     GEN - A+OX4, NAD.   HEENT - PERRL, EOMI, OP clear. MMM.   Neck - No thyromegaly or cervical LAD. No thyroid masses felt.  CV - RRR, no m/r   Chest - CTAB, no wheezing or rhonchi  Abd - S/NT/ND/+BS.   Ext - 2+BDP and radial pulses. No C/C/E.    PREVIOUS LABS REVIEWED.     Assessment/Plan     Grace was seen today for hypertension.    Diagnoses and all orders for this visit:    Benign essential hypertension - Stable and controlled. Continue current medications.    Dizziness - improved         Follow up if symptoms worsen or fail to improve.      Lisa Dean MD  Department of Internal Medicine - [facility]  2:18 PM    "

## 2023-06-24 DIAGNOSIS — L30.9 DERMATITIS: ICD-10-CM

## 2023-06-26 RX ORDER — TRIAMCINOLONE ACETONIDE 1 MG/G
CREAM TOPICAL
Qty: 30 G | Refills: 0 | Status: SHIPPED | OUTPATIENT
Start: 2023-06-26 | End: 2023-10-09 | Stop reason: SDUPTHER

## 2023-06-30 ENCOUNTER — HOSPITAL ENCOUNTER (OUTPATIENT)
Dept: RADIOLOGY | Facility: HOSPITAL | Age: 78
Discharge: HOME | End: 2023-06-30
Attending: INTERNAL MEDICINE
Payer: MEDICARE

## 2023-06-30 DIAGNOSIS — Z12.31 ENCOUNTER FOR SCREENING MAMMOGRAM FOR MALIGNANT NEOPLASM OF BREAST: ICD-10-CM

## 2023-06-30 PROCEDURE — 77067 MAMMO DIGITAL SCREENING BILAT WITH TOMO: ICD-10-PCS | Mod: 26,,, | Performed by: RADIOLOGY

## 2023-06-30 PROCEDURE — 77063 BREAST TOMOSYNTHESIS BI: CPT | Mod: 26,,, | Performed by: RADIOLOGY

## 2023-06-30 PROCEDURE — 77067 SCR MAMMO BI INCL CAD: CPT | Mod: TC

## 2023-06-30 PROCEDURE — 77067 SCR MAMMO BI INCL CAD: CPT | Mod: 26,,, | Performed by: RADIOLOGY

## 2023-06-30 PROCEDURE — 77063 MAMMO DIGITAL SCREENING BILAT WITH TOMO: ICD-10-PCS | Mod: 26,,, | Performed by: RADIOLOGY

## 2023-07-01 ENCOUNTER — PATIENT MESSAGE (OUTPATIENT)
Dept: PRIMARY CARE CLINIC | Facility: CLINIC | Age: 78
End: 2023-07-01
Payer: MEDICARE

## 2023-07-01 DIAGNOSIS — R10.9 ABDOMINAL CRAMPING: ICD-10-CM

## 2023-07-03 RX ORDER — DICYCLOMINE HYDROCHLORIDE 10 MG/1
10 CAPSULE ORAL
Qty: 120 CAPSULE | Refills: 0 | Status: SHIPPED | OUTPATIENT
Start: 2023-07-03 | End: 2023-08-02

## 2023-07-05 ENCOUNTER — PATIENT MESSAGE (OUTPATIENT)
Dept: PRIMARY CARE CLINIC | Facility: CLINIC | Age: 78
End: 2023-07-05
Payer: MEDICARE

## 2023-07-06 ENCOUNTER — PATIENT MESSAGE (OUTPATIENT)
Dept: PRIMARY CARE CLINIC | Facility: CLINIC | Age: 78
End: 2023-07-06
Payer: MEDICARE

## 2023-07-06 ENCOUNTER — OFFICE VISIT (OUTPATIENT)
Dept: INTERNAL MEDICINE | Facility: CLINIC | Age: 78
End: 2023-07-06
Payer: MEDICARE

## 2023-07-06 VITALS
WEIGHT: 149.94 LBS | DIASTOLIC BLOOD PRESSURE: 78 MMHG | TEMPERATURE: 98 F | HEART RATE: 71 BPM | BODY MASS INDEX: 25.6 KG/M2 | SYSTOLIC BLOOD PRESSURE: 146 MMHG | OXYGEN SATURATION: 99 % | HEIGHT: 64 IN

## 2023-07-06 DIAGNOSIS — M54.30 SCIATICA, UNSPECIFIED LATERALITY: Primary | ICD-10-CM

## 2023-07-06 PROCEDURE — 1159F PR MEDICATION LIST DOCUMENTED IN MEDICAL RECORD: ICD-10-PCS | Mod: CPTII,GC,S$GLB,

## 2023-07-06 PROCEDURE — 99213 OFFICE O/P EST LOW 20 MIN: CPT | Mod: GC,S$GLB,,

## 2023-07-06 PROCEDURE — 3078F PR MOST RECENT DIASTOLIC BLOOD PRESSURE < 80 MM HG: ICD-10-PCS | Mod: CPTII,GC,S$GLB,

## 2023-07-06 PROCEDURE — 99213 PR OFFICE/OUTPT VISIT, EST, LEVL III, 20-29 MIN: ICD-10-PCS | Mod: GC,S$GLB,,

## 2023-07-06 PROCEDURE — 1125F PR PAIN SEVERITY QUANTIFIED, PAIN PRESENT: ICD-10-PCS | Mod: CPTII,GC,S$GLB,

## 2023-07-06 PROCEDURE — 3077F SYST BP >= 140 MM HG: CPT | Mod: CPTII,GC,S$GLB,

## 2023-07-06 PROCEDURE — 1125F AMNT PAIN NOTED PAIN PRSNT: CPT | Mod: CPTII,GC,S$GLB,

## 2023-07-06 PROCEDURE — 1101F PR PT FALLS ASSESS DOC 0-1 FALLS W/OUT INJ PAST YR: ICD-10-PCS | Mod: CPTII,GC,S$GLB,

## 2023-07-06 PROCEDURE — 3288F FALL RISK ASSESSMENT DOCD: CPT | Mod: CPTII,GC,S$GLB,

## 2023-07-06 PROCEDURE — 1159F MED LIST DOCD IN RCRD: CPT | Mod: CPTII,GC,S$GLB,

## 2023-07-06 PROCEDURE — 99999 PR PBB SHADOW E&M-EST. PATIENT-LVL IV: ICD-10-PCS | Mod: PBBFAC,GC,,

## 2023-07-06 PROCEDURE — 99999 PR PBB SHADOW E&M-EST. PATIENT-LVL IV: CPT | Mod: PBBFAC,GC,,

## 2023-07-06 PROCEDURE — 3078F DIAST BP <80 MM HG: CPT | Mod: CPTII,GC,S$GLB,

## 2023-07-06 PROCEDURE — 3077F PR MOST RECENT SYSTOLIC BLOOD PRESSURE >= 140 MM HG: ICD-10-PCS | Mod: CPTII,GC,S$GLB,

## 2023-07-06 PROCEDURE — 3288F PR FALLS RISK ASSESSMENT DOCUMENTED: ICD-10-PCS | Mod: CPTII,GC,S$GLB,

## 2023-07-06 PROCEDURE — 1101F PT FALLS ASSESS-DOCD LE1/YR: CPT | Mod: CPTII,GC,S$GLB,

## 2023-07-06 RX ORDER — PREDNISONE 20 MG/1
20 TABLET ORAL 2 TIMES DAILY
Qty: 10 TABLET | Refills: 0 | Status: SHIPPED | OUTPATIENT
Start: 2023-07-06 | End: 2023-07-11

## 2023-07-06 NOTE — PROGRESS NOTES
Subjective     Chief Complaint:    History of Present Illness:  Ms. Grace Navarro is a 77 y.o. female chronic LBP, sciatica of RLE presenting for acute exacerbation of sciatica. Noted sxs worsened acutely on 7/3 when attempting to lift an object from a low height. On 7/4, patient noted she could barely ambulate. Has since regained ability to ambulate, but pain level much worse than baseline pain. Usually controlled via exercises, lidocaine patches, and occasional meloxicam. None of these interventions have sufficiently relieved her exacerbation pain thus far. Traveling to [facility] in 2 weeks.     Review of Systems   Constitutional:  Negative for chills and fever.   HENT: Negative.     Eyes: Negative.    Respiratory: Negative.     Cardiovascular: Negative.    Gastrointestinal: Negative.    Genitourinary: Negative.    Musculoskeletal:  Negative for falls.        RLE pain    Skin: Negative.    Neurological: Negative.    Endo/Heme/Allergies: Negative.    Psychiatric/Behavioral: Negative.       PAST HISTORY:     Past Medical History:   Diagnosis Date    Anxiety     Rene's disease     Cataract     Chronic low back pain     Depression     Goiter, nodular     Hyperlipidemia     Hypertension     Irritable bowel     Neuromuscular disorder     Osteopenia of multiple sites 5/29/2017    PUD (peptic ulcer disease)     Subarachnoid hemorrhage 2014    Varicose veins        Past Surgical History:   Procedure Laterality Date    APPENDECTOMY      BELPHAROPTOSIS REPAIR Bilateral     BLEPHAROPLASTY, UPPER EYELID Bilateral 11/2/2022    Procedure: BLEPHAROPLASTY, UPPER EYELID;  Surgeon: Bina Umana MD;  Location: [facility];  Service: Ophthalmology;  Laterality: Bilateral;    BREAST BIOPSY Right     RIGHT-axilla    CATARACT EXTRACTION W/  INTRAOCULAR LENS IMPLANT Left 08/03/2016        CATARACT EXTRACTION W/  INTRAOCULAR LENS IMPLANT Right 09/21/2016        SPINE SURGERY  07/2012    Fusion @ L4L5     TARSAL STRIPPING Right 11/2/2022    Procedure: STRIPPING, TARSAL;  Surgeon: Bina Umana MD;  Location: [facility];  Service: Ophthalmology;  Laterality: Right;    TONSILLECTOMY         Family History   Problem Relation Age of Onset    Hypertension Mother     Kidney failure Mother     Hypertension Father     Coronary artery disease Father     Prostate cancer Father     Stroke Father     Eczema Son     Diabetes Brother     Thyroid cancer Neg Hx     Breast cancer Neg Hx     Colon cancer Neg Hx     Ovarian cancer Neg Hx     Amblyopia Neg Hx     Blindness Neg Hx     Glaucoma Neg Hx     Macular degeneration Neg Hx     Retinal detachment Neg Hx     Strabismus Neg Hx        Social History     Tobacco Use    Smoking status: Former     Packs/day: 1.00     Years: 10.00     Pack years: 10.00     Types: Cigarettes    Smokeless tobacco: Never    Tobacco comments:     quit 1975   Substance Use Topics    Alcohol use: Yes     Alcohol/week: 14.0 standard drinks     Types: 14 Glasses of wine per week     Comment: socially    Drug use: No       MEDICATIONS & ALLERGIES:     Current Outpatient Medications on File Prior to Visit   Medication Sig    ascorbic acid, vitamin C, (VITAMIN C) 1000 MG tablet Take 1,000 mg by mouth once daily.    CALCIUM-MAGNESIUM ORAL Take 1 capsule by mouth 2 (two) times a day. 1000mg of calcium and 500mg Magnesium    carvediloL (COREG) 12.5 MG tablet Take 12.5mg qam and 25mg tab qpm.    carvediloL (COREG) 25 MG tablet Take 12.5mg qam and 25mg qpm.    cholecalciferol, vitamin D3, 1,000 unit capsule Take 1,000 Units by mouth once daily.    cyanocobalamin 500 MCG tablet Take 500 mcg by mouth once daily.    cycloSPORINE (RESTASIS) 0.05 % ophthalmic emulsion Place 1 drop into both eyes 2 (two) times daily.    diclofenac sodium (VOLTAREN) 1 % Gel Apply 2 g topically 2 (two) times daily.    dicyclomine (BENTYL) 10 MG capsule Take 1 capsule (10 mg total) by mouth 4 (four) times daily before meals and nightly.     "EScitalopram oxalate (LEXAPRO) 5 MG Tab Take 1/2 pill nightly.    ezetimibe (ZETIA) 10 mg tablet TAKE 1 TABLET EVERY DAY    fluorouraciL (EFUDEX) 5 % cream Use hs for 2 weeks for back of hands    hydroCHLOROthiazide (HYDRODIURIL) 12.5 MG Tab Take 1 tablet (12.5 mg total) by mouth once daily.    hydrOXYzine HCL (ATARAX) 25 MG tablet Take 1 tablet (25 mg total) by mouth 3 (three) times daily as needed for Itching.    imiquimod (ALDARA) 5 % cream Us hs on wart    meloxicam (MOBIC) 15 MG tablet Take 1 tablet by mouth once daily    triamcinolone acetonide 0.1% (KENALOG) 0.1 % cream APPLY  CREAM EXTERNALLY TO AFFECTED AREA TWICE DAILY AS NEEDED FOR 7 DAYS     Current Facility-Administered Medications on File Prior to Visit   Medication    [DISCONTINUED] triamcinolone acetonide injection 40 mg       Review of patient's allergies indicates:   Allergen Reactions    Ace inhibitors Other (See Comments)     Pt not sure which medication it was - was told by doctor that she was allergic    Amlodipine      flushing    Ibuprofen      Elevate blood pressure    Morphine Other (See Comments)     hallucination    Statins-hmg-coa reductase inhibitors      Muscle cramps       OBJECTIVE:     Vital Signs:  Vitals:    07/06/23 1517   BP: (!) 146/78   BP Location: Right arm   Patient Position: Sitting   BP Method: Medium (Manual)   Pulse: 71   Temp: 97.5 °F (36.4 °C)   TempSrc: Temporal   SpO2: 99%   Weight: 68 kg (149 lb 14.6 oz)   Height: 5' 4" (1.626 m)       Body mass index is 25.73 kg/m².     Physical Exam  Constitutional:       Appearance: Normal appearance.   HENT:      Head: Normocephalic and atraumatic.      Right Ear: External ear normal.      Left Ear: External ear normal.      Nose: Nose normal.      Mouth/Throat:      Mouth: Mucous membranes are moist.      Pharynx: Oropharynx is clear.   Eyes:      Extraocular Movements: Extraocular movements intact.      Pupils: Pupils are equal, round, and reactive to light.   Cardiovascular: "      Rate and Rhythm: Normal rate and regular rhythm.      Pulses: Normal pulses.      Heart sounds: Normal heart sounds.   Pulmonary:      Effort: Pulmonary effort is normal.      Breath sounds: Normal breath sounds.   Abdominal:      General: Abdomen is flat.      Palpations: Abdomen is soft.   Musculoskeletal:         General: Normal range of motion.      Cervical back: Normal range of motion and neck supple.      Comments: No radicular pain reproduced on straight leg raise   Skin:     General: Skin is warm and dry.   Neurological:      General: No focal deficit present.      Mental Status: She is alert and oriented to person, place, and time.      Motor: No weakness.      Coordination: Coordination normal.      Gait: Gait abnormal.      Comments: Antalgic gait due to pain in RLE    Psychiatric:         Mood and Affect: Mood normal.         Behavior: Behavior normal.     There are no preventive care reminders to display for this patient.      ASSESSMENT & PLAN:   Ms. Grace Navarro is a 77 y.o. female with chronic LBP, sciatica of RLE presenting for acute exacerbation of sciatica. Pain not sufficiently relieved with usual regimen, persistnet mild difficulty ambulating. Likely sciatic nerve flair from strenuous lift on Monday 7/3/23. No red flag symptoms.   PLAN:  -prednisone 20mg BID for 5d  -return precautions if pain worsens or if red flag symptoms such as urinary incontinence or lower extremity weakness occurs          Sciatica, unspecified laterality  -     predniSONE (DELTASONE) 20 MG tablet; Take 1 tablet (20 mg total) by mouth 2 (two) times daily. for 5 days  Dispense: 10 tablet; Refill: 0        RTC in: as needed or if LBP worsens    Discussed with Dr. Gallegos - staff attestation to follow    Galilea Jacques MD  Internal Medicine, PGY-I  [facility]  [address], [place] [id number] [phone]

## 2023-07-07 ENCOUNTER — TELEPHONE (OUTPATIENT)
Dept: PRIMARY CARE CLINIC | Facility: CLINIC | Age: 78
End: 2023-07-07
Payer: MEDICARE

## 2023-07-07 ENCOUNTER — PATIENT MESSAGE (OUTPATIENT)
Dept: INTERNAL MEDICINE | Facility: CLINIC | Age: 78
End: 2023-07-07
Payer: MEDICARE

## 2023-07-07 NOTE — TELEPHONE ENCOUNTER
----- Message from Nan Dee sent at 7/7/2023  1:13 PM CDT -----  Contact: Pt [phone]  Pt called and stated that she was seen yesterday and was prescribed prednisone since taking it she is experiencing weakness and tiredness.     Please call

## 2023-07-07 NOTE — TELEPHONE ENCOUNTER
Pt advised she can stop prednisone per Dr. Mejía.   States she is in pain all over, and is concerned about going on trip feeling this way.

## 2023-07-07 NOTE — TELEPHONE ENCOUNTER
Patient states she is feeling weak and tired after starting the prednisone last night, and also took one one this am. Denies any heart palpitations, no new muscle pains.   Saw provider yesterday for sciatica.     Any other suggestions for pain at this time?     Thanks

## 2023-07-10 ENCOUNTER — PATIENT MESSAGE (OUTPATIENT)
Dept: PRIMARY CARE CLINIC | Facility: CLINIC | Age: 78
End: 2023-07-10
Payer: MEDICARE

## 2023-07-10 NOTE — TELEPHONE ENCOUNTER
B, can you check on pt for LBP? I can maybe send in muscle relaxers if needed. Problem is, she likely won't get much benefit out of PT if she's leaving in 1.5 weeks (not even sure if she can get in before that). We can maybe try naproxen BID x7 days but ibuprofen in the past elevated her BP. Let me know margaux she says and if we need to do an eval - can take 11:40 am today w/ me if needed.

## 2023-07-10 NOTE — TELEPHONE ENCOUNTER
Grace Gonzalez Staff (supporting Lisa Dean MD)      10:27 AM  Dr. Dean: happily I have been improving. Your assistant Miss Duncan helped me a lot with my problem while you were not at the office.  I am going to be travelling to see my family in [facility] and coming back in town after August 18, 2023.   I will prefer to see you after 2 or 3 weeks.  I would appreciate an appointment with you for September if you will still be at the office.  Thanks  Grace Navarro    (Apt was booked for 9/12)

## 2023-07-12 ENCOUNTER — OFFICE VISIT (OUTPATIENT)
Dept: INTERNAL MEDICINE | Facility: CLINIC | Age: 78
End: 2023-07-12
Payer: MEDICARE

## 2023-07-12 ENCOUNTER — PATIENT MESSAGE (OUTPATIENT)
Dept: PRIMARY CARE CLINIC | Facility: CLINIC | Age: 78
End: 2023-07-12
Payer: MEDICARE

## 2023-07-12 VITALS
BODY MASS INDEX: 25.11 KG/M2 | WEIGHT: 147.06 LBS | HEART RATE: 75 BPM | SYSTOLIC BLOOD PRESSURE: 148 MMHG | DIASTOLIC BLOOD PRESSURE: 90 MMHG | OXYGEN SATURATION: 96 % | HEIGHT: 64 IN

## 2023-07-12 DIAGNOSIS — M47.812 CERVICAL ARTHRITIS: ICD-10-CM

## 2023-07-12 DIAGNOSIS — M54.2 NECK PAIN: Primary | ICD-10-CM

## 2023-07-12 PROCEDURE — 1125F AMNT PAIN NOTED PAIN PRSNT: CPT | Mod: CPTII,S$GLB,, | Performed by: INTERNAL MEDICINE

## 2023-07-12 PROCEDURE — 1160F RVW MEDS BY RX/DR IN RCRD: CPT | Mod: CPTII,S$GLB,, | Performed by: INTERNAL MEDICINE

## 2023-07-12 PROCEDURE — 1159F PR MEDICATION LIST DOCUMENTED IN MEDICAL RECORD: ICD-10-PCS | Mod: CPTII,S$GLB,, | Performed by: INTERNAL MEDICINE

## 2023-07-12 PROCEDURE — 99213 OFFICE O/P EST LOW 20 MIN: CPT | Mod: S$GLB,,, | Performed by: INTERNAL MEDICINE

## 2023-07-12 PROCEDURE — 1160F PR REVIEW ALL MEDS BY PRESCRIBER/CLIN PHARMACIST DOCUMENTED: ICD-10-PCS | Mod: CPTII,S$GLB,, | Performed by: INTERNAL MEDICINE

## 2023-07-12 PROCEDURE — 3288F PR FALLS RISK ASSESSMENT DOCUMENTED: ICD-10-PCS | Mod: CPTII,S$GLB,, | Performed by: INTERNAL MEDICINE

## 2023-07-12 PROCEDURE — 3080F DIAST BP >= 90 MM HG: CPT | Mod: CPTII,S$GLB,, | Performed by: INTERNAL MEDICINE

## 2023-07-12 PROCEDURE — 1101F PT FALLS ASSESS-DOCD LE1/YR: CPT | Mod: CPTII,S$GLB,, | Performed by: INTERNAL MEDICINE

## 2023-07-12 PROCEDURE — 3080F PR MOST RECENT DIASTOLIC BLOOD PRESSURE >= 90 MM HG: ICD-10-PCS | Mod: CPTII,S$GLB,, | Performed by: INTERNAL MEDICINE

## 2023-07-12 PROCEDURE — 99213 PR OFFICE/OUTPT VISIT, EST, LEVL III, 20-29 MIN: ICD-10-PCS | Mod: S$GLB,,, | Performed by: INTERNAL MEDICINE

## 2023-07-12 PROCEDURE — 99999 PR PBB SHADOW E&M-EST. PATIENT-LVL V: ICD-10-PCS | Mod: PBBFAC,,, | Performed by: INTERNAL MEDICINE

## 2023-07-12 PROCEDURE — 1101F PR PT FALLS ASSESS DOC 0-1 FALLS W/OUT INJ PAST YR: ICD-10-PCS | Mod: CPTII,S$GLB,, | Performed by: INTERNAL MEDICINE

## 2023-07-12 PROCEDURE — 3077F PR MOST RECENT SYSTOLIC BLOOD PRESSURE >= 140 MM HG: ICD-10-PCS | Mod: CPTII,S$GLB,, | Performed by: INTERNAL MEDICINE

## 2023-07-12 PROCEDURE — 1159F MED LIST DOCD IN RCRD: CPT | Mod: CPTII,S$GLB,, | Performed by: INTERNAL MEDICINE

## 2023-07-12 PROCEDURE — 1125F PR PAIN SEVERITY QUANTIFIED, PAIN PRESENT: ICD-10-PCS | Mod: CPTII,S$GLB,, | Performed by: INTERNAL MEDICINE

## 2023-07-12 PROCEDURE — 3288F FALL RISK ASSESSMENT DOCD: CPT | Mod: CPTII,S$GLB,, | Performed by: INTERNAL MEDICINE

## 2023-07-12 PROCEDURE — 99999 PR PBB SHADOW E&M-EST. PATIENT-LVL V: CPT | Mod: PBBFAC,,, | Performed by: INTERNAL MEDICINE

## 2023-07-12 PROCEDURE — 3077F SYST BP >= 140 MM HG: CPT | Mod: CPTII,S$GLB,, | Performed by: INTERNAL MEDICINE

## 2023-07-12 RX ORDER — CYCLOBENZAPRINE HCL 5 MG
5 TABLET ORAL NIGHTLY PRN
Qty: 7 TABLET | Refills: 0 | Status: ON HOLD | OUTPATIENT
Start: 2023-07-12 | End: 2023-08-20

## 2023-07-12 NOTE — PROGRESS NOTES
Subjective:       Patient ID: Grace Navarro is a 77 y.o. female.   Chief Complaint: Neck Pain    HPI: Last week had sciatica and it slowly improved.   This AM, early am awoke with left sided neck pain radiating to the shoulder and up the head.   Has had neck problems since age 19. This is the worst pain or flair up in a while but feels it off and on regularly.   Had treatment over the years.   Usually take Tylenol including today.   Last week took Meloxicam for 3 days for sciatica. Also took a few Prednisone as prescribed but they caused increased heart rate so she stopped.   No trauma yesterday. Went for her usual walk.   Did not use any ice or heat. It is slightly less stiff than when it started.   The neck is slightly less stiff and less radiation to the left arm since this AM. Neck brace is helping. No rash noted. She has taken the Shingles Vaccine.     Review of Systems   Constitutional:  Negative for fever.   Musculoskeletal:  Positive for back pain, neck pain and neck stiffness.   Integumentary:  Negative for rash.        Objective:      Physical Exam  Constitutional:       Appearance: Normal appearance.   Neck:        Comments: Stiff, tender to move toward the left and right. Can band forward a little. Normal ROM of the right and left arm .  Cardiovascular:      Rate and Rhythm: Normal rate.      Pulses: Normal pulses.   Pulmonary:      Effort: Pulmonary effort is normal.   Chest:      Chest wall: No tenderness.   Abdominal:      Tenderness: There is no abdominal tenderness.   Musculoskeletal:         General: Tenderness present.      Cervical back: Normal range of motion. Rigidity and tenderness (left lateral and posterior muscles at the base.) present.   Skin:     Findings: No rash.   Neurological:      Mental Status: She is alert.       Assessment:       Problem List Items Addressed This Visit    None  Visit Diagnoses       Neck pain    -  Primary    Cervical arthritis                Plan:      "  Grace was seen today for neck pain.    Diagnoses and all orders for this visit:    Neck pain    Cervical arthritis    Other orders  -     cyclobenzaprine (FLEXERIL) 5 MG tablet; Take 1 tablet (5 mg total) by mouth nightly as needed for Muscle spasms.         Heat, meloxicam for a few days, stretching or gentle massage.   Drowsy precautions for the flexeril.   Folllow up with me or PCP ./        Portions of this note may have been created with voice recognition software. Occasional "wrong-word" or "sound-a-like" substitutions may have occurred due to the inherent limitations of voice recognition software. Please, read the note carefully and recognize, using context, where substitutions have occurred.  "

## 2023-07-12 NOTE — PATIENT INSTRUCTIONS
Heat,gentle massage/stretching.   Meloxicam or Diclofenac for a day or two.   Low dose Flexeril/Cyclobenzaprine which is a mild muscle relaxer to be used in the evening or bedtime.

## 2023-08-18 ENCOUNTER — TELEPHONE (OUTPATIENT)
Dept: PRIMARY CARE CLINIC | Facility: CLINIC | Age: 78
End: 2023-08-18
Payer: MEDICARE

## 2023-08-18 ENCOUNTER — PATIENT MESSAGE (OUTPATIENT)
Dept: PRIMARY CARE CLINIC | Facility: CLINIC | Age: 78
End: 2023-08-18
Payer: MEDICARE

## 2023-08-18 DIAGNOSIS — R56.9 NEW ONSET SEIZURE: Primary | ICD-10-CM

## 2023-08-18 NOTE — TELEPHONE ENCOUNTER
Spoke w/ pt, she stated she just got home late last night. Advised me that she already got a call from Neuro and has appt.   Stated that when she was on the plane, remembers standing in line for the restroom, felt a little funny, and then remembered waking up and people taking care of her and telling her that she had a seizure  She is not currently having any headaches, vision problems, or confusion. States she is tired from long flight and wants to rest.  States that she does not want to go to a primary care Dr if it is not her PCP.

## 2023-08-18 NOTE — TELEPHONE ENCOUNTER
If still w/ symptoms need to go to ER. Please see how her BPs are running. Can stop hctz. If monday 1240 is not taken by the other pt we offered to can take that slot. Otherwise, would have to follow up w/ another MD. Referral to neurology for new onset seizure but that'll take a while to get in.

## 2023-08-18 NOTE — TELEPHONE ENCOUNTER
Pt resting, and will monitor b/p over wknd. She is ok w/ not seeing you until her appt 9/12. Let us know if you want earlier appt.

## 2023-08-18 NOTE — TELEPHONE ENCOUNTER
Jacque said first apt for neuro was November, she is sending  a message over to scheduling pool. Will also route this thread.

## 2023-08-19 ENCOUNTER — HOSPITAL ENCOUNTER (OUTPATIENT)
Facility: HOSPITAL | Age: 78
Discharge: HOME | End: 2023-08-20
Attending: EMERGENCY MEDICINE | Admitting: HOSPITALIST
Payer: MEDICARE

## 2023-08-19 ENCOUNTER — NURSE TRIAGE (OUTPATIENT)
Dept: ADMINISTRATIVE | Facility: CLINIC | Age: 78
End: 2023-08-19
Payer: MEDICARE

## 2023-08-19 DIAGNOSIS — R07.9 CHEST PAIN: ICD-10-CM

## 2023-08-19 DIAGNOSIS — I10 UNCONTROLLED HYPERTENSION: ICD-10-CM

## 2023-08-19 DIAGNOSIS — R03.0 ELEVATED BLOOD PRESSURE READING: ICD-10-CM

## 2023-08-19 DIAGNOSIS — R55 NEAR SYNCOPE: ICD-10-CM

## 2023-08-19 DIAGNOSIS — I16.0 HYPERTENSIVE URGENCY: Primary | ICD-10-CM

## 2023-08-19 LAB
ALBUMIN SERPL BCP-MCNC: 4.4 G/DL (ref 3.5–5.2)
ALP SERPL-CCNC: 72 U/L (ref 55–135)
ALT SERPL W/O P-5'-P-CCNC: 18 U/L (ref 10–44)
ANION GAP SERPL CALC-SCNC: 13 MMOL/L (ref 8–16)
AST SERPL-CCNC: 22 U/L (ref 10–40)
BASOPHILS # BLD AUTO: 0.04 K/UL (ref 0–0.2)
BASOPHILS NFR BLD: 0.8 % (ref 0–1.9)
BILIRUB SERPL-MCNC: 0.4 MG/DL (ref 0.1–1)
BILIRUB UR QL STRIP: NEGATIVE
BUN SERPL-MCNC: 14 MG/DL (ref 8–23)
CALCIUM SERPL-MCNC: 9.9 MG/DL (ref 8.7–10.5)
CHLORIDE SERPL-SCNC: 103 MMOL/L (ref 95–110)
CLARITY UR: CLEAR
CO2 SERPL-SCNC: 27 MMOL/L (ref 23–29)
COLOR UR: COLORLESS
CREAT SERPL-MCNC: 0.7 MG/DL (ref 0.5–1.4)
DIFFERENTIAL METHOD: ABNORMAL
EOSINOPHIL # BLD AUTO: 0.1 K/UL (ref 0–0.5)
EOSINOPHIL NFR BLD: 2.4 % (ref 0–8)
ERYTHROCYTE [DISTWIDTH] IN BLOOD BY AUTOMATED COUNT: 12.9 % (ref 11.5–14.5)
EST. GFR  (NO RACE VARIABLE): >60 ML/MIN/1.73 M^2
GLUCOSE SERPL-MCNC: 98 MG/DL (ref 70–110)
GLUCOSE UR QL STRIP: NEGATIVE
HCT VFR BLD AUTO: 38.3 % (ref 37–48.5)
HGB BLD-MCNC: 12.9 G/DL (ref 12–16)
HGB UR QL STRIP: ABNORMAL
IMM GRANULOCYTES # BLD AUTO: 0 K/UL (ref 0–0.04)
IMM GRANULOCYTES NFR BLD AUTO: 0 % (ref 0–0.5)
KETONES UR QL STRIP: NEGATIVE
LEUKOCYTE ESTERASE UR QL STRIP: NEGATIVE
LYMPHOCYTES # BLD AUTO: 1.2 K/UL (ref 1–4.8)
LYMPHOCYTES NFR BLD: 24.2 % (ref 18–48)
MCH RBC QN AUTO: 31.6 PG (ref 27–31)
MCHC RBC AUTO-ENTMCNC: 33.7 G/DL (ref 32–36)
MCV RBC AUTO: 94 FL (ref 82–98)
MICROSCOPIC COMMENT: NORMAL
MONOCYTES # BLD AUTO: 0.5 K/UL (ref 0.3–1)
MONOCYTES NFR BLD: 10 % (ref 4–15)
NEUTROPHILS # BLD AUTO: 3.1 K/UL (ref 1.8–7.7)
NEUTROPHILS NFR BLD: 62.6 % (ref 38–73)
NITRITE UR QL STRIP: NEGATIVE
NRBC BLD-RTO: 0 /100 WBC
PH UR STRIP: 5 [PH] (ref 5–8)
PLATELET # BLD AUTO: 211 K/UL (ref 150–450)
PMV BLD AUTO: 9.6 FL (ref 9.2–12.9)
POTASSIUM SERPL-SCNC: 3.8 MMOL/L (ref 3.5–5.1)
PROT SERPL-MCNC: 7.8 G/DL (ref 6–8.4)
PROT UR QL STRIP: NEGATIVE
RBC # BLD AUTO: 4.08 M/UL (ref 4–5.4)
RBC #/AREA URNS HPF: 1 /HPF (ref 0–4)
SODIUM SERPL-SCNC: 143 MMOL/L (ref 136–145)
SP GR UR STRIP: 1.01 (ref 1–1.03)
TROPONIN I SERPL DL<=0.01 NG/ML-MCNC: 0.03 NG/ML (ref 0–0.03)
TROPONIN I SERPL DL<=0.01 NG/ML-MCNC: 0.03 NG/ML (ref 0–0.03)
URN SPEC COLLECT METH UR: ABNORMAL
UROBILINOGEN UR STRIP-ACNC: NEGATIVE EU/DL
WBC # BLD AUTO: 4.92 K/UL (ref 3.9–12.7)
WBC #/AREA URNS HPF: 0 /HPF (ref 0–5)

## 2023-08-19 PROCEDURE — 99285 EMERGENCY DEPT VISIT HI MDM: CPT | Mod: 25,HCNC

## 2023-08-19 PROCEDURE — 80053 COMPREHEN METABOLIC PANEL: CPT | Mod: HCNC | Performed by: NURSE PRACTITIONER

## 2023-08-19 PROCEDURE — 96374 THER/PROPH/DIAG INJ IV PUSH: CPT | Mod: HCNC

## 2023-08-19 PROCEDURE — 96376 TX/PRO/DX INJ SAME DRUG ADON: CPT | Mod: HCNC

## 2023-08-19 PROCEDURE — 25000003 PHARM REV CODE 250: Mod: HCNC | Performed by: EMERGENCY MEDICINE

## 2023-08-19 PROCEDURE — 81000 URINALYSIS NONAUTO W/SCOPE: CPT | Mod: HCNC | Performed by: NURSE PRACTITIONER

## 2023-08-19 PROCEDURE — 93010 EKG 12-LEAD: ICD-10-PCS | Mod: HCNC,,, | Performed by: INTERNAL MEDICINE

## 2023-08-19 PROCEDURE — 93010 ELECTROCARDIOGRAM REPORT: CPT | Mod: HCNC,,, | Performed by: INTERNAL MEDICINE

## 2023-08-19 PROCEDURE — G0378 HOSPITAL OBSERVATION PER HR: HCPCS | Mod: HCNC

## 2023-08-19 PROCEDURE — 25000003 PHARM REV CODE 250: Mod: HCNC | Performed by: NURSE PRACTITIONER

## 2023-08-19 PROCEDURE — 85025 COMPLETE CBC W/AUTO DIFF WBC: CPT | Mod: HCNC | Performed by: NURSE PRACTITIONER

## 2023-08-19 PROCEDURE — 93005 ELECTROCARDIOGRAM TRACING: CPT | Mod: HCNC

## 2023-08-19 PROCEDURE — 96372 THER/PROPH/DIAG INJ SC/IM: CPT | Mod: 59 | Performed by: NURSE PRACTITIONER

## 2023-08-19 PROCEDURE — 63600175 PHARM REV CODE 636 W HCPCS: Mod: HCNC | Performed by: NURSE PRACTITIONER

## 2023-08-19 PROCEDURE — 84484 ASSAY OF TROPONIN QUANT: CPT | Mod: HCNC | Performed by: NURSE PRACTITIONER

## 2023-08-19 PROCEDURE — 63600175 PHARM REV CODE 636 W HCPCS: Mod: HCNC | Performed by: EMERGENCY MEDICINE

## 2023-08-19 PROCEDURE — 84484 ASSAY OF TROPONIN QUANT: CPT | Mod: 91,HCNC | Performed by: EMERGENCY MEDICINE

## 2023-08-19 RX ORDER — IBUPROFEN 200 MG
24 TABLET ORAL
Status: DISCONTINUED | OUTPATIENT
Start: 2023-08-20 | End: 2023-08-20 | Stop reason: HOSPADM

## 2023-08-19 RX ORDER — ENOXAPARIN SODIUM 100 MG/ML
40 INJECTION SUBCUTANEOUS EVERY 24 HOURS
Status: DISCONTINUED | OUTPATIENT
Start: 2023-08-19 | End: 2023-08-20 | Stop reason: HOSPADM

## 2023-08-19 RX ORDER — HYDRALAZINE HYDROCHLORIDE 20 MG/ML
10 INJECTION INTRAMUSCULAR; INTRAVENOUS EVERY 8 HOURS PRN
Status: DISCONTINUED | OUTPATIENT
Start: 2023-08-20 | End: 2023-08-20

## 2023-08-19 RX ORDER — HYDRALAZINE HYDROCHLORIDE 20 MG/ML
10 INJECTION INTRAMUSCULAR; INTRAVENOUS
Status: COMPLETED | OUTPATIENT
Start: 2023-08-19 | End: 2023-08-19

## 2023-08-19 RX ORDER — IBUPROFEN 200 MG
16 TABLET ORAL
Status: DISCONTINUED | OUTPATIENT
Start: 2023-08-20 | End: 2023-08-20 | Stop reason: HOSPADM

## 2023-08-19 RX ORDER — CYCLOBENZAPRINE HCL 5 MG
5 TABLET ORAL NIGHTLY PRN
Status: DISCONTINUED | OUTPATIENT
Start: 2023-08-20 | End: 2023-08-20 | Stop reason: HOSPADM

## 2023-08-19 RX ORDER — ONDANSETRON 2 MG/ML
4 INJECTION INTRAMUSCULAR; INTRAVENOUS EVERY 8 HOURS PRN
Status: DISCONTINUED | OUTPATIENT
Start: 2023-08-20 | End: 2023-08-20 | Stop reason: HOSPADM

## 2023-08-19 RX ORDER — LABETALOL HYDROCHLORIDE 5 MG/ML
10 INJECTION, SOLUTION INTRAVENOUS EVERY 6 HOURS PRN
Status: DISCONTINUED | OUTPATIENT
Start: 2023-08-20 | End: 2023-08-20

## 2023-08-19 RX ORDER — CARVEDILOL 12.5 MG/1
12.5 TABLET ORAL DAILY
Status: DISCONTINUED | OUTPATIENT
Start: 2023-08-20 | End: 2023-08-20

## 2023-08-19 RX ORDER — ACETAMINOPHEN 325 MG/1
650 TABLET ORAL EVERY 4 HOURS PRN
Status: DISCONTINUED | OUTPATIENT
Start: 2023-08-20 | End: 2023-08-20 | Stop reason: HOSPADM

## 2023-08-19 RX ORDER — GLUCAGON 1 MG
1 KIT INJECTION
Status: DISCONTINUED | OUTPATIENT
Start: 2023-08-20 | End: 2023-08-20 | Stop reason: HOSPADM

## 2023-08-19 RX ORDER — CARVEDILOL 25 MG/1
25 TABLET ORAL NIGHTLY
Status: DISCONTINUED | OUTPATIENT
Start: 2023-08-20 | End: 2023-08-20

## 2023-08-19 RX ORDER — SODIUM CHLORIDE 0.9 % (FLUSH) 0.9 %
3 SYRINGE (ML) INJECTION EVERY 12 HOURS PRN
Status: DISCONTINUED | OUTPATIENT
Start: 2023-08-20 | End: 2023-08-20 | Stop reason: HOSPADM

## 2023-08-19 RX ORDER — MELOXICAM 7.5 MG/1
15 TABLET ORAL DAILY
Status: DISCONTINUED | OUTPATIENT
Start: 2023-08-20 | End: 2023-08-20 | Stop reason: HOSPADM

## 2023-08-19 RX ORDER — TALC
6 POWDER (GRAM) TOPICAL NIGHTLY PRN
Status: DISCONTINUED | OUTPATIENT
Start: 2023-08-20 | End: 2023-08-20 | Stop reason: HOSPADM

## 2023-08-19 RX ORDER — CYANOCOBALAMIN (VITAMIN B-12) 250 MCG
500 TABLET ORAL DAILY
Status: DISCONTINUED | OUTPATIENT
Start: 2023-08-20 | End: 2023-08-20 | Stop reason: HOSPADM

## 2023-08-19 RX ORDER — ESCITALOPRAM OXALATE 5 MG/1
5 TABLET ORAL NIGHTLY
Status: DISCONTINUED | OUTPATIENT
Start: 2023-08-19 | End: 2023-08-20 | Stop reason: HOSPADM

## 2023-08-19 RX ORDER — LABETALOL HYDROCHLORIDE 5 MG/ML
10 INJECTION, SOLUTION INTRAVENOUS
Status: DISCONTINUED | OUTPATIENT
Start: 2023-08-19 | End: 2023-08-19

## 2023-08-19 RX ORDER — CARVEDILOL 12.5 MG/1
12.5 TABLET ORAL 2 TIMES DAILY
Status: DISCONTINUED | OUTPATIENT
Start: 2023-08-19 | End: 2023-08-19

## 2023-08-19 RX ORDER — EZETIMIBE 10 MG/1
10 TABLET ORAL DAILY
Status: DISCONTINUED | OUTPATIENT
Start: 2023-08-20 | End: 2023-08-20 | Stop reason: HOSPADM

## 2023-08-19 RX ORDER — HYDRALAZINE HYDROCHLORIDE 20 MG/ML
10 INJECTION INTRAMUSCULAR; INTRAVENOUS ONCE AS NEEDED
Status: DISCONTINUED | OUTPATIENT
Start: 2023-08-19 | End: 2023-08-20

## 2023-08-19 RX ORDER — HYDROCHLOROTHIAZIDE 12.5 MG/1
12.5 TABLET ORAL DAILY
Status: DISCONTINUED | OUTPATIENT
Start: 2023-08-20 | End: 2023-08-20 | Stop reason: HOSPADM

## 2023-08-19 RX ORDER — NALOXONE HCL 0.4 MG/ML
0.02 VIAL (ML) INJECTION
Status: DISCONTINUED | OUTPATIENT
Start: 2023-08-20 | End: 2023-08-20 | Stop reason: HOSPADM

## 2023-08-19 RX ADMIN — ENOXAPARIN SODIUM 40 MG: 40 INJECTION SUBCUTANEOUS at 11:08

## 2023-08-19 RX ADMIN — CARVEDILOL 12.5 MG: 12.5 TABLET, FILM COATED ORAL at 10:08

## 2023-08-19 RX ADMIN — ESCITALOPRAM OXALATE 5 MG: 5 TABLET, FILM COATED ORAL at 11:08

## 2023-08-19 RX ADMIN — HYDRALAZINE HYDROCHLORIDE 10 MG: 20 INJECTION, SOLUTION INTRAMUSCULAR; INTRAVENOUS at 11:08

## 2023-08-19 RX ADMIN — HYDRALAZINE HYDROCHLORIDE 10 MG: 20 INJECTION, SOLUTION INTRAMUSCULAR; INTRAVENOUS at 09:08

## 2023-08-19 NOTE — TELEPHONE ENCOUNTER
Grace c/o high blood pressure. Returned Thursday from long trip from [place] and feels very tired. Pt reports head heaviness in mid forehead which is of new onset and mild headache. Reports 3 different BP readings of 194/98 ,188/89 P 56 & most recent /94 P56 at 1541. Currently takes Carvedilol 6.25 mg every morning and Cardevedilol 12.5 mg. Advised pt per triage protocol to go to nearest ED now for physician brett. Instructed to call  now if no immediate . Pt refusing care advice and requesting VV. Triager informed caller that VV could not be scheduled and reiterated care advice to pt to call  now. Pt  hangs up prior to triager ending call.   Reason for Disposition   [1] Systolic BP  >= 160 OR Diastolic >= 100 AND [2] cardiac (e.g., breathing difficulty, chest pain) or neurologic symptoms (e.g., new-onset blurred or double vision, unsteady gait)    Additional Information   Negative: Difficult to awaken or acting confused (e.g., disoriented, slurred speech)   Negative: SEVERE difficulty breathing (e.g., struggling for each breath, speaks in single words)   Negative: [1] Weakness of the face, arm or leg on one side of the body AND [2] new-onset   Negative: [1] Numbness (i.e., loss of sensation) of the face, arm or leg on one side of the body AND [2] new-onset   Negative: [1] Chest pain lasts > 5 minutes AND [2] history of heart disease (i.e., heart attack, bypass surgery, angina, angioplasty, CHF)   Negative: [1] Chest pain AND [2] took nitrogylcerin AND [3] pain was not relieved   Negative: Sounds like a life-threatening emergency to the triager    Protocols used: Blood Pressure - High-A-AH

## 2023-08-20 VITALS
OXYGEN SATURATION: 97 % | WEIGHT: 156.94 LBS | RESPIRATION RATE: 18 BRPM | BODY MASS INDEX: 26.94 KG/M2 | DIASTOLIC BLOOD PRESSURE: 63 MMHG | HEART RATE: 70 BPM | SYSTOLIC BLOOD PRESSURE: 139 MMHG | TEMPERATURE: 98 F

## 2023-08-20 PROBLEM — I16.0 HYPERTENSIVE URGENCY: Status: RESOLVED | Noted: 2023-08-19 | Resolved: 2023-08-20

## 2023-08-20 PROBLEM — R55 NEAR SYNCOPE: Status: RESOLVED | Noted: 2023-08-20 | Resolved: 2023-08-20

## 2023-08-20 PROBLEM — R55 NEAR SYNCOPE: Status: ACTIVE | Noted: 2023-08-20

## 2023-08-20 LAB
ANION GAP SERPL CALC-SCNC: 14 MMOL/L (ref 8–16)
BASOPHILS # BLD AUTO: 0.02 K/UL (ref 0–0.2)
BASOPHILS NFR BLD: 0.4 % (ref 0–1.9)
BUN SERPL-MCNC: 15 MG/DL (ref 8–23)
CALCIUM SERPL-MCNC: 9.3 MG/DL (ref 8.7–10.5)
CHLORIDE SERPL-SCNC: 101 MMOL/L (ref 95–110)
CO2 SERPL-SCNC: 26 MMOL/L (ref 23–29)
CREAT SERPL-MCNC: 0.7 MG/DL (ref 0.5–1.4)
DIFFERENTIAL METHOD: ABNORMAL
EOSINOPHIL # BLD AUTO: 0 K/UL (ref 0–0.5)
EOSINOPHIL NFR BLD: 0.4 % (ref 0–8)
ERYTHROCYTE [DISTWIDTH] IN BLOOD BY AUTOMATED COUNT: 13.2 % (ref 11.5–14.5)
EST. GFR  (NO RACE VARIABLE): >60 ML/MIN/1.73 M^2
GLUCOSE SERPL-MCNC: 122 MG/DL (ref 70–110)
HCT VFR BLD AUTO: 38.9 % (ref 37–48.5)
HGB BLD-MCNC: 12.9 G/DL (ref 12–16)
IMM GRANULOCYTES # BLD AUTO: 0.02 K/UL (ref 0–0.04)
IMM GRANULOCYTES NFR BLD AUTO: 0.4 % (ref 0–0.5)
LYMPHOCYTES # BLD AUTO: 0.7 K/UL (ref 1–4.8)
LYMPHOCYTES NFR BLD: 13 % (ref 18–48)
MCH RBC QN AUTO: 30.8 PG (ref 27–31)
MCHC RBC AUTO-ENTMCNC: 33.2 G/DL (ref 32–36)
MCV RBC AUTO: 93 FL (ref 82–98)
MONOCYTES # BLD AUTO: 0.5 K/UL (ref 0.3–1)
MONOCYTES NFR BLD: 9.3 % (ref 4–15)
NEUTROPHILS # BLD AUTO: 4.1 K/UL (ref 1.8–7.7)
NEUTROPHILS NFR BLD: 76.5 % (ref 38–73)
NRBC BLD-RTO: 0 /100 WBC
PLATELET # BLD AUTO: 220 K/UL (ref 150–450)
PMV BLD AUTO: 9.7 FL (ref 9.2–12.9)
POTASSIUM SERPL-SCNC: 3.3 MMOL/L (ref 3.5–5.1)
RBC # BLD AUTO: 4.19 M/UL (ref 4–5.4)
SODIUM SERPL-SCNC: 141 MMOL/L (ref 136–145)
WBC # BLD AUTO: 5.39 K/UL (ref 3.9–12.7)

## 2023-08-20 PROCEDURE — 99214 OFFICE O/P EST MOD 30 MIN: CPT | Mod: HCNC,,, | Performed by: INTERNAL MEDICINE

## 2023-08-20 PROCEDURE — 80048 BASIC METABOLIC PNL TOTAL CA: CPT | Mod: HCNC | Performed by: NURSE PRACTITIONER

## 2023-08-20 PROCEDURE — 63600175 PHARM REV CODE 636 W HCPCS: Mod: HCNC | Performed by: NURSE PRACTITIONER

## 2023-08-20 PROCEDURE — G0378 HOSPITAL OBSERVATION PER HR: HCPCS | Mod: HCNC

## 2023-08-20 PROCEDURE — 96375 TX/PRO/DX INJ NEW DRUG ADDON: CPT | Mod: HCNC

## 2023-08-20 PROCEDURE — 99214 PR OFFICE/OUTPT VISIT, EST, LEVL IV, 30-39 MIN: ICD-10-PCS | Mod: HCNC,,, | Performed by: INTERNAL MEDICINE

## 2023-08-20 PROCEDURE — 85025 COMPLETE CBC W/AUTO DIFF WBC: CPT | Mod: HCNC | Performed by: NURSE PRACTITIONER

## 2023-08-20 PROCEDURE — 36415 COLL VENOUS BLD VENIPUNCTURE: CPT | Mod: HCNC | Performed by: NURSE PRACTITIONER

## 2023-08-20 PROCEDURE — 25000003 PHARM REV CODE 250: Mod: HCNC | Performed by: NURSE PRACTITIONER

## 2023-08-20 PROCEDURE — 25000003 PHARM REV CODE 250: Mod: HCNC | Performed by: FAMILY MEDICINE

## 2023-08-20 RX ORDER — POTASSIUM CHLORIDE 750 MG/1
10 TABLET, EXTENDED RELEASE ORAL ONCE
Status: COMPLETED | OUTPATIENT
Start: 2023-08-20 | End: 2023-08-20

## 2023-08-20 RX ORDER — CARVEDILOL 6.25 MG/1
6.25 TABLET ORAL DAILY
Status: DISCONTINUED | OUTPATIENT
Start: 2023-08-20 | End: 2023-08-20

## 2023-08-20 RX ORDER — LABETALOL HYDROCHLORIDE 5 MG/ML
10 INJECTION, SOLUTION INTRAVENOUS EVERY 6 HOURS PRN
Status: DISCONTINUED | OUTPATIENT
Start: 2023-08-20 | End: 2023-08-20 | Stop reason: HOSPADM

## 2023-08-20 RX ORDER — CARVEDILOL 12.5 MG/1
12.5 TABLET ORAL NIGHTLY
Status: DISCONTINUED | OUTPATIENT
Start: 2023-08-20 | End: 2023-08-20

## 2023-08-20 RX ORDER — CARVEDILOL 12.5 MG/1
12.5 TABLET ORAL DAILY
Status: DISCONTINUED | OUTPATIENT
Start: 2023-08-20 | End: 2023-08-20 | Stop reason: HOSPADM

## 2023-08-20 RX ORDER — CARVEDILOL 25 MG/1
25 TABLET ORAL NIGHTLY
Status: DISCONTINUED | OUTPATIENT
Start: 2023-08-20 | End: 2023-08-20 | Stop reason: HOSPADM

## 2023-08-20 RX ADMIN — ONDANSETRON HYDROCHLORIDE 4 MG: 2 SOLUTION INTRAMUSCULAR; INTRAVENOUS at 12:08

## 2023-08-20 RX ADMIN — HYPROMELLOSE 2910 2 DROP: 5 SOLUTION/ DROPS OPHTHALMIC at 02:08

## 2023-08-20 RX ADMIN — POTASSIUM CHLORIDE 10 MEQ: 750 TABLET, EXTENDED RELEASE ORAL at 09:08

## 2023-08-20 RX ADMIN — CARVEDILOL 12.5 MG: 12.5 TABLET, FILM COATED ORAL at 08:08

## 2023-08-20 RX ADMIN — ACETAMINOPHEN 650 MG: 325 TABLET ORAL at 10:08

## 2023-08-20 RX ADMIN — HYDROCHLOROTHIAZIDE 12.5 MG: 12.5 TABLET ORAL at 08:08

## 2023-08-20 RX ADMIN — Medication 500 MCG: at 08:08

## 2023-08-20 NOTE — FIRST PROVIDER EVALUATION
Emergency Department TeleTriage Encounter Note      CHIEF COMPLAINT    Chief Complaint   Patient presents with    Hypertension     Reports several elevated BP readings today. Denies headache, chest pain, SOB. Only weakness. Last dose of BP mediation taken this morning. Will be due for next dose at 10 pm.        VITAL SIGNS   Initial Vitals [08/19/23 1921]   BP Pulse Resp Temp SpO2   (!) 214/98 76 17 98.2 °F (36.8 °C) 99 %      MAP       --            ALLERGIES    Review of patient's allergies indicates:   Allergen Reactions    Ace inhibitors Other (See Comments)     Pt not sure which medication it was - was told by doctor that she was allergic    Amlodipine      flushing    Ibuprofen      Elevate blood pressure    Morphine Other (See Comments)     hallucination    Statins-hmg-coa reductase inhibitors      Muscle cramps       PROVIDER TRIAGE NOTE  This is a teletriage evaluation of a 77 y.o. female presenting to the ED complaining of elevated BP readings today. PMhx of HTN. Reports mild headache. Denies CP and SOB.     Initial orders will be placed and care will be transferred to an alternate provider when patient is roomed for a full evaluation. Any additional orders and the final disposition will be determined by that provider.         ORDERS  Labs Reviewed   CBC W/ AUTO DIFFERENTIAL   COMPREHENSIVE METABOLIC PANEL   TROPONIN I   URINALYSIS, REFLEX TO URINE CULTURE       ED Orders (720h ago, onward)      Start Ordered     Status Ordering Provider    08/19/23 1935 08/19/23 1934  Cardiac Monitoring - Adult  Continuous        Comments: Notify Physician If:    Ordered LEROY COY N.    08/19/23 1934 08/19/23 1934  EKG 12-lead  Once         Ordered LEROY COY N.    08/19/23 1934 08/19/23 1934  CBC auto differential  STAT         Ordered LEROY COY N.    08/19/23 1934 08/19/23 1934  Comprehensive metabolic panel  STAT         Ordered LEROY COY N.    08/19/23 1934 08/19/23  1934  Troponin I  STAT         Ordered LEROY COY N.    08/19/23 1934 08/19/23 1934  Urinalysis, Reflex to Urine Culture Urine, Clean Catch  STAT         Ordered LEROY COY N.    08/19/23 1934 08/19/23 1934  Insert Saline lock IV  Once         Ordered LEROY COY.              Virtual Visit Note: The provider triage portion of this emergency department evaluation and documentation was performed via [employer], a HIPAA-compliant telemedicine application, in concert with a tele-presenter in the room. A face to face patient evaluation with one of my colleagues will occur once the patient is placed in an emergency department room.      DISCLAIMER: This note was prepared with [employer] voice recognition transcription software. Garbled syntax, mangled pronouns, and other bizarre constructions may be attributed to that software system.

## 2023-08-20 NOTE — ED NOTES
Pt became nauseous and vomited after position change , gibran np at bedside, zofran administered.

## 2023-08-20 NOTE — ASSESSMENT & PLAN NOTE
· x2 episodes witnessed in ED  · Associated with flushing and N/V  · S/p dropping BP from 223/107 to 136/69  · Likely orthostatic response to significant BP drop  · Patient does have PMH syncope  · Will obtain carotid US and echo

## 2023-08-20 NOTE — CONSULTS
[facility]  Cardiology  Consult Note    Patient Name: Grace Navarro  MRN: [id number]  Admission Date: 8/19/2023  Hospital Length of Stay: 0 days  Code Status: Full Code   Attending Provider: Shadi Kennedy MD   Consulting Provider: Matthew Corral MD  Primary Care Physician: Lisa Dean MD  Principal Problem:Hypertensive urgency    Patient information was obtained from patient and ER records.     Consults  Subjective:     Chief Complaint:  HTN     HPI:   76 y/o female with hx of HTN, HLD, who presented with HTN urgency and cardiology consulted for mildly elevated trop.  on arrival and normotensive overnight. No symptoms. Recent long flights to different countries. Has phone suzanne for BP monitoring. Trop 0.03-0.028.    Past Medical History:   Diagnosis Date    Anxiety     Rene's disease     Cataract     Chronic low back pain     Depression     Goiter, nodular     Hyperlipidemia     Hypertension     Irritable bowel     Neuromuscular disorder     Osteopenia of multiple sites 5/29/2017    PUD (peptic ulcer disease)     Subarachnoid hemorrhage 2014    Varicose veins        Past Surgical History:   Procedure Laterality Date    APPENDECTOMY      BELPHAROPTOSIS REPAIR Bilateral     BLEPHAROPLASTY, UPPER EYELID Bilateral 11/2/2022    Procedure: BLEPHAROPLASTY, UPPER EYELID;  Surgeon: Bina Umana MD;  Location: [facility] OR;  Service: Ophthalmology;  Laterality: Bilateral;    BREAST BIOPSY Right     RIGHT-axilla    CATARACT EXTRACTION W/  INTRAOCULAR LENS IMPLANT Left 08/03/2016        CATARACT EXTRACTION W/  INTRAOCULAR LENS IMPLANT Right 09/21/2016        SPINE SURGERY  07/2012    Fusion @ L4L5    TARSAL STRIPPING Right 11/2/2022    Procedure: STRIPPING, TARSAL;  Surgeon: Bina Umana MD;  Location: [facility] OR;  Service: Ophthalmology;  Laterality: Right;    TONSILLECTOMY         Review of patient's allergies indicates:   Allergen Reactions    Ace inhibitors Other (See Comments)      Pt not sure which medication it was - was told by doctor that she was allergic    Amlodipine      flushing    Ibuprofen      Elevate blood pressure    Morphine Other (See Comments)     hallucination    Statins-hmg-coa reductase inhibitors      Muscle cramps       No current facility-administered medications on file prior to encounter.     Current Outpatient Medications on File Prior to Encounter   Medication Sig    ascorbic acid, vitamin C, (VITAMIN C) 1000 MG tablet Take 1,000 mg by mouth 2 (two) times daily.    CALCIUM-MAGNESIUM ORAL Take 1 capsule by mouth 2 (two) times a day. 1000mg of calcium and 500mg Magnesium    carvediloL (COREG) 12.5 MG tablet Take 12.5mg qam and 25mg tab qpm.    carvediloL (COREG) 25 MG tablet Take 12.5mg qam and 25mg qpm.    cholecalciferol, vitamin D3, 1,000 unit capsule Take 1,000 Units by mouth 2 (two) times a day.    cyanocobalamin 500 MCG tablet Take 500 mcg by mouth once daily.    cycloSPORINE (RESTASIS) 0.05 % ophthalmic emulsion Place 1 drop into both eyes 2 (two) times daily.    diclofenac sodium (VOLTAREN) 1 % Gel Apply 2 g topically 2 (two) times daily.    EScitalopram oxalate (LEXAPRO) 5 MG Tab Take 1/2 pill nightly.    ezetimibe (ZETIA) 10 mg tablet TAKE 1 TABLET EVERY DAY    fluorouraciL (EFUDEX) 5 % cream Use hs for 2 weeks for back of hands    hydroCHLOROthiazide (HYDRODIURIL) 12.5 MG Tab Take 1 tablet (12.5 mg total) by mouth once daily.    hydrOXYzine HCL (ATARAX) 25 MG tablet Take 1 tablet (25 mg total) by mouth 3 (three) times daily as needed for Itching.    imiquimod (ALDARA) 5 % cream Us hs on wart    meloxicam (MOBIC) 15 MG tablet Take 1 tablet by mouth once daily    triamcinolone acetonide 0.1% (KENALOG) 0.1 % cream APPLY  CREAM EXTERNALLY TO AFFECTED AREA TWICE DAILY AS NEEDED FOR 7 DAYS    [DISCONTINUED] cyclobenzaprine (FLEXERIL) 5 MG tablet Take 1 tablet (5 mg total) by mouth nightly as needed for Muscle spasms.     Family History       Problem Relation (Age  of Onset)    Coronary artery disease Father    Diabetes Brother    Eczema Son    Hypertension Mother, Father    Kidney failure Mother    Prostate cancer Father    Stroke Father          Tobacco Use    Smoking status: Former     Current packs/day: 1.00     Average packs/day: 1 pack/day for 10.0 years (10.0 ttl pk-yrs)     Types: Cigarettes    Smokeless tobacco: Never    Tobacco comments:     quit 1975   Substance and Sexual Activity    Alcohol use: Yes     Alcohol/week: 14.0 standard drinks of alcohol     Types: 14 Glasses of wine per week     Comment: socially    Drug use: No    Sexual activity: Not Currently     Partners: Male     Comment:      Review of Systems   Constitutional: Negative for malaise/fatigue.   HENT:  Negative for congestion.    Eyes:  Negative for blurred vision.   Cardiovascular:  Negative for chest pain, claudication, cyanosis, dyspnea on exertion, irregular heartbeat, leg swelling, near-syncope, orthopnea, palpitations, paroxysmal nocturnal dyspnea and syncope.   Respiratory:  Negative for shortness of breath.    Endocrine: Negative for polyuria.   Hematologic/Lymphatic: Negative for bleeding problem.   Skin:  Negative for itching and rash.   Musculoskeletal:  Negative for joint swelling, muscle cramps and muscle weakness.   Gastrointestinal:  Negative for abdominal pain, hematemesis, hematochezia, melena, nausea and vomiting.   Genitourinary:  Negative for dysuria and hematuria.   Neurological:  Negative for dizziness, focal weakness, headaches, light-headedness, loss of balance and weakness.   Psychiatric/Behavioral:  Negative for depression. The patient is not nervous/anxious.      Objective:     Vital Signs (Most Recent):  Temp: 98.1 °F (36.7 °C) (08/20/23 1115)  Pulse: 78 (08/20/23 1115)  Resp: 18 (08/20/23 1115)  BP: 139/63 (08/20/23 1115)  SpO2: 97 % (08/20/23 1115) Vital Signs (24h Range):  Temp:  [97.5 °F (36.4 °C)-98.2 °F (36.8 °C)] 98.1 °F (36.7 °C)  Pulse:  [65-94] 78  Resp:   [16-20] 18  SpO2:  [95 %-100 %] 97 %  BP: (129-223)/() 139/63     Weight: 71.2 kg (156 lb 15.5 oz)  Body mass index is 26.94 kg/m².    SpO2: 97 %         Intake/Output Summary (Last 24 hours) at 8/20/2023 1115  Last data filed at 8/20/2023 0636  Gross per 24 hour   Intake 300 ml   Output 250 ml   Net 50 ml       Lines/Drains/Airways       Peripheral Intravenous Line  Duration                  Peripheral IV - Single Lumen 08/19/23 2300 22 G Right Antecubital <1 day                    Physical Exam  Constitutional:       Appearance: She is well-developed.   HENT:      Head: Normocephalic and atraumatic.   Neck:      Vascular: No JVD.   Cardiovascular:      Rate and Rhythm: Normal rate and regular rhythm.      Pulses:           Carotid pulses are 2+ on the right side and 2+ on the left side.       Radial pulses are 2+ on the right side and 2+ on the left side.        Femoral pulses are 2+ on the right side and 2+ on the left side.     Heart sounds: Normal heart sounds.   Pulmonary:      Effort: Pulmonary effort is normal.      Breath sounds: Normal breath sounds.   Abdominal:      General: Bowel sounds are normal.      Palpations: Abdomen is soft.   Musculoskeletal:      Cervical back: Neck supple.   Skin:     General: Skin is warm and dry.   Neurological:      Mental Status: She is alert and oriented to person, place, and time.   Psychiatric:         Behavior: Behavior normal.         Thought Content: Thought content normal.         Significant Labs:   Recent Lab Results         08/20/23  0617   08/19/23  2259   08/19/23  2031   08/19/23  1957        Albumin       4.4       Alkaline Phosphatase       72       ALT       18       Anion Gap 14       13       Appearance, UA     Clear         AST       22       Baso # 0.02       0.04       Basophil % 0.4       0.8       Bilirubin (UA)     Negative         BILIRUBIN TOTAL       0.4  Comment: For infants and newborns, interpretation of results should be based  on  gestational age, weight and in agreement with clinical  observations.    Premature Infant recommended reference ranges:  Up to 24 hours.............<8.0 mg/dL  Up to 48 hours............<12.0 mg/dL  3-5 days..................<15.0 mg/dL  6-29 days.................<15.0 mg/dL         BUN 15       14       Calcium 9.3       9.9       Chloride 101       103       CO2 26       27       Color, UA     Colorless         Creatinine 0.7       0.7       Differential Method Automated       Automated       eGFR >60       >60       Eos # 0.0       0.1       Eosinophil % 0.4       2.4       Glucose 122       98       Glucose, UA     Negative         Gran # (ANC) 4.1       3.1       Gran % 76.5       62.6       Hematocrit 38.9       38.3       Hemoglobin 12.9       12.9       Immature Grans (Abs) 0.02  Comment: Mild elevation in immature granulocytes is non specific and   can be seen in a variety of conditions including stress response,   acute inflammation, trauma and pregnancy. Correlation with other   laboratory and clinical findings is essential.         0.00  Comment: Mild elevation in immature granulocytes is non specific and   can be seen in a variety of conditions including stress response,   acute inflammation, trauma and pregnancy. Correlation with other   laboratory and clinical findings is essential.         Immature Granulocytes 0.4       0.0       Ketones, UA     Negative         Leukocytes, UA     Negative         Lymph # 0.7       1.2       Lymph % 13.0       24.2       MCH 30.8       31.6       MCHC 33.2       33.7       MCV 93       94       Microscopic Comment     SEE COMMENT  Comment: Other formed elements not mentioned in the report are not   present in the microscopic examination.            Mono # 0.5       0.5       Mono % 9.3       10.0       MPV 9.7       9.6       NITRITE UA     Negative         nRBC 0       0       Occult Blood UA     1+         pH, UA     5.0         Platelets 220       211        Potassium 3.3       3.8       PROTEIN TOTAL       7.8       Protein, UA     Negative  Comment: Recommend a 24 hour urine protein or a urine   protein/creatinine ratio if globulin induced proteinuria is  clinically suspected.           RBC 4.19       4.08       RBC, UA     1         RDW 13.2       12.9       Sodium 141       143       Specific [place], UA     1.010         Specimen UA     Urine, Clean Catch         Troponin I   0.028  Comment: The reference interval for Troponin I represents the 99th percentile   cutoff   for our facility and is consistent with 3rd generation assay   performance.       0.030  Comment: The reference interval for Troponin I represents the 99th percentile   cutoff   for our facility and is consistent with 3rd generation assay   performance.         UROBILINOGEN UA     Negative         WBC, UA     0         WBC 5.39       4.92               Assessment and Plan:     Active Diagnoses:    Diagnosis Date Noted POA    PRINCIPAL PROBLEM:  Hypertensive urgency [I16.0] 08/19/2023 Yes    Near syncope [R55] 08/20/2023 Yes    Other hyperlipidemia [E78.49]  Yes      Problems Resolved During this Admission:     HTN urgency  -now normotensive   -mildly elevated trop likely demand  -cont current regimen  -Pt told she is safe for D/C from cards perspective today. 2DE can be done as outpt. Will defer to primary team    HLD  -cont Xetia      VTE Risk Mitigation (From admission, onward)           Ordered     enoxaparin injection 40 mg  Daily         08/19/23 2307     IP VTE HIGH RISK PATIENT  Once         08/19/23 2307     Place sequential compression device  Until discontinued         08/19/23 2307                    Thank you for your consult. I will sign off. Please contact us if you have any additional questions.    Matthew Corral MD  Cardiology   [facility]

## 2023-08-20 NOTE — HPI
Grace Navarro is a 77 y.o. female who  has a past medical history of Anxiety, Rene's disease, Cataract, Chronic low back pain, Depression, Goiter, nodular, Hyperlipidemia, Hypertension, Irritable bowel, Neuromuscular disorder, Osteopenia of multiple sites (5/29/2017), PUD (peptic ulcer disease), Subarachnoid hemorrhage (2014), and Varicose veins.     The patient presents to the ED due to elevated blood pressure readings.  Patient states that her blood pressure has been high all day. She normally takes 6.25 mg of carvedilol in the morning and 12.5 at night. She denies any chest pain but was having abdominal pain earlier today.  She also reports she was having a headache.  No vomiting, diarrhea, or dizziness reported.  As soon as I walked in to patient's room, she started yelling at me.  She requested assistance getting up to the bathroom.  As I was actively assisting her to the side of the stretcher she had a near syncopal episode.  Patient assisted back to a lying position but when the RN came in to assist and patient once again attempted to get up, the same thing happened again.  Patient's BP at that time was noted to be 136/69.  On arrival it was 223/107.  Once patient was lying neck down she complained of N/V and a flushed feeling.  After a few minutes she was stable and able to go up to the floor where she was able to scoot herself from the stretcher to the bed without difficulty and answer questions.

## 2023-08-20 NOTE — H&P
[facility] Medicine  History & Physical    Patient Name: Grace Navarro  MRN: [id number]  Patient Class: OP- Observation  Admission Date: 8/19/2023  Attending Physician: Aristeo Fenton,*   Primary Care Provider: Lisa Dean MD         Patient information was obtained from patient, past medical records and ER records.     Subjective:     Principal Problem:Hypertensive urgency    Chief Complaint:   Chief Complaint   Patient presents with    Hypertension     Reports several elevated BP readings today. Denies headache, chest pain, SOB. Only weakness. Last dose of BP mediation taken this morning. Will be due for next dose at 10 pm.         HPI: Grace Navarro is a 77 y.o. female who  has a past medical history of Anxiety, Rene's disease, Cataract, Chronic low back pain, Depression, Goiter, nodular, Hyperlipidemia, Hypertension, Irritable bowel, Neuromuscular disorder, Osteopenia of multiple sites (5/29/2017), PUD (peptic ulcer disease), Subarachnoid hemorrhage (2014), and Varicose veins.     The patient presents to the ED due to elevated blood pressure readings.  Patient states that her blood pressure has been high all day. She normally takes 6.25 mg of carvedilol in the morning and 12.5 at night. She denies any chest pain but was having abdominal pain earlier today.  She also reports she was having a headache.  No vomiting, diarrhea, or dizziness reported.  As soon as I walked in to patient's room, she started yelling at me.  She requested assistance getting up to the bathroom.  As I was actively assisting her to the side of the stretcher she had a near syncopal episode.  Patient assisted back to a lying position but when the RN came in to assist and patient once again attempted to get up, the same thing happened again.  Patient's BP at that time was noted to be 136/69.  On arrival it was 223/107.  Once patient was lying neck down she complained of N/V and a flushed feeling.   After a few minutes she was stable and able to go up to the floor where she was able to scoot herself from the stretcher to the bed without difficulty and answer questions.         Past Medical History:   Diagnosis Date    Anxiety     Rene's disease     Cataract     Chronic low back pain     Depression     Goiter, nodular     Hyperlipidemia     Hypertension     Irritable bowel     Neuromuscular disorder     Osteopenia of multiple sites 5/29/2017    PUD (peptic ulcer disease)     Subarachnoid hemorrhage 2014    Varicose veins        Past Surgical History:   Procedure Laterality Date    APPENDECTOMY      BELPHAROPTOSIS REPAIR Bilateral     BLEPHAROPLASTY, UPPER EYELID Bilateral 11/2/2022    Procedure: BLEPHAROPLASTY, UPPER EYELID;  Surgeon: Bina Umana MD;  Location: [facility] OR;  Service: Ophthalmology;  Laterality: Bilateral;    BREAST BIOPSY Right     RIGHT-axilla    CATARACT EXTRACTION W/  INTRAOCULAR LENS IMPLANT Left 08/03/2016        CATARACT EXTRACTION W/  INTRAOCULAR LENS IMPLANT Right 09/21/2016        SPINE SURGERY  07/2012    Fusion @ L4L5    TARSAL STRIPPING Right 11/2/2022    Procedure: STRIPPING, TARSAL;  Surgeon: Bina Umana MD;  Location: [facility] OR;  Service: Ophthalmology;  Laterality: Right;    TONSILLECTOMY         Review of patient's allergies indicates:   Allergen Reactions    Ace inhibitors Other (See Comments)     Pt not sure which medication it was - was told by doctor that she was allergic    Amlodipine      flushing    Ibuprofen      Elevate blood pressure    Morphine Other (See Comments)     hallucination    Statins-hmg-coa reductase inhibitors      Muscle cramps       No current facility-administered medications on file prior to encounter.     Current Outpatient Medications on File Prior to Encounter   Medication Sig    ascorbic acid, vitamin C, (VITAMIN C) 1000 MG tablet Take 1,000 mg by mouth once daily.    CALCIUM-MAGNESIUM ORAL  Take 1 capsule by mouth 2 (two) times a day. 1000mg of calcium and 500mg Magnesium    carvediloL (COREG) 12.5 MG tablet Take 12.5mg qam and 25mg tab qpm.    carvediloL (COREG) 25 MG tablet Take 12.5mg qam and 25mg qpm.    cholecalciferol, vitamin D3, 1,000 unit capsule Take 1,000 Units by mouth once daily.    cyanocobalamin 500 MCG tablet Take 500 mcg by mouth once daily.    cyclobenzaprine (FLEXERIL) 5 MG tablet Take 1 tablet (5 mg total) by mouth nightly as needed for Muscle spasms.    cycloSPORINE (RESTASIS) 0.05 % ophthalmic emulsion Place 1 drop into both eyes 2 (two) times daily.    diclofenac sodium (VOLTAREN) 1 % Gel Apply 2 g topically 2 (two) times daily.    EScitalopram oxalate (LEXAPRO) 5 MG Tab Take 1/2 pill nightly.    ezetimibe (ZETIA) 10 mg tablet TAKE 1 TABLET EVERY DAY    fluorouraciL (EFUDEX) 5 % cream Use hs for 2 weeks for back of hands    hydroCHLOROthiazide (HYDRODIURIL) 12.5 MG Tab Take 1 tablet (12.5 mg total) by mouth once daily.    hydrOXYzine HCL (ATARAX) 25 MG tablet Take 1 tablet (25 mg total) by mouth 3 (three) times daily as needed for Itching.    imiquimod (ALDARA) 5 % cream Us hs on wart    meloxicam (MOBIC) 15 MG tablet Take 1 tablet by mouth once daily    triamcinolone acetonide 0.1% (KENALOG) 0.1 % cream APPLY  CREAM EXTERNALLY TO AFFECTED AREA TWICE DAILY AS NEEDED FOR 7 DAYS     Family History       Problem Relation (Age of Onset)    Coronary artery disease Father    Diabetes Brother    Eczema Son    Hypertension Mother, Father    Kidney failure Mother    Prostate cancer Father    Stroke Father          Tobacco Use    Smoking status: Former     Current packs/day: 1.00     Average packs/day: 1 pack/day for 10.0 years (10.0 ttl pk-yrs)     Types: Cigarettes    Smokeless tobacco: Never    Tobacco comments:     quit 1975   Substance and Sexual Activity    Alcohol use: Yes     Alcohol/week: 14.0 standard drinks of alcohol     Types: 14 Glasses of wine per week      Comment: socially    Drug use: No    Sexual activity: Not Currently     Partners: Male     Comment:      Review of Systems   Constitutional:  Negative for chills, fatigue and fever.   HENT:  Negative for trouble swallowing.    Eyes:  Negative for visual disturbance.   Respiratory:  Negative for shortness of breath.    Cardiovascular:  Negative for chest pain.   Gastrointestinal:  Positive for abdominal pain, nausea and vomiting.   Genitourinary:  Negative for difficulty urinating.   Musculoskeletal:  Negative for myalgias.   Skin:  Negative for color change.   Neurological:  Positive for headaches. Negative for seizures, facial asymmetry and speech difficulty.        Near-syncope   Psychiatric/Behavioral:  Negative for agitation and confusion. The patient is not nervous/anxious.      Objective:     Vital Signs (Most Recent):  Temp: 97.5 °F (36.4 °C) (08/20/23 0051)  Pulse: 76 (08/20/23 0051)  Resp: 16 (08/20/23 0051)  BP: (!) 145/65 (08/20/23 0051)  SpO2: 97 % (08/20/23 0051) Vital Signs (24h Range):  Temp:  [97.5 °F (36.4 °C)-98.2 °F (36.8 °C)] 97.5 °F (36.4 °C)  Pulse:  [65-94] 76  Resp:  [16-20] 16  SpO2:  [97 %-100 %] 97 %  BP: (145-223)/() 145/65     Weight: 68.5 kg (151 lb 0.2 oz)  Body mass index is 25.92 kg/m².     Physical Exam  Vitals and nursing note reviewed.   Constitutional:       General: She is not in acute distress.     Appearance: Normal appearance. She is ill-appearing.   HENT:      Head: Normocephalic.      Mouth/Throat:      Mouth: Mucous membranes are moist.   Eyes:      Pupils: Pupils are equal, round, and reactive to light.   Cardiovascular:      Rate and Rhythm: Normal rate and regular rhythm.      Pulses: Normal pulses.      Heart sounds: Normal heart sounds.   Pulmonary:      Effort: Pulmonary effort is normal. No respiratory distress.      Breath sounds: Normal breath sounds. No wheezing, rhonchi or rales.   Abdominal:      General: Bowel sounds are normal. There is no  distension.      Palpations: Abdomen is soft.      Tenderness: There is no abdominal tenderness. There is no guarding or rebound.   Musculoskeletal:         General: Normal range of motion.      Cervical back: Normal range of motion.      Right lower leg: No edema.      Left lower leg: No edema.   Skin:     General: Skin is warm.   Neurological:      Mental Status: She is alert and oriented to person, place, and time.      Motor: Weakness present.   Psychiatric:         Mood and Affect: Mood normal.         Behavior: Behavior is agitated.         Thought Content: Thought content normal.         Judgment: Judgment normal.              CRANIAL NERVES     CN III, IV, VI   Pupils are equal, round, and reactive to light.       Significant Labs: All pertinent labs within the past 24 hours have been reviewed.    Significant Imaging: I have reviewed all pertinent imaging results/findings within the past 24 hours.    Assessment/Plan:     * Hypertensive urgency  Patient has a current diagnosis of hypertensive urgency (without evidence of end organ damage) which is controlled.  Latest blood pressure and vitals reviewed-   Temp:  [97.5 °F (36.4 °C)-98.2 °F (36.8 °C)]   Pulse:  [65-94]   Resp:  [16-20]   BP: (145-223)/()   SpO2:  [97 %-100 %] .   Patient currently off IV antihypertensives.   Home meds for hypertension were reviewed and noted below.   Hypertension Medications             carvediloL (COREG) 12.5 MG tablet Take 12.5mg qam and 25mg tab qpm.    carvediloL (COREG) 25 MG tablet Take 12.5mg qam and 25mg qpm.    hydroCHLOROthiazide (HYDRODIURIL) 12.5 MG Tab Take 1 tablet (12.5 mg total) by mouth once daily.          Medication adjustment for hospital antihypertensives is as follows- resume home BP meds, p.r.n. labetalol for SBP greater than 180    Will aim for controlled BP reduction by medications noted above. Monitor and mitigate end organ damage as indicated.    Near syncope  · x2 episodes witnessed in  ED  · Associated with flushing and N/V  · S/p dropping BP from 223/107 to 136/69  · Likely orthostatic response to significant BP drop  · Patient does have PMH syncope  · Will obtain carotid US and echo       Other hyperlipidemia  · Continue Zetia        VTE Risk Mitigation (From admission, onward)         Ordered     enoxaparin injection 40 mg  Daily         08/19/23 2307     IP VTE HIGH RISK PATIENT  Once         08/19/23 2307     Place sequential compression device  Until discontinued         08/19/23 2307                     On 08/20/2023, patient should be placed in hospital observation services under my care in collaboration with Aristeo Fenton MD.      Lisa Ham NP  Department of Hospital Medicine  [facility]

## 2023-08-20 NOTE — SUBJECTIVE & OBJECTIVE
Past Medical History:   Diagnosis Date    Anxiety     Rene's disease     Cataract     Chronic low back pain     Depression     Goiter, nodular     Hyperlipidemia     Hypertension     Irritable bowel     Neuromuscular disorder     Osteopenia of multiple sites 5/29/2017    PUD (peptic ulcer disease)     Subarachnoid hemorrhage 2014    Varicose veins        Past Surgical History:   Procedure Laterality Date    APPENDECTOMY      BELPHAROPTOSIS REPAIR Bilateral     BLEPHAROPLASTY, UPPER EYELID Bilateral 11/2/2022    Procedure: BLEPHAROPLASTY, UPPER EYELID;  Surgeon: Bina Umana MD;  Location: [facility] OR;  Service: Ophthalmology;  Laterality: Bilateral;    BREAST BIOPSY Right     RIGHT-axilla    CATARACT EXTRACTION W/  INTRAOCULAR LENS IMPLANT Left 08/03/2016        CATARACT EXTRACTION W/  INTRAOCULAR LENS IMPLANT Right 09/21/2016        SPINE SURGERY  07/2012    Fusion @ L4L5    TARSAL STRIPPING Right 11/2/2022    Procedure: STRIPPING, TARSAL;  Surgeon: Bina Umana MD;  Location: [facility] OR;  Service: Ophthalmology;  Laterality: Right;    TONSILLECTOMY         Review of patient's allergies indicates:   Allergen Reactions    Ace inhibitors Other (See Comments)     Pt not sure which medication it was - was told by doctor that she was allergic    Amlodipine      flushing    Ibuprofen      Elevate blood pressure    Morphine Other (See Comments)     hallucination    Statins-hmg-coa reductase inhibitors      Muscle cramps       No current facility-administered medications on file prior to encounter.     Current Outpatient Medications on File Prior to Encounter   Medication Sig    ascorbic acid, vitamin C, (VITAMIN C) 1000 MG tablet Take 1,000 mg by mouth once daily.    CALCIUM-MAGNESIUM ORAL Take 1 capsule by mouth 2 (two) times a day. 1000mg of calcium and 500mg Magnesium    carvediloL (COREG) 12.5 MG tablet Take 12.5mg qam and 25mg tab qpm.    carvediloL (COREG) 25 MG tablet Take 12.5mg qam and  25mg qpm.    cholecalciferol, vitamin D3, 1,000 unit capsule Take 1,000 Units by mouth once daily.    cyanocobalamin 500 MCG tablet Take 500 mcg by mouth once daily.    cyclobenzaprine (FLEXERIL) 5 MG tablet Take 1 tablet (5 mg total) by mouth nightly as needed for Muscle spasms.    cycloSPORINE (RESTASIS) 0.05 % ophthalmic emulsion Place 1 drop into both eyes 2 (two) times daily.    diclofenac sodium (VOLTAREN) 1 % Gel Apply 2 g topically 2 (two) times daily.    EScitalopram oxalate (LEXAPRO) 5 MG Tab Take 1/2 pill nightly.    ezetimibe (ZETIA) 10 mg tablet TAKE 1 TABLET EVERY DAY    fluorouraciL (EFUDEX) 5 % cream Use hs for 2 weeks for back of hands    hydroCHLOROthiazide (HYDRODIURIL) 12.5 MG Tab Take 1 tablet (12.5 mg total) by mouth once daily.    hydrOXYzine HCL (ATARAX) 25 MG tablet Take 1 tablet (25 mg total) by mouth 3 (three) times daily as needed for Itching.    imiquimod (ALDARA) 5 % cream Us hs on wart    meloxicam (MOBIC) 15 MG tablet Take 1 tablet by mouth once daily    triamcinolone acetonide 0.1% (KENALOG) 0.1 % cream APPLY  CREAM EXTERNALLY TO AFFECTED AREA TWICE DAILY AS NEEDED FOR 7 DAYS     Family History       Problem Relation (Age of Onset)    Coronary artery disease Father    Diabetes Brother    Eczema Son    Hypertension Mother, Father    Kidney failure Mother    Prostate cancer Father    Stroke Father          Tobacco Use    Smoking status: Former     Current packs/day: 1.00     Average packs/day: 1 pack/day for 10.0 years (10.0 ttl pk-yrs)     Types: Cigarettes    Smokeless tobacco: Never    Tobacco comments:     quit 1975   Substance and Sexual Activity    Alcohol use: Yes     Alcohol/week: 14.0 standard drinks of alcohol     Types: 14 Glasses of wine per week     Comment: socially    Drug use: No    Sexual activity: Not Currently     Partners: Male     Comment:      Review of Systems   Constitutional:  Negative for chills, fatigue and fever.   HENT:  Negative for trouble  swallowing.    Eyes:  Negative for visual disturbance.   Respiratory:  Negative for shortness of breath.    Cardiovascular:  Negative for chest pain.   Gastrointestinal:  Positive for abdominal pain, nausea and vomiting.   Genitourinary:  Negative for difficulty urinating.   Musculoskeletal:  Negative for myalgias.   Skin:  Negative for color change.   Neurological:  Positive for headaches. Negative for seizures, facial asymmetry and speech difficulty.        Near-syncope   Psychiatric/Behavioral:  Negative for agitation and confusion. The patient is not nervous/anxious.      Objective:     Vital Signs (Most Recent):  Temp: 97.5 °F (36.4 °C) (08/20/23 0051)  Pulse: 76 (08/20/23 0051)  Resp: 16 (08/20/23 0051)  BP: (!) 145/65 (08/20/23 0051)  SpO2: 97 % (08/20/23 0051) Vital Signs (24h Range):  Temp:  [97.5 °F (36.4 °C)-98.2 °F (36.8 °C)] 97.5 °F (36.4 °C)  Pulse:  [65-94] 76  Resp:  [16-20] 16  SpO2:  [97 %-100 %] 97 %  BP: (145-223)/() 145/65     Weight: 68.5 kg (151 lb 0.2 oz)  Body mass index is 25.92 kg/m².     Physical Exam  Vitals and nursing note reviewed.   Constitutional:       General: She is not in acute distress.     Appearance: Normal appearance. She is ill-appearing.   HENT:      Head: Normocephalic.      Mouth/Throat:      Mouth: Mucous membranes are moist.   Eyes:      Pupils: Pupils are equal, round, and reactive to light.   Cardiovascular:      Rate and Rhythm: Normal rate and regular rhythm.      Pulses: Normal pulses.      Heart sounds: Normal heart sounds.   Pulmonary:      Effort: Pulmonary effort is normal. No respiratory distress.      Breath sounds: Normal breath sounds. No wheezing, rhonchi or rales.   Abdominal:      General: Bowel sounds are normal. There is no distension.      Palpations: Abdomen is soft.      Tenderness: There is no abdominal tenderness. There is no guarding or rebound.   Musculoskeletal:         General: Normal range of motion.      Cervical back: Normal range of  motion.      Right lower leg: No edema.      Left lower leg: No edema.   Skin:     General: Skin is warm.   Neurological:      Mental Status: She is alert and oriented to person, place, and time.      Motor: Weakness present.   Psychiatric:         Mood and Affect: Mood normal.         Behavior: Behavior is agitated.         Thought Content: Thought content normal.         Judgment: Judgment normal.              CRANIAL NERVES     CN III, IV, VI   Pupils are equal, round, and reactive to light.       Significant Labs: All pertinent labs within the past 24 hours have been reviewed.    Significant Imaging: I have reviewed all pertinent imaging results/findings within the past 24 hours.

## 2023-08-20 NOTE — NURSING
Pt arrived to unit reporting headache, dry eyes, and nausea and asking for something to eat. Zofran was administered in ED shortly before pt arrived to unit. Only pudding cereal and crackers available at this time. Pt stated she cannot eat anything with milk and wants real food. Saltines provided at this time. EAMON, House Supervisor, notified of need for food/snacks. Plan of care, safety protocols and call light functions reviewed with pt. Bed in low/locked position with call light within reach, alarm set, and 2 side rails raised.

## 2023-08-20 NOTE — PLAN OF CARE
08/20/23 0137   Admission   Initial VN Admission Questions Complete   Communication Issues? None   Shift   Virtual Nurse - Rounding Complete   Virtual Nurse - Patient Verbalized Approval Of Camera Use;VN Rounding   Type of Frequent Check   Type Patient Rounds;Telemetry Monitoring   Safety/Activity   Patient Rounds bed in low position;bed wheels locked;call light in patient/parent reach;clutter free environment maintained;ID band on;visualized patient;placement of personal items at bedside   Safety Promotion/Fall Prevention assistive device/personal item within reach;bed alarm set;Fall Risk reviewed with patient/family;medications reviewed;side rails raised x 2;instructed to call staff for mobility   Positioning   Body Position position changed independently   Pain/Comfort/Sleep   Comfort/Acceptable Pain Level 2   Cardiac   Cardiac/Telemetry Monitor On Yes   ECG   Rhythm normal sinus rhythm     Admission questions completed. Introduced patient to VIP model, patient verbalized understanding. Educated patient on fall prevention protocol, updated plan of care. Opportunity given for pt's questions. All questions answered. Denies needs at this time.

## 2023-08-20 NOTE — NURSING
VN cued into room and permission given to adjust camera towards patient.  Reviewed AVS with patient and patient verbalized complete understanding on the discharge instructions.  Awaiting for friend for .  Instructed to call for wheelchair per Transport when she is ready to leave.  Voiced no other concerns.

## 2023-08-20 NOTE — PLAN OF CARE
08/20/23 0600   Shift   Virtual Nurse - Rounding Complete   Virtual Nurse - Patient Verbalized Approval Of Camera Use;VN Rounding   Type of Frequent Check   Type Patient Rounds;Telemetry Monitoring   Safety/Activity   Safety Promotion/Fall Prevention assistive device/personal item within reach;Fall Risk reviewed with patient/family;side rails raised x 2;instructed to call staff for mobility;bed alarm set;medications reviewed   Cardiac   Cardiac/Telemetry Monitor On Yes     Admission questions completed. Introduced patient to VIP model, patient verbalized understanding. Educated patient on fall prevention protocol, updated plan of care. Opportunity given for pt's questions. All questions answered.

## 2023-08-20 NOTE — DISCHARGE SUMMARY
[facility] Medicine  Discharge Summary      Patient Name: Grace Navarro  MRN: [id number]  Admission Date: 8/19/2023  Hospital Length of Stay: 0 days  Discharge Date and Time:  08/20/2023 1:15 PM  Attending Physician: Shadi Kennedy MD   Discharging Provider: Shadi Kennedy MD  Discharge Provider Team: Networked reference to record PCT   Primary Care Provider: Lisa Dean MD        HPI: HPI: Grace Navarro is a 77 y.o. female who  has a past medical history of Anxiety, Rene's disease, Cataract, Chronic low back pain, Depression, Goiter, nodular, Hyperlipidemia, Hypertension, Irritable bowel, Neuromuscular disorder, Osteopenia of multiple sites (5/29/2017), PUD (peptic ulcer disease), Subarachnoid hemorrhage (2014), and Varicose veins.     The patient presents to the ED due to elevated blood pressure readings.  Patient states that her blood pressure has been high all day. She normally takes 6.25 mg of carvedilol in the morning and 12.5 at night. She denies any chest pain but was having abdominal pain earlier today.  She also reports she was having a headache.  No vomiting, diarrhea, or dizziness reported.  As soon as I walked in to patient's room, she started yelling at me.  She requested assistance getting up to the bathroom.  As I was actively assisting her to the side of the stretcher she had a near syncopal episode.  Patient assisted back to a lying position but when the RN came in to assist and patient once again attempted to get up, the same thing happened again.  Patient's BP at that time was noted to be 136/69.  On arrival it was 223/107.  Once patient was lying neck down she complained of N/V and a flushed feeling.  After a few minutes she was stable and able to go up to the floor where she was able to scoot herself from the stretcher to the bed without difficulty and answer questions.    * No surgery found *      Hospital Course: patient admitted to hospital medicine overnight  and BP controlled after IV Labetalol. Then her BP medications started. Her labs mostly stable except mild elevated troponin. CT head is normal. Cardiology consulted who recommended outpatient echo. Patient is stable to be discharged home.    Consults:   Consults (From admission, onward)          Status Ordering Provider     Inpatient consult to Cardiology-[facility]  Once        Provider:  Matthew Corral MD    Acknowledged ANJU MARQUIS     IP consult to case management  Once        Provider:  (Not yet assigned)    Acknowledged SCOTT MELCHOR            Final Active Diagnoses:    Diagnosis Date Noted POA    Other hyperlipidemia [E78.49]  Yes      Problems Resolved During this Admission:    Diagnosis Date Noted Date Resolved POA    PRINCIPAL PROBLEM:  Hypertensive urgency [I16.0] 08/19/2023 08/20/2023 Yes    Near syncope [R55] 08/20/2023 08/20/2023 Yes      Discharged Condition: fair    Disposition: Home or Self Care    Follow Up:   Follow-up Information       Lisa Dean MD Follow up in 1 week(s).    Specialty: Internal Medicine  Contact information:  Clari HICKS [id number] [phone]                           Patient Instructions:      Ambulatory referral/consult to Cardiology   Standing Status: Future   Referral Priority: Routine Referral Type: Consultation   Referral Reason: Specialty Services Required   Requested Specialty: Cardiology   Number of Visits Requested: 1     Medications:  Reconciled Home Medications:      Medication List        CONTINUE taking these medications      ascorbic acid (vitamin C) 1000 MG tablet  Commonly known as: VITAMIN C  Take 1,000 mg by mouth 2 (two) times daily.     CALCIUM-MAGNESIUM ORAL  Take 1 capsule by mouth 2 (two) times a day. 1000mg of calcium and 500mg Magnesium     * carvediloL 25 MG tablet  Commonly known as: COREG  Take 12.5mg qam and 25mg qpm.     * carvediloL 12.5 MG tablet  Commonly known as: COREG  Take 12.5mg qam and 25mg tab qpm.      cholecalciferol (vitamin D3) 25 mcg (1,000 unit) capsule  Commonly known as: VITAMIN D3  Take 1,000 Units by mouth 2 (two) times a day.     cyanocobalamin 500 MCG tablet  Take 500 mcg by mouth once daily.     cycloSPORINE 0.05 % ophthalmic emulsion  Commonly known as: RESTASIS  Place 1 drop into both eyes 2 (two) times daily.     diclofenac sodium 1 % Gel  Commonly known as: VOLTAREN  Apply 2 g topically 2 (two) times daily.     EScitalopram oxalate 5 MG Tab  Commonly known as: LEXAPRO  Take 1/2 pill nightly.     ezetimibe 10 mg tablet  Commonly known as: ZETIA  TAKE 1 TABLET EVERY DAY     fluorouraciL 5 % cream  Commonly known as: EFUDEX  Use hs for 2 weeks for back of hands     hydroCHLOROthiazide 12.5 MG Tab  Commonly known as: HYDRODIURIL  Take 1 tablet (12.5 mg total) by mouth once daily.     hydrOXYzine HCL 25 MG tablet  Commonly known as: ATARAX  Take 1 tablet (25 mg total) by mouth 3 (three) times daily as needed for Itching.     imiquimod 5 % cream  Commonly known as: ALDARA  Us hs on wart     meloxicam 15 MG tablet  Commonly known as: MOBIC  Take 1 tablet by mouth once daily     triamcinolone acetonide 0.1% 0.1 % cream  Commonly known as: KENALOG  APPLY  CREAM EXTERNALLY TO AFFECTED AREA TWICE DAILY AS NEEDED FOR 7 DAYS           * This list has 2 medication(s) that are the same as other medications prescribed for you. Read the directions carefully, and ask your doctor or other care provider to review them with you.                  Significant Diagnostic Studies: Labs: BMP:   Recent Labs   Lab 08/19/23 1957 08/20/23  0617   GLU 98 122*    141   K 3.8 3.3*    101   CO2 27 26   BUN 14 15   CREATININE 0.7 0.7   CALCIUM 9.9 9.3   , CMP   Recent Labs   Lab 08/19/23 1957 08/20/23  0617    141   K 3.8 3.3*    101   CO2 27 26   GLU 98 122*   BUN 14 15   CREATININE 0.7 0.7   CALCIUM 9.9 9.3   PROT 7.8  --    ALBUMIN 4.4  --    BILITOT 0.4  --    ALKPHOS 72  --    AST 22  --    ALT 18   --    ANIONGAP 13 14   , CBC   Recent Labs   Lab 08/19/23 1957 08/20/23  0617   WBC 4.92 5.39   HGB 12.9 12.9   HCT 38.3 38.9    220   , and Troponin   Recent Labs   Lab 08/19/23 1957 08/19/23  2259   TROPONINI 0.030* 0.028*       Pending Diagnostic Studies:       Procedure Component Value Units Date/Time    Echo [[id number]]     Order Status: Sent Lab Status: No result           Indwelling Lines/Drains at time of discharge:   Lines/Drains/Airways       None                   Time spent on the discharge of patient: 30 minutes         Shadi Kennedy MD  Department of Hospital Medicine  [facility]

## 2023-08-20 NOTE — ASSESSMENT & PLAN NOTE
Patient has a current diagnosis of hypertensive urgency (without evidence of end organ damage) which is controlled.  Latest blood pressure and vitals reviewed-   Temp:  [97.5 °F (36.4 °C)-98.2 °F (36.8 °C)]   Pulse:  [65-94]   Resp:  [16-20]   BP: (145-223)/()   SpO2:  [97 %-100 %] .   Patient currently off IV antihypertensives.   Home meds for hypertension were reviewed and noted below.   Hypertension Medications             carvediloL (COREG) 12.5 MG tablet Take 12.5mg qam and 25mg tab qpm.    carvediloL (COREG) 25 MG tablet Take 12.5mg qam and 25mg qpm.    hydroCHLOROthiazide (HYDRODIURIL) 12.5 MG Tab Take 1 tablet (12.5 mg total) by mouth once daily.          Medication adjustment for hospital antihypertensives is as follows- resume home BP meds, p.r.n. labetalol for SBP greater than 180    Will aim for controlled BP reduction by medications noted above. Monitor and mitigate end organ damage as indicated.

## 2023-08-20 NOTE — ED PROVIDER NOTES
Encounter Date: 8/19/2023       History     Chief Complaint   Patient presents with    Hypertension     Reports several elevated BP readings today. Denies headache, chest pain, SOB. Only weakness. Last dose of BP mediation taken this morning. Will be due for next dose at 10 pm.      Grace Navarro is a 77 y.o. female who  has a past medical history of Anxiety, Rene's disease, Cataract, Chronic low back pain, Depression, Goiter, nodular, Hyperlipidemia, Hypertension, Irritable bowel, Neuromuscular disorder, Osteopenia of multiple sites (5/29/2017), PUD (peptic ulcer disease), Subarachnoid hemorrhage (2014), and Varicose veins.    The patient presents to the ED due to elevated blood pressure readings.  Patient states that her blood pressure has been high all day she normally takes 6.25 mg of carvedilol in the morning and 12.5 at night headache she denies any chest pain but was having abdominal pain earlier today.  No vomiting diarrhea or dizziness reported.  No other concerns at this time.    Patient also [place] and states that she had a syncopal episode on the return flight home back couple of days ago.    The history is provided by the patient.     Review of patient's allergies indicates:   Allergen Reactions    Ace inhibitors Other (See Comments)     Pt not sure which medication it was - was told by doctor that she was allergic    Amlodipine      flushing    Ibuprofen      Elevate blood pressure    Morphine Other (See Comments)     hallucination    Statins-hmg-coa reductase inhibitors      Muscle cramps     Past Medical History:   Diagnosis Date    Anxiety     Rene's disease     Cataract     Chronic low back pain     Depression     Goiter, nodular     Hyperlipidemia     Hypertension     Irritable bowel     Neuromuscular disorder     Osteopenia of multiple sites 5/29/2017    PUD (peptic ulcer disease)     Subarachnoid hemorrhage 2014    Varicose  veins      Past Surgical History:   Procedure Laterality Date    APPENDECTOMY      BELPHAROPTOSIS REPAIR Bilateral     BLEPHAROPLASTY, UPPER EYELID Bilateral 11/2/2022    Procedure: BLEPHAROPLASTY, UPPER EYELID;  Surgeon: Bina Umana MD;  Location: [facility] OR;  Service: Ophthalmology;  Laterality: Bilateral;    BREAST BIOPSY Right     RIGHT-axilla    CATARACT EXTRACTION W/  INTRAOCULAR LENS IMPLANT Left 08/03/2016        CATARACT EXTRACTION W/  INTRAOCULAR LENS IMPLANT Right 09/21/2016        SPINE SURGERY  07/2012    Fusion @ L4L5    TARSAL STRIPPING Right 11/2/2022    Procedure: STRIPPING, TARSAL;  Surgeon: Bina Umana MD;  Location: [facility] OR;  Service: Ophthalmology;  Laterality: Right;    TONSILLECTOMY       Family History   Problem Relation Age of Onset    Hypertension Mother     Kidney failure Mother     Hypertension Father     Coronary artery disease Father     Prostate cancer Father     Stroke Father     Eczema Son     Diabetes Brother     Thyroid cancer Neg Hx     Breast cancer Neg Hx     Colon cancer Neg Hx     Ovarian cancer Neg Hx     Amblyopia Neg Hx     Blindness Neg Hx     Glaucoma Neg Hx     Macular degeneration Neg Hx     Retinal detachment Neg Hx     Strabismus Neg Hx      Social History     Tobacco Use    Smoking status: Former     Current packs/day: 1.00     Average packs/day: 1 pack/day for 10.0 years (10.0 ttl pk-yrs)     Types: Cigarettes    Smokeless tobacco: Never    Tobacco comments:     quit 1975   Substance Use Topics    Alcohol use: Yes     Alcohol/week: 14.0 standard drinks of alcohol     Types: 14 Glasses of wine per week     Comment: socially    Drug use: No     Review of Systems   Constitutional:  Negative for chills and fever.   HENT:  Negative for sore throat.    Respiratory:  Negative for cough and shortness of breath.    Cardiovascular:  Negative for chest pain.   Gastrointestinal:  Negative for nausea and vomiting.    Genitourinary:  Negative for dysuria, frequency and urgency.   Musculoskeletal:  Negative for back pain, neck pain and neck stiffness.   Skin:  Negative for rash and wound.   Neurological:  Positive for headaches. Negative for syncope and weakness.   Hematological:  Does not bruise/bleed easily.   Psychiatric/Behavioral:  Negative for agitation, behavioral problems and confusion.        Physical Exam     Initial Vitals [08/19/23 1921]   BP Pulse Resp Temp SpO2   (!) 214/98 76 17 98.2 °F (36.8 °C) 99 %      MAP       --         Physical Exam    Nursing note and vitals reviewed.  Constitutional: She appears well-developed and well-nourished. She is not diaphoretic. No distress.   HENT:   Head: Normocephalic and atraumatic.   Mouth/Throat: Oropharynx is clear and moist.   Eyes: EOM are normal. Pupils are equal, round, and reactive to light.   Neck: No tracheal deviation present.   Cardiovascular:  Normal rate, regular rhythm, normal heart sounds and intact distal pulses.           Pulmonary/Chest: Breath sounds normal. No stridor. No respiratory distress. She has no wheezes.   Abdominal: Abdomen is soft. Bowel sounds are normal. She exhibits no distension and no mass. There is no abdominal tenderness.   Musculoskeletal:         General: No edema. Normal range of motion.     Neurological: She is alert and oriented to person, place, and time. No cranial nerve deficit or sensory deficit.   CN 2-12 intact  Finger to nose within normal limits    Equal strength to bilateral upper extremities, SILT  Equal strength to bilateral lower extremities, SILT     Skin: Skin is warm and dry. Capillary refill takes less than 2 seconds. No rash noted.   Psychiatric: She has a normal mood and affect. Her behavior is normal. Thought content normal.       ED Course   Procedures  Labs Reviewed   CBC W/ AUTO DIFFERENTIAL - Abnormal; Notable for the following components:       Result Value    MCH 31.6 (*)     All other components within  normal limits   TROPONIN I - Abnormal; Notable for the following components:    Troponin I 0.030 (*)     All other components within normal limits   URINALYSIS, REFLEX TO URINE CULTURE - Abnormal; Notable for the following components:    Color, UA Colorless (*)     Occult Blood UA 1+ (*)     All other components within normal limits    Narrative:     Specimen Source->Urine   TROPONIN I - Abnormal; Notable for the following components:    Troponin I 0.028 (*)     All other components within normal limits   COMPREHENSIVE METABOLIC PANEL   URINALYSIS MICROSCOPIC    Narrative:     Specimen Source->Urine          Imaging Results              CT Head Without Contrast (Final result)  Result time 08/19/23 22:15:00      Final result by Fili Banegas MD (08/19/23 22:15:00)                   Impression:      No acute abnormality.      Electronically signed by: Fili Banegas  Date:    08/19/2023  Time:    22:15               Narrative:    EXAMINATION:  CT HEAD WITHOUT CONTRAST    CLINICAL HISTORY:  Headache, sudden, severe;    TECHNIQUE:  Low dose axial CT images obtained throughout the head without intravenous contrast. Sagittal and coronal reconstructions were performed.    COMPARISON:  Of 03/01/2022    FINDINGS:  Intracranial compartment:    Ventricles and sulci are normal in size for age without evidence of hydrocephalus. No extra-axial blood or fluid collections.    The brain parenchyma appears normal. No parenchymal mass, hemorrhage, edema or major vascular distribution infarct.    Skull/extracranial contents (limited evaluation): No fracture. Mastoid air cells and paranasal sinuses are essentially clear.  Traumatic changes in the facial and periorbital region on the left have resolved.  Ocular lens replacements remain.                                       Medications   cyanocobalamin tablet 500 mcg (has no administration in time range)   cyclobenzaprine tablet 5 mg (has no administration in time range)    artificial tears 0.5 % ophthalmic solution 2 drop (2 drops Both Eyes Given 8/20/23 0209)   EScitalopram oxalate tablet 5 mg (5 mg Oral Given 8/19/23 2323)   ezetimibe tablet 10 mg (has no administration in time range)   hydroCHLOROthiazide tablet 12.5 mg (has no administration in time range)   meloxicam tablet 15 mg (has no administration in time range)   sodium chloride 0.9% flush 3 mL (has no administration in time range)   enoxaparin injection 40 mg (40 mg Subcutaneous Given 8/19/23 2325)   melatonin tablet 6 mg (has no administration in time range)   ondansetron injection 4 mg (4 mg Intravenous Given 8/20/23 0024)   naloxone 0.4 mg/mL injection 0.02 mg (has no administration in time range)   glucose chewable tablet 16 g (has no administration in time range)   glucose chewable tablet 24 g (has no administration in time range)   glucagon (human recombinant) injection 1 mg (has no administration in time range)   acetaminophen tablet 650 mg (has no administration in time range)   dextrose 10% bolus 125 mL 125 mL (has no administration in time range)   dextrose 10% bolus 250 mL 250 mL (has no administration in time range)   labetaloL injection 10 mg (has no administration in time range)   carvediloL tablet 25 mg (has no administration in time range)   carvediloL tablet 12.5 mg (has no administration in time range)   hydrALAZINE injection 10 mg (10 mg Intravenous Given 8/19/23 2118)     Medical Decision Making  Patient is a 77-year-old woman presenting with elevated blood pressure readings at home and here.  But remarkable hypertension.  Patient has no focal neuro deficits.  Will plan to obtain labs CT head, reassess.    Differential Diagnosis includes, but is not limited to:  Hypertensive encephalopathy, CVA/TIA, intracranial hemorrhage, MI/ACS, aortic/carotid artery dissection, AAA, hypertensive nephropathy, medication non-compliance, anxiety, drug abuse/intoxication, essential hypertension      Amount and/or  Complexity of Data Reviewed  Independent Historian: parent  Labs:  Decision-making details documented in ED Course.  Radiology: ordered.    Risk  Prescription drug management.  Decision regarding hospitalization.              Attending Attestation:         Attending Critical Care:   Critical Care Times:   ==============================================================  Total Critical Care Time - exclusive of procedural time: 33 minutes.  ==============================================================  Critical care was necessary to treat or prevent imminent or life-threatening deterioration of the following conditions: hypertensive urgency.         ED Course as of 23 0545   Sat Aug 19, 2023   2121 EK.  Normal sinus rhythm.  No STEMI.  No ectopy. [RN]    Comprehensive metabolic panel  No significant abnormality.    [RN]    Troponin I(!)  elevated [RN]    Urinalysis, Reflex to Urine Culture Urine, Clean Catch(!)  No significant abnormality.    [RN]    CBC auto differential(!)  No significant abnormality.    [RN]      ED Course User Index  [RN] Flo Richards Jr., MD               Medical Decision Making:   ED Management:  Troponin ordered in triage was positive, patient did report syncopal symptoms a couple of days ago however she is currently not having chest pain or shortness of breath.  Patient's blood pressure was noted to be significantly elevated this responded to treatment with hydralazine.  Concern for hypertensive urgency/emergency with elevated troponin.  Patient has never had elevated troponins documented.  Will plan to admit to [facility] medicine for further care.  Repeat troponin pending at time of shift change      Clinical Impression:   Final diagnoses:  [R03.0] Elevated blood pressure reading  [I16.0] Hypertensive urgency (Primary)  [I10] Uncontrolled hypertension        ED Disposition Condition    Observation Stable        Portions of this note were dictated using voice  recognition software and may contain dictation related errors in spelling/grammar/syntax not found on text review          Flo Richards Jr., MD  08/20/23 0549       Flo Richards Jr., MD  09/21/23 8921

## 2023-08-20 NOTE — PLAN OF CARE
08/20/23 1446   Final Note   Assessment Type Final Discharge Note   Anticipated Discharge Disposition Home   What phone number can be called within the next 1-3 days to see how you are doing after discharge? [phone]   Post-Acute Status   Discharge Delays None known at this time      spoke with pt regarding discharge plan to home. Pt lives alone, is independent, still drives and is retired. Pt has no medical equipment or  services. Pt informed SW that her friend would come to pick her up at D/C. No needs at this time.     SW sent message to schedulers for follow up for Cardiology.     Follow up appointments scheduled below.     Future Appointments   Date Time Provider Department Center   8/25/2023  1:00 PM Ashley Krishnan MD PhD [facility]   9/12/2023  1:00 PM [facility] [place]

## 2023-08-21 ENCOUNTER — PATIENT MESSAGE (OUTPATIENT)
Dept: PRIMARY CARE CLINIC | Facility: CLINIC | Age: 78
End: 2023-08-21
Payer: MEDICARE

## 2023-08-21 NOTE — TELEPHONE ENCOUNTER
Spoke w/ pt. She will keep appt w/ Neuro @ 1pm  Unable to schedule Friday w/ Dr Clark right now.

## 2023-08-25 ENCOUNTER — OFFICE VISIT (OUTPATIENT)
Dept: NEUROLOGY | Facility: CLINIC | Age: 78
End: 2023-08-25
Payer: MEDICARE

## 2023-08-25 VITALS
HEIGHT: 64 IN | BODY MASS INDEX: 24.82 KG/M2 | WEIGHT: 145.38 LBS | DIASTOLIC BLOOD PRESSURE: 77 MMHG | SYSTOLIC BLOOD PRESSURE: 139 MMHG | HEART RATE: 76 BPM

## 2023-08-25 DIAGNOSIS — F33.0 MILD EPISODE OF RECURRENT MAJOR DEPRESSIVE DISORDER: ICD-10-CM

## 2023-08-25 DIAGNOSIS — F41.1 GAD (GENERALIZED ANXIETY DISORDER): ICD-10-CM

## 2023-08-25 DIAGNOSIS — M43.10 SPONDYLOLISTHESIS, GRADE 1: ICD-10-CM

## 2023-08-25 DIAGNOSIS — Z86.79 HISTORY OF SUBARACHNOID HEMORRHAGE: ICD-10-CM

## 2023-08-25 DIAGNOSIS — I10 PRIMARY HYPERTENSION: ICD-10-CM

## 2023-08-25 DIAGNOSIS — I99.8 FLUCTUATING BLOOD PRESSURE: Primary | ICD-10-CM

## 2023-08-25 DIAGNOSIS — F33.1 MODERATE EPISODE OF RECURRENT MAJOR DEPRESSIVE DISORDER: ICD-10-CM

## 2023-08-25 PROCEDURE — 3288F PR FALLS RISK ASSESSMENT DOCUMENTED: ICD-10-PCS | Mod: HCNC,CPTII,S$GLB, | Performed by: PSYCHIATRY & NEUROLOGY

## 2023-08-25 PROCEDURE — 99205 OFFICE O/P NEW HI 60 MIN: CPT | Mod: HCNC,S$GLB,, | Performed by: PSYCHIATRY & NEUROLOGY

## 2023-08-25 PROCEDURE — 3075F SYST BP GE 130 - 139MM HG: CPT | Mod: HCNC,CPTII,S$GLB, | Performed by: PSYCHIATRY & NEUROLOGY

## 2023-08-25 PROCEDURE — 1160F RVW MEDS BY RX/DR IN RCRD: CPT | Mod: HCNC,CPTII,S$GLB, | Performed by: PSYCHIATRY & NEUROLOGY

## 2023-08-25 PROCEDURE — 1126F AMNT PAIN NOTED NONE PRSNT: CPT | Mod: HCNC,CPTII,S$GLB, | Performed by: PSYCHIATRY & NEUROLOGY

## 2023-08-25 PROCEDURE — 1101F PT FALLS ASSESS-DOCD LE1/YR: CPT | Mod: HCNC,CPTII,S$GLB, | Performed by: PSYCHIATRY & NEUROLOGY

## 2023-08-25 PROCEDURE — 3288F FALL RISK ASSESSMENT DOCD: CPT | Mod: HCNC,CPTII,S$GLB, | Performed by: PSYCHIATRY & NEUROLOGY

## 2023-08-25 PROCEDURE — 99999 PR PBB SHADOW E&M-EST. PATIENT-LVL V: CPT | Mod: PBBFAC,HCNC,, | Performed by: PSYCHIATRY & NEUROLOGY

## 2023-08-25 PROCEDURE — 3078F PR MOST RECENT DIASTOLIC BLOOD PRESSURE < 80 MM HG: ICD-10-PCS | Mod: HCNC,CPTII,S$GLB, | Performed by: PSYCHIATRY & NEUROLOGY

## 2023-08-25 PROCEDURE — 3075F PR MOST RECENT SYSTOLIC BLOOD PRESS GE 130-139MM HG: ICD-10-PCS | Mod: HCNC,CPTII,S$GLB, | Performed by: PSYCHIATRY & NEUROLOGY

## 2023-08-25 PROCEDURE — 1160F PR REVIEW ALL MEDS BY PRESCRIBER/CLIN PHARMACIST DOCUMENTED: ICD-10-PCS | Mod: HCNC,CPTII,S$GLB, | Performed by: PSYCHIATRY & NEUROLOGY

## 2023-08-25 PROCEDURE — 1159F MED LIST DOCD IN RCRD: CPT | Mod: HCNC,CPTII,S$GLB, | Performed by: PSYCHIATRY & NEUROLOGY

## 2023-08-25 PROCEDURE — 99999 PR PBB SHADOW E&M-EST. PATIENT-LVL V: ICD-10-PCS | Mod: PBBFAC,HCNC,, | Performed by: PSYCHIATRY & NEUROLOGY

## 2023-08-25 PROCEDURE — 99205 PR OFFICE/OUTPT VISIT, NEW, LEVL V, 60-74 MIN: ICD-10-PCS | Mod: HCNC,S$GLB,, | Performed by: PSYCHIATRY & NEUROLOGY

## 2023-08-25 PROCEDURE — 3078F DIAST BP <80 MM HG: CPT | Mod: HCNC,CPTII,S$GLB, | Performed by: PSYCHIATRY & NEUROLOGY

## 2023-08-25 PROCEDURE — 1101F PR PT FALLS ASSESS DOC 0-1 FALLS W/OUT INJ PAST YR: ICD-10-PCS | Mod: HCNC,CPTII,S$GLB, | Performed by: PSYCHIATRY & NEUROLOGY

## 2023-08-25 PROCEDURE — 1159F PR MEDICATION LIST DOCUMENTED IN MEDICAL RECORD: ICD-10-PCS | Mod: HCNC,CPTII,S$GLB, | Performed by: PSYCHIATRY & NEUROLOGY

## 2023-08-25 PROCEDURE — 1126F PR PAIN SEVERITY QUANTIFIED, NO PAIN PRESENT: ICD-10-PCS | Mod: HCNC,CPTII,S$GLB, | Performed by: PSYCHIATRY & NEUROLOGY

## 2023-08-25 RX ORDER — ESCITALOPRAM OXALATE 5 MG/1
5 TABLET ORAL NIGHTLY
Qty: 90 TABLET | Refills: 3 | Status: SHIPPED | OUTPATIENT
Start: 2023-08-25

## 2023-08-25 NOTE — PATIENT INSTRUCTIONS
You came to Neurology Clinic because of an episode of language difficulty on plane for a short time. You have had several visits where they found issues with your blood pressure. Any change in the blood pressure can cause your brain to not work as well. These episodes do not sound like seizure to me. I would like you to keep working on getting your blood pressure under control with the right balance of medications, eating habits, exercise, and staying hydrated. You can work on these issues with your primary care doctor and your cardiologist.     You exam and the imaging are very reassuring. I do not need any additional information or work up at this time. The best way to protect your brain is to keep your blood pressure in the correct range. You have had some difficultly coming down on the medication for mood. I would like you to go back to the original dose you were on especially while you are working on getting your pressure back in range. Anyone can continue this medication for you.     Do not miss any doses of medication. Go to sleep at the same time and wake up at the same time every day.     Per [place] law, no episodes of loss of consciousness for 6 months before driving.  Avoid dangerous situations.      Return to clinic with any new issues.  Please patient portal with any questions or concerns or new events.    Ashley Krishnan MD PhD [facility]  Neurology-Epilepsy  [facility].    Forms/Letters/Disability/DMV Paperwork: We understand the importance of filling out forms and providing letters in a timely manner.  However, many of these forms have very tricky language and once an official form is submitted as part of the medical record, it can not be modified or erased.  Please work with us in order to get these forms filled out in the most complete, accurate, and efficient way. 1.  Once you are aware that a form will need to be completed, please make an appointment.  A virtual appointment with  Dr. Krishnan or Miri is perfectly fine. 2.  Please fill out the form as much as you can.  There are many questions that we do not have an answer for.  Please bring a blank copy of the form and your partially filled out form to the clinic visit or send them to us over the portal.  We will complete the form together with you during the clinic visit, sign it, and either return it to you or send it to the correct destination.  Every form will require an appointment however we can fill out multiple forms at once if needed.  Please do not hesitate to reach out with any questions or concerns about this policy.  We are trying to make sure that we have a system in place to meet this need which works for everyone involved.  Thank you for your understanding.

## 2023-08-25 NOTE — PROGRESS NOTES
Name: Grace Navarro  MRN:[id number]   CSN: [id number]  Date of service: 2023  Age:77 y.o.   Gender:female   Referring Physician/Service: Lisa Dean  [place] [address]   The patient is here today with:  self    Neurology Clinic: Initial Visit    CHIEF COMPLAINT:  Episode of confusion while on a plane in the setting of labile blood pressure    HPI 2023:     Age of first event:  67yo -> fall while intoxicated, 78yo episode of cognitive slowing  Handedness: right  Risk Factors:  Traumatic orbital hematoma . No family history of seizures. Mom  young from kidney failure. No CNS infections. [place] her. Hurt her left side of her body. No issues around birth, 2021 syncope    Time of Last event: last ED 2023  # of lifetime events: one episode   Frequency: One event on one day   Triggers:  Alcohol, unclear, poor food intake, alcohol on the trip, eating a lot, gain weight, now she is anxious and has lost some of the weight  Injuries/Hospitalization? No injury, ED no admission   Driving? Yes   Pregnancy? 2 kids, no grandchilrden   Bone Health:  Dexa osteopenia  Mood: anxious, did not tell daughter anything, no SI, no HI, usually very energetic, feels awful, helping eating makes her feel better and she just had a big trip where she eat she wanted  Sleep: bed 11pm, wake 730-9, sleep pretty good, up to the bathroom      Auras: out of the blue     Seizure Events:   Hypotension, first flight to [place] on her trip from [facility].  Recommended to go to Neurology after this event. 2023 Talking strange for a very short time. Glass of orange juice made her feel better. Recovered. Comes home, 2023, takes her blood pressure at home to upload the info into the chart. Went again, very high. Phone said crisis. Got dressed. 202/106-227/100. Hyperlipidemia. Hypertensive urgency. Near syncope.  Hydralazine IV to lower the blood  "pressure, made her feel aweful. Nausea and vomitting. Medicine. Near syncope.     Current AED/Neuroleptics/SEs:  Hydroxyzine -> taking nightly   2. Carvedilol taking 3.6.25 in the morning 12.5 at night, recently cut in half because of hypotension     Previous AED/SEs or reason for DC.   None     EEG: none  CT brain: 8/19/2023 normal   MRI brain:none in the system    Other Allergies: reviewed      AED compliance, adherence: no missed doses. Pill box.    ROS 8/25/2023: Need echo to complete the workup for blood pressure lability. Headache -> heaviness from the headband, tired. Eyes very tired. Floaters. Nausea and vomitting, ER and the hospital. Nausea. Does not need to be treated, just a lingering sensation. Can drink water. Generalized weakness with cramping in her legs. Stretches at home. Otherwise, denies dysarthria, dysphagia, lightheadedness, vertigo, tinnitus or hearing difficulty. Denies difficulties producing or comprehending speech.  Denies focal weakness, numbness, paresthesias. Denies difficulty with gait. Denies cough, shortness of breath.  Denies chest pain or tightness, palpitations.  No bowel or bladder incontinence or retention.  No saddle anesthesia.  No falls.       EXAM:   - Vitals: /77 (BP Location: Left arm, Patient Position: Sitting)   Pulse 76   Ht 5' 4" (1.626 m)   Wt 65.9 kg (145 lb 6.3 oz)   LMP 06/14/1996   BMI 24.96 kg/m²    - General: Awake, cooperative, anxious.  - HEENT: NC/AT  - Neck:  Decreased range of motion, muscle spasms  - Pulmonary: no increased WOB  - Cardiac: well perfused   - Abdomen: soft, nontender, nondistended  - Extremities: no edema  - Skin: no rashes or lesions noted.     NEURO EXAM:   - Mental Status: Awake, alert, oriented x 3. Able to relate history without difficulty. Attentive to examiner. Language, English is not her first language but she is fluent with intact repetition and comprehension with subtle errors. Normal prosody. Able to name both high and " low frequency objects. Speech was not dysarthric. Able to follow both midline and appendicular commands. There was no evidence of apraxia or neglect.    - Cranial Nerves:  VFF to confrontation. EOMI. No facial droop. Hearing intact to finger-rub bilaterally. 5/5 strength in trapezii and SCM bilaterally. Tongue protrudes in midline and to either side with no evidence of atrophy or weakness.    - Motor: Normal bulk and tone throughout. No pronator drift bilaterally. No adventitious movements such as tremor or asterixis noted.     Delt Bic Tri WrE WrF  FFl FE IO IP Quad Ham TA Gastroc  R   5     5    5    5    5        5   4    5   5    5        5     5      5            L   5      5    5   5    5        5    4   5    5    5       5     5      5              - Sensory: No deficits to light touch. No extinction to DSS.  - Coordination: No dysmetria on FNF bilaterally.  - Gait: Good initiation. Narrow-based, normal stride and arm swing. Romberg feels off balance with eyes closed.    PLAN:  77-year-old woman with blood pressure liability causing cognitive slowing (recent episodes of both hypertension/hypotension).  Does not meet the criteria for the diagnosis of epilepsy or treatment with antiseizure medications.  Encouraged optimizing blood pressure management with PCP/cardiology.  While her blood pressure is so erratic, encouraged her to continue Lexapro at the full dose until she can optimize her blood pressure.  She has experienced significant anxiety with the lower dose.  Asked her to portal me with any new episodes. Follow up if symptoms worsen or fail to improve.     Patient Instructions   You came to Neurology Clinic because of an episode of language difficulty on plane for a short time. You have had several visits where they found issues with your blood pressure. Any change in the blood pressure can cause your brain to not work as well. These episodes do not sound like seizure to me. I would like you to keep  working on getting your blood pressure under control with the right balance of medications, eating habits, exercise, and staying hydrated. You can work on these issues with your primary care doctor and your cardiologist.     You exam and the imaging are very reassuring. I do not need any additional information or work up at this time. The best way to protect your brain is to keep your blood pressure in the correct range. You have had some difficultly coming down on the medication for mood. I would like you to go back to the original dose you were on especially while you are working on getting your pressure back in range. Anyone can continue this medication for you.     Do not miss any doses of medication. Go to sleep at the same time and wake up at the same time every day.     Per [place] law, no episodes of loss of consciousness for 6 months before driving.  Avoid dangerous situations.      Return to clinic with any new issues.  Please patient portal with any questions or concerns or new events.    Ashley Krishnan MD PhD [facility]  Neurology-Epilepsy  [facility].    Forms/Letters/Disability/DMV Paperwork: We understand the importance of filling out forms and providing letters in a timely manner.  However, many of these forms have very tricky language and once an official form is submitted as part of the medical record, it can not be modified or erased.  Please work with us in order to get these forms filled out in the most complete, accurate, and efficient way. 1.  Once you are aware that a form will need to be completed, please make an appointment.  A virtual appointment with Dr. Krishnan or Miri is perfectly fine. 2.  Please fill out the form as much as you can.  There are many questions that we do not have an answer for.  Please bring a blank copy of the form and your partially filled out form to the clinic visit or send them to us over the portal.  We will complete the form together with you during  the clinic visit, sign it, and either return it to you or send it to the correct destination.  Every form will require an appointment however we can fill out multiple forms at once if needed.  Please do not hesitate to reach out with any questions or concerns about this policy.  We are trying to make sure that we have a system in place to meet this need which works for everyone involved.  Thank you for your understanding.            Problem List Items Addressed This Visit       Hypertension    Spondylolisthesis, grade 1    History of subarachnoid hemorrhage    Overview     Due to trauma related to fall while intoxicated 2/14         Moderate episode of recurrent major depressive disorder    Relevant Medications    EScitalopram oxalate (LEXAPRO) 5 MG Tab    YOCASTA (generalized anxiety disorder)    Relevant Medications    EScitalopram oxalate (LEXAPRO) 5 MG Tab    Fluctuating blood pressure - Primary    Mild episode of recurrent major depressive disorder    Relevant Medications    EScitalopram oxalate (LEXAPRO) 5 MG Tab       More than 50% of the 53 minutes spent with the patient (as well as family/caregiver(s) was spent on face-to-face counseling. Total time spent on encounter: 73 minutes    Disclaimer: This note has been generated using voice-recognition software. There may be typographical errors that were missed during proof-reading.     LABS:  Recent Labs   Lab 03/08/22  1410 04/21/22  1228 07/21/22  1208 08/09/22  1551 03/29/23  0940 08/19/23  1957 08/20/23  0617   WBC 4.89  --  4.02 4.63   < > 4.92 5.39   Hemoglobin 11.1 L  --  12.1 12.2   < > 12.9 12.9   Hematocrit 36.7 L  --  38.7 36.8 L   < > 38.3 38.9   Platelets 216  --  208 194   < > 211 220   Sodium 141 141 141  --    < > 143 141   Potassium 4.0 4.4 4.1  --    < > 3.8 3.3 L   BUN 13 13 10  --    < > 14 15   Creatinine 0.6 0.6 0.6  --    < > 0.7 0.7   eGFR if African American >60.0 >60.0 >60.0  --   --   --   --    eGFR if non African American >60.0 >60.0  >60.0  --   --   --   --    TSH 1.042  --   --  0.939  --   --   --    Vitamin B-12  --   --   --  1584 H  --   --   --     < > = values in this interval not displayed.              IMAGING:  Recent imaging is personally reviewed with the patient.    Results for orders placed during the hospital encounter of 08/19/23    CT Head Without Contrast    Impression  No acute abnormality.      Electronically signed by: Fili Banegas  Date:    08/19/2023  Time:    22:15    Results for orders placed during the hospital encounter of 03/01/22    CT Cervical Spine Without Contrast    Impression  1. No acute displaced fracture or dislocation of the cervical spine noting motion artifact.  2. Please see above for several additional findings.      Electronically signed by: Dameon Zaman MD  Date:    03/01/2022  Time:    17:51    Results for orders placed during the hospital encounter of 12/10/21    DXA Bone Density Spine And Hip    Impression  Osteopenia      Electronically signed by: Korina Don MD  Date:    12/10/2021  Time:    14:17    PAST MEDICAL HISTORY:   Active Ambulatory Problems     Diagnosis Date Noted    Chronic low back pain     Hypertension     Other hyperlipidemia     Irritable bowel     PUD (peptic ulcer disease)     Varicose veins     Goiter, nodular     Spondylolisthesis, grade 1 09/11/2012    History of subarachnoid hemorrhage 02/27/2014    Left atrial enlargement 02/27/2014    Syncope 02/28/2014    Microscopic hematuria 04/10/2015    Lumbar radiculopathy 11/17/2015    Postlaminectomy syndrome of lumbar region 11/17/2015    Lumbar facet arthropathy 11/17/2015    Senile nuclear sclerosis 08/03/2016    Nuclear sclerotic cataract of right eye 09/21/2016    Osteopenia of multiple sites 05/29/2017    Pain of right upper extremity 01/02/2018    Weakness 01/02/2018    Excessive daytime sleepiness     GENOVEVA (obstructive sleep apnea)     Senile purpura 02/05/2020    Moderate episode of recurrent major depressive  disorder 09/26/2022    YOCASTA (generalized anxiety disorder) 09/26/2022    Fluctuating blood pressure 08/26/2023    Mild episode of recurrent major depressive disorder 08/26/2023     Resolved Ambulatory Problems     Diagnosis Date Noted    Neuromuscular disorder     Anxiety     Depression     Lumbago 07/12/2013    Easy bruising 11/13/2013    Traumatic orbital hematoma, left  02/08/2014    Decreased range of motion 02/03/2017    Decreased strength 02/03/2017    Neck pain 02/03/2017    Decreased functional mobility 02/03/2017    Chronic pain of both knees 01/19/2018    Weakness of both lower extremities 01/19/2018    Acute pain of right knee 07/31/2020    Decreased ROM of right knee 07/31/2020    Antalgic gait 07/31/2020    Decreased strength of lower extremity 07/31/2020    Preoperative cardiovascular examination 02/03/2022    Hypertensive urgency 08/19/2023    Near syncope 08/20/2023     Past Medical History:   Diagnosis Date    Rene's disease     Cataract     Hyperlipidemia     Subarachnoid hemorrhage 2014        PAST SURGICAL HISTORY:   Past Surgical History:   Procedure Laterality Date    APPENDECTOMY      BELPHAROPTOSIS REPAIR Bilateral     BLEPHAROPLASTY, UPPER EYELID Bilateral 11/2/2022    Procedure: BLEPHAROPLASTY, UPPER EYELID;  Surgeon: Bina Umana MD;  Location: [facility] OR;  Service: Ophthalmology;  Laterality: Bilateral;    BREAST BIOPSY Right     RIGHT-axilla    CATARACT EXTRACTION W/  INTRAOCULAR LENS IMPLANT Left 08/03/2016        CATARACT EXTRACTION W/  INTRAOCULAR LENS IMPLANT Right 09/21/2016        SPINE SURGERY  07/2012    Fusion @ L4L5    TARSAL STRIPPING Right 11/2/2022    Procedure: STRIPPING, TARSAL;  Surgeon: Bina Umana MD;  Location: [facility] OR;  Service: Ophthalmology;  Laterality: Right;    TONSILLECTOMY          ALLERGIES: Ace inhibitors, Amlodipine, Ibuprofen, Morphine, and Statins-hmg-coa reductase inhibitors   CURRENT MEDICATIONS:   Current Outpatient  Medications   Medication Sig Dispense Refill    ascorbic acid, vitamin C, (VITAMIN C) 1000 MG tablet Take 1,000 mg by mouth 2 (two) times daily.      CALCIUM-MAGNESIUM ORAL Take 1 capsule by mouth 2 (two) times a day. 1000mg of calcium and 500mg Magnesium      carvediloL (COREG) 12.5 MG tablet Take 12.5mg qam and 25mg tab qpm. 90 tablet 3    carvediloL (COREG) 25 MG tablet Take 12.5mg qam and 25mg qpm. 180 tablet 3    cholecalciferol, vitamin D3, 1,000 unit capsule Take 1,000 Units by mouth 2 (two) times a day.      cyanocobalamin 500 MCG tablet Take 500 mcg by mouth once daily.      cycloSPORINE (RESTASIS) 0.05 % ophthalmic emulsion Place 1 drop into both eyes 2 (two) times daily. 60 each 11    diclofenac sodium (VOLTAREN) 1 % Gel Apply 2 g topically 2 (two) times daily. 350 g 1    ezetimibe (ZETIA) 10 mg tablet TAKE 1 TABLET EVERY DAY 90 tablet 3    fluorouraciL (EFUDEX) 5 % cream Use hs for 2 weeks for back of hands 40 g 3    hydroCHLOROthiazide (HYDRODIURIL) 12.5 MG Tab Take 1 tablet (12.5 mg total) by mouth once daily. 90 tablet 3    hydrOXYzine HCL (ATARAX) 25 MG tablet Take 1 tablet (25 mg total) by mouth 3 (three) times daily as needed for Itching. 270 tablet 3    meloxicam (MOBIC) 15 MG tablet Take 1 tablet by mouth once daily 30 tablet 0    triamcinolone acetonide 0.1% (KENALOG) 0.1 % cream APPLY  CREAM EXTERNALLY TO AFFECTED AREA TWICE DAILY AS NEEDED FOR 7 DAYS 30 g 0    EScitalopram oxalate (LEXAPRO) 5 MG Tab Take 1 tablet (5 mg total) by mouth nightly. 90 tablet 3     No current facility-administered medications for this visit.        FAMILY HISTORY:   Family History   Problem Relation Age of Onset    Hypertension Mother     Kidney failure Mother     Hypertension Father     Coronary artery disease Father     Prostate cancer Father     Stroke Father     Eczema Son     Diabetes Brother     Thyroid cancer Neg Hx     Breast cancer Neg Hx     Colon cancer Neg Hx     Ovarian cancer Neg Hx     Amblyopia Neg  Hx     Blindness Neg Hx     Glaucoma Neg Hx     Macular degeneration Neg Hx     Retinal detachment Neg Hx     Strabismus Neg Hx          SOCIAL HISTORY:   Social History     Socioeconomic History    Marital status: Single   Occupational History     Employer: retired   Tobacco Use    Smoking status: Former     Current packs/day: 1.00     Average packs/day: 1 pack/day for 10.0 years (10.0 ttl pk-yrs)     Types: Cigarettes    Smokeless tobacco: Never    Tobacco comments:     quit 1975   Substance and Sexual Activity    Alcohol use: Yes     Alcohol/week: 14.0 standard drinks of alcohol     Types: 14 Glasses of wine per week     Comment: socially    Drug use: No    Sexual activity: Not Currently     Partners: Male     Comment:    Social History Narrative    . 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     Social Determinants of Health     Financial Resource Strain: Unknown (7/12/2023)    Overall Financial Resource Strain (CARDIA)     Difficulty of Paying Living Expenses: Patient refused   Food Insecurity: Unknown (7/12/2023)    Hunger Vital Sign     Worried About Running Out of Food in the Last Year: Patient refused     Ran Out of Food in the Last Year: Patient refused   Transportation Needs: Unknown (7/12/2023)    PRAPARE - Transportation     Lack of Transportation (Medical): Patient refused     Lack of Transportation (Non-Medical): Patient refused   Physical Activity: Unknown (7/12/2023)    Exercise Vital Sign     Days of Exercise per Week: 7 days   Stress: Stress Concern Present (7/12/2023)    [place] [employer] - Occupational Stress Questionnaire     Feeling of Stress : To some extent   Social Connections: Unknown (7/12/2023)    Social Connection and Isolation Panel [NHANES]     Frequency of Communication with Friends and Family: Patient refused     Frequency of Social Gatherings with Friends and Family: Patient refused     Active Member of  Clubs or Organizations: Patient refused     Attends Club or Organization Meetings: Patient refused     Marital Status: Patient refused   Housing Stability: Unknown (7/12/2023)    Housing Stability Vital Sign     Unable to Pay for Housing in the Last Year: Patient refused     Unstable Housing in the Last Year: Patient refused         Questions and concerns raised by the patient and family/care-giver(s) were addressed and they indicated understanding of everything discussed and agreed to plans as above.    Ashley Krishnan MD PhD [facility]  Neurology-Epilepsy  [facility].

## 2023-08-26 ENCOUNTER — PATIENT MESSAGE (OUTPATIENT)
Dept: NEUROLOGY | Facility: CLINIC | Age: 78
End: 2023-08-26
Payer: MEDICARE

## 2023-08-26 PROBLEM — F33.0 MILD EPISODE OF RECURRENT MAJOR DEPRESSIVE DISORDER: Status: ACTIVE | Noted: 2023-08-26

## 2023-08-26 PROBLEM — I99.8 FLUCTUATING BLOOD PRESSURE: Status: ACTIVE | Noted: 2023-08-26

## 2023-08-28 ENCOUNTER — TELEPHONE (OUTPATIENT)
Dept: PRIMARY CARE CLINIC | Facility: CLINIC | Age: 78
End: 2023-08-28
Payer: MEDICARE

## 2023-08-28 NOTE — TELEPHONE ENCOUNTER
Reviewed neurology's note. Does she want to see me tomorrow at 11:40am? Also does she want to enroll in digital HTN again?

## 2023-08-29 ENCOUNTER — OFFICE VISIT (OUTPATIENT)
Dept: PRIMARY CARE CLINIC | Facility: CLINIC | Age: 78
End: 2023-08-29
Payer: MEDICARE

## 2023-08-29 ENCOUNTER — HOSPITAL ENCOUNTER (OUTPATIENT)
Dept: CARDIOLOGY | Facility: HOSPITAL | Age: 78
Discharge: HOME | End: 2023-08-29
Attending: INTERNAL MEDICINE
Payer: MEDICARE

## 2023-08-29 VITALS
SYSTOLIC BLOOD PRESSURE: 134 MMHG | DIASTOLIC BLOOD PRESSURE: 61 MMHG | HEART RATE: 63 BPM | OXYGEN SATURATION: 97 % | HEIGHT: 64 IN | BODY MASS INDEX: 24.84 KG/M2 | TEMPERATURE: 98 F | WEIGHT: 145.5 LBS

## 2023-08-29 DIAGNOSIS — F43.22 ADJUSTMENT DISORDER WITH ANXIOUS MOOD: ICD-10-CM

## 2023-08-29 DIAGNOSIS — R79.89 ELEVATED TROPONIN: ICD-10-CM

## 2023-08-29 DIAGNOSIS — I10 BENIGN ESSENTIAL HYPERTENSION: ICD-10-CM

## 2023-08-29 DIAGNOSIS — R55 SYNCOPE, UNSPECIFIED SYNCOPE TYPE: Primary | ICD-10-CM

## 2023-08-29 LAB
AV INDEX (PROSTH): 0.66
AV MEAN GRADIENT: 3 MMHG
AV PEAK GRADIENT: 7 MMHG
AV REGURGITATION PRESSURE HALF TIME: 586.94 MS
AV VALVE AREA BY VELOCITY RATIO: 2.01 CM²
AV VALVE AREA: 1.99 CM²
AV VELOCITY RATIO: 0.67
BSA FOR ECHO PROCEDURE: 1.73 M2
CV ECHO LV RWT: 0.49 CM
DOP CALC AO PEAK VEL: 1.29 M/S
DOP CALC AO VTI: 24.5 CM
DOP CALC LVOT AREA: 3 CM2
DOP CALC LVOT DIAMETER: 1.96 CM
DOP CALC LVOT PEAK VEL: 0.86 M/S
DOP CALC LVOT STROKE VOLUME: 48.85 CM3
DOP CALC MV VTI: 17.4 CM
DOP CALCLVOT PEAK VEL VTI: 16.2 CM
E WAVE DECELERATION TIME: 316.44 MSEC
E/A RATIO: 0.62
E/E' RATIO: 5.43 M/S
ECHO LV POSTERIOR WALL: 1.23 CM (ref 0.6–1.1)
EJECTION FRACTION: 60 %
FRACTIONAL SHORTENING: 22 % (ref 28–44)
INTERVENTRICULAR SEPTUM: 1.12 CM (ref 0.6–1.1)
IVC DIAMETER: 1.36 CM
IVRT: 142.72 MSEC
LA MAJOR: 4.93 CM
LA MINOR: 5.18 CM
LA WIDTH: 3.9 CM
LEFT ATRIUM SIZE: 2.8 CM
LEFT ATRIUM VOLUME INDEX MOD: 25.5 ML/M2
LEFT ATRIUM VOLUME INDEX: 27.4 ML/M2
LEFT ATRIUM VOLUME MOD: 43.59 CM3
LEFT ATRIUM VOLUME: 46.89 CM3
LEFT INTERNAL DIMENSION IN SYSTOLE: 3.88 CM (ref 2.1–4)
LEFT VENTRICLE DIASTOLIC VOLUME INDEX: 68.59 ML/M2
LEFT VENTRICLE DIASTOLIC VOLUME: 117.29 ML
LEFT VENTRICLE MASS INDEX: 132 G/M2
LEFT VENTRICLE SYSTOLIC VOLUME INDEX: 38.1 ML/M2
LEFT VENTRICLE SYSTOLIC VOLUME: 65.13 ML
LEFT VENTRICULAR INTERNAL DIMENSION IN DIASTOLE: 4.98 CM (ref 3.5–6)
LEFT VENTRICULAR MASS: 225.51 G
LV LATERAL E/E' RATIO: 4.75 M/S
LV SEPTAL E/E' RATIO: 6.33 M/S
LVOT MG: 1.33 MMHG
LVOT MV: 0.54 CM/S
MV MEAN GRADIENT: 1 MMHG
MV PEAK A VEL: 0.61 M/S
MV PEAK E VEL: 0.38 M/S
MV PEAK GRADIENT: 2 MMHG
MV STENOSIS PRESSURE HALF TIME: 106.68 MS
MV VALVE AREA BY CONTINUITY EQUATION: 2.81 CM2
MV VALVE AREA P 1/2 METHOD: 2.06 CM2
OHS LV EJECTION FRACTION SIMPSONS BIPLANE MOD: 61 %
PISA AR MAX VEL: 4.81 M/S
PISA MRMAX VEL: 4.26 M/S
PISA TR MAX VEL: 2.85 M/S
PV PEAK GRADIENT: 4 MMHG
PV PEAK VELOCITY: 1 M/S
RA MAJOR: 5.76 CM
RA PRESSURE ESTIMATED: 3 MMHG
RA WIDTH: 3.65 CM
RIGHT VENTRICULAR END-DIASTOLIC DIMENSION: 2.76 CM
RV TB RVSP: 6 MMHG
RV TISSUE DOPPLER FREE WALL SYSTOLIC VELOCITY 1 (APICAL 4 CHAMBER VIEW): 10.6 CM/S
TDI LATERAL: 0.08 M/S
TDI SEPTAL: 0.06 M/S
TDI: 0.07 M/S
TR MAX PG: 32 MMHG
TRICUSPID ANNULAR PLANE SYSTOLIC EXCURSION: 2.17 CM
TV REST PULMONARY ARTERY PRESSURE: 35 MMHG
Z-SCORE OF LEFT VENTRICULAR DIMENSION IN END DIASTOLE: 0.48
Z-SCORE OF LEFT VENTRICULAR DIMENSION IN END SYSTOLE: 2.19

## 2023-08-29 PROCEDURE — 3075F SYST BP GE 130 - 139MM HG: CPT | Mod: HCNC,CPTII,S$GLB, | Performed by: INTERNAL MEDICINE

## 2023-08-29 PROCEDURE — 1126F AMNT PAIN NOTED NONE PRSNT: CPT | Mod: HCNC,CPTII,S$GLB, | Performed by: INTERNAL MEDICINE

## 2023-08-29 PROCEDURE — 93306 TTE W/DOPPLER COMPLETE: CPT | Mod: 26,HCNC,, | Performed by: INTERNAL MEDICINE

## 2023-08-29 PROCEDURE — 1126F PR PAIN SEVERITY QUANTIFIED, NO PAIN PRESENT: ICD-10-PCS | Mod: HCNC,CPTII,S$GLB, | Performed by: INTERNAL MEDICINE

## 2023-08-29 PROCEDURE — 3288F PR FALLS RISK ASSESSMENT DOCUMENTED: ICD-10-PCS | Mod: HCNC,CPTII,S$GLB, | Performed by: INTERNAL MEDICINE

## 2023-08-29 PROCEDURE — 99999 PR PBB SHADOW E&M-EST. PATIENT-LVL V: ICD-10-PCS | Mod: PBBFAC,HCNC,, | Performed by: INTERNAL MEDICINE

## 2023-08-29 PROCEDURE — 93306 TTE W/DOPPLER COMPLETE: CPT | Mod: HCNC

## 2023-08-29 PROCEDURE — 1101F PR PT FALLS ASSESS DOC 0-1 FALLS W/OUT INJ PAST YR: ICD-10-PCS | Mod: HCNC,CPTII,S$GLB, | Performed by: INTERNAL MEDICINE

## 2023-08-29 PROCEDURE — 3075F PR MOST RECENT SYSTOLIC BLOOD PRESS GE 130-139MM HG: ICD-10-PCS | Mod: HCNC,CPTII,S$GLB, | Performed by: INTERNAL MEDICINE

## 2023-08-29 PROCEDURE — 3078F DIAST BP <80 MM HG: CPT | Mod: HCNC,CPTII,S$GLB, | Performed by: INTERNAL MEDICINE

## 2023-08-29 PROCEDURE — 3288F FALL RISK ASSESSMENT DOCD: CPT | Mod: HCNC,CPTII,S$GLB, | Performed by: INTERNAL MEDICINE

## 2023-08-29 PROCEDURE — 1159F PR MEDICATION LIST DOCUMENTED IN MEDICAL RECORD: ICD-10-PCS | Mod: HCNC,CPTII,S$GLB, | Performed by: INTERNAL MEDICINE

## 2023-08-29 PROCEDURE — 1159F MED LIST DOCD IN RCRD: CPT | Mod: HCNC,CPTII,S$GLB, | Performed by: INTERNAL MEDICINE

## 2023-08-29 PROCEDURE — 3078F PR MOST RECENT DIASTOLIC BLOOD PRESSURE < 80 MM HG: ICD-10-PCS | Mod: HCNC,CPTII,S$GLB, | Performed by: INTERNAL MEDICINE

## 2023-08-29 PROCEDURE — 99214 PR OFFICE/OUTPT VISIT, EST, LEVL IV, 30-39 MIN: ICD-10-PCS | Mod: HCNC,S$GLB,, | Performed by: INTERNAL MEDICINE

## 2023-08-29 PROCEDURE — 99214 OFFICE O/P EST MOD 30 MIN: CPT | Mod: HCNC,S$GLB,, | Performed by: INTERNAL MEDICINE

## 2023-08-29 PROCEDURE — 1101F PT FALLS ASSESS-DOCD LE1/YR: CPT | Mod: HCNC,CPTII,S$GLB, | Performed by: INTERNAL MEDICINE

## 2023-08-29 PROCEDURE — 99999 PR PBB SHADOW E&M-EST. PATIENT-LVL V: CPT | Mod: PBBFAC,HCNC,, | Performed by: INTERNAL MEDICINE

## 2023-08-29 PROCEDURE — 93306 ECHO (CUPID ONLY): ICD-10-PCS | Mod: 26,HCNC,, | Performed by: INTERNAL MEDICINE

## 2023-08-29 PROCEDURE — 1160F PR REVIEW ALL MEDS BY PRESCRIBER/CLIN PHARMACIST DOCUMENTED: ICD-10-PCS | Mod: HCNC,CPTII,S$GLB, | Performed by: INTERNAL MEDICINE

## 2023-08-29 PROCEDURE — 1160F RVW MEDS BY RX/DR IN RCRD: CPT | Mod: HCNC,CPTII,S$GLB, | Performed by: INTERNAL MEDICINE

## 2023-08-29 NOTE — PATIENT INSTRUCTIONS
Follow up with cardiology as scheduled tomorrow.   Schedule echocardiogram.   We scheduled you to see Reed Meza LMSW on Thursday at 2pm.     I will see you back from maternity leave.   Covering physician while I'm on leave is Dr. Sheng Clark.

## 2023-08-29 NOTE — PROGRESS NOTES
"Subjective:       Patient ID: Grace Navarro is a 77 y.o. female.    Chief Complaint: possible seizure?    HPI  Pt was in [facility] for about a month. Long flight. On her way back, she was standing in line to use the restroom, felt funny and then had a syncopal episode. Last thing she remembers was standing in line and then when she woke up, there were other passengers checking and her and told her she had a seizure. Was told her BP was low.   One day was checking her BP and SBP was in the >200s. Called nurse triage and told to go to ER.   Pt went to ED 8/19/23 for hypertensive urgency.   CT head 8/19/23 - no acute abnormality.  B carotid US - No HD significant carotid bifurcation stenosis.   EKG - Normal sinus rhythm   poor precordial R wave progression   Nonspecific ST abnormality   Abnormal ECG   When compared with ECG of 28-OCT-2022 11:53,   ST c hanges more prominent   Monitored overnight and BP controlled on IV labetalol. Mildly elevated trop and cards recommended outpt echo. No new meds. Cont coreg 12.5mg 1/2 tab qam and 1 tab qpm, hctz 12.5mg daily.  Saw Dr. Krishnan 8/26/23 in neurology. Does not meet criteria for dx of epilepsy or antiseizure meds. Rec optimizing BP management. Rec to continue lexapro 5mg nightly until she can optimize her BP due to anxiety.  Ordered digital HTN and pt restarted. Has appt w/ Dr. Toledo tomorrow.    Pt reports dizziness when she changes positions in the morning and and goes away w/in seconds.     Reports overall feels more easily irritable. Has been taking lexapro 5mg daily. Previously saw Reed Meza LMSW but lost to follow up - last seen in May.     Review of Systems  Comprehensive review of systems otherwise negative. See history/subjective section for more details.    Objective:      Physical Exam    /61 (BP Location: Right arm, Patient Position: Standing, BP Method: Large (Automatic))   Pulse 63   Temp 98.4 °F (36.9 °C) (Oral)   Ht 5' 4" (1.626 m)   Wt " 66 kg (145 lb 8.1 oz)   LMP 06/14/1996   SpO2 97%   BMI 24.98 kg/m²     GEN - A+OX4, NAD.   HEENT - PERRL, EOMI, OP clear. MMM.   Neck - No thyromegaly or cervical LAD. No thyroid masses felt.  CV - RRR, no m/r   Chest - CTAB, no wheezing or rhonchi  Abd - S/NT/ND/+BS.   Ext - 2+BDP and radial pulses. No C/C/E.    Labs reviewed.     Assessment/Plan     Grace was seen today for seizures.    Diagnoses and all orders for this visit:    Syncope, unspecified syncope type - one ep. Seen Dr. Krishnan. Sometimes w/ dizziness on standing. Not quite orthostatic. Currently on coreg 6.25mg in the AM and 12.5mg in the PM and hctz 12.5mg daily. Gets very anxious when BP is high. Resigned back on digital HTN program. Has appt to see Dr. Toledo tomorrow.     Benign essential hypertension - as above.     Elevated troponin in the hospital.   -     Echo; Future    Adjustment disorder with anxious mood - cont lexapro 5mg daily. Have pt to f/u w/ Reed Meza LMSW this Thursday at 2pm.     Follow up with cardiology as scheduled tomorrow.   Schedule echocardiogram.   We scheduled you to see Reed Meza LMSW on Thursday at 2pm.     I will see you back from maternity leave.   Covering physician while I'm on leave is Dr. Sheng Clark.     Follow up if symptoms worsen or fail to improve.      Lisa Dean MD  Department of Internal Medicine - [facility]  11:40 AM

## 2023-08-30 ENCOUNTER — OFFICE VISIT (OUTPATIENT)
Dept: CARDIOLOGY | Facility: CLINIC | Age: 78
End: 2023-08-30
Payer: MEDICARE

## 2023-08-30 ENCOUNTER — PATIENT MESSAGE (OUTPATIENT)
Dept: PRIMARY CARE CLINIC | Facility: CLINIC | Age: 78
End: 2023-08-30
Payer: MEDICARE

## 2023-08-30 VITALS
HEART RATE: 65 BPM | BODY MASS INDEX: 24.75 KG/M2 | WEIGHT: 145 LBS | RESPIRATION RATE: 20 BRPM | SYSTOLIC BLOOD PRESSURE: 146 MMHG | HEIGHT: 64 IN | DIASTOLIC BLOOD PRESSURE: 84 MMHG

## 2023-08-30 DIAGNOSIS — I16.0 HYPERTENSIVE URGENCY: ICD-10-CM

## 2023-08-30 DIAGNOSIS — R55 NEAR SYNCOPE: ICD-10-CM

## 2023-08-30 DIAGNOSIS — I10 UNCONTROLLED HYPERTENSION: ICD-10-CM

## 2023-08-30 PROCEDURE — 1160F RVW MEDS BY RX/DR IN RCRD: CPT | Mod: CPTII,S$GLB,, | Performed by: INTERNAL MEDICINE

## 2023-08-30 PROCEDURE — 1160F PR REVIEW ALL MEDS BY PRESCRIBER/CLIN PHARMACIST DOCUMENTED: ICD-10-PCS | Mod: CPTII,S$GLB,, | Performed by: INTERNAL MEDICINE

## 2023-08-30 PROCEDURE — 1126F AMNT PAIN NOTED NONE PRSNT: CPT | Mod: CPTII,S$GLB,, | Performed by: INTERNAL MEDICINE

## 2023-08-30 PROCEDURE — 99214 OFFICE O/P EST MOD 30 MIN: CPT | Mod: 25,S$GLB,, | Performed by: INTERNAL MEDICINE

## 2023-08-30 PROCEDURE — 3288F PR FALLS RISK ASSESSMENT DOCUMENTED: ICD-10-PCS | Mod: CPTII,S$GLB,, | Performed by: INTERNAL MEDICINE

## 2023-08-30 PROCEDURE — 1101F PR PT FALLS ASSESS DOC 0-1 FALLS W/OUT INJ PAST YR: ICD-10-PCS | Mod: CPTII,S$GLB,, | Performed by: INTERNAL MEDICINE

## 2023-08-30 PROCEDURE — 1101F PT FALLS ASSESS-DOCD LE1/YR: CPT | Mod: CPTII,S$GLB,, | Performed by: INTERNAL MEDICINE

## 2023-08-30 PROCEDURE — 3077F PR MOST RECENT SYSTOLIC BLOOD PRESSURE >= 140 MM HG: ICD-10-PCS | Mod: CPTII,S$GLB,, | Performed by: INTERNAL MEDICINE

## 2023-08-30 PROCEDURE — 3079F PR MOST RECENT DIASTOLIC BLOOD PRESSURE 80-89 MM HG: ICD-10-PCS | Mod: CPTII,S$GLB,, | Performed by: INTERNAL MEDICINE

## 2023-08-30 PROCEDURE — 93000 ELECTROCARDIOGRAM COMPLETE: CPT | Mod: S$GLB,,, | Performed by: INTERNAL MEDICINE

## 2023-08-30 PROCEDURE — 1126F PR PAIN SEVERITY QUANTIFIED, NO PAIN PRESENT: ICD-10-PCS | Mod: CPTII,S$GLB,, | Performed by: INTERNAL MEDICINE

## 2023-08-30 PROCEDURE — 99999 PR PBB SHADOW E&M-EST. PATIENT-LVL IV: CPT | Mod: PBBFAC,,, | Performed by: INTERNAL MEDICINE

## 2023-08-30 PROCEDURE — 3079F DIAST BP 80-89 MM HG: CPT | Mod: CPTII,S$GLB,, | Performed by: INTERNAL MEDICINE

## 2023-08-30 PROCEDURE — 1159F MED LIST DOCD IN RCRD: CPT | Mod: CPTII,S$GLB,, | Performed by: INTERNAL MEDICINE

## 2023-08-30 PROCEDURE — 99214 PR OFFICE/OUTPT VISIT, EST, LEVL IV, 30-39 MIN: ICD-10-PCS | Mod: 25,S$GLB,, | Performed by: INTERNAL MEDICINE

## 2023-08-30 PROCEDURE — 3288F FALL RISK ASSESSMENT DOCD: CPT | Mod: CPTII,S$GLB,, | Performed by: INTERNAL MEDICINE

## 2023-08-30 PROCEDURE — 93000 EKG 12-LEAD: ICD-10-PCS | Mod: S$GLB,,, | Performed by: INTERNAL MEDICINE

## 2023-08-30 PROCEDURE — 1159F PR MEDICATION LIST DOCUMENTED IN MEDICAL RECORD: ICD-10-PCS | Mod: CPTII,S$GLB,, | Performed by: INTERNAL MEDICINE

## 2023-08-30 PROCEDURE — 3077F SYST BP >= 140 MM HG: CPT | Mod: CPTII,S$GLB,, | Performed by: INTERNAL MEDICINE

## 2023-08-30 PROCEDURE — 99999 PR PBB SHADOW E&M-EST. PATIENT-LVL IV: ICD-10-PCS | Mod: PBBFAC,,, | Performed by: INTERNAL MEDICINE

## 2023-08-30 NOTE — PROGRESS NOTES
HISTORY:    77-year-old female with a history of hypertension, hyperlipidemia, and vasovagal syncope presenting for follow-up.    Pt while on a flight back from [facility] had an episode of dizziness and presyncope while standing up and waiting for the restroom. Symptoms resolved without intervention. Has a previous h/o presyncope and syncope under similar circumstances.      2 days later she checked her BP and noted that it was elevated and went to ED. SBPs in the 200s. Received IV antihtnsive. Later had relative hypotension with a presyncopal episode. Symptoms resolved without issue.     Since the event she has been able to complete ADLs without limitation. She denies any issues with chest pain, shortness of breath, or dyspnea on exertion.  No palpitations, light headedness, or dizziness. Some fatigue post travel, that is improving. No infectious symptoms.      The patient denies any history of coronary artery disease, myocardial infarction, congestive heart failure, cardiomyopathy, stroke, or peripheral arterial disease.  Activity levels are excellent for her age.  She lives by herself and takes care of her home without any issue.  She additionally goes for 90 minute walks/4 miles multiple times per week without any limitation.  Overall her health is superior to her peers.     She currently takes hctz 12.5x1, carvedilol 6.125.5/12.5, and ezetimibe 10x1. Bps since hospital discharge are normal. Carvedilol has previously been decreased due to light headedness.    We previously trialed atorvastatin 10 po daily and the patient had diffuse body aches that resolved off med.     PHYSICAL EXAM:    Vitals:    08/30/23 1116   BP: (!) 146/84   Pulse: 65   Resp: 20       NAD, A+Ox3.  No jvd, no bruit.  RRR nml s1,s2. No murmurs.  CTA B no wheezes or crackles.  No edema.    LABS/STUDIES (imaging reviewed during clinic visit):    August 2023 CBC and CMP unremarkable.  Troponin low-grade.   and HDL 67.  Triglycerides 81.   September 2021 CBC shows hemoglobin of 12.2, CMP demonstrates a creatinine 0.6 with BUN of 12. Albumin 4.1.   HDL 72.   ECG today demonstrates sinus rhythm with no Q-waves or ST changes.  August 19, 2023 sinus rhythm with nonspecific changes.  2022 and 2021 EKGs normal.    Holter January 2022 demonstrates an average heart rate of 74 beats per minute with the sinus rhythm.  Rare PVCs and PACs noted and a short run of SVT (8 beats).  For symptomatic episodes noted in patient diary all corresponded with sinus rhythm without ectopy.  TTE August 2023 demonstrates normal LV size and systolic function with EF 65%.  Normal RV size and function.  LA enlargement.  No significant valve disease.    CT cardiac scoring 2020 Agaston score of 64.  CT head August 2023 no acute abnormality.  Carotid ultrasound August 2023 shows no evidence of significant ICA stenosis. Atherosclerosis is present.    ASSESSMENT & PLAN:    1. Near syncope    2. Hypertensive urgency    3. Uncontrolled hypertension        Orders Placed This Encounter    IN OFFICE EKG 12-LEAD (to [facility])        Patient with a h/o vasovagal symptoms. Typical vasovagal event on her plane trip. Unclear why she presented to the ED with elevated Bps. Iatrogenic pre-syncope there. Wood unremarkable including CT head, carotid, TTE. Minimally elevated trop 2/2 hypertension. No ischemic symptoms w nml ecg and PE today.     The patient has a long history of hypertension.  Blood pressures mostly well controlled on hctz 12.5x1 and carvedilol 6.25/12. No further recs.     Elevated LDL. Has failed multiple statins in the past. Tolerating ezetimibe 10x1.        Jennifer Toledo MD

## 2023-08-31 ENCOUNTER — CLINICAL SUPPORT (OUTPATIENT)
Dept: PRIMARY CARE CLINIC | Facility: CLINIC | Age: 78
End: 2023-08-31
Payer: MEDICARE

## 2023-08-31 DIAGNOSIS — F41.1 GAD (GENERALIZED ANXIETY DISORDER): Primary | ICD-10-CM

## 2023-08-31 PROCEDURE — 99499 NO LOS: ICD-10-PCS | Mod: HCNC,S$GLB,, | Performed by: SOCIAL WORKER

## 2023-08-31 PROCEDURE — 99499 UNLISTED E&M SERVICE: CPT | Mod: HCNC,S$GLB,, | Performed by: SOCIAL WORKER

## 2023-08-31 NOTE — PROGRESS NOTES
"Individual Psychotherapy (LCSW/PhD)  Grace Navarro,  8/31/2023    Site:  [place]         Therapeutic Intervention: Met with patient for individual psychotherapy.    Chief complaint/reason for encounter: anxiety     Interval history and content of current session: Pt reports recent fainting spell and anxiety related to this fainting. Pt reports ongoing worry and concern about their health and possibly fainting again. LCSW supported pt in processing  feelings of anxiety and concern regarding their health. LCSW and pt discussed feedback from PCP.     Pt reports enjoying their trip and states that they did enjoy it overall. Pt and LCSW discussed importance of recognizing areas of control I.e. diet, self-care vs. Unknown fainting etc. Pt reports feeling like they couldn't share their fear with daughter because they did not want to "upset her". Pt and LCSW discussed pt using meditation and deep breathing to calm anxiety.Pt and LCSW discussed importance of socializing and not isolating theirself.t    Treatment plan  Target symptoms: anxiety   Why chosen therapy is appropriate versus another modality: relevant to diagnosis, evidence based practice  Outcome monitoring methods: self-report, checklist/rating scale  Therapeutic intervention type: supportive psychotherapy    Risk parameters:  Patient reports no suicidal ideation  Patient reports no homicidal ideation  Patient reports no self-injurious behavior  Patient reports no violent behavior    Verbal deficits: None    Patient's response to intervention:  The patient's response to intervention is accepting.    Progress toward goals and other mental status changes:  The patient's progress toward goals is not progressing.    Diagnosis:   No diagnosis found.    Plan: Pt plans to continue individual psychotherapy    Return to clinic: 2 weeks    Length of Service (minutes): 60      "

## 2023-09-10 ENCOUNTER — PATIENT MESSAGE (OUTPATIENT)
Dept: PRIMARY CARE CLINIC | Facility: CLINIC | Age: 78
End: 2023-09-10
Payer: MEDICARE

## 2023-09-12 ENCOUNTER — PATIENT MESSAGE (OUTPATIENT)
Dept: PRIMARY CARE CLINIC | Facility: CLINIC | Age: 78
End: 2023-09-12
Payer: MEDICARE

## 2023-09-12 DIAGNOSIS — I10 BENIGN ESSENTIAL HYPERTENSION: ICD-10-CM

## 2023-09-12 RX ORDER — HYDROCHLOROTHIAZIDE 12.5 MG/1
12.5 TABLET ORAL DAILY
Qty: 90 TABLET | Refills: 3 | Status: SHIPPED | OUTPATIENT
Start: 2023-09-12 | End: 2023-11-27 | Stop reason: SDUPTHER

## 2023-09-13 ENCOUNTER — PATIENT MESSAGE (OUTPATIENT)
Dept: OTHER | Facility: OTHER | Age: 78
End: 2023-09-13
Payer: MEDICARE

## 2023-09-14 ENCOUNTER — CLINICAL SUPPORT (OUTPATIENT)
Dept: PRIMARY CARE CLINIC | Facility: CLINIC | Age: 78
End: 2023-09-14
Payer: MEDICARE

## 2023-09-14 DIAGNOSIS — F32.A MODERATELY SEVERE DEPRESSION: Primary | ICD-10-CM

## 2023-09-14 PROCEDURE — 99499 NO LOS: ICD-10-PCS | Mod: HCNC,S$GLB,, | Performed by: SOCIAL WORKER

## 2023-09-14 PROCEDURE — 99499 UNLISTED E&M SERVICE: CPT | Mod: HCNC,S$GLB,, | Performed by: SOCIAL WORKER

## 2023-09-14 NOTE — PROGRESS NOTES
Individual Psychotherapy (LCSW/PhD)  Grace Ontiveroskayli,  9/14/2023    Site:  [place]         Therapeutic Intervention: Met with patient for individual psychotherapy.    Chief complaint/reason for encounter: anxiety     Interval history and content of current session: Pt reports some frustration with fatigue related to taking medication. Pt continues to struggle with anxiety and states they have been feeling angry.     Pt and LCSW discussed pt's irritability and feelings of frustration. LCSW supported pt in processing feelings of anxiety and ways for pt to manage these feelings. Pt and LCSW discussed pt's health concerns and importance of avoiding. Pt and LCSW discussed pt possibly volunteering to help improve mood.     Treatment plan:  Target symptoms: anxiety   Why chosen therapy is appropriate versus another modality: relevant to diagnosis, evidence based practice  Outcome monitoring methods: self-report, checklist/rating scale  Therapeutic intervention type: supportive psychotherapy    Risk parameters:  Patient reports no suicidal ideation  Patient reports no homicidal ideation  Patient reports no self-injurious behavior  Patient reports no violent behavior    Verbal deficits: None    Patient's response to intervention:  The patient's response to intervention is accepting.    Progress toward goals and other mental status changes:  The patient's progress toward goals is limited.    Diagnosis:   No diagnosis found.    Plan: Pt plans to continue individual psychotherapy    Return to clinic: 2 weeks    Length of Service (minutes): 60

## 2023-09-28 ENCOUNTER — DOCUMENTATION ONLY (OUTPATIENT)
Dept: PRIMARY CARE CLINIC | Facility: CLINIC | Age: 78
End: 2023-09-28
Payer: MEDICARE

## 2023-09-28 NOTE — PROGRESS NOTES
"Individual Psychotherapy (LCSW/PhD)  Grace Navarro,  9/28/2023    Site:  [place]         Therapeutic Intervention: Met with patient for individual psychotherapy.    Chief complaint/reason for encounter: depression     Interval history and content of current session: Pt is trying to cope with conflict with friend. Pt states their friend has asked them to help with their finances which is causing stress. Pt states that friend is "manipulative" and doesn't not respect pt's boundaries. Pt and LCSW discussed need for pt to set more boundaries with friend. LCSW supported pt in processing feelings of frustration. Pt reports feeling excess guilt at setting boundaries with friend due to friends early-onset dementia. Pt and LCSW discussed importance of pt expressing thoughts and needs to friend to better express their needs.    Pt and LCSW discussed volunteer opportunities for pt.     Treatment plan:  Target symptoms: depression  Why chosen therapy is appropriate versus another modality: relevant to diagnosis, evidence based practice  Outcome monitoring methods: self-report, checklist/rating scale  Therapeutic intervention type: supportive psychotherapy    Risk parameters:  Patient reports no suicidal ideation  Patient reports no homicidal ideation  Patient reports no self-injurious behavior  Patient reports no violent behavior    Verbal deficits: None    Patient's response to intervention:  The patient's response to intervention is accepting.    Progress toward goals and other mental status changes:  The patient's progress toward goals is good.    Diagnosis:   No diagnosis found.    Plan: Pt plans to continue individual psychotherapy    Return to clinic: 2 weeks    Length of Service (minutes): 60      "

## 2023-10-09 ENCOUNTER — PATIENT MESSAGE (OUTPATIENT)
Dept: PRIMARY CARE CLINIC | Facility: CLINIC | Age: 78
End: 2023-10-09
Payer: MEDICARE

## 2023-10-09 DIAGNOSIS — L30.9 DERMATITIS: ICD-10-CM

## 2023-10-09 RX ORDER — TRIAMCINOLONE ACETONIDE 1 MG/G
CREAM TOPICAL
Qty: 60 G | Refills: 1 | Status: SHIPPED | OUTPATIENT
Start: 2023-10-09

## 2023-10-09 NOTE — TELEPHONE ENCOUNTER
triamcinolone acetonide 0.1% (KENALOG) 0.1 % cream [Pharmacy Med Name: Triamcinolone Acetonide 0.1 % External Cream] [[id number]]    Electronically signed by: Perla Campos RN on 10/04/23 1437 Status: Pending   Ordering user: Perla Campos RN 10/04/23 1437 Ordering provider: Lisa Dean MD   Authorized by: Lisa Dean MD   Frequency:  10/04/23 - Until Discontinued Pended by: Perla Campos RN 10/04/23 1437   Diagnoses   Dermatitis [L30.9]   Admin instructions: APPLY  CREAM EXTERNALLY TO AFFECTED AREA TWICE DAILY AS NEEDED FOR 7 DAYS   Reordered from: triamcinolone acetonide 0.1% (KENALOG) 0.1 % cream [[id number]]     Refused Medication Requests      triamcinolone acetonide 0.1 % APPLY  CREAM EXTERNALLY TO AFFECTED AREA TWICE DAILY AS NEEDED FOR 7 DAYS    Protocol Details

## 2023-10-12 ENCOUNTER — CLINICAL SUPPORT (OUTPATIENT)
Dept: PRIMARY CARE CLINIC | Facility: CLINIC | Age: 78
End: 2023-10-12
Payer: MEDICARE

## 2023-10-12 DIAGNOSIS — F32.A MODERATELY SEVERE DEPRESSION: Primary | ICD-10-CM

## 2023-10-12 PROCEDURE — 90832 PSYTX W PT 30 MINUTES: CPT | Mod: HCNC,S$GLB,, | Performed by: SOCIAL WORKER

## 2023-10-12 PROCEDURE — 90832 PR PSYCHOTHERAPY W/PATIENT, 30 MIN: ICD-10-PCS | Mod: HCNC,S$GLB,, | Performed by: SOCIAL WORKER

## 2023-10-12 NOTE — PROGRESS NOTES
"Individual Psychotherapy (LCSW/PhD)  Grace Navarro,  10/12/2023    Site:  [place]         Therapeutic Intervention: Met with patient for individual psychotherapy.    Chief complaint/reason for encounter: depression and anxiety     Interval history and content of current session: Pt reports "feeling better" and states that they have more energy. Pt [place] Pt states they feel like getting out of the house helps their mood. Pt states they feel like their medication for blood pressure is helpful. Pt reports feeling more comfortable.    Pt reports being able to set boundaries with friend.             Treatment plan:  Target symptoms: depression, anxiety   Why chosen therapy is appropriate versus another modality: relevant to diagnosis, evidence based practice  Outcome monitoring methods: self-report, checklist/rating scale  Therapeutic intervention type: supportive psychotherapy    Risk parameters:  Patient reports no suicidal ideation  Patient reports no homicidal ideation  Patient reports no self-injurious behavior  Patient reports no violent behavior    Verbal deficits: None    Patient's response to intervention:  The patient's response to intervention is accepting.    Progress toward goals and other mental status changes:  The patient's progress toward goals is excellent.    Diagnosis:   No diagnosis found.    Plan: Pt plans to continue individual psychotherapy    Return to clinic: 2 weeks    Length of Service (minutes): 60      "

## 2023-10-19 ENCOUNTER — PATIENT MESSAGE (OUTPATIENT)
Dept: OPHTHALMOLOGY | Facility: CLINIC | Age: 78
End: 2023-10-19
Payer: MEDICARE

## 2023-10-19 ENCOUNTER — PATIENT MESSAGE (OUTPATIENT)
Dept: PRIMARY CARE CLINIC | Facility: CLINIC | Age: 78
End: 2023-10-19
Payer: MEDICARE

## 2023-10-26 ENCOUNTER — CLINICAL SUPPORT (OUTPATIENT)
Dept: PRIMARY CARE CLINIC | Facility: CLINIC | Age: 78
End: 2023-10-26
Payer: MEDICARE

## 2023-10-26 DIAGNOSIS — F32.A MODERATELY SEVERE DEPRESSION: Primary | ICD-10-CM

## 2023-10-26 PROCEDURE — 90837 PSYTX W PT 60 MINUTES: CPT | Mod: HCNC,S$GLB,, | Performed by: SOCIAL WORKER

## 2023-10-26 PROCEDURE — 90837 PR PSYCHOTHERAPY W/PATIENT, 60 MIN: ICD-10-PCS | Mod: HCNC,S$GLB,, | Performed by: SOCIAL WORKER

## 2023-10-26 NOTE — PROGRESS NOTES
"Individual Psychotherapy (LCSW/PhD)  Grace Navarro,  10/26/2023    Site:  [place]         Therapeutic Intervention: Met with patient for individual psychotherapy.    Chief complaint/reason for encounter: depression and anxiety     Interval history and content of current session: Pt reports "ok" mood and states they have been feeling "ok". Pt states they have had some stress related to health concerns but otherwise feel like they are doing well.     Pt states they have been monitoring their alcohol intake but feel like they also enjoy enjoying alcohol and cooking. Pt has been socializing and states they have been socializing. LCSW and pt discussed strategies for reducing alcohol intake including drinking water in between glasses of wine.     Pt and LCSW discussed importance of pt not allowing all or nothing feeling. Pt and LCSW discussed impact of isolation on pt's mood.     Treatment plan:  Target symptoms: depression, anxiety   Why chosen therapy is appropriate versus another modality: relevant to diagnosis, evidence based practice  Outcome monitoring methods: self-report, checklist/rating scale  Therapeutic intervention type: supportive psychotherapy    Risk parameters:  Patient reports no suicidal ideation  Patient reports no homicidal ideation  Patient reports no self-injurious behavior  Patient reports no violent behavior    Verbal deficits: None    Patient's response to intervention:  The patient's response to intervention is accepting.    Progress toward goals and other mental status changes:  The patient's progress toward goals is good.    Diagnosis:   No diagnosis found.    Plan: Pt plans to continue individual psychotherapy    Return to clinic: 2 weeks    Length of Service (minutes): 60      "

## 2023-11-09 ENCOUNTER — CLINICAL SUPPORT (OUTPATIENT)
Dept: PRIMARY CARE CLINIC | Facility: CLINIC | Age: 78
End: 2023-11-09
Payer: MEDICARE

## 2023-11-09 DIAGNOSIS — F32.A MODERATELY SEVERE DEPRESSION: Primary | ICD-10-CM

## 2023-11-09 PROCEDURE — 90832 PR PSYCHOTHERAPY W/PATIENT, 30 MIN: ICD-10-PCS | Mod: HCNC,S$GLB,, | Performed by: SOCIAL WORKER

## 2023-11-09 PROCEDURE — 90832 PSYTX W PT 30 MINUTES: CPT | Mod: HCNC,S$GLB,, | Performed by: SOCIAL WORKER

## 2023-11-09 NOTE — PROGRESS NOTES
"Individual Psychotherapy (LCSW/PhD)  Grace Navarro,  11/9/2023    Site:  [place]         Therapeutic Intervention: Met with patient for individual psychotherapy.    Chief complaint/reason for encounter: depression     Interval history and content of current session: Pt reports "ok" mood. Pt is still considering love to the [place] to live with daughter. LCSW supported pt in processing feelings of loneliness. Pt has been speaking with an ill friend in their [place].     LCSW supported pt in processing feelings of grief at friends illness. LCSW and pt discussed pt's self-care and mood. Pt states they recently spent time with a social group of  women.     Treatment plan:  Target symptoms: depression  Why chosen therapy is appropriate versus another modality: relevant to diagnosis, evidence based practice  Outcome monitoring methods: self-report, checklist/rating scale  Therapeutic intervention type: supportive psychotherapy    Risk parameters:  Patient reports no suicidal ideation  Patient reports no homicidal ideation  Patient reports no self-injurious behavior  Patient reports no violent behavior    Verbal deficits: None    Patient's response to intervention:  The patient's response to intervention is accepting.    Progress toward goals and other mental status changes:  The patient's progress toward goals is good.    Diagnosis:   No diagnosis found.    Plan: Pt plans to continue individual psychotherapy    Return to clinic: 2 weeks    Length of Service (minutes): 30      "

## 2023-11-10 ENCOUNTER — PATIENT MESSAGE (OUTPATIENT)
Dept: PRIMARY CARE CLINIC | Facility: CLINIC | Age: 78
End: 2023-11-10
Payer: MEDICARE

## 2023-11-10 DIAGNOSIS — R25.2 MUSCLE CRAMPS: Primary | ICD-10-CM

## 2023-11-10 DIAGNOSIS — R30.0 DYSURIA: ICD-10-CM

## 2023-11-10 NOTE — TELEPHONE ENCOUNTER
Will have her go do a BMP to see if hypokalemia might be behind this.  She takes HCTZ and last K checked was low.

## 2023-11-13 ENCOUNTER — LAB VISIT (OUTPATIENT)
Dept: LAB | Facility: HOSPITAL | Age: 78
End: 2023-11-13
Attending: HOSPITALIST
Payer: MEDICARE

## 2023-11-13 DIAGNOSIS — R30.0 DYSURIA: ICD-10-CM

## 2023-11-13 LAB
BACTERIA #/AREA URNS AUTO: ABNORMAL /HPF
BILIRUB UR QL STRIP: NEGATIVE
CLARITY UR REFRACT.AUTO: CLEAR
COLOR UR AUTO: COLORLESS
GLUCOSE UR QL STRIP: NEGATIVE
HGB UR QL STRIP: NEGATIVE
KETONES UR QL STRIP: NEGATIVE
LEUKOCYTE ESTERASE UR QL STRIP: ABNORMAL
MICROSCOPIC COMMENT: ABNORMAL
NITRITE UR QL STRIP: NEGATIVE
PH UR STRIP: 7 [PH] (ref 5–8)
PROT UR QL STRIP: NEGATIVE
RBC #/AREA URNS AUTO: 3 /HPF (ref 0–4)
SP GR UR STRIP: 1.01 (ref 1–1.03)
URN SPEC COLLECT METH UR: ABNORMAL
WBC #/AREA URNS AUTO: 13 /HPF (ref 0–5)
WBC CLUMPS UR QL AUTO: ABNORMAL

## 2023-11-13 PROCEDURE — 87086 URINE CULTURE/COLONY COUNT: CPT | Mod: HCNC | Performed by: HOSPITALIST

## 2023-11-13 PROCEDURE — 81001 URINALYSIS AUTO W/SCOPE: CPT | Mod: HCNC | Performed by: HOSPITALIST

## 2023-11-13 PROCEDURE — 87186 SC STD MICRODIL/AGAR DIL: CPT | Mod: HCNC | Performed by: HOSPITALIST

## 2023-11-13 PROCEDURE — 87077 CULTURE AEROBIC IDENTIFY: CPT | Mod: HCNC | Performed by: HOSPITALIST

## 2023-11-13 PROCEDURE — 87088 URINE BACTERIA CULTURE: CPT | Mod: HCNC | Performed by: HOSPITALIST

## 2023-11-13 NOTE — TELEPHONE ENCOUNTER
Pt called and also has been having some burning while urinating and the sensation that she still needs to go after emptying bladder. Urine studies ordered, apt made for today.

## 2023-11-13 NOTE — TELEPHONE ENCOUNTER
Radha Castañeda [facility] Staff  Caller: [phone] (Today,  8:20 AM)  Patient states she would like a call back, patient  will not give any more information. Please call and advise.    Thank you and have a great day.

## 2023-11-14 ENCOUNTER — PATIENT MESSAGE (OUTPATIENT)
Dept: PRIMARY CARE CLINIC | Facility: CLINIC | Age: 78
End: 2023-11-14
Payer: MEDICARE

## 2023-11-14 DIAGNOSIS — R25.2 MUSCLE CRAMPS: ICD-10-CM

## 2023-11-14 DIAGNOSIS — N30.00 ACUTE CYSTITIS WITHOUT HEMATURIA: Primary | ICD-10-CM

## 2023-11-14 RX ORDER — NITROFURANTOIN 25; 75 MG/1; MG/1
100 CAPSULE ORAL 2 TIMES DAILY
Qty: 10 CAPSULE | Refills: 0 | Status: SHIPPED | OUTPATIENT
Start: 2023-11-14 | End: 2023-11-19

## 2023-11-14 RX ORDER — POTASSIUM CHLORIDE 750 MG/1
10 TABLET, EXTENDED RELEASE ORAL DAILY
Qty: 90 TABLET | Refills: 3 | Status: SHIPPED | OUTPATIENT
Start: 2023-11-14 | End: 2024-11-13

## 2023-11-14 NOTE — TELEPHONE ENCOUNTER
Mild inflammatory changes in urine which could represent UTI.  Nitrofurantoin 100mg BID x5d sent to pharmacy.  BMP with slight hypokalemia likely from HCTZ which might account for muscle cramps.  Will Rx KCl 10mEq daily.  Pt already on magnesium supplement.

## 2023-11-15 NOTE — TELEPHONE ENCOUNTER
138/70   Goal Outcome Evaluation:       [id number]: Afebrile. Heart rates in high 40s-50s when sleeping. Jairo MANNING who came to bedside to assess patient. Heart rate increased to 70s when awake, team advised nurse to continue monitoring. Otherwise vitally stable on room air. Pt reported pain at abdominal incision, gave scheduled tylenol x1 and prn morphine x1. Neuros intact. Eyes dilated from eye exam during previous shift. Blood pressures within parameters, no prn hydralazine needed. Sips of water with meds. No UOP or BM overnight. Slept well between cares. Hep Xa was .21 at 0200 check, no rate change indicated. Mom present at bedside, supportive of patient.

## 2023-11-16 LAB — BACTERIA UR CULT: ABNORMAL

## 2023-11-21 ENCOUNTER — PATIENT MESSAGE (OUTPATIENT)
Dept: PRIMARY CARE CLINIC | Facility: CLINIC | Age: 78
End: 2023-11-21
Payer: MEDICARE

## 2023-11-22 ENCOUNTER — OFFICE VISIT (OUTPATIENT)
Dept: PRIMARY CARE CLINIC | Facility: CLINIC | Age: 78
End: 2023-11-22
Payer: MEDICARE

## 2023-11-22 VITALS
HEIGHT: 64 IN | HEART RATE: 67 BPM | OXYGEN SATURATION: 96 % | TEMPERATURE: 98 F | WEIGHT: 153 LBS | BODY MASS INDEX: 26.12 KG/M2 | SYSTOLIC BLOOD PRESSURE: 124 MMHG | DIASTOLIC BLOOD PRESSURE: 80 MMHG

## 2023-11-22 DIAGNOSIS — Z23 NEED FOR IMMUNIZATION AGAINST INFLUENZA: ICD-10-CM

## 2023-11-22 DIAGNOSIS — R39.15 URGENCY OF URINATION: Primary | ICD-10-CM

## 2023-11-22 DIAGNOSIS — M53.87 SCIATICA ASSOCIATED WITH DISORDER OF LUMBOSACRAL SPINE: ICD-10-CM

## 2023-11-22 PROCEDURE — 90694 FLU VACCINE - QUADRIVALENT - ADJUVANTED: ICD-10-PCS | Mod: HCNC,S$GLB,, | Performed by: HOSPITALIST

## 2023-11-22 PROCEDURE — 3079F DIAST BP 80-89 MM HG: CPT | Mod: HCNC,CPTII,S$GLB, | Performed by: HOSPITALIST

## 2023-11-22 PROCEDURE — 99999 PR PBB SHADOW E&M-EST. PATIENT-LVL V: ICD-10-PCS | Mod: PBBFAC,HCNC,, | Performed by: HOSPITALIST

## 2023-11-22 PROCEDURE — 90694 VACC AIIV4 NO PRSRV 0.5ML IM: CPT | Mod: HCNC,S$GLB,, | Performed by: HOSPITALIST

## 2023-11-22 PROCEDURE — 3079F PR MOST RECENT DIASTOLIC BLOOD PRESSURE 80-89 MM HG: ICD-10-PCS | Mod: HCNC,CPTII,S$GLB, | Performed by: HOSPITALIST

## 2023-11-22 PROCEDURE — 3288F FALL RISK ASSESSMENT DOCD: CPT | Mod: HCNC,CPTII,S$GLB, | Performed by: HOSPITALIST

## 2023-11-22 PROCEDURE — 1124F PR ADV CARE PLAN DISCUSSED, UNABLE/UNWILL DOC PLAN OR SURROGATE: ICD-10-PCS | Mod: HCNC,CPTII,S$GLB, | Performed by: HOSPITALIST

## 2023-11-22 PROCEDURE — 1125F AMNT PAIN NOTED PAIN PRSNT: CPT | Mod: HCNC,CPTII,S$GLB, | Performed by: HOSPITALIST

## 2023-11-22 PROCEDURE — 3074F PR MOST RECENT SYSTOLIC BLOOD PRESSURE < 130 MM HG: ICD-10-PCS | Mod: HCNC,CPTII,S$GLB, | Performed by: HOSPITALIST

## 2023-11-22 PROCEDURE — G0008 FLU VACCINE - QUADRIVALENT - ADJUVANTED: ICD-10-PCS | Mod: HCNC,S$GLB,, | Performed by: HOSPITALIST

## 2023-11-22 PROCEDURE — 3074F SYST BP LT 130 MM HG: CPT | Mod: HCNC,CPTII,S$GLB, | Performed by: HOSPITALIST

## 2023-11-22 PROCEDURE — 1101F PR PT FALLS ASSESS DOC 0-1 FALLS W/OUT INJ PAST YR: ICD-10-PCS | Mod: HCNC,CPTII,S$GLB, | Performed by: HOSPITALIST

## 2023-11-22 PROCEDURE — 1124F ACP DISCUSS-NO DSCNMKR DOCD: CPT | Mod: HCNC,CPTII,S$GLB, | Performed by: HOSPITALIST

## 2023-11-22 PROCEDURE — 3288F PR FALLS RISK ASSESSMENT DOCUMENTED: ICD-10-PCS | Mod: HCNC,CPTII,S$GLB, | Performed by: HOSPITALIST

## 2023-11-22 PROCEDURE — G0008 ADMIN INFLUENZA VIRUS VAC: HCPCS | Mod: HCNC,S$GLB,, | Performed by: HOSPITALIST

## 2023-11-22 PROCEDURE — 1125F PR PAIN SEVERITY QUANTIFIED, PAIN PRESENT: ICD-10-PCS | Mod: HCNC,CPTII,S$GLB, | Performed by: HOSPITALIST

## 2023-11-22 PROCEDURE — 99211 PR OFFICE/OUTPT VISIT, EST, LEVL I: ICD-10-PCS | Mod: HCNC,25,S$GLB, | Performed by: HOSPITALIST

## 2023-11-22 PROCEDURE — 1159F PR MEDICATION LIST DOCUMENTED IN MEDICAL RECORD: ICD-10-PCS | Mod: HCNC,CPTII,S$GLB, | Performed by: HOSPITALIST

## 2023-11-22 PROCEDURE — 1101F PT FALLS ASSESS-DOCD LE1/YR: CPT | Mod: HCNC,CPTII,S$GLB, | Performed by: HOSPITALIST

## 2023-11-22 PROCEDURE — 99211 OFF/OP EST MAY X REQ PHY/QHP: CPT | Mod: HCNC,25,S$GLB, | Performed by: HOSPITALIST

## 2023-11-22 PROCEDURE — 99999 PR PBB SHADOW E&M-EST. PATIENT-LVL V: CPT | Mod: PBBFAC,HCNC,, | Performed by: HOSPITALIST

## 2023-11-22 PROCEDURE — 1159F MED LIST DOCD IN RCRD: CPT | Mod: HCNC,CPTII,S$GLB, | Performed by: HOSPITALIST

## 2023-11-22 NOTE — ASSESSMENT & PLAN NOTE
Counseled pt on proper dosing and use of Voltaren gel, which she already has.  Referral to [facility] placed.

## 2023-11-22 NOTE — PROGRESS NOTES
Primary Care Provider Appointment - [facility]  Sheng Clark MD      Subjective:      Patient ID: Grace Navarro is a 77 y.o. female with   Past Medical History:   Diagnosis Date    Anxiety     Rene's disease     Cataract     Chronic low back pain     Depression     Goiter, nodular     Hyperlipidemia     Hypertension     Irritable bowel     Neuromuscular disorder     Osteopenia of multiple sites 5/29/2017    PUD (peptic ulcer disease)     Subarachnoid hemorrhage 2014    Varicose veins            Chief Complaint: Leg Pain    Prior to this visit, patient's last encounter with PCP was 8/29/2023.    Pt reports ongoing issues with stye in R eye, has been using warm compresses only infrequently and hasn't started lid scrubs yet.  Stye on L eyelid lanced by ophthalmologist and has gone through 2 rounds of tobramycin drops.  R eye stye did open up and bled some, but hasn't completely resolved.    Also reports concern about episode of urinary incontinence a/w recent UTI, and still has urgency and frequency.    Also reports about 1 wk of RLE sciatica which has caused limping, but it does improve w/ activity.  Has had to take a mobic twice for sx, which she tries to avoid.  Had L4-L5 fusion 11 years ago, started to have LBP again recently.  Does stretches which helps some.        4Ms for Medical Decision-Making in Older Adults    Last Completed EAWV: None    MOBILITY:  Get Up and Go:       No data to display              Activities of Daily Living:       No data to display              Whisper Test:       No data to display              Disability Status:      9/21/2016     7:28 AM   Disability Status   Are you deaf or do you have serious difficulty hearing? N   Are you blind or do you have serious difficulty seeing, even when wearing glasses? N   Because of a physical, mental, or emotional condition, do you have serious difficulty concentrating, remembering, or making decisions? N   Do you have serious  difficulty walking or climbing stairs? N   Do you have difficulty dressing or bathing? N   Because of a physical, mental, or emotional condition, do you have difficulty doing errands alone such as visiting a doctor's office or shopping? N     Nutrition Screening:       No data to display             Screening Score: 0-7 Malnourished, 8-11 At Risk, 12-14 Normal    MENTATION:   Depression Patient Health Questionnaire:      11/9/2023     1:47 PM   Depression Patient Health Questionnaire   Over the last two weeks how often have you been bothered by little interest or pleasure in doing things Several days   Over the last two weeks how often have you been bothered by feeling down, depressed or hopeless Several days   PHQ-2 Total Score 2   PHQ-9 Total Score 4   PHQ-9 Interpretation Minimal or None     Has Dementia Dx: No    Cognitive Function Screening:       No data to display              Cognitive Function Screening Total - Less than 4 = Abnormal,  Greater than or equal to 4 = Normal    MEDICATIONS:  High Risk Medications:  Total Active Medications: 1  EScitalopram oxalate Tab - 5 MG    WHAT MATTERS MOST:  Advance Care Planning   ACP Status:   Patient has had an ACP conversation  Living Will: No  Power of : No  [place]: No      Date: 11/22/2023  Patient did not wish or was not able to name a surrogate decision maker or provide an Advance Care Plan.  She has HCPOA documents at home; she was urged to bring them to be scanned into chart.      Social History     Socioeconomic History    Marital status: Single   Occupational History     Employer: retired   Tobacco Use    Smoking status: Former     Current packs/day: 1.00     Average packs/day: 1 pack/day for 10.0 years (10.0 ttl pk-yrs)     Types: Cigarettes    Smokeless tobacco: Never    Tobacco comments:     quit 1975   Substance and Sexual Activity    Alcohol use: Yes     Alcohol/week: 14.0 standard drinks of alcohol     Types: 14 Glasses of wine per week      Comment: socially    Drug use: No    Sexual activity: Not Currently     Partners: Male     Comment:    Social History Narrative    . 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     Social Determinants of Health     Financial Resource Strain: Unknown (7/12/2023)    Overall Financial Resource Strain (CARDIA)     Difficulty of Paying Living Expenses: Patient refused   Food Insecurity: Unknown (7/12/2023)    Hunger Vital Sign     Worried About Running Out of Food in the Last Year: Patient refused     Ran Out of Food in the Last Year: Patient refused   Transportation Needs: Unknown (7/12/2023)    PRAPARE - Transportation     Lack of Transportation (Medical): Patient refused     Lack of Transportation (Non-Medical): Patient refused   Physical Activity: Unknown (7/12/2023)    Exercise Vital Sign     Days of Exercise per Week: 7 days   Stress: Stress Concern Present (7/12/2023)    [employer] - Occupational Stress Questionnaire     Feeling of Stress : To some extent   Social Connections: Unknown (7/12/2023)    Social Connection and Isolation Panel [NHANES]     Frequency of Communication with Friends and Family: Patient refused     Frequency of Social Gatherings with Friends and Family: Patient refused     Active Member of Clubs or Organizations: Patient refused     Attends Club or Organization Meetings: Patient refused     Marital Status: Patient refused   Housing Stability: Unknown (7/12/2023)    Housing Stability Vital Sign     Unable to Pay for Housing in the Last Year: Patient refused     Unstable Housing in the Last Year: Patient refused       Review of Systems   Constitutional:  Positive for activity change.   Genitourinary:  Positive for bladder incontinence, frequency and urgency.   Musculoskeletal:  Positive for back pain and leg pain.        Objective:   /80 (BP Location: Right arm, Patient Position: Sitting, BP Method: Large  "(Manual))   Pulse 67   Temp 98.4 °F (36.9 °C) (Oral)   Ht 5' 4" (1.626 m)   Wt 69.4 kg (153 lb)   LMP 06/14/1996   SpO2 96%   BMI 26.26 kg/m²     Physical Exam  Vitals reviewed.   Constitutional:       General: She is not in acute distress.     Appearance: Normal appearance.   HENT:      Head: Normocephalic and atraumatic.      Mouth/Throat:      Mouth: Mucous membranes are moist.      Pharynx: Oropharynx is clear.   Eyes:      General: No scleral icterus.     Conjunctiva/sclera: Conjunctivae normal.   Cardiovascular:      Rate and Rhythm: Normal rate and regular rhythm.   Pulmonary:      Effort: Pulmonary effort is normal. No respiratory distress.   Musculoskeletal:      Right lower leg: No edema.      Left lower leg: No edema.      Comments: Point TTP over R SI joint.  Minimally positive SLR on R   Skin:     General: Skin is warm and dry.   Neurological:      General: No focal deficit present.      Mental Status: She is alert and oriented to person, place, and time.      Motor: No weakness.              Lab Results   Component Value Date    WBC 5.39 08/20/2023    HGB 12.9 08/20/2023    HCT 38.9 08/20/2023     08/20/2023    CHOL 207 (H) 03/29/2023    TRIG 81 03/29/2023    HDL 67 03/29/2023    ALT 18 08/19/2023    AST 22 08/19/2023     11/13/2023    K 3.7 11/13/2023     11/13/2023    CREATININE 0.6 11/13/2023    BUN 13 11/13/2023    CO2 28 11/13/2023    TSH 0.939 08/09/2022    INR 1.0 02/08/2014    HGBA1C 5.5 06/19/2019       Current Outpatient Medications on File Prior to Visit   Medication Sig Dispense Refill    ascorbic acid, vitamin C, (VITAMIN C) 1000 MG tablet Take 1,000 mg by mouth 2 (two) times daily.      CALCIUM-MAGNESIUM ORAL Take 1 capsule by mouth 2 (two) times a day. 1000mg of calcium and 500mg Magnesium      carvediloL (COREG) 12.5 MG tablet Take 12.5mg qam and 25mg tab qpm. 90 tablet 3    cholecalciferol, vitamin D3, 1,000 unit capsule Take 1,000 Units by mouth 2 (two) times " a day.      cyanocobalamin 500 MCG tablet Take 500 mcg by mouth once daily.      cycloSPORINE (RESTASIS) 0.05 % ophthalmic emulsion Place 1 drop into both eyes 2 (two) times daily. 60 each 11    diclofenac sodium (VOLTAREN) 1 % Gel Apply 2 g topically 2 (two) times daily. 350 g 1    EScitalopram oxalate (LEXAPRO) 5 MG Tab Take 1 tablet (5 mg total) by mouth nightly. 90 tablet 3    ezetimibe (ZETIA) 10 mg tablet TAKE 1 TABLET EVERY DAY 90 tablet 3    fluorouraciL (EFUDEX) 5 % cream Use hs for 2 weeks for back of hands 40 g 3    hydroCHLOROthiazide (HYDRODIURIL) 12.5 MG Tab Take 1 tablet (12.5 mg total) by mouth once daily. 90 tablet 3    hydrOXYzine HCL (ATARAX) 25 MG tablet Take 1 tablet (25 mg total) by mouth 3 (three) times daily as needed for Itching. 270 tablet 3    meloxicam (MOBIC) 15 MG tablet Take 1 tablet by mouth once daily 30 tablet 0    potassium chloride SA (K-DUR,KLOR-CON M) 10 MEQ tablet Take 1 tablet (10 mEq total) by mouth once daily. 90 tablet 3    triamcinolone acetonide 0.1% (KENALOG) 0.1 % cream APPLY  CREAM EXTERNALLY TO AFFECTED AREA TWICE DAILY AS NEEDED FOR 7 DAYS 60 g 1     No current facility-administered medications on file prior to visit.         Assessment:   77 y.o. 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  Referral to [facility].         Relevant Orders    Ambulatory referral/consult to [facility]    Urgency of urination - Primary     As discussed previously w/ pt, suspect she likely has some degree of bladder prolapse preventing her from completely voiding her bladder.  Will get PVR and based on results consider uro-gyn referral.  She already does Kegels.         Relevant Orders    US  Bladder Post Void Residual     Other Visit Diagnoses       Need for immunization against influenza        Relevant Orders    Influenza (FLUAD) - Quadrivalent (Adjuvanted) *Preferred* (65+) (PF) (Completed)            Health Maintenance         Date Due Completion Date    RSV Vaccine (Age 60+ and Pregnant patients) (1 - 1-dose 60+ series) Never done ---    COVID-19 Vaccine (6 - 2023-24 season) 09/01/2023 12/8/2022    Mammogram 06/30/2024 6/30/2023    DEXA Scan 12/10/2025 12/10/2021    Lipid Panel 03/29/2028 3/29/2023    TETANUS VACCINE 03/01/2032 3/1/2022            Future Appointments   Date Time Provider Department Center   11/28/2023  4:00 PM [facility] [place]   11/30/2023  2:00 PM Reed Meza LMSW [facility] [place]   12/4/2023  3:15 PM Mayra Liriano MD [facility] [address]   1/11/2024  1:00 PM Sheng Clark MD [facility] [place]         Follow up in about 4 weeks (around 12/20/2023). Total clinical care time was 40 min.    Sheng Clark MD  [facility]

## 2023-11-22 NOTE — PATIENT INSTRUCTIONS
Use lid scrubs nightly to wipe your eyelids, which will help prevent them from getting clogged, and this should prevent the styes from coming back.  Keep using the warm compresses on your eye; keep them on your eye until it cools off, and do that every 4 hours or so.    When you go do the ultrasound of your bladder, be sure you have a full bladder beforehand.    Bring your Living Will and Healthcare Power of Attourney to your next visit so we can scan them into your chart.      When you use the Voltaren gel on your low back or leg measure out 4 grams to apply.

## 2023-11-22 NOTE — PROGRESS NOTES
Pt identified by name and , allergies reviewed, gave consent and was given flu shot per MD order. Tolerated well advised to wait in clinic 15 minutes post administration.

## 2023-11-22 NOTE — ASSESSMENT & PLAN NOTE
As discussed previously w/ pt, suspect she likely has some degree of bladder prolapse preventing her from completely voiding her bladder.  Will get PVR and based on results consider uro-gyn referral.  She already does Kegels.

## 2023-11-24 ENCOUNTER — TELEPHONE (OUTPATIENT)
Dept: ADMINISTRATIVE | Facility: OTHER | Age: 78
End: 2023-11-24
Payer: MEDICARE

## 2023-11-27 ENCOUNTER — PATIENT MESSAGE (OUTPATIENT)
Dept: PRIMARY CARE CLINIC | Facility: CLINIC | Age: 78
End: 2023-11-27
Payer: MEDICARE

## 2023-11-27 DIAGNOSIS — I10 BENIGN ESSENTIAL HYPERTENSION: ICD-10-CM

## 2023-11-27 DIAGNOSIS — E78.5 HYPERLIPIDEMIA, UNSPECIFIED HYPERLIPIDEMIA TYPE: ICD-10-CM

## 2023-11-27 RX ORDER — HYDROCHLOROTHIAZIDE 12.5 MG/1
12.5 TABLET ORAL DAILY
Qty: 90 TABLET | Refills: 3 | Status: SHIPPED | OUTPATIENT
Start: 2023-11-27 | End: 2024-11-26

## 2023-11-27 RX ORDER — EZETIMIBE 10 MG/1
10 TABLET ORAL DAILY
Qty: 90 TABLET | Refills: 3 | Status: SHIPPED | OUTPATIENT
Start: 2023-11-27

## 2023-11-28 ENCOUNTER — HOSPITAL ENCOUNTER (OUTPATIENT)
Dept: RADIOLOGY | Facility: HOSPITAL | Age: 78
Discharge: HOME | End: 2023-11-28
Attending: HOSPITALIST
Payer: MEDICARE

## 2023-11-28 DIAGNOSIS — R39.15 URGENCY OF URINATION: ICD-10-CM

## 2023-11-28 PROCEDURE — 76857 US EXAM PELVIC LIMITED: CPT | Mod: TC

## 2023-11-28 PROCEDURE — 76857 US BLADDER POST VOID RESIDUAL: ICD-10-PCS | Mod: 26,,, | Performed by: STUDENT IN AN ORGANIZED HEALTH CARE EDUCATION/TRAINING PROGRAM

## 2023-11-28 PROCEDURE — 76857 US EXAM PELVIC LIMITED: CPT | Mod: 26,,, | Performed by: STUDENT IN AN ORGANIZED HEALTH CARE EDUCATION/TRAINING PROGRAM

## 2023-11-29 ENCOUNTER — PATIENT MESSAGE (OUTPATIENT)
Dept: PRIMARY CARE CLINIC | Facility: CLINIC | Age: 78
End: 2023-11-29
Payer: MEDICARE

## 2023-11-30 ENCOUNTER — CLINICAL SUPPORT (OUTPATIENT)
Dept: PRIMARY CARE CLINIC | Facility: CLINIC | Age: 78
End: 2023-11-30
Payer: MEDICARE

## 2023-11-30 DIAGNOSIS — F43.29 ADJUSTMENT DISORDER WITH OTHER SYMPTOM: Primary | ICD-10-CM

## 2023-11-30 PROCEDURE — 90832 PR PSYCHOTHERAPY W/PATIENT, 30 MIN: ICD-10-PCS | Mod: HCNC,S$GLB,, | Performed by: SOCIAL WORKER

## 2023-11-30 PROCEDURE — 90832 PSYTX W PT 30 MINUTES: CPT | Mod: HCNC,S$GLB,, | Performed by: SOCIAL WORKER

## 2023-11-30 NOTE — PROGRESS NOTES
Individual Psychotherapy (LCSW/PhD)  Grace Navarro,  11/30/2023    Site:  [place]         Therapeutic Intervention: Met with patient for individual psychotherapy.    Chief complaint/reason for encounter: depression and anxiety     Interval history and content of current session: Pt has been stressed coping with minor health issues and feels frustrated. Pt has been speaking with daughter about moving in with her. Pt was told by daughter that they would not be planning for mother to move to their home. LCSW supported pt in processing feelings if sadness and frustration changes. Pt states they are still considering changes including more and feel lonely at times.     Pt states they are encouraged by their health. Pt feels like their mood may be lower because they have not been able to speak with daughter daily.     Treatment plan:  Target symptoms: depression, anxiety   Why chosen therapy is appropriate versus another modality: relevant to diagnosis, evidence based practice  Outcome monitoring methods: self-report, checklist/rating scale  Therapeutic intervention type: supportive psychotherapy    Risk parameters:  Patient reports no suicidal ideation  Patient reports no homicidal ideation  Patient reports no self-injurious behavior  Patient reports no violent behavior    Verbal deficits: None    Patient's response to intervention:  The patient's response to intervention is accepting.    Progress toward goals and other mental status changes:  The patient's progress toward goals is good.    Diagnosis:   No diagnosis found.    Plan: Pt plans to continue individual psychotherapy    Return to clinic: 2 weeks    Length of Service (minutes): 60

## 2023-12-02 NOTE — PROGRESS NOTES
HPI     Glaucoma            Comments: Patient Grace Navarro is a 77 year old female.          Comments    Pt here for 9 month IOP check with pachymetry/gonioscopy. Pt states that   VA OU is the same since last visit. Pt states that she has been having   recurrent styes along LL in both eyes. Pt states that she has LLL stye   removed by Dr. Murry on 10/06/2023. Pt states that she still has residual   stye along LLL. Pt states that she now has a red stye RLL for the past   month. Pt states that she is using prescribed medication and warm   compresses but stye is painful to touch. Pt states that she had a previous   stye RLL that drained on its own but has blood instead of pus.    DLS: 01/26/2023 with Dr. Liriano    Meds:  1. Systane Ultra TID OU  2. Systane gel qhs OU  3. Tobradex QID OD  4. Lid scrubs qhs OU  5. Warm compresses TID OU    POHx:  1. Open angle with borderline findings and high glaucoma risk in both eyes  2. Large physiologic cupping of optic disc  3. Senile ectropion of right lower eyelid  4. Dermatochalasis of both upper eyelids  5. Dry eyes  6. Pseudophakia of both eyes            Last edited by Ofelia Richter on 12/4/2023  3:08 PM.            Assessment /Plan     For exam results, see Encounter Report.    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    Glaucoma (type and duration)    Suspect - low tension glaucoma suspect (suspicious ON.s)    folowed by [facility] for years as a suspect    First HVF   11/2019    First photos   3/2016    Treatment / Drops started   none           Family history    ???        Glaucoma meds   None         H/O adverse  rxn to glaucoma drops    None         LASERS    None         GLAUCOMA SURGERIES    None        OTHER EYE SURGERIES    none        CDR    0.8 w/myelinated fibers  /0.8        Tbase    12-16        Tmax    16/16         Ttarget   ???            HVF  3 test  2019 to  2023  -  Non sp  od // non sp  Os(( ? If associated with the myelinated  NFL od)         Gonio   +3-4 ou         CCT    ???        OCT    3 test 2019 to 2023 - RNFL - Dec   od // wnl  os        Disc photos     5/19/2022     - Ttoday   16/15   - Test done today    IOP/  stye / chalazion check // MGD // blepharitis       2. . Myelinated NFL OD    3. H/o eyelid surgery in 2005   Dr. harris for ptosis / dermatochalasis   Pt is interested in a re-do        4. PC IOL OU    OD - 9/21/2016 - PCB00 21.5 - [facility]   OS - 8/3/2016 - PCB00 22.0 - [facility]     5. S/P eyelid surgery    Lyn[facility] 11/2/2022 - tarsal stripping and blepharoplasty // review recent notes     Plan  Get records from Dr. Khan - signed release  - was monitored with VF's photos ect. / Eventually was told she did NOT have glaucoma and no further testing was done - old VF's ect - may have a defect from the medullated NFL    Try +1.50 readers for [employer] work - her +2.50 are now too strong    Monitor as a suspect - normal tension suspect - very suspicious ON/s   Hold off on starting treatment at this time -     - the VF changes od may ne 2/2 myelinated NFL     Stye / chalazion / MGD right and left lower lids  Rec WC/ LH/   Ok to cont tobradex oint - from Dr Alvarado - [facility] as a urgent care [facility]  Rec she consider getting the [employer] lid hygiene kit - brochure given   Rec doxycycline bid x 1 month and then q day   Pt wants to hold off on the doxy   Expressed MG's RLL with q tip and pressure     F/U 2-3 month blepharitis / MGD/chalazion check

## 2023-12-04 ENCOUNTER — OFFICE VISIT (OUTPATIENT)
Dept: OPHTHALMOLOGY | Facility: CLINIC | Age: 78
End: 2023-12-04
Payer: MEDICARE

## 2023-12-04 DIAGNOSIS — H02.834 DERMATOCHALASIS OF BOTH UPPER EYELIDS: ICD-10-CM

## 2023-12-04 DIAGNOSIS — H00.12 CHALAZION OF RIGHT LOWER EYELID: ICD-10-CM

## 2023-12-04 DIAGNOSIS — H04.123 DRY EYES: ICD-10-CM

## 2023-12-04 DIAGNOSIS — Z96.1 PSEUDOPHAKIA OF BOTH EYES: ICD-10-CM

## 2023-12-04 DIAGNOSIS — H57.812 BROW PTOSIS, LEFT: ICD-10-CM

## 2023-12-04 DIAGNOSIS — H02.831 DERMATOCHALASIS OF BOTH UPPER EYELIDS: ICD-10-CM

## 2023-12-04 DIAGNOSIS — H40.023 OPEN ANGLE WITH BORDERLINE FINDINGS AND HIGH GLAUCOMA RISK IN BOTH EYES: Primary | ICD-10-CM

## 2023-12-04 DIAGNOSIS — H02.132 SENILE ECTROPION OF RIGHT LOWER EYELID: ICD-10-CM

## 2023-12-04 DIAGNOSIS — H00.15 CHALAZION OF LEFT LOWER EYELID: ICD-10-CM

## 2023-12-04 DIAGNOSIS — H47.239 LARGE PHYSIOLOGIC CUPPING OF OPTIC DISC: ICD-10-CM

## 2023-12-04 PROCEDURE — 1159F MED LIST DOCD IN RCRD: CPT | Mod: HCNC,CPTII,S$GLB, | Performed by: OPHTHALMOLOGY

## 2023-12-04 PROCEDURE — 1160F RVW MEDS BY RX/DR IN RCRD: CPT | Mod: HCNC,CPTII,S$GLB, | Performed by: OPHTHALMOLOGY

## 2023-12-04 PROCEDURE — 1126F AMNT PAIN NOTED NONE PRSNT: CPT | Mod: HCNC,CPTII,S$GLB, | Performed by: OPHTHALMOLOGY

## 2023-12-04 PROCEDURE — 99214 PR OFFICE/OUTPT VISIT, EST, LEVL IV, 30-39 MIN: ICD-10-PCS | Mod: HCNC,S$GLB,, | Performed by: OPHTHALMOLOGY

## 2023-12-04 PROCEDURE — 99999 PR PBB SHADOW E&M-EST. PATIENT-LVL III: CPT | Mod: PBBFAC,HCNC,, | Performed by: OPHTHALMOLOGY

## 2023-12-04 PROCEDURE — 1160F PR REVIEW ALL MEDS BY PRESCRIBER/CLIN PHARMACIST DOCUMENTED: ICD-10-PCS | Mod: HCNC,CPTII,S$GLB, | Performed by: OPHTHALMOLOGY

## 2023-12-04 PROCEDURE — 1126F PR PAIN SEVERITY QUANTIFIED, NO PAIN PRESENT: ICD-10-PCS | Mod: HCNC,CPTII,S$GLB, | Performed by: OPHTHALMOLOGY

## 2023-12-04 PROCEDURE — 99214 OFFICE O/P EST MOD 30 MIN: CPT | Mod: HCNC,S$GLB,, | Performed by: OPHTHALMOLOGY

## 2023-12-04 PROCEDURE — 1159F PR MEDICATION LIST DOCUMENTED IN MEDICAL RECORD: ICD-10-PCS | Mod: HCNC,CPTII,S$GLB, | Performed by: OPHTHALMOLOGY

## 2023-12-04 PROCEDURE — 99999 PR PBB SHADOW E&M-EST. PATIENT-LVL III: ICD-10-PCS | Mod: PBBFAC,HCNC,, | Performed by: OPHTHALMOLOGY

## 2023-12-04 RX ORDER — TOBRAMYCIN AND DEXAMETHASONE 3; 1 MG/ML; MG/ML
1 SUSPENSION/ DROPS OPHTHALMIC EVERY 6 HOURS
COMMUNITY
End: 2024-02-26

## 2024-01-11 ENCOUNTER — HOSPITAL ENCOUNTER (OUTPATIENT)
Dept: RADIOLOGY | Facility: HOSPITAL | Age: 79
Discharge: HOME | End: 2024-01-11
Attending: HOSPITALIST
Payer: MEDICARE

## 2024-01-11 ENCOUNTER — OFFICE VISIT (OUTPATIENT)
Dept: PRIMARY CARE CLINIC | Facility: CLINIC | Age: 79
End: 2024-01-11
Payer: MEDICARE

## 2024-01-11 VITALS
OXYGEN SATURATION: 98 % | WEIGHT: 149.25 LBS | HEART RATE: 72 BPM | SYSTOLIC BLOOD PRESSURE: 184 MMHG | HEIGHT: 64 IN | BODY MASS INDEX: 25.48 KG/M2 | DIASTOLIC BLOOD PRESSURE: 86 MMHG | TEMPERATURE: 98 F

## 2024-01-11 DIAGNOSIS — S09.90XA HEAD TRAUMA, INITIAL ENCOUNTER: ICD-10-CM

## 2024-01-11 DIAGNOSIS — R39.15 URGENCY OF URINATION: Primary | ICD-10-CM

## 2024-01-11 DIAGNOSIS — I10 PRIMARY HYPERTENSION: ICD-10-CM

## 2024-01-11 PROCEDURE — 99999 PR PBB SHADOW E&M-EST. PATIENT-LVL V: CPT | Mod: PBBFAC,HCNC,, | Performed by: HOSPITALIST

## 2024-01-11 PROCEDURE — 99499 UNLISTED E&M SERVICE: CPT | Mod: HCNC,S$GLB,, | Performed by: HOSPITALIST

## 2024-01-11 PROCEDURE — 70450 CT HEAD/BRAIN W/O DYE: CPT | Mod: 26,HCNC,, | Performed by: RADIOLOGY

## 2024-01-11 PROCEDURE — 70450 CT HEAD/BRAIN W/O DYE: CPT | Mod: TC,HCNC

## 2024-01-11 NOTE — ASSESSMENT & PLAN NOTE
Concerning for possible ICH, especially considering persistent sx and elevated BP w/ prior h/o ICH.  Lack of intracranial hypertension sx reassuring, as is normal neuro exam.  Will get CT head to r/o definitively.

## 2024-01-11 NOTE — ASSESSMENT & PLAN NOTE
Doesn't seem to be improving w/ bladder training thus far.  PVR done recently did not show significant bladder retention, so doubt related to POP, especially since she routinely does Kegels anyhow.  May represent IC; will refer to Urogynecology.

## 2024-01-11 NOTE — PROGRESS NOTES
Primary Care Provider Appointment - [facility]  Sheng Clark MD      Subjective:      Patient ID: Grace Navarro is a 78 y.o. female with   Past Medical History:   Diagnosis Date    Anxiety     Rene's disease     Cataract     Chronic low back pain     Depression     Goiter, nodular     Hyperlipidemia     Hypertension     Irritable bowel     Neuromuscular disorder     Osteopenia of multiple sites 5/29/2017    PUD (peptic ulcer disease)     Subarachnoid hemorrhage 2014    Varicose veins            Chief Complaint: f/u    Prior to this visit, patient's last encounter with PCP was 11/22/2023.    Just got back from being [place] for a few [place].  Prior to leaving to come back fell and hit her head; she was putting boots on sitting on the side of her bed, which was too high for her having a second mattress, and she slipped off the bed and hit the back of her head 2x on closet door, breaking it off the hinges.  Had a knot but no bleeding.  Has had HA since.  It's frontal, but also has chronic neck pain since an injury at 18 y/o; woke up so very stiff during the night.  Is not aware of any positional component to HA.  No change in balance since.  BP has been high lately, but her daughter and JUANITA both cook with salt.  Has been doing bladder training as instructed but doesn't seem to help.  Had another incontinence episode when walking with friends, but they were walking longer than usual that time.      4Ms for Medical Decision-Making in Older Adults    Last Completed EAWV: None    MOBILITY:  Get Up and Go:       No data to display                Activities of Daily Living:       No data to display                Whisper Test:       No data to display                Disability Status:      9/21/2016     7:28 AM   Disability Status   Are you deaf or do you have serious difficulty hearing? N   Are you blind or do you have serious difficulty seeing, even when wearing glasses?  N   Because of a physical, mental, or emotional condition, do you have serious difficulty concentrating, remembering, or making decisions? N   Do you have serious difficulty walking or climbing stairs? N   Do you have difficulty dressing or bathing? N   Because of a physical, mental, or emotional condition, do you have difficulty doing errands alone such as visiting a doctor's office or shopping? N     Nutrition Screening:       No data to display               Screening Score: 0-7 Malnourished, 8-11 At Risk, 12-14 Normal    MENTATION:   Depression Patient Health Questionnaire:      11/9/2023     1:47 PM   Depression Patient Health Questionnaire   Over the last two weeks how often have you been bothered by little interest or pleasure in doing things Several days   Over the last two weeks how often have you been bothered by feeling down, depressed or hopeless Several days   PHQ-2 Total Score 2   PHQ-9 Total Score 4   PHQ-9 Interpretation Minimal or None     Has Dementia Dx: No    Cognitive Function Screening:       No data to display                Cognitive Function Screening Total - Less than 4 = Abnormal,  Greater than or equal to 4 = Normal    MEDICATIONS:  High Risk Medications:  Total Active Medications: 1  EScitalopram oxalate Tab - 5 MG    WHAT MATTERS MOST:  Advance Care Planning   ACP Status:   Patient has had an ACP conversation  Living Will: No  Power of : No  [place]: No      Date: 11/22/2023  Patient did not wish or was not able to name a surrogate decision maker or provide an Advance Care Plan.  She has HCPOA documents at home; she was urged to bring them to be scanned into chart.      Social History     Socioeconomic History    Marital status: Single   Occupational History     Employer: retired   Tobacco Use    Smoking status: Former     Current packs/day: 1.00     Average packs/day: 1 pack/day for 10.0 years (10.0 ttl pk-yrs)     Types: Cigarettes    Smokeless tobacco: Never    Tobacco  comments:     quit 1975   Substance and Sexual Activity    Alcohol use: Yes     Alcohol/week: 14.0 standard drinks of alcohol     Types: 14 Glasses of wine per week     Comment: socially    Drug use: No    Sexual activity: Not Currently     Partners: Male     Comment:    Social History Narrative    . 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     Social Determinants of Health     Financial Resource Strain: Patient Declined (7/12/2023)    Overall Financial Resource Strain (CARDIA)     Difficulty of Paying Living Expenses: Patient declined   Food Insecurity: Patient Declined (7/12/2023)    Hunger Vital Sign     Worried About Running Out of Food in the Last Year: Patient declined     Ran Out of Food in the Last Year: Patient declined   Transportation Needs: Patient Declined (7/12/2023)    PRAPARE - Transportation     Lack of Transportation (Medical): Patient declined     Lack of Transportation (Non-Medical): Patient declined   Physical Activity: Unknown (7/12/2023)    Exercise Vital Sign     Days of Exercise per Week: 7 days   Stress: Stress Concern Present (7/12/2023)    [employer] [place] [employer] - Occupational Stress Questionnaire     Feeling of Stress : To some extent   Social Connections: Unknown (7/12/2023)    Social Connection and Isolation Panel [NHANES]     Frequency of Communication with Friends and Family: Patient declined     Frequency of Social Gatherings with Friends and Family: Patient declined     Active Member of Clubs or Organizations: Patient declined     Attends Club or Organization Meetings: Patient declined     Marital Status: Patient declined   Housing Stability: Unknown (7/12/2023)    Housing Stability Vital Sign     Unable to Pay for Housing in the Last Year: Patient refused     Unstable Housing in the Last Year: Patient refused       Review of Systems   HENT:  Negative for rhinorrhea.    Eyes:  Negative for visual disturbance.  "  Cardiovascular:  Positive for leg swelling (resolved).   Gastrointestinal:  Negative for nausea and vomiting.   Genitourinary:  Positive for bladder incontinence, frequency and urgency.   Musculoskeletal:  Positive for back pain, leg pain, neck pain and neck stiffness.   Neurological:  Positive for headaches. Negative for dizziness, syncope, speech difficulty, weakness, memory loss and coordination difficulties.        Objective:   BP (!) 184/86 (BP Location: Right arm, Patient Position: Sitting, BP Method: Large (Manual))   Pulse 72   Temp 98.4 °F (36.9 °C) (Oral)   Ht 5' 4" (1.626 m)   Wt 67.7 kg (149 lb 4 oz)   LMP 06/14/1996   SpO2 98%   BMI 25.62 kg/m²     Physical Exam  Vitals reviewed.   Constitutional:       General: She is not in acute distress.     Appearance: Normal appearance.   HENT:      Head: Normocephalic and atraumatic.      Comments: Very slight TTP over site of impact on occipital scalp.  No apparent injury.     Mouth/Throat:      Mouth: Mucous membranes are moist.      Pharynx: Oropharynx is clear.   Eyes:      General: No scleral icterus.     Extraocular Movements: Extraocular movements intact.      Conjunctiva/sclera: Conjunctivae normal.      Pupils: Pupils are equal, round, and reactive to light.   Cardiovascular:      Rate and Rhythm: Normal rate and regular rhythm.   Pulmonary:      Effort: Pulmonary effort is normal. No respiratory distress.   Musculoskeletal:      Right lower leg: No edema.      Left lower leg: No edema.      Comments: Point TTP over R SI joint.  Minimally positive SLR on R   Skin:     General: Skin is warm and dry.      Findings: No bruising.   Neurological:      General: No focal deficit present.      Mental Status: She is alert and oriented to person, place, and time.      Motor: No weakness.      Gait: Gait normal.              Lab Results   Component Value Date    WBC 5.39 08/20/2023    HGB 12.9 08/20/2023    HCT 38.9 08/20/2023     08/20/2023    CHOL " 207 (H) 03/29/2023    TRIG 81 03/29/2023    HDL 67 03/29/2023    ALT 18 08/19/2023    AST 22 08/19/2023     11/13/2023    K 3.7 11/13/2023     11/13/2023    CREATININE 0.6 11/13/2023    BUN 13 11/13/2023    CO2 28 11/13/2023    TSH 0.939 08/09/2022    INR 1.0 02/08/2014    HGBA1C 5.5 06/19/2019       Current Outpatient Medications on File Prior to Visit   Medication Sig Dispense Refill    ascorbic acid, vitamin C, (VITAMIN C) 1000 MG tablet Take 1,000 mg by mouth 2 (two) times daily.      CALCIUM-MAGNESIUM ORAL Take 1 capsule by mouth 2 (two) times a day. 1000mg of calcium and 500mg Magnesium      carvediloL (COREG) 12.5 MG tablet Take 12.5mg qam and 25mg tab qpm. 90 tablet 3    cholecalciferol, vitamin D3, 1,000 unit capsule Take 1,000 Units by mouth 2 (two) times a day.      COVID [id number],12up,,andu,,PF, (SPIKEVAX 4789-8472,12Y UP,,PF,) 50 mcg/0.5 mL injection Inject into the muscle. 0.5 mL 0    cyanocobalamin 500 MCG tablet Take 500 mcg by mouth once daily.      cycloSPORINE (RESTASIS) 0.05 % ophthalmic emulsion Place 1 drop into both eyes 2 (two) times daily. 60 each 11    diclofenac sodium (VOLTAREN) 1 % Gel Apply 2 g topically 2 (two) times daily. 350 g 1    EScitalopram oxalate (LEXAPRO) 5 MG Tab Take 1 tablet (5 mg total) by mouth nightly. 90 tablet 3    ezetimibe (ZETIA) 10 mg tablet Take 1 tablet (10 mg total) by mouth once daily. 90 tablet 3    fluorouraciL (EFUDEX) 5 % cream Use hs for 2 weeks for back of hands 40 g 3    hydroCHLOROthiazide (HYDRODIURIL) 12.5 MG Tab Take 1 tablet (12.5 mg total) by mouth once daily. 90 tablet 3    hydrOXYzine HCL (ATARAX) 25 MG tablet Take 1 tablet (25 mg total) by mouth 3 (three) times daily as needed for Itching. 270 tablet 3    meloxicam (MOBIC) 15 MG tablet Take 1 tablet by mouth once daily 30 tablet 0    potassium chloride SA (K-DUR,KLOR-CON M) 10 MEQ tablet Take 1 tablet (10 mEq total) by mouth once daily. 90 tablet 3    tobramycin-dexAMETHasone  0.3-0.1% (TOBRADEX) 0.3-0.1 % DrpS Place 1 drop into the right eye every 6 (six) hours.      triamcinolone acetonide 0.1% (KENALOG) 0.1 % cream APPLY  CREAM EXTERNALLY TO AFFECTED AREA TWICE DAILY AS NEEDED FOR 7 DAYS 60 g 1     No current facility-administered medications on file prior to visit.         Assessment:   78 y.o. female with multiple co-morbid illnesses here to follow-up with PCP and continue work-up of chronic issues    Plan:     Problem List Items Addressed This Visit       Hypertension     BP elevated today in context of HA.  Pt reports eating a lot of salty food lately which could potentially account for this, but recent significant head trauma with prior h/o ICH warrants imaging to rule that out.  BP can be reassessed at f/u and if intervention needed at that time it can be addressed.         Urgency of urination - Primary     Doesn't seem to be improving w/ bladder training thus far.  PVR done recently did not show significant bladder retention, so doubt related to POP, especially since she routinely does Kegels anyhow.  May represent IC; will refer to Urogynecology.         Relevant Orders    Ambulatory referral/consult to Urogynecology    Head trauma, initial encounter     Concerning for possible ICH, especially considering persistent sx and elevated BP w/ prior h/o ICH.  Lack of intracranial hypertension sx reassuring, as is normal neuro exam.  Will get CT head to r/o definitively.           Relevant Orders    CT Head Without Contrast         Health Maintenance         Date Due Completion Date    RSV Vaccine (Age 60+ and Pregnant patients) (1 - 1-dose 60+ series) Never done ---    COVID-19 Vaccine (7 - 2023-24 season) 08/01/2024 (Originally 2/1/2024) 12/7/2023    Mammogram 06/30/2024 6/30/2023    DEXA Scan 12/10/2025 12/10/2021    Lipid Panel 03/29/2028 3/29/2023    TETANUS VACCINE 03/01/2032 3/1/2022        Counseled on RSV vaccine.    Future Appointments   Date Time Provider Department Center    1/11/2024  3:30 PM [facility] OIC-CT1 500 LB LIMIT [facility]   1/22/2024  1:00 PM Jasmin Cedeño, NP [facility] FR REST [facility]   2/1/2024  1:00 PM Lisa Dean MD [facility] [place]   2/26/2024  3:15 PM Mayra Liriano MD [facility] [address]   4/10/2024  1:40 PM Lisa Dean MD [facility] [place]   5/14/2024 11:30 AM Kurtis Salvador DO [facility]         Follow up in about 2 weeks (around 1/25/2024). Total clinical care time was 40 min.    Sheng Clark MD  [facility]

## 2024-01-11 NOTE — ASSESSMENT & PLAN NOTE
BP elevated today in context of HA.  Pt reports eating a lot of salty food lately which could potentially account for this, but recent significant head trauma with prior h/o ICH warrants imaging to rule that out.  BP can be reassessed at f/u and if intervention needed at that time it can be addressed.

## 2024-01-22 ENCOUNTER — OFFICE VISIT (OUTPATIENT)
Dept: PAIN MEDICINE | Facility: OTHER | Age: 79
End: 2024-01-22
Attending: HOSPITALIST
Payer: MEDICARE

## 2024-01-22 DIAGNOSIS — M53.87 SCIATICA ASSOCIATED WITH DISORDER OF LUMBOSACRAL SPINE: ICD-10-CM

## 2024-01-22 DIAGNOSIS — R68.89 DECREASED MOTOR ACTIVITY: ICD-10-CM

## 2024-01-22 DIAGNOSIS — R26.89 BALANCE PROBLEM: ICD-10-CM

## 2024-01-22 DIAGNOSIS — M96.1 POSTLAMINECTOMY SYNDROME OF LUMBAR REGION: Primary | ICD-10-CM

## 2024-01-22 PROCEDURE — 1160F RVW MEDS BY RX/DR IN RCRD: CPT | Mod: HCNC,CPTII,, | Performed by: NURSE PRACTITIONER

## 2024-01-22 PROCEDURE — 99205 OFFICE O/P NEW HI 60 MIN: CPT | Mod: HCNC,,, | Performed by: NURSE PRACTITIONER

## 2024-01-22 PROCEDURE — 1159F MED LIST DOCD IN RCRD: CPT | Mod: HCNC,CPTII,, | Performed by: NURSE PRACTITIONER
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
## 2024-01-23 ENCOUNTER — PATIENT MESSAGE (OUTPATIENT)
Dept: PRIMARY CARE CLINIC | Facility: CLINIC | Age: 79
End: 2024-01-23
Payer: MEDICARE

## 2024-01-24 ENCOUNTER — PATIENT MESSAGE (OUTPATIENT)
Dept: PRIMARY CARE CLINIC | Facility: CLINIC | Age: 79
End: 2024-01-24
Payer: MEDICARE

## 2024-02-01 ENCOUNTER — OFFICE VISIT (OUTPATIENT)
Dept: PRIMARY CARE CLINIC | Facility: CLINIC | Age: 79
End: 2024-02-01
Payer: MEDICARE

## 2024-02-01 ENCOUNTER — LAB VISIT (OUTPATIENT)
Dept: LAB | Facility: HOSPITAL | Age: 79
End: 2024-02-01
Attending: INTERNAL MEDICINE
Payer: MEDICARE

## 2024-02-01 VITALS
DIASTOLIC BLOOD PRESSURE: 82 MMHG | HEART RATE: 57 BPM | OXYGEN SATURATION: 98 % | TEMPERATURE: 98 F | HEIGHT: 64 IN | SYSTOLIC BLOOD PRESSURE: 135 MMHG | WEIGHT: 151.44 LBS | BODY MASS INDEX: 25.85 KG/M2

## 2024-02-01 DIAGNOSIS — I10 BENIGN ESSENTIAL HYPERTENSION: ICD-10-CM

## 2024-02-01 DIAGNOSIS — D69.2 SENILE PURPURA: ICD-10-CM

## 2024-02-01 DIAGNOSIS — N39.41 URGE INCONTINENCE: ICD-10-CM

## 2024-02-01 DIAGNOSIS — E87.6 HYPOKALEMIA: ICD-10-CM

## 2024-02-01 DIAGNOSIS — M79.10 MYALGIA: ICD-10-CM

## 2024-02-01 DIAGNOSIS — F33.42 MDD (MAJOR DEPRESSIVE DISORDER), RECURRENT, IN FULL REMISSION: ICD-10-CM

## 2024-02-01 DIAGNOSIS — G89.29 CHRONIC LEFT-SIDED LOW BACK PAIN WITH RIGHT-SIDED SCIATICA: ICD-10-CM

## 2024-02-01 DIAGNOSIS — M54.41 CHRONIC LEFT-SIDED LOW BACK PAIN WITH RIGHT-SIDED SCIATICA: ICD-10-CM

## 2024-02-01 DIAGNOSIS — I10 BENIGN ESSENTIAL HYPERTENSION: Primary | ICD-10-CM

## 2024-02-01 PROBLEM — F33.1 MODERATE EPISODE OF RECURRENT MAJOR DEPRESSIVE DISORDER: Status: RESOLVED | Noted: 2022-09-26 | Resolved: 2024-02-01

## 2024-02-01 LAB
BASOPHILS # BLD AUTO: 0.04 K/UL (ref 0–0.2)
BASOPHILS NFR BLD: 0.8 % (ref 0–1.9)
DIFFERENTIAL METHOD BLD: ABNORMAL
EOSINOPHIL # BLD AUTO: 0.1 K/UL (ref 0–0.5)
EOSINOPHIL NFR BLD: 1.7 % (ref 0–8)
ERYTHROCYTE [DISTWIDTH] IN BLOOD BY AUTOMATED COUNT: 13.6 % (ref 11.5–14.5)
HCT VFR BLD AUTO: 38.8 % (ref 37–48.5)
HGB BLD-MCNC: 12.4 G/DL (ref 12–16)
IMM GRANULOCYTES # BLD AUTO: 0.01 K/UL (ref 0–0.04)
IMM GRANULOCYTES NFR BLD AUTO: 0.2 % (ref 0–0.5)
LYMPHOCYTES # BLD AUTO: 1.2 K/UL (ref 1–4.8)
LYMPHOCYTES NFR BLD: 25.2 % (ref 18–48)
MCH RBC QN AUTO: 31.2 PG (ref 27–31)
MCHC RBC AUTO-ENTMCNC: 32 G/DL (ref 32–36)
MCV RBC AUTO: 98 FL (ref 82–98)
MONOCYTES # BLD AUTO: 0.5 K/UL (ref 0.3–1)
MONOCYTES NFR BLD: 10.9 % (ref 4–15)
NEUTROPHILS # BLD AUTO: 2.9 K/UL (ref 1.8–7.7)
NEUTROPHILS NFR BLD: 61.2 % (ref 38–73)
NRBC BLD-RTO: 0 /100 WBC
PLATELET # BLD AUTO: 189 K/UL (ref 150–450)
PMV BLD AUTO: 10.5 FL (ref 9.2–12.9)
RBC # BLD AUTO: 3.97 M/UL (ref 4–5.4)
WBC # BLD AUTO: 4.77 K/UL (ref 3.9–12.7)

## 2024-02-01 PROCEDURE — 85025 COMPLETE CBC W/AUTO DIFF WBC: CPT | Mod: HCNC | Performed by: INTERNAL MEDICINE

## 2024-02-01 PROCEDURE — 1160F RVW MEDS BY RX/DR IN RCRD: CPT | Mod: HCNC,CPTII,S$GLB, | Performed by: INTERNAL MEDICINE

## 2024-02-01 PROCEDURE — 36415 COLL VENOUS BLD VENIPUNCTURE: CPT | Mod: HCNC,PN | Performed by: INTERNAL MEDICINE

## 2024-02-01 PROCEDURE — 1101F PT FALLS ASSESS-DOCD LE1/YR: CPT | Mod: HCNC,CPTII,S$GLB, | Performed by: INTERNAL MEDICINE

## 2024-02-01 PROCEDURE — 80053 COMPREHEN METABOLIC PANEL: CPT | Mod: HCNC | Performed by: INTERNAL MEDICINE

## 2024-02-01 PROCEDURE — 1125F AMNT PAIN NOTED PAIN PRSNT: CPT | Mod: HCNC,CPTII,S$GLB, | Performed by: INTERNAL MEDICINE

## 2024-02-01 PROCEDURE — 3079F DIAST BP 80-89 MM HG: CPT | Mod: HCNC,CPTII,S$GLB, | Performed by: INTERNAL MEDICINE

## 2024-02-01 PROCEDURE — 99999 PR PBB SHADOW E&M-EST. PATIENT-LVL V: CPT | Mod: PBBFAC,HCNC,, | Performed by: INTERNAL MEDICINE

## 2024-02-01 PROCEDURE — 3288F FALL RISK ASSESSMENT DOCD: CPT | Mod: HCNC,CPTII,S$GLB, | Performed by: INTERNAL MEDICINE

## 2024-02-01 PROCEDURE — 3075F SYST BP GE 130 - 139MM HG: CPT | Mod: HCNC,CPTII,S$GLB, | Performed by: INTERNAL MEDICINE

## 2024-02-01 PROCEDURE — 1159F MED LIST DOCD IN RCRD: CPT | Mod: HCNC,CPTII,S$GLB, | Performed by: INTERNAL MEDICINE

## 2024-02-01 PROCEDURE — 83735 ASSAY OF MAGNESIUM: CPT | Mod: HCNC | Performed by: INTERNAL MEDICINE

## 2024-02-01 PROCEDURE — 1124F ACP DISCUSS-NO DSCNMKR DOCD: CPT | Mod: HCNC,CPTII,S$GLB, | Performed by: INTERNAL MEDICINE

## 2024-02-01 PROCEDURE — 99214 OFFICE O/P EST MOD 30 MIN: CPT | Mod: HCNC,S$GLB,, | Performed by: INTERNAL MEDICINE

## 2024-02-01 NOTE — PATIENT INSTRUCTIONS
Schedule physical therapy for your lower back.   Schedule pelvic floor physical therapy for urinary urgency.   Labs today.

## 2024-02-01 NOTE — PROGRESS NOTES
"Subjective:       Patient ID: Grace Navarro is a 78 y.o. female.    Chief Complaint: Back Pain    HPI  Had a fallback in Nov.   Was sitting at the corner of a high bed and putting on boots; slid down the bed and and fell on the R side - hit head and shoulder b/c closet was very close by.   Has been having REYES.   Saw Dr. Clark.  CT head 1/11/24 - no acute IC hemorrhage.   Referred to functional restoration program for her LBP w/ radiation down the R leg. H/o L4/L5 fusion in 2012 (XR 3/8/22 - pedicle screws and fixation rods at L4-5 w/ a disc implant and mild anterolisthesis of L4 on L5; mild-moderate DJD and severe DISH). W/ the copay, would end up having to copay $95 daily 3x/week x 5 weeks. Cannot afford it.   Reports pain in the L lower back and the mid back. Has radiating pain to the R buttocks. No numbness/tingling/weakness.   Tries not to take meloxicam - relieves the pain within 1/2 hour.     HTN - hctz 12.5mg daily, coreg 12.5mg QAM and 25mg qpm.  Hypok - on Kdur 10mEq daily. Helps w/ muscle cramps.   Tries to keep salt out of diet.     MDD, in remission - on lexapro 5mg daily.     Easy bruising.     Review of Systems  Comprehensive review of systems otherwise negative. See history/subjective section for more details.    Objective:      Physical Exam    /82   Pulse (!) 57   Temp 98.4 °F (36.9 °C) (Oral)   Ht 5' 4" (1.626 m)   Wt 68.7 kg (151 lb 7.3 oz)   LMP 06/14/1996   SpO2 98%   BMI 26.00 kg/m²     GEN - A+OX4, NAD   HEENT - PERRL, EOMI, OP clear  Neck - No thyromegaly or cervical LAD. No thyroid masses felt.  CV - RRR, no m/r   Chest - CTAB, no wheezing or rhonchi  Abd - S/NT/ND/+BS.   Ext - 2+BDP and radial pulses. No C/C/E.  Neuro - 5/5 BUE and BLE strength. Normal and careful gait.   Skin - No rash. BUE w/ some bruising.    Previous labs reviewed.     Assessment/Plan     Grace was seen today for back pain.    Diagnoses and all orders for this visit:    Benign essential " hypertension - Stable and controlled. Continue current medications.  -     Comprehensive Metabolic Panel; Future  -     Magnesium; Future    Myalgia  -     Cancel: Comprehensive Metabolic Panel; Future  -     Comprehensive Metabolic Panel; Future  -     Magnesium; Future    Hypokalemia  -     Cancel: Comprehensive Metabolic Panel; Future  -     Comprehensive Metabolic Panel; Future  -     Magnesium; Future    MDD (major depressive disorder), recurrent, in full remission - doing well. Cont lexapro. Referred back to see Reed per pt's request.     Senile purpura  -     CBC Auto Differential; Future    Urge incontinence - keep appt w/ urogyn. Referral to pelvic floor PT. Pt reports doing kegels but not sure if doing them right.   -     Ambulatory referral/consult to Physical/Occupational Therapy; Future    Chronic left-sided low back pain with right-sided sciatica  -     Ambulatory referral/consult to Physical/Occupational Therapy; Future    Advance Care Planning     Date: 02/01/2024  Patient did not wish or was not able to name a surrogate decision maker or provide an Advance Care Plan.         Follow up in about 2 months (around 4/1/2024).      Lisa Dean MD  Department of Internal Medicine - [facility]  1:11 PM

## 2024-02-02 LAB
ALBUMIN SERPL BCP-MCNC: 4.1 G/DL (ref 3.5–5.2)
ALP SERPL-CCNC: 60 U/L (ref 55–135)
ALT SERPL W/O P-5'-P-CCNC: 15 U/L (ref 10–44)
ANION GAP SERPL CALC-SCNC: 9 MMOL/L (ref 8–16)
AST SERPL-CCNC: 21 U/L (ref 10–40)
BILIRUB SERPL-MCNC: 0.4 MG/DL (ref 0.1–1)
BUN SERPL-MCNC: 13 MG/DL (ref 8–23)
CALCIUM SERPL-MCNC: 10 MG/DL (ref 8.7–10.5)
CHLORIDE SERPL-SCNC: 102 MMOL/L (ref 95–110)
CO2 SERPL-SCNC: 28 MMOL/L (ref 23–29)
CREAT SERPL-MCNC: 0.6 MG/DL (ref 0.5–1.4)
EST. GFR  (NO RACE VARIABLE): >60 ML/MIN/1.73 M^2
GLUCOSE SERPL-MCNC: 94 MG/DL (ref 70–110)
MAGNESIUM SERPL-MCNC: 1.9 MG/DL (ref 1.6–2.6)
POTASSIUM SERPL-SCNC: 4 MMOL/L (ref 3.5–5.1)
PROT SERPL-MCNC: 7.1 G/DL (ref 6–8.4)
SODIUM SERPL-SCNC: 139 MMOL/L (ref 136–145)

## 2024-02-05 ENCOUNTER — PATIENT MESSAGE (OUTPATIENT)
Dept: PRIMARY CARE CLINIC | Facility: CLINIC | Age: 79
End: 2024-02-05
Payer: MEDICARE

## 2024-02-08 ENCOUNTER — CLINICAL SUPPORT (OUTPATIENT)
Dept: PRIMARY CARE CLINIC | Facility: CLINIC | Age: 79
End: 2024-02-08
Payer: MEDICARE

## 2024-02-08 DIAGNOSIS — F32.A MODERATELY SEVERE DEPRESSION: Primary | ICD-10-CM

## 2024-02-08 PROCEDURE — 90837 PSYTX W PT 60 MINUTES: CPT | Mod: HCNC,S$GLB,, | Performed by: SOCIAL WORKER

## 2024-02-08 NOTE — PROGRESS NOTES
Individual Psychotherapy (LCSW/PhD)  Grace Ontiveroskayli,  2/8/2024    Site:  [facility]         Therapeutic Intervention: Met with patient for individual psychotherapy.    Chief complaint/reason for encounter: depression     Interval history and content of current session: Pt states they had a good visit with daughter over the highlights. Pt and LCSW discussed pt's plans on moving and thoughts about their social life. Pt and LCSW discussed interest in pursuing a friendship with a friend. Pt and LCSW discussed pt's anxiety with social relationships.     Pt and LCSW discussed pt's feelings about their friendships and expressing their emotions.    Treatment plan:  Target symptoms: depression  Why chosen therapy is appropriate versus another modality: relevant to diagnosis, evidence based practice  Outcome monitoring methods: self-report, checklist/rating scale  Therapeutic intervention type: supportive psychotherapy    Risk parameters:  Patient reports no suicidal ideation  Patient reports no homicidal ideation  Patient reports no self-injurious behavior  Patient reports no violent behavior    Verbal deficits: None    Patient's response to intervention:  The patient's response to intervention is accepting.    Progress toward goals and other mental status changes:  The patient's progress toward goals is excellent.    Diagnosis:   No diagnosis found.    Plan: Pt plans to continue individual psychotherapy    Return to clinic: as needed    Length of Service (minutes): 60

## 2024-02-09 ENCOUNTER — CLINICAL SUPPORT (OUTPATIENT)
Dept: REHABILITATION | Facility: HOSPITAL | Age: 79
End: 2024-02-09
Payer: MEDICARE

## 2024-02-09 DIAGNOSIS — R68.89 DECREASED STRENGTH, ENDURANCE, AND MOBILITY: ICD-10-CM

## 2024-02-09 DIAGNOSIS — Z74.09 DECREASED STRENGTH, ENDURANCE, AND MOBILITY: ICD-10-CM

## 2024-02-09 DIAGNOSIS — G89.29 CHRONIC LEFT-SIDED LOW BACK PAIN WITH RIGHT-SIDED SCIATICA: Primary | ICD-10-CM

## 2024-02-09 DIAGNOSIS — M54.41 CHRONIC LEFT-SIDED LOW BACK PAIN WITH RIGHT-SIDED SCIATICA: Primary | ICD-10-CM

## 2024-02-09 DIAGNOSIS — R53.1 DECREASED STRENGTH, ENDURANCE, AND MOBILITY: ICD-10-CM

## 2024-02-09 DIAGNOSIS — M53.86 DECREASED ROM OF LUMBAR SPINE: ICD-10-CM

## 2024-02-09 PROCEDURE — 97161 PT EVAL LOW COMPLEX 20 MIN: CPT | Mod: HCNC,PN
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
## 2024-02-13 ENCOUNTER — PATIENT MESSAGE (OUTPATIENT)
Dept: PRIMARY CARE CLINIC | Facility: CLINIC | Age: 79
End: 2024-02-13
Payer: MEDICARE

## 2024-02-14 ENCOUNTER — LAB VISIT (OUTPATIENT)
Dept: LAB | Facility: HOSPITAL | Age: 79
End: 2024-02-14
Attending: HOSPITALIST
Payer: MEDICARE

## 2024-02-14 ENCOUNTER — OFFICE VISIT (OUTPATIENT)
Dept: PRIMARY CARE CLINIC | Facility: CLINIC | Age: 79
End: 2024-02-14
Payer: MEDICARE

## 2024-02-14 ENCOUNTER — PATIENT MESSAGE (OUTPATIENT)
Dept: PRIMARY CARE CLINIC | Facility: CLINIC | Age: 79
End: 2024-02-14

## 2024-02-14 DIAGNOSIS — R39.15 URGENCY OF URINATION: Primary | ICD-10-CM

## 2024-02-14 DIAGNOSIS — R39.15 URGENCY OF URINATION: ICD-10-CM

## 2024-02-14 LAB
BACTERIA #/AREA URNS AUTO: ABNORMAL /HPF
BILIRUB UR QL STRIP: NEGATIVE
CLARITY UR REFRACT.AUTO: CLEAR
COLOR UR AUTO: YELLOW
GLUCOSE UR QL STRIP: NEGATIVE
HGB UR QL STRIP: NEGATIVE
KETONES UR QL STRIP: NEGATIVE
LEUKOCYTE ESTERASE UR QL STRIP: ABNORMAL
MICROSCOPIC COMMENT: ABNORMAL
NITRITE UR QL STRIP: POSITIVE
PH UR STRIP: 6 [PH] (ref 5–8)
PROT UR QL STRIP: NEGATIVE
RBC #/AREA URNS AUTO: 3 /HPF (ref 0–4)
SP GR UR STRIP: 1.01 (ref 1–1.03)
SQUAMOUS #/AREA URNS AUTO: 0 /HPF
URN SPEC COLLECT METH UR: ABNORMAL
WBC #/AREA URNS AUTO: 40 /HPF (ref 0–5)
WBC CLUMPS UR QL AUTO: ABNORMAL

## 2024-02-14 PROCEDURE — 87086 URINE CULTURE/COLONY COUNT: CPT | Mod: HCNC | Performed by: HOSPITALIST

## 2024-02-14 PROCEDURE — 87088 URINE BACTERIA CULTURE: CPT | Mod: HCNC | Performed by: HOSPITALIST

## 2024-02-14 PROCEDURE — 81001 URINALYSIS AUTO W/SCOPE: CPT | Mod: HCNC | Performed by: HOSPITALIST

## 2024-02-14 PROCEDURE — 87186 SC STD MICRODIL/AGAR DIL: CPT | Mod: HCNC | Performed by: HOSPITALIST

## 2024-02-14 PROCEDURE — 87077 CULTURE AEROBIC IDENTIFY: CPT | Mod: HCNC | Performed by: HOSPITALIST

## 2024-02-14 PROCEDURE — 99499 UNLISTED E&M SERVICE: CPT | Mod: HCNC,95,, | Performed by: HOSPITALIST

## 2024-02-14 NOTE — TELEPHONE ENCOUNTER
"Dr. Dean: with the symptoms I have I don't know if I can wait one month to see the specialist. I have been reading the "cancer of the bladder symptoms" and I have several of them!  Apart from that I cannot go out for my walk or anything that doesn't allow me to have a bathroom near by every half an hour and when I feel better I can only wait one hour!!!!  Thanks  Grace Navarro       "

## 2024-02-14 NOTE — PROGRESS NOTES
Established Patient - Audio Only Telehealth Visit     The patient location is: home  The chief complaint leading to consultation is: dysuria, urgency, frequency  Visit type: Virtual visit with audio only (telephone)  Total time spent with patient: 7 min       The reason for the audio only service rather than synchronous audio and video virtual visit was related to technical difficulties or patient preference/necessity.     Each patient to whom I provide medical services by telemedicine is:  (1) informed of the relationship between the physician and patient and the respective role of any other health care provider with respect to management of the patient; and (2) notified that they may decline to receive medical services by telemedicine and may withdraw from such care at any time. Patient verbally consented to receive this service via voice-only telephone call.       HPI: Pt experiencing urinary urgency needing to go about every 1/2 hr, a/w dysuria.  Did improve some yesterday afternoon.  Incontinence x1 on account of not getting to toilet fast enough after trying to wait it out.  Urgency now about every hour.  Can't leave house b/c of sx.  She is worried about possibility of bladder cancer after looking up her symptoms on the Internet.     Assessment and plan:  Reassured patient that her symptoms are more consistent with UTI, and that we should do a urinalysis and culture to rule in or out infection.  We will place orders; she would prefer to  sample cup from [place] location.  She was advised after she provides sample that she can  Pyridium available OTC at any drug store to help relieve her irritative voiding symptoms.  She was also advised that she not start taking Pyridium before the urinalysis as it can alter the results (positive nitrites).  She was counseled that although her symptoms are more likely to represent infection, the reason why bladder cancer came up as a possibility when she  looked up her symptoms is that it can also cause irritative voiding symptoms has bladder cancers typically bleed, and blood is very irritating to the urinary tract.  She was advised that in the unlikely event that microscopic hematuria is found instead of infection that can be evaluated further.                        This service was not originating from a related E/M service provided within the previous 7 days nor will  to an E/M service or procedure within the next 24 hours or my soonest available appointment.  Prevailing standard of care was able to be met in this audio-only visit.

## 2024-02-15 DIAGNOSIS — N30.00 ACUTE CYSTITIS WITHOUT HEMATURIA: Primary | ICD-10-CM

## 2024-02-15 RX ORDER — NITROFURANTOIN 25; 75 MG/1; MG/1
100 CAPSULE ORAL 2 TIMES DAILY
Qty: 10 CAPSULE | Refills: 0 | Status: SHIPPED | OUTPATIENT
Start: 2024-02-15 | End: 2024-02-20

## 2024-02-16 ENCOUNTER — CLINICAL SUPPORT (OUTPATIENT)
Dept: REHABILITATION | Facility: HOSPITAL | Age: 79
End: 2024-02-16
Payer: MEDICARE

## 2024-02-16 DIAGNOSIS — M54.50 CHRONIC BILATERAL LOW BACK PAIN, UNSPECIFIED WHETHER SCIATICA PRESENT: Primary | ICD-10-CM

## 2024-02-16 DIAGNOSIS — R53.1 DECREASED STRENGTH, ENDURANCE, AND MOBILITY: ICD-10-CM

## 2024-02-16 DIAGNOSIS — Z74.09 DECREASED STRENGTH, ENDURANCE, AND MOBILITY: ICD-10-CM

## 2024-02-16 DIAGNOSIS — G89.29 CHRONIC BILATERAL LOW BACK PAIN, UNSPECIFIED WHETHER SCIATICA PRESENT: Primary | ICD-10-CM

## 2024-02-16 DIAGNOSIS — M53.86 DECREASED ROM OF LUMBAR SPINE: ICD-10-CM

## 2024-02-16 DIAGNOSIS — R68.89 DECREASED STRENGTH, ENDURANCE, AND MOBILITY: ICD-10-CM

## 2024-02-16 PROCEDURE — 97140 MANUAL THERAPY 1/> REGIONS: CPT | Mod: HCNC,PN

## 2024-02-16 PROCEDURE — 97112 NEUROMUSCULAR REEDUCATION: CPT | Mod: HCNC,PN

## 2024-02-16 PROCEDURE — 97110 THERAPEUTIC EXERCISES: CPT | Mod: HCNC,PN

## 2024-02-16 NOTE — PROGRESS NOTES
"[facility] OUTPATIENT THERAPY AND WELLNESS   Physical Therapy Treatment Note      Name: Grace Ware [facility]  Clinic Number: [id number]    Therapy Diagnosis:   Encounter Diagnoses   Name Primary?    Chronic bilateral low back pain with Right side sciatica Yes    Decreased strength, endurance, and mobility     Decreased ROM of lumbar spine      Physician: Lisa Dean MD    Visit Date: 2/16/2024       Physician Orders: PT Eval and Treat   Medical Diagnosis from Referral: M54.41,G89.29 (ICD-10-CM) - Chronic left-sided low back pain with right-sided sciatica   Evaluation Date: 2/9/2024  Authorization Period Expiration: 4/16/24  Plan of Care Expiration: 4/7/24  Visit # / Visits authorized: 1/20  FOTO: 2/10  PTA Visit #: 0/5     Time In: 11:06  Time Out: 12:00  Total Billable Time: 54 minutes (Therapeutic Exercise - 2, 1 MT, 1 NM)     Precautions: Standard, hx of L4-5 fusion; limit time on stomach; bruises easily     Subjective     Patient reports: recently diagnosed with UTI, started antibiotics yesterday. Increased urinary frequency/urgency. Back pain is not too bad today.    She was compliant with home exercise program.  Response to previous treatment: eval, sore   Functional change: ongoing     Pain: 3/10  Location: bilateral back  and buttocks      Objective      Objective Measures updated at progress report unless specified.     Treatment     Grace received the treatments listed below:      therapeutic exercises to develop strength, endurance, ROM, flexibility, posture, and core stabilization for 31 minutes including:  Bridge blue theraband 2x10  Piriformis stretch 3x30"   Open book 10x   Ball squeeze 5" x2'  Sit to stand 2x10  Pallof press 15x red theraband     manual therapy techniques: Joint mobilizations were applied to the: back for 8 minutes, including:  LAD bilateral     neuromuscular re-education activities to improve: Balance, Coordination, Kinesthetic, Sense, Proprioception, and Posture for 15 minutes. The " "following activities were included:  Pelvic tilt 2x10   Transverse abdominus activation march 2x10   Transverse abdominus activation hip abduction blue theraband 10" 2'   Sahrmann L2 2x10   Patient Education and Home Exercises       Education provided:   - updated home exercise program     Written Home Exercises Provided: Patient instructed to cont prior HEP. Exercises were reviewed and Grace was able to demonstrate them prior to the end of the session.  Grace demonstrated good  understanding of the education provided. See Electronic Medical Record under Patient Instructions for exercises provided during therapy sessions    Assessment   Grace is a 78 y.o. female referred to outpatient Physical Therapy with a medical diagnosis of chronic low back pain with right side sciatica. Pt presents with signs and symptoms consistent with this diagnosis. History of lumbar fusion L4-L5.   Patient presents  to first follow up with recent diagnosis of UTI on antibiotics. Agreeable to participation in therapy. Emphasis on core control and generalized mobility and strengthening. Overall tolerated well. Reported specific targeting of painful area with pallof press. Included into home exercise program.     Grace Is progressing well towards her goals.   Patient prognosis is Good.     Patient will continue to benefit from skilled outpatient physical therapy to address the deficits listed in the problem list box on initial evaluation, provide pt/family education and to maximize pt's level of independence in the home and community environment.     Patient's spiritual, cultural and educational needs considered and pt agreeable to plan of care and goals.     Anticipated barriers to physical therapy: chronicity    Goals:   Short Term Goals (4 Weeks):  1. Pt will be compliant with HEP to supplement PT in decreasing pain with functional mobility.  2. Pt will perform sahrmann L2  with good control to demonstrate improved core " strength.  3. Pt will report 25% improvement in thoracolumbar ROM to promote functional mobility.  4. Pt will improve impaired LE MMTs to >/=4/5 to improve strength for functional tasks.     Long Term Goals (8 Weeks):   1. Pt will improve FOTO score to </= 60% limited to decrease perceived limitation with maintaining/changing body position.   2. Pt will perform 10 sit to stand  with good control to demonstrate improved core strength.  3. Pt will improve impaired LE MMTs to >/=4+/5 to improve strength for functional tasks.  4. Pt will report no pain during gardening to promote return to recreational activities.     Plan   Plan of care Certification: 2/9/2024 to 4/7/24.     SATISH HAWLEY, PT

## 2024-02-17 LAB — BACTERIA UR CULT: ABNORMAL

## 2024-02-19 ENCOUNTER — PATIENT MESSAGE (OUTPATIENT)
Dept: PRIMARY CARE CLINIC | Facility: CLINIC | Age: 79
End: 2024-02-19
Payer: MEDICARE

## 2024-02-19 NOTE — TELEPHONE ENCOUNTER
Responded to pt portal msg. Pt did not specify pain location.   Pt c/o pain and asking about meds not on her list. Recent UTI, Rx Macrobid.  Let me know if you want a phone call.      carried

## 2024-02-20 ENCOUNTER — PATIENT MESSAGE (OUTPATIENT)
Dept: PRIMARY CARE CLINIC | Facility: CLINIC | Age: 79
End: 2024-02-20

## 2024-02-20 ENCOUNTER — LAB VISIT (OUTPATIENT)
Dept: LAB | Facility: HOSPITAL | Age: 79
End: 2024-02-20
Attending: INTERNAL MEDICINE
Payer: MEDICARE

## 2024-02-20 ENCOUNTER — OFFICE VISIT (OUTPATIENT)
Dept: PRIMARY CARE CLINIC | Facility: CLINIC | Age: 79
End: 2024-02-20
Payer: MEDICARE

## 2024-02-20 VITALS
TEMPERATURE: 98 F | BODY MASS INDEX: 25.93 KG/M2 | HEIGHT: 64 IN | OXYGEN SATURATION: 98 % | SYSTOLIC BLOOD PRESSURE: 149 MMHG | WEIGHT: 151.88 LBS | HEART RATE: 70 BPM | RESPIRATION RATE: 18 BRPM | DIASTOLIC BLOOD PRESSURE: 85 MMHG

## 2024-02-20 DIAGNOSIS — R39.15 URINARY URGENCY: ICD-10-CM

## 2024-02-20 DIAGNOSIS — N39.0 URINARY TRACT INFECTION WITHOUT HEMATURIA, SITE UNSPECIFIED: ICD-10-CM

## 2024-02-20 DIAGNOSIS — M54.50 CHRONIC MIDLINE LOW BACK PAIN WITHOUT SCIATICA: Primary | ICD-10-CM

## 2024-02-20 DIAGNOSIS — G89.29 CHRONIC MIDLINE LOW BACK PAIN WITHOUT SCIATICA: Primary | ICD-10-CM

## 2024-02-20 LAB
BACTERIA #/AREA URNS AUTO: NORMAL /HPF
BILIRUB UR QL STRIP: NEGATIVE
CLARITY UR REFRACT.AUTO: CLEAR
COLOR UR AUTO: NORMAL
GLUCOSE UR QL STRIP: NEGATIVE
HGB UR QL STRIP: NEGATIVE
KETONES UR QL STRIP: NEGATIVE
LEUKOCYTE ESTERASE UR QL STRIP: NEGATIVE
MICROSCOPIC COMMENT: NORMAL
NITRITE UR QL STRIP: NEGATIVE
PH UR STRIP: 5 [PH] (ref 5–8)
PROT UR QL STRIP: NEGATIVE
RBC #/AREA URNS AUTO: 2 /HPF (ref 0–4)
SP GR UR STRIP: 1 (ref 1–1.03)
SQUAMOUS #/AREA URNS AUTO: 0 /HPF
URN SPEC COLLECT METH UR: NORMAL

## 2024-02-20 PROCEDURE — 1125F AMNT PAIN NOTED PAIN PRSNT: CPT | Mod: HCNC,CPTII,S$GLB, | Performed by: INTERNAL MEDICINE

## 2024-02-20 PROCEDURE — 3077F SYST BP >= 140 MM HG: CPT | Mod: HCNC,CPTII,S$GLB, | Performed by: INTERNAL MEDICINE

## 2024-02-20 PROCEDURE — 99999 PR PBB SHADOW E&M-EST. PATIENT-LVL V: CPT | Mod: PBBFAC,HCNC,, | Performed by: INTERNAL MEDICINE

## 2024-02-20 PROCEDURE — 1101F PT FALLS ASSESS-DOCD LE1/YR: CPT | Mod: HCNC,CPTII,S$GLB, | Performed by: INTERNAL MEDICINE

## 2024-02-20 PROCEDURE — 3079F DIAST BP 80-89 MM HG: CPT | Mod: HCNC,CPTII,S$GLB, | Performed by: INTERNAL MEDICINE

## 2024-02-20 PROCEDURE — 1159F MED LIST DOCD IN RCRD: CPT | Mod: HCNC,CPTII,S$GLB, | Performed by: INTERNAL MEDICINE

## 2024-02-20 PROCEDURE — 3288F FALL RISK ASSESSMENT DOCD: CPT | Mod: HCNC,CPTII,S$GLB, | Performed by: INTERNAL MEDICINE

## 2024-02-20 PROCEDURE — 87086 URINE CULTURE/COLONY COUNT: CPT | Mod: HCNC | Performed by: INTERNAL MEDICINE

## 2024-02-20 PROCEDURE — 1160F RVW MEDS BY RX/DR IN RCRD: CPT | Mod: HCNC,CPTII,S$GLB, | Performed by: INTERNAL MEDICINE

## 2024-02-20 PROCEDURE — 81001 URINALYSIS AUTO W/SCOPE: CPT | Mod: HCNC | Performed by: INTERNAL MEDICINE

## 2024-02-20 PROCEDURE — 99213 OFFICE O/P EST LOW 20 MIN: CPT | Mod: HCNC,S$GLB,, | Performed by: INTERNAL MEDICINE

## 2024-02-20 RX ORDER — MELOXICAM 15 MG/1
15 TABLET ORAL DAILY
Qty: 30 TABLET | Refills: 1 | Status: SHIPPED | OUTPATIENT
Start: 2024-02-20

## 2024-02-20 RX ORDER — CYCLOBENZAPRINE HCL 5 MG
5 TABLET ORAL 3 TIMES DAILY PRN
Qty: 30 TABLET | Refills: 0 | Status: SHIPPED | OUTPATIENT
Start: 2024-02-20 | End: 2024-03-01

## 2024-02-20 NOTE — PROGRESS NOTES
"Subjective:       Patient ID: Grace Navarro is a 78 y.o. female.    Chief Complaint: back pain worse    HPI  R buttocks pain.   Undergoing PT.  3 days ago, pain in the mid lower back and pain at the R buttocks and R groin and the back of the R leg. Reports when she wakes up, pain is so bad that she cannot walk - 10 of 10. Yesterday, took a meloxicam and muscle relaxer last night. Today took mobic. Pain is a little better - 9 of 10. Able to walk but has to walk w/ a straight R leg (instead of limping over the weekend).    Had some bladder pressure and had urine checked consistent w/ UTI. Cx w/ pansensitive E coli. Last day of macrobid yesterday. Still w/ urinary frequency/urgency.  No bowel issues. No hematuria. No fevers/nausea/vomiting. Chills yesterday or the day before but none today.   Has been stretching in the AM - painful when stretching back of legs in the AM but better now. No numbness/tingling.    Review of Systems  Comprehensive review of systems otherwise negative. See history/subjective section for more details.    Objective:      Physical Exam    BP (!) 149/85   Pulse 70   Temp 98 °F (36.7 °C)   Resp 18   Ht 5' 4" (1.626 m)   Wt 68.9 kg (151 lb 14.4 oz)   LMP 06/14/1996   SpO2 98%   BMI 26.07 kg/m²     GEN - A+OX4, NAD   HEENT - PERRL, EOMI, OP clear  Neck - No thyromegaly or cervical LAD. No thyroid masses felt.  CV - RRR, no m/r   Chest - CTAB, no wheezing or rhonchi  Abd - S/NT/ND/+BS.   Ext - 2+BDP and radial pulses. No C/C/E.  Neuro - 5/5 BUE and BLE strength. antalgic gait. Dec B DTRs. Sensation to light touch intact.   MSK - spinal tenderness to palpation at the lumbosacral region and the R buttocks and B hips. Tight R lower lumbar paraspinal m. Negative straight leg raise.       Assessment/Plan     Diagnoses and all orders for this visit:    Chronic midline low back pain without sciatica  -     MRI Lumbar Spine Without Contrast; Future  -     meloxicam (MOBIC) 15 MG tablet; " Take 1 tablet (15 mg total) by mouth once daily.  -     cyclobenzaprine (FLEXERIL) 5 MG tablet; Take 1 tablet (5 mg total) by mouth 3 (three) times daily as needed for Muscle spasms.    Urinary urgency  -     MRI Lumbar Spine Without Contrast; Future    Urinary tract infection without hematuria, site unspecified  -     Urinalysis; Future  -     Urinalysis Microscopic; Future  -     Urine culture; Future        Follow up if symptoms worsen or fail to improve.      Lisa Dean MD  Department of Internal Medicine - [facility]  1:54 PM

## 2024-02-20 NOTE — TELEPHONE ENCOUNTER
Spoke w/ pt, she is going to try and get to clinic for 1:40. Sri offered, pt did not feel she needed a ride, and is going to drive.

## 2024-02-20 NOTE — TELEPHONE ENCOUNTER
Lets have her come in to be evaluated. Can she come in at 1:40pm today? Will recheck urine also if symptoms still present.

## 2024-02-20 NOTE — TELEPHONE ENCOUNTER
Bladder + sciatic nerve  (Newest Message First)  View All Conversations on this Encounter  Grace Dean [facility] Staff (supporting Lisa Dean MD)21 minutes ago (10:43 AM)       Dr. Dean: I  don't remember such a strong sciatic attack ever. Only after some stretches I get up this morning. I am using my surgical corset  + the walker at the beginning . Now I will take a Meloxicam.   In reference to my bladder I finished the antibiotic last night. Still the pressure and burning sensation even after emptying my bladder.  I am in a very bad shape, I think.  Thanks  Grace Navarro

## 2024-02-21 LAB — BACTERIA UR CULT: NO GROWTH

## 2024-02-22 ENCOUNTER — PATIENT MESSAGE (OUTPATIENT)
Dept: PRIMARY CARE CLINIC | Facility: CLINIC | Age: 79
End: 2024-02-22
Payer: MEDICARE

## 2024-02-25 NOTE — PROGRESS NOTES
HPI     Glaucoma            Comments: 2 month IOP ck and pt c/o tearing OD           Comments    DLS: 12/4/23    1. LTG Suspect  OU  2. Myelinated NFL OD  3. Ptosis Repair OU (Lyndsay Kim)  4. MGD OU  5. Chalazion RLL and LLL    MEDS:  Systane Ultra  AT;'s TID OU  Lid scrubs QHS OU            Last edited by Lilliam Mtz MA on 2/26/2024  3:31 PM.            Assessment /Plan     For exam results, see Encounter Report.    Open angle with borderline findings and high glaucoma risk in both eyes    Large physiologic cupping of optic disc    Dry eyes    Dermatochalasis of both upper eyelids    Senile ectropion of right lower eyelid    Brow ptosis, left    Pseudophakia of both eyes          Last visit in glaucoma clinic 11/18/2019  - has seen jaguar and guillmet since   [facility] stye / chalazion - 12/4/2023      1.    Glaucoma (type and duration)    Suspect - low tension glaucoma suspect (suspicious ON.s)    folowed by [facility] for years as a suspect    First HVF   11/2019    First photos   3/2016    Treatment / Drops started   none           Family history    ???        Glaucoma meds   None         H/O adverse rxn to glaucoma drops    None         LASERS    None         GLAUCOMA SURGERIES    None        OTHER EYE SURGERIES    none        CDR    0.8 w/myelinated fibers  /0.8        Tbase    12-16        Tmax    16/16         Ttarget   ???            HVF  3 test  2019 to  2023  -  Non sp  od // non sp  Os(( ? If associated with the myelinated  NFL od)         Gonio   +3-4 ou         CCT    ???        OCT    3 test 2019 to 2023 - RNFL - Dec   od // wnl  os        Disc photos     5/19/2022     - Ttoday   16/14   - Test done today    IOP/  stye / chalazion check // MGD // blepharitis       2. . Myelinated NFL OD    3. H/o eyelid surgery in 2005   Dr. harris for ptosis / dermatochalasis   Pt is interested in a re-do        4. PC IOL OU    OD - 9/21/2016 - PCB00 21.5 - [facility]   OS - 8/3/2016 - PCB00 22.0 - [facility]     5. S/P eyelid surgery     Lyndsay 11/2/2022 - tarsal stripping and blepharoplasty     Plan  Get records from Dr. Khan - signed release  - was monitored with VF's photos ect. / Eventually was told she did NOT have glaucoma and no further testing was done - old VF's ect - may have a defect from the medullated NFL    Try +1.50 readers for [employer] work - her +2.50 are now too strong    Monitor as a suspect - normal tension suspect - very suspicious ON/s   Hold off on starting treatment at this time -     - the VF changes od may ne 2/2 myelinated NFL     Stye / chalazion / MGD right and left lower lids  Rec WC/ LH/   Ok to cont tobradex oint - from Dr Murry - seem as a urgent care after being seen [facility]  Rec she consider getting the [employer] DB lid hygiene kit - brochure given   Rec doxycycline bid x 1 month and then q day   Pt wants to hold off on the doxy   Expressed MG's RLL with q tip and pressure     2/26/2024   Stye / chalazion - much smaller - almost resolved - nothing to really I&D     Pt C/O tearing od    Rec referral back to dr Umana   Pt prefers to see someone else - upset with [facility] - there was some fomplications with billing - with regards to the anesthesia   Given annalisa and dr coleman names     Pt is going to try and sell [place] and [place] to her daughter - feels she needs to be closer to family as she ages     F/U 4 months with HVF /DFE / OCT

## 2024-02-26 ENCOUNTER — OFFICE VISIT (OUTPATIENT)
Dept: OPHTHALMOLOGY | Facility: CLINIC | Age: 79
End: 2024-02-26
Payer: MEDICARE

## 2024-02-26 DIAGNOSIS — H02.831 DERMATOCHALASIS OF BOTH UPPER EYELIDS: ICD-10-CM

## 2024-02-26 DIAGNOSIS — H02.834 DERMATOCHALASIS OF BOTH UPPER EYELIDS: ICD-10-CM

## 2024-02-26 DIAGNOSIS — Z96.1 PSEUDOPHAKIA OF BOTH EYES: ICD-10-CM

## 2024-02-26 DIAGNOSIS — H02.132 SENILE ECTROPION OF RIGHT LOWER EYELID: ICD-10-CM

## 2024-02-26 DIAGNOSIS — H40.023 OPEN ANGLE WITH BORDERLINE FINDINGS AND HIGH GLAUCOMA RISK IN BOTH EYES: Primary | ICD-10-CM

## 2024-02-26 DIAGNOSIS — H57.812 BROW PTOSIS, LEFT: ICD-10-CM

## 2024-02-26 DIAGNOSIS — H04.123 DRY EYES: ICD-10-CM

## 2024-02-26 DIAGNOSIS — H47.239 LARGE PHYSIOLOGIC CUPPING OF OPTIC DISC: ICD-10-CM

## 2024-02-26 PROCEDURE — 99214 OFFICE O/P EST MOD 30 MIN: CPT | Mod: HCNC,S$GLB,, | Performed by: OPHTHALMOLOGY

## 2024-02-26 PROCEDURE — 3288F FALL RISK ASSESSMENT DOCD: CPT | Mod: HCNC,CPTII,S$GLB, | Performed by: OPHTHALMOLOGY

## 2024-02-26 PROCEDURE — 1126F AMNT PAIN NOTED NONE PRSNT: CPT | Mod: HCNC,CPTII,S$GLB, | Performed by: OPHTHALMOLOGY

## 2024-02-26 PROCEDURE — 1159F MED LIST DOCD IN RCRD: CPT | Mod: HCNC,CPTII,S$GLB, | Performed by: OPHTHALMOLOGY

## 2024-02-26 PROCEDURE — 1160F RVW MEDS BY RX/DR IN RCRD: CPT | Mod: HCNC,CPTII,S$GLB, | Performed by: OPHTHALMOLOGY

## 2024-02-26 PROCEDURE — 1101F PT FALLS ASSESS-DOCD LE1/YR: CPT | Mod: HCNC,CPTII,S$GLB, | Performed by: OPHTHALMOLOGY

## 2024-02-26 PROCEDURE — 99999 PR PBB SHADOW E&M-EST. PATIENT-LVL III: CPT | Mod: PBBFAC,HCNC,, | Performed by: OPHTHALMOLOGY

## 2024-02-26 RX ORDER — ASCORBIC ACID 1000 MG
1 TABLET, EXTENDED RELEASE ORAL 2 TIMES DAILY
COMMUNITY

## 2024-02-29 ENCOUNTER — HOSPITAL ENCOUNTER (OUTPATIENT)
Dept: RADIOLOGY | Facility: HOSPITAL | Age: 79
Discharge: HOME | End: 2024-02-29
Attending: INTERNAL MEDICINE
Payer: MEDICARE

## 2024-02-29 DIAGNOSIS — M54.50 CHRONIC MIDLINE LOW BACK PAIN WITHOUT SCIATICA: ICD-10-CM

## 2024-02-29 DIAGNOSIS — G89.29 CHRONIC MIDLINE LOW BACK PAIN WITHOUT SCIATICA: ICD-10-CM

## 2024-02-29 DIAGNOSIS — R39.15 URINARY URGENCY: ICD-10-CM

## 2024-02-29 PROCEDURE — 72148 MRI LUMBAR SPINE W/O DYE: CPT | Mod: 26,HCNC,, | Performed by: STUDENT IN AN ORGANIZED HEALTH CARE EDUCATION/TRAINING PROGRAM

## 2024-02-29 PROCEDURE — 72148 MRI LUMBAR SPINE W/O DYE: CPT | Mod: TC,HCNC

## 2024-03-01 ENCOUNTER — TELEPHONE (OUTPATIENT)
Dept: PRIMARY CARE CLINIC | Facility: CLINIC | Age: 79
End: 2024-03-01
Payer: MEDICARE

## 2024-03-01 ENCOUNTER — PATIENT MESSAGE (OUTPATIENT)
Dept: PRIMARY CARE CLINIC | Facility: CLINIC | Age: 79
End: 2024-03-01
Payer: MEDICARE

## 2024-03-01 DIAGNOSIS — M54.41 RIGHT-SIDED LOW BACK PAIN WITH RIGHT-SIDED SCIATICA, UNSPECIFIED CHRONICITY: Primary | ICD-10-CM

## 2024-03-01 NOTE — TELEPHONE ENCOUNTER
Ok to take mobic. Can add her muscle relaxant if needed. Referral to back and spine in system if pain has not improved. MRI result on portal.

## 2024-03-04 ENCOUNTER — PATIENT MESSAGE (OUTPATIENT)
Dept: UROGYNECOLOGY | Facility: CLINIC | Age: 79
End: 2024-03-04
Payer: MEDICARE

## 2024-03-07 ENCOUNTER — CLINICAL SUPPORT (OUTPATIENT)
Dept: PRIMARY CARE CLINIC | Facility: CLINIC | Age: 79
End: 2024-03-07
Payer: MEDICARE

## 2024-03-07 DIAGNOSIS — F32.A MODERATELY SEVERE DEPRESSION: Primary | ICD-10-CM

## 2024-03-07 PROCEDURE — 90832 PSYTX W PT 30 MINUTES: CPT | Mod: HCNC,S$GLB,, | Performed by: SOCIAL WORKER

## 2024-03-07 NOTE — PROGRESS NOTES
"Individual Psychotherapy (LCSW/PhD)  Grace Navarro,  3/7/2024    Site:  [place]         Therapeutic Intervention: Met with patient for individual psychotherapy.    Chief complaint/reason for encounter: depression     Interval history and content of current session: Pt reports "good" mood since last session. Pt has been attending festivals with friends. Pt states they have been struggling with back pain which they find frustrating. LCSW supported pt in processing feelings. LCSW and pt discussed pt's progress in therapy and pt's plans for the future. LCSW provided information for [facility] for volunteer opportunities.    LCSW and pt discussed transfer to new 65+ SW as needed. LCSW to follow-up with new 65+ therapist to update.    Treatment plan:  Target symptoms: depression  Why chosen therapy is appropriate versus another modality: relevant to diagnosis, evidence based practice  Outcome monitoring methods: self-report, checklist/rating scale  Therapeutic intervention type: supportive psychotherapy    Risk parameters:  Patient reports no suicidal ideation  Patient reports no homicidal ideation  Patient reports no self-injurious behavior  Patient reports no violent behavior    Verbal deficits: None    Patient's response to intervention:  The patient's response to intervention is accepting.    Progress toward goals and other mental status changes:  The patient's progress toward goals is good.    Diagnosis:   No diagnosis found.    Plan: Pt plans to continue individual psychotherapy    Return to clinic: as needed    Length of Service (minutes): 60      "

## 2024-03-08 RX ORDER — CARVEDILOL 12.5 MG/1
TABLET ORAL
Qty: 90 TABLET | Refills: 11 | Status: SHIPPED | OUTPATIENT
Start: 2024-03-08

## 2024-03-12 ENCOUNTER — PATIENT MESSAGE (OUTPATIENT)
Dept: UROGYNECOLOGY | Facility: CLINIC | Age: 79
End: 2024-03-12
Payer: MEDICARE

## 2024-03-14 ENCOUNTER — OFFICE VISIT (OUTPATIENT)
Dept: UROGYNECOLOGY | Facility: CLINIC | Age: 79
End: 2024-03-14
Attending: HOSPITALIST
Payer: MEDICARE

## 2024-03-14 ENCOUNTER — PATIENT MESSAGE (OUTPATIENT)
Dept: UROGYNECOLOGY | Facility: CLINIC | Age: 79
End: 2024-03-14
Payer: MEDICARE

## 2024-03-14 VITALS
BODY MASS INDEX: 26.34 KG/M2 | WEIGHT: 154.31 LBS | SYSTOLIC BLOOD PRESSURE: 190 MMHG | HEIGHT: 64 IN | DIASTOLIC BLOOD PRESSURE: 91 MMHG

## 2024-03-14 DIAGNOSIS — R39.15 URGENCY OF URINATION: ICD-10-CM

## 2024-03-14 DIAGNOSIS — N95.2 VAGINAL ATROPHY: ICD-10-CM

## 2024-03-14 DIAGNOSIS — N39.41 URGE INCONTINENCE: Primary | ICD-10-CM

## 2024-03-14 DIAGNOSIS — N30.00 ACUTE CYSTITIS WITHOUT HEMATURIA: ICD-10-CM

## 2024-03-14 DIAGNOSIS — R39.15 URGENCY OF URINATION: Primary | ICD-10-CM

## 2024-03-14 PROCEDURE — 1101F PT FALLS ASSESS-DOCD LE1/YR: CPT | Mod: CPTII,S$GLB,, | Performed by: OBSTETRICS & GYNECOLOGY

## 2024-03-14 PROCEDURE — 3079F DIAST BP 80-89 MM HG: CPT | Mod: CPTII,S$GLB,, | Performed by: OBSTETRICS & GYNECOLOGY

## 2024-03-14 PROCEDURE — 99999 PR PBB SHADOW E&M-EST. PATIENT-LVL IV: CPT | Mod: PBBFAC,,, | Performed by: OBSTETRICS & GYNECOLOGY

## 2024-03-14 PROCEDURE — 3288F FALL RISK ASSESSMENT DOCD: CPT | Mod: CPTII,S$GLB,, | Performed by: OBSTETRICS & GYNECOLOGY

## 2024-03-14 PROCEDURE — 99203 OFFICE O/P NEW LOW 30 MIN: CPT | Mod: S$GLB,,, | Performed by: OBSTETRICS & GYNECOLOGY

## 2024-03-14 PROCEDURE — 3077F SYST BP >= 140 MM HG: CPT | Mod: CPTII,S$GLB,, | Performed by: OBSTETRICS & GYNECOLOGY

## 2024-03-14 PROCEDURE — 87086 URINE CULTURE/COLONY COUNT: CPT | Mod: HCNC | Performed by: OBSTETRICS & GYNECOLOGY

## 2024-03-14 PROCEDURE — 1126F AMNT PAIN NOTED NONE PRSNT: CPT | Mod: CPTII,S$GLB,, | Performed by: OBSTETRICS & GYNECOLOGY

## 2024-03-14 RX ORDER — CONJUGATED ESTROGENS 0.62 MG/G
CREAM VAGINAL
Qty: 45 G | Refills: 12 | Status: SHIPPED | OUTPATIENT
Start: 2024-03-14 | End: 2024-03-14

## 2024-03-14 RX ORDER — CONJUGATED ESTROGENS 0.62 MG/G
CREAM VAGINAL
Qty: 45 G | Refills: 12 | Status: SHIPPED | OUTPATIENT
Start: 2024-03-14

## 2024-03-14 NOTE — PATIENT INSTRUCTIONS
1. Urinary incontinence, urge   --Bladder diary for 3-7 days, write the number of times you go to the rest room and associated symptoms of urgency or leakage.   --Empty bladder every 3 hours.  Empty well: wait a minute, lean forward on toilet.    --Avoid dietary irritants (see sheet).  Keep diary x 3-5 days to determine your irritants.  --KEGELS: do 10 in AM and 10 in PM, holding each x 10 seconds.  When you feel urge to go, STOP, KEGEL, and when urge has passed, then go to bathroom.  Start pelvic floor PT.     --URGE: Gemtessa 75 mg nighty.  SE profile reviewed.    Takes 2-4 weeks to see if will have effect.  For dry mouth: get sour, sugar free lozenge or gum.        2. Vaginal narrowing - recommend vaginal dilation       3. Vaginal atrophy (dryness):  Use 1 gram of estrogen cream in vagina nightly x 2 weeks, then twice a week thereafter. [phone]

## 2024-03-14 NOTE — PROGRESS NOTES
Subjective:       Patient ID: Grace Navarro is a 78 y.o. female.    Chief Complaint:  Urinary Urgency and Urinary Frequency        History of Present Illness  HPI 78 y.o.  female    has a past medical history of Anxiety, Rene's disease, Chronic low back pain, Depression, Glaucoma, Goiter, nodular, Hyperlipidemia, Hypertension, Irritable bowel, Neuromuscular disorder, Osteopenia of multiple sites (2017), PUD (peptic ulcer disease), Subarachnoid hemorrhage (), and Varicose veins.  Refererd by Dr. Clark for urinary urgency and incontience. Symptoms became problematic when she turned 77. She was found of the have UTI's (E Coli and Klebsiella - pan sensitive ) and was tredated with several rounds of antibiotics. She has continued to have bothersome urinary incontinnence.   Ohs Peq Pfdi20    Question 3/14/2024  2:22 PM CDT - Filed by Patient   Do you...    Usually experience pressure in the lower abdomen? Symptoms present and they bother me quite a bit   Usually experience heaviness or dullness in the pelvic area? Symptoms present and they bother me moderately   Usually have a bulge or something falling out that you can see or feel in your vaginal area? Symptoms not present   Ever have to push on the vagina or around the rectum to complete a bowel movement? Symptoms not present   Usually experience a feeling of incomplete bladder emptying? Symptoms present and they bother me quite a bit   Ever have to push up on a bulge in the vaginal area with your fingers to start or complete urination? Symptoms not present   Do you...    Feel you need to strain too hard to have a bowel movement? Symptoms not present   Feel you have not completely emptied your bowels at the end of a bowel movement? Symptoms not present   Usually lose stool beyond your control if your stool is well formed? Symptoms not present   Usually lose stool beyond your control if your stool is loose? Symptoms not present   Usually  lose gas from your rectum beyond your control? Symptoms not present   Usually have pain when you pass your stool? Symptoms not present   Experience a strong sense of urgency and have to rush to the bathroom to have a bowel movement? Symptoms present and they bother me somewhat   Does part of your bowel ever pass through the rectum and bulge outside during or after a bowel movement? Symptoms present and they bother me somewhat   Do you...    Usually experience frequent urination? Symptoms present and they bother me quite a bit   Usually experience urine leakage associated with a feeling of urgency, that is, a strong sensation of needing to go to the bathroom? Symptoms present and they bother me quite a bit   Usually experience urine leakage related to coughing, sneezing or laughing? Symptoms not present   Usually experience small amounts of urine leakage (that is, drops)? Symptoms not present   Usually experience difficulty emptying your bladder? Symptoms present and they bother me moderately   Usually experience pain or discomfort in the lower abdomen or genital region? Symptoms present and they bother me somewhat   POPDI  (range: 0 - 100) 45.83   CRADI (range: 0 - 100) 12.5   HALLEY (range: 0 - 100) 54.17   TOTAL SCORE  (range: 0 - 300) 112.5     GYN & OB History  Patient's last menstrual period was 1996.   Date of Last Pap: No result found    OB History    Para Term  AB Living   2 2 2     2   SAB IAB Ectopic Multiple Live Births           2      # Outcome Date GA Lbr Kiran/2nd Weight Sex Type Anes PTL Lv   2 Term     M Vag-Spont   BREEZY   1 Term     F Vag-Spont   BREEZY     OB     x 2.  C/s x 0.    Largest: 7 # 12 oz       GYN  Menarche: ?  Menstrual cycle:  Menopause:  50's  Hysterectomy:  No  Ovaries:  present   Tubal ligation: Yes or NO   Other abdominal surgeries: Appy       Past Medical History:   Diagnosis Date    Anxiety     Rene's disease     Chronic low back pain     Depression      Glaucoma     Goiter, nodular     Hyperlipidemia     Hypertension     Irritable bowel     Neuromuscular disorder     Osteopenia of multiple sites 05/29/2017    PUD (peptic ulcer disease)     Subarachnoid hemorrhage 2014    Varicose veins      Past Surgical History:   Procedure Laterality Date    APPENDECTOMY      BELPHAROPTOSIS REPAIR Bilateral 2005    DONE OUTSIDE [facility]    BLEPHAROPLASTY, UPPER EYELID Bilateral 11/02/2022        BREAST BIOPSY Right     RIGHT-axilla    CATARACT EXTRACTION W/  INTRAOCULAR LENS IMPLANT Left 08/03/2016        CATARACT EXTRACTION W/  INTRAOCULAR LENS IMPLANT Right 09/21/2016        SPINE SURGERY  07/2012    Fusion @ L4L5    TARSAL STRIPPING Right 11/02/2022    Procedure: STRIPPING, TARSAL;  Surgeon: Bina Umana MD;  Location: [facility];  Service: Ophthalmology;  Laterality: Right;    TONSILLECTOMY         Review of Systems  Review of Systems   Constitutional: Negative.  Negative for activity change, appetite change, chills, diaphoresis, fatigue, fever and unexpected weight change.   HENT: Negative.     Eyes: Negative.    Respiratory: Negative.  Negative for apnea, cough and wheezing.    Cardiovascular: Negative.  Negative for chest pain and palpitations.   Gastrointestinal:  Negative for abdominal distention, abdominal pain, anal bleeding, blood in stool, constipation, diarrhea, nausea, rectal pain and vomiting.   Endocrine: Negative.    Genitourinary:  Positive for frequency and urgency. Negative for decreased urine volume, difficulty urinating, dyspareunia, dysuria, enuresis, flank pain, genital sores, hematuria, menstrual problem, pelvic pain, vaginal bleeding, vaginal discharge and vaginal pain.   Musculoskeletal:  Negative for back pain and gait problem.   Skin:  Negative for color change, pallor, rash and wound.   Allergic/Immunologic: Negative for immunocompromised state.   Neurological: Negative.  Negative for dizziness and speech difficulty.    Hematological:  Negative for adenopathy.   Psychiatric/Behavioral:  Negative for agitation, behavioral problems, confusion and sleep disturbance.            Objective:     Physical Exam   Constitutional: She is oriented to person, place, and time. She appears well-developed.   HENT:   Head: Normocephalic and atraumatic.   Eyes: Conjunctivae and EOM are normal.   Cardiovascular: Normal rate, regular rhythm, S1 normal, S2 normal, normal heart sounds and intact distal pulses.   Pulmonary/Chest: Effort normal and breath sounds normal. She exhibits no tenderness.   Abdominal: Soft. Bowel sounds are normal. She exhibits no distension and no mass. There is no splenomegaly or hepatomegaly. There is no abdominal tenderness. There is no rigidity, no rebound and no guarding. Hernia confirmed negative in the right inguinal area and confirmed negative in the left inguinal area.   Genitourinary: Pelvic exam was performed with patient supine. Rectum normal, vagina normal, uterus normal, cervix normal, skenes normal and bartholins normal. Right labia normal and left labia normal. Urethra exhibits hypermobility. Urethra exhibits no urethral caruncle, no urethral diverticulum and no urethral mass. Right bartholin is not enlarged and not tender. Left bartholin is not enlarged and not tender. Rectal exam shows resting tone normal and active tone normal. Rectal exam shows no external hemorrhoid, no fissure, no tenderness, anal tone normal and no dovetailing. Guaiac negative stool. There is atrophy and lavator tenderness in the vagina. No foreign body, tenderness, bleeding, unspecified prolapse of vaginal walls, fistula or mesh exposure in the vagina. Right adnexum displays no tenderness. Left adnexum displays no tenderness.   PVR: 70 ML  Empty cough stress test: Negative.  Kegel: 1/5    POP-Q  Aa: -2 Ba: -2 C: -5   GH: 1 PB: 2 TVL: 7   Ap: -2 Bp: -2 D: -5                      Genitourinary Comments: Vagina narrow very hard to get  small speculum in the vagina      Musculoskeletal:         General: Normal range of motion.      Cervical back: Normal range of motion and neck supple.   Neurological: She is alert and oriented to person, place, and time. She has normal strength, normal reflexes and intact cranial nerves (2-12). Cranial nerves II through XII intact. No cranial nerve deficit.   Skin: Skin is warm and dry.   Psychiatric: She has a normal mood and affect. Her speech is normal and behavior is normal. Judgment normal.              Assessment:        1. Urge incontinence    2. Urgency of urination    3. Acute cystitis without hematuria    4. Vaginal atrophy                Plan:    1. Urinary incontinence, urge   --Bladder diary for 3-7 days, write the number of times you go to the rest room and associated symptoms of urgency or leakage.   --Empty bladder every 3 hours.  Empty well: wait a minute, lean forward on toilet.    --Avoid dietary irritants (see sheet).  Keep diary x 3-5 days to determine your irritants.  --KEGELS: do 10 in AM and 10 in PM, holding each x 10 seconds.  When you feel urge to go, STOP, KEGEL, and when urge has passed, then go to bathroom.  Start pelvic floor PT.     --URGE: Gemtessa 75 mg nighty.  SE profile reviewed.    Takes 2-4 weeks to see if will have effect.  For dry mouth: get sour, sugar free lozenge or gum.      2. Vaginal narrowing vs early stenosis - recommend to start pelvic floor PT and  vaginal dilation     3. Vaginal atrophy (dryness):  Use 1 gram of estrogen cream in vagina nightly x 2 weeks, then twice a week thereafter. [phone]      Approximately 35 minutes of total time spent in consult, 75 % in discussion. This includes face to face time and non-face to face time preparing to see the patient, obtaining and/or reviewing separately obtained history, documenting clinical information in the electronic or other health record, independently interpreting results (not separately reported) and  communicating results to the patient/family/caregiver, or care coordination (not separately reported).      Thank you for requesting consultation of your patient.  I look forward to participating in her care.    Kurtis Salvador DO  Female Pelvic Medicine and Reconstructive Surgery  [facility] [place], [place]

## 2024-03-15 ENCOUNTER — PATIENT MESSAGE (OUTPATIENT)
Dept: PRIMARY CARE CLINIC | Facility: CLINIC | Age: 79
End: 2024-03-15
Payer: MEDICARE

## 2024-03-15 LAB — BACTERIA UR CULT: NO GROWTH

## 2024-03-15 RX ORDER — VIBEGRON 75 MG/1
75 TABLET, FILM COATED ORAL DAILY
Qty: 30 TABLET | Refills: 11 | Status: SHIPPED | OUTPATIENT
Start: 2024-03-15

## 2024-03-15 NOTE — TELEPHONE ENCOUNTER
Patient confirmed over phone that she is taking 1/2 of a 12.5 mg carvedilol pill in the am and one 12.5  carvedilol   pill in the evening,     Advised pt to take the remaining 1/2 of her 12.5 mg carvedilol pill now, since she stated her blood pressure is high, and take the 2  12.5 mg of carvedilol per PCP / Rx instructions in the evening.     Advised pt to take the 12.5 mg of Carvedilol as Rx'd from now on. Do not cut in half for am, and 2 pills in the jose    Nurse visit scheduled on Tuesday @ 11:30 per pt request to go over meds, and blood pressures

## 2024-03-19 ENCOUNTER — PATIENT MESSAGE (OUTPATIENT)
Dept: UROGYNECOLOGY | Facility: CLINIC | Age: 79
End: 2024-03-19
Payer: MEDICARE

## 2024-03-19 ENCOUNTER — CLINICAL SUPPORT (OUTPATIENT)
Dept: PRIMARY CARE CLINIC | Facility: CLINIC | Age: 79
End: 2024-03-19
Payer: MEDICARE

## 2024-03-19 VITALS — SYSTOLIC BLOOD PRESSURE: 132 MMHG | DIASTOLIC BLOOD PRESSURE: 61 MMHG

## 2024-03-19 DIAGNOSIS — I10 PRIMARY HYPERTENSION: Primary | ICD-10-CM

## 2024-03-19 NOTE — PROGRESS NOTES
Patient here at clinic for medication check / blood pressure check in.   Patient brought her Carvedilol 12.5 mg pill bottle with her.   Pt stated this is the medication that she has been taking 1/2 pill of every am.   Pt stated that she took 1/2 pill of carvedilol 6.25 mg at 9 am today.  Pt also stated that she did take the whole pill  of Carvedilol 12.5 mg the day prior, and it made her dizzy, and she was not able to drive.       Pt also mentioned that carissa has recently added a medication called Vibegron, and she is wondering if it is affecting her blood pressure  Said she thinks she took 2 last night and was very dizzy.   Also mentioned that it is expensive.   Advised pt I would let PCP know.     Clinic manual blood pressure taken, is 132/61.  Pt took 6.25 mg ( 1/2 pill ) of carvedilol this am, and 12.5 mg of HCTZ   Wondering if she needs to take the other 1/2 of carvedilol at lunchtime.  Advised pt that I would let PCP know, and call her this week with further advice.    Pt also advised to bring in her home blood pressure machine to f/u nurse visit, as readings are notably higher.  Pt given blood pressure measurement instructions to take home, as pt verbalized she is often taking b/p readings at times when  She is busy, or has not been seated for 10 minutes.     Will f/u with pt with nurse visit b/p machine check, and reinforce blood pressure measurement instructions.

## 2024-03-20 ENCOUNTER — PATIENT MESSAGE (OUTPATIENT)
Dept: UROGYNECOLOGY | Facility: CLINIC | Age: 79
End: 2024-03-20
Payer: MEDICARE

## 2024-03-25 ENCOUNTER — DOCUMENTATION ONLY (OUTPATIENT)
Dept: REHABILITATION | Facility: HOSPITAL | Age: 79
End: 2024-03-25
Payer: MEDICARE

## 2024-03-25 NOTE — PROGRESS NOTES
Physical Therapy Discharge summary    Pt was evaluated on 24 and was seen 2 times for PT. Pt has not attended PT since 24. Patient managing other urogynecology conditions and cancelled remaining visits. Patient given HEP. Plan of care and/or authorization . Pt to be discharged at this time.    Pinky Gu, PT, DPT

## 2024-03-26 ENCOUNTER — CLINICAL SUPPORT (OUTPATIENT)
Dept: REHABILITATION | Facility: HOSPITAL | Age: 79
End: 2024-03-26
Attending: OBSTETRICS & GYNECOLOGY
Payer: MEDICARE

## 2024-03-26 DIAGNOSIS — M62.89 PELVIC FLOOR DYSFUNCTION: Primary | ICD-10-CM

## 2024-03-26 DIAGNOSIS — R27.8 OTHER LACK OF COORDINATION: ICD-10-CM

## 2024-03-26 DIAGNOSIS — R53.1 WEAKNESS: ICD-10-CM

## 2024-03-26 PROCEDURE — 97112 NEUROMUSCULAR REEDUCATION: CPT | Mod: HCNC

## 2024-03-26 PROCEDURE — 97161 PT EVAL LOW COMPLEX 20 MIN: CPT | Mod: HCNC

## 2024-03-26 PROCEDURE — 97530 THERAPEUTIC ACTIVITIES: CPT | Mod: HCNC

## 2024-03-26 NOTE — PATIENT INSTRUCTIONS
Pelvic Floor Relaxation Training Exercises:    1) Gently push out your pelvic floor.  Try to breathe out as you do so.  Perform 5 pushes 2-3x/day.     Use a hand held mirror to help visualize your pelvic floor motion if you would find that helpful.

## 2024-03-26 NOTE — PLAN OF CARE
[facility]   Physical Therapy Initial Evaluation        Date: 3/26/2024   Name: Grace Ware [facility]  Clinic Number: [id number]    Therapy Diagnosis:   Encounter Diagnoses   Name Primary?    Pelvic floor dysfunction Yes    Weakness     Other lack of coordination      Physician: Kurtis Salvador,*    Physician Orders: PT Eval and Treat   Medical Diagnosis from Referral: urge urinary incontinence   Evaluation Date: 3/26/2024  Authorization Period Expiration: 24  Plan of Care Expiration: 24  Progress Note Due: 24  Visit # / Visits authorized:    FOTO: 1/3    Precautions: Standard     Time In: 1055  Time Out: 1140  Total Appointment Time (timed & untimed codes): 45 minutes    SUBJECTIVE       Date of onset: 2023    History of current condition - Grace reports: She is concerned that she is not doing kegels correctly.  She reports issues with over active bladder and a history of urinary tract infection(s).  She started taking bladder medication which has helped decrease her urinary frequency.  She reports issues with urinating just in case .  Is taking Gemtessa.      OB/GYN History:  and vaginal delivery  Using vaginal estrogen cream: Yes  Sexually active? No  Pelvic Pain with: with intercourse      Bladder/Bowel History:   Frequency of urination:   Daytime: variable but since starting the medicine she is able to hold on occasion up to 3 hours            Nighttime: with the medicine 0-1x   Difficulty initiating urine stream: Yes she has some hesitancy since starting her bladder medicine (a few seconds)  Urine stream: strong  Complete emptying: Yes  Bladder leakage: Only 2x with urgency   Urinary Urgency: Yes.  Able to delay the urge for at least 1 minute(s).    Frequency of bowel movements: once a day  Difficulty initiating BM: No  Quality/Shape of BM: [facility] Stool Chart 4  Does Patient Feel Empty after BM? Yes      Form of protection: panty liner    Number of pads required in 24 hours: 1    Types of fluid intake: water, 1 hot tea (decreased from 2-3)  Diet: Standard  Habitus: well developed, well nourished  Abuse/Neglect: Pt denies a history of physical or emotional abuse at this visit.       Pain:  Location: low back (L4-5 is fused)      Medical History: Grace  has a past medical history of Anxiety, Rene's disease, Chronic low back pain, Depression, Glaucoma, Goiter, nodular, Hyperlipidemia, Hypertension, Irritable bowel, Neuromuscular disorder, Osteopenia of multiple sites (05/29/2017), PUD (peptic ulcer disease), Subarachnoid hemorrhage (2014), and Varicose veins.     Surgical History: Grace Navarro  has a past surgical history that includes Appendectomy; Tonsillectomy; Spine surgery (07/2012); Blepharoptosis repair (Bilateral, 2005); Cataract extraction w/  intraocular lens implant (Left, 08/03/2016); Cataract extraction w/  intraocular lens implant (Right, 09/21/2016); Breast biopsy (Right); Tarsal stripping (Right, 11/02/2022); and blepharoplasty, upper eyelid (Bilateral, 11/02/2022).    Medications: Grace has a current medication list which includes the following prescription(s): ascorbic acid (vitamin c), calcium magnesium, carvedilol, cholecalciferol (vitamin d3), premarin, cyanocobalamin, diclofenac sodium, escitalopram oxalate, ezetimibe, fluorouracil, hydrochlorothiazide, hydroxyzine hcl, meloxicam, potassium chloride sa, triamcinolone acetonide 0.1%, gemtesa, and vibegron.    Allergies:   Review of patient's allergies indicates:   Allergen Reactions    Ace inhibitors      Pt not sure which medication it was - was told by doctor that she was allergic    Amlodipine      flushing    Ibuprofen      Elevate blood pressure    Morphine Hallucinations    Statins-hmg-coa reductase inhibitors      Muscle cramps        Imaging: None reported for this condition     Prior Therapy/Previous treatment included: None reported for this condition    Social History/Living Arrangements: lives alone  Current exercise: walking  Occupation: retired    Prior Level of Function: Pt was independent with all ADLs and iADLs without pain, no reports of incontinence of bowel or bladder.  Current Level of Function: urinary symptom(s) affecting activities of daily living     Constitutional Symptoms Review: The patient denies having any constitutional symptoms.     Flags Screening  Red Flags:family history of cancer: father had prostate      Pts goals: improve bladder control     OBJECTIVE     See EMR under MEDIA for written consent provided 3/26/2024  Chaperone: declined    ORTHO SCREEN  Posture in sitting: slouched  and sits squarely   Posture in standing: forward head and forward and rounded shoulders   Pelvic alignment: Not assessed today        ABDOMINAL WALL ASSESSMENT: Deferred due to time constraints     BREATHING MECHANICS ASSESSMENT: Deferred due to time constraints       VAGINAL PELVIC FLOOR EXAM    EXTERNAL ASSESSMENT  Introitus: WNL  Skin condition: WNL  Scarring: did not formally assess today    Sensation: WNL   Pain: none  Voluntary contraction: visible lift and accessory muscle use  Voluntary relaxation: visible drop  Involuntary contraction: reflex tightening  Bearing down: reflex tightening  Perineal descent: absent        INTERNAL ASSESSMENT  Pain: tender areas noted as follows: globally throughout all layers bilaterally    Sensation: able to localized pressure appropriately   Vaginal vault: narrow    Muscle Bulk: hypertonus   Muscle Power: 2/5  Muscle Endurance: Not tested   # Reps To Fatigue: Not tested     Fast Contractions in 10 seconds: Not tested      Quality of contraction: slow relaxation and incomplete relaxation   Specificity: WNL   Coordination: tends to hold breath during PFM contration   Bearing down: reflex tightening  Prolapse check: Did not assess today       Intake Outcome Measures for FOTO Survey    Therapist reviewed  FOTO scores for Grace Navarro on 3/26/2024.     FOTO report- see Media section in Epic or account episode details in FOTO.      Intake Score:            TREATMENT        Treatment Time In: 1117  Treatment Time Out: 1140  Total Treatment time (time-based codes) separate from Evaluation: 23 minutes    Neuromuscular Re-education to develop Coordination, Control, and Down training for 15 minutes including:   [x]pelvic floor relaxation speed after performing a kegel   [x]pelvic floor relaxation/bulging training       Therapeutic Activity Patient participated in dynamic functional therapeutic activities to improve functional performance for 8 minutes. Including: Education as described below.     [x] Education on visual cues/mental imagery for pelvic floor relaxation  [x] Pelvic anatomy edu and impact on current level of function   [x] HEP building/HEP review      Written Home Exercises and Education Provided: yes. Exercises were reviewed and Grace was able to demonstrate them prior to the end of the session.  Grace demonstrated good  understanding of the education provided. See EMR under Patient Instructions for exercises and education provided during therapy sessions.      ASSESSMENT     Grace is a 78 y.o. female referred to outpatient Physical Therapy with a medical diagnosis of urge urinary incontinence. Pt presents with pelvic floor tenderness, increased tension of the pelvic muscles, and poor quality of pelvic muscle contraction.  Examination reveals lumbopelvic dysfunction and pelvic floor coordination deficits and increased muscular tone with soft tissue restrictions. The patient is expected to benefit from skilled intervention to work towards improving lumbopelvic dysfunction, pelvic floor relaxation and coordination as well as improving overall strength and ROM in order to return towards prior level of function and ADL participation.         Pt prognosis is Good.   Pt will benefit from skilled  outpatient Physical Therapy to address the deficits stated above and in the chart below, provide pt/family education, and to maximize pt's level of independence.     Plan of care discussed with patient: Yes  Pt's spiritual, cultural and educational needs considered and patient is agreeable to the plan of care and goals as stated below:     Anticipated Barriers for therapy: none    Medical Necessity is demonstrated by the following  History  Co-morbidities and personal factors that may impact the plan of care [x] LOW: no personal factors / co-morbidities  [] MODERATE: 1-2 personal factors / co-morbidities  [] HIGH: 3+ personal factors / co-morbidities    Moderate / High Support Documentation:   Co-morbidities affecting plan of care: low back pain     Personal Factors:   no deficits     Examination  Body Structures and Functions, activity limitations and participation restrictions that may impact the plan of care [x] LOW: addressing 1-2 elements  [] MODERATE: 3+ elements  [] HIGH: 4+ elements (please support below)    Moderate / High Support Documentation: pelvic floor muscle(s) dysfunction      Clinical Presentation [x] LOW: stable  [] MODERATE: Evolving  [] HIGH: Unstable     Decision Making/ Complexity Score: low         Goals:  Short Term Goals: 4 weeks   Patient to demonstrate proper use of urge delay strategies to help reduce urge urinary incontinence with activities of daily living.   Pt to voice understanding of the role that diet plays on urinary urgency.    Pt to be compliant with a voiding schedule of every 3 hours to help decrease urge urinary incontinence with activities of daily living.    Pt will report minimal to no pain with single digit pelvic assessment and display relaxation during this assessment in order to progress to dilator use.        Long Term Goals: 12 weeks ,   Pt to be discharged with home plan for carry over after discharge.    Pt to report an 80% reduction of urinary incontinence  symptoms with ADL participation thereby demonstrating improved pelvic floor muscle control and strength.   Pt to demonstrate an improved score in the FOTO Urinary Problem survey  to at least 77 to demonstrate improving pelvic floor muscle function and coordination needed for improved bladder control with ADLs.    Pt to be able to delay the urge to urinate at least 10 minutes with a strong urge to urinate in order to make it to the bathroom without leaking.  Pt to report no longer feeling the need to urinate just in case when shopping or participating in social activities to demonstrate improving pelvic floor and bladder control.        PLAN     Plan of care Certification: 3/26/2024 to 6-18-24.    Outpatient Physical Therapy 1-2 times weekly for 12 weeks to include the following interventions: therapeutic exercises, therapeutic activity, neuromuscular re-education, gait training, manual therapy, modalities PRN, patient/family education, and self care/home management, and dry needling.     Lisa Gautam, PT

## 2024-03-28 ENCOUNTER — CLINICAL SUPPORT (OUTPATIENT)
Dept: REHABILITATION | Facility: HOSPITAL | Age: 79
End: 2024-03-28
Attending: OBSTETRICS & GYNECOLOGY
Payer: MEDICARE

## 2024-03-28 DIAGNOSIS — R39.15 URGENCY OF URINATION: ICD-10-CM

## 2024-03-28 DIAGNOSIS — R27.8 OTHER LACK OF COORDINATION: ICD-10-CM

## 2024-03-28 DIAGNOSIS — M62.89 PELVIC FLOOR DYSFUNCTION: Primary | ICD-10-CM

## 2024-03-28 DIAGNOSIS — N39.41 URGE INCONTINENCE: ICD-10-CM

## 2024-03-28 DIAGNOSIS — R53.1 WEAKNESS: ICD-10-CM

## 2024-03-28 PROCEDURE — 97112 NEUROMUSCULAR REEDUCATION: CPT | Mod: HCNC

## 2024-03-28 PROCEDURE — 97140 MANUAL THERAPY 1/> REGIONS: CPT | Mod: HCNC

## 2024-03-28 PROCEDURE — 97530 THERAPEUTIC ACTIVITIES: CPT | Mod: HCNC

## 2024-03-28 NOTE — PROGRESS NOTES
[facility]   Physical Therapy Treatment Note      Name: Grace Ware [facility]  Clinic Number: [id number]    Therapy Diagnosis:   Encounter Diagnoses   Name Primary?    Pelvic floor dysfunction Yes    Weakness     Other lack of coordination      Physician: Kurtis Salvador,*    Visit Date: 3/28/2024    Physician Orders: PT Eval and Treat   Medical Diagnosis from Referral: urge urinary incontinence   Evaluation Date: 3/26/2024  Authorization Period Expiration: 12-31-24  Plan of Care Expiration: 6-18-24  Progress Note Due: 4-26-24  Visit # / Visits authorized: 2/ 20    FOTO: 2/3  Date last given: 3/28/2024    Time In: 850  Time Out: 928  Total Billable Time: 38 minutes    Precautions: Standard    Subjective     Pt reports: She has been keeping a bladder diary and brought it in.  She was compliant with home exercise program.  Response to previous treatment: no issues reported   Functional change: no changes reported    Pain: 0/10  Location: NA    Constitutional Symptoms Review: The patient denies having any constitutional symptoms.       Objective      Objective Measures updated at progress report unless specified.       Treatment   Grace received the treatments listed below:    Pt verbally consents to intravaginal treatment today.  Signed consent form already on file.  Chaperone declined today.    Grace received the following manual therapy techniques: to develop flexibility and extensibility for 8 minutes including:   [x] trigger point/myofascial release of intravaginal pelvic floor muscle(s) focusing on layer 3 bilaterally       Grace participated in neuromuscular re-education activities to develop Coordination, Control, and Down training for 20 minutes including:   [x]pelvic floor relaxation speed after performing a kegel   [x]pelvic floor relaxation/bulging training   [x] Kegel edu and practice.  Increased time spent on edu on proper technique.  Pt improves with practice and  verbal cues.        Grace participated in dynamic functional therapeutic activities to improve functional performance for 10  minutes, including:  [x] Plan of care review  [x] Education on visual cues/mental imagery for pelvic floor relaxation   [x] Anatomy review and discussion of the anatomy on current level of function   [] The knack edu with functional activities review   [] Pt edu in the Roll for Control technique to decrease incontinence with strong urge.  Practiced new technique in sitting and standing.    [x] Home exercise program issued and reviewed   [x] Reviewed bladder diary     Home Exercises Provided and Patient Education Provided     Education provided: See above  Discussed progression of plan of care with patient; educated pt in activity modification; reviewed HEP with pt. Pt demonstrated and verbalized understanding of all instruction and was provided with a handout of HEP (see Patient Instructions).    Written Home Exercises Provided: yes.  Exercises were reviewed and Grace was able to demonstrate them prior to the end of the session.  Grace demonstrated good  understanding of the education provided.     See EMR under Patient Instructions for exercises provided 3/28/2024.    Assessment     Worked on addressing myofascial restrictions intravaginally today focusing on layer 3 muscle(s).  Also worked on coordination and control focusing on relaxation speed after contraction as well as lengthening the pelvic floor muscle(s).   Reviewed bladder diary.  Pt will continue to benefit from skilled outpatient physical therapy to address the deficits listed in the problem list box on initial evaluation, provide pt/family education and to maximize pt's level of independence in the home and community environment.       Grace Is progressing well towards her goals.   Pt prognosis is Excellent.     Pt will continue to benefit from skilled outpatient physical therapy to address the deficits listed in the  problem list box on initial evaluation, provide pt/family education and to maximize pt's level of independence in the home and community environment.     Pt's spiritual, cultural and educational needs considered and pt agreeable to plan of care and goals.     Anticipated barriers to physical therapy: none    Goals:   Short Term Goals: 4 weeks   Patient to demonstrate proper use of urge delay strategies to help reduce urge urinary incontinence with activities of daily living.   Pt to voice understanding of the role that diet plays on urinary urgency.    Pt to be compliant with a voiding schedule of every 3 hours to help decrease urge urinary incontinence with activities of daily living.    Pt will report minimal to no pain with single digit pelvic assessment and display relaxation during this assessment in order to progress to dilator use.           Long Term Goals: 12 weeks ,   Pt to be discharged with home plan for carry over after discharge.    Pt to report an 80% reduction of urinary incontinence symptoms with ADL participation thereby demonstrating improved pelvic floor muscle control and strength.   Pt to demonstrate an improved score in the FOTO Urinary Problem survey  to at least 77 to demonstrate improving pelvic floor muscle function and coordination needed for improved bladder control with ADLs.    Pt to be able to delay the urge to urinate at least 10 minutes with a strong urge to urinate in order to make it to the bathroom without leaking.  Pt to report no longer feeling the need to urinate just in case when shopping or participating in social activities to demonstrate improving pelvic floor and bladder control.           PLAN      Plan of care Certification: 3/26/2024 to 6-18-24.     Outpatient Physical Therapy 1-2 times weekly for 12 weeks to include the following interventions: therapeutic exercises, therapeutic activity, neuromuscular re-education, gait training, manual therapy, modalities PRN,  patient/family education, and self care/home management, and dry needling.     Plan     Continue with established Plan of Care, working toward established PT goals.      At next visit: urge delay strategies     Lisa Gautam, PT

## 2024-04-01 ENCOUNTER — CLINICAL SUPPORT (OUTPATIENT)
Dept: REHABILITATION | Facility: HOSPITAL | Age: 79
End: 2024-04-01
Payer: MEDICARE

## 2024-04-01 DIAGNOSIS — R53.1 WEAKNESS: ICD-10-CM

## 2024-04-01 DIAGNOSIS — R27.8 OTHER LACK OF COORDINATION: ICD-10-CM

## 2024-04-01 DIAGNOSIS — M62.89 PELVIC FLOOR DYSFUNCTION: Primary | ICD-10-CM

## 2024-04-01 PROCEDURE — 97112 NEUROMUSCULAR REEDUCATION: CPT | Mod: HCNC

## 2024-04-01 PROCEDURE — 97140 MANUAL THERAPY 1/> REGIONS: CPT | Mod: HCNC

## 2024-04-01 PROCEDURE — 97530 THERAPEUTIC ACTIVITIES: CPT | Mod: HCNC

## 2024-04-01 NOTE — PATIENT INSTRUCTIONS
Home Exercise Program: 04/01/2024    Kegels: Perform kegels on the exhale     Quick Flicks   Perform, a fast kegel (contract and LIFT the pelvic floor muscles as if you're trying to stop the stream of urine and passage of gas).    Make sure you're just using the internal muscles, not holding for longer than 1 second without holding your breath.  Let go and relax everything for 3-5 seconds.   Repeat 10 times, 3 sets per day.     Endurance Holds  Perform a long kegel (contract and LIFT the pelvic floor muscles as if you're trying to stop the stream of urine and passage of gas).    Make sure you're just using the internal muscles without holding your breath.  Let go and relax everything for 10 seconds.   Hold 3 seconds. Repeat 10 times, 3 sets per day.  (Goal is 10 seconds x10 reps)

## 2024-04-01 NOTE — PROGRESS NOTES
[facility]   Physical Therapy Treatment Note      Name: Grace [facility]  Clinic Number: [id number]    Therapy Diagnosis:   Encounter Diagnoses   Name Primary?    Pelvic floor dysfunction Yes    Weakness     Other lack of coordination      Physician: Kurtis Salvador,*    Visit Date: 4/1/2024    Physician Orders: PT Eval and Treat   Medical Diagnosis from Referral: urge urinary incontinence   Evaluation Date: 3/26/2024  Authorization Period Expiration: 12-31-24  Plan of Care Expiration: 6-18-24  Progress Note Due: 4-26-24  Visit # / Visits authorized: 3/ 20    FOTO: 2/3  Date last given: 4/1/2024    Time In: 1332  Time Out: 1416  Total Billable Time: 44 minutes    Precautions: Standard    Subjective     Pt reports: She is worried about having side effects with the gemtesa (itching).      She was compliant with home exercise program.  Response to previous treatment: no issues reported   Functional change: no changes reported    Pain: 0/10  Location: NA    Constitutional Symptoms Review: The patient denies having any constitutional symptoms.       Objective      Objective Measures updated at progress report unless specified.       Treatment   Grace received the treatments listed below:    Pt verbally consents to intravaginal treatment today.  Signed consent form already on file.  Chaperone declined today.    Grace received the following manual therapy techniques: to develop flexibility and extensibility for 12 minutes including:   [x] trigger point/myofascial release of intravaginal pelvic floor muscle(s) focusing on layer 3 bilaterally   [x] Cross friction massage to bilateral inguinal ligaments       Grace participated in neuromuscular re-education activities to develop Coordination, Control, and Down training for 22 minutes including:   [x]pelvic floor relaxation speed after performing a kegel   [x]pelvic floor relaxation/bulging training (also practiced sitting on a  towel roll)  [x] Kegel edu and practice.  Increased time spent on edu on proper technique.  Pt improves with practice and verbal cues.  [x] Posterior pelvic tilt x 10 reps.  Verbal and visual cues needed for technique.          Grace participated in dynamic functional therapeutic activities to improve functional performance for 10  minutes, including:  [x] Plan of care review  [x] Education on visual cues/mental imagery for pelvic floor relaxation   [x] Anatomy review and discussion of the anatomy on current level of function   [] The knack edu with functional activities review   [] Pt edu in the Roll for Control technique to decrease incontinence with strong urge.  Practiced new technique in sitting and standing.    [x] Home exercise program issued and reviewed   [] Reviewed bladder diary     Home Exercises Provided and Patient Education Provided     Education provided: See above  Discussed progression of plan of care with patient; educated pt in activity modification; reviewed HEP with pt. Pt demonstrated and verbalized understanding of all instruction and was provided with a handout of HEP (see Patient Instructions).    Written Home Exercises Provided: yes.  Exercises were reviewed and Grace was able to demonstrate them prior to the end of the session.  Grace demonstrated good  understanding of the education provided.     See EMR under Patient Instructions for exercises provided 3/28/2024.    Assessment     Worked on addressing myofascial restrictions intravaginally today focusing on layer 3 muscle(s).  Also worked on coordination and control focusing on relaxation speed after contraction as well as lengthening the pelvic floor muscle(s).   Initiated work on bilateral inguinal ligaments to help decrease strain to pelvic structures.   Initiated core strengthening.  Pt will continue to benefit from skilled outpatient physical therapy to address the deficits listed in the problem list box on initial  evaluation, provide pt/family education and to maximize pt's level of independence in the home and community environment.       Grace Is progressing well towards her goals.   Pt prognosis is Excellent.     Pt will continue to benefit from skilled outpatient physical therapy to address the deficits listed in the problem list box on initial evaluation, provide pt/family education and to maximize pt's level of independence in the home and community environment.     Pt's spiritual, cultural and educational needs considered and pt agreeable to plan of care and goals.     Anticipated barriers to physical therapy: none    Goals:   Short Term Goals: 4 weeks   Patient to demonstrate proper use of urge delay strategies to help reduce urge urinary incontinence with activities of daily living.   Pt to voice understanding of the role that diet plays on urinary urgency.    Pt to be compliant with a voiding schedule of every 3 hours to help decrease urge urinary incontinence with activities of daily living.    Pt will report minimal to no pain with single digit pelvic assessment and display relaxation during this assessment in order to progress to dilator use.           Long Term Goals: 12 weeks ,   Pt to be discharged with home plan for carry over after discharge.    Pt to report an 80% reduction of urinary incontinence symptoms with ADL participation thereby demonstrating improved pelvic floor muscle control and strength.   Pt to demonstrate an improved score in the FOTO Urinary Problem survey  to at least 77 to demonstrate improving pelvic floor muscle function and coordination needed for improved bladder control with ADLs.    Pt to be able to delay the urge to urinate at least 10 minutes with a strong urge to urinate in order to make it to the bathroom without leaking.  Pt to report no longer feeling the need to urinate just in case when shopping or participating in social activities to demonstrate improving pelvic floor  and bladder control.           PLAN      Plan of care Certification: 3/26/2024 to 6-18-24.     Outpatient Physical Therapy 1-2 times weekly for 12 weeks to include the following interventions: therapeutic exercises, therapeutic activity, neuromuscular re-education, gait training, manual therapy, modalities PRN, patient/family education, and self care/home management, and dry needling.     Plan     Continue with established Plan of Care, working toward established PT goals.      At next visit: urge delay strategies     Lisa Gautam, PT

## 2024-04-08 ENCOUNTER — PATIENT MESSAGE (OUTPATIENT)
Dept: UROGYNECOLOGY | Facility: CLINIC | Age: 79
End: 2024-04-08
Payer: MEDICARE

## 2024-04-09 ENCOUNTER — OFFICE VISIT (OUTPATIENT)
Dept: PAIN MEDICINE | Facility: CLINIC | Age: 79
End: 2024-04-09
Payer: MEDICARE

## 2024-04-09 ENCOUNTER — TELEPHONE (OUTPATIENT)
Dept: PAIN MEDICINE | Facility: CLINIC | Age: 79
End: 2024-04-09

## 2024-04-09 VITALS
DIASTOLIC BLOOD PRESSURE: 84 MMHG | BODY MASS INDEX: 26.34 KG/M2 | HEART RATE: 66 BPM | HEIGHT: 64 IN | SYSTOLIC BLOOD PRESSURE: 172 MMHG | WEIGHT: 154.31 LBS

## 2024-04-09 DIAGNOSIS — M54.41 RIGHT-SIDED LOW BACK PAIN WITH RIGHT-SIDED SCIATICA, UNSPECIFIED CHRONICITY: ICD-10-CM

## 2024-04-09 DIAGNOSIS — M47.816 LUMBAR SPONDYLOSIS: Primary | ICD-10-CM

## 2024-04-09 PROCEDURE — 3079F DIAST BP 80-89 MM HG: CPT | Mod: CPTII,S$GLB,, | Performed by: STUDENT IN AN ORGANIZED HEALTH CARE EDUCATION/TRAINING PROGRAM

## 2024-04-09 PROCEDURE — 99999 PR PBB SHADOW E&M-EST. PATIENT-LVL IV: CPT | Mod: PBBFAC,,, | Performed by: STUDENT IN AN ORGANIZED HEALTH CARE EDUCATION/TRAINING PROGRAM

## 2024-04-09 PROCEDURE — 1160F RVW MEDS BY RX/DR IN RCRD: CPT | Mod: CPTII,S$GLB,, | Performed by: STUDENT IN AN ORGANIZED HEALTH CARE EDUCATION/TRAINING PROGRAM

## 2024-04-09 PROCEDURE — 1159F MED LIST DOCD IN RCRD: CPT | Mod: CPTII,S$GLB,, | Performed by: STUDENT IN AN ORGANIZED HEALTH CARE EDUCATION/TRAINING PROGRAM

## 2024-04-09 PROCEDURE — 99214 OFFICE O/P EST MOD 30 MIN: CPT | Mod: S$GLB,,, | Performed by: STUDENT IN AN ORGANIZED HEALTH CARE EDUCATION/TRAINING PROGRAM

## 2024-04-09 PROCEDURE — 1125F AMNT PAIN NOTED PAIN PRSNT: CPT | Mod: CPTII,S$GLB,, | Performed by: STUDENT IN AN ORGANIZED HEALTH CARE EDUCATION/TRAINING PROGRAM

## 2024-04-09 PROCEDURE — 1101F PT FALLS ASSESS-DOCD LE1/YR: CPT | Mod: CPTII,S$GLB,, | Performed by: STUDENT IN AN ORGANIZED HEALTH CARE EDUCATION/TRAINING PROGRAM

## 2024-04-09 PROCEDURE — 3077F SYST BP >= 140 MM HG: CPT | Mod: CPTII,S$GLB,, | Performed by: STUDENT IN AN ORGANIZED HEALTH CARE EDUCATION/TRAINING PROGRAM

## 2024-04-09 PROCEDURE — 3288F FALL RISK ASSESSMENT DOCD: CPT | Mod: CPTII,S$GLB,, | Performed by: STUDENT IN AN ORGANIZED HEALTH CARE EDUCATION/TRAINING PROGRAM
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
## 2024-04-09 NOTE — TELEPHONE ENCOUNTER
----- Message from Yasmine Plummer MD sent at 2024 11:29 AM CDT -----  Regarding: Order for KRISTAN MORGAN    Patient Name: KRISTAN MORGAN([id number])  Sex: Female  : 1945      PCP: MECHE DUFFY    Center: [facility] CENTRAL BILLING OFFICE     Types of orders made on 2024: Outpatient Referral, Procedure Request    Order Date:2024  Ordering User:YASMINE PLUMMER [[id number]]  Encounter Provider:Yasmine Plummer MD [[id number]]  Z  1 Authorizing Provider: Yasmine Plummer MD [[id number]]  Department:[facility][[id number]]    Common Order Information  Procedure -> Medial Branch Block (Specify level and laterality) Cmt: Bilateral             L3, L4, L5    Order Specific Information  Order: Procedure Order to Pain Management [Custom: [id number]]  Order #:          [id number]: 1 FUTURE    Priority: Routine  Class: Clinic Performed      Future Order Information      Expires on:2025            Expected by:2024                   Associated Diagnoses      M47.816 Lumbar spondylosis      Physician -> ELIAN         Is patient on anti-coagulants? -> No         Facility Name: -> [facility]         Follow-up: -> 2 weeks           Priority: Routine  Class: Clinic Performed    Future Order Information      Expires on:2025          Z  1   Expected by:2024                   Associated Diagnoses      M47.816 Lumbar spondylosis      Procedure -> Medial Branch Block (Specify level and laterality) Cmt:                 Bilateral L3, L4, L5        Physician -> ELIAN         Is patient on anti-coagulants? -> No         Facility Name: -> [facility]         Follow-up: -> 2 weeks

## 2024-04-16 ENCOUNTER — CLINICAL SUPPORT (OUTPATIENT)
Dept: REHABILITATION | Facility: HOSPITAL | Age: 79
End: 2024-04-16
Payer: MEDICARE

## 2024-04-16 DIAGNOSIS — R27.8 OTHER LACK OF COORDINATION: ICD-10-CM

## 2024-04-16 DIAGNOSIS — R53.1 WEAKNESS: ICD-10-CM

## 2024-04-16 DIAGNOSIS — M62.89 PELVIC FLOOR DYSFUNCTION: Primary | ICD-10-CM

## 2024-04-16 PROCEDURE — 97112 NEUROMUSCULAR REEDUCATION: CPT

## 2024-04-16 NOTE — PROGRESS NOTES
[facility]   Physical Therapy Treatment Note      Name: Grace Ware [facility]  Clinic Number: [id number]    Therapy Diagnosis:   Encounter Diagnoses   Name Primary?    Pelvic floor dysfunction Yes    Weakness     Other lack of coordination      Physician: Kurtis Salvador,*    Visit Date: 4/16/2024    Physician Orders: PT Eval and Treat   Medical Diagnosis from Referral: urge urinary incontinence   Evaluation Date: 3/26/2024  Authorization Period Expiration: 12-31-24  Plan of Care Expiration: 6-18-24  Progress Note Due: 4-26-24  Visit # / Visits authorized: 3/ 20    FOTO: 2/3  Date last given: 4/1/2024    Time In: 1017  Time Out: 1055  Total Billable Time: 38 minutes    Precautions: Standard    Subjective     Pt reports: She reports burning and discomfort vaginally the day after her last appointment.  It resolved after 1 day.      She was compliant with home exercise program.  Response to previous treatment: no issues reported   Functional change: no changes reported    Pain: 0/10  Location: NA    Constitutional Symptoms Review: The patient denies having any constitutional symptoms.       Objective      Objective Measures updated at progress report unless specified.       Treatment   Grace received the treatments listed below:    Pt verbally consents to intravaginal treatment today.  Signed consent form already on file.  Chaperone declined today.    Grace received the following manual therapy techniques: to develop flexibility and extensibility for 12 minutes including:   [x] trigger point/myofascial release of intravaginal pelvic floor muscle(s) focusing on layer 3 bilaterally   [x] Cross friction massage to bilateral inguinal ligaments       Grace participated in neuromuscular re-education activities to develop Coordination, Control, and Down training for 38 minutes including:   [x]pelvic floor relaxation speed after performing a kegel   []pelvic floor relaxation/bulging  training ( also practiced sitting on a towel roll)  [x] Kegel edu and practice.  Increased time spent on edu on proper technique.  Pt improves with practice and verbal cues. With intravaginal feedback  [] Posterior pelvic tilt x 10 reps.  Verbal and visual cues needed for technique.    [x] Pelvic floor muscle(s) lengthening and bulging with intravaginal feedback  [x] Transabdominal ultrasound for pelvic floor muscle(s) activation and pressure management         Grace participated in dynamic functional therapeutic activities to improve functional performance for 00 minutes, including:  [] Plan of care review  [] Education on visual cues/mental imagery for pelvic floor relaxation   [] Anatomy review and discussion of the anatomy on current level of function   [] The knack edu with functional activities review   [] Pt edu in the Roll for Control technique to decrease incontinence with strong urge.  Practiced new technique in sitting and standing.    [] Home exercise program issued and reviewed   [] Reviewed bladder diary     Home Exercises Provided and Patient Education Provided     Education provided: See above  Discussed progression of plan of care with patient; educated pt in activity modification; reviewed HEP with pt. Pt demonstrated and verbalized understanding of all instruction and was provided with a handout of HEP (see Patient Instructions).    Written Home Exercises Provided: yes.  Exercises were reviewed and Grace was able to demonstrate them prior to the end of the session.  Grace demonstrated good  understanding of the education provided.     See EMR under Patient Instructions for exercises provided 3/28/2024.    Assessment     Worked on coordination and control focusing on relaxation speed after contraction as well as lengthening the pelvic floor muscle(s).  Use rehabilitative ultrasound imaging to help Grace visualize her pelvic floor muscle(s).  Increased time spent on pelvic floor  muscle(s) training and coordination today to ensure patient comfort and correct technique.   Pt will continue to benefit from skilled outpatient physical therapy to address the deficits listed in the problem list box on initial evaluation, provide pt/family education and to maximize pt's level of independence in the home and community environment.       Grace Is progressing well towards her goals.   Pt prognosis is Excellent.     Pt will continue to benefit from skilled outpatient physical therapy to address the deficits listed in the problem list box on initial evaluation, provide pt/family education and to maximize pt's level of independence in the home and community environment.     Pt's spiritual, cultural and educational needs considered and pt agreeable to plan of care and goals.     Anticipated barriers to physical therapy: none    Goals:   Short Term Goals: 4 weeks   Patient to demonstrate proper use of urge delay strategies to help reduce urge urinary incontinence with activities of daily living.   Pt to voice understanding of the role that diet plays on urinary urgency.    Pt to be compliant with a voiding schedule of every 3 hours to help decrease urge urinary incontinence with activities of daily living.    Pt will report minimal to no pain with single digit pelvic assessment and display relaxation during this assessment in order to progress to dilator use.           Long Term Goals: 12 weeks ,   Pt to be discharged with home plan for carry over after discharge.    Pt to report an 80% reduction of urinary incontinence symptoms with ADL participation thereby demonstrating improved pelvic floor muscle control and strength.   Pt to demonstrate an improved score in the FOTO Urinary Problem survey  to at least 77 to demonstrate improving pelvic floor muscle function and coordination needed for improved bladder control with ADLs.    Pt to be able to delay the urge to urinate at least 10 minutes with a  strong urge to urinate in order to make it to the bathroom without leaking.  Pt to report no longer feeling the need to urinate just in case when shopping or participating in social activities to demonstrate improving pelvic floor and bladder control.           PLAN      Plan of care Certification: 3/26/2024 to 6-18-24.     Outpatient Physical Therapy 1-2 times weekly for 12 weeks to include the following interventions: therapeutic exercises, therapeutic activity, neuromuscular re-education, gait training, manual therapy, modalities PRN, patient/family education, and self care/home management, and dry needling.     Plan     Continue with established Plan of Care, working toward established PT goals.      At next visit: urge delay strategies     Lisa Gautam, PT

## 2024-04-16 NOTE — PATIENT INSTRUCTIONS
"HOW DIET MAY AFFECT YOUR BLADDER    Although there is no particular "diet" that can cure bladder control, there are certain dietary suggestions you can use to help control the problem.  Many people with bladder control problems decrease their intake of liquids in hope that they will need to urinate less frequently or have less urinary leakage.  While a decrease in liquid intake does result in a decrease in the volume of urine, the smaller amount of urine may be more highly concentrated.  Highly concentrated, dark yellow urine is irritating to the bladder surface and may actually cause you to go to the bathroom more frequently.  It also encourages the growth of bacteria, which may lead to infections resulting in incontinence.  You should not restrict fluids to control your bladder.    Some foods and beverages are thought to contribute to bladder leakage and irritability. However their effect on the bladder is not completely understood and you may want to see if eliminating one or all of these items improves your bladder control.  If you are unable to give them up completely, it is recommended that you use the following items in moderation:    Foods with acidic properties:  Alcoholic beverages  Tomato based products  Vinegar  Coffee (regular and decaf)  Tea (regular and decaf)  Ayala  Citrus fruits and juices  Spicy foods  Caffeinated beverages  Cola  Milk  Food colorings and flavorings  Artificial sweeteners  Chocolate    Cigarette smoking is also irritating to the bladder surface and is associated with bladder cancer.  In addition, the coughing associated with smoking may lead to increased incontinent episodes.          Substitutions for Bladder Irritants  Although water is always the best beverage choice, grape and apple juice are thirst quenchers and are not as irritating to the bladder.    Low acid fruits:  pears, apricots, papaya, watermelon    For coffee drinkers: KAVA®  Postum®  Douglas®  Kaffree [place]®    For tea " drinkers: Non-citrus herbal    Sun brewed tea    Vitamin C substitute:  Calcium carbonate co-buffered with calcium ascorbate    Good Habits  Maintain a good fluid intake. Depending on your body size and environment, drink 4-8 cups (8 oz each) of fluid per day unless otherwise advised by your doctor. Not enough fluid creates a foul odor and dark color of the urine.  Typical dietary recommendations for fiber are between 25-35 grams per day. You should discuss your fiber needs with your physician, pharmacist or nutritionist.

## 2024-04-18 ENCOUNTER — PATIENT MESSAGE (OUTPATIENT)
Dept: PAIN MEDICINE | Facility: CLINIC | Age: 79
End: 2024-04-18
Payer: MEDICARE

## 2024-04-19 PROBLEM — N95.2 VAGINAL ATROPHY: Status: ACTIVE | Noted: 2024-04-19

## 2024-04-19 PROBLEM — N39.41 URGE INCONTINENCE: Status: ACTIVE | Noted: 2024-04-19

## 2024-04-25 ENCOUNTER — CLINICAL SUPPORT (OUTPATIENT)
Dept: REHABILITATION | Facility: HOSPITAL | Age: 79
End: 2024-04-25
Payer: MEDICARE

## 2024-04-25 DIAGNOSIS — M62.89 PELVIC FLOOR DYSFUNCTION: Primary | ICD-10-CM

## 2024-04-25 DIAGNOSIS — R27.8 OTHER LACK OF COORDINATION: ICD-10-CM

## 2024-04-25 DIAGNOSIS — R53.1 WEAKNESS: ICD-10-CM

## 2024-04-25 PROCEDURE — 97112 NEUROMUSCULAR REEDUCATION: CPT

## 2024-04-25 PROCEDURE — 97140 MANUAL THERAPY 1/> REGIONS: CPT

## 2024-04-25 PROCEDURE — 97530 THERAPEUTIC ACTIVITIES: CPT

## 2024-04-25 NOTE — PROGRESS NOTES
[facility] OUTPAT[facility] THERAPY AND [facility]LL[facility]   Physical Therapy Treatment Note      Name: Grace Ware [facility]  Clinic Number: [id number]    Therapy Diagnosis:   Encounter Diagnoses   Name Primary?    Pelvic floor dysfunction Yes    Weakness     Other lack of coordination      Physician: Kurtis Salvador,*    Visit Date: 4/25/2024    Physician Orders: PT Eval and Treat   Medical Diagnosis from Referral: urge urinary incontinence   Evaluation Date: 3/26/2024  Authorization Period Expiration: 12-31-24  Plan of Care Expiration: 6-18-24  Progress Note Due: 4-26-24  Visit # / Visits authorized: 3/ 20    FOTO: 2/3  Date last given: 4/1/2024    Time In: 845  Time Out: 923  Total Billable Time: 38 minutes    Precautions: Standard    Subjective     Pt reports: She is working on increasing interval before needing to urinate to at least 3 hours.  No urinary incontinence reported.  She reports she is feeling more comfortable about her upcoming trip due to improved bladder control.      She was compliant with home exercise program.  Response to previous treatment: no issues reported   Functional change: no changes reported    Pain: 0/10  Location: NA    Constitutional Symptoms Review: The patient denies having any constitutional symptoms.       Objective      Objective Measures updated at progress report unless specified.       Treatment   Grace received the treatments listed below:    Pt verbally consents to intravaginal treatment today.  Signed consent form already on file.  Chaperone declined today.    Grace received the following manual therapy techniques: to develop flexibility and extensibility for 8 minutes including:   [x] trigger point/myofascial release of intravaginal pelvic floor muscle(s) focusing on layer 3 bilaterally   [] Cross friction massage to bilateral inguinal ligaments       Grace participated in neuromuscular re-education activities to develop Coordination, Control, and Down training for 20  minutes including:   [x]pelvic floor relaxation speed after performing a kegel   []pelvic floor relaxation/bulging training ( also practiced sitting on a towel roll)  [x] Kegel edu and practice.  Increased time spent on edu on proper technique.  Pt improves with practice and verbal cues. With intravaginal feedback  [] Posterior pelvic tilt x 10 reps.  Verbal and visual cues needed for technique.    [x] Pelvic floor muscle(s) lengthening and bulging with intravaginal feedback  [] Transabdominal ultrasound for pelvic floor muscle(s) activation and pressure management         Grace participated in dynamic functional therapeutic activities to improve functional performance for 10 minutes, including:  [] Plan of care review  [] Education on visual cues/mental imagery for pelvic floor relaxation   [x] Anatomy review and discussion of the anatomy on current level of function   [] The knack edu with functional activities review   [] Pt edu in the Roll for Control technique to decrease incontinence with strong urge.  Practiced new technique in sitting and standing.    [] Home exercise program issued and reviewed   [x] Reviewed bladder diary   [x] Edu on bladder management while traveling especially by plane     Home Exercises Provided and Patient Education Provided     Education provided: See above  Discussed progression of plan of care with patient; educated pt in activity modification; reviewed HEP with pt. Pt demonstrated and verbalized understanding of all instruction and was provided with a handout of HEP (see Patient Instructions).    Written Home Exercises Provided: yes.  Exercises were reviewed and Grace was able to demonstrate them prior to the end of the session.  Grace demonstrated good  understanding of the education provided.     See EMR under Patient Instructions for exercises provided 3/28/2024.    Assessment     Worked on coordination and control focusing on relaxation speed after contraction as well  as lengthening the pelvic floor muscle(s).   Increased time spent on pelvic floor muscle(s) training and coordination today to ensure patient comfort and correct technique.   Edu on bladder control during air travel and review of anatomy.  Pt will continue to benefit from skilled outpatient physical therapy to address the deficits listed in the problem list box on initial evaluation, provide pt/family education and to maximize pt's level of independence in the home and community environment.       Grace Is progressing well towards her goals.   Pt prognosis is Excellent.     Pt will continue to benefit from skilled outpatient physical therapy to address the deficits listed in the problem list box on initial evaluation, provide pt/family education and to maximize pt's level of independence in the home and community environment.     Pt's spiritual, cultural and educational needs considered and pt agreeable to plan of care and goals.     Anticipated barriers to physical therapy: none    Goals:   Short Term Goals: 4 weeks   Patient to demonstrate proper use of urge delay strategies to help reduce urge urinary incontinence with activities of daily living. Met   Pt to voice understanding of the role that diet plays on urinary urgency.  Met   Pt to be compliant with a voiding schedule of every 3 hours to help decrease urge urinary incontinence with activities of daily living.  Met   Pt will report minimal to no pain with single digit pelvic assessment and display relaxation during this assessment in order to progress to dilator use. Met         Long Term Goals: 12 weeks ,   Pt to be discharged with home plan for carry over after discharge.    Pt to report an 80% reduction of urinary incontinence symptoms with ADL participation thereby demonstrating improved pelvic floor muscle control and strength.   Pt to demonstrate an improved score in the FOTO Urinary Problem survey  to at least 77 to demonstrate improving pelvic  floor muscle function and coordination needed for improved bladder control with ADLs.    Pt to be able to delay the urge to urinate at least 10 minutes with a strong urge to urinate in order to make it to the bathroom without leaking.  Pt to report no longer feeling the need to urinate just in case when shopping or participating in social activities to demonstrate improving pelvic floor and bladder control.           PLAN      Plan of care Certification: 3/26/2024 to 6-18-24.     Outpatient Physical Therapy 1-2 times weekly for 12 weeks to include the following interventions: therapeutic exercises, therapeutic activity, neuromuscular re-education, gait training, manual therapy, modalities PRN, patient/family education, and self care/home management, and dry needling.     Plan     Continue with established Plan of Care, working toward established PT goals.      At next visit: urge delay strategies     Lisa Gautam, PT

## 2024-05-17 ENCOUNTER — PATIENT MESSAGE (OUTPATIENT)
Dept: PRIMARY CARE CLINIC | Facility: CLINIC | Age: 79
End: 2024-05-17
Payer: MEDICARE

## 2024-05-20 ENCOUNTER — CLINICAL SUPPORT (OUTPATIENT)
Dept: REHABILITATION | Facility: HOSPITAL | Age: 79
End: 2024-05-20
Payer: MEDICARE

## 2024-05-20 DIAGNOSIS — M62.89 PELVIC FLOOR DYSFUNCTION: Primary | ICD-10-CM

## 2024-05-20 DIAGNOSIS — R53.1 WEAKNESS: ICD-10-CM

## 2024-05-20 DIAGNOSIS — R27.8 OTHER LACK OF COORDINATION: ICD-10-CM

## 2024-05-20 PROCEDURE — 97530 THERAPEUTIC ACTIVITIES: CPT | Mod: HCNC

## 2024-05-20 PROCEDURE — 97112 NEUROMUSCULAR REEDUCATION: CPT | Mod: HCNC

## 2024-05-20 NOTE — PROGRESS NOTES
"[facility]   Physical Therapy Treatment Note      Name: Grace Ware [facility]  Clinic Number: [id number]    Therapy Diagnosis:   Encounter Diagnoses   Name Primary?    Pelvic floor dysfunction Yes    Weakness     Other lack of coordination      Physician: Kurtis Salvador,*    Visit Date: 5/20/2024    Physician Orders: PT Eval and Treat   Medical Diagnosis from Referral: urge urinary incontinence   Evaluation Date: 3/26/2024  Authorization Period Expiration: 12-31-24  Plan of Care Expiration: 6-18-24  Progress Note Due: 4-26-24  Visit # / Visits authorized: 3/ 20    FOTO: 3/3  Date last given: 5/20/2024    Time In: 1400  Time Out: 1445  Total Billable Time: 45 minutes    Precautions: Standard    Subjective     Pt reports: She just returned from her trip to there daughter's house.  "I feel like I'm doing better.  I went shopping with my daughter and I did not always need a bathroom."          She was compliant with home exercise program.  Response to previous treatment: no issues reported   Functional change: no changes reported    Pain: 0/10  Location: NA    Constitutional Symptoms Review: The patient denies having any constitutional symptoms.       Objective      Objective Measures updated at progress report unless specified.       Treatment   Grace received the treatments listed below:    Pt verbally consents to intravaginal treatment today.  Signed consent form already on file.  Chaperone declined today.    Grace received the following manual therapy techniques: to develop flexibility and extensibility for 00 minutes including:   [] trigger point/myofascial release of intravaginal pelvic floor muscle(s) focusing on layer 3 bilaterally   [] Cross friction massage to bilateral inguinal ligaments       Grace participated in neuromuscular re-education activities to develop Coordination, Control, and Down training for 35 minutes including:   [x]pelvic floor relaxation speed " after performing a kegel   []pelvic floor relaxation/bulging training ( also practiced sitting on a towel roll)  [x] Kegel edu and practice.  Increased time spent on edu on proper technique.  Pt improves with practice and verbal cues. With intravaginal feedback  [x] Posterior pelvic tilt x 10 reps.  Verbal and visual cues needed for technique.    [x] Pelvic floor muscle(s) lengthening and bulging with intravaginal feedback  [] Transabdominal ultrasound for pelvic floor muscle(s) activation and pressure management   [x] Posterior pelvic tilt 5 sec x 20 reps   [x]  Hip adduction with kegel and ball squeeze 5 sec x 20 reps  [x] Pt struggles to isolate TrA initially but improves with practice. Tactile and verbal cues required for correct activation of TrA. Initially pt substitutes with rectus abdominus muscle but with mod verbal and tactile instruction is able to isolate out  lower abdominal muscles.    [x] Transverse abdominus muscle +Kegel 5 sec x 10 reps  [x] Transverse abdominus muscle +Kegel+BKFO/Marching x10 reps.   Cues needed for correct TA activation and coordination with kegel.        Grace participated in dynamic functional therapeutic activities to improve functional performance for 10 minutes, including:  [x] Plan of care review  [] Education on visual cues/mental imagery for pelvic floor relaxation   [x] Anatomy review and discussion of the anatomy on current level of function   [] The david edu with functional activities review   [] Pt edu in the Roll for Control technique to decrease incontinence with strong urge.  Practiced new technique in sitting and standing.    [x] Home exercise program issued and reviewed   [] Reviewed bladder diary   [] Edu on bladder management while traveling especially by plane   [x] Sit to stand with kegelx 10 reps      Home Exercises Provided and Patient Education Provided     Education provided: See above  Discussed progression of plan of care with patient; educated pt in  activity modification; reviewed HEP with pt. Pt demonstrated and verbalized understanding of all instruction and was provided with a handout of HEP (see Patient Instructions).    Written Home Exercises Provided: yes.  Exercises were reviewed and Grace was able to demonstrate them prior to the end of the session.  Grace demonstrated good  understanding of the education provided.     See EMR under Patient Instructions for exercises provided 3/28/2024.    Assessment     Focused on strength and coordination training today.  Patient performs exercises slowly and methodically in order to ensure proper technique thereby requiring increased time to complete.  Pt will continue to benefit from skilled outpatient physical therapy to address the deficits listed in the problem list box on initial evaluation, provide pt/family education and to maximize pt's level of independence in the home and community environment.       Grace Is progressing well towards her goals.   Pt prognosis is Excellent.     Pt will continue to benefit from skilled outpatient physical therapy to address the deficits listed in the problem list box on initial evaluation, provide pt/family education and to maximize pt's level of independence in the home and community environment.     Pt's spiritual, cultural and educational needs considered and pt agreeable to plan of care and goals.     Anticipated barriers to physical therapy: none    Goals:   Short Term Goals: 4 weeks   Patient to demonstrate proper use of urge delay strategies to help reduce urge urinary incontinence with activities of daily living. Met   Pt to voice understanding of the role that diet plays on urinary urgency.  Met   Pt to be compliant with a voiding schedule of every 3 hours to help decrease urge urinary incontinence with activities of daily living.  Met   Pt will report minimal to no pain with single digit pelvic assessment and display relaxation during this assessment in  order to progress to dilator use. Met         Long Term Goals: 12 weeks ,   Pt to be discharged with home plan for carry over after discharge.    Pt to report an 80% reduction of urinary incontinence symptoms with ADL participation thereby demonstrating improved pelvic floor muscle control and strength.   Pt to demonstrate an improved score in the FOTO Urinary Problem survey  to at least 77 to demonstrate improving pelvic floor muscle function and coordination needed for improved bladder control with ADLs.    Pt to be able to delay the urge to urinate at least 10 minutes with a strong urge to urinate in order to make it to the bathroom without leaking. Met   Pt to report no longer feeling the need to urinate just in case when shopping or participating in social activities to demonstrate improving pelvic floor and bladder control. Met         PLAN      Plan of care Certification: 3/26/2024 to 6-18-24.     Outpatient Physical Therapy 1-2 times weekly for 12 weeks to include the following interventions: therapeutic exercises, therapeutic activity, neuromuscular re-education, gait training, manual therapy, modalities PRN, patient/family education, and self care/home management, and dry needling.   Continue with established Plan of Care, working toward established PT goals.      At next visit: urge delay strategies     Lisa Gautam, PT

## 2024-05-20 NOTE — PATIENT INSTRUCTIONS
"Home Exercise Program: 05/20/2024    Abdominal Muscle Training:  Tighten your abdominal muscles without sucking in our pooching your belly.  Tighten and draw in your muscles.  Don't hold your breath!  It actually helps to exhale while your tighten.  "Blow out birthday candles."     Hold 5 seconds.  Repeat 10 times.  2 sets per day.                  "

## 2024-05-27 ENCOUNTER — CLINICAL SUPPORT (OUTPATIENT)
Dept: REHABILITATION | Facility: HOSPITAL | Age: 79
End: 2024-05-27
Payer: MEDICARE

## 2024-05-27 DIAGNOSIS — R53.1 WEAKNESS: ICD-10-CM

## 2024-05-27 DIAGNOSIS — M62.89 PELVIC FLOOR DYSFUNCTION: Primary | ICD-10-CM

## 2024-05-27 DIAGNOSIS — R27.8 OTHER LACK OF COORDINATION: ICD-10-CM

## 2024-05-27 PROCEDURE — 97112 NEUROMUSCULAR REEDUCATION: CPT | Mod: HCNC

## 2024-05-27 PROCEDURE — 97530 THERAPEUTIC ACTIVITIES: CPT | Mod: HCNC

## 2024-05-27 NOTE — PROGRESS NOTES
"[facility]   Physical Therapy Treatment Note      Name: Grace Ware [facility]  Clinic Number: [id number]    Therapy Diagnosis:   Encounter Diagnoses   Name Primary?    Pelvic floor dysfunction Yes    Weakness     Other lack of coordination        Physician: Kurtsi Salvador,*    Visit Date: 5/27/2024    Physician Orders: PT Eval and Treat   Medical Diagnosis from Referral: urge urinary incontinence   Evaluation Date: 3/26/2024  Authorization Period Expiration: 12-31-24  Plan of Care Expiration: 6-18-24  Progress Note Due: 4-26-24  Visit # / Visits authorized: 3/ 20    FOTO: 3/3  Date last given: 5/20/2024    Time In: 1415  Time Out: 1500  Total Billable Time: 45 minutes    Precautions: Standard    Subjective     Pt reports: She just returned from her trip to there daughter's house.  "I feel like I'm doing better.  I went shopping with my daughter and I did not always need a bathroom."          She was compliant with home exercise program.  Response to previous treatment: no issues reported   Functional change: no changes reported    Pain: 0/10  Location: NA    Constitutional Symptoms Review: The patient denies having any constitutional symptoms.       Objective      Objective Measures updated at progress report unless specified.       Treatment   Grace received the treatments listed below:    Pt verbally consents to intravaginal treatment today.  Signed consent form already on file.  Chaperone declined today.    Grace received the following manual therapy techniques: to develop flexibility and extensibility for 00 minutes including:   [] trigger point/myofascial release of intravaginal pelvic floor muscle(s) focusing on layer 3 bilaterally   [] Cross friction massage to bilateral inguinal ligaments       Grace participated in neuromuscular re-education activities to develop Coordination, Control, and Down training for 20 minutes including:   []pelvic floor relaxation speed " after performing a kegel   []pelvic floor relaxation/bulging training ( also practiced sitting on a towel roll)  [] Kegel edu and practice.  Increased time spent on edu on proper technique.  Pt improves with practice and verbal cues. With intravaginal feedback  [x] Posterior pelvic tilt 5 sec x 20 reps.  Verbal and visual cues needed for technique.    [] Pelvic floor muscle(s) lengthening and bulging with intravaginal feedback  [] Transabdominal ultrasound for pelvic floor muscle(s) activation and pressure management   []  Hip adduction with kegel and ball squeeze 5 sec x 20 reps  [] Pt struggles to isolate TrA initially but improves with practice. Tactile and verbal cues required for correct activation of TrA. Initially pt substitutes with rectus abdominus muscle but with mod verbal and tactile instruction is able to isolate out  lower abdominal muscles.    [x] Transverse abdominus muscle +Kegel 5 sec x 10 reps  [x] Transverse abdominus muscle +Kegel+BKFO/Marching x10 reps.   Cues needed for correct TA activation and coordination with kegel.        Grace participated in dynamic functional therapeutic activities to improve functional performance for 25 minutes, including:  [x] Plan of care review  [] Education on visual cues/mental imagery for pelvic floor relaxation   [] Anatomy review and discussion of the anatomy on current level of function   [] The david edu with functional activities review   [x] Pt edu in the Roll for Control technique to decrease incontinence with strong urge.  Practiced new technique in sitting and standing.    [x] Home exercise program issued and reviewed   [] Reviewed bladder diary   [] Edu on bladder management while traveling especially by plane   [x] Sit to stand with kegel x 15 reps  [x] Wall push ups with kegel x 10 reps       Home Exercises Provided and Patient Education Provided     Education provided: See above  Discussed progression of plan of care with patient; educated pt in  activity modification; reviewed HEP with pt. Pt demonstrated and verbalized understanding of all instruction and was provided with a handout of HEP (see Patient Instructions).    Written Home Exercises Provided: yes.  Exercises were reviewed and Grace was able to demonstrate them prior to the end of the session.  Grace demonstrated good  understanding of the education provided.     See EMR under Patient Instructions for exercises provided 3/28/2024.    Assessment    Edu provided on urge delay strategies today.  Focused on strength and coordination training today.  Progressed to include standing functional training activities.  Reviewed home exercise program today.  Patient performs exercises slowly and methodically in order to ensure proper technique thereby requiring increased time to complete.  Pt will continue to benefit from skilled outpatient physical therapy to address the deficits listed in the problem list box on initial evaluation, provide pt/family education and to maximize pt's level of independence in the home and community environment.       Grace Is progressing well towards her goals.   Pt prognosis is Excellent.     Pt will continue to benefit from skilled outpatient physical therapy to address the deficits listed in the problem list box on initial evaluation, provide pt/family education and to maximize pt's level of independence in the home and community environment.     Pt's spiritual, cultural and educational needs considered and pt agreeable to plan of care and goals.     Anticipated barriers to physical therapy: none    Goals:   Short Term Goals: 4 weeks   Patient to demonstrate proper use of urge delay strategies to help reduce urge urinary incontinence with activities of daily living. Met   Pt to voice understanding of the role that diet plays on urinary urgency.  Met   Pt to be compliant with a voiding schedule of every 3 hours to help decrease urge urinary incontinence with  activities of daily living.  Met   Pt will report minimal to no pain with single digit pelvic assessment and display relaxation during this assessment in order to progress to dilator use. Met         Long Term Goals: 12 weeks ,   Pt to be discharged with home plan for carry over after discharge.    Pt to report an 80% reduction of urinary incontinence symptoms with ADL participation thereby demonstrating improved pelvic floor muscle control and strength.   Pt to demonstrate an improved score in the FOTO Urinary Problem survey  to at least 77 to demonstrate improving pelvic floor muscle function and coordination needed for improved bladder control with ADLs.    Pt to be able to delay the urge to urinate at least 10 minutes with a strong urge to urinate in order to make it to the bathroom without leaking. Met   Pt to report no longer feeling the need to urinate just in case when shopping or participating in social activities to demonstrate improving pelvic floor and bladder control. Met         PLAN      Plan of care Certification: 3/26/2024 to 6-18-24.     Outpatient Physical Therapy 1-2 times weekly for 12 weeks to include the following interventions: therapeutic exercises, therapeutic activity, neuromuscular re-education, gait training, manual therapy, modalities PRN, patient/family education, and self care/home management, and dry needling.   Continue with established Plan of Care, working toward established PT goals.      At next visit: urge delay strategies     Lisa Gautam, PT

## 2024-05-27 NOTE — PATIENT INSTRUCTIONS
"Home Exercise Program: 05/27/2024    "Roll for Control"  Strategies to Delay Voiding    What to do when you feel like you "gotta go gotta go!"     Stop what you are doing.    Take a few good deep breaths (this settles down your nervous system and relaxes your bladder).    WHILE YOU CONTINUE DEEP BREATHING, activate your pelvic floor by performing several quick contractions and releases.  (Pelvic floor contractions send a message to the bladder to relax and hold urine.)  Sitting: Roll thigh in and out slowly or squeeze knees together  Standing: Roll thigh in/out slowly and gently    As you do the above, you can also count backwards from 100 (to help get your mind off the urge!).       After the urge to void has quietly, you may be able to process with your activity OR if it has been approximately 3 hours since you've voided, you may choose to go to the bathroom and void.        Remember......"Control your bladder before it controls YOU!"                     Abdominal Muscle Training:  WITH SUHAS                  Functional Training: with rahel                                               " No

## 2024-05-31 ENCOUNTER — LAB VISIT (OUTPATIENT)
Dept: LAB | Facility: HOSPITAL | Age: 79
End: 2024-05-31
Attending: INTERNAL MEDICINE
Payer: MEDICARE

## 2024-05-31 ENCOUNTER — OFFICE VISIT (OUTPATIENT)
Dept: PRIMARY CARE CLINIC | Facility: CLINIC | Age: 79
End: 2024-05-31
Payer: MEDICARE

## 2024-05-31 VITALS
BODY MASS INDEX: 25.33 KG/M2 | HEART RATE: 64 BPM | SYSTOLIC BLOOD PRESSURE: 130 MMHG | OXYGEN SATURATION: 99 % | WEIGHT: 148.38 LBS | TEMPERATURE: 98 F | HEIGHT: 64 IN | DIASTOLIC BLOOD PRESSURE: 82 MMHG

## 2024-05-31 DIAGNOSIS — H91.93 BILATERAL HEARING LOSS, UNSPECIFIED HEARING LOSS TYPE: ICD-10-CM

## 2024-05-31 DIAGNOSIS — M54.50 CHRONIC LOW BACK PAIN WITHOUT SCIATICA, UNSPECIFIED BACK PAIN LATERALITY: Primary | ICD-10-CM

## 2024-05-31 DIAGNOSIS — L98.9 SKIN LESION: ICD-10-CM

## 2024-05-31 DIAGNOSIS — M25.50 ARTHRALGIA, UNSPECIFIED JOINT: ICD-10-CM

## 2024-05-31 DIAGNOSIS — I10 BENIGN ESSENTIAL HYPERTENSION: ICD-10-CM

## 2024-05-31 DIAGNOSIS — E87.6 HYPOKALEMIA: ICD-10-CM

## 2024-05-31 DIAGNOSIS — G89.29 CHRONIC LOW BACK PAIN WITHOUT SCIATICA, UNSPECIFIED BACK PAIN LATERALITY: Primary | ICD-10-CM

## 2024-05-31 DIAGNOSIS — N39.41 URGE INCONTINENCE OF URINE: ICD-10-CM

## 2024-05-31 DIAGNOSIS — Z12.31 ENCOUNTER FOR SCREENING MAMMOGRAM FOR MALIGNANT NEOPLASM OF BREAST: ICD-10-CM

## 2024-05-31 LAB
ALBUMIN SERPL BCP-MCNC: 4.1 G/DL (ref 3.5–5.2)
ALP SERPL-CCNC: 57 U/L (ref 55–135)
ALT SERPL W/O P-5'-P-CCNC: 16 U/L (ref 10–44)
ANION GAP SERPL CALC-SCNC: 10 MMOL/L (ref 8–16)
AST SERPL-CCNC: 21 U/L (ref 10–40)
BILIRUB SERPL-MCNC: 0.5 MG/DL (ref 0.1–1)
BUN SERPL-MCNC: 17 MG/DL (ref 8–23)
CALCIUM SERPL-MCNC: 9.9 MG/DL (ref 8.7–10.5)
CHLORIDE SERPL-SCNC: 102 MMOL/L (ref 95–110)
CO2 SERPL-SCNC: 28 MMOL/L (ref 23–29)
CREAT SERPL-MCNC: 0.7 MG/DL (ref 0.5–1.4)
CRP SERPL-MCNC: 1 MG/L (ref 0–8.2)
ERYTHROCYTE [SEDIMENTATION RATE] IN BLOOD BY PHOTOMETRIC METHOD: 13 MM/HR (ref 0–36)
EST. GFR  (NO RACE VARIABLE): >60 ML/MIN/1.73 M^2
GLUCOSE SERPL-MCNC: 85 MG/DL (ref 70–110)
MAGNESIUM SERPL-MCNC: 2 MG/DL (ref 1.6–2.6)
POTASSIUM SERPL-SCNC: 4.5 MMOL/L (ref 3.5–5.1)
PROT SERPL-MCNC: 7.4 G/DL (ref 6–8.4)
SODIUM SERPL-SCNC: 140 MMOL/L (ref 136–145)

## 2024-05-31 PROCEDURE — 1126F AMNT PAIN NOTED NONE PRSNT: CPT | Mod: HCNC,CPTII,S$GLB, | Performed by: INTERNAL MEDICINE

## 2024-05-31 PROCEDURE — 36415 COLL VENOUS BLD VENIPUNCTURE: CPT | Mod: HCNC,PN | Performed by: INTERNAL MEDICINE

## 2024-05-31 PROCEDURE — 1101F PT FALLS ASSESS-DOCD LE1/YR: CPT | Mod: HCNC,CPTII,S$GLB, | Performed by: INTERNAL MEDICINE

## 2024-05-31 PROCEDURE — 3075F SYST BP GE 130 - 139MM HG: CPT | Mod: HCNC,CPTII,S$GLB, | Performed by: INTERNAL MEDICINE

## 2024-05-31 PROCEDURE — 1159F MED LIST DOCD IN RCRD: CPT | Mod: HCNC,CPTII,S$GLB, | Performed by: INTERNAL MEDICINE

## 2024-05-31 PROCEDURE — 3288F FALL RISK ASSESSMENT DOCD: CPT | Mod: HCNC,CPTII,S$GLB, | Performed by: INTERNAL MEDICINE

## 2024-05-31 PROCEDURE — 99999 PR PBB SHADOW E&M-EST. PATIENT-LVL V: CPT | Mod: PBBFAC,HCNC,, | Performed by: INTERNAL MEDICINE

## 2024-05-31 PROCEDURE — 99214 OFFICE O/P EST MOD 30 MIN: CPT | Mod: HCNC,S$GLB,, | Performed by: INTERNAL MEDICINE

## 2024-05-31 PROCEDURE — 85652 RBC SED RATE AUTOMATED: CPT | Mod: HCNC | Performed by: INTERNAL MEDICINE

## 2024-05-31 PROCEDURE — 86140 C-REACTIVE PROTEIN: CPT | Mod: HCNC | Performed by: INTERNAL MEDICINE

## 2024-05-31 PROCEDURE — 83735 ASSAY OF MAGNESIUM: CPT | Mod: HCNC | Performed by: INTERNAL MEDICINE

## 2024-05-31 PROCEDURE — 3079F DIAST BP 80-89 MM HG: CPT | Mod: HCNC,CPTII,S$GLB, | Performed by: INTERNAL MEDICINE

## 2024-05-31 PROCEDURE — 1160F RVW MEDS BY RX/DR IN RCRD: CPT | Mod: HCNC,CPTII,S$GLB, | Performed by: INTERNAL MEDICINE

## 2024-05-31 PROCEDURE — 80053 COMPREHEN METABOLIC PANEL: CPT | Mod: HCNC | Performed by: INTERNAL MEDICINE

## 2024-05-31 NOTE — PROGRESS NOTES
"Subjective:       Patient ID: Grace Navarro is a 78 y.o. female.    Chief Complaint: Low-back Pain    HPI  Chronic LBP - was in constant pain x 1 mo. Pain is doing well yesterday and today. Went to visit daughter in [place] x 2 weeks and pain was awful. Took ibuprofen.  MRI L SPINE 2/29/24 -   Postoperative change including posterior instrumented fusion L4-L5.  No significant spinal canal stenosis or neural foraminal narrowing throughout the visualized lumbar spine.  Minimal grade 1 anterolisthesis of L4 on L5.  Seen in pain mgmt 4/9/24 w/ Dr. Krueger. Cont PT. B L3-L5 medial branch block to be scheduled.  She's not comfortable getting the procedure yet. Pain is intermittent. Sometimes it's very painful and sometimes not painful.   Walking 2-3 mi per day but slowly.     Follows w/ urogyn for urge incontinence. Referred to pelvic floor PT.  On gemtesa 75mg daily  Reports is now doing so well. Not sure if it's gemtesa or PT. Gemtesa is expensive $100/mo.   Has f/u w/ urogyn.   Had incontinence when she had bacteria in the urine.     Thinks that she's gaining some weight despite the weight lower here - thinks due to winter clothes.   Wants to eat and drink less. Hasn't been able to do this.     Daughter and JUANITA told pt that she's "deaf". Pt reports english is her second language. She doesn't feel lik eit but would want to get it checked. No tinnitus.    Feels like she has arthritis all over her body including her R hand (not so much the L hand). Painful but not swollen. B/c she's been having various pains, hasn't checked her BP b/c she was afraid of it. Yesterday started checking BP again and was in the 130s. On hctz 12.5mg daily and Kdur 10mEq daily. And coreg 12.5mg BID. When her potassium was low, she was having muscle cramps. Improved on the 10mEq daily. Ran out yesterday.  Doesn't really take mobic daily - doesn't feel like it always work. Sometimes takes muscle relaxant and that helps when she has spasms. " "    Some vaginal itching and burning - comes and goes. Using premarin cream 2x/week consistently. No discharge.     Read that as she age, you need more protein. Wonders if she needs to add more protein to diet. Doesn't eat a lot of meat but eats chicken, fish, beans, etc. TP and albumin ok on labs.     Lots of scabs, moles, etc. Hasn't liked the previous dermatologists she's seen.     Review of Systems  as above in HPI.     Objective:      Physical Exam    /82 (BP Location: Left arm, Patient Position: Sitting, BP Method: Medium (Manual))   Pulse 64   Temp 98.1 °F (36.7 °C) (Oral)   Ht 5' 4" (1.626 m)   Wt 67.3 kg (148 lb 5.9 oz)   LMP 06/14/1996   SpO2 99%   BMI 25.47 kg/m²     GEN - A+OX4, NAD   HEENT - PERRL, EOMI, OP clear  Neck - No thyromegaly or cervical LAD. No thyroid masses felt.  CV - RRR, no m/r   Chest - CTAB, no wheezing or rhonchi  Abd - S/NT/ND/+BS.   Ext - 2+BDP and radial pulses. No C/C/E.  Neuro - 5/5 BUE and BLE strength. antalgic gait. Dec B DTRs. Sensation to light touch intact.   MSK - no spinal tenderness to palpation at the lumbosacral region. Nrmal gait.     Previous labs reviewed.     Assessment/Plan     Grace was seen today for low-back pain.    Diagnoses and all orders for this visit:    Chronic low back pain without sciatica, unspecified back pain laterality - f/u w/ pain mgmt.     Urge incontinence of urine - improvement likely from both gemtesa and pelvic floor pt. Wants to see if she can stop gemtesa due to cost. Can try but if symptoms return, then can always restart.     Bilateral hearing loss, unspecified hearing loss type  -     Comprehensive audiogram; Future  -     Ambulatory referral/consult to Audiology; Future  -     Ambulatory referral/consult to ENT; Future    Arthralgia, unspecified joint  -     Sedimentation rate; Future  -     C-Reactive Protein; Future    Hypokalemia  -     Comprehensive Metabolic Panel; Future  -     Magnesium; Future    Benign " essential hypertension  -     Comprehensive Metabolic Panel; Future  -     Magnesium; Future    Skin lesion  -     Ambulatory referral/consult to Dermatology; Future    Encounter for screening mammogram for malignant neoplasm of breast  -     Mammo Digital Screening Bilat w/ [facility]; Future      Follow up in about 3 months (around 8/31/2024).      Lisa Dean MD  Department of Internal Medicine - [facility]  11:50 AM

## 2024-06-03 ENCOUNTER — CLINICAL SUPPORT (OUTPATIENT)
Dept: REHABILITATION | Facility: HOSPITAL | Age: 79
End: 2024-06-03
Payer: MEDICARE

## 2024-06-03 DIAGNOSIS — R53.1 WEAKNESS: ICD-10-CM

## 2024-06-03 DIAGNOSIS — M62.89 PELVIC FLOOR DYSFUNCTION: Primary | ICD-10-CM

## 2024-06-03 DIAGNOSIS — R27.8 OTHER LACK OF COORDINATION: ICD-10-CM

## 2024-06-03 PROCEDURE — 97112 NEUROMUSCULAR REEDUCATION: CPT | Mod: HCNC

## 2024-06-03 PROCEDURE — 97530 THERAPEUTIC ACTIVITIES: CPT | Mod: HCNC

## 2024-06-05 ENCOUNTER — PATIENT MESSAGE (OUTPATIENT)
Dept: PRIMARY CARE CLINIC | Facility: CLINIC | Age: 79
End: 2024-06-05
Payer: MEDICARE

## 2024-06-05 NOTE — TELEPHONE ENCOUNTER
Last K level is 4.5. Pt is asking if she needs to continue K supplements. She is still taking hctz.     Is she due for Lipid panel or CBC ?     Thanks!

## 2024-06-06 ENCOUNTER — TELEPHONE (OUTPATIENT)
Dept: PRIMARY CARE CLINIC | Facility: CLINIC | Age: 79
End: 2024-06-06
Payer: MEDICARE

## 2024-06-06 DIAGNOSIS — E78.5 HYPERLIPIDEMIA, UNSPECIFIED HYPERLIPIDEMIA TYPE: Primary | ICD-10-CM

## 2024-06-20 ENCOUNTER — OFFICE VISIT (OUTPATIENT)
Dept: DERMATOLOGY | Facility: CLINIC | Age: 79
End: 2024-06-20
Payer: MEDICARE

## 2024-06-20 ENCOUNTER — OFFICE VISIT (OUTPATIENT)
Dept: UROGYNECOLOGY | Facility: CLINIC | Age: 79
End: 2024-06-20
Payer: MEDICARE

## 2024-06-20 DIAGNOSIS — B07.9 VERRUCA VULGARIS: Primary | ICD-10-CM

## 2024-06-20 DIAGNOSIS — L57.8 ACTINIC ELASTOSIS: ICD-10-CM

## 2024-06-20 DIAGNOSIS — D22.9 MULTIPLE BENIGN NEVI: ICD-10-CM

## 2024-06-20 DIAGNOSIS — R39.15 URGENCY OF URINATION: Primary | ICD-10-CM

## 2024-06-20 DIAGNOSIS — L81.4 LENTIGINES: ICD-10-CM

## 2024-06-20 DIAGNOSIS — L57.0 ACTINIC KERATOSIS: ICD-10-CM

## 2024-06-20 DIAGNOSIS — N39.41 URGE INCONTINENCE: ICD-10-CM

## 2024-06-20 DIAGNOSIS — D69.2 SENILE PURPURA: ICD-10-CM

## 2024-06-20 DIAGNOSIS — L82.1 SEBORRHEIC KERATOSES: ICD-10-CM

## 2024-06-20 DIAGNOSIS — Z12.83 SKIN EXAM, SCREENING FOR CANCER: ICD-10-CM

## 2024-06-20 DIAGNOSIS — N95.2 VAGINAL ATROPHY: ICD-10-CM

## 2024-06-20 RX ORDER — AMMONIUM LACTATE 12 G/100G
CREAM TOPICAL
Qty: 385 G | Refills: 11 | Status: SHIPPED | OUTPATIENT
Start: 2024-06-20

## 2024-06-20 NOTE — Clinical Note
Caro Styles,  Can you please set her up for an appointment with me in 3 months, and call her and let her know. LD

## 2024-06-20 NOTE — PATIENT INSTRUCTIONS
CRYOSURGERY      Your doctor has used a method called cryosurgery to treat your skin condition. Cryosurgery refers to the use of very cold substances to treat a variety of skin conditions such as warts, precancerous skin lesions, molluscum contagiosum, sun spots, and several benign growths. The substance we use in cryosurgery is liquid nitrogen and is so cold (-195 degrees Celsius) that it burns when administered.     Following treatment in the office, the skin may immediately itch or burn and become red. You may find the area around the lesion is affected as well. It is sometimes necessary to treat not only the lesion, but also a small area of the surrounding normal skin to achieve a good response.     A blister, and even a blood-filled blister, may form after treatment.   This is a normal response. If the blister is painful, it is acceptable to sterilize a needle with rubbing alcohol and gently pop the blister. It is important that you gently wash the area with soap and warm water as the blister fluid may contain wart virus if a wart was treated. Do not remove the roof of the blister.     The area treated can take anywhere from 1-3 weeks to heal. Healing time depends on the kind of skin lesion treated, the location, and how aggressively the lesion was treated. It is recommended that the areas treated are covered with Vaseline and a bandaid to improve healing. If a bandaid is not practical, Vaseline applied several times per day will do. Keeping these areas moist will speed the healing time.    Treatment with liquid nitrogen can leave a scar. In dark skin, it may be a light or dark scar; in light skin it may be a white or pink scar. These will generally fade with time, but may never go away completely.     If you have any concerns after your treatment, please message us via [facility] or call us at [phone].

## 2024-06-20 NOTE — PROGRESS NOTES
"  Patient Information  Name: Grace Navarro  : 1945  MRN: [id number]     Referring Physician:  Dianne   Primary Care Physician:  Lisa Dean MD   Date of Visit: 2024      Subjective:     History of Present lllness:    Grace Navarro is a 78 y.o. female who presents with a chief complaint of moles.  Patient is here today for a "mole" check. Denies any new, changing, or symptomatic lesions on the skin.    She also complains of easy bruising on the forearms. She recently scratched her skin on L forearm which caused bruising.    Clinical documentation obtained by nursing staff reviewed.    Review of Systems    Objective:   Physical Exam   Constitutional: She appears well-developed and well-nourished. No distress.   Neurological: She is alert and oriented to person, place, and time. She is not disoriented.   Psychiatric: She has a normal mood and affect.   Skin:   Areas Examined (abnormalities noted in diagram):   Scalp / Hair Palpated and Inspected  Head / Face Inspection Performed  Neck Inspection Performed  Chest / Axilla Inspection Performed  Abdomen Inspection Performed  Genitals / Buttocks / Groin Inspection Performed  Back Inspection Performed  RUE Inspected  LUE Inspection Performed  RLE Inspected  LLE Inspection Performed  Nails and Digits Inspection Performed                 Diagram Legend     Erythematous scaling macule/papule c/w actinic keratosis       Vascular papule c/w angioma      Pigmented verrucoid papule/plaque c/w seborrheic keratosis      Yellow umbilicated papule c/w sebaceous hyperplasia      Irregularly shaped tan macule c/w lentigo     1-2 mm smooth white papules consistent with Milia      Movable subcutaneous cyst with punctum c/w epidermal inclusion cyst      Subcutaneous movable cyst c/w pilar cyst      Firm pink to brown papule c/w dermatofibroma      Pedunculated fleshy papule(s) c/w skin tag(s)      Evenly pigmented macule c/w junctional nevus     Mildly variegated " pigmented, slightly irregular-bordered macule c/w mildly atypical nevus      Flesh colored to evenly pigmented papule c/w intradermal nevus       Pink pearly papule/plaque c/w basal cell carcinoma      Erythematous hyperkeratotic cursted plaque c/w SCC      Surgical scar with no sign of skin cancer recurrence      Open and closed comedones      Inflammatory papules and pustules      Verrucoid papule consistent consistent with wart     Erythematous eczematous patches and plaques     Dystrophic onycholytic nail with subungual debris c/w onychomycosis     Umbilicated papule    Erythematous-base heme-crusted tan verrucoid plaque consistent with inflamed seborrheic keratosis     Erythematous Silvery Scaling Plaque c/w Psoriasis     See annotation    No images are attached to the encounter or orders placed in the encounter.      [] Data reviewed  [] Prior external notes reviewed  [] Independent review of test  [] Management discussed with another provider  [] Independent historian    Assessment / Plan:        Verruca vulgaris  Cryosurgery procedure note:  Risk, benefits, and alternatives of cryosurgery are discussed with the patient, including but not limited to the risks of hypopigmentation, hyperpigmentation, scar, infection, recurrence of lesion(s), development of new lesion(s), and need for additional treatment of the lesion(s). Verbal consent obtained from patient. Liquid nitrogen cryosurgery applied to 1 lesion(s) to produce a freeze injury. Counseled patient that blisters may form, and instructed patient on wound care with gentle cleansing and use of Vaseline ointment to keep moist until healed. Handout was provided, and patient was instructed to return to clinic in 1-2 months if lesions do not completely resolve.    Actinic keratosis  Cryosurgery procedure note:  Risk, benefits, and alternatives of cryosurgery are discussed with the patient, including but not limited to the risks of hypopigmentation,  "hyperpigmentation, scar, infection, recurrence of lesion(s), development of new lesion(s), and need for additional treatment of the lesion(s). Verbal consent obtained from patient. Liquid nitrogen cryosurgery applied to 2 lesion(s) to produce a freeze injury. Counseled patient that blisters may form, and instructed patient on wound care with gentle cleansing and use of Vaseline ointment to keep moist until healed. Handout was provided, and patient was instructed to return to clinic in 1-2 months if lesions do not completely resolve.    Senile purpura  Actinic elastosis  - chronic problem, not at treatment goal  The thin skin and easy bruising is due to chronic sun damage and age causing weakening of the support around the blood vessels. It can be exacerbated by certain medications as well.   Ammonium lactate cream contains an alpha hydroxy acid which hydrates the skin and, with time it can help regenerate collagen and elastin to strengthen the support around the blood vessels.  Apply the cream to damp skin immediately following your bath or shower. Sometimes, a mild stinging sensation or irritation may be felt which is normal.   Do not use on open wounds or on sunburned, windburned, dry, chapped, or irritated skin.  Everyday use a broad-spectrum, water-resistant sunscreen with SPF of 30 or higher--reapply every 2 hours. Seek shade and wear sun-protective clothing/hat.  If any "bruise" does not resolve within a few weeks as a normal bruise would, RTC for biopsy.  -     ammonium lactate 12 % Crea; Apply to entire body daily after bath or shower.  Dispense: 385 g; Refill: 11    Multiple benign nevi  Multiple benign-appearing nevi present on exam today. Reassurance provided. Counseled patient to periodically examine moles and return to clinic if any changes in size, shape, or color are noted or if it becomes symptomatic (bleeding, itching, pain, etc).  Recommend using a broad-spectrum, water-resistant sunscreen with SPF " of 30 or higher--reapply every 2 hours. Seek shade, wear sun-protective clothing, and perform regular skin self-exams.    Seborrheic keratoses  These are benign, inherited growths without a malignant potential. Reassurance given to patient. No treatment is necessary.    Lentigines  These are benign sun spots which should be monitored for changes. Daily sun protection will reduce the number of new lesions.   Recommend using a broad-spectrum, water-resistant sunscreen with SPF of 30 or higher--reapply every 2 hours. Seek shade, wear sun-protective clothing, and perform regular skin self-exams.    Skin exam, screening for cancer  Total body skin examination performed today as noted in physical exam. No lesions suspicious for malignancy were seen.  Recommend using a broad-spectrum, water-resistant sunscreen with SPF of 30 or higher--reapply every 2 hours. Seek shade, wear sun-protective clothing, and perform regular skin self-exams.         Follow up in about 1 year (around 6/20/2025) for TBSE, or sooner if any new problems or changing lesions.      Beth Barragan MD, FAAD  [facility]

## 2024-06-20 NOTE — PROGRESS NOTES
Subjective:       Patient ID: Grace Navarro is a 78 y.o. female.    Chief Complaint:  No chief complaint on file.  The patient location is: [place]  The chief complaint leading to consultation is: OAB    Visit type: audiovisual    Face to Face time with patient:   15  minutes of total time spent on the encounter, which includes face to face time and non-face to face time preparing to see the patient (eg, review of tests), Obtaining and/or reviewing separately obtained history, Documenting clinical information in the electronic or other health record, Independently interpreting results (not separately reported) and communicating results to the patient/family/caregiver, or Care coordination (not separately reported).         Each patient to whom he or she provides medical services by telemedicine is:  (1) informed of the relationship between the physician and patient and the respective role of any other health care provider with respect to management of the patient; and (2) notified that he or she may decline to receive medical services by telemedicine and may withdraw from such care at any time.    Notes:     See below     History of Present Illness  HPI 78 y.o.  female    has a past medical history of Anxiety, Rene's disease, Chronic low back pain, Depression, Glaucoma, Goiter, nodular, Hyperlipidemia, Hypertension, Irritable bowel, Neuromuscular disorder, Osteopenia of multiple sites (2017), PUD (peptic ulcer disease), Subarachnoid hemorrhage (), and Varicose veins.  Refererd by Dr. Clark for urinary urgency and incontience. Symptoms became problematic when she turned 77. She was found of the have UTI's (E Coli and Klebsiella - pan sensitive ) and was tredated with several rounds of antibiotics. She has continued to have bothersome urinary incontinnence.     2024  Very happy happy with the PFPT  Used the Gemtessa for 3 months does not want to continue since symptoms have much  improved  No leakage since the start of the vaginal irritation   Using the vaginal estrogen     Ohs Peq Pfdi20    Question 3/14/2024  2:22 PM CDT - Filed by Patient   Do you...    Usually experience pressure in the lower abdomen? Symptoms present and they bother me quite a bit   Usually experience heaviness or dullness in the pelvic area? Symptoms present and they bother me moderately   Usually have a bulge or something falling out that you can see or feel in your vaginal area? Symptoms not present   Ever have to push on the vagina or around the rectum to complete a bowel movement? Symptoms not present   Usually experience a feeling of incomplete bladder emptying? Symptoms present and they bother me quite a bit   Ever have to push up on a bulge in the vaginal area with your fingers to start or complete urination? Symptoms not present   Do you...    Feel you need to strain too hard to have a bowel movement? Symptoms not present   Feel you have not completely emptied your bowels at the end of a bowel movement? Symptoms not present   Usually lose stool beyond your control if your stool is well formed? Symptoms not present   Usually lose stool beyond your control if your stool is loose? Symptoms not present   Usually lose gas from your rectum beyond your control? Symptoms not present   Usually have pain when you pass your stool? Symptoms not present   Experience a strong sense of urgency and have to rush to the bathroom to have a bowel movement? Symptoms present and they bother me somewhat   Does part of your bowel ever pass through the rectum and bulge outside during or after a bowel movement? Symptoms present and they bother me somewhat   Do you...    Usually experience frequent urination? Symptoms present and they bother me quite a bit   Usually experience urine leakage associated with a feeling of urgency, that is, a strong sensation of needing to go to the bathroom? Symptoms present and they bother me quite a bit    Usually experience urine leakage related to coughing, sneezing or laughing? Symptoms not present   Usually experience small amounts of urine leakage (that is, drops)? Symptoms not present   Usually experience difficulty emptying your bladder? Symptoms present and they bother me moderately   Usually experience pain or discomfort in the lower abdomen or genital region? Symptoms present and they bother me somewhat   POPDI  (range: 0 - 100) 45.83   CRADI (range: 0 - 100) 12.5   HALLEY (range: 0 - 100) 54.17   TOTAL SCORE  (range: 0 - 300) 112.5     GYN & OB History  Patient's last menstrual period was 1996.   Date of Last Pap: Last Colonoscopy completed on     OB History    Para Term  AB Living   2 2 2     2   SAB IAB Ectopic Multiple Live Births           2      # Outcome Date GA Lbr Kiran/2nd Weight Sex Type Anes PTL Lv   2 Term     M Vag-Spont   BREEZY   1 Term     F Vag-Spont   BREEZY     OB     x 2.  C/s x 0.    Largest: 7 # 12 oz       GYN  Menarche: ?  Menstrual cycle:  Menopause:  50's  Hysterectomy:  No  Ovaries:  present   Tubal ligation: Yes or NO   Other abdominal surgeries: Appy       Past Medical History:   Diagnosis Date    Anxiety     Rene's disease     Chronic low back pain     Depression     Glaucoma     Goiter, nodular     Hyperlipidemia     Hypertension     Irritable bowel     Neuromuscular disorder     Osteopenia of multiple sites 2017    PUD (peptic ulcer disease)     Subarachnoid hemorrhage 2014    Varicose veins      Past Surgical History:   Procedure Laterality Date    APPENDECTOMY      BELPHAROPTOSIS REPAIR Bilateral     DONE OUTSIDE [facility]    BLEPHAROPLASTY, UPPER EYELID Bilateral 2022        BREAST BIOPSY Right     RIGHT-axilla    CATARACT EXTRACTION W/  INTRAOCULAR LENS IMPLANT Left 2016        CATARACT EXTRACTION W/  INTRAOCULAR LENS IMPLANT Right 2016        SPINE SURGERY  2012    Fusion @ L4L5    TARSAL  STRIPPING Right 11/02/2022    Procedure: STRIPPING, TARSAL;  Surgeon: Bina Umana MD;  Location: [facility];  Service: Ophthalmology;  Laterality: Right;    TONSILLECTOMY         Review of Systems  Review of Systems   Constitutional: Negative.  Negative for activity change, appetite change, chills, diaphoresis, fatigue, fever and unexpected weight change.   HENT: Negative.     Eyes: Negative.    Respiratory: Negative.  Negative for apnea, cough and wheezing.    Cardiovascular: Negative.  Negative for chest pain and palpitations.   Gastrointestinal:  Negative for abdominal distention, abdominal pain, anal bleeding, blood in stool, constipation, diarrhea, nausea, rectal pain and vomiting.   Endocrine: Negative.    Genitourinary:  Positive for frequency and urgency. Negative for decreased urine volume, difficulty urinating, dyspareunia, dysuria, enuresis, flank pain, genital sores, hematuria, menstrual problem, pelvic pain, vaginal bleeding, vaginal discharge and vaginal pain.   Musculoskeletal:  Negative for back pain and gait problem.   Skin:  Negative for color change, pallor, rash and wound.   Allergic/Immunologic: Negative for immunocompromised state.   Neurological: Negative.  Negative for dizziness and speech difficulty.   Hematological:  Negative for adenopathy.   Psychiatric/Behavioral:  Negative for agitation, behavioral problems, confusion and sleep disturbance.            Objective:     Physical Exam   Constitutional: She is oriented to person, place, and time. She appears well-developed and well-nourished.   HENT:   Head: Normocephalic and atraumatic.   Pulmonary/Chest: No respiratory distress. She has no wheezes.   Musculoskeletal:         General: Normal range of motion.      Cervical back: Normal range of motion and neck supple.   Neurological: She is alert and oriented to person, place, and time.   Psychiatric: She has a normal mood and affect. Her behavior is normal. Judgment and thought content  normal.          Assessment:        1. Urgency of urination    2. Urge incontinence    3. Vaginal atrophy          Plan:    1. Urinary incontinence, urge   --Bladder diary for 3-7 days, write the number of times you go to the rest room and associated symptoms of urgency or leakage.   --Empty bladder every 3 hours.  Empty well: wait a minute, lean forward on toilet.    --Avoid dietary irritants (see sheet).  Keep diary x 3-5 days to determine your irritants.  --KEGELS: do 10 in AM and 10 in PM, holding each x 10 seconds.  When you feel urge to go, STOP, KEGEL, and when urge has passed, then go to bathroom.  S/p  pelvic floor PT   --URGE: recommend continuing Gemtessa 75 mg nighty- patient states she will stop taking it for now    2. Vaginal narrowing vs early stenosis - recommend to start pelvic floor PT revaluate in 3 months may need vaginal dilation - will schedule     3. Vaginal atrophy (dryness):  Use 1 gram of estrogen cream in vagina twice a week .      Kurtis Salvador DO  Female Pelvic Medicine and Reconstructive Surgery  [facility] [place], [place]

## 2024-06-23 ENCOUNTER — PATIENT MESSAGE (OUTPATIENT)
Dept: ADMINISTRATIVE | Facility: HOSPITAL | Age: 79
End: 2024-06-23
Payer: MEDICARE

## 2024-06-23 NOTE — PROGRESS NOTES
HPI    DLS: 2/26/2024    Pt here for HVF review/OCT;  Pt states no eye pain but the OD keeps tearing.     Meds;  Systane Ultra TID OU  Lid Scrubs QHS OU    1. LTG Suspect  OU   2. Myelinated NFL OD   3. Ptosis Repair OU (Lyndsay Kim)   4. MGD OU   5. H/O Chalazion RLL and LLL       Last edited by Mayra Liriano MD on 6/24/2024  3:52 PM.            Assessment /Plan     For exam results, see Encounter Report.    Open angle with borderline findings and high glaucoma risk in both eyes    Large physiologic cupping of optic disc    Dry eyes    Dermatochalasis of both upper eyelids    Senile ectropion of right lower eyelid    Brow ptosis, left    Pseudophakia of both eyes    H/O chalazion          Last visit in glaucoma clinic 11/18/2019  - has seen jaguar and guillmet since   [facility] / chalazion - 12/4/2023      1.    Glaucoma (type and duration)    Suspect - low tension glaucoma suspect (suspicious ON.s)    folowed by [facility] for years as a suspect    First HVF   11/2019    First photos   3/2016    Treatment / Drops started   none           Family history    ???        Glaucoma meds   None         H/O adverse rxn to glaucoma drops    None         LASERS    None         GLAUCOMA SURGERIES    None        OTHER EYE SURGERIES    none        CDR    0.8 w/myelinated fibers  /0.8        Tbase    12-16        Tmax    16/16         Ttarget   ???            HVF  4  test  2019 to  2024  -  Non sp  od // non sp  Os(( ? If associated with the myelinated  NFL od)         Gonio   +3-4 ou         CCT    569/546        OCT    4  test 2019 to 2024 - RNFL - poor scan od 2/2 myelinated NFL  od // wnl  os        Disc photos     5/19/2022     - Ttoday   14/13  - Test done today    IOP/ HVF / DFE / OCT       2. . Myelinated NFL OD    3. H/o eyelid surgery in 2005   Dr. harris for ptosis / dermatochalasis   Pt is interested in a re-do        4. PC IOL OU    OD - 9/21/2016 - PCB00 21.5 - [facility]   OS - 8/3/2016 - PCB00 22.0 - [facility]     5. S/P  eyelid surgery    [facility] 11/2/2022 - tarsal stripping and blepharoplasty     Plan  Get records from Dr. Khan - signed release  - was monitored with VF's photos ect. / Eventually was told she did NOT have glaucoma and no further testing was done - old VF's ect - may have a defect from the medullated NFL    Try +1.50 readers for [employer] work - her +2.50 are now too strong    Monitor as a suspect - normal tension suspect - very suspicious ON/s   Hold off on starting treatment at this time -     - the VF changes od may ne 2/2 myelinated NFL     2/26/2024   Stye / chalazion - much smaller - almost resolved - nothing to really I&D   Doing ok with using lid wipes bid     Pt C/O tearing od    Rec referral back to dr Umana   Pt prefers to see someone else - upset with [facility] - there was some complications with billing - with regards to the anesthesia   Given annalisa and dr coleman names     Pt is going to try and sell her house and move  [place] - [place] be closer to her daughter - feels she needs to be closer to family as she ages     Today's visit is associated with current and anticipated ongoing medical care related to this patient's single serious/complex condition (glaucoma - suspect - high risk ) Follow up is to be continued indefinitely to monitor the condition.    F/U 4 months with 6 months IOP / gonio

## 2024-06-24 ENCOUNTER — OFFICE VISIT (OUTPATIENT)
Dept: OPHTHALMOLOGY | Facility: CLINIC | Age: 79
End: 2024-06-24
Payer: MEDICARE

## 2024-06-24 ENCOUNTER — CLINICAL SUPPORT (OUTPATIENT)
Dept: OPHTHALMOLOGY | Facility: CLINIC | Age: 79
End: 2024-06-24
Payer: MEDICARE

## 2024-06-24 DIAGNOSIS — H02.831 DERMATOCHALASIS OF BOTH UPPER EYELIDS: ICD-10-CM

## 2024-06-24 DIAGNOSIS — H47.239 LARGE PHYSIOLOGIC CUPPING OF OPTIC DISC: ICD-10-CM

## 2024-06-24 DIAGNOSIS — Z86.69 H/O CHALAZION: ICD-10-CM

## 2024-06-24 DIAGNOSIS — H40.023 OPEN ANGLE WITH BORDERLINE FINDINGS AND HIGH GLAUCOMA RISK IN BOTH EYES: ICD-10-CM

## 2024-06-24 DIAGNOSIS — H02.834 DERMATOCHALASIS OF BOTH UPPER EYELIDS: ICD-10-CM

## 2024-06-24 DIAGNOSIS — H40.023 OPEN ANGLE WITH BORDERLINE FINDINGS AND HIGH GLAUCOMA RISK IN BOTH EYES: Primary | ICD-10-CM

## 2024-06-24 DIAGNOSIS — H02.132 SENILE ECTROPION OF RIGHT LOWER EYELID: ICD-10-CM

## 2024-06-24 DIAGNOSIS — H57.812 BROW PTOSIS, LEFT: ICD-10-CM

## 2024-06-24 DIAGNOSIS — H04.123 DRY EYES: ICD-10-CM

## 2024-06-24 DIAGNOSIS — Z96.1 PSEUDOPHAKIA OF BOTH EYES: ICD-10-CM

## 2024-06-24 PROCEDURE — 99999 PR PBB SHADOW E&M-EST. PATIENT-LVL I: CPT | Mod: PBBFAC,HCNC,, | Performed by: OPHTHALMOLOGY

## 2024-06-24 PROCEDURE — 1160F RVW MEDS BY RX/DR IN RCRD: CPT | Mod: HCNC,CPTII,S$GLB, | Performed by: OPHTHALMOLOGY

## 2024-06-24 PROCEDURE — 1159F MED LIST DOCD IN RCRD: CPT | Mod: HCNC,CPTII,S$GLB, | Performed by: OPHTHALMOLOGY

## 2024-06-24 PROCEDURE — 99214 OFFICE O/P EST MOD 30 MIN: CPT | Mod: HCNC,S$GLB,, | Performed by: OPHTHALMOLOGY

## 2024-07-01 ENCOUNTER — HOSPITAL ENCOUNTER (OUTPATIENT)
Dept: RADIOLOGY | Facility: HOSPITAL | Age: 79
Discharge: HOME | End: 2024-07-01
Attending: INTERNAL MEDICINE
Payer: MEDICARE

## 2024-07-01 DIAGNOSIS — Z12.31 ENCOUNTER FOR SCREENING MAMMOGRAM FOR MALIGNANT NEOPLASM OF BREAST: ICD-10-CM

## 2024-07-01 PROCEDURE — 77067 SCR MAMMO BI INCL CAD: CPT | Mod: TC,HCNC

## 2024-07-03 ENCOUNTER — PATIENT MESSAGE (OUTPATIENT)
Dept: PRIMARY CARE CLINIC | Facility: CLINIC | Age: 79
End: 2024-07-03
Payer: MEDICARE

## 2024-07-09 ENCOUNTER — PATIENT MESSAGE (OUTPATIENT)
Dept: UROGYNECOLOGY | Facility: CLINIC | Age: 79
End: 2024-07-09
Payer: MEDICARE

## 2024-07-12 ENCOUNTER — TELEPHONE (OUTPATIENT)
Dept: PRIMARY CARE CLINIC | Facility: CLINIC | Age: 79
End: 2024-07-12
Payer: MEDICARE

## 2024-07-12 DIAGNOSIS — R30.0 DYSURIA: Primary | ICD-10-CM

## 2024-07-12 NOTE — TELEPHONE ENCOUNTER
"Contacted patient via telephone. Informed patient that for UTI symptoms, we advise to call PCP or go to the nearest Urgent Care to be evaluated. Informed patient will send message to Dr. Salvador so she can follow up. Patient reporting she sent message on Monday but has not heard back. Apologized to patient and informed her this was first message I have received from her. Patient stating "this is disgusting!" Apologized to patient for inconvenience. Patient stating she is in a hurry and hung up phone. Call ended.    "

## 2024-07-12 NOTE — TELEPHONE ENCOUNTER
Had phone staff transfer pt to me, bc I cannot call out. Still cannot connect w/ outside lines.   Patient advised to check portal msg.  Urine labs ordered

## 2024-07-12 NOTE — TELEPHONE ENCOUNTER
----- Message from Nan Dee sent at 7/12/2024 10:55 AM CDT -----  Contact: pt [phone]  Pt called and stated that she would like a call back regarding  had an episode of leakage , very mild because it only  wet my undergarment but I couldn't hold even with my kegel exercises. In the past I had 2 episodes of incontinence due to bacteria in my bladder and that was the reason I was referred to you.   In case you want me to have an urinary test would you , please, send the order to [facility] , in [place]?

## 2024-07-12 NOTE — TELEPHONE ENCOUNTER
----- Message from Radha Castañeda sent at 7/12/2024  4:10 PM CDT -----  Contact: [phone]  Patient is returning a phone call.    Who left a message for the patient: Nurse Perla    Does patient know what this is regarding:  patient not sure    Would you like a call back, or a response through your [facility] portal?:   call    Comments:

## 2024-07-13 ENCOUNTER — LAB VISIT (OUTPATIENT)
Dept: LAB | Facility: HOSPITAL | Age: 79
End: 2024-07-13
Attending: INTERNAL MEDICINE
Payer: MEDICARE

## 2024-07-13 DIAGNOSIS — R30.0 DYSURIA: ICD-10-CM

## 2024-07-13 LAB
BACTERIA #/AREA URNS AUTO: NORMAL /HPF
BILIRUB UR QL STRIP: NEGATIVE
CLARITY UR REFRACT.AUTO: CLEAR
COLOR UR AUTO: COLORLESS
GLUCOSE UR QL STRIP: NEGATIVE
HGB UR QL STRIP: NEGATIVE
KETONES UR QL STRIP: NEGATIVE
LEUKOCYTE ESTERASE UR QL STRIP: NEGATIVE
MICROSCOPIC COMMENT: NORMAL
NITRITE UR QL STRIP: NEGATIVE
PH UR STRIP: 7 [PH] (ref 5–8)
PROT UR QL STRIP: NEGATIVE
SP GR UR STRIP: 1.01 (ref 1–1.03)
URN SPEC COLLECT METH UR: ABNORMAL

## 2024-07-13 PROCEDURE — 81001 URINALYSIS AUTO W/SCOPE: CPT | Mod: HCNC | Performed by: INTERNAL MEDICINE

## 2024-07-13 PROCEDURE — 87086 URINE CULTURE/COLONY COUNT: CPT | Mod: HCNC | Performed by: INTERNAL MEDICINE

## 2024-07-15 ENCOUNTER — PATIENT MESSAGE (OUTPATIENT)
Dept: PRIMARY CARE CLINIC | Facility: CLINIC | Age: 79
End: 2024-07-15
Payer: MEDICARE

## 2024-07-15 DIAGNOSIS — R39.15 URGENCY OF URINATION: ICD-10-CM

## 2024-07-15 LAB — BACTERIA UR CULT: NORMAL

## 2024-07-15 RX ORDER — VIBEGRON 75 MG/1
75 TABLET, FILM COATED ORAL DAILY
Qty: 30 TABLET | Refills: 11 | Status: SHIPPED | OUTPATIENT
Start: 2024-07-15

## 2024-07-23 ENCOUNTER — PATIENT MESSAGE (OUTPATIENT)
Dept: PRIMARY CARE CLINIC | Facility: CLINIC | Age: 79
End: 2024-07-23
Payer: MEDICARE

## 2024-07-23 DIAGNOSIS — F32.A ANXIETY AND DEPRESSION: Primary | ICD-10-CM

## 2024-07-23 DIAGNOSIS — F41.9 ANXIETY AND DEPRESSION: Primary | ICD-10-CM

## 2024-07-30 ENCOUNTER — OFFICE VISIT (OUTPATIENT)
Dept: OTOLARYNGOLOGY | Facility: CLINIC | Age: 79
End: 2024-07-30
Payer: MEDICARE

## 2024-07-30 ENCOUNTER — CLINICAL SUPPORT (OUTPATIENT)
Dept: OTOLARYNGOLOGY | Facility: CLINIC | Age: 79
End: 2024-07-30
Payer: MEDICARE

## 2024-07-30 ENCOUNTER — TELEPHONE (OUTPATIENT)
Dept: PRIMARY CARE CLINIC | Facility: CLINIC | Age: 79
End: 2024-07-30
Payer: MEDICARE

## 2024-07-30 VITALS
DIASTOLIC BLOOD PRESSURE: 79 MMHG | BODY MASS INDEX: 26.3 KG/M2 | SYSTOLIC BLOOD PRESSURE: 141 MMHG | HEART RATE: 62 BPM | WEIGHT: 153.25 LBS

## 2024-07-30 DIAGNOSIS — H91.93 BILATERAL HEARING LOSS, UNSPECIFIED HEARING LOSS TYPE: ICD-10-CM

## 2024-07-30 DIAGNOSIS — H93.13 TINNITUS AURIUM, BILATERAL: Chronic | ICD-10-CM

## 2024-07-30 DIAGNOSIS — H93.19 TINNITUS, UNSPECIFIED LATERALITY: ICD-10-CM

## 2024-07-30 DIAGNOSIS — H90.3 SENSORINEURAL HEARING LOSS (SNHL), BILATERAL: Primary | Chronic | ICD-10-CM

## 2024-07-30 DIAGNOSIS — H93.293 IMPAIRED AUDITORY DISCRIMINATION, BILATERAL: ICD-10-CM

## 2024-07-30 DIAGNOSIS — H90.3 SENSORINEURAL HEARING LOSS, BILATERAL: Primary | ICD-10-CM

## 2024-07-30 PROCEDURE — 92557 COMPREHENSIVE HEARING TEST: CPT | Mod: HCNC,S$GLB,, | Performed by: PHYSICIAN ASSISTANT

## 2024-07-30 PROCEDURE — 1159F MED LIST DOCD IN RCRD: CPT | Mod: HCNC,CPTII,S$GLB, | Performed by: OTOLARYNGOLOGY

## 2024-07-30 PROCEDURE — 99999 PR PBB SHADOW E&M-EST. PATIENT-LVL II: CPT | Mod: PBBFAC,HCNC,, | Performed by: PHYSICIAN ASSISTANT

## 2024-07-30 PROCEDURE — 92567 TYMPANOMETRY: CPT | Mod: HCNC,S$GLB,, | Performed by: PHYSICIAN ASSISTANT

## 2024-07-30 PROCEDURE — 3078F DIAST BP <80 MM HG: CPT | Mod: HCNC,CPTII,S$GLB, | Performed by: OTOLARYNGOLOGY

## 2024-07-30 PROCEDURE — 99999 PR PBB SHADOW E&M-EST. PATIENT-LVL III: CPT | Mod: PBBFAC,HCNC,, | Performed by: OTOLARYNGOLOGY

## 2024-07-30 PROCEDURE — 3077F SYST BP >= 140 MM HG: CPT | Mod: HCNC,CPTII,S$GLB, | Performed by: OTOLARYNGOLOGY

## 2024-07-30 PROCEDURE — 1160F RVW MEDS BY RX/DR IN RCRD: CPT | Mod: HCNC,CPTII,S$GLB, | Performed by: OTOLARYNGOLOGY

## 2024-07-30 PROCEDURE — 99204 OFFICE O/P NEW MOD 45 MIN: CPT | Mod: HCNC,S$GLB,, | Performed by: OTOLARYNGOLOGY

## 2024-07-30 PROCEDURE — 1126F AMNT PAIN NOTED NONE PRSNT: CPT | Mod: HCNC,CPTII,S$GLB, | Performed by: OTOLARYNGOLOGY

## 2024-07-30 NOTE — TELEPHONE ENCOUNTER
LCSW received referral from Dr. Dean.  LCSW called patient and left voicemail requesting a return call to discuss referral.

## 2024-07-30 NOTE — PROGRESS NOTES
Grace Navarro, a 78 y.o. female, was seen today in the clinic for an audiologic evaluation.  Patients main complaint was bilateral hearing loss that has been progressing over the years.  Ms. Flores admits to tinnitus but denies ear pain or ear drainage. She has never worn hearing aids and reports she does not have significant difficulty understanding friends and family.     Audiogram results revealed a bilateral mild to profound sensorineural hearing loss.  Speech reception thresholds were noted at 30 dB in the right ear and 30 dB in the left ear.  Speech discrimination scores were 56% in the right ear and 64% in the left ear.  Tympanometry revealed Type A in the right ear and Type A in the left ear. The results of the audiogram and benefits of amplification were discussed.    Recommendations:  Otologic evaluation  Annual audiogram  Hearing protection when in noise  Hearing aid consultation

## 2024-07-30 NOTE — PROGRESS NOTES
Chief Complaint   Patient presents with    Hearing Loss     Following up on hearing    .     HPI:  Grace Navarro is a very pleasant 78 y.o. female here to see me today for the first time for evaluation of hearing loss.  She reports hearing loss that has been gradually progressing over the last several  years.  She has not noted any difference in hearing between the ears, with both ears being the worse hearing ear.  She has noted  tinnitus in both ears.She describes it as a bell type sound.   She has not had any recent issues with ear pain or ear drainage.  She and has not had any previous otologic surgery.  She denies any history of significant loud noise exposure.        Past Medical History:   Diagnosis Date    Anxiety     Rene's disease     Chronic low back pain     Depression     Glaucoma     Goiter, nodular     Hyperlipidemia     Hypertension     Irritable bowel     Neuromuscular disorder     Osteopenia of multiple sites 05/29/2017    PUD (peptic ulcer disease)     Subarachnoid hemorrhage 2014    Varicose veins      Social History     Socioeconomic History    Marital status: Single   Occupational History     Employer: retired   Tobacco Use    Smoking status: Former     Current packs/day: 1.00     Average packs/day: 1 pack/day for 10.0 years (10.0 ttl pk-yrs)     Types: Cigarettes    Smokeless tobacco: Never    Tobacco comments:     quit 1975   Substance and Sexual Activity    Alcohol use: Yes     Alcohol/week: 14.0 standard drinks of alcohol     Types: 14 Glasses of wine per week     Comment: socially    Drug use: No    Sexual activity: Not Currently     Partners: Male     Comment:    Social History Narrative    . 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     Social Determinants of Health     Financial Resource Strain: Patient Declined (7/12/2023)    Overall Financial Resource Strain (CARDIA)     Difficulty of Paying Living Expenses: Patient  declined   Food Insecurity: Patient Declined (7/12/2023)    Hunger Vital Sign     Worried About Running Out of Food in the Last Year: Patient declined     Ran Out of Food in the Last Year: Patient declined   Transportation Needs: Patient Declined (7/12/2023)    PRAPARE - Transportation     Lack of Transportation (Medical): Patient declined     Lack of Transportation (Non-Medical): Patient declined   Physical Activity: Unknown (7/12/2023)    Exercise Vital Sign     Days of Exercise per Week: 7 days   Stress: Stress Concern Present (7/12/2023)    [employer] [place] [employer] - Occupational Stress Questionnaire     Feeling of Stress : To some extent   Housing Stability: Unknown (7/12/2023)    Housing Stability Vital Sign     Unable to Pay for Housing in the Last Year: Patient refused     Unstable Housing in the Last Year: Patient refused     Past Surgical History:   Procedure Laterality Date    APPENDECTOMY      BELPHAROPTOSIS REPAIR Bilateral 2005    DONE OUTSIDE [facility]    BLEPHAROPLASTY, UPPER EYELID Bilateral 11/02/2022        BREAST BIOPSY Right     RIGHT-axilla    CATARACT EXTRACTION W/  INTRAOCULAR LENS IMPLANT Left 08/03/2016        CATARACT EXTRACTION W/  INTRAOCULAR LENS IMPLANT Right 09/21/2016        SPINE SURGERY  07/2012    Fusion @ L4L5    TARSAL STRIPPING Right 11/02/2022    Procedure: STRIPPING, TARSAL;  Surgeon: Bina Umana MD;  Location: [facility];  Service: Ophthalmology;  Laterality: Right;    TONSILLECTOMY       Family History   Problem Relation Name Age of Onset    Hypertension Mother      Kidney failure Mother      Hypertension Father      Coronary artery disease Father      Prostate cancer Father      Stroke Father      Eczema Son      Diabetes Brother      Thyroid cancer Neg Hx      Breast cancer Neg Hx      Colon cancer Neg Hx      Ovarian cancer Neg Hx      Amblyopia Neg Hx      Blindness Neg Hx      Glaucoma Neg Hx      Macular degeneration Neg Hx       Retinal detachment Neg Hx      Strabismus Neg Hx           Review of Systems  General: negative for chills, fever or weight loss  Psychological: negative for mood changes or depression  Ophthalmic: negative for blurry vision, photophobia or eye pain  ENT: see HPI  Respiratory: no cough, shortness of breath, or wheezing  Cardiovascular: no chest pain or dyspnea on exertion  Gastrointestinal: no abdominal pain, change in bowel habits, or black/ bloody stools  Musculoskeletal: negative for gait disturbance or muscular weakness  Neurological: no syncope or seizures; no ataxia  Dermatological: negative for puritis,  rash and jaundice  Hematologic/lymphatic: no easy bruising, no new lumps or bumps      Physical Exam:    Vitals:    07/30/24 0920   BP: (!) 141/79   Pulse: 62       Constitutional: Well appearing / communicating without difficutly.  NAD.  Eyes: EOM I Bilaterally  Head/Face: Normocephalic.  Negative paranasal sinus pressure/tenderness.  Salivary glands WNL.  House Brackmann I Bilaterally.    Right Ear: Auricle normal appearance. External Auditory Canal within normal limits no lesions or masses,TM w/o masses/lesions/perforations. TM mobility noted.   Left Ear: Auricle normal appearance. External Auditory Canal within normal limits no lesions or masses,TM w/o masses/lesions/perforations. TM mobility noted.  Rinne Air conduction >bone conduction bilaterally, Ross midline.   Nose: No gross nasal septal deviation. Inferior Turbinates 3+ bilaterally. No septal perforation. No masses/lesions. External nasal skin appears normal without masses/lesions.  Oral Cavity: Gingiva/lips within normal limits.  Dentition/gingiva healthy appearing. Mucus membranes moist. Floor of mouth soft, no masses palpated. Oral Tongue mobile. Hard Palate appears normal.    Oropharynx: Base of tongue appears normal. No masses/lesions noted. Tonsillar fossa/pharyngeal wall without lesions. Posterior oropharynx WNL.  Soft palate without  masses. Midline uvula.   Neck/Lymphatic: No LAD I-VI bilaterally.  No thyromegaly.  No masses noted on exam.      Diagnostic studies:  Audiogram interpreted personally by me and discussed in detail with the patient today.       Assessment:    ICD-10-CM ICD-9-CM    1. Sensorineural hearing loss (SNHL), bilateral  H90.3 389.18       2. Tinnitus aurium, bilateral  H93.13 388.31         The primary encounter diagnosis was Sensorineural hearing loss (SNHL), bilateral. A diagnosis of Tinnitus aurium, bilateral was also pertinent to this visit.      Plan:  No orders of the defined types were placed in this encounter.          We reviewed the patient's recent audiogram and hearing loss in detail.  We also discussed that she is a good candidate for hearing aids, if and when she the patient is motivated.    We also discussed the use hearing protection when exposed to loud noise, including lawn equipment. Recommend audiogram in 1 year.     Continue Flonase 2 sprays per nostril BID    Thank you kindly for allowing me to participate in the patient's care.       Karissa Agosto MD

## 2024-08-05 ENCOUNTER — LAB VISIT (OUTPATIENT)
Dept: LAB | Facility: HOSPITAL | Age: 79
End: 2024-08-05
Attending: INTERNAL MEDICINE
Payer: MEDICARE

## 2024-08-05 ENCOUNTER — PATIENT MESSAGE (OUTPATIENT)
Dept: UROGYNECOLOGY | Facility: CLINIC | Age: 79
End: 2024-08-05
Payer: MEDICARE

## 2024-08-05 ENCOUNTER — OFFICE VISIT (OUTPATIENT)
Dept: PRIMARY CARE CLINIC | Facility: CLINIC | Age: 79
End: 2024-08-05
Payer: MEDICARE

## 2024-08-05 ENCOUNTER — PATIENT MESSAGE (OUTPATIENT)
Dept: PRIMARY CARE CLINIC | Facility: CLINIC | Age: 79
End: 2024-08-05
Payer: MEDICARE

## 2024-08-05 VITALS
TEMPERATURE: 98 F | DIASTOLIC BLOOD PRESSURE: 72 MMHG | SYSTOLIC BLOOD PRESSURE: 132 MMHG | HEART RATE: 72 BPM | OXYGEN SATURATION: 96 % | BODY MASS INDEX: 26.11 KG/M2 | WEIGHT: 152.13 LBS

## 2024-08-05 DIAGNOSIS — I10 BENIGN ESSENTIAL HYPERTENSION: ICD-10-CM

## 2024-08-05 DIAGNOSIS — E78.49 OTHER HYPERLIPIDEMIA: ICD-10-CM

## 2024-08-05 DIAGNOSIS — R20.2 PARESTHESIA: Primary | ICD-10-CM

## 2024-08-05 DIAGNOSIS — N32.81 OAB (OVERACTIVE BLADDER): ICD-10-CM

## 2024-08-05 DIAGNOSIS — M47.816 LUMBAR FACET ARTHROPATHY: ICD-10-CM

## 2024-08-05 LAB
ALBUMIN SERPL BCP-MCNC: 4.1 G/DL (ref 3.5–5.2)
ALP SERPL-CCNC: 64 U/L (ref 55–135)
ALT SERPL W/O P-5'-P-CCNC: 17 U/L (ref 10–44)
ANION GAP SERPL CALC-SCNC: 8 MMOL/L (ref 8–16)
AST SERPL-CCNC: 21 U/L (ref 10–40)
BILIRUB SERPL-MCNC: 0.3 MG/DL (ref 0.1–1)
BUN SERPL-MCNC: 13 MG/DL (ref 8–23)
CALCIUM SERPL-MCNC: 9.6 MG/DL (ref 8.7–10.5)
CHLORIDE SERPL-SCNC: 102 MMOL/L (ref 95–110)
CHOLEST SERPL-MCNC: 194 MG/DL (ref 120–199)
CHOLEST/HDLC SERPL: 3 {RATIO} (ref 2–5)
CO2 SERPL-SCNC: 28 MMOL/L (ref 23–29)
CREAT SERPL-MCNC: 0.7 MG/DL (ref 0.5–1.4)
EST. GFR  (NO RACE VARIABLE): >60 ML/MIN/1.73 M^2
GLUCOSE SERPL-MCNC: 115 MG/DL (ref 70–110)
HDLC SERPL-MCNC: 65 MG/DL (ref 40–75)
HDLC SERPL: 33.5 % (ref 20–50)
LDLC SERPL CALC-MCNC: 113.4 MG/DL (ref 63–159)
NONHDLC SERPL-MCNC: 129 MG/DL
POTASSIUM SERPL-SCNC: 3.8 MMOL/L (ref 3.5–5.1)
PROT SERPL-MCNC: 7.2 G/DL (ref 6–8.4)
SODIUM SERPL-SCNC: 138 MMOL/L (ref 136–145)
TRIGL SERPL-MCNC: 78 MG/DL (ref 30–150)

## 2024-08-05 PROCEDURE — 80053 COMPREHEN METABOLIC PANEL: CPT | Mod: HCNC | Performed by: INTERNAL MEDICINE

## 2024-08-05 PROCEDURE — 80061 LIPID PANEL: CPT | Mod: HCNC | Performed by: INTERNAL MEDICINE

## 2024-08-05 PROCEDURE — 99999 PR PBB SHADOW E&M-EST. PATIENT-LVL III: CPT | Mod: PBBFAC,HCNC,, | Performed by: INTERNAL MEDICINE

## 2024-08-05 PROCEDURE — 36415 COLL VENOUS BLD VENIPUNCTURE: CPT | Mod: HCNC,PO | Performed by: INTERNAL MEDICINE

## 2024-08-06 ENCOUNTER — TELEPHONE (OUTPATIENT)
Dept: NEUROLOGY | Facility: CLINIC | Age: 79
End: 2024-08-06
Payer: MEDICARE

## 2024-08-07 ENCOUNTER — PATIENT MESSAGE (OUTPATIENT)
Dept: PRIMARY CARE CLINIC | Facility: CLINIC | Age: 79
End: 2024-08-07
Payer: MEDICARE

## 2024-08-09 ENCOUNTER — CLINICAL SUPPORT (OUTPATIENT)
Dept: PRIMARY CARE CLINIC | Facility: CLINIC | Age: 79
End: 2024-08-09
Payer: MEDICARE

## 2024-08-09 DIAGNOSIS — F32.A ANXIETY AND DEPRESSION: ICD-10-CM

## 2024-08-09 DIAGNOSIS — F41.9 ANXIETY AND DEPRESSION: ICD-10-CM

## 2024-08-09 DIAGNOSIS — Z65.8 INTERPERSONAL PROBLEM: Primary | ICD-10-CM

## 2024-08-09 PROCEDURE — 99999 PR PBB SHADOW E&M-EST. PATIENT-LVL I: CPT | Mod: PBBFAC,HCNC,,

## 2024-08-16 ENCOUNTER — PATIENT MESSAGE (OUTPATIENT)
Dept: PRIMARY CARE CLINIC | Facility: CLINIC | Age: 79
End: 2024-08-16
Payer: MEDICARE

## 2024-08-21 ENCOUNTER — CLINICAL SUPPORT (OUTPATIENT)
Dept: PRIMARY CARE CLINIC | Facility: CLINIC | Age: 79
End: 2024-08-21
Payer: MEDICARE

## 2024-08-21 DIAGNOSIS — F41.9 ANXIETY AND DEPRESSION: Primary | ICD-10-CM

## 2024-08-21 DIAGNOSIS — F32.A ANXIETY AND DEPRESSION: Primary | ICD-10-CM

## 2024-08-21 PROCEDURE — 99499 UNLISTED E&M SERVICE: CPT | Mod: HCNC,S$GLB,,

## 2024-08-21 NOTE — PROGRESS NOTES
"DATE:  8/21/2024  REFERRAL SOURCE:  Lisa Dean MD  TYPE OF VISIT:  In person  SITE:  [facility]  LENGTH OF SESSION: 50  .  HISTORY OF PRESENTING ILLNESS:  Grace Navarro, a 78 y.o. 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    CHIEF COMPLAINT/REASON FOR ENCOUNTER: Met with patient for individual psychotherapy. Pt's chief complaint includes the following: depression, anxiety, and interpersonal.QUINCY mike 8/21/24: Patient has lived alone for 22 years and considers herself independent.  She moved [place] [facility] to [place], [place] in [place].  Her dtr lives in [place] with her  and wants patient to sell her home to move near them.  She visits her dtr in May for Mother's day for 2 weeks and in December for 3 weeks.  Her dtr &  bought a large property 3 yrs ago with the plan of building a separate apt for patient.  This has not happened.  If patient sells her home, she would need to rent a place in [place] which she feels is not a good financial move.  She does think that she wants to be near her dtr as he is aging since she has no family here.  She has very good friends she talks to on the phone who live out of the country.  She has 2 friends here and 1 has assisted when needed in the past.  She goes to [facility] daily to walk 3.5 mi during the summer. Other [place]. Before the pandemic she was dancing 2x/week and has tried a few classes at the [facility]. She enjoys cooking and does so nightly.  She feels "at peace" and "doesn't feel lonely."  She has started listening to political podcasts at the gym and is enjoying this.  Her daughter tends to tell patient what she should or shouldn't do.  She sometimes appreciates "friendly advice" but thinks she is capable of making her own decisions at this time. The majority of this session was spent building rapport and exploring " "current concerns over aging in place vs moving to be near dtr.      INTERVAL HISTORY AND CONTENT OF CURRENT SESSION:   Patient verbalized being angry about a situation with a friend/acquaintance that she would like to discuss today. This person gave out patient's name and number to a friend and offered for patient to provide financial advice without asking patient.  Patient received a text from her friend informing her of the same and asking if it was OK after the fact.  Patient replied "NO!!!" Patient is very concerned about liability issues and her friend making such promises without patient's permission.  She later had a conversation about this with her friend but was very upset about the boundary issues.  LCSW and patient discussed the importance of setting healthy boundaries for self-care and normalized patient's feelings of frustration and anger when her friend violated these boundaries.  Patient went to the gym for a workout after this and also because she had been dealing with home repair issues that her feeling overwhelmed and anxious. She demonstrated insight into the physical exercise helping with the stress/anxiety and giving her a better perspective on problem-solving after her workout when she felt calmer.  LCSW reinforced patient on "behavioral activation" for her success in scheduling an activity that had a high likelihood of pleasure.     TREATMENT PLAN/GOALS:  Target symptoms: depression, anxiety , adjustment  Why chosen therapy is appropriate versus another modality: relevant to diagnosis  Outcome monitoring methods: self-report, observation, checklist/rating scale  Therapeutic intervention type: insight oriented psychotherapy, supportive psychotherapy    RISK ASSESSMENT:  Patient reports no suicidal ideation  Patient reports no homicidal ideation  Patient reports no self-injurious behavior  Patient reports no violent behavior    MENTAL HEALTH STATUS EXAM  General Appearance:  age appropriate, " well dressed, neatly groomed   Speech: normal tone, normal rate, normal pitch, normal volume      Level of Cooperation: cooperative      Thought Processes: normal and logical   Mood: steady      Thought Content: normal, no suicidality, no homicidality, delusions, or paranoia   Affect: congruent and appropriate   Orientation: Oriented x3   Memory: intact for content of interview   Attention Span & Concentration: able to focus   Fund of General Knowledge: intact and appropriate to age and level of education   Abstract Reasoning: Not formally tested   Judgment & Insight: good     Language  intact     PATIENT'S RESPONSE TO INTERVENTION:  The patient's response to intervention is accepting, motivated.    PROGRESS TOWARD GOALS AND OTHER MENTAL STATUS CHANGES:  The patient's progress toward goals is fair .    DIAGNOSIS:   Anxiety and Depression - adjustment    PLAN: Pt plans to continue individual psychotherapy.  Next session will be focused on goal setting.  Problem-solving Therapy will be utilized in future individual therapy sessions to increase insight and support.     RETURN TO CLINIC: 2 weeks, 9/4/24

## 2024-08-22 ENCOUNTER — TELEPHONE (OUTPATIENT)
Dept: PRIMARY CARE CLINIC | Facility: CLINIC | Age: 79
End: 2024-08-22
Payer: MEDICARE

## 2024-08-22 NOTE — TELEPHONE ENCOUNTER
----- Message from Priya Marybethscott Granados sent at 8/22/2024 12:58 PM CDT -----  Contact: [phone]  [email or web address]     Patient is calling for Medical Advice regarding:pt is calling her blood pressure kind of high and wants to speak with someone in the office.  Please call her back and advise.    How long has patient had these symptoms:    Pharmacy name and phone#:    Patient wants a call back or thru [facility]:callback    Comments:    Please advise patient replies from provider may take up to 48 hours.

## 2024-08-23 NOTE — TELEPHONE ENCOUNTER
Spoke w/ pt, pt was just waking up for the day.   pt stated that her blood pressure went up, ( did not have exact numbers)  but had gone back down to 130's / 80's by the afternoon.   Stated that a good friend is hospitalized with cva, and she has been stressed. Understands that she cannot do anything about it. Did go to visit friend.   Has PCP Appt on 8/30, and has some things she wants to discuss, did not go into detail.  Pt is ok with keeping appt with Lisa on 9/4.

## 2024-08-30 ENCOUNTER — OFFICE VISIT (OUTPATIENT)
Dept: PRIMARY CARE CLINIC | Facility: CLINIC | Age: 79
End: 2024-08-30
Payer: MEDICARE

## 2024-08-30 ENCOUNTER — HOSPITAL ENCOUNTER (OUTPATIENT)
Dept: RADIOLOGY | Facility: HOSPITAL | Age: 79
Discharge: HOME | End: 2024-08-30
Attending: INTERNAL MEDICINE
Payer: MEDICARE

## 2024-08-30 VITALS
BODY MASS INDEX: 26.28 KG/M2 | SYSTOLIC BLOOD PRESSURE: 150 MMHG | DIASTOLIC BLOOD PRESSURE: 80 MMHG | WEIGHT: 153.13 LBS | OXYGEN SATURATION: 97 % | TEMPERATURE: 98 F | HEART RATE: 65 BPM

## 2024-08-30 DIAGNOSIS — F41.1 GAD (GENERALIZED ANXIETY DISORDER): ICD-10-CM

## 2024-08-30 DIAGNOSIS — R60.0 LOWER EXTREMITY EDEMA: ICD-10-CM

## 2024-08-30 DIAGNOSIS — R68.81 EARLY SATIETY: ICD-10-CM

## 2024-08-30 DIAGNOSIS — F33.0 MILD EPISODE OF RECURRENT MAJOR DEPRESSIVE DISORDER: ICD-10-CM

## 2024-08-30 DIAGNOSIS — I10 BENIGN ESSENTIAL HYPERTENSION: Primary | ICD-10-CM

## 2024-08-30 DIAGNOSIS — R14.0 BLOATING: ICD-10-CM

## 2024-08-30 PROCEDURE — 93970 EXTREMITY STUDY: CPT | Mod: 26,,, | Performed by: RADIOLOGY

## 2024-08-30 PROCEDURE — 99999 PR PBB SHADOW E&M-EST. PATIENT-LVL IV: CPT | Mod: PBBFAC,HCNC,, | Performed by: INTERNAL MEDICINE

## 2024-08-30 PROCEDURE — 93970 EXTREMITY STUDY: CPT | Mod: TC

## 2024-08-30 RX ORDER — CARVEDILOL 12.5 MG/1
TABLET ORAL
Qty: 90 TABLET | Refills: 11 | Status: SHIPPED | OUTPATIENT
Start: 2024-08-30

## 2024-08-30 RX ORDER — HYDRALAZINE HYDROCHLORIDE 25 MG/1
TABLET, FILM COATED ORAL
Qty: 90 TABLET | Refills: 1 | Status: SHIPPED | OUTPATIENT
Start: 2024-08-30

## 2024-08-30 RX ORDER — ESCITALOPRAM OXALATE 5 MG/1
10 TABLET ORAL NIGHTLY
Qty: 90 TABLET | Refills: 3 | Status: SHIPPED | OUTPATIENT
Start: 2024-08-30

## 2024-08-30 NOTE — PROGRESS NOTES
Subjective:       Patient ID: Grace Navarro is a 78 y.o. female.    Chief Complaint: HTN     HPI  HTN - hctz 12.5mg daily, coreg 12.5mg qam and 2 pills nightly.   Diuretic induced hypoK - Kdur 10mEq daily.   Checks BP regularly - BP has been high.   Has 2 different BP cuffs at home but none of them are validated.   Eating less salt.   Has a lot of issues at home - just got notification that one of her friends in [facility] is in ICU. She reports also had A/C issues.   Has been seeing Lisa Choi LCSW.   On lexapro 5mg nightly for anxiety/depression.     Reports swollen ankles at least for the last week. Doesn't hurt.   No CP/palpitations/SOB.   When her BP was high in the 170s, she had HA (this was when she found out about her friend in the ICU). No dizziness/numbness/tingling/weakness.   Has been elevating legs.     Bloating when she's eating/after eating. No abdominal pain. No nausea/vomiting. No diarrhea/constipation. Reports weight gain over time though is stable in the last yr. Not dependent on what kind of food.   No fevers/chills/night sweats. No blood in the stool or urine.     Taking gemtesa for urination. Reports it makes her urinate less and she's worried that her leg swelling is due to retaining urine. Discussed Cr normal.     Review of Systems  Comprehensive review of systems otherwise negative. See history/subjective section for more details.    Objective:      Physical Exam    BP (!) 150/80 (BP Location: Left arm, Patient Position: Sitting, BP Method: Medium (Manual))   Pulse 65   Temp 97.6 °F (36.4 °C) (Oral)   Wt 69.4 kg (153 lb 1.8 oz)   LMP 06/14/1996   SpO2 97%   BMI 26.28 kg/m²     GEN - A+OX4, NAD   HEENT - PERRL, EOMI, OP clear. MMM.   Neck - No thyromegaly or cervical LAD. No thyroid masses felt.  CV - RRR, no m/r   Chest - CTAB, no wheezing or rhonchi  Abd - S/ND/+BS. Very mild epigastric pain on palpation. No rebound or guarding.   Ext - 2+BDP and radial pulses. No C/C/E.  Neuro  - 5/5 BUE and BLE strength.  LN - No axillary or inguinal LAD appreciated.  Skin - No rash.    Previous labs reviewed.     Assessment/Plan     Grace was seen today for  f/u blood.    Diagnoses and all orders for this visit:    Benign essential hypertension  -     hydrALAZINE (APRESOLINE) 25 MG tablet; Take every 8 hours as needed for blood pressure >160/90.  -     carvediloL (COREG) 12.5 MG tablet; TAKE 2 TABLETS BY MOUTH IN THE MORNING AND 2 TABS IN THE EVENING    Mild episode of recurrent major depressive disorder  -     EScitalopram oxalate (LEXAPRO) 5 MG Tab; Take 2 tablets (10 mg total) by mouth nightly.    YOCASTA (generalized anxiety disorder)  -     EScitalopram oxalate (LEXAPRO) 5 MG Tab; Take 2 tablets (10 mg total) by mouth nightly.    Lower extremity edema  -     US Lower Extremity Veins Bilateral Insufficiency; Future  -     B-TYPE NATRIURETIC PEPTIDE; Future    Early satiety  -     LIPASE; Future  -     H. PYLORI ANTIBODY, IGG; Future    Bloating  -     H. PYLORI ANTIBODY, IGG; Future    Take another carvedilol 12.5mg today.  Increase carvedilol 12.5mg from 1 pill in the morning and 2 pills in the afternoon to 2 pills in the morning and 2 pills in the afternoon.   Start checking your blood pressure twice a day and write it down.   Follow up with a nurse's visit on Thursday.   I'll send in hydralazine 25mg every 8 hours as needed for blood pressures >160/90.    Start taking probiotics daily to see if it helps with the bloating.     Schedule leg ultrasound.     Lab to check pancreas and also BNP to screen for heart failure.     Inc lexapro from 5mg qhs to 10mg qhs. Cont to f/u w/ Riri.     Follow up in about 4 weeks (around 9/27/2024). Phq/yocasta.      Lisa Dean MD  Department of Internal Medicine - [facility]  11:56 AM

## 2024-08-30 NOTE — PATIENT INSTRUCTIONS
Take another carvedilol 12.5mg today.  Increase carvedilol 12.5mg from 1 pill in the morning and 2 pills in the afternoon to 2 pills in the morning and 2 pills in the afternoon.   Start checking your blood pressure twice a day and write it down.   Follow up with a nurse's visit on Thursday.   I'll send in hydralazine 25mg every 8 hours as needed for blood pressures >160/90.    Start taking probiotics daily to see if it helps with the bloating.     Schedule leg ultrasound.     Lab to check pancreas and also BNP to screen for heart failure.

## 2024-08-31 ENCOUNTER — LAB VISIT (OUTPATIENT)
Dept: LAB | Facility: HOSPITAL | Age: 79
End: 2024-08-31
Attending: INTERNAL MEDICINE
Payer: MEDICARE

## 2024-08-31 DIAGNOSIS — R14.0 BLOATING: ICD-10-CM

## 2024-08-31 DIAGNOSIS — R68.81 EARLY SATIETY: ICD-10-CM

## 2024-08-31 DIAGNOSIS — R60.0 LOWER EXTREMITY EDEMA: ICD-10-CM

## 2024-08-31 LAB
BNP SERPL-MCNC: 105 PG/ML (ref 0–99)
LIPASE SERPL-CCNC: 23 U/L (ref 4–60)

## 2024-08-31 PROCEDURE — 83690 ASSAY OF LIPASE: CPT | Performed by: INTERNAL MEDICINE

## 2024-08-31 PROCEDURE — 36415 COLL VENOUS BLD VENIPUNCTURE: CPT | Mod: PO | Performed by: INTERNAL MEDICINE

## 2024-08-31 PROCEDURE — 86677 HELICOBACTER PYLORI ANTIBODY: CPT | Performed by: INTERNAL MEDICINE

## 2024-08-31 PROCEDURE — 83880 ASSAY OF NATRIURETIC PEPTIDE: CPT | Performed by: INTERNAL MEDICINE

## 2024-09-02 DIAGNOSIS — F41.1 GAD (GENERALIZED ANXIETY DISORDER): ICD-10-CM

## 2024-09-02 DIAGNOSIS — F33.0 MILD EPISODE OF RECURRENT MAJOR DEPRESSIVE DISORDER: ICD-10-CM

## 2024-09-03 ENCOUNTER — PATIENT MESSAGE (OUTPATIENT)
Dept: PRIMARY CARE CLINIC | Facility: CLINIC | Age: 79
End: 2024-09-03
Payer: MEDICARE

## 2024-09-03 ENCOUNTER — TELEPHONE (OUTPATIENT)
Dept: PRIMARY CARE CLINIC | Facility: CLINIC | Age: 79
End: 2024-09-03
Payer: MEDICARE

## 2024-09-03 DIAGNOSIS — M54.50 CHRONIC MIDLINE LOW BACK PAIN WITHOUT SCIATICA: ICD-10-CM

## 2024-09-03 DIAGNOSIS — G89.29 CHRONIC MIDLINE LOW BACK PAIN WITHOUT SCIATICA: ICD-10-CM

## 2024-09-03 DIAGNOSIS — M79.89 LEG SWELLING: Primary | ICD-10-CM

## 2024-09-03 LAB — H PYLORI IGG SERPL QL IA: NEGATIVE

## 2024-09-03 RX ORDER — CYCLOBENZAPRINE HCL 5 MG
5 TABLET ORAL
Qty: 30 TABLET | Refills: 0 | Status: SHIPPED | OUTPATIENT
Start: 2024-09-03

## 2024-09-03 RX ORDER — ESCITALOPRAM OXALATE 10 MG/1
10 TABLET ORAL DAILY
Qty: 90 TABLET | Refills: 3 | Status: SHIPPED | OUTPATIENT
Start: 2024-09-03 | End: 2025-09-03

## 2024-09-03 RX ORDER — FUROSEMIDE 20 MG/1
20 TABLET ORAL DAILY
Qty: 30 TABLET | Refills: 0 | Status: SHIPPED | OUTPATIENT
Start: 2024-09-03 | End: 2025-09-03

## 2024-09-03 RX ORDER — ESCITALOPRAM OXALATE 5 MG/1
10 TABLET ORAL NIGHTLY
Qty: 90 TABLET | Refills: 3 | OUTPATIENT
Start: 2024-09-03

## 2024-09-03 NOTE — TELEPHONE ENCOUNTER
Leg ultrasound does not show any clots but does have chronic venous insufficiency - leaky veins which probably contributes to her leg swelling. I'm adding lasix 20mg dialy. Her BNp, screening test for heart failure, is very mildly high. Recommend repeat echo.  Schedule 2 week f/u w/ YAHIR Alan please.

## 2024-09-03 NOTE — TELEPHONE ENCOUNTER
----- Message from Chip Aragon sent at 9/3/2024  8:25 AM CDT -----  Regarding: Pharmacy  Contact: [phone]  Pharmacy is calling to clarify an RX.    RX name:  hydrALAZINE (APRESOLINE) 25 MG tablet    What do they need to clarify:  Directions    Comments:    [facility] [place] - [address]  [place] [address]  Phone: [phone] Fax: [phone]

## 2024-09-03 NOTE — TELEPHONE ENCOUNTER
----- Message from Chip Aragon sent at 9/3/2024  8:25 AM CDT -----  Regarding: Pharmacy  Contact: [phone]  Pharmacy is calling to clarify an RX.    RX name:  hydrALAZINE (APRESOLINE) 25 MG tablet    What do they need to clarify:  Directions    Comments:    [facility] [place] - [address]  [place] [address]  Phone: [phone] Fax: [phone]

## 2024-09-03 NOTE — TELEPHONE ENCOUNTER
Pharmacy comment: Please clarify the quantity prescribed for this prescription. insurance cover 10mg once daily.

## 2024-09-03 NOTE — TELEPHONE ENCOUNTER
Clarified with Pharmacy. They had questions on 2 meds.   Lexapro was supposed to be 10 mg once a day, and was clarified.  Hydralazine is 25 mg 1 tab every 8 hours prn blood pressure 160/90 or greater.   No other questions from pharmacy at this time

## 2024-09-04 ENCOUNTER — CLINICAL SUPPORT (OUTPATIENT)
Dept: PRIMARY CARE CLINIC | Facility: CLINIC | Age: 79
End: 2024-09-04
Payer: MEDICARE

## 2024-09-04 ENCOUNTER — TELEPHONE (OUTPATIENT)
Dept: PRIMARY CARE CLINIC | Facility: CLINIC | Age: 79
End: 2024-09-04
Payer: MEDICARE

## 2024-09-04 VITALS — SYSTOLIC BLOOD PRESSURE: 162 MMHG | DIASTOLIC BLOOD PRESSURE: 70 MMHG

## 2024-09-04 DIAGNOSIS — I10 HYPERTENSION, UNSPECIFIED TYPE: Primary | ICD-10-CM

## 2024-09-04 PROCEDURE — 99999 PR PBB SHADOW E&M-EST. PATIENT-LVL II: CPT | Mod: PBBFAC,,,

## 2024-09-04 NOTE — TELEPHONE ENCOUNTER
LCSW attempted to reach patient to reschedule today's appt due to provider illness. Left VM informing patient and offering appt on Fri, 9/6 at 9 or 9:30am or 3pm.  Or Mon, 9/9 at 2:30pm.  Will await call back.

## 2024-09-04 NOTE — PROGRESS NOTES
Patient here for nurse blood pressure check today.   Pt States that the following medications were taken at 10:15 am today  Hctz 12.5 mg   Carvedilol 12.5 mg x 2 pills ( 25 mg total )     Has not started her lasix 20 mg yet   Home blood pressure cuff brought with them today.  Pt has 2 cuffs.  Pt will demonstrate on how they take home blood pressure with home machine.   Cuff#1 183/95  Cuff#2  175/60  Clinic Manual blood pressure taken 5 minutes after home blood pressure machine demonstration  Pt Manual blood pressure is 158/68    Did repeat pressures  Manual 162/70 at 1:43 pm  Home Cuff #2  162/86  1:50 pm  Patient wishes to use this cuff at home going forward    Discussed w/ pt and made a medication schedule w/ her so that she does not feel overwhelmed taking meds.  Pt wishes to take meds as follows:   10 am Lasix 20 mg              Carvedilol 12.5 mg ( one pill)   1:30 pm                Hydrochlorothiazide ( hctz ) 12.5 mg                 Carvedilol 12.5 mg ( one pill)                 Patient will take her Potassium and her vitamins / supplements at this time ( b-12 / D3 )     Bedtime Gemtesa , Zetia, Lexapro, Q-10 and Carvedilol 12.5 x 2 pills ( 25 mg total )     Pt states concern about legs , stated that her son contacted her after almost 6 years of no contact, has her stressed.   Wants to speak more about this with Lisa, has also been stressed about friend who has had recent stroke. Does feel a little better since she found out he is recovering and doing better after stroke.

## 2024-09-05 ENCOUNTER — TELEPHONE (OUTPATIENT)
Dept: PRIMARY CARE CLINIC | Facility: CLINIC | Age: 79
End: 2024-09-05
Payer: MEDICARE

## 2024-09-06 ENCOUNTER — CLINICAL SUPPORT (OUTPATIENT)
Dept: PRIMARY CARE CLINIC | Facility: CLINIC | Age: 79
End: 2024-09-06
Payer: MEDICARE

## 2024-09-06 DIAGNOSIS — F32.A ANXIETY AND DEPRESSION: Primary | ICD-10-CM

## 2024-09-06 DIAGNOSIS — Z65.8 INTERPERSONAL PROBLEM: ICD-10-CM

## 2024-09-06 DIAGNOSIS — F41.9 ANXIETY AND DEPRESSION: Primary | ICD-10-CM

## 2024-09-06 PROCEDURE — 99499 UNLISTED E&M SERVICE: CPT | Mod: S$GLB,,,

## 2024-09-10 ENCOUNTER — TELEPHONE (OUTPATIENT)
Dept: PRIMARY CARE CLINIC | Facility: CLINIC | Age: 79
End: 2024-09-10
Payer: MEDICARE

## 2024-09-10 NOTE — TELEPHONE ENCOUNTER
Spoke w/ pt. Confirmed w/ her that visit will be virtual with PCP on 9/11,   Pt wants to discuss leg swelling and blood pressure.

## 2024-09-10 NOTE — TELEPHONE ENCOUNTER
Left MsDanial Stockzabrinakayli a portal message to convert her appointment for tomorrow to a virtual visit.

## 2024-09-10 NOTE — PROGRESS NOTES
DATE:  9/6/24  REFERRAL SOURCE:  Lisa Dean MD  TYPE OF VISIT:  In person  SITE:  [facility]  LENGTH OF SESSION: 50  .  HISTORY OF PRESENTING ILLNESS:  Grace Navarro, a 78 y.o. 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    CHIEF COMPLAINT/REASON FOR ENCOUNTER: Met with patient for individual psychotherapy. Pt's chief complaint includes the following: depression, anxiety, and interpersonal.     INTERVAL HISTORY AND CONTENT OF CURRENT SESSION:   Patient verbalized concerns of elevated blood pressure and Dr. Dean adjusting meds due to her family history of cardiac issues.  She also received news that her close friend's  [place] had a CVA and was in ICU.  LCSW provided active listening while patient shared thoughts and feelings of worry.  Patient's adult son who has been estranged for appx 6 yrs by his choice from entire family called her last week.  He is in town from [place] for work until 9/27.  He left a VM since she didn't recognize the number.  They eventually spoke and she verbalized feeling like nothing has changed since the conversation always refocused to himself and how well he is doing at work according to others. Patient reports being in therapy previously for some time to help cope with their relationship and his estrangement.  She is setting healthy boundaries for herself not to invite him into her home due to past outbursts/behaviors and has not been a positive source of support.  They have spoken a few times and she is uncertain if she wants to see him and will see how things go before making that decision. Patient was encouraged and supported to express her concerns, fears and expectations regarding relationship with son.  LCSW also practiced mindful breathing with patient due to increased anxiety and increased BP.  She will continue going to the gym and monitoring her BP as  recommended by Dr. Dean and team.    TREATMENT PLAN/GOALS:  Target symptoms: depression, anxiety , adjustment  Why chosen therapy is appropriate versus another modality: relevant to diagnosis  Outcome monitoring methods: self-report, observation, checklist/rating scale  Therapeutic intervention type: insight oriented psychotherapy, supportive psychotherapy    RISK ASSESSMENT:  Patient reports no suicidal ideation  Patient reports no homicidal ideation  Patient reports no self-injurious behavior  Patient reports no violent behavior    MENTAL HEALTH STATUS EXAM  General Appearance:  age appropriate, well dressed, neatly groomed   Speech: normal tone, normal rate, normal pitch, normal volume      Level of Cooperation: cooperative      Thought Processes: normal and logical   Mood: steady      Thought Content: normal, no suicidality, no homicidality, delusions, or paranoia   Affect: congruent and appropriate   Orientation: Oriented x3   Memory: intact for content of interview   Attention Span & Concentration: able to focus   Fund of General Knowledge: intact and appropriate to age and level of education   Abstract Reasoning: Not formally tested   Judgment & Insight: good     Language  intact     PATIENT'S RESPONSE TO INTERVENTION:  The patient's response to intervention is accepting, motivated.    PROGRESS TOWARD GOALS AND OTHER MENTAL STATUS CHANGES:  The patient's progress toward goals is fair .    DIAGNOSIS:   Anxiety and Depression - adjustment    PLAN: Pt plans to continue individual psychotherapy.  Problem-solving Therapy will be utilized in future individual therapy sessions to increase insight and support.     RETURN TO CLINIC: 2 weeks, 9/20/24

## 2024-09-12 ENCOUNTER — PATIENT MESSAGE (OUTPATIENT)
Dept: PRIMARY CARE CLINIC | Facility: CLINIC | Age: 79
End: 2024-09-12

## 2024-09-12 ENCOUNTER — OFFICE VISIT (OUTPATIENT)
Dept: PRIMARY CARE CLINIC | Facility: CLINIC | Age: 79
End: 2024-09-12
Payer: MEDICARE

## 2024-09-12 ENCOUNTER — TELEPHONE (OUTPATIENT)
Dept: PRIMARY CARE CLINIC | Facility: CLINIC | Age: 79
End: 2024-09-12
Payer: MEDICARE

## 2024-09-12 DIAGNOSIS — I87.2 CHRONIC VENOUS INSUFFICIENCY: ICD-10-CM

## 2024-09-12 DIAGNOSIS — F32.A ANXIETY AND DEPRESSION: ICD-10-CM

## 2024-09-12 DIAGNOSIS — F41.9 ANXIETY AND DEPRESSION: ICD-10-CM

## 2024-09-12 DIAGNOSIS — I10 BENIGN ESSENTIAL HYPERTENSION: Primary | ICD-10-CM

## 2024-09-12 PROCEDURE — 1160F RVW MEDS BY RX/DR IN RCRD: CPT | Mod: CPTII,95,, | Performed by: INTERNAL MEDICINE

## 2024-09-12 PROCEDURE — 99443 PR PHYSICIAN TELEPHONE EVALUATION 21-30 MIN: CPT | Mod: 95,,, | Performed by: INTERNAL MEDICINE

## 2024-09-12 PROCEDURE — 1159F MED LIST DOCD IN RCRD: CPT | Mod: CPTII,95,, | Performed by: INTERNAL MEDICINE

## 2024-09-12 RX ORDER — HYDROCHLOROTHIAZIDE 12.5 MG/1
12.5 TABLET ORAL 2 TIMES DAILY
Qty: 180 TABLET | Refills: 3 | Status: SHIPPED | OUTPATIENT
Start: 2024-09-12 | End: 2025-09-12

## 2024-09-12 NOTE — Clinical Note
Benign essential hypertension - stop lasix. Inc hctz to BID. Cont coreg. -     hydroCHLOROthiazide (HYDRODIURIL) 12.5 MG Tab; Take 1 tablet (12.5 mg total) by mouth 2 (two) times a day. Nurse's visit for BP in 2 weeks. Repeat BMP then.    F/u w/ me in 6 weeks in person.

## 2024-09-12 NOTE — PROGRESS NOTES
Established Patient - Audio Only Telehealth Visit     The patient location is: [place], [place]  The chief complaint leading to consultation is: high blood pressure  Visit type: Virtual visit with audio only (telephone)  Total time spent with patient: 22 min       The reason for the audio only service rather than synchronous audio and video virtual visit was related to technical difficulties or patient preference/necessity.     Each patient to whom I provide medical services by telemedicine is:  (1) informed of the relationship between the physician and patient and the respective role of any other health care provider with respect to management of the patient; and (2) notified that they may decline to receive medical services by telemedicine and may withdraw from such care at any time. Patient verbally consented to receive this service via voice-only telephone call.       HPI:   Instructions for BP meds at our last visit 2 weeks ago: Take another carvedilol 12.5mg today.  Increase carvedilol 12.5mg from 1 pill in the morning and 2 pills in the afternoon to 2 pills in the morning and 2 pills in the afternoon. Continue hctz 12.5mg daily. Pt has fair amount of anxiety due to BP so went ahead and ordered hydralazine 25mg q 8 hours prn SBP>160. She was previously enrolled in digital HTN program but monitoring her BP actually caused her to be more anxious and the cuff was reading higher than our readings. We had also increased her lexapro from 5 to 10mg daily on 8/30.    She had a nurse's visit 9/4/24 and she has 2 BP cuffs. The second one seems to be closer to the manual reading here.   Per pt:  Pt wishes to take meds as follows:   10 am Lasix 20 mg               Carvedilol 12.5 mg ( one pill)   1:30 pm                Hydrochlorothiazide ( hctz ) 12.5 mg                 Carvedilol 12.5 mg ( one pill)                 Patient will take her Potassium and her vitamins / supplements at this time ( b-12 / D3 )      Bedtime  Gemtesa , Zetia, Lexapro, Q-10 and Carvedilol 12.5 x 2 pills ( 25 mg total )      Lasix 20 mg --->reports this made her feel dizzy despite BP is not low.   Has been using compression stockings. Didn't wear it today as no AC from [place].    140s-160s at the top. One day it was 190s and then after 5 min it was 150s.     Still w/ stress w/ son. Hasn't seen him yet. Got a new air conditioner.  Follows w/ Lisa Choi, LCSW - LOV 9/10/24.     Pt reports she called the other day and didn't get a reply for 24 hours. Discussed if it's anything urgent, should call nurse on call.     Assessment and plan:    Diagnoses and all orders for this visit:    Benign essential hypertension - stop lasix. Inc hctz to BID. Cont coreg.  -     hydroCHLOROthiazide (HYDRODIURIL) 12.5 MG Tab; Take 1 tablet (12.5 mg total) by mouth 2 (two) times a day.    Chronic venous insufficiency - cont compression stockings. Low salt diet. Elevate legs whenever possible.     Anxiety and depression - increased lexapro 8/30. Will take some time for mood to reflect dosage change.     Nurse's visit for BP in 2 weeks. Repeat BMP then.     F/u w/ me in 6 weeks in person.    Lisa Dean MD  Department of Internal Medicine - [facility]  10:27 AM         This service was not originating from a related E/M service provided within the previous 7 days nor will  to an E/M service or procedure within the next 24 hours or my soonest available appointment.  Prevailing standard of care was able to be met in this audio-only visit.

## 2024-09-12 NOTE — TELEPHONE ENCOUNTER
----- Message from Radha Castañeda sent at 9/12/2024  9:46 AM CDT -----  Contact: [phone]  Patient is returning a phone call.    Who left a message for the patient: MA    Does patient know what this is regarding:  appt    Would you like a call back, or a response through your [facility] portal?:   call    Comments: Patient can  due an audio only visit she does not have internet

## 2024-09-16 ENCOUNTER — PATIENT MESSAGE (OUTPATIENT)
Dept: PRIMARY CARE CLINIC | Facility: CLINIC | Age: 79
End: 2024-09-16
Payer: MEDICARE

## 2024-09-18 ENCOUNTER — OFFICE VISIT (OUTPATIENT)
Dept: UROGYNECOLOGY | Facility: CLINIC | Age: 79
End: 2024-09-18
Payer: MEDICARE

## 2024-09-18 VITALS
WEIGHT: 152.31 LBS | BODY MASS INDEX: 26 KG/M2 | HEART RATE: 66 BPM | HEIGHT: 64 IN | DIASTOLIC BLOOD PRESSURE: 79 MMHG | SYSTOLIC BLOOD PRESSURE: 152 MMHG

## 2024-09-18 DIAGNOSIS — R30.0 DYSURIA: Primary | ICD-10-CM

## 2024-09-18 DIAGNOSIS — N39.41 URGE INCONTINENCE: ICD-10-CM

## 2024-09-18 PROCEDURE — 3288F FALL RISK ASSESSMENT DOCD: CPT | Mod: HCNC,CPTII,S$GLB, | Performed by: OBSTETRICS & GYNECOLOGY

## 2024-09-18 PROCEDURE — 1125F AMNT PAIN NOTED PAIN PRSNT: CPT | Mod: HCNC,CPTII,S$GLB, | Performed by: OBSTETRICS & GYNECOLOGY

## 2024-09-18 PROCEDURE — 99999 PR PBB SHADOW E&M-EST. PATIENT-LVL III: CPT | Mod: PBBFAC,HCNC,, | Performed by: OBSTETRICS & GYNECOLOGY

## 2024-09-18 PROCEDURE — 1101F PT FALLS ASSESS-DOCD LE1/YR: CPT | Mod: HCNC,CPTII,S$GLB, | Performed by: OBSTETRICS & GYNECOLOGY

## 2024-09-18 PROCEDURE — 87086 URINE CULTURE/COLONY COUNT: CPT | Performed by: OBSTETRICS & GYNECOLOGY

## 2024-09-18 PROCEDURE — 3077F SYST BP >= 140 MM HG: CPT | Mod: HCNC,CPTII,S$GLB, | Performed by: OBSTETRICS & GYNECOLOGY

## 2024-09-18 PROCEDURE — 99213 OFFICE O/P EST LOW 20 MIN: CPT | Mod: HCNC,S$GLB,, | Performed by: OBSTETRICS & GYNECOLOGY

## 2024-09-18 PROCEDURE — 3078F DIAST BP <80 MM HG: CPT | Mod: HCNC,CPTII,S$GLB, | Performed by: OBSTETRICS & GYNECOLOGY

## 2024-09-18 NOTE — PATIENT INSTRUCTIONS
Plan:    1. Urinary incontinence, urge   --Bladder diary for 3-7 days, write the number of times you go to the rest room and associated symptoms of urgency or leakage.   --Empty bladder every 3 hours.  Empty well: wait a minute, lean forward on toilet.    --Avoid dietary irritants (see sheet).  Keep diary x 3-5 days to determine your irritants.  --KEGELS: do 10 in AM and 10 in PM, holding each x 10 seconds.  When you feel urge to go, STOP, KEGEL, and when urge has passed, then go to bathroom.   pelvic floor PT   --URGE: recommend continuing Gemtessa 75 mg nighty- patient states she will stop taking it for now    Long discussion of advanced treatment options for UUI  including Botox , PTNS and interstim (SNS). Botox is a prescription medicine that is injected into the bladder muscle and used to treat overactive bladder  symptoms such as a strong need to urinate with leakage or wetting accidents or urianting often (frequency ) or the strong need to urinate immediately which can not be deferred. There is a 6-10 % risk of urinary retention which usually resolved by 8 wks.  There is also a risk of UTI. UTI risks can be higher in patients with diabetes.  It requires a  prior authorization. We reccomend to start ABX 2 days before and 3 days after.  Percutaneous tibial nerve stimulation (PTNS), also referred to as posterior tibial nerve stimulation, is the least invasive forms of neuromodulation used to treat overactive bladder (OAB) and the associated symptoms of urinary urgency, urinary frequency and urge incontinence. With correct placement of the needle electrode and level of electrical impulse, there is often an involuntary toe flex or fan, or an extension of the entire foot. However, for some patients, the correct placement and stimulation may only result in a mild sensation in the ankle area or across the sole of the foot. The treatment protocol requires once-a-week treatments for 12 weeks, 30 minutes per session.  Many patients begin to see improvements by the 6th treatment. Patients who respond to treatment may require occasional treatments (~ once every 3 weeks) to sustain improvements.PTNS is a low-risk procedure. The most common side-effects with PTNS treatment are temporary and minor, resulting from the placement of the needle electrode. They include minor bleeding, mild pain and skin inflammation. InterStim therapy helps control urinary problems through an implanted devices that sends mild electrical impulse by a thin wire (lead) the sacral nerves that control the bladder, sphincter and plevic floor muscles often called a bladder pacemaker. It is FDA approved for the treatment of urinary retention and symptoms of OAB urge incontinence urgency and frequency for patient who have failed or were unable to tolerate more conservative treatments.  Like with any implanted devise there is a risk of infection or pain at the site implanted site.

## 2024-09-18 NOTE — PROGRESS NOTES
Subjective:       Patient ID: Grace Navarro is a 78 y.o. female.    Chief Complaint:  Follow-up  The patient location is: [place]  The chief complaint leading to consultation is: OAB    Visit type: audiovisual    Face to Face time with patient:   15  minutes of total time spent on the encounter, which includes face to face time and non-face to face time preparing to see the patient (eg, review of tests), Obtaining and/or reviewing separately obtained history, Documenting clinical information in the electronic or other health record, Independently interpreting results (not separately reported) and communicating results to the patient/family/caregiver, or Care coordination (not separately reported).         Each patient to whom he or she provides medical services by telemedicine is:  (1) informed of the relationship between the physician and patient and the respective role of any other health care provider with respect to management of the patient; and (2) notified that he or she may decline to receive medical services by telemedicine and may withdraw from such care at any time.    Notes:     See below     History of Present Illness  Follow-up  Pertinent negatives include no abdominal pain, chest pain, chills, coughing, diaphoresis, fatigue, fever, nausea, rash or vomiting.    78 y.o.  female    has a past medical history of Anxiety, Rene's disease, Chronic low back pain, Depression, Glaucoma, Goiter, nodular, Hyperlipidemia, Hypertension, Irritable bowel, Neuromuscular disorder, Osteopenia of multiple sites (2017), PUD (peptic ulcer disease), Subarachnoid hemorrhage (), and Varicose veins.  Refererd by Dr. Clakr for urinary urgency and incontience. Symptoms became problematic when she turned 77. She was found of the have UTI's (E Coli and Klebsiella - pan sensitive ) and was tredated with several rounds of antibiotics. She has continued to have bothersome urinary incontinnence.      6/20/2024  Very happy happy with the PFPT  Used the Gemtessa for 3 months does not want to continue since symptoms have much improved  No leakage since the start of the vaginal irritation   Using the vaginal estrogen     Ohs Peq Pfdi20    Question 3/14/2024  2:22 PM CDT - Filed by Patient   Do you...    Usually experience pressure in the lower abdomen? Symptoms present and they bother me quite a bit   Usually experience heaviness or dullness in the pelvic area? Symptoms present and they bother me moderately   Usually have a bulge or something falling out that you can see or feel in your vaginal area? Symptoms not present   Ever have to push on the vagina or around the rectum to complete a bowel movement? Symptoms not present   Usually experience a feeling of incomplete bladder emptying? Symptoms present and they bother me quite a bit   Ever have to push up on a bulge in the vaginal area with your fingers to start or complete urination? Symptoms not present   Do you...    Feel you need to strain too hard to have a bowel movement? Symptoms not present   Feel you have not completely emptied your bowels at the end of a bowel movement? Symptoms not present   Usually lose stool beyond your control if your stool is well formed? Symptoms not present   Usually lose stool beyond your control if your stool is loose? Symptoms not present   Usually lose gas from your rectum beyond your control? Symptoms not present   Usually have pain when you pass your stool? Symptoms not present   Experience a strong sense of urgency and have to rush to the bathroom to have a bowel movement? Symptoms present and they bother me somewhat   Does part of your bowel ever pass through the rectum and bulge outside during or after a bowel movement? Symptoms present and they bother me somewhat   Do you...    Usually experience frequent urination? Symptoms present and they bother me quite a bit   Usually experience urine leakage associated with a  feeling of urgency, that is, a strong sensation of needing to go to the bathroom? Symptoms present and they bother me quite a bit   Usually experience urine leakage related to coughing, sneezing or laughing? Symptoms not present   Usually experience small amounts of urine leakage (that is, drops)? Symptoms not present   Usually experience difficulty emptying your bladder? Symptoms present and they bother me moderately   Usually experience pain or discomfort in the lower abdomen or genital region? Symptoms present and they bother me somewhat   POPDI  (range: 0 - 100) 45.83   CRADI (range: 0 - 100) 12.5   HALLEY (range: 0 - 100) 54.17   TOTAL SCORE  (range: 0 - 300) 112.5     GYN & OB History  Patient's last menstrual period was 1996.   Date of Last Pap: 2019    OB History    Para Term  AB Living   2 2 2     2   SAB IAB Ectopic Multiple Live Births           2      # Outcome Date GA Lbr Kiran/2nd Weight Sex Type Anes PTL Lv   2 Term     M Vag-Spont   BREEZY   1 Term     F Vag-Spont   BREEZY     OB     x 2.  C/s x 0.    Largest: 7 # 12 oz       GYN  Menarche: ?  Menstrual cycle:  Menopause:  50's  Hysterectomy:  No  Ovaries:  present   Tubal ligation: Yes or NO   Other abdominal surgeries: Appy       Past Medical History:   Diagnosis Date    Anxiety     Rene's disease     Chronic low back pain     Depression     Glaucoma     Goiter, nodular     Hyperlipidemia     Hypertension     Irritable bowel     Neuromuscular disorder     Osteopenia of multiple sites 2017    PUD (peptic ulcer disease)     Subarachnoid hemorrhage 2014    Varicose veins      Past Surgical History:   Procedure Laterality Date    APPENDECTOMY      BELPHAROPTOSIS REPAIR Bilateral     DONE OUTSIDE [facility]    BLEPHAROPLASTY, UPPER EYELID Bilateral 2022        BREAST BIOPSY Right     RIGHT-axilla    CATARACT EXTRACTION W/  INTRAOCULAR LENS IMPLANT Left 2016        CATARACT EXTRACTION W/   INTRAOCULAR LENS IMPLANT Right 09/21/2016        SPINE SURGERY  07/2012    Fusion @ L4L5    TARSAL STRIPPING Right 11/02/2022    Procedure: STRIPPING, TARSAL;  Surgeon: Bina Umana MD;  Location: [facility];  Service: Ophthalmology;  Laterality: Right;    TONSILLECTOMY         Review of Systems  Review of Systems   Constitutional: Negative.  Negative for activity change, appetite change, chills, diaphoresis, fatigue, fever and unexpected weight change.   HENT: Negative.     Eyes: Negative.    Respiratory: Negative.  Negative for apnea, cough and wheezing.    Cardiovascular: Negative.  Negative for chest pain and palpitations.   Gastrointestinal:  Negative for abdominal distention, abdominal pain, anal bleeding, blood in stool, constipation, diarrhea, nausea, rectal pain and vomiting.   Endocrine: Negative.    Genitourinary:  Positive for frequency and urgency. Negative for decreased urine volume, difficulty urinating, dyspareunia, dysuria, enuresis, flank pain, genital sores, hematuria, menstrual problem, pelvic pain, vaginal bleeding, vaginal discharge and vaginal pain.   Musculoskeletal:  Negative for back pain and gait problem.   Skin:  Negative for color change, pallor, rash and wound.   Allergic/Immunologic: Negative for immunocompromised state.   Neurological: Negative.  Negative for dizziness and speech difficulty.   Hematological:  Negative for adenopathy.   Psychiatric/Behavioral:  Negative for agitation, behavioral problems, confusion and sleep disturbance.            Objective:     Physical Exam   Constitutional: She is oriented to person, place, and time. She appears well-developed and well-nourished.   HENT:   Head: Normocephalic and atraumatic.   Pulmonary/Chest: No respiratory distress. She has no wheezes.   Musculoskeletal:         General: Normal range of motion.      Cervical back: Normal range of motion and neck supple.   Neurological: She is alert and oriented to person, place, and  time.   Psychiatric: She has a normal mood and affect. Her behavior is normal. Judgment and thought content normal.          Assessment:        No diagnosis found.        Plan:    1. Urinary incontinence, urge   --Bladder diary for 3-7 days, write the number of times you go to the rest room and associated symptoms of urgency or leakage.   --Empty bladder every 3 hours.  Empty well: wait a minute, lean forward on toilet.    --Avoid dietary irritants (see sheet).  Keep diary x 3-5 days to determine your irritants.  --KEGELS: do 10 in AM and 10 in PM, holding each x 10 seconds.  When you feel urge to go, STOP, KEGEL, and when urge has passed, then go to bathroom.   pelvic floor PT   --URGE: recommend continuing Gemtessa 75 mg nighty- patient states she will stop taking it for now    Long discussion of advanced treatment options for UUI  including Botox , PTNS and interstim (SNS). Botox is a prescription medicine that is injected into the bladder muscle and used to treat overactive bladder  symptoms such as a strong need to urinate with leakage or wetting accidents or urianting often (frequency ) or the strong need to urinate immediately which can not be deferred. There is a 6-10 % risk of urinary retention which usually resolved by 8 wks.  There is also a risk of UTI. UTI risks can be higher in patients with diabetes.  It requires a  prior authorization. We reccomend to start ABX 2 days before and 3 days after.  Percutaneous tibial nerve stimulation (PTNS), also referred to as posterior tibial nerve stimulation, is the least invasive forms of neuromodulation used to treat overactive bladder (OAB) and the associated symptoms of urinary urgency, urinary frequency and urge incontinence. With correct placement of the needle electrode and level of electrical impulse, there is often an involuntary toe flex or fan, or an extension of the entire foot. However, for some patients, the correct placement and stimulation may only  result in a mild sensation in the ankle area or across the sole of the foot. The treatment protocol requires once-a-week treatments for 12 weeks, 30 minutes per session. Many patients begin to see improvements by the 6th treatment. Patients who respond to treatment may require occasional treatments (~ once every 3 weeks) to sustain improvements.PTNS is a low-risk procedure. The most common side-effects with PTNS treatment are temporary and minor, resulting from the placement of the needle electrode. They include minor bleeding, mild pain and skin inflammation. InterStim therapy helps control urinary problems through an implanted devices that sends mild electrical impulse by a thin wire (lead) the sacral nerves that control the bladder, sphincter and plevic floor muscles often called a bladder pacemaker. It is FDA approved for the treatment of urinary retention and symptoms of OAB urge incontinence urgency and frequency for patient who have failed or were unable to tolerate more conservative treatments.  Like with any implanted devise there is a risk of infection or pain at the site implanted site.       3. Vaginal atrophy (dryness):  Use 1 gram of estrogen cream in vagina twice a week .      Kurtis Salvador DO  Female Pelvic Medicine and Reconstructive Surgery  [facility] [place], [place]

## 2024-09-20 ENCOUNTER — PATIENT MESSAGE (OUTPATIENT)
Dept: UROGYNECOLOGY | Facility: CLINIC | Age: 79
End: 2024-09-20
Payer: MEDICARE

## 2024-09-20 ENCOUNTER — CLINICAL SUPPORT (OUTPATIENT)
Dept: PRIMARY CARE CLINIC | Facility: CLINIC | Age: 79
End: 2024-09-20
Payer: MEDICARE

## 2024-09-20 DIAGNOSIS — F41.9 ANXIETY AND DEPRESSION: Primary | ICD-10-CM

## 2024-09-20 DIAGNOSIS — Z65.8 INTERPERSONAL PROBLEM: ICD-10-CM

## 2024-09-20 DIAGNOSIS — F32.A ANXIETY AND DEPRESSION: Primary | ICD-10-CM

## 2024-09-20 LAB — BACTERIA UR CULT: NO GROWTH

## 2024-09-20 NOTE — PROGRESS NOTES
DATE:  9/6/24  REFERRAL SOURCE:  Lisa Dean MD  TYPE OF VISIT:  In person  SITE:  [facility]  LENGTH OF SESSION: 50    HISTORY OF PRESENTING ILLNESS:  Grace Navarro, a 78 y.o. 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    CHIEF COMPLAINT/REASON FOR ENCOUNTER: Met with patient for individual psychotherapy. Pt's chief complaint includes the following: depression, anxiety, and interpersonal.     INTERVAL HISTORY AND CONTENT OF CURRENT SESSION:   Patient came in walking with a limp with her left leg.  She reports missing the last step climbing off a stool in the kitchen 2 hours before this appt.  She put heat on it.  LCSW offered patient a chair to elevate her leg during the session and she accepted.  Patient and LCSW discussed difficulties with son and past issues.  He has been calling every couple of days while in town and she agreed to meet him for dinner.  She expressed feelings of guilt for not feeling joyful after seeing him.  She did feel a sense of peace when he hugged her after walking her to her car.  LCSW discussed the importance of continuing to set healthy boundaries with their interactions to prioritize self-care.  LCSW provided empathy and support for patient's expression of thoughts/feelings during the session.At the end of this session, patient was complaining of feeling dizzy from recent medication adjustments and not taking BP med at lunch today before this appt.  Patient in agreement for LCSW to discuss with RN to assess BP.  RN/Perla attended to patient's medical needs.      TREATMENT PLAN/GOALS:  Target symptoms: depression, anxiety , adjustment   Worry and ruminate less about family members.   Experience feelings of contentment and happiness more often.  Why chosen therapy is appropriate versus another modality: relevant to diagnosis  Outcome monitoring methods: self-report,  observation, checklist/rating scale  Therapeutic intervention type: insight oriented psychotherapy, supportive psychotherapy    RISK ASSESSMENT:  Patient reports no suicidal ideation  Patient reports no homicidal ideation  Patient reports no self-injurious behavior  Patient reports no violent behavior    MENTAL HEALTH STATUS EXAM  General Appearance:  age appropriate, casually dressed, neatly groomed, limping   Speech: normal tone, normal rate, normal pitch, normal volume      Level of Cooperation: cooperative      Thought Processes: normal and logical   Mood: steady      Thought Content: normal, no suicidality, no homicidality, delusions, or paranoia   Affect: congruent and appropriate   Orientation: Oriented x3   Memory: intact for content of interview   Attention Span & Concentration: able to focus   Fund of General Knowledge: intact and appropriate to age and level of education   Abstract Reasoning: Not formally tested   Judgment & Insight: good     Language  intact        PATIENT'S RESPONSE TO INTERVENTION:  The patient's response to intervention is accepting, motivated.    PROGRESS TOWARD GOALS AND OTHER MENTAL STATUS CHANGES:  The patient's progress toward goals is good.    DIAGNOSIS:   Anxiety and Depression - adjustment    PLAN: Pt plans to continue individual psychotherapy.  Problem-solving Therapy will be utilized in future individual therapy sessions to increase insight and support.     RETURN TO CLINIC: 2 weeks as scheduled

## 2024-09-26 ENCOUNTER — CLINICAL SUPPORT (OUTPATIENT)
Dept: PRIMARY CARE CLINIC | Facility: CLINIC | Age: 79
End: 2024-09-26
Payer: MEDICARE

## 2024-09-26 DIAGNOSIS — I10 BENIGN ESSENTIAL HYPERTENSION: Primary | ICD-10-CM

## 2024-09-26 PROCEDURE — 80048 BASIC METABOLIC PNL TOTAL CA: CPT | Mod: HCNC | Performed by: INTERNAL MEDICINE

## 2024-09-26 NOTE — PROGRESS NOTES
Patient here for nurse blood pressure check today.   Pt States  medications were taken at  8:15  today  12.5 mg of HCTZ   12.5 mg of Carvedilol    Pt also took 12.5 mg Hctz, and 12.5 mg of Carvedilol at lunchtime ( around 12:30-1 )    144/82 blood pressure today  Pt feels that pain makes blood pressure go up.  Feels weak and dizzy on current blood pressure meds soon after taking them,  Wants to discuss with PCP about possibly changing meds if appropriate.    Urogyn took off of Gemtesa.  Pt still w/ dizziness.   Patient is still having some stress / emotions on seeing her son after 6 years of no contact.  Patient has leg discomfort, ongoing, less swelling lately.  Patient stated that she also notices that blood pressure goes down when she is not in pain.

## 2024-09-27 ENCOUNTER — PATIENT MESSAGE (OUTPATIENT)
Dept: PRIMARY CARE CLINIC | Facility: CLINIC | Age: 79
End: 2024-09-27
Payer: MEDICARE

## 2024-09-27 LAB
ANION GAP SERPL CALC-SCNC: 8 MMOL/L (ref 8–16)
BUN SERPL-MCNC: 16 MG/DL (ref 8–23)
CALCIUM SERPL-MCNC: 9.3 MG/DL (ref 8.7–10.5)
CHLORIDE SERPL-SCNC: 101 MMOL/L (ref 95–110)
CO2 SERPL-SCNC: 30 MMOL/L (ref 23–29)
CREAT SERPL-MCNC: 0.6 MG/DL (ref 0.5–1.4)
EST. GFR  (NO RACE VARIABLE): >60 ML/MIN/1.73 M^2
GLUCOSE SERPL-MCNC: 101 MG/DL (ref 70–110)
POTASSIUM SERPL-SCNC: 3.5 MMOL/L (ref 3.5–5.1)
SODIUM SERPL-SCNC: 139 MMOL/L (ref 136–145)

## 2024-10-01 ENCOUNTER — PATIENT MESSAGE (OUTPATIENT)
Dept: PRIMARY CARE CLINIC | Facility: CLINIC | Age: 79
End: 2024-10-01
Payer: MEDICARE

## 2024-10-02 ENCOUNTER — PATIENT MESSAGE (OUTPATIENT)
Dept: RESEARCH | Facility: CLINIC | Age: 79
End: 2024-10-02
Payer: MEDICARE

## 2024-10-04 ENCOUNTER — TELEPHONE (OUTPATIENT)
Dept: PRIMARY CARE CLINIC | Facility: CLINIC | Age: 79
End: 2024-10-04
Payer: MEDICARE

## 2024-10-04 ENCOUNTER — CLINICAL SUPPORT (OUTPATIENT)
Dept: PRIMARY CARE CLINIC | Facility: CLINIC | Age: 79
End: 2024-10-04
Payer: MEDICARE

## 2024-10-04 DIAGNOSIS — Z65.8 INTERPERSONAL PROBLEM: ICD-10-CM

## 2024-10-04 DIAGNOSIS — F32.A ANXIETY AND DEPRESSION: Primary | ICD-10-CM

## 2024-10-04 DIAGNOSIS — F41.9 ANXIETY AND DEPRESSION: Primary | ICD-10-CM

## 2024-10-04 NOTE — TELEPHONE ENCOUNTER
Spoke with SW after pt's appt, SW expressed concern w/ pt not feeling well, continuing dizziness, and expressing feeling hopeless.  Called pt to see if she can come in for eval w/ provider. Pt stated that she prefers MD. Pt is ok w/seeing Dr. Thompson this coming Tuesday.  Pt would like to talk about her medications and ongoing / increasing dizziness.

## 2024-10-04 NOTE — PROGRESS NOTES
"DATE:  10/4/24  REFERRAL SOURCE:  Lisa Dean MD  TYPE OF VISIT:  In person  SITE:  [facility]  LENGTH OF SESSION: 50    HISTORY OF PRESENTING ILLNESS:  Grace Navarro, a 78 y.o. 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    CHIEF COMPLAINT/REASON FOR ENCOUNTER: Met with patient for individual psychotherapy. Pt's chief complaint includes the following: depression, anxiety, and interpersonal.     INTERVAL HISTORY AND CONTENT OF CURRENT SESSION:   Patient is feeling dizzy and not feeling like going out of the house.  She is feeling miserable medically and worried about medications. "I know something is wrong."   She feels less energy and verbalized feeling hopeless and indifferent about her health.  She reports having antidepressant increased 1 mo ago.  She was able to walk 1 hr yesterday at the gym but didn't for 3 days prior.  She is feeling her activity is tied to medication regimen and she is not enjoying it.  "I have been feeling miserable for appx 2 weeks before I was just dizzy and now I have no energy.  Her BP was 161/84 before lunch.  She is trying to go to gym after 3-4pm since not as dizzy and notices the difference in her activity level in her fitness tracker.  LCSW provided actove listening and support as patient expressed thoughts and feelings.  Patient met with a  this week and will put house for sale in January after she returns from spending the holidays with her dtr. She will also work with an estate sale coordinator to assist with sale of items she will not take with her. Patient is optimistic about her move to be near her dtr.  She went to a concert Tuesday evening with friend outdoors and a couple that speak [place].  She feels better in the evenings and was able to sit and enjoy the event.  She is sleeping at night and reports eating well.  LCSW and patient problem solved " regarding communicating with health team and she is in agreement for LCSW to collaboarte with Dr. Dean's team about her concerns.  She saw son 1 week ago for the 2nd time before he left to return home.  She chose not to see him the following day before he left as he requested.  She is establishing healthy boundaries since they haven't spoken in 6 yrs by his choice.  LCSW provided reinforcement for insight and actions taken for self care with relationship with son.     TREATMENT PLAN/GOALS:  Target symptoms: depression, anxiety , adjustment   Worry and ruminate less about family members.   Experience feelings of contentment and happiness more often.  Why chosen therapy is appropriate versus another modality: relevant to diagnosis  Outcome monitoring methods: self-report, observation, checklist/rating scale  Therapeutic intervention type: insight oriented psychotherapy, supportive psychotherapy    RISK ASSESSMENT:  Patient reports no suicidal ideation  Patient reports no homicidal ideation  Patient reports no self-injurious behavior  Patient reports no violent behavior    MENTAL HEALTH STATUS EXAM  General Appearance:  age appropriate, casually dressed, tired   Speech: normal tone, normal rate, normal pitch, normal volume      Level of Cooperation: cooperative      Thought Processes: normal and logical   Mood: sad      Thought Content: normal, no suicidality, no homicidality, delusions, or paranoia   Affect: sad   Orientation: Oriented x3   Memory: intact for content of interview   Attention Span & Concentration: able to focus   Fund of General Knowledge: intact and appropriate to age and level of education   Abstract Reasoning: Not formally tested   Judgment & Insight: good     Language  intact        PATIENT'S RESPONSE TO INTERVENTION:  The patient's response to intervention is accepting, motivated.    PROGRESS TOWARD GOALS AND OTHER MENTAL STATUS CHANGES:  The patient's progress toward goals is good.    DIAGNOSIS:    Anxiety and Depression - adjustment    PLAN: Pt plans to continue individual psychotherapy.  Problem-solving Therapy will be utilized in future individual therapy sessions to increase insight and support.     RETURN TO CLINIC: 2 weeks as scheduled

## 2024-10-04 NOTE — TELEPHONE ENCOUNTER
"Patient seen for f/u session today. Patient reports feeling miserable medically and worried about medications. "I know something is wrong."  She is feeling less energy and feeling hopeless and indifferent about my health.  She reports having antidepressant increased 1 mo ago.  "I feel like I am tied to my medication regimen."  The evenings she feels better because not dizzy.   Difference in walking activity is showing on tracker.  Sleeping at night.  Eating well.  "I have been feeling miserable for appx 2 weeks before I was just dizzy and now I have no energy." 161/84 before lunch.  Patient in agreement for LCSW to discuss with Dr. Dean's team.  Discussed with Perla/LUIS F following visit.   "

## 2024-10-08 ENCOUNTER — OFFICE VISIT (OUTPATIENT)
Dept: PRIMARY CARE CLINIC | Facility: CLINIC | Age: 79
End: 2024-10-08
Payer: MEDICARE

## 2024-10-08 VITALS
OXYGEN SATURATION: 95 % | DIASTOLIC BLOOD PRESSURE: 80 MMHG | SYSTOLIC BLOOD PRESSURE: 129 MMHG | BODY MASS INDEX: 26 KG/M2 | HEIGHT: 64 IN | HEART RATE: 75 BPM | WEIGHT: 152.31 LBS

## 2024-10-08 DIAGNOSIS — I10 PRIMARY HYPERTENSION: ICD-10-CM

## 2024-10-08 DIAGNOSIS — R79.9 ABNORMAL FINDING OF BLOOD CHEMISTRY, UNSPECIFIED: ICD-10-CM

## 2024-10-08 DIAGNOSIS — R53.83 DECREASED ENERGY: Primary | ICD-10-CM

## 2024-10-08 DIAGNOSIS — R32 INCONTINENCE IN FEMALE: ICD-10-CM

## 2024-10-08 DIAGNOSIS — R60.0 LOWER EXTREMITY EDEMA: ICD-10-CM

## 2024-10-08 DIAGNOSIS — F32.A ANXIETY AND DEPRESSION: ICD-10-CM

## 2024-10-08 DIAGNOSIS — F41.9 ANXIETY AND DEPRESSION: ICD-10-CM

## 2024-10-08 PROCEDURE — 3074F SYST BP LT 130 MM HG: CPT | Mod: HCNC,CPTII,S$GLB,

## 2024-10-08 PROCEDURE — 1125F AMNT PAIN NOTED PAIN PRSNT: CPT | Mod: HCNC,CPTII,S$GLB,

## 2024-10-08 PROCEDURE — 99214 OFFICE O/P EST MOD 30 MIN: CPT | Mod: HCNC,S$GLB,,

## 2024-10-08 PROCEDURE — 3079F DIAST BP 80-89 MM HG: CPT | Mod: HCNC,CPTII,S$GLB,

## 2024-10-08 PROCEDURE — 99999 PR PBB SHADOW E&M-EST. PATIENT-LVL V: CPT | Mod: PBBFAC,HCNC,,

## 2024-10-08 PROCEDURE — 1160F RVW MEDS BY RX/DR IN RCRD: CPT | Mod: HCNC,CPTII,S$GLB,

## 2024-10-08 PROCEDURE — 1159F MED LIST DOCD IN RCRD: CPT | Mod: HCNC,CPTII,S$GLB,

## 2024-10-09 ENCOUNTER — LAB VISIT (OUTPATIENT)
Dept: LAB | Facility: HOSPITAL | Age: 79
End: 2024-10-09
Payer: MEDICARE

## 2024-10-09 DIAGNOSIS — R53.83 DECREASED ENERGY: ICD-10-CM

## 2024-10-09 DIAGNOSIS — R32 INCONTINENCE IN FEMALE: ICD-10-CM

## 2024-10-09 DIAGNOSIS — R79.9 ABNORMAL FINDING OF BLOOD CHEMISTRY, UNSPECIFIED: ICD-10-CM

## 2024-10-09 PROBLEM — R60.0 LOWER EXTREMITY EDEMA: Status: ACTIVE | Noted: 2024-10-09

## 2024-10-09 PROBLEM — F41.9 ANXIETY AND DEPRESSION: Status: ACTIVE | Noted: 2024-10-09

## 2024-10-09 PROBLEM — F32.A ANXIETY AND DEPRESSION: Status: ACTIVE | Noted: 2024-10-09

## 2024-10-09 LAB
ALBUMIN SERPL BCP-MCNC: 3.9 G/DL (ref 3.5–5.2)
ALP SERPL-CCNC: 58 U/L (ref 55–135)
ALT SERPL W/O P-5'-P-CCNC: 15 U/L (ref 10–44)
ANION GAP SERPL CALC-SCNC: 8 MMOL/L (ref 8–16)
AST SERPL-CCNC: 22 U/L (ref 10–40)
BASOPHILS # BLD AUTO: 0.04 K/UL (ref 0–0.2)
BASOPHILS NFR BLD: 1 % (ref 0–1.9)
BILIRUB SERPL-MCNC: 0.4 MG/DL (ref 0.1–1)
BILIRUB UR QL STRIP: NEGATIVE
BUN SERPL-MCNC: 10 MG/DL (ref 8–23)
CALCIUM SERPL-MCNC: 9.4 MG/DL (ref 8.7–10.5)
CHLORIDE SERPL-SCNC: 101 MMOL/L (ref 95–110)
CLARITY UR REFRACT.AUTO: CLEAR
CO2 SERPL-SCNC: 30 MMOL/L (ref 23–29)
COLOR UR AUTO: COLORLESS
CREAT SERPL-MCNC: 0.6 MG/DL (ref 0.5–1.4)
DIFFERENTIAL METHOD BLD: ABNORMAL
EOSINOPHIL # BLD AUTO: 0.1 K/UL (ref 0–0.5)
EOSINOPHIL NFR BLD: 3.3 % (ref 0–8)
ERYTHROCYTE [DISTWIDTH] IN BLOOD BY AUTOMATED COUNT: 13.2 % (ref 11.5–14.5)
EST. GFR  (NO RACE VARIABLE): >60 ML/MIN/1.73 M^2
ESTIMATED AVG GLUCOSE: 111 MG/DL (ref 68–131)
GLUCOSE SERPL-MCNC: 90 MG/DL (ref 70–110)
GLUCOSE UR QL STRIP: NEGATIVE
HBA1C MFR BLD: 5.5 % (ref 4–5.6)
HCT VFR BLD AUTO: 36.4 % (ref 37–48.5)
HGB BLD-MCNC: 11.8 G/DL (ref 12–16)
HGB UR QL STRIP: NEGATIVE
IMM GRANULOCYTES # BLD AUTO: 0.01 K/UL (ref 0–0.04)
IMM GRANULOCYTES NFR BLD AUTO: 0.3 % (ref 0–0.5)
KETONES UR QL STRIP: NEGATIVE
LEUKOCYTE ESTERASE UR QL STRIP: NEGATIVE
LYMPHOCYTES # BLD AUTO: 1.1 K/UL (ref 1–4.8)
LYMPHOCYTES NFR BLD: 28.4 % (ref 18–48)
MCH RBC QN AUTO: 31.6 PG (ref 27–31)
MCHC RBC AUTO-ENTMCNC: 32.4 G/DL (ref 32–36)
MCV RBC AUTO: 98 FL (ref 82–98)
MONOCYTES # BLD AUTO: 0.5 K/UL (ref 0.3–1)
MONOCYTES NFR BLD: 12.3 % (ref 4–15)
NEUTROPHILS # BLD AUTO: 2.1 K/UL (ref 1.8–7.7)
NEUTROPHILS NFR BLD: 54.7 % (ref 38–73)
NITRITE UR QL STRIP: NEGATIVE
NRBC BLD-RTO: 0 /100 WBC
PH UR STRIP: 6 [PH] (ref 5–8)
PLATELET # BLD AUTO: 187 K/UL (ref 150–450)
PMV BLD AUTO: 10.8 FL (ref 9.2–12.9)
POTASSIUM SERPL-SCNC: 4.1 MMOL/L (ref 3.5–5.1)
PROT SERPL-MCNC: 6.8 G/DL (ref 6–8.4)
PROT UR QL STRIP: NEGATIVE
RBC # BLD AUTO: 3.73 M/UL (ref 4–5.4)
SODIUM SERPL-SCNC: 139 MMOL/L (ref 136–145)
SP GR UR STRIP: 1.01 (ref 1–1.03)
TSH SERPL DL<=0.005 MIU/L-ACNC: 1.95 UIU/ML (ref 0.4–4)
URN SPEC COLLECT METH UR: ABNORMAL
WBC # BLD AUTO: 3.91 K/UL (ref 3.9–12.7)

## 2024-10-09 PROCEDURE — 81003 URINALYSIS AUTO W/O SCOPE: CPT | Mod: HCNC

## 2024-10-09 PROCEDURE — 36415 COLL VENOUS BLD VENIPUNCTURE: CPT | Mod: HCNC,PO

## 2024-10-09 PROCEDURE — 83036 HEMOGLOBIN GLYCOSYLATED A1C: CPT | Mod: HCNC

## 2024-10-09 PROCEDURE — 80053 COMPREHEN METABOLIC PANEL: CPT | Mod: HCNC

## 2024-10-09 PROCEDURE — 84443 ASSAY THYROID STIM HORMONE: CPT | Mod: HCNC

## 2024-10-09 PROCEDURE — 85025 COMPLETE CBC W/AUTO DIFF WBC: CPT | Mod: HCNC

## 2024-10-09 NOTE — ASSESSMENT & PLAN NOTE
- Pt noted improvements with ongoing PT  - No significant adverse effects after discontinuing Gemtessa reported  - Continue PT and follow up with Dr Salvador

## 2024-10-09 NOTE — ASSESSMENT & PLAN NOTE
- Reviewed prior BLE US results with Ms Navarro, which showed venous reflux bilaterally with no DVT  - Pt reported decreased BLE edema since discontinuing gemtessa and increasing HCTZ dose  - Discussed the physiology of chronic venous insufficiency and its relation to leg swelling.  - Acknowledged significant improvement in BLE edema with minimal edema appreciated on examination.  - Continue current regimen with gemtessa discontinued  - RTC for scheduled follow up on 10/21/24

## 2024-10-09 NOTE — PROGRESS NOTES
" [facility]    Subjective     Patient Name: Grace Navarro  YOB: 1945  Patient Age: 78 y.o.  Patient Sex: female  Patient Phone: [phone]  PCP: Lisa Dean MD  Last PCP Appointment: 9/12/2024    History of Present Illness    CHIEF COMPLAINT:  Ms. Navarro presents today for follow up and reports feeling persistently lightheaded, fatigued and weak.    CARDIOVASCULAR:  She has a history of hypertension, currently managed with carvedilol and hydrochlorothiazide. She reports erratic blood pressure readings, alternating between normal and significantly elevated values. She experiences dizziness and lightheadedness, particularly after taking her morning medications, often lasting until mid-afternoon. This affects her ability to drive safely and engage in daily activities. Her carvedilol dosage was recently increased to 25mg twice daily, and hydrochlorothiazide dosage was also increased. She reports a family history of hypertension on both sides, with her father having had multiple strokes before passing away at nearly 80 years old.    URINARY SYMPTOMS:  She reports a history of urinary incontinence, with two significant episodes occurring approximately one year ago. She was referred to a urogynecologist in mid-March and previously treated with Gemtessa for 3 months, which she found ineffective and costly. She has undergone physical therapy for incontinence, which she found beneficial and continues to practice. Currently, she reports normal urinary frequency, denies nocturnal urination, and has no urinary burning or pain.    FATIGUE AND ENERGY LEVELS:  She complains of severe fatigue and lack of energy, often needing to rest for 15-30 minutes after minimal exertion. This has significantly impacted her ability to perform daily tasks and engage in usual activities. She expresses feeling "miserable all the time" and that this is not her normal self.    MENTAL HEALTH:  She takes Lexapro for " depression, recently increased from 5 to 10 mg daily on . She has not noticed significant improvement in depressive symptoms since the dosage increase. She attends regular therapy sessions every two weeks, which she finds beneficial, especially as she lives alone.    VENOUS INSUFFICIENCY:  She has a history of varicose veins that have worsened with age. She recently experienced leg swelling, which has significantly improved (80-90%) since discontinuing Centesa. She uses compression stockings and elevates her legs at night to manage these issues.    BACK PAIN:  She had back surgery 12 years ago and currently manages her back pain by walking at the gym and applying topical diclofenac before walking. She reports decreased endurance compared to her previous ability. Her current back pain is around the waist area, with no pain upon palpation during exam.    MEDICATIONS AND SUPPLEMENTS:  Current medications include carvedilol, hydrochlorothiazide, and Lexapro. She also takes supplements including vitamin C, magnesium, calcium, vitamin D3, and B12. She takes one allergy pill in the morning for allergies developed since living in [place], though she questions its effectiveness.    FAMILY HISTORY:  Her mother  from kidney failure, prompting regular kidney function checks. History of maternal and pa  ternal high blood pressure. She denies any family history of thyroid disorders.    LIFESTYLE:  She lives alone and enjoys cooking and walking as hobbies. She attends the gym regularly but notes decreased strength and endurance in her workouts.    PAST MEDICAL HISTORY:  She reports a history of urinary tract infection approximately one year ago, now resolved.    ROS is negative unless otherwise indicated in HPI.       Health Maintenance and Care Gaps  TDap is UTD.   Influenza is not UTD.  Pneumovax is UTD.   Zostavax is UTD.       Covid is not UTD.  RSV is not UTD.  Colonoscopy is UTD.   DEXA  is  UTD.    Screening:  Hepatitis C is UTD in pts born between 2723-7103.   HIV is UTD     Prior to Admission medications    Medication Sig Start Date End Date Taking? Authorizing Provider   ascorbic acid, vitamin C, (VITAMIN C) 1000 MG tablet Take 1,000 mg by mouth 2 (two) times daily.   Yes Provider, Historical   Ca carb-Ca gluc-Mg ox-Mg gluco (CALCIUM MAGNESIUM) 500 mg calcium -250 mg Tab Take 1 tablet by mouth 2 (two) times a day.   Yes Provider, Historical   carvediloL (COREG) 12.5 MG tablet TAKE 2 TABLETS BY MOUTH IN THE MORNING AND 2 TABS IN THE EVENING 8/30/24  Yes Lisa Dean MD   cholecalciferol, vitamin D3, 1,000 unit capsule Take 1,000 Units by mouth 2 (two) times a day.   Yes Provider, Historical   conjugated estrogens (PREMARIN) vaginal cream 1 g with applicator or dime-sized amt with finger in vagina nightly x 2 weeks, then twice a week thereafter 3/14/24  Yes Kurtis Salvador,    cyanocobalamin 500 MCG tablet Take 500 mcg by mouth once daily.   Yes Provider, Historical   cyclobenzaprine (FLEXERIL) 5 MG tablet Take 1 tablet by mouth three times daily as needed for muscle spasm 9/3/24  Yes Lisa Dean MD   diclofenac sodium (VOLTAREN) 1 % Gel Apply 2 g topically 2 (two) times daily. 4/14/23  Yes Lisa Dean MD   EScitalopram oxalate (LEXAPRO) 10 MG tablet Take 1 tablet (10 mg total) by mouth once daily. 9/3/24 9/3/25 Yes Lisa Dean MD   ezetimibe (ZETIA) 10 mg tablet Take 1 tablet (10 mg total) by mouth once daily. 11/27/23  Yes Sheng Clark MD   hydroCHLOROthiazide (HYDRODIURIL) 12.5 MG Tab Take 1 tablet (12.5 mg total) by mouth 2 (two) times a day. 9/12/24 9/12/25 Yes Lisa Dean MD   hydrOXYzine HCL (ATARAX) 25 MG tablet Take 1 tablet (25 mg total) by mouth 3 (three) times daily as needed for Itching. 8/29/22  Yes Lisa Dean MD   meloxicam (MOBIC) 15 MG tablet Take 1 tablet (15 mg total) by mouth once daily. 2/20/24  Yes Lisa Dean MD   potassium chloride SA (K-DUR,KLOR-CON M) 10 MEQ tablet Take  "1 tablet (10 mEq total) by mouth once daily. 11/14/23 11/13/24 Yes Sheng Clark MD   triamcinolone acetonide 0.1% (KENALOG) 0.1 % cream APPLY  CREAM EXTERNALLY TO AFFECTED AREA TWICE DAILY AS NEEDED FOR 7 DAYS 10/9/23  Yes Lisa Dean MD   ammonium lactate 12 % Crea Apply to entire body daily after bath or shower. 6/20/24   Beth Barragan MD   hydrALAZINE (APRESOLINE) 25 MG tablet Take every 8 hours as needed for blood pressure >160/90.  Patient not taking: Reported on 10/8/2024 8/30/24   Lisa Dean MD   vibegron 75 mg Tab Take 1 tablet (75 mg total) by mouth Daily.  Patient not taking: Reported on 10/8/2024 3/14/24   Kurtis Salvador DO       OBJECTIVE:     Vitals:    10/08/24 1336 10/08/24 1339 10/08/24 1341   BP: 129/78 134/77 129/80   BP Location: Left arm Left arm Left arm   Patient Position: Lying Sitting Standing   Pulse: 70 70 75   SpO2: 96% 95% 95%   Weight: 69.1 kg (152 lb 5.4 oz)     Height: 5' 4" (1.626 m)         Body mass index is 26.15 kg/m².     PHYSICAL EXAM  GEN - A+OX4, NAD   HEENT - PERRL, EOMI, OP clear  Neck - No thyromegaly or cervical LAD. No thyroid masses felt.  CV - RRR, no m/r   Chest - CTAB, no wheezing or rhonchi  Abd - S/NT/ND/+BS.   Ext - 2+BDP and radial pulses. No C/C/E. Small prominent vessels noted in BLE with minimal edema  MSK - normal ROM, no tenderness  Neuro - 5/5 BUE and BLE strength.  LN - No axillary or inguinal LAD appreciated.  Skin - No rash.     LABS  Lab Results   Component Value Date    WBC 4.77 02/01/2024    HGB 12.4 02/01/2024    HCT 38.8 02/01/2024    MCV 98 02/01/2024     02/01/2024         CMP  Sodium   Date Value Ref Range Status   09/26/2024 139 136 - 145 mmol/L Final     Potassium   Date Value Ref Range Status   09/26/2024 3.5 3.5 - 5.1 mmol/L Final     Chloride   Date Value Ref Range Status   09/26/2024 101 95 - 110 mmol/L Final     CO2   Date Value Ref Range Status   09/26/2024 30 (H) 23 - 29 mmol/L Final     Glucose   Date Value " Ref Range Status   09/26/2024 101 70 - 110 mg/dL Final     BUN   Date Value Ref Range Status   09/26/2024 16 8 - 23 mg/dL Final     Creatinine   Date Value Ref Range Status   09/26/2024 0.6 0.5 - 1.4 mg/dL Final     Calcium   Date Value Ref Range Status   09/26/2024 9.3 8.7 - 10.5 mg/dL Final     Total Protein   Date Value Ref Range Status   08/05/2024 7.2 6.0 - 8.4 g/dL Final     Albumin   Date Value Ref Range Status   08/05/2024 4.1 3.5 - 5.2 g/dL Final     Total Bilirubin   Date Value Ref Range Status   08/05/2024 0.3 0.1 - 1.0 mg/dL Final     Comment:     For infants and newborns, interpretation of results should be based  on gestational age, weight and in agreement with clinical  observations.    Premature Infant recommended reference ranges:  Up to 24 hours.............<8.0 mg/dL  Up to 48 hours............<12.0 mg/dL  3-5 days..................<15.0 mg/dL  6-29 days.................<15.0 mg/dL       Alkaline Phosphatase   Date Value Ref Range Status   08/05/2024 64 55 - 135 U/L Final     AST   Date Value Ref Range Status   08/05/2024 21 10 - 40 U/L Final     ALT   Date Value Ref Range Status   08/05/2024 17 10 - 44 U/L Final     Anion Gap   Date Value Ref Range Status   09/26/2024 8 8 - 16 mmol/L Final     eGFR   Date Value Ref Range Status   09/26/2024 >60.0 >60 mL/min/1.73 m^2 Final       ASSESSMENT & PLAN:   Ms. Grace Navarro is a 78 y.o. female who was seen in clinic today for ongoing symptoms of feeling lightheaded, fatigued, and weak. Assessed patient's symptoms, which worsened after recent medication changes. Considered potential causes including medication side effects, anemia, thyroid dysfunction, and diabetes. Noted significant improvement in leg swelling after discontinuation of Gemtessa. Evaluated orthostatic BP, which was normal during office visit with no appreciable orthostatic hypotension. Will order lab work to rule out underlying causes before adjusting medications. Ms Navarro has a  scheduled follow up with Dr Dean (PCP) on 10/21/24.         1. Decreased energy  Overview:  - Chronic decreased energy associated with lightheadedness, fatigue and dizziness  - Pt reported feeling tired with little energy to do the things she enjoys like cooking and social activities  - She stated that other acquaintances have notice the change  - This symptoms have persisted despite recent improvement in BLE edema    Assessment & Plan:  - Reviewed medical/family history as well as current medications with Ms Navarro  - Discussed most recent lab results   - Checked orthostatic BP which was negative for orthostatic hypotension  - Discussed importance of drawing current/investigative lab work prior to changing medications  - Ordered A1C, TSH, CBC, CMP    Orders:  -     CBC W/ AUTO DIFFERENTIAL; Future; Expected date: 10/08/2024  -     COMPREHENSIVE METABOLIC PANEL; Future; Expected date: 10/08/2024  -     TSH; Future; Expected date: 10/08/2024    2. Incontinence in female  Overview:  - Previously started on Gemtessa, now discontinued  - Pt followed by Dr Salvador (UroGyn)  - PT for pelvic floor strengthening ordered  - Diuresis with HCTZ 12.5mg BID    Assessment & Plan:  - Pt noted improvements with ongoing PT  - No significant adverse effects after discontinuing Gemtessa reported  - Continue PT and follow up with Dr Salvador    Orders:  -     Urinalysis, Reflex to Urine Culture Urine, Clean Catch; Future; Expected date: 10/08/2024    3. Lower extremity edema  Overview:  - Hx of BLE edema   - Managed with HCTZ 12.5mg BID    Assessment & Plan:  - Reviewed prior BLE US results with Ms Navarro, which showed venous reflux bilaterally with no DVT  - Pt reported decreased BLE edema since discontinuing gemtessa and increasing HCTZ dose  - Discussed the physiology of chronic venous insufficiency and its relation to leg swelling.  - Acknowledged significant improvement in BLE edema with minimal edema appreciated on  examination.  - Continue current regimen with gemtessa discontinued  - RTC for scheduled follow up on 10/21/24        4. Abnormal finding of blood chemistry, unspecified  -     HEMOGLOBIN A1C; Future; Expected date: 10/08/2024    5. Primary hypertension  Overview:  - Managed with Coreg 25mg BID and HCTZ 12.5 BID  - [id number] PRN for SBP >180  - BP today controlled at 129/80  - Orthostatic BP negative for orthostatic hypotension:   Lyin/78 Sittin/77 Standin/80       Assessment & Plan:  - Continue current medications  - Continue cardiac diet with aerobic exercise as tolerated  - Daily BP checks/logs      6. Anxiety and depression  Overview:  - Managed with Lexapro 10mg daily (recently increased on 24 from 5mg daily)    Assessment & Plan:  - No adverse effects from increasing dosage  - Medication taken as indicated  - Minimal relief noted for current symptoms  - Continue current regimen      Follow up as scheduled with Dr Dean on 10/21/24    All questions answered. Pt to call/message the Primary [facility] for any additional concerns        Grzegorz Thompson MD  [facility] - [facility]    This note was generated with the assistance of ambient listening technology. Verbal consent was obtained by the patient and accompanying visitor(s) for the recording of patient appointment to facilitate this note. I attest to having reviewed and edited the generated note for accuracy, though some syntax or spelling errors may persist. Please contact the author of this note for any clarification.

## 2024-10-09 NOTE — ASSESSMENT & PLAN NOTE
- Reviewed medical/family history as well as current medications with Ms Navarro  - Discussed most recent lab results   - Checked orthostatic BP which was negative for orthostatic hypotension  - Discussed importance of drawing current/investigative lab work prior to changing medications  - Ordered A1C, TSH, CBC, CMP

## 2024-10-09 NOTE — ASSESSMENT & PLAN NOTE
- No adverse effects from increasing dosage  - Medication taken as indicated  - Minimal relief noted for current symptoms  - Continue current regimen

## 2024-10-09 NOTE — ASSESSMENT & PLAN NOTE
- Continue current medications  - Continue cardiac diet with aerobic exercise as tolerated  - Daily BP checks/logs

## 2024-10-10 ENCOUNTER — OFFICE VISIT (OUTPATIENT)
Dept: OPTOMETRY | Facility: CLINIC | Age: 79
End: 2024-10-10
Payer: MEDICARE

## 2024-10-10 DIAGNOSIS — H52.4 PRESBYOPIA OF BOTH EYES: Primary | ICD-10-CM

## 2024-10-10 PROCEDURE — 99999 PR PBB SHADOW E&M-EST. PATIENT-LVL III: CPT | Mod: PBBFAC,,, | Performed by: OPTOMETRIST

## 2024-10-10 PROCEDURE — 92015 DETERMINE REFRACTIVE STATE: CPT | Mod: S$GLB,,, | Performed by: OPTOMETRIST

## 2024-10-10 PROCEDURE — 99499 UNLISTED E&M SERVICE: CPT | Mod: S$GLB,,, | Performed by: OPTOMETRIST

## 2024-10-10 NOTE — PROGRESS NOTES
HPI    Patient states that her currently glasses are for near vision and computer   only. Feels as though she needs a stronger prescription.   Last edited by Terri Patel on 10/10/2024  9:35 AM.            Assessment /Plan     For exam results, see Encounter Report.    Presbyopia of both eyes      Eyeglass Final Rx       Eyeglass Final Rx         Sphere Cylinder Axis Add    Right -0.50 +0.50 095 +3.00    Left -0.50 +0.50 085 +3.00      Type: PAL    Expiration Date: 10/10/2025                   RTC to me PRN

## 2024-10-11 DIAGNOSIS — L30.9 DERMATITIS: ICD-10-CM

## 2024-10-11 RX ORDER — TRIAMCINOLONE ACETONIDE 1 MG/G
CREAM TOPICAL
Qty: 60 G | Refills: 0 | Status: SHIPPED | OUTPATIENT
Start: 2024-10-11

## 2024-10-14 NOTE — PLAN OF CARE
[facility]   Pelvic Health Physical Therapy Initial Evaluation      Date: 10/15/2024   Name: Grace Ware zabrina  Clinic Number: [id number]    Therapy Diagnosis:   Encounter Diagnoses   Name Primary?    Urge incontinence     Pelvic floor weakness Yes     Referring Provider: Kurtis Salvador,*    Referring Provider Orders: PT Eval and Treat  Medical Diagnosis from Referral: Urge incontinence [N39.41]   Evaluation Date: 10/15/2024  Authorization Period Expiration: 2024  Plan of Care Expiration: 1/15/2025  Progress Note Due: 11/15/2024  Visit # / Visits authorized:    FOTO: 1/3    Precautions: Standard     Time In: 8:54  Time Out: 9:30  Total Appointment Time (timed & untimed codes): 36 minutes    SUBJECTIVE     Date of onset: Concern over possibility of bladder leakage recurrence after medication cessation .   History of current condition: Grace reports Bebeto gave her Gemtessa for her symptoms then she completed PFPT from march-2024. Patient reports that she went off the medication, and symptoms came back within a month. She reports being most bothered by sensation of needing to urinate when she doesn't actually need to.    Patient also reports diagnosis of chronic venous insufficieny and has been taking a water pill to treat this.  Patient reports that she didn't take the medication this morning, as she was worried about having an accident, and she has to stay home when she takes it.    Reports that she has more urgency to go to the bathroom, hasn't had leakage but is concerned about that returning.    Reports that she hasn't been doing her exercises, feels like she was procrastinating, and had the gemtessa so she didn't need them.    OB/GYN History:      Hysterectomy? Yes  Oophorectomy? Yes  Using vaginal estrogen cream: Yes 2x per week  Sexually active? No    SEXUAL/PELVIC PAIN HISTORY  Pelvic Pain with: none reported       BLADDER   "History  Frequency of urination:   Day: every 2 hours            Difficulty initiating urine stream: No  Urine stream: strong  Complete emptying: Yes when using double void  Post-void dribbling: No  Urinary Urgency: Yes.    Bladder leakage: No hasn't had leakage yet    Leakage Protection  Form of protection: panty liner  Number of pads required in 24 hours: 1    BOWEL History  Reviewed but no symptoms reported    Types of fluid intake: Water: 8x8oz/day and expresso & caffinated tea 1 each per day  Diet: Standard  Habitus: well developed, well nourished  Abuse/Neglect: Pt denies a history of physical or emotional abuse at this visit.       Prior Therapy/Previous treatment included: Prior therapy with Lisa Hahn March-June 2024  Social History/Living Arrangements: lives alone  Current exercise: Walks around walking track [employer] almost daily  Occupation: retired  Prior Level of Function: no pelvic floor dysfunction  Current Level of Function: increased urgency    Constitutional Symptoms Review: Dizziness/syncope (fainting) and Fatigue     Patients goals: "Not using Gemtessa, so need to apply myself as this will be the only tool I have to help prevent leakage"    Imaging: None reported for this condition     Medical History: Grace  has a past medical history of Anxiety, Rene's disease, Chronic low back pain, Depression, Glaucoma, Goiter, nodular, Hyperlipidemia, Hypertension, Irritable bowel, Neuromuscular disorder, Osteopenia of multiple sites (05/29/2017), PUD (peptic ulcer disease), Subarachnoid hemorrhage (2014), and Varicose veins.     Surgical History: Grace Navarro  has a past surgical history that includes Appendectomy; Tonsillectomy; Spine surgery (07/2012); Blepharoptosis repair (Bilateral, 2005); Cataract extraction w/  intraocular lens implant (Left, 08/03/2016); Cataract extraction w/  intraocular lens implant (Right, 09/21/2016); Breast biopsy (Right); Tarsal stripping (Right, 11/02/2022); and " blepharoplasty, upper eyelid (Bilateral, 11/02/2022).    Medications: Grace has a current medication list which includes the following prescription(s): ascorbic acid (vitamin c), calcium magnesium, carvedilol, cholecalciferol (vitamin d3), premarin, cyanocobalamin, cyclobenzaprine, diclofenac sodium, escitalopram oxalate, ezetimibe, hydralazine, hydrochlorothiazide, hydroxyzine hcl, meloxicam, potassium chloride sa, and triamcinolone acetonide 0.1%.    Allergies:   Review of patient's allergies indicates:   Allergen Reactions    Ace inhibitors      Pt not sure which medication it was - was told by doctor that she was allergic    Amlodipine      flushing    Ibuprofen      Elevate blood pressure    Morphine Hallucinations    Statins-hmg-coa reductase inhibitors      Muscle cramps        OBJECTIVE     See Electronic Medical Record under MEDIA for written consent provided 10/15/2024  Chaperone: declined  Deferred Internal Pelvic floor muscle assessment due to time constraints.    Assessed patient's pelvic floor contraction of levator ani in supine with good palpable & visible lift and drop.     Limitation/Restriction for FOTO Pelvic Floor Surveys    Therapist reviewed FOTO scores for Grace Navarro on 10/15/2024  FOTO documents entered into [employer] - see Media section.    Limitation Score:   % impairment     TREATMENT     Total Treatment time (time-based codes) separate from the evaluation: 10 minutes     Neuromuscular Re-education to develop Coordination, Control, Proprioception, and Sense for 2 minutes including:   [x] Patient 8x pelvic floor contraction & 10 sec hold     Therapeutic Activity Patient participated in dynamic functional therapeutic activities to improve functional performance for 8 minutes. Including: Education as described below.   [x] Education on pelvic floor anatomy, function, and assessment and impact on current level of function   [x] pt prognosis and PT plan of care  [x] HEP building/HEP  review    PATIENT EDUCATION AND HOME EXERCISES     Education provided: general anatomy/physiology of urinary & bowel system, benefits of treatment, and alternative methods of treatment were discussed with the patient. Additionally, Grace was provided education on pt prognosis and physical therapy plan of care.     Written Home Exercises provided: yes  Exercises were reviewed, and Grace was able to demonstrate them prior to the end of the session. Grace demonstrated good understanding of the education provided. See EMR under 'Patient Instructions' for exercises and education provided during session.    Grace demonstrated good  understanding of the education & exercises provided.    ASSESSMENT     Grace is a 78 y.o. female referred to outpatient physical therapy with a medical diagnosis of Urge incontinence [N39.41]. Patient presents with increased frequency of urination and incomplete urination, however has not had leakage episodes return since last round of pelvic floor physical therapy.   Symptoms impair the patient's ability to perform ADLs and limit her ability to leave the home due to fear of incontinence. External pelvic floor examination performed today, show's that patient has good ability effectively recruit pelvic floor musculature and perform a pelvic floor contraction, with good endurance as she is able to maintain that hold for 10 seconds. Internal assessment deferred to next session due to time constraints, however this will be beneficial to assess pelvic floor muscles more specifically. Symptoms appear consistent with pelvic floor dysfunction.   Pt will benefit from pelvic floor muscle training, bladder habit retraining, core/hip strengthening, and patient education    Pt prognosis is Excellent.   Grace will benefit from skilled outpatient physical therapy to address the deficits stated above and in the chart below, provide patient/family education, and to maximize the patient's level  of independence.     Plan of care discussed with patient: yes  Patient's spiritual, cultural and educational needs considered, and the patient is agreeable to the plan of care and goals as stated below:     Anticipated Barriers for therapy: none    Medical Necessity is demonstrated by the following -  History  Co-morbidities and personal factors that may impact the plan of care [x] LOW: no personal factors / co-morbidities  [] MODERATE: 1-2 personal factors / co-morbidities  [] HIGH: 3+ personal factors / co-morbidities    Moderate / High Support Documentation:   Co-morbidities affecting plan of care: none    Personal Factors:   none     Examination  Body Structures and Functions, activity limitations and participation restrictions that may impact the plan of care [x] LOW: addressing 1-2 elements  [] MODERATE: 3+ elements  [] HIGH: 4+ elements (please support below)    Moderate / High Support Documentation: none     Clinical Presentation [x] LOW: stable  [] MODERATE: Evolving  [] HIGH: Unstable     Decision Making/ Complexity Score: low     Goals:  Short Term Goals: 4 weeks   Pt to be able to perform a 10 second kegel x 10 reps to demonstrate improving strength and endurance needed for continence.  Pt to report urinary frequency of no more than every 2-3 hours to demonstrate improved bladder control and Pt to verbalize urge suppression strategies for improved control over urgency and urge urinary incontinence   Patient to be independent with introductory home exercise program.     Long Term Goals: 12 weeks  Pt to be able to perform a 15 second kegel x 10 reps to demonstrate improving strength and endurance needed for continence.  Patient to report reduced feelings of bladder discomfort, and feelings of increased urgency that make her feel like she has to go to the bathroom more frequently.  Patient to be independent with advanced home exercise program for carryover post discharge.    PLAN     Plan of Care  certification: 10/15/2024 to 1/15/2024    Outpatient Physical Therapy 1-2 times weekly for 12 weeks to include the following interventions: therapeutic exercises, therapeutic activity, neuromuscular re-education, gait training, manual therapy, modalities PRN, patient/family education, and self care/home management, and dry needling.     Nupur Fay, PT, DPT

## 2024-10-15 ENCOUNTER — CLINICAL SUPPORT (OUTPATIENT)
Dept: REHABILITATION | Facility: HOSPITAL | Age: 79
End: 2024-10-15
Attending: OBSTETRICS & GYNECOLOGY
Payer: MEDICARE

## 2024-10-15 DIAGNOSIS — N39.41 URGE INCONTINENCE: ICD-10-CM

## 2024-10-15 DIAGNOSIS — N81.89 PELVIC FLOOR WEAKNESS: Primary | ICD-10-CM

## 2024-10-15 PROCEDURE — 97530 THERAPEUTIC ACTIVITIES: CPT | Mod: HCNC

## 2024-10-15 PROCEDURE — 97161 PT EVAL LOW COMPLEX 20 MIN: CPT | Mod: HCNC

## 2024-10-21 ENCOUNTER — OFFICE VISIT (OUTPATIENT)
Dept: PRIMARY CARE CLINIC | Facility: CLINIC | Age: 79
End: 2024-10-21
Payer: MEDICARE

## 2024-10-21 VITALS
OXYGEN SATURATION: 97 % | HEART RATE: 64 BPM | BODY MASS INDEX: 26.49 KG/M2 | WEIGHT: 155.19 LBS | HEIGHT: 64 IN | DIASTOLIC BLOOD PRESSURE: 78 MMHG | TEMPERATURE: 98 F | SYSTOLIC BLOOD PRESSURE: 140 MMHG

## 2024-10-21 DIAGNOSIS — R20.2 PARESTHESIA: ICD-10-CM

## 2024-10-21 DIAGNOSIS — Z23 FLU VACCINE NEED: ICD-10-CM

## 2024-10-21 DIAGNOSIS — D64.9 NORMOCYTIC ANEMIA: ICD-10-CM

## 2024-10-21 DIAGNOSIS — I10 BENIGN ESSENTIAL HYPERTENSION: Primary | ICD-10-CM

## 2024-10-21 DIAGNOSIS — E87.6 HYPOKALEMIA: ICD-10-CM

## 2024-10-21 PROCEDURE — 1159F MED LIST DOCD IN RCRD: CPT | Mod: HCNC,CPTII,S$GLB, | Performed by: INTERNAL MEDICINE

## 2024-10-21 PROCEDURE — 99214 OFFICE O/P EST MOD 30 MIN: CPT | Mod: HCNC,S$GLB,, | Performed by: INTERNAL MEDICINE

## 2024-10-21 PROCEDURE — 3077F SYST BP >= 140 MM HG: CPT | Mod: HCNC,CPTII,S$GLB, | Performed by: INTERNAL MEDICINE

## 2024-10-21 PROCEDURE — 1125F AMNT PAIN NOTED PAIN PRSNT: CPT | Mod: HCNC,CPTII,S$GLB, | Performed by: INTERNAL MEDICINE

## 2024-10-21 PROCEDURE — 90653 IIV ADJUVANT VACCINE IM: CPT | Mod: HCNC,S$GLB,, | Performed by: INTERNAL MEDICINE

## 2024-10-21 PROCEDURE — 3288F FALL RISK ASSESSMENT DOCD: CPT | Mod: HCNC,CPTII,S$GLB, | Performed by: INTERNAL MEDICINE

## 2024-10-21 PROCEDURE — G0008 ADMIN INFLUENZA VIRUS VAC: HCPCS | Mod: HCNC,S$GLB,, | Performed by: INTERNAL MEDICINE

## 2024-10-21 PROCEDURE — 99999 PR PBB SHADOW E&M-EST. PATIENT-LVL III: CPT | Mod: PBBFAC,HCNC,, | Performed by: INTERNAL MEDICINE

## 2024-10-21 PROCEDURE — 3078F DIAST BP <80 MM HG: CPT | Mod: HCNC,CPTII,S$GLB, | Performed by: INTERNAL MEDICINE

## 2024-10-21 PROCEDURE — 1160F RVW MEDS BY RX/DR IN RCRD: CPT | Mod: HCNC,CPTII,S$GLB, | Performed by: INTERNAL MEDICINE

## 2024-10-21 PROCEDURE — 1101F PT FALLS ASSESS-DOCD LE1/YR: CPT | Mod: HCNC,CPTII,S$GLB, | Performed by: INTERNAL MEDICINE

## 2024-10-21 RX ORDER — CARVEDILOL 12.5 MG/1
TABLET ORAL
Start: 2024-10-21

## 2024-10-21 RX ORDER — SPIRONOLACTONE 25 MG/1
12.5 TABLET ORAL DAILY
Qty: 15 TABLET | Refills: 11 | Status: SHIPPED | OUTPATIENT
Start: 2024-10-21 | End: 2025-10-21

## 2024-10-21 RX ORDER — HYDROCHLOROTHIAZIDE 12.5 MG/1
12.5 TABLET ORAL DAILY
Start: 2024-10-21 | End: 2024-10-21

## 2024-10-21 NOTE — PROGRESS NOTES
"Subjective:       Patient ID: Grace Navarro is a 78 y.o. female.    Chief Complaint: Follow-up    HPI  Recently saw Dr. Thompson 10/8/24 for complaints of lightheadedness/fatigue/weakness. Chronically have some LBP.   Mild anemia compared to previously. Avoiding red meat for cholesterol.   No blood in stool or urine.   TSH, A1C, CMP UA ok.   Went back to taking coreg 12.5mg qam and 25mg qpm and hctz 12.5mg daily. Has been feeling better since going back to current med. On K dur 10mEq daily.     Urge incontinence - was tried on gemtesa, which helped w/ urination but caused leg swelling. Stopped when she saw urogyn (Dr. Salvador). Referred to pelvic floor PT. Started exercise.     Asked about LPA. Discussed it's a risk stratification test.   High ASCVD score. Tried on atorvastatin and multiple doses of crestor and both caused diffuse body aches and didn't want this class of drug so on zetia 10mg daily.     Review of Systems  Comprehensive review of systems otherwise negative. See history/subjective section for more details.    Objective:      Physical Exam    BP (!) 140/78   Pulse 64   Temp 97.7 °F (36.5 °C)   Ht 5' 4" (1.626 m)   Wt 70.4 kg (155 lb 3.3 oz)   LMP 06/14/1996   SpO2 97%   BMI 26.64 kg/m²     GEN - A+OX4, NAD   HEENT - PERRL, EOMI, OP clear. MMM.   Neck - No thyromegaly or cervical LAD. No thyroid masses felt.  CV - RRR, no m/r   Chest - CTAB, no wheezing or rhonchi  Abd - S/NT/ND/+BS.   Ext - 2+BDP and radial pulses. No LE edema.   Neuro - 5/5 BUE and BLE strength.  Skin - No rash.    Previous labs reviewed.     Assessment/Plan     Grace was seen today for follow-up.    Diagnoses and all orders for this visit:    Benign essential hypertension - trial of switching hctz to aldactone. Aldosterone/renin ratio before change of med. Stop kdur also.   -     Aldosterone/Renin Activity Ratio; Future  -     carvediloL (COREG) 12.5 MG tablet; TAKE 1 TABLET BY MOUTH IN THE MORNING AND 2 TABS IN " THE EVENING  -     Discontinue: hydroCHLOROthiazide (HYDRODIURIL) 12.5 MG Tab; Take 1 tablet (12.5 mg total) by mouth once daily.  -     spironolactone (ALDACTONE) 25 MG tablet; Take 0.5 tablets (12.5 mg total) by mouth once daily.    Hypokalemia - ok to stop kdur.  -     Aldosterone/Renin Activity Ratio; Future    Normocytic anemia - new. Labs as below.  -     CBC Auto Differential; Future  -     Ferritin; Future  -     Iron and TIBC; Future  -     Folate; Future  -     Vitamin B12; Future    Paresthesia  -     Folate; Future  -     Vitamin B12; Future        Follow up in about 2 weeks (around 11/4/2024).      Lisa Dean MD  Department of Internal Medicine - [facility]  1:10 PM

## 2024-10-22 ENCOUNTER — LAB VISIT (OUTPATIENT)
Dept: LAB | Facility: HOSPITAL | Age: 79
End: 2024-10-22
Attending: INTERNAL MEDICINE
Payer: MEDICARE

## 2024-10-22 ENCOUNTER — CLINICAL SUPPORT (OUTPATIENT)
Dept: PRIMARY CARE CLINIC | Facility: CLINIC | Age: 79
End: 2024-10-22
Payer: MEDICARE

## 2024-10-22 DIAGNOSIS — D64.9 NORMOCYTIC ANEMIA: ICD-10-CM

## 2024-10-22 DIAGNOSIS — F43.21 ADJUSTMENT DISORDER WITH DEPRESSED MOOD: ICD-10-CM

## 2024-10-22 DIAGNOSIS — E87.6 HYPOKALEMIA: ICD-10-CM

## 2024-10-22 DIAGNOSIS — Z65.8 INTERPERSONAL PROBLEM: Primary | ICD-10-CM

## 2024-10-22 DIAGNOSIS — I10 BENIGN ESSENTIAL HYPERTENSION: ICD-10-CM

## 2024-10-22 DIAGNOSIS — R20.2 PARESTHESIA: ICD-10-CM

## 2024-10-22 LAB
BASOPHILS # BLD AUTO: 0.02 K/UL (ref 0–0.2)
BASOPHILS NFR BLD: 0.5 % (ref 0–1.9)
DIFFERENTIAL METHOD BLD: ABNORMAL
EOSINOPHIL # BLD AUTO: 0.1 K/UL (ref 0–0.5)
EOSINOPHIL NFR BLD: 2.3 % (ref 0–8)
ERYTHROCYTE [DISTWIDTH] IN BLOOD BY AUTOMATED COUNT: 13.7 % (ref 11.5–14.5)
FERRITIN SERPL-MCNC: 93 NG/ML (ref 20–300)
FOLATE SERPL-MCNC: 12.2 NG/ML (ref 4–24)
HCT VFR BLD AUTO: 37.5 % (ref 37–48.5)
HGB BLD-MCNC: 11.7 G/DL (ref 12–16)
IMM GRANULOCYTES # BLD AUTO: 0.01 K/UL (ref 0–0.04)
IMM GRANULOCYTES NFR BLD AUTO: 0.2 % (ref 0–0.5)
IRON SERPL-MCNC: 79 UG/DL (ref 30–160)
LYMPHOCYTES # BLD AUTO: 0.8 K/UL (ref 1–4.8)
LYMPHOCYTES NFR BLD: 17.4 % (ref 18–48)
MCH RBC QN AUTO: 31.5 PG (ref 27–31)
MCHC RBC AUTO-ENTMCNC: 31.2 G/DL (ref 32–36)
MCV RBC AUTO: 101 FL (ref 82–98)
MONOCYTES # BLD AUTO: 0.6 K/UL (ref 0.3–1)
MONOCYTES NFR BLD: 13.5 % (ref 4–15)
NEUTROPHILS # BLD AUTO: 2.9 K/UL (ref 1.8–7.7)
NEUTROPHILS NFR BLD: 66.1 % (ref 38–73)
NRBC BLD-RTO: 0 /100 WBC
PLATELET # BLD AUTO: 184 K/UL (ref 150–450)
PMV BLD AUTO: 10.5 FL (ref 9.2–12.9)
RBC # BLD AUTO: 3.71 M/UL (ref 4–5.4)
SATURATED IRON: 22 % (ref 20–50)
TOTAL IRON BINDING CAPACITY: 357 UG/DL (ref 250–450)
TRANSFERRIN SERPL-MCNC: 241 MG/DL (ref 200–375)
VIT B12 SERPL-MCNC: 875 PG/ML (ref 210–950)
WBC # BLD AUTO: 4.31 K/UL (ref 3.9–12.7)

## 2024-10-22 PROCEDURE — 84244 ASSAY OF RENIN: CPT | Mod: HCNC | Performed by: INTERNAL MEDICINE

## 2024-10-22 PROCEDURE — 82607 VITAMIN B-12: CPT | Mod: HCNC | Performed by: INTERNAL MEDICINE

## 2024-10-22 PROCEDURE — 36415 COLL VENOUS BLD VENIPUNCTURE: CPT | Mod: HCNC,PO | Performed by: INTERNAL MEDICINE

## 2024-10-22 PROCEDURE — 99499 UNLISTED E&M SERVICE: CPT | Mod: HCNC,S$GLB,,

## 2024-10-22 PROCEDURE — 84466 ASSAY OF TRANSFERRIN: CPT | Mod: HCNC | Performed by: INTERNAL MEDICINE

## 2024-10-22 PROCEDURE — 82746 ASSAY OF FOLIC ACID SERUM: CPT | Mod: HCNC | Performed by: INTERNAL MEDICINE

## 2024-10-22 PROCEDURE — 82728 ASSAY OF FERRITIN: CPT | Mod: HCNC | Performed by: INTERNAL MEDICINE

## 2024-10-22 PROCEDURE — 85025 COMPLETE CBC W/AUTO DIFF WBC: CPT | Mod: HCNC | Performed by: INTERNAL MEDICINE

## 2024-10-22 PROCEDURE — 82088 ASSAY OF ALDOSTERONE: CPT | Mod: HCNC | Performed by: INTERNAL MEDICINE

## 2024-10-23 ENCOUNTER — TELEPHONE (OUTPATIENT)
Dept: PRIMARY CARE CLINIC | Facility: CLINIC | Age: 79
End: 2024-10-23
Payer: MEDICARE

## 2024-10-23 NOTE — TELEPHONE ENCOUNTER
Please call and notify pt:  B12, folate, iron levels are fine. Still mildly anemic. Any alcohol use? Her red blood cells are big and that sometimes occur w/ alcohol intake.

## 2024-10-24 NOTE — TELEPHONE ENCOUNTER
Spoke w/ SW , sw stated that patient let her know that she did stop etoh x 4 weeks ago.   Will call patient with lab results, and see how she is doing.

## 2024-10-25 LAB
ALDOST SERPL-MCNC: 18.7 NG/DL
ALDOST/RENIN PLAS-RTO: 62.3 RATIO
RENIN PLAS-CCNC: 0.3 NG/ML/HR

## 2024-10-30 ENCOUNTER — CLINICAL SUPPORT (OUTPATIENT)
Dept: REHABILITATION | Facility: HOSPITAL | Age: 79
End: 2024-10-30
Attending: OBSTETRICS & GYNECOLOGY
Payer: MEDICARE

## 2024-10-30 DIAGNOSIS — M62.89 PELVIC FLOOR DYSFUNCTION: Primary | ICD-10-CM

## 2024-10-30 DIAGNOSIS — N81.89 PELVIC FLOOR WEAKNESS: ICD-10-CM

## 2024-10-30 PROCEDURE — 97530 THERAPEUTIC ACTIVITIES: CPT | Mod: HCNC

## 2024-10-30 PROCEDURE — 97112 NEUROMUSCULAR REEDUCATION: CPT | Mod: HCNC

## 2024-10-31 ENCOUNTER — PATIENT MESSAGE (OUTPATIENT)
Dept: REHABILITATION | Facility: HOSPITAL | Age: 79
End: 2024-10-31
Payer: MEDICARE

## 2024-11-04 ENCOUNTER — OFFICE VISIT (OUTPATIENT)
Dept: PRIMARY CARE CLINIC | Facility: CLINIC | Age: 79
End: 2024-11-04
Payer: MEDICARE

## 2024-11-04 ENCOUNTER — HOSPITAL ENCOUNTER (OUTPATIENT)
Dept: RADIOLOGY | Facility: HOSPITAL | Age: 79
Discharge: HOME | End: 2024-11-04
Attending: INTERNAL MEDICINE
Payer: MEDICARE

## 2024-11-04 ENCOUNTER — CLINICAL SUPPORT (OUTPATIENT)
Dept: PRIMARY CARE CLINIC | Facility: CLINIC | Age: 79
End: 2024-11-04
Payer: MEDICARE

## 2024-11-04 VITALS
OXYGEN SATURATION: 97 % | DIASTOLIC BLOOD PRESSURE: 88 MMHG | BODY MASS INDEX: 26.32 KG/M2 | WEIGHT: 154.19 LBS | TEMPERATURE: 98 F | SYSTOLIC BLOOD PRESSURE: 152 MMHG | HEIGHT: 64 IN | HEART RATE: 62 BPM

## 2024-11-04 DIAGNOSIS — I10 BENIGN ESSENTIAL HYPERTENSION: ICD-10-CM

## 2024-11-04 DIAGNOSIS — E26.9 HIGH ALDOSTERONE TO RENIN RATIO: Primary | ICD-10-CM

## 2024-11-04 DIAGNOSIS — D64.9 NORMOCYTIC ANEMIA: ICD-10-CM

## 2024-11-04 DIAGNOSIS — Z65.8 INTERPERSONAL PROBLEM: Primary | ICD-10-CM

## 2024-11-04 DIAGNOSIS — E26.9 HIGH ALDOSTERONE TO RENIN RATIO: ICD-10-CM

## 2024-11-04 PROCEDURE — 3288F FALL RISK ASSESSMENT DOCD: CPT | Mod: HCNC,CPTII,S$GLB, | Performed by: INTERNAL MEDICINE

## 2024-11-04 PROCEDURE — 25500020 PHARM REV CODE 255: Mod: HCNC | Performed by: INTERNAL MEDICINE

## 2024-11-04 PROCEDURE — 99999 PR PBB SHADOW E&M-EST. PATIENT-LVL V: CPT | Mod: PBBFAC,HCNC,, | Performed by: INTERNAL MEDICINE

## 2024-11-04 PROCEDURE — 99499 UNLISTED E&M SERVICE: CPT | Mod: HCNC,S$GLB,,

## 2024-11-04 PROCEDURE — 1125F AMNT PAIN NOTED PAIN PRSNT: CPT | Mod: HCNC,CPTII,S$GLB, | Performed by: INTERNAL MEDICINE

## 2024-11-04 PROCEDURE — 74177 CT ABD & PELVIS W/CONTRAST: CPT | Mod: TC,HCNC

## 2024-11-04 PROCEDURE — 1160F RVW MEDS BY RX/DR IN RCRD: CPT | Mod: HCNC,CPTII,S$GLB, | Performed by: INTERNAL MEDICINE

## 2024-11-04 PROCEDURE — 3077F SYST BP >= 140 MM HG: CPT | Mod: HCNC,CPTII,S$GLB, | Performed by: INTERNAL MEDICINE

## 2024-11-04 PROCEDURE — 74177 CT ABD & PELVIS W/CONTRAST: CPT | Mod: 26,HCNC,, | Performed by: RADIOLOGY

## 2024-11-04 PROCEDURE — 99214 OFFICE O/P EST MOD 30 MIN: CPT | Mod: HCNC,S$GLB,, | Performed by: INTERNAL MEDICINE

## 2024-11-04 PROCEDURE — 3079F DIAST BP 80-89 MM HG: CPT | Mod: HCNC,CPTII,S$GLB, | Performed by: INTERNAL MEDICINE

## 2024-11-04 PROCEDURE — 1101F PT FALLS ASSESS-DOCD LE1/YR: CPT | Mod: HCNC,CPTII,S$GLB, | Performed by: INTERNAL MEDICINE

## 2024-11-04 PROCEDURE — 1159F MED LIST DOCD IN RCRD: CPT | Mod: HCNC,CPTII,S$GLB, | Performed by: INTERNAL MEDICINE

## 2024-11-04 RX ORDER — SPIRONOLACTONE 25 MG/1
25 TABLET ORAL DAILY
Qty: 90 TABLET | Refills: 3 | Status: SHIPPED | OUTPATIENT
Start: 2024-11-04 | End: 2025-11-04

## 2024-11-04 RX ADMIN — IOHEXOL 75 ML: 350 INJECTION, SOLUTION INTRAVENOUS at 04:11

## 2024-11-04 RX ADMIN — IOHEXOL 30 ML: 350 INJECTION, SOLUTION INTRAVENOUS at 03:11

## 2024-11-04 NOTE — PROGRESS NOTES
DATE:  10/22/24  REFERRAL SOURCE:  Lisa Dean MD  TYPE OF VISIT:  In person  SITE:  [facility]  LENGTH OF SESSION: 50    HISTORY OF PRESENTING ILLNESS:  Grace Navarro, a 78 y.o. 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    CHIEF COMPLAINT/REASON FOR ENCOUNTER: Met with patient for individual psychotherapy. Pt's chief complaint includes the following: depression, anxiety, and interpersonal.     INTERVAL HISTORY AND CONTENT OF CURRENT SESSION:   Patient reports she continues to feel better but BP is not doing well.  Dr. Dean is changing meds again.  She has been thinking about relationship with son and being able to maintain healthy boundaries. She initially thought of this as selfish but realizes she is prioritizing self care.   She does not want to invite him to her house. She had a long conversation with him and feels she let her guard down.  She differentiates friends vs acquaintances and discussed she feels this was about her son now.  She doesn't trust his intentions.  She was deeply hurt 6 yrs ago when he left the family and does not want revenge but wants to protect herself.  LCSW provided reinforcement for good insight into problematic behavior and changes made to prioritize her self care.  Patient has begun cooking again and back to the gym.  LCSW reviewed behavioral activation with scheduling activities that have a high likelihood for pleasure and mastery.  Patient is looking forward to her month at her dtr's and looking forward to spending time with family.  She will work on downsizing her things at home of 40 yrs but dtr has not started on building the apt.  LCSW provided support and active listening throughout session to assist client with problem solving interpersonal problems.    TREATMENT PLAN/GOALS:  Target symptoms: depression, anxiety , adjustment   Worry and ruminate less about family  members.   Experience feelings of contentment and happiness more often.  Why chosen therapy is appropriate versus another modality: relevant to diagnosis  Outcome monitoring methods: self-report, observation, checklist/rating scale  Therapeutic intervention type: insight oriented psychotherapy, supportive psychotherapy    RISK ASSESSMENT:  Patient reports no suicidal ideation  Patient reports no homicidal ideation  Patient reports no self-injurious behavior  Patient reports no violent behavior    MENTAL HEALTH STATUS EXAM  General Appearance:  age appropriate, casually dressed, tired   Speech: normal tone, normal rate, normal pitch, normal volume      Level of Cooperation: cooperative      Thought Processes: normal and logical   Mood: happy      Thought Content: normal, no suicidality, no homicidality, delusions, or paranoia   Affect: appropriate, sad   Orientation: Oriented x3   Memory: intact for content of interview   Attention Span & Concentration: able to focus   Fund of General Knowledge: intact and appropriate to age and level of education   Abstract Reasoning: Not formally tested   Judgment & Insight: good     Language  intact        PATIENT'S RESPONSE TO INTERVENTION:  The patient's response to intervention is accepting, motivated.    PROGRESS TOWARD GOALS AND OTHER MENTAL STATUS CHANGES:  The patient's progress toward goals is good.    DIAGNOSIS:   Anxiety and Depression - adjustment    PLAN: Pt plans to continue individual psychotherapy.  Problem-solving Therapy will be utilized in future individual therapy sessions to increase insight and support.     RETURN TO CLINIC: 2 weeks as scheduled

## 2024-11-04 NOTE — PATIENT INSTRUCTIONS
Increase spironolactone from 1/2 tablet to 1 tablet daily.   Your aldosterone to renin ratio (adrenal gland hormones) were high so I want to get a CT scan to look at the adrenal glands. Schedule CT of the abdomen/pelvis with contrast.

## 2024-11-04 NOTE — PROGRESS NOTES
"Subjective:       Patient ID: Grace Navarro is a 78 y.o. female.    Chief Complaint: Follow-up    HPI  Aldosterone/renin ratio 62.3 10/22/24. At our last visit, changed HCTZ 12.5mg to aldactone 1/2 of 25mg daily. Continued on coreg 12.5mg qam and 2 tabs qpm. Stopped on kdur due to change from HCTZ to aldactone.   Reports no more dizziness. Feels like she has so much more energy and doesn't feel like she needs to go to sleep at 6pm.   Part of digital HTN program. BPs have been high though.   No leg swelling.     New anemia that's mild - Hgb 11.7. b12, folate, iron, ferritin ok.     Review of Systems  Comprehensive review of systems otherwise negative. See history/subjective section for more details.    Objective:      Physical Exam    BP (!) 152/88   Pulse 62   Temp 97.9 °F (36.6 °C)   Ht 5' 4" (1.626 m)   Wt 70 kg (154 lb 3.4 oz)   LMP 06/14/1996   SpO2 97%   BMI 26.47 kg/m²     GEN - A+OX4, NAD   HEENT - PERRL, EOMI, OP clear. MMM.   Neck - No thyromegaly or cervical LAD. No thyroid masses felt.  CV - RRR, no m/r   Chest - CTAB, no wheezing or rhonchi  Abd - S/NT/ND/+BS.   Ext - 2+BDP and radial pulses. No LE edema.   Neuro - 5/5 BUE and BLE strength.  Skin - No rash.     Previous labs reviewed.     Assessment/Plan     Grace was seen today for follow-up.    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    Normocytic anemia  -     CBC Auto Differential; Future    Benign essential hypertension  -     spironolactone (ALDACTONE) 25 MG tablet; Take 1 tablet (25 mg total) by mouth once daily.  HCTZ-->dizziness    Increase spironolactone from 1/2 tablet to 1 tablet daily.   Your aldosterone to renin ratio (adrenal " gland hormones) were high so I want to get a CT scan to look at the adrenal glands. Schedule CT of the abdomen/pelvis with contrast.     Follow up in about 15 days (around 11/19/2024).      Lisa Dean MD  Department of Internal Medicine - [facility]  8:55 AM

## 2024-11-12 ENCOUNTER — CLINICAL SUPPORT (OUTPATIENT)
Dept: REHABILITATION | Facility: HOSPITAL | Age: 79
End: 2024-11-12
Attending: OBSTETRICS & GYNECOLOGY
Payer: MEDICARE

## 2024-11-12 DIAGNOSIS — M62.89 PELVIC FLOOR DYSFUNCTION: Primary | ICD-10-CM

## 2024-11-12 DIAGNOSIS — N81.89 PELVIC FLOOR WEAKNESS: ICD-10-CM

## 2024-11-12 PROCEDURE — 97140 MANUAL THERAPY 1/> REGIONS: CPT | Mod: HCNC

## 2024-11-12 PROCEDURE — 97530 THERAPEUTIC ACTIVITIES: CPT | Mod: HCNC

## 2024-11-12 PROCEDURE — 97112 NEUROMUSCULAR REEDUCATION: CPT | Mod: HCNC

## 2024-11-12 NOTE — PROGRESS NOTES
[facility]   Physical Therapy Treatment Note      Name: Grace Ware zabrina  Clinic Number: [id number]    Therapy Diagnosis:   Encounter Diagnoses   Name Primary?    Pelvic floor dysfunction Yes    Pelvic floor weakness      Physician: Kurtis Salvador,*    Visit Date: 11/12/2024    Referring Provider Orders: PT Eval and Treat  Medical Diagnosis from Referral: Urge incontinence [N39.41]   Evaluation Date: 10/15/2024  Authorization Period Expiration: 12/31/2024  Plan of Care Expiration: 1/15/2025  Progress Note Due: 11/15/2024  Visit # / Visits authorized: 2/20   FOTO: 1/3     Precautions: Standard     Time In: 1515  Time Out: 1600  Total Billable Time: 45 minutes    Subjective     Pt reports: She stopped the Gemtesa in September and reports decreased swelling in her legs.  Still feeling worried that with out gemtessa she's going to leak.       She was compliant with home exercise program.    Response to previous treatment: no change  Functional change: none reported    Pain: none    Objective      VAGINAL PELVIC FLOOR EXAM  Exam performed: 11/12/2024    External Assessment  Introitus: WNL  Skin condition: redness noted  Scarring: none   Sensation: WNL   Pain: none  Voluntary contraction: visible lift  Voluntary relaxation: visible drop  Bearing down: visible drop     Internal Assessment  Pain: tender areas noted as follows: at introitus and bilateral levator ani   Sensation: able to localized pressure appropriately   Vaginal vault: WNL   Muscle Bulk: increased resting tension noted   Muscle Power: 3/5  Muscle Endurance: 7 seconds  # Reps To Fatigue: 3    Quality of contraction: slow rise and incomplete relaxation   Specificity: WNL   Coordination: WNL good use of exhale for contraction      Treatment     Grace received the treatments listed below:    Pt verbally consents to intravaginal treatment today.  Signed consent form already on file.  Chaperone declined today.  Manual  Therapy to develop flexibility, extensibility, and desensitization for 8 minutes including: trigger point/myofascial release of layer 1 pelvic floor muscle(s) intravaginally         Neuromuscular Re-education to develop Coordination, Control, Down training, Proprioception, Kinesthetic, and Sense for 28 minutes including:   [x]pelvic floor relaxation/bulging training, pelvic floor relaxation speed after performing a kegel, and urge delay strategies    [x] Kegel education and practice. Increased time spent on edu on proper technique. Pt improves with practice and verbal cues. Focus on fully relaxing after each rep.  [] Education on visual cues/mental imagery for pelvic floor relaxation  [] Education on visual cues/mental imagery for pelvic floor activation  2  Therapeutic Activity to improve functional performance for 9  minutes, including: Education as described below.   [x] Plan of care review  [x] Anatomy review and discussion of the anatomy on current level of function   [x] Home exercise program issued and reviewed   [] fluid intake/dietary modifications as it relates to urgency  [x] etiology of urinary urgency and pelvic floor muscle strengthening for management of urinary urgency  [x] Genital hygiene and avoidance of soaps       Home Exercises Provided and Patient Education Provided     Education provided: See above  Discussed progression of plan of care with patient; educated pt in activity modification; reviewed HEP with pt. Pt demonstrated and verbalized understanding of all instruction and was provided with a handout of HEP (see Patient Instructions).    Written Home Exercises Provided: yes.  Exercises were reviewed and Grace was able to demonstrate them prior to the end of the session.  Grace demonstrated good  understanding of the education provided.     See EMR under Patient Instructions for exercises provided 11/12/2024     Assessment     Grace consents to an intravaginal pelvic floor muscle(s)  assessment today.  She is noted to have increased resting tension and reports pain and burning at the introitus with gentle pressure.  Worked on gentle myofascial release today and also worked on pelvic floor muscle(s) downtraining.  Discussed continuing her kegel exercise program but decreasing reps to 10 and focusing on fully relaxing after each contraction.   Pt will continue to benefit from skilled outpatient physical therapy to address the deficits listed in the problem list box on initial evaluation, provide pt/family education and to maximize pt's level of independence in the home and community environment.     Grace Is progressing well towards her goals.   Pt prognosis is Good.     Pt will continue to benefit from skilled outpatient physical therapy to address the deficits listed in the problem list box on initial evaluation, provide pt/family education and to maximize pt's level of independence in the home and community environment.     Pt's spiritual, cultural and educational needs considered and pt agreeable to plan of care and goals.     Anticipated barriers to physical therapy: none    Goals:   Short Term Goals: 4 weeks   Pt to be able to perform a 10 second kegel x 10 reps to demonstrate improving strength and endurance needed for continence.  Pt to report urinary frequency of no more than every 2-3 hours to demonstrate improved bladder control and Pt to verbalize urge suppression strategies for improved control over urgency and urge urinary incontinence   Patient to be independent with introductory home exercise program.      Long Term Goals: 12 weeks  Pt to be able to perform a 15 second kegel x 10 reps to demonstrate improving strength and endurance needed for continence.  Patient to report reduced feelings of bladder discomfort, and feelings of increased urgency that make her feel like she has to go to the bathroom more frequently.  Patient to be independent with advanced home exercise program  for carryover post discharge.    Plan     Continue with established Plan of Care, working toward established PT goals.    At next visit: continue plan of care as tolerated, ultrasound for bladder    Lisa Gautam, PT, DPT

## 2024-11-12 NOTE — PATIENT INSTRUCTIONS
"Pelvic Floor Relaxation Training Exercises:   Perform a 5 second kegel and quickly relax.  Try to let go of the contraction as quickly as possible.  Wait 5 seconds before starting the next kegel.            GENITAL HYGIENE FOR WOMEN Adapted from EILEEN Hughes MD, The V Book     What is it? Genital hygiene means the ways in which women keep their genitals clean. This part of the body is made up of skin, moist areas and glands. Secretions (moistness) from the vagina keep it clean and healthy and these secretions are normal. These secretions protect the vagina and the skin.     Are there any problems with washing the genitals? Yes. The skin and moist surfaces of this part of the body are very delicate. It is important not to wash with harsh chemicals that may irritate the area. Washing too often, or rubbing too hard when drying, can irritate this skin. If you have problems in this area, washing with plain water is best. Using soap, shower gels and some cleansers can make the problems worse. Your health care provider may be able to suggest a soap substitute.     What is the best way of keeping myself clean? Gently separate the outer "lips and bathe the inner skin with plain water, using your hands only. Gently pat dry the outer skin.     What else should I know? It is not necessary to wash the vulva every day and it should not be washed more than once a day. Do not wash the vagina. Do not use wipes, deodorants, douches or other cosmetic and cleansing products. Women with a problem in this area should use only treatments prescribed by their health care provider.     Hygiene   Use soft, white, unscented toilet paper.   Use lukewarm or cool sitz baths to relieve burning or irritation.   Avoid getting shampoo on the vulvar area.   Do not use bubble bath, feminine hygiene products, powder, or any perfumed creams or soaps.   Wash the vulva with cool to lukewarm water only.   Rinse the vulva with water after urinations. "   Urinate before the bladder is full.   Prevent constipation by (a) adding fiber to your diet (if necessary, use a psyllium product such as Metamucil) and (b) drinking at least 8 glasses of water daily.   Use 100 percent cotton menstrual pads and tampons.  Avoid the daily use of pantiliners. Change underwear as often as necessary to control wetness.         International Society for the Study of Vulvovaginal Disease. Patient Information Committee, December 2003.

## 2024-11-13 ENCOUNTER — PROCEDURE VISIT (OUTPATIENT)
Dept: NEUROLOGY | Facility: CLINIC | Age: 79
End: 2024-11-13
Payer: MEDICARE

## 2024-11-13 DIAGNOSIS — R20.2 PARESTHESIA: ICD-10-CM

## 2024-11-13 PROCEDURE — 95911 NRV CNDJ TEST 9-10 STUDIES: CPT | Mod: HCNC,S$GLB,, | Performed by: PSYCHIATRY & NEUROLOGY

## 2024-11-13 PROCEDURE — 95886 MUSC TEST DONE W/N TEST COMP: CPT | Mod: HCNC,S$GLB,, | Performed by: PSYCHIATRY & NEUROLOGY

## 2024-11-13 PROCEDURE — 99214 OFFICE O/P EST MOD 30 MIN: CPT | Mod: 25,HCNC,S$GLB, | Performed by: PSYCHIATRY & NEUROLOGY

## 2024-11-13 NOTE — PROCEDURES
EMG W/ ULTRASOUND AND NERVE CONDUCTION TEST 2 Extremities    Date/Time: 11/13/2024 9:00 AM    Performed by: Brenden Cook MD  Authorized by: Lisa Dean MD                                                                 [facility] [address]     Subjective:       Patient ID: Grace Navarro is a 78 y.o. female here for a EMG focused evaluation for arm and leg numbness. Previous visits and diagnostic evaluation reviewed.  Patient states she began experiencing left hand and foot numbness several months ago.  Fortunately symptoms have resolved.  She does describe chronic neck and back pain which occasionally radiates down the arms or legs.  HPI  Review of patient's allergies indicates:   Allergen Reactions    Gemtesa [vibegron] Swelling     Leg swelling    Hydrochlorothiazide Other (See Comments)     dizziness    Ace inhibitors      Pt not sure which medication it was - was told by doctor that she was allergic    Amlodipine      flushing    Ibuprofen      Elevate blood pressure    Morphine Hallucinations    Statins-hmg-coa reductase inhibitors      Muscle cramps      There were no vitals filed for this visit.   Chief Complaint: No chief complaint on file.    Past Medical History:   Diagnosis Date    Anxiety     Rnee's disease     Chronic low back pain     Depression     Glaucoma     Goiter, nodular     Hyperlipidemia     Hypertension     Irritable bowel     Neuromuscular disorder     Osteopenia of multiple sites 05/29/2017    PUD (peptic ulcer disease)     Subarachnoid hemorrhage 2014    Varicose veins       Social History     Socioeconomic History    Marital status: Single   Occupational History     Employer: retired   Tobacco Use    Smoking status: Former     Current packs/day: 1.00     Average packs/day: 1 pack/day for 10.0 years (10.0 ttl pk-yrs)     Types: Cigarettes    Smokeless tobacco: Never    Tobacco comments:     quit 1975   Substance and Sexual Activity    Alcohol use: Yes     Alcohol/week:  14.0 standard drinks of alcohol     Types: 14 Glasses of wine per week     Comment: socially    Drug use: No    Sexual activity: Not Currently     Partners: Male     Comment:    Social History Narrative    . 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     Social Drivers of Health     Financial Resource Strain: Low Risk  (8/6/2024)    Overall Financial Resource Strain (CARDIA)     Difficulty of Paying Living Expenses: Not hard at all   Food Insecurity: Unknown (8/6/2024)    Hunger Vital Sign     Worried About Running Out of Food in the Last Year: Patient declined     Ran Out of Food in the Last Year: Never true   Transportation Needs: Patient Declined (7/12/2023)    PRAPARE - Transportation     Lack of Transportation (Medical): Patient declined     Lack of Transportation (Non-Medical): Patient declined   Physical Activity: Sufficiently Active (8/6/2024)    Exercise Vital Sign     Days of Exercise per Week: 6 days     Minutes of Exercise per Session: 80 min   Stress: No Stress Concern Present (8/6/2024)    [place] [employer] - Occupational Stress Questionnaire     Feeling of Stress : Only a little   Housing Stability: Unknown (8/6/2024)    Housing Stability Vital Sign     Unable to Pay for Housing in the Last Year: Patient declined            Objective:      Physical Exam    Constitutional: Patient appears well-nourished.   Head: Normocephalic and atraumatic.   Mouth/Throat: Oropharynx is clear and moist.   Pulmonary/Chest: Effort normal.   Abdominal: Soft.   Skin: Skin is warm and dry.      General:  Patient is alert and cooperative.  Affect:  Patient is appropriate to surroundings and environment.  Language:  Speech is fluent.  HEENT:  There are no outward signs of trauma to head or face.  Cranial Nerves:  Pupils are equal round and reactive to light. Extra-ocular movements are intact. Face, tongue, and palate are symmetrical.  Motor:  Patient  exhibits normal strength testing in bilateral proximal and distal upper and lower extremities.  Reflexes:  Symmetrical in bilateral upper and lower extremities at the knees.  Absent ankle reflexes.  Gait:  Ambulation is independent without use of cane or walker without signs of ataxia or circumduction.  Cerebellar:  No evidence of dysmetria.  Sensory:  Intact to sensory modalities tested.  Musculoskeletal:  There is no severe tenderness to palpation and manipulation of cervical and lumbar spine regions.   Assessment:       We reviewed and discussed at length results of EMG of the left upper and lower extremities performed today which was normal without significant evidence of cervical or lumbar radiculopathies. These findings are available via media section of chart review.   1. Paresthesia              Plan:       We discussed treatment options at length. Recommend patient keep appointment with referring provider.         I spent a total of 35 minutes on the day of the visit. This includes face to face time and non-face to face time preparing to see the patient (eg, review of tests), obtaining and/or reviewing separately obtained history, documenting clinical information in the electronic or other health record, independently interpreting results and communicating results to the patient/family/caregiver, or care coordinator.    Brenden Cook MD, FAAN  11/13/2024   9:57 AM       A dictation device was used to produce this document. Use of such devices sometimes results in grammatical errors or replacement of words that sound similarly.

## 2024-11-18 ENCOUNTER — CLINICAL SUPPORT (OUTPATIENT)
Dept: PRIMARY CARE CLINIC | Facility: CLINIC | Age: 79
End: 2024-11-18
Payer: MEDICARE

## 2024-11-18 ENCOUNTER — LAB VISIT (OUTPATIENT)
Dept: LAB | Facility: HOSPITAL | Age: 79
End: 2024-11-18
Attending: INTERNAL MEDICINE
Payer: MEDICARE

## 2024-11-18 DIAGNOSIS — F33.0 MILD EPISODE OF RECURRENT MAJOR DEPRESSIVE DISORDER: ICD-10-CM

## 2024-11-18 DIAGNOSIS — Z65.8 INTERPERSONAL PROBLEM: Primary | ICD-10-CM

## 2024-11-18 DIAGNOSIS — F41.1 GAD (GENERALIZED ANXIETY DISORDER): ICD-10-CM

## 2024-11-18 DIAGNOSIS — E26.9 HIGH ALDOSTERONE TO RENIN RATIO: ICD-10-CM

## 2024-11-18 DIAGNOSIS — D64.9 NORMOCYTIC ANEMIA: ICD-10-CM

## 2024-11-18 LAB
ALBUMIN SERPL BCP-MCNC: 4.1 G/DL (ref 3.5–5.2)
ALP SERPL-CCNC: 58 U/L (ref 40–150)
ALT SERPL W/O P-5'-P-CCNC: 16 U/L (ref 10–44)
ANION GAP SERPL CALC-SCNC: 7 MMOL/L (ref 8–16)
AST SERPL-CCNC: 20 U/L (ref 10–40)
BASOPHILS # BLD AUTO: 0.04 K/UL (ref 0–0.2)
BASOPHILS NFR BLD: 0.7 % (ref 0–1.9)
BILIRUB SERPL-MCNC: 0.4 MG/DL (ref 0.1–1)
BUN SERPL-MCNC: 15 MG/DL (ref 8–23)
CALCIUM SERPL-MCNC: 9.4 MG/DL (ref 8.7–10.5)
CHLORIDE SERPL-SCNC: 105 MMOL/L (ref 95–110)
CO2 SERPL-SCNC: 28 MMOL/L (ref 23–29)
CREAT SERPL-MCNC: 0.7 MG/DL (ref 0.5–1.4)
DIFFERENTIAL METHOD BLD: ABNORMAL
EOSINOPHIL # BLD AUTO: 0.1 K/UL (ref 0–0.5)
EOSINOPHIL NFR BLD: 1.5 % (ref 0–8)
ERYTHROCYTE [DISTWIDTH] IN BLOOD BY AUTOMATED COUNT: 14.3 % (ref 11.5–14.5)
EST. GFR  (NO RACE VARIABLE): >60 ML/MIN/1.73 M^2
GLUCOSE SERPL-MCNC: 99 MG/DL (ref 70–110)
HCT VFR BLD AUTO: 38.9 % (ref 37–48.5)
HGB BLD-MCNC: 12.1 G/DL (ref 12–16)
IMM GRANULOCYTES # BLD AUTO: 0.01 K/UL (ref 0–0.04)
IMM GRANULOCYTES NFR BLD AUTO: 0.2 % (ref 0–0.5)
LYMPHOCYTES # BLD AUTO: 1.1 K/UL (ref 1–4.8)
LYMPHOCYTES NFR BLD: 19.5 % (ref 18–48)
MCH RBC QN AUTO: 31.3 PG (ref 27–31)
MCHC RBC AUTO-ENTMCNC: 31.1 G/DL (ref 32–36)
MCV RBC AUTO: 101 FL (ref 82–98)
MONOCYTES # BLD AUTO: 0.7 K/UL (ref 0.3–1)
MONOCYTES NFR BLD: 12.3 % (ref 4–15)
NEUTROPHILS # BLD AUTO: 3.5 K/UL (ref 1.8–7.7)
NEUTROPHILS NFR BLD: 65.8 % (ref 38–73)
NRBC BLD-RTO: 0 /100 WBC
PLATELET # BLD AUTO: 185 K/UL (ref 150–450)
PMV BLD AUTO: 10.7 FL (ref 9.2–12.9)
POTASSIUM SERPL-SCNC: 4.7 MMOL/L (ref 3.5–5.1)
PROT SERPL-MCNC: 7.1 G/DL (ref 6–8.4)
RBC # BLD AUTO: 3.86 M/UL (ref 4–5.4)
SODIUM SERPL-SCNC: 140 MMOL/L (ref 136–145)
WBC # BLD AUTO: 5.38 K/UL (ref 3.9–12.7)

## 2024-11-18 PROCEDURE — 36415 COLL VENOUS BLD VENIPUNCTURE: CPT | Mod: HCNC,PO | Performed by: INTERNAL MEDICINE

## 2024-11-18 PROCEDURE — 84244 ASSAY OF RENIN: CPT | Mod: HCNC | Performed by: INTERNAL MEDICINE

## 2024-11-18 PROCEDURE — 85025 COMPLETE CBC W/AUTO DIFF WBC: CPT | Mod: HCNC | Performed by: INTERNAL MEDICINE

## 2024-11-18 PROCEDURE — 99499 UNLISTED E&M SERVICE: CPT | Mod: HCNC,S$GLB,,

## 2024-11-18 PROCEDURE — 80053 COMPREHEN METABOLIC PANEL: CPT | Mod: HCNC | Performed by: INTERNAL MEDICINE

## 2024-11-18 NOTE — PROGRESS NOTES
"Individual Psychotherapy (LCSW/PhD)  Grace Navarro    DATE:  11/18/2024  REFERRAL SOURCE:  Lisa Dean MD  TYPE OF VISIT:  In person  SITE:  [place] [id number]  LENGTH OF SESSION: 50  .  HISTORY OF PRESENTING ILLNESS:  Grace Navarro, a 78 y.o. female with history of Anxiety disorders; anxiety, unspecified [F41.9], Major Depressive Disorder, Recurrent, Unspecified (F33.9), and interpersonal .    CHIEF COMPLAINT/REASON FOR ENCOUNTER: Met with patient for individual psychotherapy. Pt's chief complaint includes the following: interpersonal.     INTERVAL HISTORY AND CONTENT OF CURRENT SESSION: Patient having more lower back pain.  Surgery 12 years ago.  Feeling much better.  Discussed health  referral and she is in agreement. Son continues to call every other day.  They spoke 40 mins the other day.  She remains skeptical and is keeping her boundaries set.  He is sending texts like "have a good day mama."  He started discussing his childhood on the phone and she asked why after 6 years. He said he needed to find himself.  She remembers him lying from a young child and she doesn't trust his intentions. She has learned from her past therapy to create healthy boundaries and plans to continue.  She is excited for her trip to [place] to see daughter for 3 weeks.  She continues daily exercise and has been walking 3-4 mi daily by fajardo.  Only able to 2 mi yesterday due to heat.  Patient's mood is improved.      INTERVENTIONS:  Active listening, Assisted patient in resolving interpersonal problems through the use of reassurance & support, Educated and encouraged "behavioral activation" by scheduling activities that have a high likelihood for pleasure and mastery, Goal/Progress review, Reinforced positive self-talk, Supported patient in processing feelings of sadness at dysfunctional relationship with son, and Referred to Health /Exercise classes     TREATMENT PLAN/GOALS:  Target symptoms: " depression, anxiety , adjustment              Worry and ruminate less about family members.   Experience feelings of contentment and happiness more often.  Why chosen therapy is appropriate versus another modality: relevant to diagnosis, patient responds to this modality, evidence based practice  Outcome monitoring methods: self-report, observation, checklist/rating scale  Therapeutic intervention type: insight oriented psychotherapy, supportive psychotherapy    RISK ASSESSMENT:  Patient reports no suicidal ideation  Patient reports no homicidal ideation  Patient reports no self-injurious behavior  Patient reports no violent behavior    MENTAL HEALTH STATUS EXAM  General Appearance:  age appropriate, well dressed, neatly groomed   Speech: normal tone, normal rate, normal pitch, normal volume      Level of Cooperation: cooperative      Thought Processes: normal and logical   Mood: happy      Thought Content: normal, no suicidality, no homicidality, delusions, or paranoia   Affect: bright   Orientation: Oriented x3   Memory: intact for content of interview   Attention Span & Concentration: able to focus   Fund of General Knowledge: intact and appropriate to age and level of education   Abstract Reasoning: Not formally tested   Judgment & Insight: good     Language  intact     PATIENT'S RESPONSE TO INTERVENTION:  The patient's response to intervention is accepting, motivated.    PROGRESS TOWARD GOALS AND OTHER MENTAL STATUS CHANGES:  The patient's progress toward goals is good.    DIAGNOSIS:     ICD-10-CM ICD-9-CM   1. Interpersonal problem  Z65.8 V62.81   2. Mild episode of recurrent major depressive disorder  F33.0 296.31   3. YOCASTA (generalized anxiety disorder)  F41.1 300.02       PLAN: Pt plans to continue individual psychotherapy.  CBT and Problem-solving Therapy will be utilized in future individual therapy sessions to increase insight and support.     RETURN TO CLINIC: 2 weeks, as scheduled

## 2024-11-19 ENCOUNTER — PATIENT MESSAGE (OUTPATIENT)
Dept: PRIMARY CARE CLINIC | Facility: CLINIC | Age: 79
End: 2024-11-19

## 2024-11-19 ENCOUNTER — E-CONSULT (OUTPATIENT)
Dept: ENDOCRINOLOGY | Facility: CLINIC | Age: 79
End: 2024-11-19
Payer: MEDICARE

## 2024-11-19 ENCOUNTER — OFFICE VISIT (OUTPATIENT)
Dept: PRIMARY CARE CLINIC | Facility: CLINIC | Age: 79
End: 2024-11-19
Payer: MEDICARE

## 2024-11-19 ENCOUNTER — TELEPHONE (OUTPATIENT)
Dept: PRIMARY CARE CLINIC | Facility: CLINIC | Age: 79
End: 2024-11-19

## 2024-11-19 VITALS
OXYGEN SATURATION: 96 % | DIASTOLIC BLOOD PRESSURE: 68 MMHG | HEIGHT: 64 IN | BODY MASS INDEX: 25.99 KG/M2 | SYSTOLIC BLOOD PRESSURE: 134 MMHG | HEART RATE: 65 BPM | TEMPERATURE: 98 F | WEIGHT: 152.25 LBS

## 2024-11-19 DIAGNOSIS — I10 PRIMARY HYPERTENSION: Primary | ICD-10-CM

## 2024-11-19 DIAGNOSIS — E26.9 HIGH ALDOSTERONE TO RENIN RATIO: ICD-10-CM

## 2024-11-19 DIAGNOSIS — M54.9 MID BACK PAIN: ICD-10-CM

## 2024-11-19 DIAGNOSIS — I70.0 AORTIC ATHEROSCLEROSIS: ICD-10-CM

## 2024-11-19 DIAGNOSIS — M79.10 MYALGIA: ICD-10-CM

## 2024-11-19 DIAGNOSIS — E27.8 ADRENAL INCIDENTALOMA: ICD-10-CM

## 2024-11-19 DIAGNOSIS — I10 BENIGN ESSENTIAL HYPERTENSION: Primary | ICD-10-CM

## 2024-11-19 PROCEDURE — 3078F DIAST BP <80 MM HG: CPT | Mod: HCNC,CPTII,S$GLB, | Performed by: INTERNAL MEDICINE

## 2024-11-19 PROCEDURE — 1159F MED LIST DOCD IN RCRD: CPT | Mod: HCNC,CPTII,S$GLB, | Performed by: INTERNAL MEDICINE

## 2024-11-19 PROCEDURE — 1160F RVW MEDS BY RX/DR IN RCRD: CPT | Mod: HCNC,CPTII,S$GLB, | Performed by: INTERNAL MEDICINE

## 2024-11-19 PROCEDURE — 1101F PT FALLS ASSESS-DOCD LE1/YR: CPT | Mod: HCNC,CPTII,S$GLB, | Performed by: INTERNAL MEDICINE

## 2024-11-19 PROCEDURE — 99999 PR PBB SHADOW E&M-EST. PATIENT-LVL V: CPT | Mod: PBBFAC,HCNC,, | Performed by: INTERNAL MEDICINE

## 2024-11-19 PROCEDURE — 3288F FALL RISK ASSESSMENT DOCD: CPT | Mod: HCNC,CPTII,S$GLB, | Performed by: INTERNAL MEDICINE

## 2024-11-19 PROCEDURE — 99451 NTRPROF PH1/NTRNET/EHR 5/>: CPT | Mod: S$GLB,,, | Performed by: INTERNAL MEDICINE

## 2024-11-19 PROCEDURE — 99214 OFFICE O/P EST MOD 30 MIN: CPT | Mod: HCNC,S$GLB,, | Performed by: INTERNAL MEDICINE

## 2024-11-19 PROCEDURE — 3075F SYST BP GE 130 - 139MM HG: CPT | Mod: HCNC,CPTII,S$GLB, | Performed by: INTERNAL MEDICINE

## 2024-11-19 PROCEDURE — 1125F AMNT PAIN NOTED PAIN PRSNT: CPT | Mod: HCNC,CPTII,S$GLB, | Performed by: INTERNAL MEDICINE

## 2024-11-19 RX ORDER — TRAMADOL HYDROCHLORIDE 50 MG/1
50 TABLET ORAL EVERY 6 HOURS PRN
Qty: 30 TABLET | Refills: 0 | Status: SHIPPED | OUTPATIENT
Start: 2024-11-19

## 2024-11-19 NOTE — PROGRESS NOTES
"Subjective:       Patient ID: Grace Navarro is a 78 y.o. female.    Chief Complaint: Follow-up    HPI  High aldosterone/renin ratio 10/22/24 62.3.   Started on aldactone and stopped on hctz (HCTZ was causing dizziness anyway).  Repeat aldosterone/renin ratio is pending from yesterday.   CT A/P adrenal protocol 11/4/24  No acute abdominopelvic abnormality.  Subcentimeter right and left adrenal nodules, technically indeterminate by this exam though appear present in retrospect on comparison 2014 CT suggesting benign etiology such as adenoma.  Colonic diverticulosis and additional findings as above.  Modest abdominal aortic atherosclerosis.    HLD - statin induced myalgia so intolerant to statin. On zetia 10mg daily.  LDL 8/5/24 113.4.  Reports still does have muscle cramps in the legs at night 3x/week. K is ok yesterday. Off potassium supplements now. Sometimes will have to do stretches at night.     Will be traveling to see daughter for a mo starting Dec 9th.     Mid L back pain that sometimes goes to the R. Wakes up with the pain.   Didn't do PT. Has to defer till January.   Meloxicam doesn't help. Ibuprofen elevates her BP.   Flexeril doesn't help either.   Seen Dr. Krueger in pain mgmt 4/9/24. Pain is now a little higher.  UA 10/9/24 is neg. Doing her pelvic floor PT exercises.     Review of Systems  Comprehensive review of systems otherwise negative. See history/subjective section for more details.    Objective:      Physical Exam    /68   Pulse 65   Temp 97.9 °F (36.6 °C) (Oral)   Ht 5' 4" (1.626 m)   Wt 69 kg (152 lb 3.6 oz)   LMP 06/14/1996   SpO2 96%   BMI 26.13 kg/m²     GEN - A+OX4, NAD   HEENT - PERRL, EOMI, OP clear. MMM.   Neck - No thyromegaly or cervical LAD. No thyroid masses felt.  CV - RRR, no m/r   Chest - CTAB, no wheezing or rhonchi  Abd - S/NT/ND/+BS.   Ext - 2+BDP and radial pulses. No LE edema.   Neuro - 5/5 BUE and BLE strength.  Skin - No rash.     Previous labs reviewed. "     Assessment/Plan     Grace was seen today for follow-up.    Diagnoses and all orders for this visit:    Benign essential hypertension - Stable and controlled. Continue current medications.    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 Cont zetia.     Myalgia - K ok. Trial of OTC Mg.    Mid back pain - wants to defer PT till after January.   -     Cancel: Ambulatory referral/consult to Physical/Occupational Therapy; Future  -     traMADoL (ULTRAM) 50 mg tablet; Take 1 tablet (50 mg total) by mouth every 6 (six) hours as needed for Pain.      Follow up in about 2 months (around 1/19/2025).      Lisa Dean MD  Department of Internal Medicine - [facility]  11:34 AM

## 2024-11-19 NOTE — CONSULTS
[facility]  Response for E-Consult     Patient Name: Grace Navarro  MRN: [id number]  Primary Care Provider: Lisa Dean MD   Requesting Provider: Lisa Dean MD  E-Consult to Endocrinology  Consult performed by: Omar Lyons MD  Consult ordered by: Lisa Dean MD  Reason for consult: Hypertension  Assessment/Recommendations: Patient presenting with elevated ARR in the setting of subcentimeter adrenal nodules. If we were to proceed with arterial venous sampling and consider adrenalectomy would recommend confirmatory testing with salt loading. However, I think medical management is appropriate in this situation so no confirmatory testing needed. Would titrate spironlactone using the following parameters (renin >1.0, potassium >4 to upper limit normal, and normal blood pressure. No need for additional imaging for adrenal nodules give documented stability back to 2014.           Recommendation:   Patient presenting with elevated ARR in the setting of subcentimeter adrenal nodules. If we were to proceed with arterial venous sampling and consider adrenalectomy would recommend confirmatory testing with salt loading. However, I think medical management is appropriate in this situation so no confirmatory testing needed. Would titrate spironlactone using the following parameters (renin >1.0, potassium >4 and normal blood pressure. No need for additional imaging for adrenal nodules give documented stability back to 2014.       Total time of Consultation: 5 minute    I did not speak to the requesting provider verbally about this.     *This eConsult is based on the clinical data available to me and is furnished without benefit of a physical examination. The eConsult will need to be interpreted in light of any clinical issues or changes in patient status not available to me at the time of filing this eConsults. Significant changes in patient condition or level of acuity should result in immediate formal  consultation and reevaluation. Please alert me if you have further questions.    Thank you for this eConsult referral.     MD [facility]ank[facility] Betts [facility]

## 2024-11-19 NOTE — TELEPHONE ENCOUNTER
Please call to let pt know e consult to endo:  However, I think medical management is appropriate in this situation so no confirmatory testing needed. Would titrate spironlactone using the following parameters (renin >1.0, potassium >4 and normal blood pressure. No need for additional imaging for adrenal nodules give documented stability back to 2014.     Still waiting for aldosterone/renin ratio to result from yesterday.

## 2024-11-20 ENCOUNTER — TELEPHONE (OUTPATIENT)
Dept: PRIMARY CARE CLINIC | Facility: CLINIC | Age: 79
End: 2024-11-20
Payer: MEDICARE

## 2024-11-20 NOTE — TELEPHONE ENCOUNTER
Spoke with Maryjane at [facility] to give the okay to release tramadol to MsDanial Grace she verbally understood.

## 2024-11-20 NOTE — TELEPHONE ENCOUNTER
[facility] is calling to see if it is okay to release the tramadol prescription due to Ms. Chacon allergy to morphine from 2013 reaction of hallucination. Please advise

## 2024-11-20 NOTE — TELEPHONE ENCOUNTER
----- Message from Whitney sent at 11/20/2024  2:06 PM CST -----  Contact: [phone]  [facility] is calling for clarification on the medication sent for Tramadol she states pt is allergic to Morphin and they are trying to see if this is correct please advise

## 2024-11-23 LAB
ALDOST SERPL-MCNC: 26.1 NG/DL
ALDOST/RENIN PLAS-RTO: 11.9 RATIO
RENIN PLAS-CCNC: 2.2 NG/ML/HR

## 2024-11-25 ENCOUNTER — PATIENT MESSAGE (OUTPATIENT)
Dept: PRIMARY CARE CLINIC | Facility: CLINIC | Age: 79
End: 2024-11-25
Payer: MEDICARE

## 2024-11-29 DIAGNOSIS — M54.50 CHRONIC MIDLINE LOW BACK PAIN WITHOUT SCIATICA: ICD-10-CM

## 2024-11-29 DIAGNOSIS — G89.29 CHRONIC MIDLINE LOW BACK PAIN WITHOUT SCIATICA: ICD-10-CM

## 2024-11-29 RX ORDER — CYCLOBENZAPRINE HCL 5 MG
5 TABLET ORAL
Qty: 30 TABLET | Refills: 0 | Status: SHIPPED | OUTPATIENT
Start: 2024-11-29

## 2024-12-02 ENCOUNTER — PATIENT MESSAGE (OUTPATIENT)
Dept: PRIMARY CARE CLINIC | Facility: CLINIC | Age: 79
End: 2024-12-02
Payer: MEDICARE

## 2024-12-02 DIAGNOSIS — E78.5 HYPERLIPIDEMIA, UNSPECIFIED HYPERLIPIDEMIA TYPE: ICD-10-CM

## 2024-12-02 RX ORDER — EZETIMIBE 10 MG/1
10 TABLET ORAL DAILY
Qty: 90 TABLET | Refills: 3 | Status: SHIPPED | OUTPATIENT
Start: 2024-12-02

## 2024-12-05 ENCOUNTER — CLINICAL SUPPORT (OUTPATIENT)
Dept: REHABILITATION | Facility: HOSPITAL | Age: 79
End: 2024-12-05
Attending: OBSTETRICS & GYNECOLOGY
Payer: MEDICARE

## 2024-12-05 DIAGNOSIS — M62.89 PELVIC FLOOR DYSFUNCTION: Primary | ICD-10-CM

## 2024-12-05 DIAGNOSIS — N81.89 PELVIC FLOOR WEAKNESS: ICD-10-CM

## 2024-12-05 PROCEDURE — 97112 NEUROMUSCULAR REEDUCATION: CPT | Mod: HCNC

## 2024-12-05 PROCEDURE — 97530 THERAPEUTIC ACTIVITIES: CPT | Mod: HCNC

## 2024-12-05 NOTE — PROGRESS NOTES
"[facility]   Physical Therapy Treatment and Discharge Note      Name: Grace Ware sera  Clinic Number: [id number]    Therapy Diagnosis:   Encounter Diagnoses   Name Primary?    Pelvic floor dysfunction Yes    Pelvic floor weakness      Physician: Kurtis Salvador,*    Visit Date: 12/5/2024    Referring Provider Orders: PT Eval and Treat  Medical Diagnosis from Referral: Urge incontinence [N39.41]   Evaluation Date: 10/15/2024  Authorization Period Expiration: 12/31/2024  Plan of Care Expiration: 1/15/2025  Progress Note Due: 11/15/2024  Visit # / Visits authorized: 4/20   FOTO: 1/3     Precautions: Standard     Time In: 1245  Time Out: 1323  Total Billable Time: 38 minutes    Subjective     Pt reports: She has been working on making sure her pelvic floor is relaxed between kegels.  She feels like she is making progressing since her last visit.  She has been trying to extend trips to the bathroom especially if it's been less than 2 hours.  No urinary incontinence reported since her last visit.  "I have more confidence."     She was compliant with home exercise program.    Response to previous treatment: no change  Functional change: none reported    Pain: none    Objective      VAGINAL PELVIC FLOOR EXAM  Exam performed: 12/5/2024    External Assessment  Introitus: WNL  Skin condition: redness noted  Scarring: none   Sensation: WNL   Pain: none  Voluntary contraction: visible lift  Voluntary relaxation: visible drop  Bearing down: visible drop     Internal Assessment  Pain: no pain reported   Sensation: able to localized pressure appropriately   Vaginal vault: WNL   Muscle Bulk: improved resting tension noted   Muscle Power: 3/5  Muscle Endurance: 7 seconds  # Reps To Fatigue: 3    Quality of contraction: within normal limits   Specificity: WNL   Coordination: WNL good use of exhale for contraction      Treatment     Grace received the treatments listed below:    Pt verbally " consents to intravaginal treatment today.  Signed consent form already on file.  Chaperone declined today.  Manual Therapy to develop flexibility, extensibility, and desensitization for 00 minutes including: trigger point/myofascial release of layer 1 pelvic floor muscle(s) intravaginally         Neuromuscular Re-education to develop Coordination, Control, Down training, Proprioception, Kinesthetic, and Sense for 30 minutes including:   [x]pelvic floor relaxation/bulging training, pelvic floor relaxation speed after performing a kegel, and urge delay strategies    [x] Kegel education and practice. Increased time spent on edu on proper technique. Pt improves with practice and verbal cues. Focus on fully relaxing after each rep.  [x] Education on visual cues/mental imagery for pelvic floor relaxation  [x] Education on visual cues/mental imagery for pelvic floor activation    Therapeutic Activity to improve functional performance for 8  minutes, including: Education as described below.   [x] Discharge planning   [x] Anatomy review and discussion of the anatomy on current level of function   [x] Home exercise program reviewed   [] fluid intake/dietary modifications as it relates to urgency  [x] etiology of urinary urgency and pelvic floor muscle strengthening for management of urinary urgency  [] Genital hygiene and avoidance of soaps       Home Exercises Provided and Patient Education Provided     Education provided: See above  Discussed progression of plan of care with patient; educated pt in activity modification; reviewed HEP with pt. Pt demonstrated and verbalized understanding of all instruction and was provided with a handout of HEP (see Patient Instructions).    Written Home Exercises Provided: yes.  Exercises were reviewed and Grace was able to demonstrate them prior to the end of the session.  Grace demonstrated good  understanding of the education provided.     See EMR under Patient Instructions for  exercises provided 12/5/2024     Assessment     Grace has made excellent progress with participation in the POC towards reduction of urinary symptom(s).  She has met the majority of her goals and is independent with her home program.  At this time she no longer requires skilled intervention and is appropriate for discharge.       Discharge reason: Patient has met all of his/her goals and Patient has reached the maximum rehab potential for the present time    Discharge FOTO Score:     Goals:   Short Term Goals: 4 weeks   Pt to be able to perform a 10 second kegel x 10 reps to demonstrate improving strength and endurance needed for continence. Met      Pt to report urinary frequency of no more than every 2-3 hours to demonstrate improved bladder control and Pt to verbalize urge suppression strategies for improved control over urgency and urge urinary incontinence  Met   Patient to be independent with introductory home exercise program. Met      Long Term Goals: 12 weeks  Pt to be able to perform a 15 second kegel x 10 reps to demonstrate improving strength and endurance needed for continence.Met      Patient to report reduced feelings of bladder discomfort, and feelings of increased urgency that make her feel like she has to go to the bathroom more frequently.Met      Patient to be independent with advanced home exercise program for carryover post discharge.Met        Plan     Continue with established Plan of Care, working toward established PT goals.    At next visit: continue plan of care as tolerated, ultrasound for bladder    Lisa Sen, PT, DPT

## 2025-01-04 ENCOUNTER — PATIENT MESSAGE (OUTPATIENT)
Dept: OPTOMETRY | Facility: CLINIC | Age: 80
End: 2025-01-04
Payer: MEDICARE

## 2025-01-20 ENCOUNTER — TELEPHONE (OUTPATIENT)
Dept: PRIMARY CARE CLINIC | Facility: CLINIC | Age: 80
End: 2025-01-20
Payer: MEDICARE

## 2025-01-20 ENCOUNTER — PATIENT MESSAGE (OUTPATIENT)
Dept: PRIMARY CARE CLINIC | Facility: CLINIC | Age: 80
End: 2025-01-20
Payer: MEDICARE
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
## 2025-01-22 ENCOUNTER — PATIENT MESSAGE (OUTPATIENT)
Dept: OPTOMETRY | Facility: CLINIC | Age: 80
End: 2025-01-22
Payer: MEDICARE

## 2025-01-23 NOTE — TELEPHONE ENCOUNTER
20 min in Feb some time is fine. Likely will refer to PT b/c she was traveling for a mo since Dec and was waiting to put in orders. Please remind her to bring ACPs. No labs prior.

## 2025-01-23 NOTE — TELEPHONE ENCOUNTER
Pt scheduled on 1/24/25,  schedule opened, as no other pt could make it to clinic at that time.   Pt is agreeable to coming in at 9am to see PCP on 1/24

## 2025-01-24 ENCOUNTER — OFFICE VISIT (OUTPATIENT)
Dept: PRIMARY CARE CLINIC | Facility: CLINIC | Age: 80
End: 2025-01-24
Payer: MEDICARE

## 2025-01-24 VITALS
OXYGEN SATURATION: 97 % | WEIGHT: 155 LBS | HEART RATE: 65 BPM | DIASTOLIC BLOOD PRESSURE: 65 MMHG | SYSTOLIC BLOOD PRESSURE: 130 MMHG | BODY MASS INDEX: 26.6 KG/M2

## 2025-01-24 DIAGNOSIS — N39.41 URGE INCONTINENCE OF URINE: ICD-10-CM

## 2025-01-24 DIAGNOSIS — I10 BENIGN ESSENTIAL HYPERTENSION: Primary | ICD-10-CM

## 2025-01-24 DIAGNOSIS — R73.02 IGT (IMPAIRED GLUCOSE TOLERANCE): ICD-10-CM

## 2025-01-24 DIAGNOSIS — I70.0 AORTIC ATHEROSCLEROSIS: ICD-10-CM

## 2025-01-24 DIAGNOSIS — E26.9 HIGH ALDOSTERONE TO RENIN RATIO: ICD-10-CM

## 2025-01-24 DIAGNOSIS — I83.813 VARICOSE VEINS OF BOTH LOWER EXTREMITIES WITH PAIN: ICD-10-CM

## 2025-01-24 DIAGNOSIS — F32.A ANXIETY AND DEPRESSION: ICD-10-CM

## 2025-01-24 DIAGNOSIS — F41.9 ANXIETY AND DEPRESSION: ICD-10-CM

## 2025-01-24 DIAGNOSIS — E78.5 HYPERLIPIDEMIA, UNSPECIFIED HYPERLIPIDEMIA TYPE: ICD-10-CM

## 2025-01-24 PROCEDURE — 99214 OFFICE O/P EST MOD 30 MIN: CPT | Mod: HCNC,S$GLB,, | Performed by: INTERNAL MEDICINE

## 2025-01-24 PROCEDURE — 99999 PR PBB SHADOW E&M-EST. PATIENT-LVL IV: CPT | Mod: PBBFAC,HCNC,, | Performed by: INTERNAL MEDICINE

## 2025-01-24 PROCEDURE — 1100F PTFALLS ASSESS-DOCD GE2>/YR: CPT | Mod: HCNC,CPTII,S$GLB, | Performed by: INTERNAL MEDICINE

## 2025-01-24 PROCEDURE — 1159F MED LIST DOCD IN RCRD: CPT | Mod: HCNC,CPTII,S$GLB, | Performed by: INTERNAL MEDICINE

## 2025-01-24 PROCEDURE — 1124F ACP DISCUSS-NO DSCNMKR DOCD: CPT | Mod: HCNC,CPTII,S$GLB, | Performed by: INTERNAL MEDICINE

## 2025-01-24 PROCEDURE — 3075F SYST BP GE 130 - 139MM HG: CPT | Mod: HCNC,CPTII,S$GLB, | Performed by: INTERNAL MEDICINE

## 2025-01-24 PROCEDURE — 1160F RVW MEDS BY RX/DR IN RCRD: CPT | Mod: HCNC,CPTII,S$GLB, | Performed by: INTERNAL MEDICINE

## 2025-01-24 PROCEDURE — 3078F DIAST BP <80 MM HG: CPT | Mod: HCNC,CPTII,S$GLB, | Performed by: INTERNAL MEDICINE

## 2025-01-24 PROCEDURE — 3288F FALL RISK ASSESSMENT DOCD: CPT | Mod: HCNC,CPTII,S$GLB, | Performed by: INTERNAL MEDICINE

## 2025-01-24 PROCEDURE — 1126F AMNT PAIN NOTED NONE PRSNT: CPT | Mod: HCNC,CPTII,S$GLB, | Performed by: INTERNAL MEDICINE

## 2025-01-24 NOTE — PROGRESS NOTES
Subjective:       Patient ID: Grace Navarro is a 79 y.o. female.    Chief Complaint: Benign essential hypertension    HPI  HTN - aldactone 25 mg daily (high aldosterone renin ratio 10/22/24), coreg 12.5mg qam and 2 tabs in the PM.   Keeping an eye on her BP. At least once a week. When not at goal, she'll recheck it after 5 min and it'll normalize. Doesn't get too concerned over SBP in the 140s like she did previously. Trying to stay w/ low salt diet but a little more difficult during the holidays.   Repeat aldosterone/renin ratio normalized w/ rise in renin.  HCTZ-->dizziness  CT A/P adrenal protocol 11/4/24  No acute abdominopelvic abnormality.  Subcentimeter right and left adrenal nodules, technically indeterminate by this exam though appear present in retrospect on comparison 2014 CT suggesting benign etiology such as adenoma.  Colonic diverticulosis and additional findings as above.  Modest abd aortic atherosclerosis.      HLD - statin induced myalgia so intolerant to statin. On zetia 10mg daily.  LDL 8/5/24 113.4.  Reports still does have muscle cramps in the legs at night 3x/week. K is ok yesterday. Off potassium supplements now that she's no longer taking hctz and is taking aldactone. Sometimes will have to do stretches at night.      Chronic mid L back pain that sometimes goes to the R. Wakes up with the pain.   Just came back from visiting Daughter a few weeks ago.  Meloxicam doesn't help. Ibuprofen elevates her BP.   Flexeril doesn't help either.   Seen Dr. Krueger in pain mgmt 4/9/24. Pain is now a little higher.  UA 10/9/24 is neg. Doing her pelvic floor PT exercises. Had an ep of urinary incontinence but that's b/c she was walking outside and holding her urine.   First day at her daughter's house, 60lbs puppy jumped on her and made her fall. Had bruising all on her L side. No LOC. Iced the area. The hip on the R still bothers her a little bit.   Doing stretches at home.     Anxiety/depression - on  lexapro 10mg daily.   Doing ok.     Reports her 92 y/o cousin in [facility] can do all the same things as previously but just slower. Pt reports was cooking the other day and would take breaks while cooking so it was taking much longer to cook than previously. Only using cleaning lady every 2 mo but she's maintaining her house and doing housework. No assistance w/ ADLs.   Plans on selling her house eventually and will move to [place] close to daughter.    Has painful varicose veins and much better when wearing compression stockings.     Review of Systems  Comprehensive review of systems otherwise negative. See history/subjective section for more details.    Objective:      Physical Exam    /65   Pulse 65   Wt 70.3 kg (154 lb 15.7 oz)   LMP 06/14/1996   SpO2 97%   BMI 26.60 kg/m²     GEN - A+OX4, NAD   HEENT - PERRL, EOMI, OP clear. MMM.  Neck - No thyromegaly or cervical LAD. No thyroid masses felt.  CV - RRR, no m/r   Chest - CTAB, no wheezing or rhonchi  Abd - S/NT/ND/+BS.   Ext - 2+BDP and radial pulses. No LE edema. Some pain on palpation of B legs.  Neuro - 5/5 BUE and BLE strength. NORMAL gait.   Skin - No rash.    Labs reviewed.     Assessment/Plan     Grace was seen today for benign essential hypertension.    Diagnoses and all orders for this visit:    Benign essential hypertension - Stable and controlled. Continue current medications.  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    Hyperlipidemia, unspecified hyperlipidemia type - statin intolerance. Cont zetia.  -     Comprehensive Metabolic Panel; Future  -     Lipid Panel; Future    Aortic atherosclerosis - cont zetia.  -     Comprehensive Metabolic Panel; Future  -     Lipid Panel; Future    Urge incontinence of urine - avoid bladder irritants. Cont kegel exercises. Make sure to empty bladder when you feel the urge and not try to hold it.    Anxiety and depression - doing well on lexapro.      Varicose veins of both lower extremities with pain - try to be more consistent with using compression stockings daily.  -     CBC Auto Differential; Future    IGT (impaired glucose tolerance)  -     Hemoglobin A1C; Future    Advance Care Planning     Date: 01/24/2025  Patient did not wish or was not able to name a surrogate decision maker or provide an Advance Care Plan.     Working on her house now and plan on putting it on the market 2/1. Eventually plans to move to [place] to be close to her daughter.    Very normal to take longer to do tasks. Ok to take breaks - doesn't feel any CP/SOB/ESQUEDA.     Follow up in about 3 months (around 4/24/2025). Labs prior.      Lisa Dean MD  Department of Internal Medicine - [facility]  7:57 AM

## 2025-01-27 ENCOUNTER — OFFICE VISIT (OUTPATIENT)
Dept: OPTOMETRY | Facility: CLINIC | Age: 80
End: 2025-01-27
Payer: MEDICARE

## 2025-01-27 ENCOUNTER — TELEPHONE (OUTPATIENT)
Dept: PRIMARY CARE CLINIC | Facility: CLINIC | Age: 80
End: 2025-01-27
Payer: MEDICARE

## 2025-01-27 DIAGNOSIS — H52.4 PRESBYOPIA OF BOTH EYES: Primary | ICD-10-CM

## 2025-01-27 PROCEDURE — 99499 UNLISTED E&M SERVICE: CPT | Mod: S$GLB,,, | Performed by: OPTOMETRIST

## 2025-01-27 NOTE — PROGRESS NOTES
HPI    Patient is here today for mrx recheck. States that she she tried her   glasses on after purchasing, she could not see well through them and was   advised to get prescription rechecked. Rx NVO.   Last edited by Terri Patel on 1/27/2025  2:16 PM.            Assessment /Plan     For exam results, see Encounter Report.    Presbyopia of both eyes      Eyeglass Final Rx       Eyeglass Final Rx         Sphere Cylinder    Right +3.00 Sphere    Left +3.00 Sphere      Type: Reading    Expiration Date: 1/27/2026              Eyeglass Final Rx #2         Sphere Cylinder    Right +1.25 Sphere    Left +1.25 Sphere      Type: Computer    Expiration Date: 1/27/2026                   RTC x 9 months for annual eye exam unless changes noted sooner.

## 2025-01-31 ENCOUNTER — PATIENT MESSAGE (OUTPATIENT)
Dept: OPHTHALMOLOGY | Facility: CLINIC | Age: 80
End: 2025-01-31
Payer: MEDICARE

## 2025-02-21 DIAGNOSIS — Z00.00 ENCOUNTER FOR MEDICARE ANNUAL WELLNESS EXAM: ICD-10-CM

## 2025-02-23 ENCOUNTER — PATIENT MESSAGE (OUTPATIENT)
Dept: PRIMARY CARE CLINIC | Facility: CLINIC | Age: 80
End: 2025-02-23
Payer: MEDICARE

## 2025-02-24 NOTE — TELEPHONE ENCOUNTER
Spoke w/ pt, pt states she is feeling ok, no CP, or SOB. Pt states she does feel some stress as she is selling her home at the present time.   Pt advised to call or message clinic if she starts having symptoms, or has any further concerns or questions

## 2025-02-28 ENCOUNTER — TELEPHONE (OUTPATIENT)
Dept: PRIMARY CARE CLINIC | Facility: CLINIC | Age: 80
End: 2025-02-28
Payer: MEDICARE

## 2025-02-28 NOTE — TELEPHONE ENCOUNTER
Patient left  on 2/21 stating she is back in town from extended vacation and would like to make an appt.  This LCSW has been out of office due to illness.  Spoke with patient today and she is scheduled for 3/7 at 10am.  She is stressed due to house being for sale.

## 2025-03-07 ENCOUNTER — CLINICAL SUPPORT (OUTPATIENT)
Dept: PRIMARY CARE CLINIC | Facility: CLINIC | Age: 80
End: 2025-03-07
Payer: MEDICARE

## 2025-03-07 DIAGNOSIS — Z65.8 INTERPERSONAL PROBLEM: Primary | ICD-10-CM

## 2025-03-07 PROCEDURE — 99499 UNLISTED E&M SERVICE: CPT | Mod: HCNC,S$GLB,,
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
## 2025-03-08 NOTE — PROGRESS NOTES
HPI    DLS: 6/24/2024    Pt here for 6 Month Check;  Pt states no eye pain or discomfort. Pt states she is [place] out of state   closer to her daughter.     Meds;  Systane Ultra TID OU  Lid Scrubs QHS OU    1. LTG Suspect  OU   2. Myelinated NFL OD   3. Ptosis Repair OU (Lyndsay Kim)   4. MGD OU   5. H/O Chalazion RLL and LLL       Last edited by Bessy Meza on 3/13/2025  1:11 PM.            Assessment /Plan     For exam results, see Encounter Report.    Open angle with borderline findings and high glaucoma risk in both eyes    Large physiologic cupping of optic disc    Dry eyes    Dermatochalasis of both upper eyelids    Senile ectropion of right lower eyelid    Brow ptosis, left    Pseudophakia of both eyes    H/O chalazion          1.    Glaucoma (type and duration)    Suspect - low tension glaucoma suspect (suspicious ON.s)    folowed by [facility] for years as a suspect    First HVF   11/2019    First photos   3/2016    Treatment / Drops started   none           Family history    ???        Glaucoma meds   None         H/O adverse rxn to glaucoma drops    None         LASERS    None         GLAUCOMA SURGERIES    None        OTHER EYE SURGERIES    none        CDR    0.8 w/myelinated fibers  /0.8        Tbase    12-16        Tmax    16/16         Ttarget   ???            HVF  4  test  2019 to  2024  -  Non sp  od // non sp  Os(( ? If associated with the myelinated  NFL od)         Gonio   +3-4 ou         CCT    569/546        OCT    4  test 2019 to 2024 - RNFL - poor scan od 2/2 myelinated NFL  od // wnl  os        Disc photos     5/19/2022     - Ttoday   14/134  - Test done today    IOP/       2. . Myelinated NFL OD    3. H/o eyelid surgery in 2005   Dr. harris for ptosis / dermatochalasis   Pt is interested in a re-do        4. PC IOL OU    OD - 9/21/2016 - PCB00 21.5 - [facility]   OS - 8/3/2016 - PCB00 22.0 - [facility]     5. S/P eyelid surgery    Lyndsay 11/2/2022 - tarsal stripping and blepharoplasty     Plan  - was monitored  with Dr. Loraine Miguel in past -  with VF's photos ect. / Eventually was told she did NOT have glaucoma and no further testing was done - old VF's ect - may have a defect from the medullated NFL    Continue to use OTC readers as needed     Monitor as a suspect - normal tension suspect - very suspicious ON/s   Hold off on starting treatment at this time -     - the VF changes od may ne 2/2 myelinated NFL     2/26/2024   Stye / chalazion - much smaller - almost resolved - nothing to really I&D   Doing ok with using lid wipes bid     3/13/2025   Pt is moving  to [facility] - to be closer to her daughter -     Pt is to establish eye care once she gets moved to [place]   Can contiue to be monitored as a - normal tension suspect - very suspicious ON/s   Hold off on starting treatment at this time -     - the VF changes od may be 2/2 myelinated NFL   Copy of VF's and OCT's and today note given to patient to take with her

## 2025-03-13 ENCOUNTER — OFFICE VISIT (OUTPATIENT)
Dept: OPHTHALMOLOGY | Facility: CLINIC | Age: 80
End: 2025-03-13
Payer: MEDICARE

## 2025-03-13 DIAGNOSIS — H57.812 BROW PTOSIS, LEFT: ICD-10-CM

## 2025-03-13 DIAGNOSIS — H04.123 DRY EYES: ICD-10-CM

## 2025-03-13 DIAGNOSIS — Z96.1 PSEUDOPHAKIA OF BOTH EYES: ICD-10-CM

## 2025-03-13 DIAGNOSIS — G89.29 CHRONIC MIDLINE LOW BACK PAIN WITHOUT SCIATICA: ICD-10-CM

## 2025-03-13 DIAGNOSIS — M54.50 CHRONIC MIDLINE LOW BACK PAIN WITHOUT SCIATICA: ICD-10-CM

## 2025-03-13 DIAGNOSIS — H47.239 LARGE PHYSIOLOGIC CUPPING OF OPTIC DISC: ICD-10-CM

## 2025-03-13 DIAGNOSIS — H02.831 DERMATOCHALASIS OF BOTH UPPER EYELIDS: ICD-10-CM

## 2025-03-13 DIAGNOSIS — H40.023 OPEN ANGLE WITH BORDERLINE FINDINGS AND HIGH GLAUCOMA RISK IN BOTH EYES: Primary | ICD-10-CM

## 2025-03-13 DIAGNOSIS — H02.834 DERMATOCHALASIS OF BOTH UPPER EYELIDS: ICD-10-CM

## 2025-03-13 DIAGNOSIS — Z86.69 H/O CHALAZION: ICD-10-CM

## 2025-03-13 DIAGNOSIS — H02.132 SENILE ECTROPION OF RIGHT LOWER EYELID: ICD-10-CM

## 2025-03-13 PROCEDURE — 1101F PT FALLS ASSESS-DOCD LE1/YR: CPT | Mod: HCNC,CPTII,S$GLB, | Performed by: OPHTHALMOLOGY

## 2025-03-13 PROCEDURE — 1126F AMNT PAIN NOTED NONE PRSNT: CPT | Mod: HCNC,CPTII,S$GLB, | Performed by: OPHTHALMOLOGY

## 2025-03-13 PROCEDURE — 1160F RVW MEDS BY RX/DR IN RCRD: CPT | Mod: HCNC,CPTII,S$GLB, | Performed by: OPHTHALMOLOGY

## 2025-03-13 PROCEDURE — 92012 INTRM OPH EXAM EST PATIENT: CPT | Mod: HCNC,S$GLB,, | Performed by: OPHTHALMOLOGY

## 2025-03-13 PROCEDURE — 1159F MED LIST DOCD IN RCRD: CPT | Mod: HCNC,CPTII,S$GLB, | Performed by: OPHTHALMOLOGY

## 2025-03-13 PROCEDURE — 3288F FALL RISK ASSESSMENT DOCD: CPT | Mod: HCNC,CPTII,S$GLB, | Performed by: OPHTHALMOLOGY

## 2025-03-13 PROCEDURE — 99999 PR PBB SHADOW E&M-EST. PATIENT-LVL III: CPT | Mod: PBBFAC,HCNC,, | Performed by: OPHTHALMOLOGY

## 2025-03-13 RX ORDER — CYCLOBENZAPRINE HCL 5 MG
5 TABLET ORAL
Qty: 30 TABLET | Refills: 0 | Status: SHIPPED | OUTPATIENT
Start: 2025-03-13

## 2025-03-14 ENCOUNTER — PATIENT MESSAGE (OUTPATIENT)
Dept: UROGYNECOLOGY | Facility: CLINIC | Age: 80
End: 2025-03-14
Payer: MEDICARE

## 2025-03-14 ENCOUNTER — TELEPHONE (OUTPATIENT)
Dept: PRIMARY CARE CLINIC | Facility: CLINIC | Age: 80
End: 2025-03-14
Payer: MEDICARE

## 2025-03-14 NOTE — TELEPHONE ENCOUNTER
----- Message from Radha sent at 3/14/2025 11:24 AM CDT -----  Contact: [phone]  Patient states she needs a call back regarding her:  BP current reading is 146/76 HR 78 Please call and advise.Thank you and have a great day.

## 2025-03-14 NOTE — TELEPHONE ENCOUNTER
Do we need to increase her lexapro perhaps? Does she still have her hydroxyzine 25mg TID prn itching? She can also use that for prn anxiety. Meditation? Yoga? Exercise? Of course unfortunately Lisa is going out on leave and won't be back till 6-7 weeks from now.

## 2025-03-14 NOTE — TELEPHONE ENCOUNTER
I spoke with Ms. Edwards she mentioned she is a bit overwhelmed with selling her house she has inspectors coming within the next 14 days and is feeling a bit nervous and on edge. She reported her BP was 146/76 HR 76 this morning and she took it again recently and was 142/83 HR 83. She is asking what she can do to can do to help her calm her down I recommended chamomile or lavender tea. Please advise

## 2025-03-20 RX ORDER — CONJUGATED ESTROGENS 0.62 MG/G
CREAM VAGINAL
Qty: 90 G | Refills: 1 | Status: SHIPPED | OUTPATIENT
Start: 2025-03-20

## 2025-03-20 NOTE — TELEPHONE ENCOUNTER
Refill Decision Note   Grace Navarro  is requesting a refill authorization.  Brief Assessment and Rationale for Refill:  Approve     Medication Therapy Plan: Rx sent to pharmacy requested via patient portal.      Comments:     Note composed:12:25 PM 03/20/2025

## 2025-03-24 RX ORDER — CONJUGATED ESTROGENS 0.62 MG/G
CREAM VAGINAL
Qty: 45 G | Refills: 12 | Status: SHIPPED | OUTPATIENT
Start: 2025-03-24

## 2025-04-09 ENCOUNTER — TELEPHONE (OUTPATIENT)
Dept: PHARMACY | Facility: CLINIC | Age: 80
End: 2025-04-09
Payer: MEDICARE

## 2025-04-09 NOTE — TELEPHONE ENCOUNTER
Juan José,     While performing chart reviews for patients identified as potentially being at increased for falls, this patient has been identified as being on 2+ anticholinergic medications. The use of multiple medicines with anticholinergic effects can increase the risk of dementia and cognitive decline, falls, and mortality particularly in older populations. This patient may be a candidate for a deprescribing plan for their anticholinergic medications. Below are some factors to consider when assessing if deprescribing is right for the patient. If you identify that this patient may be right for deprescribing, send an E-consult to Pharmacy for deprescribing support and a patient-specific deprescribing plan.   General deprescribing triggers:   Inappropriate indication, no current indication, presence or risk of adverse events, drug interactions, drug-disease interactions, poor adherence, patient preference   Is there a documented indication or symptoms supporting continued use?   Potential inappropriate indications for continued use   Long term therapy >6 months   Allergic conditions without prior trial of non-sedating antihistamines   Non-histamine mediated condition (e.g., neuropathic itch)   Chronic itch in older patients (skin dryness, medical conditions, polypharmacy)   Hypnotic or sedative   Did the patient fail trials of acetaminophen, NSAIDs and/or topical analgesics?   Did the patient fail trials of mirabegron (Myrbetriq) and/or vibegron (Gemtesa)?   If beta 3 agonists listed above fail to control symptoms and benefits of treatment outweigh the risk, darifenacin and trospium may have less cognitive impairment adverse effects than other antimuscarinic drugs as they are purported not to cross the blood brain barrier.   Is the patient on another medication that is causing a decrease in bladder function?   Are there adverse effects? (Present or the risk of adverse effects)   Falls, dizziness, confusion,  drowsiness, headache, constipation, dry eyes, blurry vision, dry mouth, urinary retention   Does the patient or caregiver agree with the recommendation to deprescribe?   Engage the patient or caregiver and inform them of the rationale for deprescribing    Increase fall and fracture risk, cognitive impairment   Research and interviews with patients in a deprescribing study found that ~90% of people would be willing to stop a medication if it was recommended by their physician.

## 2025-04-14 ENCOUNTER — PATIENT MESSAGE (OUTPATIENT)
Dept: PRIMARY CARE CLINIC | Facility: CLINIC | Age: 80
End: 2025-04-14
Payer: MEDICARE

## (undated) DEVICE — CONTAINER SPECIMEN OR STER 4OZ

## (undated) DEVICE — SOL WATER STRL IRR 1000ML

## (undated) DEVICE — TAPE SURG TRANSPORE 1 SNG USE

## (undated) DEVICE — SUT 5/0 18IN PROLENE BL MO

## (undated) DEVICE — SOL BSS BALANCED SALT

## (undated) DEVICE — SKINMARKER & RULER REGULAR X-F

## (undated) DEVICE — SOL BETADINE 5%

## (undated) DEVICE — BLADE SURG #15 CARBON STEEL

## (undated) DEVICE — TOWEL OR DISP STRL BLUE 4/PK

## (undated) DEVICE — DRAPE STERI INSTRUMENT 1018

## (undated) DEVICE — NDL STRAIGHT 4CM LEIBINGER

## (undated) DEVICE — PENCIL ROCKER SWITCH 10FT CORD

## (undated) DEVICE — SUT SILK 4-0 BLK BR G-3G-3

## (undated) DEVICE — MARKER SKIN STND TIP BLUE BARR

## (undated) DEVICE — TRAY MUSCLE LID EYE

## (undated) DEVICE — NDL HYPO A BEVEL 30X1/2

## (undated) DEVICE — SUT CTD VICRYL 5-0 P-2 UNDB

## (undated) DEVICE — ELECTRODE REM PLYHSV RETURN 9

## (undated) DEVICE — SUT PROLENE 6-0 P-1 18

## (undated) DEVICE — DRAPE EENT SPLIT STERILE

## (undated) DEVICE — GOWN ECLIPSE REINF LVL4 TWL XL

## (undated) DEVICE — SUT CTD VICRYL 6-0 S-14

## (undated) DEVICE — DRAPE STERI-DRAPE 1000 17X11IN

## (undated) DEVICE — GAUZE SPONGE 4X4 12PLY

## (undated) DEVICE — SYR 10CC LUER LOCK

## (undated) DEVICE — PROTECTOR CORNEAL CROUCH ST

## (undated) DEVICE — INSTRUMENT SURG SUCT FRZR W/C

## (undated) DEVICE — CLEANER TIP ELECSURG 2X2IN